# Patient Record
Sex: MALE | Race: WHITE | NOT HISPANIC OR LATINO | Employment: FULL TIME | URBAN - METROPOLITAN AREA
[De-identification: names, ages, dates, MRNs, and addresses within clinical notes are randomized per-mention and may not be internally consistent; named-entity substitution may affect disease eponyms.]

---

## 2017-02-27 ENCOUNTER — HOSPITAL ENCOUNTER (OUTPATIENT)
Dept: RADIOLOGY | Facility: CLINIC | Age: 52
Discharge: HOME | End: 2017-02-27
Payer: COMMERCIAL

## 2017-02-27 ENCOUNTER — TRANSCRIBE ORDERS (OUTPATIENT)
Dept: RADIOLOGY | Facility: CLINIC | Age: 52
End: 2017-02-27

## 2017-02-27 DIAGNOSIS — N20.0 CALCULUS OF KIDNEY: ICD-10-CM

## 2017-02-27 PROCEDURE — 74000 HB X-RAY EXAM OF ABDOMEN (SINGLE ANTEROPOSTERIOR VIEW): CPT

## 2017-03-01 ENCOUNTER — ALLSCRIPTS OFFICE VISIT (OUTPATIENT)
Dept: OTHER | Facility: OTHER | Age: 52
End: 2017-03-01

## 2017-04-02 ENCOUNTER — GENERIC CONVERSION - ENCOUNTER (OUTPATIENT)
Dept: OTHER | Facility: OTHER | Age: 52
End: 2017-04-02

## 2017-04-03 ENCOUNTER — APPOINTMENT (OUTPATIENT)
Dept: LAB | Facility: CLINIC | Age: 52
End: 2017-04-03
Payer: COMMERCIAL

## 2017-04-03 ENCOUNTER — ALLSCRIPTS OFFICE VISIT (OUTPATIENT)
Dept: OTHER | Facility: OTHER | Age: 52
End: 2017-04-03

## 2017-04-03 ENCOUNTER — TRANSCRIBE ORDERS (OUTPATIENT)
Dept: LAB | Facility: CLINIC | Age: 52
End: 2017-04-03

## 2017-04-03 DIAGNOSIS — I48.91 ATRIAL FIBRILLATION, UNSPECIFIED TYPE (HCC): ICD-10-CM

## 2017-04-03 DIAGNOSIS — I48.91 ATRIAL FIBRILLATION, UNSPECIFIED TYPE (HCC): Primary | ICD-10-CM

## 2017-04-04 ENCOUNTER — HOSPITAL ENCOUNTER (OUTPATIENT)
Dept: NON INVASIVE DIAGNOSTICS | Facility: HOSPITAL | Age: 52
Discharge: HOME | End: 2017-04-04
Attending: INTERNAL MEDICINE | Admitting: INTERNAL MEDICINE
Payer: COMMERCIAL

## 2017-04-04 ENCOUNTER — HOSPITAL ENCOUNTER (OUTPATIENT)
Dept: NON INVASIVE DIAGNOSTICS | Facility: HOSPITAL | Age: 52
Discharge: HOME | End: 2017-04-04
Attending: INTERNAL MEDICINE
Payer: COMMERCIAL

## 2017-04-04 ENCOUNTER — ANESTHESIA (OUTPATIENT)
Dept: SURGERY | Facility: HOSPITAL | Age: 52
End: 2017-04-04
Payer: COMMERCIAL

## 2017-04-04 ENCOUNTER — ANESTHESIA EVENT (OUTPATIENT)
Dept: SURGERY | Facility: HOSPITAL | Age: 52
End: 2017-04-04
Payer: COMMERCIAL

## 2017-04-04 ENCOUNTER — ANESTHESIA EVENT (OUTPATIENT)
Dept: NON INVASIVE DIAGNOSTICS | Facility: HOSPITAL | Age: 52
End: 2017-04-04
Payer: COMMERCIAL

## 2017-04-04 ENCOUNTER — GENERIC CONVERSION - ENCOUNTER (OUTPATIENT)
Dept: OTHER | Facility: OTHER | Age: 52
End: 2017-04-04

## 2017-04-04 ENCOUNTER — ANESTHESIA (OUTPATIENT)
Dept: NON INVASIVE DIAGNOSTICS | Facility: HOSPITAL | Age: 52
End: 2017-04-04
Payer: COMMERCIAL

## 2017-04-04 VITALS
SYSTOLIC BLOOD PRESSURE: 116 MMHG | BODY MASS INDEX: 40.52 KG/M2 | HEIGHT: 70 IN | TEMPERATURE: 98.2 F | RESPIRATION RATE: 20 BRPM | HEART RATE: 80 BPM | WEIGHT: 283 LBS | OXYGEN SATURATION: 95 % | DIASTOLIC BLOOD PRESSURE: 75 MMHG

## 2017-04-04 DIAGNOSIS — I48.91 A-FIB (HCC): ICD-10-CM

## 2017-04-04 LAB
ALBUMIN SERPL BCP-MCNC: 3.5 G/DL (ref 3.5–5)
ALP SERPL-CCNC: 49 U/L (ref 46–116)
ALT SERPL W P-5'-P-CCNC: 62 U/L (ref 12–78)
ANION GAP SERPL CALCULATED.3IONS-SCNC: 6 MMOL/L (ref 4–13)
AST SERPL W P-5'-P-CCNC: 20 U/L (ref 5–45)
ATRIAL RATE: 83 BPM
ATRIAL RATE: 86 BPM
BILIRUB SERPL-MCNC: 0.36 MG/DL (ref 0.2–1)
BUN SERPL-MCNC: 20 MG/DL (ref 5–25)
CALCIUM SERPL-MCNC: 8.7 MG/DL (ref 8.3–10.1)
CHLORIDE SERPL-SCNC: 108 MMOL/L (ref 100–108)
CO2 SERPL-SCNC: 27 MMOL/L (ref 21–32)
CREAT SERPL-MCNC: 1.22 MG/DL (ref 0.6–1.3)
ERYTHROCYTE [DISTWIDTH] IN BLOOD BY AUTOMATED COUNT: 13.1 % (ref 11.6–15.1)
GFR SERPL CREATININE-BSD FRML MDRD: >60 ML/MIN/1.73SQ M
GLUCOSE P FAST SERPL-MCNC: 105 MG/DL (ref 65–99)
GLUCOSE SERPL-MCNC: 105 MG/DL (ref 65–140)
HCT VFR BLD AUTO: 44.4 % (ref 36.5–49.3)
HGB BLD-MCNC: 15.4 G/DL (ref 12–17)
MAGNESIUM SERPL-MCNC: 2.2 MG/DL (ref 1.6–2.6)
MCH RBC QN AUTO: 30.1 PG (ref 26.8–34.3)
MCHC RBC AUTO-ENTMCNC: 34.7 G/DL (ref 31.4–37.4)
MCV RBC AUTO: 87 FL (ref 82–98)
P AXIS: 24 DEGREES
PLATELET # BLD AUTO: 239 THOUSANDS/UL (ref 149–390)
PMV BLD AUTO: 11.9 FL (ref 8.9–12.7)
POTASSIUM SERPL-SCNC: 3.7 MMOL/L (ref 3.5–5.3)
PR INTERVAL: 156 MS
PROT SERPL-MCNC: 7.5 G/DL (ref 6.4–8.2)
QRS AXIS: -20 DEGREES
QRS AXIS: -5 DEGREES
QRSD INTERVAL: 82 MS
QRSD INTERVAL: 96 MS
QT INTERVAL: 360 MS
QT INTERVAL: 406 MS
QTC INTERVAL: 452 MS
QTC INTERVAL: 477 MS
RBC # BLD AUTO: 5.12 MILLION/UL (ref 3.88–5.62)
SODIUM SERPL-SCNC: 141 MMOL/L (ref 136–145)
T WAVE AXIS: 27 DEGREES
T WAVE AXIS: 51 DEGREES
VENTRICULAR RATE: 83 BPM
VENTRICULAR RATE: 95 BPM
WBC # BLD AUTO: 8.67 THOUSAND/UL (ref 4.31–10.16)

## 2017-04-04 PROCEDURE — 85027 COMPLETE CBC AUTOMATED: CPT | Performed by: INTERNAL MEDICINE

## 2017-04-04 PROCEDURE — 92960 CARDIOVERSION ELECTRIC EXT: CPT | Performed by: INTERNAL MEDICINE

## 2017-04-04 PROCEDURE — 93312 ECHO TRANSESOPHAGEAL: CPT

## 2017-04-04 PROCEDURE — 80053 COMPREHEN METABOLIC PANEL: CPT | Performed by: INTERNAL MEDICINE

## 2017-04-04 PROCEDURE — 93005 ELECTROCARDIOGRAM TRACING: CPT | Performed by: INTERNAL MEDICINE

## 2017-04-04 PROCEDURE — 83735 ASSAY OF MAGNESIUM: CPT | Performed by: INTERNAL MEDICINE

## 2017-04-04 RX ORDER — SODIUM CHLORIDE 9 MG/ML
50 INJECTION, SOLUTION INTRAVENOUS CONTINUOUS
Status: DISCONTINUED | OUTPATIENT
Start: 2017-04-04 | End: 2017-04-04 | Stop reason: HOSPADM

## 2017-04-04 RX ORDER — PROPOFOL 10 MG/ML
INJECTION, EMULSION INTRAVENOUS AS NEEDED
Status: DISCONTINUED | OUTPATIENT
Start: 2017-04-04 | End: 2017-04-04 | Stop reason: SURG

## 2017-04-04 RX ADMIN — PROPOFOL 20 MG: 10 INJECTION, EMULSION INTRAVENOUS at 17:09

## 2017-04-04 RX ADMIN — SODIUM CHLORIDE: 0.9 INJECTION, SOLUTION INTRAVENOUS at 16:41

## 2017-04-04 RX ADMIN — PROPOFOL 50 MG: 10 INJECTION, EMULSION INTRAVENOUS at 17:02

## 2017-04-04 RX ADMIN — PROPOFOL 100 MG: 10 INJECTION, EMULSION INTRAVENOUS at 16:57

## 2017-04-04 RX ADMIN — PROPOFOL 200 MG: 10 INJECTION, EMULSION INTRAVENOUS at 16:52

## 2017-04-04 RX ADMIN — PROPOFOL 50 MG: 10 INJECTION, EMULSION INTRAVENOUS at 17:00

## 2017-04-04 RX ADMIN — TOPICAL ANESTHETIC 1 SPRAY: 200 SPRAY DENTAL; PERIODONTAL at 16:51

## 2017-04-04 RX ADMIN — PROPOFOL 30 MG: 10 INJECTION, EMULSION INTRAVENOUS at 17:05

## 2017-12-30 ENCOUNTER — OFFICE VISIT (OUTPATIENT)
Dept: URGENT CARE | Facility: MEDICAL CENTER | Age: 52
End: 2017-12-30
Payer: COMMERCIAL

## 2017-12-30 PROCEDURE — G0382 LEV 3 HOSP TYPE B ED VISIT: HCPCS

## 2018-01-01 NOTE — PROGRESS NOTES
Assessment   1  Acute upper respiratory infection (465 9) (J06 9)    Plan   Acute upper respiratory infection    · Benzonatate 200 MG Oral Capsule; TAKE 1 CAPSULE 3 TIMES DAILY AS NEEDED    Discussion/Summary   Discussion Summary:    1  Push fluids  Take Tessalon 1 every 8 hours as needed for cough  Follow-up with PCP if symptoms persist     Medication Side Effects Reviewed: Possible side effects of new medications were reviewed with the patient/guardian today  Understands and agrees with treatment plan: The treatment plan was reviewed with the patient/guardian  The patient/guardian understands and agrees with the treatment plan      Chief Complaint   1  Cold Symptoms  Chief Complaint Free Text Note Form: Pt presents with cold symptoms for 3-4 days  No fever      History of Present Illness   HPI: The patient is a 15-year-old male who presents with 3 day history of nasal discharge, coughing congestion  He denies any fever, chills or body aches since the onset of his symptoms  Patient denies any upset stomach, vomiting or diarrhea  Hospital Based Practices Required Assessment:      Pain Assessment      the patient states they do not have pain  Abuse And Domestic Violence Screen       Yes, the patient is safe at home  -- The patient states no one is hurting them  Depression And Suicide Screen  No, the patient has not had thoughts of hurting themself  No, the patient has not felt depressed in the past 7 days  Prefered Language is  english  Primary Language is  english  Readiness To Learn: Receptive  Barriers To Learning: none  Preferred Learning: verbal      Review of Systems   Focused-Male:      Constitutional: no fever or chills, feels well, no tiredness, no recent weight loss or weight gain  ENT: nasal discharge  Respiratory: cough-- and-- PND  Active Problems   1  Asthma (246 90) (J45 909)   2  Atrial fibrillation (840 31) (I42 91)   3   Benign essential hypertension (401 1) (I10)   4  Bilateral kidney stones (592 0) (N20 0)   5  Bilateral leg weakness (729 89) (R29 898)   6  Cough (786 2) (R05)   7  Degenerative joint disease of right knee (715 96) (M17 11)   8  Essential hypertension (401 9) (I10)   9  Hypertension (401 9) (I10)   10  Morbid obesity (278 01) (E66 01)   11  Muscle weakness (generalized) (728 87) (M62 81)   12  Nephrolithiasis (592 0) (N20 0)   13  Primary osteoarthritis of left knee (715 16) (M17 12)   14  Prostate cancer screening (V76 44) (Z12 5)   15  Right knee pain (719 46) (M25 561)   16  Spinal stenosis (724 00) (M48 00)   17  Strep pharyngitis (034 0) (J02 0)   18  Tear of medial meniscus of right knee (836 0) (S83 241A)   19  Tremor (781 0) (R25 1)    Past Medical History   1  History of Acute bronchitis (466 0) (J20 9)   2  History of Foot Pain (Soft Tissue) (729 5)   3  History of arthritis (V13 4) (Z87 39)   4  History of Irregular heart beat (427 9) (I49 9)  Active Problems And Past Medical History Reviewed: The active problems and past medical history were reviewed and updated today  Family History   Mother    1  Family history of Atrial fibrillation   2  Family history of blood dyscrasia (V18 3) (Z83 2)   3  Family history of hypertension (V17 49) (Z82 49)   4  Family history of Hypoglycemia  Father    5  Family history of atrial fibrillation (V17 49) (Z82 49)   6  Family history of Hypoglycemia  Family History    7  Family history of Diabetes Mellitus (V18 0)   8  Family history of Thrombocytosis  Family History Reviewed: The family history was reviewed and updated today  Social History    · Alcohol use (V49 89) (Z78 9)   · Completed 12th grade   · Former smoker (A43 88) (F58 508)   ·    · No caffeine use   · No drug use  Social History Reviewed: The social history was reviewed and updated today  Surgical History   1  History of Appendectomy   2   History of Cystoscopy With Insertion Of Ureteral Stent 3  History of Cystoscopy With Removal Of Object   4  History of Foot Surgery Left   5  History of Hand Surgery  Surgical History Reviewed: The surgical history was reviewed and updated today  Current Meds    1  AmLODIPine Besylate 2 5 MG Oral Tablet; TAKE 1 TABLET DAILY; Therapy: 29ZPI9862 to (Evaluate:26Rrt2311)  Requested for: 33GPK9430; Last     Rx:78Jhr1387 Ordered   2  AmLODIPine Besylate 5 MG Oral Tablet; Take 1 tablet daily; Therapy: 74OPZ0459 to (Evaluate:12Oct2018)  Requested for: 31KAU6256; Last     Rx:16Nov2017 Ordered   3  Bystolic 2 5 MG Oral Tablet; TAKE 1 TABLET DAILY; Therapy: 72ZVW9365 to (Evaluate:07Jun2018)  Requested for: 12Jun2017; Last     Rx:12Jun2017 Ordered   4  Bystolic 5 MG Oral Tablet; TAKE 1 TABLET IN THE MORNING AND 0 5 TABLET IN THE     EVENING; Therapy: (Recorded:80Lbc3732) to Recorded   5  Dofetilide 500 MCG Oral Capsule; take one capsule by mouth every 12 hours; Therapy: 74RZA1124 to (Evaluate:17Nov2018)  Requested for: 21MKE0447; Last     Rx:22Nov2017 Ordered   6  Edarbi 80 MG Oral Tablet; TAKE 1 TABLET DAILY; Last Rx:07Apr2017 Ordered   7  Tikosyn 500 MCG Oral Capsule; take one capsule by mouth every 12 hours; Therapy: 69Xou5544 to (Vertis Bodily)  Requested for: 15BOS7644; Last     Rx:33Mqz6318 Ordered   8  Xarelto 20 MG Oral Tablet; take 1 tablet every day; Therapy: 42PCP1022 to (Aniya Shepherd)  Requested for: 50MHW0260; Last     Rx:74Sss7610 Ordered  Medication List Reviewed: The medication list was reviewed and updated today  Allergies   1  No Known Drug Allergies    Vitals   Signs   Recorded: 30Dec2017 12:17PM   Temperature: 98 3 F  Heart Rate: 75  Respiration: 20  Systolic: 225  Diastolic: 88  Weight: 953 lb   BMI Calculated: 42 18  BSA Calculated: 2 46  O2 Saturation: 97  Pain Scale: 0    Physical Exam        Constitutional      General appearance: No acute distress, well appearing and well nourished         Ears, Nose, Mouth, and Throat      External inspection of ears and nose: Normal        Otoscopic examination: Tympanic membrance translucent with normal light reflex  Canals patent without erythema  Nasal mucosa, septum, and turbinates: Abnormal  -- Slight, clear nasal drainage bilateral       Oropharynx: Normal with no erythema, edema, exudate or lesions  Pulmonary      Respiratory effort: No increased work of breathing or signs of respiratory distress  Auscultation of lungs: Clear to auscultation  Cardiovascular      Auscultation of heart: Normal rate and rhythm, normal S1 and S2, without murmurs         Future Appointments      Date/Time Provider Specialty Site   03/07/2018 09:00 AM [facility]la, [facility]     Signatures    Electronically signed by : Phani Schmid, [facility]; Dec 30 2017  2:06PM EST                       (Author)     Electronically signed by : ANEUDY Eldridge ; Dec 31 2017  2:23PM EST                       (Co-author)

## 2018-01-09 NOTE — RESULT NOTES
Message  pt seen for emg today , mri of lumbar spine , no spinal stenosis , emg of lower extremties today was normal , due to persistent symptoms of leg heaviness , will order mri of tpsine   pt has f/u with me in march Signatures   Electronically signed by :  Kali Henderson DO; Jan 26 2016 10:40AM EST                       (Author)

## 2018-01-10 NOTE — RESULT NOTES
Verified Results  XR SPINE LUMBAR COMPLETE Pankaj Connell MINIMUM 6 VIEWS 83ECW0625 12:38PM [facility] Order Number: [id number]     Test Name Result Flag Reference   XR SPINE LUMBAR COMPLETE W BENDING MINIMUM 6 VIEWS (Report)     LUMBAR SPINE     INDICATION: Low back pain     COMPARISON: None     VIEWS: AP, bilateral oblique, coned down and lateral projections in neutral, flexion and extension; 6 images     FINDINGS:     Alignment is unremarkable  There is no subluxation and alignment is stable in flexion, extension, and neutral positioning  There is no radiographic evidence of acute fracture or destructive osseous lesion  No significant lumbar degenerative change noted  Several calcifications overlie the lower pole of the left kidney, the largest measures 4 mm in diameter  2 additional smaller calculi are seen  Normal lumbar spine  Left renal calculi  This can be further evaluated with an ultrasound         ##sigslh##sigslh

## 2018-01-11 NOTE — PROGRESS NOTES
Chief Complaint  Pt here for BP check per Evelin Manner  Took BP 2 times about 10 minutes apart  1st: 170/90 2nd:172/98      Active Problems    1  Asthma (493 90) (J45 909)   2  Atrial fibrillation (427 31) (I48 91)   3  Benign essential hypertension (401 1) (I10)   4  Bilateral kidney stones (592 0) (N20 0)   5  Bilateral leg weakness (729 89) (M62 81)   6  Degenerative joint disease of right knee (715 96) (M17 9)   7  Essential hypertension (401 9) (I10)   8  Hypertension (401 9) (I10)   9  Morbid obesity (278 01) (E66 01)   10  Muscle weakness (generalized) (728 87) (M62 81)   11  Nephrolithiasis (592 0) (N20 0)   12  Primary osteoarthritis of left knee (715 16) (M17 12)   13  Right knee pain (719 46) (M25 561)   14  Spinal stenosis (724 00) (M48 00)   15  Strep pharyngitis (034 0) (J02 0)   16  Tear of medial meniscus of right knee (836 0) (S83 241A)   17  Tremor (781 0) (R25 1)    Current Meds   1  AmLODIPine Besylate 5 MG Oral Tablet; Take 1 tablet daily; Therapy: 26THA0528 to (Evaluate:05Pcf0792)  Requested for: 35MYV9354; Last   Rx:02Nov2016 Ordered   2  Benicar 40 MG Oral Tablet; TAKE 1 TABLET DAILY; Therapy: 98VDM8404 to (Evaluate:12Oct2017)  Requested for: 29Oct2016; Last   Rx:36Hgs9418 Ordered   3  Bystolic 5 MG Oral Tablet; TAKE 1 TABLET DAILY  Requested for: 31Hvi8933; Last   [place]:68TVV3719 Ordered   4  Nitrofurantoin Macrocrystal 100 MG Oral Capsule; 100 mg twice daily for three days; Therapy: 84Tvu3337 to (Katie Santos)  Requested for: 29Dcg1247; Last   Rx:36Tff8078 Ordered   5  Tikosyn 500 MCG Oral Capsule; take one capsule by mouth every 12 hours; Therapy: 14Dub4807 to (Dale Kay)  Requested for: 54HHT1862; Last   Rx:23Sjr0420 Ordered   6  Xarelto 20 MG Oral Tablet; take 1 tablet every day; Therapy: 37LNT2195 to (Evaluate:28Xoa8736)  Requested for: 59RBE8032; Last   Rx:01Jfx8191 Ordered    Allergies    1   No Known Drug Allergies    Vitals  Signs    Heart Rate: 88  Systolic: 881  Diastolic: 90  BP Cuff Size: Large  Height: 5 ft 10 in  Weight: 294 lb 6 oz  BMI Calculated: 42 24  BSA Calculated: 2 46  O2 Saturation: 97    Future Appointments    Date/Time Provider Specialty Site   03/01/2017 10:30 AM Neelam Bravo Urology [facility] FOR UROLOGY [facility]   12/13/2016 10:40 AM Kira Godoy DO Cardiology [facility]     Signatures   Electronically signed by : Lauren Khan DO; Dec  4 2016  5:26PM EST                       (Author)

## 2018-01-12 NOTE — PROCEDURES
Procedures by Janeen Cardenas MD at  4/4/2017  5:22 PM      Author:  Janeen Cardenas MD Service:  Cardiology Author Type:  Fellow    Filed:  4/4/2017  5:24 PM Date of Service:  4/4/2017  5:22 PM Status:  Attested    :  Janeen Cardenas MD (Fellow)  Cosigner:  Kristopher Kim MD at 5/2/2017  7:07 PM      Procedure Orders:       1  ELECTRICAL CARDIOVERSION [[id number]] ordered by Janeen Cardenas MD at 04/04/17 1722                 Post-procedure Diagnoses:       1  A-fib [I48 91]              Attestation signed by Kristopher Kim MD at 5/2/2017  7:07 PM             I have reviewed the notes, assessments, and plan performed by Janeen Cardenas,   I personally reviewed the history with the patient and examined the patient  I concur with his documentation of [facility]                                               Electrical Cardioversion  Date/Time: 4/4/2017 5:22 PM  Performed by: Akil Rose  Authorized by: Akil Rose     Verbal consent obtained?: Yes    Written consent obtained?: Yes    Risks and benefits: Risks, benefits and alternatives were discussed     Consent given by:  Patient  Patient states understanding  of procedure being performed: Yes    Patient's understanding  of procedure matches consent: Yes    Procedure consent matches  procedure scheduled: Yes    Relevant documents present and  verified: Yes    Test results available and properly labeled:  Yes    Site marked: Yes    Imaging studies available: Yes    Patient identity confirmed:  Verbally with patient, arm band, provided demographic data and hospital-assigned identification number  Time out: Immediately prior to the procedure a time out was called    Patient sedated: Yes    Sedation:  Propofol  Vital signs: Vital signs  monitored during sedation    Cardioversion basis:  Elective  Elective indications:  failure of anti-arrhythmic medications    Pre-procedure rhythm:  Atrial fibrillation  Position:  Patient was placed in a supine position    Chest area exposed:  Chest area was exposed    Electrodes:  Pads  Electrodes placed:  Anterior-posterior  Number of attempts:  1  Attempt 1:     Attempt 1 mode:  Synchronous    Attempt 1 waveform:  Biphasic    Attempt 1 shock (Joules):  200    Attempt 1 outcome:  Conversion to normal sinus rhythm  Post-procedure rhythm:  Normal sinus rhythm  Complications:  no complications    Patient tolerance:  Patient tolerated the procedure well with no immediate complications   Preprocedure BLAIRE performed  Left atrial clot/thrombus was ruled out                        Received Antonio Acevedo MD  May  2 2017  7:07PM [facility] Standard Time

## 2018-01-13 VITALS
SYSTOLIC BLOOD PRESSURE: 138 MMHG | HEIGHT: 70 IN | HEART RATE: 60 BPM | WEIGHT: 300 LBS | BODY MASS INDEX: 42.95 KG/M2 | DIASTOLIC BLOOD PRESSURE: 90 MMHG

## 2018-01-13 NOTE — RESULT NOTES
Verified Results  * MRI LUMBAR SPINE [facility] [employer] [id number] 09:31AM [place] Navarro     Test Name Result Flag Reference   MRI LUMBAR SPINE [employer] (Report)     This is a summary report  The complete report is available in the patient's medical record  If you cannot access the medical record, please contact the sending organization for a detailed fax or copy  MRI LUMBAR SPINE WITHOUT CONTRAST     INDICATION: 49-year-old male, leg weakness     COMPARISON: 1/13/2016 x-rays     TECHNIQUE: Sagittal T1, sagittal T2, sagittal inversion recovery, axial T1 and axial T2, coronal T2       IMAGE QUALITY: Diagnostic     FINDINGS:     ALIGNMENT: Normal alignment of the lumbar spine  No compression fracture  No spondylolysis or spondylolisthesis  No scoliosis  MARROW SIGNAL:      Approximately 2 cm L5 vertebral body hemangioma   No significant marrow pathology     DISTAL CORD AND CONUS: Normal size and signal within the distal cord and conus  The conus ends at the L1 level  PARASPINAL SOFT TISSUES: Paraspinal soft tissues are unremarkable  SACRUM: Normal signal within the sacrum  No evidence of insufficiency or stress fracture  LOWER THORACIC DISC SPACES: Normal disc height and signal  No disc herniation, canal stenosis or foraminal narrowing       LUMBAR DISC SPACES:        L1-L2: Normal      L2-L3: Mild degenerative disc dehydration, mild degenerative facet hypertrophy, normal interspace height, no stenosis     L3-L4: Mild degenerative disc dehydration, mild to moderate degenerative facet hypertrophy, normal interspace height, no stenosis     L4-L5: Mild degenerative disc dehydration, mild to moderate degenerative facet hypertrophy, normal interspace height, bulging annulus, central annular tear, no stenosis     L5-S1: Mild degenerative disc dehydration, moderate degenerative facet hypertrophy, mild loss of interspace height, bulging annulus, no stenosis     Multilevel degenerative spondylosis, less conspicuous by x-rays     Bulging annulus, central annular tear L4-5     No evidence of disc herniation, central canal or foraminal stenosis     Incidentally noted L5 vertebral body hemangioma

## 2018-01-16 NOTE — MISCELLANEOUS
Provider Comments  Provider Comments:   Patient no-showed appointment with Dr Maria Antonia Shaver on 03/01/16; [facility] for patient to call back and re-schedule    -Erwin Bermudez   Electronically signed by :  Leopoldo Locket, DO; Mar  3 2016  4:04PM EST                       (Author)

## 2018-01-16 NOTE — PROGRESS NOTES
Chief Complaint  Patient here today for nursing EKG visit  Patient c/o Hans, fatigue and SOB  Active Problems    1  Asthma (493 90) (J45 909)   2  Atrial fibrillation (427 31) (I48 91)   3  Benign essential hypertension (401 1) (I10)   4  Bilateral kidney stones (592 0) (N20 0)   5  Bilateral leg weakness (729 89) (R29 898)   6  Cough (786 2) (R05)   7  Degenerative joint disease of right knee (715 96) (M17 11)   8  Essential hypertension (401 9) (I10)   9  Hypertension (401 9) (I10)   10  Morbid obesity (278 01) (E66 01)   11  Muscle weakness (generalized) (728 87) (M62 81)   12  Nephrolithiasis (592 0) (N20 0)   13  Primary osteoarthritis of left knee (715 16) (M17 12)   14  Prostate cancer screening (V76 44) (Z12 5)   15  Right knee pain (719 46) (M25 561)   16  Spinal stenosis (724 00) (M48 00)   17  Strep pharyngitis (034 0) (J02 0)   18  Tear of medial meniscus of right knee (836 0) (S83 241A)   19  Tremor (781 0) (R25 1)    Current Meds   1  AmLODIPine Besylate 2 5 MG Oral Tablet; TAKE 1 TABLET DAILY; Therapy: 49GNC2066 to (Evaluate:82Mfk8945)  Requested for: 03CVP8332; Last   Rx:82Uct9562 Ordered   2  AmLODIPine Besylate 5 MG Oral Tablet; take one tablet by mouth daily; Therapy: 91IRU2269 to (Evaluate:31Glh3445)  Requested for: 83Mcu8985; Last   Rx:30Gjx5276 Ordered   3  Bystolic 5 MG Oral Tablet; TAKE 1 TABLET IN THE MORNING AND 0 5 TABLET IN THE   EVENING; Therapy: (Recorded:84Qll1714) to Recorded   4  Edarbi 80 MG Oral Tablet; Therapy: (Recorded:01Mar2017) to Recorded   5  Tikosyn 500 MCG Oral Capsule; take one capsule by mouth every 12 hours; Therapy: 19Mkr1316 to (Maryuri Martinez)  Requested for: 40OKS4526; Last   Rx:38Cek5214 Ordered   6  Xarelto 20 MG Oral Tablet; take 1 tablet every day; Therapy: 48YRY8712 to (Evaluate:84Jyy0272)  Requested for: 35Gxc2574; Last   Rx:53Onc1051 Ordered    Allergies    1   No Known Drug Allergies    Vitals  Signs    Heart Rate: 113, Apical  Systolic: 901, LUE  Diastolic: 80, LUE  Height: 5 ft 10 in  Weight: 285 lb 6 oz  BMI Calculated: 40 95  BSA Calculated: 2 43    Plan  Atrial fibrillation    · EKG/ECG- POC; Status:Complete;   Done: 98OHJ0309    Future Appointments    Date/Time Provider Specialty Site   03/07/2018 09:00 AM Johanne Park, Neelam Sparks Urology [facility] FOR UROLOGY [facility]   04/05/2017 12:40 PM Missy Munoz DO Cardiology [facility]

## 2018-01-17 NOTE — PROGRESS NOTES
Preliminary Nursing Report                Patient Information    Initial Encounter Entry Date:   2016 9:02 AM EST (Automated Transmission Automated Transmission)       Last Modified:   {Laly Monroe}              Legal Name: 73Art Garduno Number:        YOB: 1965        Age (years): 48        Gender: M        Body Mass Index (BMI): 42 kg/m2        Height: 70 in  Weight: 290 lbs (132 kgs)           Address:   [address] [place] [id number] [place]              Phone: [phone]   (consent to leave messages)        Email:        Ethnicity: Decline to State        Yarsani:        Marital Status:        Preferred Language: English        Race: Other Race                    Patient Insurance Information        Primary Insurance Information Carrier Name: {Primary  CarrierName}           Carrier Address:   {Primary  CarrierAddress}              Carrier Phone: {Primary  CarrierPhone}          Group Number: {Primary  GroupNumber}          Policy Number: {Primary  PolicyNumber}          Insured Name: {Primary  InsuredName}          Insured : {Primary  InsuredDOB}          Relationship to Insured: {Primary  RelationshiptoInsured}           Secondary Insurance Information Carrier Name: {Secondary  CarrierName}           Carrier Address:   {Secondary  CarrierAddress}              Carrier Phone: {Secondary  CarrierPhone}          Group Number: {Secondary  GroupNumber}          Policy Number: {Secondary  PolicyNumber}          Insured Name: {Secondary  InsuredName}          Insured : {Secondary  InsuredDOB}          Relationship to Insured: {Secondary  RelationshiptoInsured}                       Health Profile   Booking #:   Nick Le #: [id number]               DOS: 2016    Surgery : CYSTOURETHROSCOPY, WITH URETEROSCOPY AND/OR PYELOSCOPY;    Add'l Procedures/Notes:     Surgery Risk: Minor          Precautions     Atrial fibrillation Allergies    No Known Drug Allergies             Medications    AmLODIPine Besylate 5 MG Oral Tablet       Benzonatate 100 MG Oral Capsule       Bystolic 5 MG Oral Tablet       Losartan Potassium 100 MG Oral Tablet       Tikosyn 500 MCG Oral Capsule       Xarelto 20 MG Oral Tablet               Conditions    Asthma       Atrial fibrillation       Bilateral kidney stones       Bilateral leg weakness       Degenerative joint disease of right knee       Essential hypertension       Muscle weakness (generalized)       Nephrolithiasis       Primary osteoarthritis of left knee       Right knee pain       Spinal stenosis       Strep pharyngitis       Tear of medial meniscus of right knee       Tremor               Family History    None             Surgical History    None             Social History    Alcohol use       Completed 12th grade       Former smoker              Clinical Comments: with 4 children, works for [employer], wife name Jewels Bustillo; No caffeine use       No drug use                               Patient Instructions       ? NPO Instructions   The day before surgery it is recommended to have a light dinner at your usual time and you are allowed a light snack early in the evening  Do not eat anything heavy or eat a big meal after 7pm  Do not eat or drink anything after midnight prior to your surgery  If you are supposed to take any of your medications, do so with a sip of water  Failure to follow these instructions can lead to an increased risk of lung complications and may result in a delay or cancellation of your procedure  If you have any questions, contact your institution for further instructions  No candy, no gum, no mints, no chewing tobacco   Triggered by: Medical Procedure Risk         ? Direct Xa inhibitor   With physician consultation, please stop the triggering medication at least 3 days before major surgery or neuraxial procedures  Triggered by: Xarelto 20 MG Oral Tablet         ? Antiarrhythmic Medication   Please continue the following cardiac medication up to and including the day of surgery (with a sip of water)  Triggered by: Tikosyn 500 MCG Oral Capsule         ? Beta Blocker 3, 2, c, 4, 6, 5  Please continue to take this medication on your normal schedule  If this is an oral medication and you take in the morning, you may do so with a sip of water  Triggered by: Bystolic 5 MG Oral Tablet         ? Calcium Blocker (Blood Pressure Medication) 3, 4, 6, 5, 2  Please continue to take this medication on your normal schedule  If this is an oral medication and you take in the morning, you may do so with a sip of water  Triggered by: AmLODIPine Besylate 5 MG Oral Tablet               Testing Considerations       No recommendations for this classification  Consultations       ? Cardiac Consult (Major Rate) c  If the patient has a heart rate of greater than 100 bpm, or if the heart rhythm disturbance is new, then a cardiac consult is indicated  Otherwise, optimization of medical therapy is recommended under the direction of the PCP or cardiologist   Triggered by: Atrial fibrillation               Miscellaneous Questions         Question: Are you able to walk up a flight of stairs, walk up a hill or do heavy housework WITHOUT having chest pain or shortness of breath? Answer: YES                   Allergies/Conditions/Medications Not Found        The following were not recognized by our system when generating the recommendations  Please consider if this would impact any preoperative protocols  ? Alcohol use       ? Completed 12th grade       ?        ? No caffeine use       ? No drug use       ? Losartan Potassium 100 MG Oral Tablet                  Appointment Information         Date:    08/09/2016        Location:    [place]        Address:           Directions:                      Footnotes revision 14      ?? Denotes a free-text entry        Legal Disclaimer: Any and all recommendations and services provided herein are designed to assist in the preoperative care of the patient  Nothing contained herein is designed to replace, eliminate or alleviate the responsibility of the attending physician to supervise and determine the patient?s preoperative care and course of treatment  Failure to provide complete, accurate information may negatively impact the system?s ability to recommend the proper preoperative protocol  THE ATTENDING PHYSICIAN IS RESPONSIBLE TO REVIEW THE SUGGESTED PREOPERATIVE PROTOCOLS/COURSE OF TREATMENT AND PRESCRIBE THE FINAL COURSE OF PREOPERATIVE TREATMENT IN CONSULTATION WITH THE PATIENT  THE ePREOP SYSTEM AND ITS MATERIALS ARE PROVIDED ? AS IS? WITHOUT WARRANTY OF ANY KIND, EXPRESS OR IMPLIED, INCLUDING, BUT NOT LIMITED TO, WARRANTIES OF PERFORMANCE OR MERCHANTABILITY OR FITNESS FOR A PARTICULAR PURPOSE  PATIENT AND PHYSICIANS HEREBY AGREE THAT THEIR USE OF THE MATERIALS AND RESOURCES ACT AS A CONSENT TO RELEASE AND WAIVE ePREOP FROM ANY AND ALL CLAIMS OF WARRANTY, TORT OR CONTRACT LAW OF ANY KIND             Electronically signed by:Sahil Sow MD  Jul 29 2016 12:16PM EST

## 2018-01-22 VITALS
SYSTOLIC BLOOD PRESSURE: 138 MMHG | BODY MASS INDEX: 40.86 KG/M2 | WEIGHT: 285.38 LBS | DIASTOLIC BLOOD PRESSURE: 80 MMHG | HEIGHT: 70 IN | HEART RATE: 113 BPM

## 2018-01-23 VITALS
SYSTOLIC BLOOD PRESSURE: 166 MMHG | HEART RATE: 75 BPM | RESPIRATION RATE: 20 BRPM | DIASTOLIC BLOOD PRESSURE: 88 MMHG | TEMPERATURE: 98.3 F | WEIGHT: 294 LBS | OXYGEN SATURATION: 97 % | BODY MASS INDEX: 42.18 KG/M2

## 2018-01-24 ENCOUNTER — TELEPHONE (OUTPATIENT)
Dept: FAMILY MEDICINE CLINIC | Facility: CLINIC | Age: 53
End: 2018-01-24

## 2018-01-24 DIAGNOSIS — Z20.828 EXPOSURE TO INFLUENZA: Primary | ICD-10-CM

## 2018-01-24 RX ORDER — OSELTAMIVIR PHOSPHATE 75 MG/1
75 CAPSULE ORAL EVERY 12 HOURS SCHEDULED
Qty: 10 CAPSULE | Refills: 0 | Status: SHIPPED | OUTPATIENT
Start: 2018-01-24 | End: 2018-01-29

## 2018-03-01 DIAGNOSIS — N20.0 CALCULUS OF KIDNEY: ICD-10-CM

## 2018-03-01 DIAGNOSIS — Z12.5 ENCOUNTER FOR SCREENING FOR MALIGNANT NEOPLASM OF PROSTATE: ICD-10-CM

## 2018-04-18 DIAGNOSIS — I10 ESSENTIAL HYPERTENSION: Primary | ICD-10-CM

## 2018-04-18 RX ORDER — AZILSARTAN KAMEDOXOMIL 80 MG/1
TABLET ORAL
Qty: 30 TABLET | Refills: 11 | Status: SHIPPED | OUTPATIENT
Start: 2018-04-18 | End: 2019-04-26 | Stop reason: SDUPTHER

## 2018-04-21 DIAGNOSIS — I10 ESSENTIAL HYPERTENSION: Primary | ICD-10-CM

## 2018-04-23 RX ORDER — AMLODIPINE BESYLATE 2.5 MG/1
TABLET ORAL
Qty: 90 TABLET | Refills: 2 | Status: SHIPPED | OUTPATIENT
Start: 2018-04-23 | End: 2018-09-03

## 2018-06-08 DIAGNOSIS — I10 ESSENTIAL HYPERTENSION: Primary | ICD-10-CM

## 2018-06-08 RX ORDER — NEBIVOLOL HYDROCHLORIDE 2.5 MG/1
TABLET ORAL
Qty: 90 TABLET | Refills: 3 | Status: SHIPPED | OUTPATIENT
Start: 2018-06-08 | End: 2018-10-29 | Stop reason: HOSPADM

## 2018-09-03 ENCOUNTER — HOSPITAL ENCOUNTER (EMERGENCY)
Facility: HOSPITAL | Age: 53
Discharge: HOME | End: 2018-09-03
Attending: EMERGENCY MEDICINE | Admitting: EMERGENCY MEDICINE
Payer: COMMERCIAL

## 2018-09-03 VITALS
TEMPERATURE: 98.3 F | HEART RATE: 58 BPM | SYSTOLIC BLOOD PRESSURE: 139 MMHG | DIASTOLIC BLOOD PRESSURE: 74 MMHG | RESPIRATION RATE: 18 BRPM | OXYGEN SATURATION: 94 %

## 2018-09-03 DIAGNOSIS — I48.91 ATRIAL FIBRILLATION WITH RVR (HCC): Primary | ICD-10-CM

## 2018-09-03 LAB
ANION GAP SERPL CALCULATED.3IONS-SCNC: 12 MMOL/L (ref 4–13)
ATRIAL RATE: 357 BPM
ATRIAL RATE: 60 BPM
BASOPHILS # BLD AUTO: 0.08 THOUSANDS/ΜL (ref 0–0.1)
BASOPHILS NFR BLD AUTO: 1 % (ref 0–1)
BUN SERPL-MCNC: 23 MG/DL (ref 5–25)
CALCIUM SERPL-MCNC: 9 MG/DL (ref 8.3–10.1)
CHLORIDE SERPL-SCNC: 105 MMOL/L (ref 100–108)
CO2 SERPL-SCNC: 27 MMOL/L (ref 21–32)
CREAT SERPL-MCNC: 1.38 MG/DL (ref 0.6–1.3)
EOSINOPHIL # BLD AUTO: 0.26 THOUSAND/ΜL (ref 0–0.61)
EOSINOPHIL NFR BLD AUTO: 3 % (ref 0–6)
ERYTHROCYTE [DISTWIDTH] IN BLOOD BY AUTOMATED COUNT: 12.5 % (ref 11.6–15.1)
GFR SERPL CREATININE-BSD FRML MDRD: 58 ML/MIN/1.73SQ M
GLUCOSE SERPL-MCNC: 99 MG/DL (ref 65–140)
HCT VFR BLD AUTO: 50.6 % (ref 36.5–49.3)
HGB BLD-MCNC: 16.8 G/DL (ref 12–17)
IMM GRANULOCYTES # BLD AUTO: 0.03 THOUSAND/UL (ref 0–0.2)
IMM GRANULOCYTES NFR BLD AUTO: 0 % (ref 0–2)
LYMPHOCYTES # BLD AUTO: 2.21 THOUSANDS/ΜL (ref 0.6–4.47)
LYMPHOCYTES NFR BLD AUTO: 24 % (ref 14–44)
MCH RBC QN AUTO: 29.7 PG (ref 26.8–34.3)
MCHC RBC AUTO-ENTMCNC: 33.2 G/DL (ref 31.4–37.4)
MCV RBC AUTO: 89 FL (ref 82–98)
MONOCYTES # BLD AUTO: 1.05 THOUSAND/ΜL (ref 0.17–1.22)
MONOCYTES NFR BLD AUTO: 11 % (ref 4–12)
NEUTROPHILS # BLD AUTO: 5.66 THOUSANDS/ΜL (ref 1.85–7.62)
NEUTS SEG NFR BLD AUTO: 61 % (ref 43–75)
NRBC BLD AUTO-RTO: 0 /100 WBCS
P AXIS: 71 DEGREES
PHOSPHATE SERPL-MCNC: 3.5 MG/DL (ref 2.7–4.5)
PLATELET # BLD AUTO: 223 THOUSANDS/UL (ref 149–390)
PMV BLD AUTO: 11.8 FL (ref 8.9–12.7)
POTASSIUM SERPL-SCNC: 3.9 MMOL/L (ref 3.5–5.3)
PR INTERVAL: 158 MS
QRS AXIS: -43 DEGREES
QRS AXIS: -47 DEGREES
QRSD INTERVAL: 78 MS
QRSD INTERVAL: 82 MS
QT INTERVAL: 340 MS
QT INTERVAL: 414 MS
QTC INTERVAL: 414 MS
QTC INTERVAL: 460 MS
RBC # BLD AUTO: 5.66 MILLION/UL (ref 3.88–5.62)
SODIUM SERPL-SCNC: 144 MMOL/L (ref 136–145)
T WAVE AXIS: 61 DEGREES
T WAVE AXIS: 90 DEGREES
TROPONIN I SERPL-MCNC: <0.02 NG/ML
TSH SERPL DL<=0.05 MIU/L-ACNC: 2.95 UIU/ML (ref 0.36–3.74)
VENTRICULAR RATE: 110 BPM
VENTRICULAR RATE: 60 BPM
WBC # BLD AUTO: 9.29 THOUSAND/UL (ref 4.31–10.16)

## 2018-09-03 PROCEDURE — 93010 ELECTROCARDIOGRAM REPORT: CPT | Performed by: INTERNAL MEDICINE

## 2018-09-03 PROCEDURE — 96374 THER/PROPH/DIAG INJ IV PUSH: CPT

## 2018-09-03 PROCEDURE — 93005 ELECTROCARDIOGRAM TRACING: CPT

## 2018-09-03 PROCEDURE — 96361 HYDRATE IV INFUSION ADD-ON: CPT

## 2018-09-03 PROCEDURE — 36415 COLL VENOUS BLD VENIPUNCTURE: CPT | Performed by: EMERGENCY MEDICINE

## 2018-09-03 PROCEDURE — 84484 ASSAY OF TROPONIN QUANT: CPT | Performed by: EMERGENCY MEDICINE

## 2018-09-03 PROCEDURE — 84443 ASSAY THYROID STIM HORMONE: CPT | Performed by: EMERGENCY MEDICINE

## 2018-09-03 PROCEDURE — 99285 EMERGENCY DEPT VISIT HI MDM: CPT

## 2018-09-03 PROCEDURE — 92960 CARDIOVERSION ELECTRIC EXT: CPT

## 2018-09-03 PROCEDURE — 80048 BASIC METABOLIC PNL TOTAL CA: CPT | Performed by: EMERGENCY MEDICINE

## 2018-09-03 PROCEDURE — 84100 ASSAY OF PHOSPHORUS: CPT | Performed by: EMERGENCY MEDICINE

## 2018-09-03 PROCEDURE — 85025 COMPLETE CBC W/AUTO DIFF WBC: CPT | Performed by: EMERGENCY MEDICINE

## 2018-09-03 RX ORDER — ETOMIDATE 2 MG/ML
0.15 INJECTION INTRAVENOUS ONCE
Status: COMPLETED | OUTPATIENT
Start: 2018-09-03 | End: 2018-09-03

## 2018-09-03 RX ORDER — FENTANYL CITRATE 50 UG/ML
50 INJECTION, SOLUTION INTRAMUSCULAR; INTRAVENOUS ONCE
Status: COMPLETED | OUTPATIENT
Start: 2018-09-03 | End: 2018-09-03

## 2018-09-03 RX ADMIN — SODIUM CHLORIDE 500 ML: 0.9 INJECTION, SOLUTION INTRAVENOUS at 16:54

## 2018-09-03 RX ADMIN — FENTANYL CITRATE 50 MCG: 50 INJECTION INTRAMUSCULAR; INTRAVENOUS at 16:54

## 2018-09-03 RX ADMIN — ETOMIDATE 20 MG: 2 INJECTION, SOLUTION INTRAVENOUS at 17:17

## 2018-09-03 NOTE — ED NOTES
Pt placed on defib  Pads anterior and posterior and cardiac monitor       Annamaria Alberto  09/03/18 0786

## 2018-09-03 NOTE — ED PROVIDER NOTES
History  Chief Complaint   Patient presents with    Rapid Heart Rate     Patient reports having rapid heart rate since last night  Has hx of afib  Also c/o shortness of breath and numbness in left arm       52 yom with paroxysmal afib  Had bad dream last night, woke up with heart racing  Assoc left arm numbness and chest pressure  Missed dose of tikosyn last night  Onset 12 hours priorTaking xarelto everyday however  Denies n/v/d neck pain, chest pain other assoc sx  Exam reveals irregularly irregular rhthym  A/P: afib with rvr, stable but symptomatic  History provided by:  Patient  Atrial Fibrillation   Quality:  Racing heart  Severity:  Moderate  Onset quality:  Sudden  Duration:  12 hours  Timing:  Constant  Progression:  Unchanged  Chronicity:  Recurrent  Relieved by:  None  Worsened by:  None  Ineffective treatments:  None  Associated symptoms: no chest pain, no cough, no diarrhea, no fatigue, no fever, no headaches, no nausea, no rash, no shortness of breath and no vomiting        Prior to Admission Medications   Prescriptions Last Dose Informant Patient Reported? Taking? AmLODIPine Besylate (NORVASC PO) 9/3/2018 at Unknown time  Yes Yes   Sig: Take 5 mg by mouth  BYSTOLIC 2 5 MG tablet 5/0/9862 at Unknown time  No Yes   Sig: TAKE 1 TABLET DAILY  EDARBI 80 MG tablet 9/3/2018 at Unknown time  No Yes   Sig: TAKE ONE TABLET DAILY   RIVAROXABAN PO 9/3/2018 at Unknown time  Yes Yes   Sig: Take 20 mg by mouth  dofetilide (TIKOSYN) 500 mcg capsule 9/3/2018 at Unknown time  Yes Yes   Sig: Take by mouth        Facility-Administered Medications: None       Past Medical History:   Diagnosis Date    Arthritis     Asthma     Atrial fibrillation (HCC)     Chronic kidney disease     Hypertension     Muscle weakness        Past Surgical History:   Procedure Laterality Date    APPENDECTOMY      CA CYSTO/URETERO W/LITHOTRIPSY &INDWELL STENT INSRT Left 8/9/2016    Procedure: CYSTOSCOPY URETEROSCOPY WITH LITHOTRIPSY HOLMIUM LASER STONE EXTRACTION, RETROGRADE PYELOGRAM AND INSERTION STENT URETERAL;  Surgeon: Rebel Ma MD;  Location: [facility];  Service: Urology       History reviewed  No pertinent family history  I have reviewed and agree with the history as documented  Social History   Substance Use Topics    Smoking status: Former Smoker     Quit date: 4/4/1994    Smokeless tobacco: Not on file    Alcohol use Yes      Comment: occ        Review of Systems   Constitutional: Negative for chills, fatigue and fever  Eyes: Negative for photophobia and visual disturbance  Respiratory: Negative for cough and shortness of breath  Cardiovascular: Positive for palpitations  Negative for chest pain and leg swelling  Gastrointestinal: Negative for diarrhea, nausea and vomiting  Endocrine: Negative for polydipsia and polyuria  Genitourinary: Negative for decreased urine volume, difficulty urinating, dysuria and frequency  Musculoskeletal: Negative for back pain, neck pain and neck stiffness  Skin: Negative for color change and rash  Allergic/Immunologic: Negative for environmental allergies and immunocompromised state  Neurological: Positive for light-headedness  Negative for dizziness and headaches  Hematological: Negative for adenopathy  Does not bruise/bleed easily  Psychiatric/Behavioral: Negative for dysphoric mood  The patient is not nervous/anxious  All other systems reviewed and are negative  Physical Exam  Physical Exam   Constitutional: He is oriented to person, place, and time  He appears well-developed  HENT:   Head: Normocephalic and atraumatic  Right Ear: External ear normal    Left Ear: External ear normal    Mouth/Throat: Oropharynx is clear and moist    Eyes: Conjunctivae and EOM are normal  Pupils are equal, round, and reactive to light  Neck: Normal range of motion  Neck supple  No JVD present  No thyromegaly present     Cardiovascular: Normal heart sounds  Exam reveals no gallop and no friction rub  No murmur heard  Irregularly irregular tachycardic   Pulmonary/Chest: Effort normal and breath sounds normal  No respiratory distress  He has no wheezes  He has no rales  Abdominal: Soft  Bowel sounds are normal  He exhibits no distension  There is no rebound and no guarding  Musculoskeletal: Normal range of motion  He exhibits no edema  Lymphadenopathy:     He has no cervical adenopathy  Neurological: He is alert and oriented to person, place, and time  No cranial nerve deficit  Skin: Skin is warm  Psychiatric: He has a normal mood and affect  His behavior is normal    Nursing note and vitals reviewed        Vital Signs  ED Triage Vitals   Temperature Pulse Respirations Blood Pressure SpO2   09/03/18 1518 09/03/18 1516 09/03/18 1516 09/03/18 1516 09/03/18 1516   98 3 °F (36 8 °C) 83 20 (!) 161/102 97 %      Temp Source Heart Rate Source Patient Position - Orthostatic VS BP Location FiO2 (%)   09/03/18 1518 09/03/18 1516 09/03/18 1516 09/03/18 1516 --   Oral Monitor Sitting Left arm       Pain Score       09/03/18 1516       2           Vitals:    09/03/18 1729 09/03/18 1733 09/03/18 1745 09/03/18 1800   BP: 163/75 130/70 133/79 139/74   Pulse: 62 (!) 52 58 58   Patient Position - Orthostatic VS: Lying Lying Lying Lying       Visual Acuity      ED Medications  Medications   etomidate (AMIDATE) 2 mg/mL injection 20 mg (20 mg Intravenous Given by Other 9/3/18 1717)   sodium chloride 0 9 % bolus 500 mL (0 mL Intravenous Stopped 9/3/18 1747)   fentanyl citrate (PF) 100 MCG/2ML 50 mcg (50 mcg Intravenous Given 9/3/18 1654)       Diagnostic Studies  Results Reviewed     Procedure Component Value Units Date/Time    Basic metabolic panel [[id number]]  (Abnormal) Collected:  09/03/18 1545    Lab Status:  Final result Specimen:  Blood from Arm, Right Updated:  09/03/18 1650     Sodium 144 mmol/L      Potassium 3 9 mmol/L      Chloride 105 mmol/L      CO2 27 mmol/L      ANION GAP 12 mmol/L      BUN 23 mg/dL      Creatinine 1 38 (H) mg/dL      Glucose 99 mg/dL      Calcium 9 0 mg/dL      eGFR 58 ml/min/1 73sq m     Narrative:         National Kidney Disease Education Program recommendations are as follows:  GFR calculation is accurate only with a steady state creatinine  Chronic Kidney disease less than 60 ml/min/1 73 sq  meters  Kidney failure less than 15 ml/min/1 73 sq  meters  TSH [[id number]]  (Normal) Collected:  09/03/18 1545    Lab Status:  Final result Specimen:  Blood from Arm, Right Updated:  09/03/18 1650     TSH 3RD GENERATON 2 951 uIU/mL     Narrative:         Patients undergoing fluorescein dye angiography may retain small amounts of fluorescein in the body for 48-72 hours post procedure  Samples containing fluorescein can produce falsely depressed TSH values  If the patient had this procedure,a specimen should be resubmitted post fluorescein clearance      Phosphorus [[id number]]  (Normal) Collected:  09/03/18 1545    Lab Status:  Final result Specimen:  Blood from Arm, Right Updated:  09/03/18 1650     Phosphorus 3 5 mg/dL     Troponin I [[id number]]  (Normal) Collected:  09/03/18 1545    Lab Status:  Final result Specimen:  Blood from Arm, Right Updated:  09/03/18 1644     Troponin I <0 02 ng/mL     CBC and differential [[id number]]  (Abnormal) Collected:  09/03/18 1545    Lab Status:  Final result Specimen:  Blood from Arm, Right Updated:  09/03/18 1619     WBC 9 29 Thousand/uL      RBC 5 66 (H) Million/uL      Hemoglobin 16 8 g/dL      Hematocrit 50 6 (H) %      MCV 89 fL      MCH 29 7 pg      MCHC 33 2 g/dL      RDW 12 5 %      MPV 11 8 fL      Platelets 081 Thousands/uL      nRBC 0 /100 WBCs      Neutrophils Relative 61 %      Immat GRANS % 0 %      Lymphocytes Relative 24 %      Monocytes Relative 11 %      Eosinophils Relative 3 %      Basophils Relative 1 %      Neutrophils Absolute 5 66 Thousands/µL      Immature Grans Absolute 0 03 Thousand/uL Lymphocytes Absolute 2 21 Thousands/µL      Monocytes Absolute 1 05 Thousand/µL      Eosinophils Absolute 0 26 Thousand/µL      Basophils Absolute 0 08 Thousands/µL                  No orders to display              Procedures  CriticalCare Time  Performed by: Harland Jeans by: Tyree Durant     Critical care provider statement:     Critical care time (minutes):  40    Critical care time was exclusive of:  Separately billable procedures and treating other patients and teaching time    Critical care was necessary to treat or prevent imminent or life-threatening deterioration of the following conditions:  Cardiac failure    Critical care was time spent personally by me on the following activities:  Blood draw for specimens, obtaining history from patient or surrogate, re-evaluation of patient's condition, review of old charts, evaluation of patient's response to treatment, examination of patient, ordering and review of laboratory studies, ordering and performing treatments and interventions and development of treatment plan with patient or surrogate  Comments:      60 minutes of afib with rvr, symptomatic, requiring observation for clinical deterioation     Cardioversion  Date/Time: 9/4/2018 11:44 PM  Performed by: Harland Jeans by: Tyree Durant     Verbal consent obtained?: Yes    Written consent obtained?: Yes    Risks and benefits: Risks, benefits and alternatives were discussed    Consent given by:  Patient  Cardioversion basis:  Elective  Elective indications: failure of anti-arrhythmic medications    Pre-procedure rhythm:  Atrial fibrillation  Position: Patient was placed in a supine position    Chest area exposed: Chest area was exposed    Electrodes:  Pads  Electrodes placed:  Anterior-posterior  Number of attempts:  1  Attempt 1:     Attempt 1 mode:  Synchronous    Attempt 1 waveform:  Biphasic    Attempt 1 shock (Joules):  200    Attempt 1 outcome:  Conversion to normal sinus rhythm  Post-procedure rhythm:  Normal sinus rhythm  Complications: no complications    Patient tolerance:  Patient tolerated the procedure well with no immediate complications  Procedural Sedation  Date/Time: 9/3/2018 5:00 PM  Performed by: Daryn Carrera by: Anirudh Hinds     Consent:     Consent obtained:  Verbal and written    Consent given by:  Patient    Risks discussed:   Allergic reaction, dysrhythmia, inadequate sedation, nausea, vomiting, prolonged sedation necessitating reversal, prolonged hypoxia resulting in organ damage and respiratory compromise necessitating ventilatory assistance and intubation  Universal protocol:     Patient identity confirmation method:  Verbally with patient  Indications:     Sedation purpose:  Cardioversion    Procedure necessitating sedation performed by:  Physician performing sedation    Intended level of sedation:  Moderate (conscious sedation)  Pre-sedation assessment:     ASA classification: class 2 - patient with mild systemic disease      Neck mobility: normal      Mallampati score:  II - soft palate, uvula, fauces visible    Pre-sedation assessments completed and reviewed: airway patency, cardiovascular function, mental status and nausea/vomiting    Immediate pre-procedure details:     Reassessment: Patient reassessed immediately prior to procedure      Reviewed: vital signs      Verified: bag valve mask available, emergency equipment available, IV patency confirmed and oxygen available    Procedure details (see MAR for exact dosages):     Sedation start time:  9/3/2018 4:38 PM    Preoxygenation:  Nasal cannula    Sedation:  Etomidate    Analgesia:  Fentanyl    Intra-procedure monitoring:  Blood pressure monitoring, cardiac monitor, continuous capnometry and continuous pulse oximetry    Intra-procedure events: none      Sedation end time:  9/3/2018 5:46 PM  Post-procedure details:     Attendance: Constant attendance by certified staff until patient recovered Recovery: Patient returned to pre-procedure baseline      Patient is stable for discharge or admission: yes      Patient tolerance: Tolerated well, no immediate complications           Phone Contacts  ED Phone Contact    ED Course  ED Course as of Sep 04 2348   Mon Sep 03, 2018   1725 Patient patient was successfully cardioverted with 200 joules back into NSR    1750 Patient tolerating p o  will discharge soon                                MDM  Number of Diagnoses or Management Options  Atrial fibrillation with RVR [facility]): new and requires workup  Diagnosis management comments: Patient cardioverted back to NSR, feels well, discharged, follow up with Dr Harp Code and/or Complexity of Data Reviewed  Clinical lab tests: ordered and reviewed  Tests in the medicine section of CPT®: reviewed and ordered  Decide to obtain previous medical records or to obtain history from someone other than the patient: yes  Independent visualization of images, tracings, or specimens: yes    Patient Progress  Patient progress: resolved    CritCare Time    Disposition  Final diagnoses:   Atrial fibrillation with RVR ([address] )     Time reflects when diagnosis was documented in both MDM as applicable and the Disposition within this note     Time User Action Codes Description Comment    9/3/2018  5:51 PM Anette Duque Add [I48 91] Atrial fibrillation with RVR [facility])       ED Disposition     ED Disposition Condition Comment    Discharge  Kelli Heredia 49 discharge to home/self care      Condition at discharge: Good        Follow-up Information     Follow up With Specialties Details Nicholas Larose 3968, DO Cardiology Schedule an appointment as soon as possible for a visit  [address]  [phone]            Discharge Medication List as of 9/3/2018  5:52 PM      CONTINUE these medications which have NOT CHANGED    Details   AmLODIPine Besylate (NORVASC PO) Take 5 mg by mouth , Until Discontinued, Historical Med      BYSTOLIC 2 5 MG tablet TAKE 1 TABLET DAILY , Normal      dofetilide (TIKOSYN) 500 mcg capsule Take by mouth , Until Discontinued, Historical Med      EDARBI 80 MG tablet TAKE ONE TABLET DAILY, Normal      RIVAROXABAN PO Take 20 mg by mouth , Until Discontinued, Historical Med           No discharge procedures on file      ED Provider  Electronically Signed by           Kimberly Rice DO  09/04/18 5641

## 2018-09-03 NOTE — DISCHARGE INSTRUCTIONS
A-fib (Atrial Fibrillation)   WHAT YOU NEED TO KNOW:   A-fib may come and go, or it may be a long-term condition  A-fib can cause blood clots, stroke, or heart failure  These conditions may become life-threatening  It is important to treat and manage a-fib to help prevent a blood clot, stroke, or heart failure  DISCHARGE INSTRUCTIONS:   Call 911 for any of the following:   · You have any of the following signs of a heart attack:      ¨ Squeezing, pressure, or pain in your chest that lasts longer than 5 minutes or returns    ¨ Discomfort or pain in your back, neck, jaw, stomach, or arm     ¨ Trouble breathing    ¨ Nausea or vomiting    ¨ Lightheadedness or a sudden cold sweat, especially with chest pain or trouble breathing    · You have any of the following signs of a stroke:      ¨ Numbness or drooping on one side of your face     ¨ Weakness in an arm or leg    ¨ Confusion or difficulty speaking    ¨ Dizziness, a severe headache, or vision loss  Return to the emergency department if:  You have any of the following signs of a blood clot:  · You feel lightheaded, are short of breath, and have chest pain  · You cough up blood  · You have swelling, redness, pain, or warmth in your arm or leg  Contact your cardiologist or healthcare provider if:   · Your target heart rate is not in the range it should be  · You have new or worsening swelling in your legs, feet, ankles, or abdomen  · You are short of breath, even at rest      · You have questions or concerns about your condition or care  Medicines: You may need any of the following:  · Heart medicines  help control your heart rate and rhythm  You may need more than one medicine to treat your symptoms  · Blood thinners    help prevent blood clots  Examples of blood thinners include heparin and warfarin  Clots can cause strokes, heart attacks, and death   The following are general safety guidelines to follow while you are taking a blood thinner:    ¨ Watch for bleeding and bruising while you take blood thinners  Watch for bleeding from your gums or nose  Watch for blood in your urine and bowel movements  Use a soft washcloth on your skin, and a soft toothbrush to brush your teeth  This can keep your skin and gums from bleeding  If you shave, use an electric shaver  Do not play contact sports  ¨ Tell your dentist and other healthcare providers that you take anticoagulants  Wear a bracelet or necklace that says you take this medicine  ¨ Do not start or stop any medicines unless your healthcare provider tells you to  Many medicines cannot be used with blood thinners  ¨ Tell your healthcare provider right away if you forget to take the medicine, or if you take too much  ¨ Warfarin  is a blood thinner that you may need to take  The following are things you should be aware of if you take warfarin  § Foods and medicines can affect the amount of warfarin in your blood  Do not make major changes to your diet while you take warfarin  Warfarin works best when you eat about the same amount of vitamin K every day  Vitamin K is found in green leafy vegetables and certain other foods  Ask for more information about what to eat when you are taking warfarin  § You will need to see your healthcare provider for follow-up visits when you are on warfarin  You will need regular blood tests  These tests are used to decide how much medicine you need  · Antiplatelets , such as aspirin, help prevent blood clots  Take your antiplatelet medicine exactly as directed  These medicines make it more likely for you to bleed or bruise  If you are told to take aspirin, do not take acetaminophen or ibuprofen instead  · Take your medicine as directed  Contact your healthcare provider if you think your medicine is not helping or if you have side effects  Tell him or her if you are allergic to any medicine   Keep a list of the medicines, vitamins, and herbs you take  Include the amounts, and when and why you take them  Bring the list or the pill bottles to follow-up visits  Carry your medicine list with you in case of an emergency  Follow up with your cardiologist as directed: You will need regular blood tests and monitoring  Write down your questions so you remember to ask them during your visits  Manage A-fib:   · Know your target heart rate  Learn how to take your pulse and monitor your heart rate  · Manage other health conditions  This includes high blood pressure, sleep apnea, thyroid disease, diabetes, and other heart conditions  Take medicine as directed and follow your treatment plan  · Limit or do not drink alcohol  Alcohol can make a-fib hard to manage  Ask your healthcare provider if it is safe for you to drink alcohol  A drink of alcohol is 12 ounces of beer, 5 ounces of wine, or 1½ ounces of liquor  · Do not smoke  Nicotine and other chemicals in cigarettes and cigars can cause heart and lung damage  Ask your healthcare provider for information if you currently smoke and need help to quit  E-cigarettes or smokeless tobacco still contain nicotine  Talk to your healthcare provider before you use these products  · Eat heart-healthy foods  Heart healthy foods will help keep your cholesterol low  These include fruits, vegetables, whole-grain breads, low-fat dairy products, beans, lean meats, and fish  Replace butter and margarine with heart-healthy oils such as olive oil and canola oil  · Maintain a healthy weight  Ask your healthcare provider how much you should weigh  Ask him to help you create a weight loss plan if you are overweight  · Exercise for 30 minutes  most days of the week  Ask your healthcare provider about the best exercise plan for you  © 2017 [employer] Information is for End User's use only and may not be sold, redistributed or otherwise used for commercial purposes   All illustrations and images included in CareNotes® are the copyrighted property of [employer]  or [employer]  The above information is an  only  It is not intended as medical advice for individual conditions or treatments  Talk to your doctor, nurse or pharmacist before following any medical regimen to see if it is safe and effective for you  Procedural Sedation   WHAT YOU NEED TO KNOW:   Procedural sedation is medicine used during procedures to help you feel relaxed and calm  You will remember little to none of the procedure  After sedation you may feel tired, weak, or unsteady on your feet  You may also have trouble concentrating or short-term memory loss  These symptoms should go away in 24 hours or less  DISCHARGE INSTRUCTIONS:   Call 911 or have someone else call for any of the following:   · You have sudden trouble breathing  · You cannot be woken  Return to the emergency department if:   · You have a severe headache or dizziness  · Your heart is beating faster than usual   Contact your healthcare provider if:   · You have a fever or chills  · Your skin is itchy, swollen, or you have a rash  · You have nausea or are vomiting for more than 8 hours after the procedure  · You have questions or concerns about your condition or care  Self-care:   · Have someone stay with you for 24 hours  This person can drive you to errands and help you do things around the house  This person can also watch for problems  · Rest and do quiet activities for 24 hours  Do not exercise, ride a bike, or play sports  Stand up slowly to prevent dizziness and falls  Take short walks around the house with another person  Slowly return to your usual activities the next day  · Do not drive or use dangerous machines or tools for 24 hours  You may injure yourself or others  Examples include a lawnmower, saw, or drill   Do not return to work for 24 hours if you use dangerous machines or tools for work      · Do not make important decisions for 24 hours  For example, do not sign important papers or invest money  · Drink liquids as directed  Liquids help flush the sedation medicine out of your body  Ask how much liquid to drink each day and which liquids are best for you  · Eat small, frequent meals to prevent nausea and vomiting  Start with clear liquids such as juice or broth  If you do not vomit after clear liquids, you can eat your usual foods  · Do not drink alcohol or take medicines that make you drowsy  This includes medicines that help you sleep and anxiety medicines  Ask your healthcare provider if it is safe for you to take pain medicine  Follow up with your healthcare provider as directed:  Write down your questions so you remember to ask them during your visits  © 2017 [employer] Information is for End User's use only and may not be sold, redistributed or otherwise used for commercial purposes  All illustrations and images included in CareNotes® are the copyrighted property of [employer]  or [employer]  The above information is an  only  It is not intended as medical advice for individual conditions or treatments  Talk to your doctor, nurse or pharmacist before following any medical regimen to see if it is safe and effective for you

## 2018-10-05 ENCOUNTER — OFFICE VISIT (OUTPATIENT)
Dept: FAMILY MEDICINE CLINIC | Facility: CLINIC | Age: 53
End: 2018-10-05
Payer: COMMERCIAL

## 2018-10-05 VITALS
DIASTOLIC BLOOD PRESSURE: 100 MMHG | HEART RATE: 74 BPM | TEMPERATURE: 97.7 F | WEIGHT: 298.6 LBS | HEIGHT: 70 IN | BODY MASS INDEX: 42.75 KG/M2 | SYSTOLIC BLOOD PRESSURE: 180 MMHG

## 2018-10-05 DIAGNOSIS — M25.512 ACUTE PAIN OF LEFT SHOULDER: ICD-10-CM

## 2018-10-05 DIAGNOSIS — E66.01 MORBID OBESITY (HCC): ICD-10-CM

## 2018-10-05 DIAGNOSIS — Z23 NEED FOR INFLUENZA VACCINATION: ICD-10-CM

## 2018-10-05 DIAGNOSIS — I10 BENIGN ESSENTIAL HYPERTENSION: Primary | ICD-10-CM

## 2018-10-05 PROBLEM — N18.30 CKD (CHRONIC KIDNEY DISEASE) STAGE 3, GFR 30-59 ML/MIN (HCC): Status: ACTIVE | Noted: 2018-10-05

## 2018-10-05 PROCEDURE — 90471 IMMUNIZATION ADMIN: CPT

## 2018-10-05 PROCEDURE — 90682 RIV4 VACC RECOMBINANT DNA IM: CPT

## 2018-10-05 PROCEDURE — 99214 OFFICE O/P EST MOD 30 MIN: CPT | Performed by: FAMILY MEDICINE

## 2018-10-05 NOTE — ASSESSMENT & PLAN NOTE
Relatively normal physical exam with a positive Apley scratch test, but no signs of impingement   Check Xray to r/o fracture  PT referral  Tylenol PRN, avoid NSAIDs because he is on a blood thinner  Consider MRI or prednisone burst if indicated

## 2018-10-05 NOTE — ASSESSMENT & PLAN NOTE
Wt Readings from Last 3 Encounters:   10/05/18 135 kg (298 lb 9 6 oz)   12/30/17 133 kg (294 lb)   04/04/17 128 kg (283 lb)     -Body mass index is 42 84 kg/m²    -Reviewed healthy diet- high in protein, low in carbohydrates, high calorie/low nutrition foods, try to stop drinking regular and diet sodas  -Drink at least 8 glasses of pure water a day  -Get at least 30 minutes of breathless exercise 4-5 days per week  -F/U 6 months

## 2018-10-05 NOTE — PROGRESS NOTES
FAMILY MEDICINE PROGRESS NOTE  Travis Nguyen 48 y o  male   DATE: October 5, 2018     ASSESSMENT and PLAN:  Travis Nguyen is a 48 y o  male with:     Acute pain of left shoulder    Relatively normal physical exam with a positive Apley scratch test      Benign essential hypertension  BP Readings from Last 3 Encounters:   10/05/18 (!) 180/100   09/03/18 139/74   12/30/17 166/88       Results from last 6 Months  Lab Units 09/03/18  1545   SODIUM mmol/L 144   POTASSIUM mmol/L 3 9   CHLORIDE mmol/L 105   CO2 mmol/L 27   BUN mg/dL 23   CREATININE mg/dL 1 38*   CALCIUM mg/dL 9 0   Uncontrolled on current regimen of Tikosyn 500mcg BID, Edarbi 80GJ, Bystolic 5 5BM, Amlodipine 5mg    Advised patient to follow up cardiologist likely will need higher doses of medications   Reviewed in detail signs or symptoms for which to go to the ER, patient is asymptomatic at this time  Discussed weight loss at length as below  Morbid obesity ([facility] [address] )  Wt Readings from Last 3 Encounters:   10/05/18 135 kg (298 lb 9 6 oz)   12/30/17 133 kg (294 lb)   04/04/17 128 kg (283 lb)     -Body mass index is 42 84 kg/m²  -Reviewed healthy diet- high in protein, low in carbohydrates, high calorie/low nutrition foods, try to stop drinking regular and diet sodas  -Drink at least 8 glasses of pure water a day  -Get at least 30 minutes of breathless exercise 4-5 days per week  -F/U 6 months    Need for influenza vaccination  Vaccine Counseling: Discussed with: Patient  The benefits, contraindication and side effects for the following vaccines were reviewed: Immunization component list: influenza  Total number of components reveiwed:1      SUBJECTIVE:  Travis Nguyen is a 48 y o  male who presents today with a chief complaint of Shoulder Pain (Left shoulder pain for several months)  For the past few months he has had pain in his left shoulder, and left back and radiates occ to his fingers and feels a numbness/tingling in his left arm    The pain in his scapula is more a tootheache that is always there and the numbness/tingling exacerbated with certain positions and any activity      No inciting injury, does  heavy canoes and   No spinal issues  Similar issues with with the right scapula years ago  Review of Systems   Constitutional: Negative for fatigue and fever  Cardiovascular: Negative for chest pain and palpitations  Musculoskeletal: Positive for arthralgias  Negative for back pain, gait problem and joint swelling  I have reviewed the patient's PMH, Social History, Medication List and Allergies as appropriate  OBJECTIVE:  BP (!) 180/100 (BP Location: Left arm, Patient Position: Sitting, Cuff Size: Large)   Pulse 74   Temp 97 7 °F (36 5 °C)   Ht 5' 10" (1 778 m)   Wt 135 kg (298 lb 9 6 oz)   BMI 42 84 kg/m²    Physical Exam   Constitutional: He appears well-developed and well-nourished  No distress  Cardiovascular: Normal rate, regular rhythm and normal heart sounds  Pulmonary/Chest: Effort normal and breath sounds normal  No respiratory distress  He has no wheezes  He has no rales  Musculoskeletal:        Left shoulder: Normal  He exhibits normal range of motion, no tenderness, no bony tenderness, no swelling, no effusion and no crepitus  Left Shoulder Exam:  Positive Apley Scratch test (rotator cuff)  Negative Empty can test (supraspinatus)  Negative [place] test (impignement)  Negative Neers test (subacromial impingement)  Negative Cross arm test [facility] SCREVEN joint arthritis)  Normal abduction, adduction, flexion, extension   Skin: He is not diaphoretic  Vitals reviewed  Brissa Jackman MD    Note: Portions of the record may have been created with voice recognition software  Occasional wrong word or "sound a like" substitutions may have occurred due to the inherent limitations of voice recognition software  Read the chart carefully and recognize, using context, where substitutions have occurred

## 2018-10-05 NOTE — ASSESSMENT & PLAN NOTE
Vaccine Counseling: Discussed with: Patient  The benefits, contraindication and side effects for the following vaccines were reviewed: Immunization component list: influenza      Total number of components reveiwed:1

## 2018-10-05 NOTE — ASSESSMENT & PLAN NOTE
BP Readings from Last 3 Encounters:   10/05/18 (!) 180/100   09/03/18 139/74   12/30/17 166/88       Results from last 6 Months  Lab Units 09/03/18  1545   SODIUM mmol/L 144   POTASSIUM mmol/L 3 9   CHLORIDE mmol/L 105   CO2 mmol/L 27   BUN mg/dL 23   CREATININE mg/dL 1 38*   CALCIUM mg/dL 9 0   Uncontrolled on current regimen of Tikosyn 500mcg BID, Edarbi 94OU, Bystolic 3 2GI, Amlodipine 5mg    Advised patient to follow up cardiologist likely will need higher doses of medications   Reviewed in detail signs or symptoms for which to go to the ER, patient is asymptomatic at this time  Discussed weight loss at length as below

## 2018-10-09 ENCOUNTER — EVALUATION (OUTPATIENT)
Dept: PHYSICAL THERAPY | Facility: CLINIC | Age: 53
End: 2018-10-09
Payer: COMMERCIAL

## 2018-10-09 DIAGNOSIS — M25.512 ACUTE PAIN OF LEFT SHOULDER: ICD-10-CM

## 2018-10-09 PROCEDURE — G8990 OTHER PT/OT CURRENT STATUS: HCPCS | Performed by: PHYSICAL THERAPIST

## 2018-10-09 PROCEDURE — 97110 THERAPEUTIC EXERCISES: CPT | Performed by: PHYSICAL THERAPIST

## 2018-10-09 PROCEDURE — G8991 OTHER PT/OT GOAL STATUS: HCPCS | Performed by: PHYSICAL THERAPIST

## 2018-10-09 PROCEDURE — 97162 PT EVAL MOD COMPLEX 30 MIN: CPT | Performed by: PHYSICAL THERAPIST

## 2018-10-09 NOTE — PROGRESS NOTES
PT Evaluation     Today's date: 10/9/2018  Patient name: Josephine Schmitz  : 1965  MRN: [id number]  Referring provider: Dougie Iraheta MD  Dx:   Encounter Diagnosis     ICD-10-CM    1  Acute pain of left shoulder M25 512 Ambulatory referral to Physical Therapy                  Assessment    Assessment details: Josephine Schmitz is a pleasant 48 y o  presenting to physical therapy with MD referral for Acute pain of left shoulder  Pt presents with poor posture and muscle spasm in left infraspinatus muscle belly  Problem list: Poor posture, decreased upper extremity strength, mildly limited shoulder ROM, and increased neural tension    Treatment to include: Manual therapy techniques, nerve glides, RTC/periscapular muscle strengthening, instruction in a comprehensive HEP, and modalities as needed  This pt would benefit from skilled PT services to address their impairments and functional limitations to maximize functional outcome  Barriers to therapy: 90 day duration of symptoms, a-fib, asthma, HTN  Understanding of Dx/Px/POC: good   Prognosis: good    Goals  ST  Pt will improve shoulder ER strength to at 4+/5 in 3 weeks  2  Pt will improve seated posture to no more than mild forward head posture and forward rounded shoulders in 3 weeks  LT  Pt will be able to lift a case of water with minimal to no pain in 6 weeks  2  Pt will be independent in a comprehensive HEP in 6 weeks  Plan  Patient would benefit from: skilled physical therapy  Frequency: 2x week  Duration in weeks: 6  Treatment plan discussed with: patient        Subjective Evaluation    History of Present Illness  Mechanism of injury: Pt reports he began to experience left shoulder pain 90 days ago with insidious onset  Pt states since onset, the pain is now constant and he is now experiencing tingling over palmar aspect of digits 5-3  Pt states tingling occurs with activity and pt denies any coldness in hands   Pt denies shoulder clicking, popping, locking, or feelings of instability  Pt denies any cervical pain  Premorbid status:  - ADLs: Independent with no difficulty  - Work: Full time, Full duty- desk work  - Recreation: walking 3-4 times per week    Current status:  - ADLs/Functional activities:     - Reaching into shoulder height cabinets with Pain Levels: no pain   - Reaching into overhead cabinets with Pain Levels: mild pain   - Washing lower back/tucking in shirt with Pain Levels: moderate pain   - Washing hair with Pain Levels: no pain   - Driving with Pain Levels: mild pain with arm at 12 o'clock position   - Lifting laundry basket with no pain- has not attempted heavier weight   - Sleeping with 3-4 nightly sleep disturbances due to pain  - Work: Full time, Full duty  - Recreation: none  Pain  Current pain ratin  At best pain rating: 3  At worst pain ratin  Location: posterior aspect of shoulder  Quality: knife-like  Relieving factors: medications  Progression: worsening          Objective     Observations     Additional Observation Details  Moderate forward rounded shoulders and forward head posture  Palpation   Left   Muscle spasm in the infraspinatus  Tenderness of the infraspinatus  Right   No palpable tenderness to the infraspinatus       Cervical/Thoracic Screen   Cervical range of motion within normal limits with the following exceptions: Cervical ROM WNL with no reproduction of numbness/tingling    Active Range of Motion   Left Shoulder   Flexion: 150 degrees   Abduction: 164 degrees   External rotation 90°: 75 degrees   External rotation BTH: T2   Internal rotation 90°: 50 degrees   Internal rotation BTB: T12     Right Shoulder   Flexion: 150 degrees   Abduction: 160 degrees   External rotation 90°: 90 degrees  External rotation BTH: T3   Internal rotation 90°: 35 degrees   Internal rotation BTB: L3     Additional Active Range of Motion Details  Poor scapulohumeral rhythm bilaterally on return from full flexed and full abducted positions with winging of medial border of scapula  Strength/Myotome Testing     Left Shoulder     Planes of Motion   Flexion: 4   Abduction: 4   External rotation at 0°: 4   Internal rotation at 0°: 4+     Right Shoulder     Planes of Motion   Flexion: 4+   Abduction: 4+   External rotation at 0°: 4+   Internal rotation at 0°: 4+     Left Elbow   Flexion: 5  Extension: 5    Right Elbow   Flexion: 5  Extension: 5    Tests     Left Shoulder   Positive empty can, full can, Hawkin's and painful arc  Negative drop arm, Neer's and Yergason's  Right Shoulder   Negative drop arm, empty can, full can, Hawkin's, painful arc and Yergason's       Additional Tests Details  Positive for increased neural tension with ulnar testing on L      Flowsheet Rows      Most Recent Value   PT/OT G-Codes   Current Score  51   Projected Score  72   FOTO information reviewed  No   Assessment Type  Evaluation   G code set  Other PT/OT Primary   Other PT Primary Current Status ()  CK   Other PT Primary Goal Status ()  CJ          Precautions: a-fib, asthma, HTN, on blood thinners (no graston)    Daily Treatment Diary     Manual  10-9 (IE)            STM to left infraspinatus             PROM into shoulder IR at 90 deg abd                                                        Exercise Diary  10-9            UBE post 4 mins NV                         Corner stretch 4 x 30" NV                         TB:             - rows 2 x 10 GTB NV            - pulldowns 2 x 10 GTB NV            - ER 2 x 10 GTB NV                         Incline bench:             - I's 10 x ea NV            - T's 10 x ea NV            - Y's 10 x ea NV                         * Please provide HEP- unable due to time constraint on IE                                                                                  Modalities  10-9 (IE)            Cryo as needed                                         * On initial evaluation, educated pt on anatomy, pathology, and exercise rationale

## 2018-10-11 ENCOUNTER — APPOINTMENT (OUTPATIENT)
Dept: PHYSICAL THERAPY | Facility: CLINIC | Age: 53
End: 2018-10-11
Payer: COMMERCIAL

## 2018-10-16 ENCOUNTER — OFFICE VISIT (OUTPATIENT)
Dept: PHYSICAL THERAPY | Facility: CLINIC | Age: 53
End: 2018-10-16
Payer: COMMERCIAL

## 2018-10-16 DIAGNOSIS — M25.512 ACUTE PAIN OF LEFT SHOULDER: Primary | ICD-10-CM

## 2018-10-16 PROCEDURE — 97140 MANUAL THERAPY 1/> REGIONS: CPT | Performed by: PHYSICAL THERAPIST

## 2018-10-16 PROCEDURE — 97110 THERAPEUTIC EXERCISES: CPT | Performed by: PHYSICAL THERAPIST

## 2018-10-16 NOTE — PROGRESS NOTES
Daily Note     Today's date: 10/16/2018  Patient name: Gene Saldaña  : 1965  MRN: [id number]  Referring provider: Aram Palomino MD  Dx:   Encounter Diagnosis     ICD-10-CM    1  Acute pain of left shoulder M25 512                   Subjective: Patient states that his shoulder is feeling much better than at time of initial evaluation  Pt states he re-arranged his workspace and has noticed significant improvement  Pt also reports he noticed his tingling is actually in his thumb and index finger rather than his 4th and 5th digits  Objective: See treatment diary below      Assessment: Provided pt with basic HEP and ensured proper form and understanding  Also, initiated exercises this date to address impairments noted during initial evaluation  Pt reported onset of tingling in thumb and index finger during shoulder ER with TB  Educated pt to reduce  and tingling was resolved  Pt has hx of fracture repair in left wrist which would most likely be cause of overactivation of wrist extensors causing compression of radial nerve  Plan: Progress treatment as tolerated        Precautions: a-fib, asthma, HTN, on blood thinners (no graston)     Daily Treatment Diary      Manual  10-9 (IE)  10-16                   STM to left infraspinatus    JG                   PROM into shoulder IR at 90 deg abd    JG                                                                                                 Exercise Diary  10-9  10-16                   UBE post 4 mins NV  4 mins                                           Corner stretch 4 x 30" NV  4 x 30"                                           TB:                       - rows 2 x 10 GTB NV  2 x 10 GTB                   - pulldowns 2 x 10 GTB NV  2 x 10 GTB                   - ER 2 x 10 GTB NV  2 x 10 GTB                                           Incline bench:                       - I's 10 x ea NV  10 x ea                   - T's 10 x ea NV  10 x ea                   - Y's 10 x ea NV  10 x ea                                                                                                                                                                                                Modalities  10-9 (IE)                     Cryo as needed                                                                         Access Code: [email or web address]   URL: [email or web address]   Date: 10/16/2018   Prepared by: Darren Rosales  · Corner Pec Major Stretch - 4 reps - 30 seconds hold - 3x daily - 7x weekly  · Seated Scapular Retraction - 10 reps - 10 seconds hold - 3x daily - 7x weekly  · Seated Cervical Retraction - 10 reps - 10 seconds hold - 3x daily - 7x weekly

## 2018-10-17 ENCOUNTER — OFFICE VISIT (OUTPATIENT)
Dept: CARDIOLOGY CLINIC | Facility: CLINIC | Age: 53
End: 2018-10-17
Payer: COMMERCIAL

## 2018-10-17 VITALS
SYSTOLIC BLOOD PRESSURE: 180 MMHG | HEIGHT: 70 IN | BODY MASS INDEX: 42.23 KG/M2 | WEIGHT: 295 LBS | HEART RATE: 64 BPM | DIASTOLIC BLOOD PRESSURE: 104 MMHG

## 2018-10-17 DIAGNOSIS — E66.01 MORBID OBESITY (HCC): ICD-10-CM

## 2018-10-17 DIAGNOSIS — Z79.01 ANTICOAGULATION ADEQUATE: ICD-10-CM

## 2018-10-17 DIAGNOSIS — I48.91 ATRIAL FIBRILLATION, UNSPECIFIED TYPE (HCC): Primary | ICD-10-CM

## 2018-10-17 DIAGNOSIS — R29.898 BILATERAL LEG WEAKNESS: ICD-10-CM

## 2018-10-17 DIAGNOSIS — N18.30 CKD (CHRONIC KIDNEY DISEASE) STAGE 3, GFR 30-59 ML/MIN (HCC): ICD-10-CM

## 2018-10-17 DIAGNOSIS — I10 BENIGN ESSENTIAL HYPERTENSION: ICD-10-CM

## 2018-10-17 DIAGNOSIS — Z79.899 LONG TERM CURRENT USE OF ANTIARRHYTHMIC DRUG: ICD-10-CM

## 2018-10-17 PROCEDURE — 93000 ELECTROCARDIOGRAM COMPLETE: CPT | Performed by: INTERNAL MEDICINE

## 2018-10-17 PROCEDURE — 99214 OFFICE O/P EST MOD 30 MIN: CPT | Performed by: INTERNAL MEDICINE

## 2018-10-17 NOTE — PROGRESS NOTES
EPS Progress Note - Magen Rodriguez 48 y o  male MRN: [id number]           ASSESSMENT:  1  Atrial fibrillation, unspecified type ([facility] [address] )  POCT ECG    TSH, 3rd generation with Free T4 reflex   2  Benign essential hypertension  cloNIDine (CATAPRES-TTS-2) 0 2 mg/24 hr    Basic metabolic panel    Magnesium    Hepatic function panel    Ambulatory referral to Nephrology    TSH, 3rd generation with Free T4 reflex   3  Bilateral leg weakness     4  CKD (chronic kidney disease) stage 3, GFR 30-59 ml/min ([facility])     5  Morbid obesity ([facility] [address] )     6  Long term current use of antiarrhythmic drug     7  Anticoagulation adequate             PLAN:  1) HTN poorly controlled  On higher dose amlodipine leg swelling  Can't increase beta blocker much further  Will start catapress patch TTS 2 and refer to nephrology  I personally contacted Dr René Fritz today  2) CKD he had one CR the was elevated in setting of AFIB  Previous GFR greater than 60  For now will leave dofetilide dose the same and recheck Chem 7  ECG  msec   3) b/l leg weakness resolved  4) AAD therapy continue current dose  One CV in 3 years on dofetilide  Qualifies for ablation but not necessary at this time  Patient not interested at this time  5) obesity - chronic  Very busy work life  He understands need to lose weight  6) anticoagulation - on appropriate dose eliquis    Will stop bystolic    Consult - Dr Kalpana Linares, F/U 3 months with me    HPI:   Interim history CV x one for symptomatic afib  Otherwise doing well  BP in office and at home consistently elevated  Otherwise 12 point ROS negative for complaints  ROS:  negative, palpitations, tachycardia, irregular heart beat  all other 12 point ROS negative    Objective:     Vitals: Blood pressure (!) 180/104, pulse 64, height 5' 10" (1 778 m), weight 134 kg (295 lb)  , Body mass index is 42 33 kg/m²  ,        Physical Exam:    GEN: Magen Rodriguez appears well, alert and oriented x 3, pleasant and cooperative   HEENT: pupils equal, round, and reactive to light; extraocular muscles intact  NECK: supple, no carotid bruits   HEART: regular rhythm, normal S1 and S2, no murmurs, clicks, gallops or rubs   LUNGS: clear to auscultation bilaterally; no wheezes, rales, or rhonchi   ABDOMEN: normal bowel sounds, soft, no tenderness, no distention  EXTREMITIES: peripheral pulses normal; no clubbing, cyanosis, or edema  NEURO: no focal findings   SKIN: normal without suspicious lesions on exposed skin    Medications:      Current Outpatient Prescriptions:     AmLODIPine Besylate (NORVASC PO), Take 5 mg by mouth 1 tablet daily in the AM , Disp: , Rfl:     BYSTOLIC 2 5 MG tablet, TAKE 1 TABLET DAILY  (Patient taking differently: TAKE 1 TABLET DAILY IN THE AM), Disp: 90 tablet, Rfl: 3    dofetilide (TIKOSYN) 500 mcg capsule, Take 500 mcg by mouth 2 (two) times a day 1 Capsule at 7:00 Am and 7 Pm , Disp: , Rfl:     EDARBI 80 MG tablet, TAKE ONE TABLET DAILY (Patient taking differently: TAKE ONE TABLET DAILY IN THE AM), Disp: 30 tablet, Rfl: 11    rivaroxaban (XARELTO) 20 mg tablet, Take 20 mg by mouth daily with breakfast, Disp: , Rfl:     cloNIDine (CATAPRES-TTS-2) 0 2 mg/24 hr, Place 1 patch on the skin once a week, Disp: 4 patch, Rfl: 0     Family History   Problem Relation Age of Onset    Atrial fibrillation Mother     Other Mother         blood dyscrasia    Hypertension Mother     Other Father         hypoglycemia     Atrial fibrillation Father     Diabetes Family     Other Family         Thrombocytosis     Social History     Social History    Marital status: /Civil Union     Spouse name: N/A    Number of children: 4    Years of education: 12th grade      Occupational History    Not on file       Social History Main Topics    Smoking status: Former Smoker     Quit date: 4/4/1994    Smokeless tobacco: Not on file    Alcohol use Yes      Comment: occ    Drug use: No    Sexual activity: Not on file     Other Topics Concern    Not on file     Social History Narrative    No caffeine use      History   Smoking Status    Former Smoker    Quit date: 1994   Smokeless Tobacco    Not on file     History   Alcohol Use    Yes     Comment: occ       Labs & Results:  Below is the patient's most recent value for Albumin, ALT, AST, BUN, Calcium, Chloride, Cholesterol, CO2, Creatinine, GFR, Glucose, HDL, Hematocrit, Hemoglobin, Hemoglobin A1C, LDL, Magnesium, Phosphorus, Platelets, Potassium, PSA, Sodium, Triglycerides, and WBC  Lab Results   Component Value Date    ALT 62 2017    AST 20 2017    BUN 23 2018    CALCIUM 9 0 2018     2018    CHOL 177 10/08/2012    CO2 27 2018    CREATININE 1 38 (H) 2018    HDL 37 (L) 10/08/2012    HCT 50 6 (H) 2018    HGB 16 8 2018    HGBA1C 5 8 (H) 2013    MG 2 2 2017    PHOS 3 5 2018     2018    K 3 9 2018     2018    TRIG 150 (H) 10/08/2012    WBC 9 29 2018     Note: for a comprehensive list of the patient's lab results, access the Results Review activity  No results found for this or any previous visit  Cardiac testing:   No results found for this or any previous visit  Results for orders placed during the hospital encounter of 17   BLAIRE    Narrative [address]  [address], [address] [facility]  [phone]    Transesophageal Echocardiogram  2D, M-mode, Doppler, and Color Doppler    Study date:  2017    Patient: Pepe Trevizo  MR number: [id number]  Account number: [de-identified]  : 1965  Age: 46 years  Gender: Male  Status: Outpatient  Location: Cath lab  Height: 67 in  Weight: 195 lb  BP: 128/ 64 mmHg    Indications: Atrial fibrillation  Study performed to rule out left atrial thrombus prior to elective electrical cardioversion      Diagnoses: R06 02 - Shortness of breath    Sonographer: JOAQUÍN Castillo  Interpreting Physician:  Sofy Navas MD  Primary Physician:  Elma Ferrer DO  Referring Physician:  Neftali Nielsen MD  Group:  [facility]  Cardiology Fellow:  Cindy Whittaker MD    SUMMARY    LEFT VENTRICLE:  Systolic function was normal  Ejection fraction was estimated to be 60 %  LEFT ATRIUM:  The atrium was mildly dilated  No thrombus was identified  LEFT ATRIAL APPENDAGE:  The appendage was moderately dilated  No thrombus was identified  ATRIAL SEPTUM:  No defect or patent foramen ovale was identified  No defect or patent foramen ovale was identified  Contrast injection was performed  There was no right-to-left shunt, with provocative maneuvers to increase right atrial pressure  MITRAL VALVE:  There was mild regurgitation  TRICUSPID VALVE:  There was mild regurgitation  HISTORY: PRIOR HISTORY: Atrial fibrillation, previous cardioversions, Hypertension    PROCEDURE: The procedure was performed in the catheterization laboratory  This was a routine study  The risks and alternatives of the procedure were explained to the patient and informed consent was obtained  The transesophageal approach  was used  The study included complete 2D imaging, M-mode, complete spectral Doppler, and color Doppler  The heart rate was 121 bpm, at the start of the study  Intubated with ease  One intubation attempt(s)  There was no blood detected on  the probe  Image quality was adequate  There were no complications during the procedure  MEDICATIONS: Benzocaine spray, topical to oropharynx, prior to procedure  Sedation administered by anesthesia team     LEFT VENTRICLE: Size was normal  Systolic function was normal  Ejection fraction was estimated to be 60 %  This study was inadequate for the evaluation of regional wall motion  Wall thickness was normal  DOPPLER: The study was not  technically sufficient to allow evaluation of LV diastolic function      RIGHT VENTRICLE: The size was normal  Systolic function was normal  Wall thickness was normal     LEFT ATRIUM: The atrium was mildly dilated  No thrombus was identified  APPENDAGE: The appendage was moderately dilated  No thrombus was identified  DOPPLER: The function was normal (normal emptying velocity)  ATRIAL SEPTUM: No defect or patent foramen ovale was identified  No defect or patent foramen ovale was identified  Contrast injection was performed  There was no right-to-left shunt, with provocative maneuvers to increase right atrial  pressure  RIGHT ATRIUM: Size was normal  No thrombus was identified  MITRAL VALVE: Valve structure was normal  There was normal leaflet separation  DOPPLER: There was mild regurgitation  AORTIC VALVE: The valve was trileaflet  Leaflets exhibited normal thickness and normal cuspal separation  DOPPLER: There was no regurgitation  TRICUSPID VALVE: The valve structure was normal  There was normal leaflet separation  DOPPLER: There was mild regurgitation  PULMONIC VALVE: Leaflets exhibited normal thickness, no calcification, and normal cuspal separation  PERICARDIUM: There was no pericardial effusion  AORTA: The root exhibited normal size  There was no atheroma  There was no evidence for dissection  There was no evidence for aneurysm  MEASUREMENT TABLES    DOPPLER MEASUREMENTS  Left atrium   (Reference normals)  VIVEK peak cheli   80 cm/s   (--)    [employer] Accredited Echocardiography Laboratory    Prepared and electronically signed by    Carlo Godoy MD  Signed 05-Apr-2017 16:16:09       No results found for this or any previous visit  No results found for this or any previous visit        EKG personally reviewed by DO RUTH BolanosR

## 2018-10-18 ENCOUNTER — APPOINTMENT (OUTPATIENT)
Dept: LAB | Facility: CLINIC | Age: 53
End: 2018-10-18
Payer: COMMERCIAL

## 2018-10-18 ENCOUNTER — OFFICE VISIT (OUTPATIENT)
Dept: PHYSICAL THERAPY | Facility: CLINIC | Age: 53
End: 2018-10-18
Payer: COMMERCIAL

## 2018-10-18 ENCOUNTER — APPOINTMENT (OUTPATIENT)
Dept: RADIOLOGY | Facility: CLINIC | Age: 53
End: 2018-10-18
Payer: COMMERCIAL

## 2018-10-18 ENCOUNTER — TRANSCRIBE ORDERS (OUTPATIENT)
Dept: LAB | Facility: CLINIC | Age: 53
End: 2018-10-18

## 2018-10-18 ENCOUNTER — TRANSCRIBE ORDERS (OUTPATIENT)
Dept: RADIOLOGY | Facility: CLINIC | Age: 53
End: 2018-10-18

## 2018-10-18 DIAGNOSIS — M25.512 ACUTE PAIN OF LEFT SHOULDER: ICD-10-CM

## 2018-10-18 DIAGNOSIS — M25.512 ACUTE PAIN OF LEFT SHOULDER: Primary | ICD-10-CM

## 2018-10-18 LAB
ALBUMIN SERPL BCP-MCNC: 3.6 G/DL (ref 3.5–5)
ALP SERPL-CCNC: 60 U/L (ref 46–116)
ALT SERPL W P-5'-P-CCNC: 57 U/L (ref 12–78)
ANION GAP SERPL CALCULATED.3IONS-SCNC: 7 MMOL/L (ref 4–13)
AST SERPL W P-5'-P-CCNC: 19 U/L (ref 5–45)
BILIRUB DIRECT SERPL-MCNC: 0.07 MG/DL (ref 0–0.2)
BILIRUB SERPL-MCNC: 0.3 MG/DL (ref 0.2–1)
BUN SERPL-MCNC: 16 MG/DL (ref 5–25)
CALCIUM SERPL-MCNC: 8.6 MG/DL (ref 8.3–10.1)
CHLORIDE SERPL-SCNC: 103 MMOL/L (ref 100–108)
CO2 SERPL-SCNC: 31 MMOL/L (ref 21–32)
CREAT SERPL-MCNC: 1 MG/DL (ref 0.6–1.3)
GFR SERPL CREATININE-BSD FRML MDRD: 86 ML/MIN/1.73SQ M
GLUCOSE SERPL-MCNC: 111 MG/DL (ref 65–140)
MAGNESIUM SERPL-MCNC: 2.1 MG/DL (ref 1.6–2.6)
POTASSIUM SERPL-SCNC: 3.5 MMOL/L (ref 3.5–5.3)
PROT SERPL-MCNC: 7.5 G/DL (ref 6.4–8.2)
SODIUM SERPL-SCNC: 141 MMOL/L (ref 136–145)
TSH SERPL DL<=0.05 MIU/L-ACNC: 2.32 UIU/ML (ref 0.36–3.74)

## 2018-10-18 PROCEDURE — 36415 COLL VENOUS BLD VENIPUNCTURE: CPT | Performed by: INTERNAL MEDICINE

## 2018-10-18 PROCEDURE — 80076 HEPATIC FUNCTION PANEL: CPT | Performed by: INTERNAL MEDICINE

## 2018-10-18 PROCEDURE — 80048 BASIC METABOLIC PNL TOTAL CA: CPT | Performed by: INTERNAL MEDICINE

## 2018-10-18 PROCEDURE — 73030 X-RAY EXAM OF SHOULDER: CPT

## 2018-10-18 PROCEDURE — 97110 THERAPEUTIC EXERCISES: CPT

## 2018-10-18 PROCEDURE — 83735 ASSAY OF MAGNESIUM: CPT | Performed by: INTERNAL MEDICINE

## 2018-10-18 PROCEDURE — 97140 MANUAL THERAPY 1/> REGIONS: CPT

## 2018-10-18 PROCEDURE — 84443 ASSAY THYROID STIM HORMONE: CPT | Performed by: INTERNAL MEDICINE

## 2018-10-18 NOTE — PROGRESS NOTES
Daily Note     Today's date: 10/18/2018  Patient name: Josephine Schmitz  : 1965  MRN: [id number]  Referring provider: Dougie Iraheta MD  Dx:   Encounter Diagnosis     ICD-10-CM    1  Acute pain of left shoulder M25 512                   Subjective: Pt states no new complaints  Objective: See treatment diary below    Precautions: a-fib, asthma, HTN, on blood thinners (no graston)     Daily Treatment Diary      Manual  10-9 (IE)  10-16  10/18                 STM to left infraspinatus    JG  5'                 PROM into shoulder IR at 90 deg abd    JG  5'                                                                                               Exercise Diary  10-9  10-16  10/18                 UBE post 4 mins NV  4 mins  4 min                                         Corner stretch 4 x 30" NV  4 x 30" 4x30"                                         TB:                       - rows 2 x 10 GTB NV  2 x 10 GTB GTB 2x10                 - pulldowns 2 x 10 GTB NV  2 x 10 GTB GTB 2x10                 - ER 2 x 10 GTB NV  2 x 10 GTB GTB 2x10                                         Incline bench:                       - I's 10 x ea NV  10 x ea  2x10 ea                 - T's 10 x ea NV  10 x ea 2x10 ea                 - Y's 10 x ea NV  10 x ea 2x10 ea                                                                                                                                                                                               Modalities  10-9 (IE)  10/16  10/18                 Cryo as needed      defers                                                                     Assessment: Tolerated treatment well  Patient exhibited good technique with therapeutic exercises  No adverse effects noted w/ TE; min tenderness noted w/ STM to L infraspinatus  Plan: Progress treatment as tolerated

## 2018-10-22 ENCOUNTER — TELEPHONE (OUTPATIENT)
Dept: FAMILY MEDICINE CLINIC | Facility: CLINIC | Age: 53
End: 2018-10-22

## 2018-10-22 NOTE — TELEPHONE ENCOUNTER
Please call Brissa Vora and let him know that I reviewed his recent test which showed that his shoulder Xray was normal, no fracture or dislocation  Thank you! Procedure: Xr Shoulder 2+ Vw Left    Result Date: 10/21/2018  Narrative: LEFT SHOULDER INDICATION:   M25 512: Pain in left shoulder  COMPARISON:  None VIEWS:  XR SHOULDER 2+ VW LEFT FINDINGS: There is no acute fracture or dislocation  No significant degenerative changes  No lytic or blastic lesions are seen  Soft tissues are unremarkable  Impression: No acute osseous abnormality   Workstation performed: [id number]

## 2018-10-23 ENCOUNTER — OFFICE VISIT (OUTPATIENT)
Dept: PHYSICAL THERAPY | Facility: CLINIC | Age: 53
End: 2018-10-23
Payer: COMMERCIAL

## 2018-10-23 DIAGNOSIS — M25.512 ACUTE PAIN OF LEFT SHOULDER: Primary | ICD-10-CM

## 2018-10-23 PROCEDURE — G8991 OTHER PT/OT GOAL STATUS: HCPCS | Performed by: PHYSICAL THERAPIST

## 2018-10-23 PROCEDURE — G8992 OTHER PT/OT  D/C STATUS: HCPCS | Performed by: PHYSICAL THERAPIST

## 2018-10-23 PROCEDURE — 97140 MANUAL THERAPY 1/> REGIONS: CPT | Performed by: PHYSICAL THERAPIST

## 2018-10-23 PROCEDURE — 97110 THERAPEUTIC EXERCISES: CPT | Performed by: PHYSICAL THERAPIST

## 2018-10-25 ENCOUNTER — APPOINTMENT (OUTPATIENT)
Dept: PHYSICAL THERAPY | Facility: CLINIC | Age: 53
End: 2018-10-25
Payer: COMMERCIAL

## 2018-10-26 ENCOUNTER — HOSPITAL ENCOUNTER (INPATIENT)
Facility: HOSPITAL | Age: 53
LOS: 3 days | Discharge: HOME | DRG: 309 | End: 2018-10-29
Attending: EMERGENCY MEDICINE | Admitting: INTERNAL MEDICINE
Payer: COMMERCIAL

## 2018-10-26 DIAGNOSIS — I48.91 ATRIAL FIBRILLATION WITH RAPID VENTRICULAR RESPONSE (HCC): Primary | ICD-10-CM

## 2018-10-26 DIAGNOSIS — I48.91 ATRIAL FIBRILLATION WITH RVR (HCC): ICD-10-CM

## 2018-10-26 DIAGNOSIS — N18.30 CKD (CHRONIC KIDNEY DISEASE) STAGE 3, GFR 30-59 ML/MIN (HCC): ICD-10-CM

## 2018-10-26 DIAGNOSIS — E66.01 MORBID OBESITY (HCC): ICD-10-CM

## 2018-10-26 DIAGNOSIS — I10 BENIGN ESSENTIAL HYPERTENSION: ICD-10-CM

## 2018-10-26 LAB
ANION GAP SERPL CALCULATED.3IONS-SCNC: 9 MMOL/L (ref 4–13)
BASOPHILS # BLD AUTO: 0.06 THOUSANDS/ΜL (ref 0–0.1)
BASOPHILS NFR BLD AUTO: 1 % (ref 0–1)
BUN SERPL-MCNC: 17 MG/DL (ref 5–25)
CALCIUM SERPL-MCNC: 9.1 MG/DL (ref 8.3–10.1)
CHLORIDE SERPL-SCNC: 102 MMOL/L (ref 100–108)
CO2 SERPL-SCNC: 30 MMOL/L (ref 21–32)
CREAT SERPL-MCNC: 1.18 MG/DL (ref 0.6–1.3)
EOSINOPHIL # BLD AUTO: 0.22 THOUSAND/ΜL (ref 0–0.61)
EOSINOPHIL NFR BLD AUTO: 3 % (ref 0–6)
ERYTHROCYTE [DISTWIDTH] IN BLOOD BY AUTOMATED COUNT: 12.3 % (ref 11.6–15.1)
GFR SERPL CREATININE-BSD FRML MDRD: 70 ML/MIN/1.73SQ M
GLUCOSE SERPL-MCNC: 138 MG/DL (ref 65–140)
HCT VFR BLD AUTO: 49.7 % (ref 36.5–49.3)
HGB BLD-MCNC: 17.1 G/DL (ref 12–17)
IMM GRANULOCYTES # BLD AUTO: 0.02 THOUSAND/UL (ref 0–0.2)
IMM GRANULOCYTES NFR BLD AUTO: 0 % (ref 0–2)
LYMPHOCYTES # BLD AUTO: 2.44 THOUSANDS/ΜL (ref 0.6–4.47)
LYMPHOCYTES NFR BLD AUTO: 28 % (ref 14–44)
MCH RBC QN AUTO: 30.4 PG (ref 26.8–34.3)
MCHC RBC AUTO-ENTMCNC: 34.4 G/DL (ref 31.4–37.4)
MCV RBC AUTO: 88 FL (ref 82–98)
MONOCYTES # BLD AUTO: 0.92 THOUSAND/ΜL (ref 0.17–1.22)
MONOCYTES NFR BLD AUTO: 11 % (ref 4–12)
NEUTROPHILS # BLD AUTO: 4.98 THOUSANDS/ΜL (ref 1.85–7.62)
NEUTS SEG NFR BLD AUTO: 57 % (ref 43–75)
NRBC BLD AUTO-RTO: 0 /100 WBCS
PLATELET # BLD AUTO: 239 THOUSANDS/UL (ref 149–390)
PMV BLD AUTO: 11.8 FL (ref 8.9–12.7)
POTASSIUM SERPL-SCNC: 3.8 MMOL/L (ref 3.5–5.3)
RBC # BLD AUTO: 5.63 MILLION/UL (ref 3.88–5.62)
SODIUM SERPL-SCNC: 141 MMOL/L (ref 136–145)
TSH SERPL DL<=0.05 MIU/L-ACNC: 2.64 UIU/ML (ref 0.36–3.74)
WBC # BLD AUTO: 8.64 THOUSAND/UL (ref 4.31–10.16)

## 2018-10-26 PROCEDURE — 80048 BASIC METABOLIC PNL TOTAL CA: CPT | Performed by: EMERGENCY MEDICINE

## 2018-10-26 PROCEDURE — 96374 THER/PROPH/DIAG INJ IV PUSH: CPT

## 2018-10-26 PROCEDURE — 99285 EMERGENCY DEPT VISIT HI MDM: CPT

## 2018-10-26 PROCEDURE — 36415 COLL VENOUS BLD VENIPUNCTURE: CPT | Performed by: EMERGENCY MEDICINE

## 2018-10-26 PROCEDURE — 99254 IP/OBS CNSLTJ NEW/EST MOD 60: CPT | Performed by: INTERNAL MEDICINE

## 2018-10-26 PROCEDURE — 85025 COMPLETE CBC W/AUTO DIFF WBC: CPT | Performed by: EMERGENCY MEDICINE

## 2018-10-26 PROCEDURE — 99220 PR INITIAL OBSERVATION CARE/DAY 70 MINUTES: CPT | Performed by: INTERNAL MEDICINE

## 2018-10-26 PROCEDURE — 5A2204Z RESTORATION OF CARDIAC RHYTHM, SINGLE: ICD-10-PCS | Performed by: EMERGENCY MEDICINE

## 2018-10-26 PROCEDURE — 92960 CARDIOVERSION ELECTRIC EXT: CPT

## 2018-10-26 PROCEDURE — 84443 ASSAY THYROID STIM HORMONE: CPT | Performed by: EMERGENCY MEDICINE

## 2018-10-26 PROCEDURE — 93005 ELECTROCARDIOGRAM TRACING: CPT

## 2018-10-26 PROCEDURE — 96361 HYDRATE IV INFUSION ADD-ON: CPT

## 2018-10-26 RX ORDER — LOSARTAN POTASSIUM 50 MG/1
100 TABLET ORAL DAILY
Status: DISCONTINUED | OUTPATIENT
Start: 2018-10-26 | End: 2018-10-27

## 2018-10-26 RX ORDER — NEBIVOLOL 5 MG/1
5 TABLET ORAL DAILY
Status: DISCONTINUED | OUTPATIENT
Start: 2018-10-26 | End: 2018-10-29 | Stop reason: HOSPADM

## 2018-10-26 RX ORDER — DILTIAZEM HYDROCHLORIDE 5 MG/ML
15 INJECTION INTRAVENOUS ONCE
Status: COMPLETED | OUTPATIENT
Start: 2018-10-26 | End: 2018-10-26

## 2018-10-26 RX ORDER — ETOMIDATE 2 MG/ML
0.3 INJECTION INTRAVENOUS ONCE
Status: COMPLETED | OUTPATIENT
Start: 2018-10-26 | End: 2018-10-26

## 2018-10-26 RX ORDER — DOFETILIDE 0.5 MG/1
500 CAPSULE ORAL 2 TIMES DAILY
Status: DISCONTINUED | OUTPATIENT
Start: 2018-10-26 | End: 2018-10-29 | Stop reason: HOSPADM

## 2018-10-26 RX ORDER — AMLODIPINE BESYLATE 5 MG/1
5 TABLET ORAL DAILY
Status: DISCONTINUED | OUTPATIENT
Start: 2018-10-26 | End: 2018-10-26

## 2018-10-26 RX ADMIN — DILTIAZEM HYDROCHLORIDE 15 MG: 5 INJECTION INTRAVENOUS at 11:58

## 2018-10-26 RX ADMIN — SODIUM CHLORIDE 1000 ML: 0.9 INJECTION, SOLUTION INTRAVENOUS at 10:06

## 2018-10-26 RX ADMIN — ETOMIDATE 40.2 MG: 2 INJECTION INTRAVENOUS at 11:42

## 2018-10-26 RX ADMIN — DOFETILIDE 500 MCG: 0.5 CAPSULE ORAL at 19:22

## 2018-10-26 RX ADMIN — NEBIVOLOL HYDROCHLORIDE 5 MG: 5 TABLET ORAL at 16:16

## 2018-10-26 RX ADMIN — DILTIAZEM HYDROCHLORIDE 30 MG: 30 TABLET, FILM COATED ORAL at 12:17

## 2018-10-26 NOTE — ASSESSMENT & PLAN NOTE
· Onset at about 7:00 p m  yesterday evening    Has a history of atrial fibrillation over the past 3 years currently on Tikosyn and follows with Dr Lazaro Graf outpatient  · Recently taking off Bystolic on 29/32/63 and started on clonidine patch for poorly controlled hypertension  · He is status postDCCV in the ED x2 with only transient return to sinus rhythm  · Improved rate control status post IV Cardizem in the ED  · Continue Cardizem p o , hold Norvasc, obtain Cardiology evaluation  · Most recent BLAIRE was on 4/4/17 which showed EF 60%  · TSH within normal limits

## 2018-10-26 NOTE — CONSULTS
Consultation - Cardiology Team One  Matt Gutierrez 48 y o  male MRN: [id number]  Unit/Bed#: -01 Encounter: [id number]    Inpatient consult to Cardiology  Consult performed by: [address] [place] [address] [place] [address] ordered by: Theodore Palacios          Physician Requesting Consult: Zeke Rawls MD     Reason for Consult / Principal Problem: atrial fibrillation      Assessment/ Plan:     Paroxsymal atrial fibrillation: currently in afib with RVR; rates 100-120s; cardioversion attempted in ED with 1x 200J and 2x 250J without success  Rates improved with diltiazem but remains in Afib; he feels better with better rate control  On AC with Xarelto; no missed doses  Ablation discussed prior Dr Filiberto Reveles but pt would like to lose weight and get better BP control before agreeing to ablation  Will discuss further with attending; will likely need medication adjustments +/- repeat cardioversion  HTN: poorly controlled; referred to nephrology as OP  OP regimen: clonidine past 0 2mg weekly, amlodipine 5mg daily, azilsartan 80mg daily  He has felt much better on clonidine patch; has appt with nephrology on 12/5    CKD: Cr 1 18    History of Present Illness   HPI: Matt Gutierrez is a 48y o  year old male who has a history of atrial fibrillation, HTN, CKD, Obesity  He follows with cardiologist Dr Lazaro Graf  Pt presents with palpitations  He has known PAF; maintains SR on Tikosyn and is anticoagulated with Xarelto  Onset occurred yesterday at 7pm while being pulled over by police driving  Continue throughout night and this AM he came to ED  No chest pain, SOB, dizziness or lightheadedness  No recent illness or dehydration  Presenting EKG showed atrial fibrillation with ventricular rate 140  CBC and BMP unremarkable  Cardioversion attempted on 2 occasions; 1x 200J and again with 250J x2 without success  He was given IV Cardizem with rate improvement and he is feeling much better  Denies any further palpitations       Saw Dr Jacinto Age 10 days ago; bystolic was stopped and clonidine patch was started for uncontrolled htn  EKG reviewed personally:   10/26/2018  Atrial fibrillation  Ventricular rate 140    Telemetry reviewed personally:  Atrial fibrillation with RVR  Rate 120-130s    Review of Systems   Constitution: Negative for decreased appetite, fever and weakness  Cardiovascular: Negative for chest pain, dyspnea on exertion, leg swelling and palpitations  Respiratory: Negative for cough and shortness of breath  Gastrointestinal: Negative for abdominal pain, nausea and vomiting  Genitourinary: Negative for dysuria  Neurological: Negative for dizziness and light-headedness  Psychiatric/Behavioral: Negative for altered mental status  All other systems reviewed and are negative      Historical Information   Past Medical History:   Diagnosis Date    Arthritis     Asthma     Atrial fibrillation ([facility] [address] )     Chronic kidney disease     kidney stones    Hypertension     Muscle weakness      Past Surgical History:   Procedure Laterality Date    APPENDECTOMY      CYSTOSCOPY  08/30/2016    w/removal of object, last assessed 8/30/16    FOOT SURGERY Left     HAND SURGERY      KNEE SURGERY Right 2016    AL CYSTO/URETERO W/LITHOTRIPSY &INDWELL STENT INSRT Left 8/9/2016    Procedure: CYSTOSCOPY URETEROSCOPY WITH LITHOTRIPSY HOLMIUM LASER STONE EXTRACTION, RETROGRADE PYELOGRAM AND INSERTION STENT URETERAL;  Surgeon: Soila Sanchez MD;  [facility];  Service: Urology last assessed 8/30/16    WRIST SURGERY Left 2014    Fracture surgery     History   Alcohol Use    Yes     Comment: occ     History   Drug Use No     History   Smoking Status    Former Smoker    Quit date: 4/4/1994   Smokeless Tobacco    Never Used     Family History:   Family History   Problem Relation Age of Onset    Atrial fibrillation Mother     Other Mother         blood dyscrasia    Hypertension Mother     Other Father         hypoglycemia  Atrial fibrillation Father     Diabetes Family     Other Family         Thrombocytosis       Meds/Allergies   current meds:   Current Facility-Administered Medications   Medication Dose Route Frequency    [START ON 11/8/2018] cloNIDine (CATAPRES-TTS-2) 0 2 mg/24 hr TD weekly patch  1 patch Transdermal Weekly    diltiazem (CARDIZEM) tablet 30 mg  30 mg Oral Q6H [address]    dofetilide (TIKOSYN) capsule 500 mcg  500 mcg Oral BID    losartan (COZAAR) tablet 100 mg  100 mg Oral Daily    [START ON 10/27/2018] rivaroxaban (XARELTO) tablet 20 mg  20 mg Oral Daily With Breakfast          No Known Allergies    Objective   Vitals: Blood pressure 147/93, pulse 89, temperature 98 5 °F (36 9 °C), temperature source Oral, resp  rate 20, height 5' 10" (1 778 m), weight 132 kg (290 lb), SpO2 95 %  ,     Body mass index is 41 61 kg/m²  ,     Systolic (57WXK), DAD:543 , Min:140 , QTN:436     Diastolic (70ERW), TME:925, Min:90, Max:148            Intake/Output Summary (Last 24 hours) at 10/26/18 1457  Last data filed at 10/26/18 1215   Gross per 24 hour   Intake             1000 ml   Output                0 ml   Net             1000 ml     Weight (last 2 days)     Date/Time   Weight    10/26/18 1257  132 (290)    10/26/18 0945  134 (295 86)            Invasive Devices     Peripheral Intravenous Line            Peripheral IV 10/26/18 Right Antecubital less than 1 day                Physical Exam   Constitutional: He is oriented to person, place, and time  No distress  Pt sitting up in bed in NAD, alert and cooperative   HENT:   Head: Normocephalic and atraumatic  Cardiovascular: S1 normal and S2 normal   An irregular rhythm present  Tachycardia present  No murmur heard  Pulmonary/Chest: Effort normal and breath sounds normal  No respiratory distress  He has no wheezes  He has no rales  Abdominal:   obese   Neurological: He is alert and oriented to person, place, and time  Skin: He is not diaphoretic     Psychiatric: He has a normal mood and affect  His behavior is normal    Nursing note and vitals reviewed  LABORATORY RESULTS:      CBC with diff:   Results from last 7 days  Lab Units 10/26/18  1007   WBC Thousand/uL 8 64   HEMOGLOBIN g/dL 17 1*   HEMATOCRIT % 49 7*   MCV fL 88   PLATELETS Thousands/uL 239   MCH pg 30 4   MCHC g/dL 34 4   RDW % 12 3   MPV fL 11 8   NRBC AUTO /100 WBCs 0     CMP:  Results from last 7 days  Lab Units 10/26/18  1008   SODIUM mmol/L 141   POTASSIUM mmol/L 3 8   CHLORIDE mmol/L 102   CO2 mmol/L 30   BUN mg/dL 17   CREATININE mg/dL 1 18   CALCIUM mg/dL 9 1   EGFR ml/min/1 73sq m 70     BMP:  Results from last 7 days  Lab Units 10/26/18  1008   SODIUM mmol/L 141   POTASSIUM mmol/L 3 8   CHLORIDE mmol/L 102   CO2 mmol/L 30   BUN mg/dL 17   CREATININE mg/dL 1 18   CALCIUM mg/dL 9 1        Results from last 7 days  Lab Units 10/26/18  1008   TSH 3RD GENERATON uIU/mL 2 642         Lipid Profile:   Lab Results   Component Value Date    CHOL 177 10/08/2012     Lab Results   Component Value Date    HDL 37 (L) 10/08/2012     No results found for: [address]  Lab Results   Component Value Date    TRIG 150 (H) 10/08/2012     Cardiac testing:   No results found for this or any previous visit  Results for orders placed during the hospital encounter of 17   BLAIRE    N[facility]ative [facility] [address] [place] [address]  [phone]    Transesophageal Echocardiogram  2D, M-mode, Doppler, and Color Doppler    Study date:  2017    Patient: Collin Mir  MR number: [id number]  Account number: [de-identified]  : 1965  Age: 46 years  Gender: Male  Status: Outpatient  Location: Cath lab  Height: 67 in  Weight: 195 lb  BP: 128/ 64 mmHg    Indications: Atrial fibrillation  Study performed to rule out left atrial thrombus prior to elective electrical cardioversion      Diagnoses: R06 02 - Shortness of breath    Sonographer:  JOAQUÍN Treviño  Interpreting Physician: Cindy Leon MD  Primary Physician:  Hanh Marlow DO  Referring Physician:  Kerry Kevin MD  Group:  [facility]  Cardiology Fellow:  Luis Enrique Alvarenga MD    SUMMARY    LEFT VENTRICLE:  Systolic function was normal  Ejection fraction was estimated to be 60 %  LEFT ATRIUM:  The atrium was mildly dilated  No thrombus was identified  LEFT ATRIAL APPENDAGE:  The appendage was moderately dilated  No thrombus was identified  ATRIAL SEPTUM:  No defect or patent foramen ovale was identified  No defect or patent foramen ovale was identified  Contrast injection was performed  There was no right-to-left shunt, with provocative maneuvers to increase right atrial pressure  MITRAL VALVE:  There was mild regurgitation  TRICUSPID VALVE:  There was mild regurgitation  HISTORY: PRIOR HISTORY: Atrial fibrillation, previous cardioversions, Hypertension    PROCEDURE: The procedure was performed in the catheterization laboratory  This was a routine study  The risks and alternatives of the procedure were explained to the patient and informed consent was obtained  The transesophageal approach  was used  The study included complete 2D imaging, M-mode, complete spectral Doppler, and color Doppler  The heart rate was 121 bpm, at the start of the study  Intubated with ease  One intubation attempt(s)  There was no blood detected on  the probe  Image quality was adequate  There were no complications during the procedure  MEDICATIONS: Benzocaine spray, topical to oropharynx, prior to procedure  Sedation administered by anesthesia team     LEFT VENTRICLE: Size was normal  Systolic function was normal  Ejection fraction was estimated to be 60 %  This study was inadequate for the evaluation of regional wall motion  Wall thickness was normal  DOPPLER: The study was not  technically sufficient to allow evaluation of LV diastolic function      RIGHT VENTRICLE: The size was normal  Systolic function was normal  Wall thickness was normal     LEFT ATRIUM: The atrium was mildly dilated  No thrombus was identified  APPENDAGE: The appendage was moderately dilated  No thrombus was identified  DOPPLER: The function was normal (normal emptying velocity)  ATRIAL SEPTUM: No defect or patent foramen ovale was identified  No defect or patent foramen ovale was identified  Contrast injection was performed  There was no right-to-left shunt, with provocative maneuvers to increase right atrial  pressure  RIGHT ATRIUM: Size was normal  No thrombus was identified  MITRAL VALVE: Valve structure was normal  There was normal leaflet separation  DOPPLER: There was mild regurgitation  AORTIC VALVE: The valve was trileaflet  Leaflets exhibited normal thickness and normal cuspal separation  DOPPLER: There was no regurgitation  TRICUSPID VALVE: The valve structure was normal  There was normal leaflet separation  DOPPLER: There was mild regurgitation  PULMONIC VALVE: Leaflets exhibited normal thickness, no calcification, and normal cuspal separation  PERICARDIUM: There was no pericardial effusion  AORTA: The root exhibited normal size  There was no atheroma  There was no evidence for dissection  There was no evidence for aneurysm  MEASUREMENT TABLES    DOPPLER MEASUREMENTS  Left atrium   (Reference normals)  VIVEK peak cheli   80 cm/s   (--)    [employer] Accredited Echocardiography Laboratory    Prepared and electronically signed by    Cindy Leon MD  Signed 05-Apr-2017 16:16:09       Imaging: I have personally reviewed pertinent reports  Xr Shoulder 2+ Vw Left    Result Date: 10/21/2018  Narrative: LEFT SHOULDER INDICATION:   M25 512: Pain in left shoulder  COMPARISON:  None VIEWS:  XR SHOULDER 2+ VW LEFT FINDINGS: There is no acute fracture or dislocation  No significant degenerative changes  No lytic or blastic lesions are seen  Soft tissues are unremarkable       Impression: No acute osseous abnormality  Workstation performed: [id number]     Assessment  Principal Problem:    Atrial fibrillation with RVR ([facility])  Active Problems:    Benign essential hypertension    Morbid obesity ([facility])    CKD (chronic kidney disease) stage 3, GFR 30-59 ml/min ([facility])      Thank you for allowing us to participate in this patient's care  This pt will follow up with Dr Marko Brenner once discharged  Counseling / Coordination of Care  Total floor / unit time spent today 45 minutes  Greater than 50% of total time was spent with the patient and / or family counseling and / or coordination of care  A description of the counseling / coordination of care: Review of history, current assessment, development of a plan  Code Status: Level 1 - Full Code    ** Please Note: Dragon 360 Dictation voice to text software may have been used in the creation of this document   **

## 2018-10-26 NOTE — PROGRESS NOTES
Pt up in the chair and denies discomfort  Spoke with LUZ MARINA, Dr Rose Mary Garcia and confirmed that cardiology will come and see the pt  Will continue to monitor

## 2018-10-26 NOTE — ASSESSMENT & PLAN NOTE
· Bystolic recently discontinued on 10/17/18 and patient started on Catapres for uncontrolled hypertension  · Continue clonidine, Norvasc will be held currently while on Cardizem, continue ARB

## 2018-10-26 NOTE — H&P
H&P- Magen Rodriguez 1965, 48 y o  male MRN: [id number]    Unit/Bed#: -01 Encounter: [id number]    Primary Care Provider: Andrea Stern MD   Date and time admitted to hospital: 10/26/2018  9:38 AM    * Atrial fibrillation with RVR (H[facility])   Assessment & Plan    · Onset at about 7:00 p m  yesterday evening  Has a history of atrial fibrillation over the past 3 years currently on Tikosyn and follows with Dr Sotero Roblero outpatient  · Recently taking off Bystolic on 69/25/54 and started on clonidine patch for poorly controlled hypertension  · He is status postDCCV in the ED x2 with only transient return to sinus rhythm  · Improved rate control status post IV Cardizem in the ED  · Continue Cardizem p o , hold Norvasc, obtain Cardiology evaluation  · Most recent BLAIRE was on 4/4/17 which showed EF 60%  · TSH within normal limits     Benign essential hypertension   Assessment & Plan    · Bystolic recently discontinued on 10/17/18 and patient started on Catapres for uncontrolled hypertension  · Continue clonidine, Norvasc will be held currently while on Cardizem, continue ARB     CKD (chronic kidney disease) stage 3, GFR 30-59 ml/min ([facility])   Assessment & Plan    · Creatinine at baseline  · Follow-up with nephrologist outpatient (recently referred)     Morbid obesity ([address] )   Assessment & Plan    · BMI 41 6  · Encourage therapeutic lifestyle changes to promote weight loss       VTE Prophylaxis: Rivaroxaban (Xarelto)       Code Status:  Full code    POLST: POLST form is not discussed and not completed at this time  Discussed with patient and spouse at the bedside  All questions answered    Anticipated Length of Stay:  Patient will be admitted on an Inpatient basis with an anticipated length of stay of  < 2 midnights  Justification for Hospital Stay:  Atrial fibrillation with RVR    Chief Complaint:     Palpitations    History of Present Illness:  Magen Rodriguez is a 48 y o  male who presents with palpitations  Patient reports he was pulled over yesterday by  following which she developed sudden onset of palpitations  Has an underlying history of atrial fibrillation and currently on Tikosyn and Xarelto  He follows with Dr Afia De La Vega outpatient and is status post cardioversions in the past   On presentation to the ED he was hypertensive in the 300 systolic with heart rate in the 130s to 150s  He had 2 unsuccessful attempts at [address] with 200 and 250joules  He briefly converted to sinus rhythm and then went back to AFib  After 50 mg IV Cardizem bolus and 30 mg p  O , heart rate improved to the 80s although he remained in AFib  He is now admitted for further cardiac evaluation  He denies any chest pain, no diaphoresis, no lightheadedness, no shortness of breath, no dizziness or syncope  Review of Systems   Constitutional: Negative  HENT: Negative  Eyes: Negative  Respiratory: Negative  Cardiovascular: Positive for palpitations  Negative for chest pain and leg swelling  Gastrointestinal: Negative  Genitourinary: Negative  Musculoskeletal: Negative  Neurological: Negative  Psychiatric/Behavioral: Negative  All other systems reviewed and are negative        Past Medical History:   Diagnosis Date    Arthritis     Asthma     Atrial fibrillation ([address] )     Chronic kidney disease     kidney stones    Hypertension     Muscle weakness        Past Surgical History:   Procedure Laterality Date    APPENDECTOMY      CYSTOSCOPY  08/30/2016    w/removal of object, last assessed 8/30/16    FOOT SURGERY Left     HAND SURGERY      KNEE SURGERY Right 2016    NM CYSTO/URETERO W/LITHOTRIPSY &INDWELL STENT INSRT Left 8/9/2016    Procedure: CYSTOSCOPY URETEROSCOPY WITH LITHOTRIPSY HOLMIUM LASER STONE EXTRACTION, RETROGRADE PYELOGRAM AND INSERTION STENT URETERAL;  Surgeon: Jhoan Price MD;  [facility];  Service: Urology last assessed 8/30/16    WRIST SURGERY Left 2014    Fracture surgery       Family History:  Family History   Problem Relation Age of Onset    Atrial fibrillation Mother     Other Mother         blood dyscrasia    Hypertension Mother     Other Father         hypoglycemia     Atrial fibrillation Father     Diabetes Family     Other Family         Thrombocytosis       Home Meds:  all medications and allergies reviewed    Allergies: No Known Allergies      Marital Status: /Civil Union     History   Alcohol Use    Yes     Comment: occ     History   Smoking Status    Former Smoker    Quit date: 4/4/1994   Smokeless Tobacco    Never Used     History   Drug Use No     Physical Exam:   Vitals:   Blood Pressure: 147/93 (10/26/18 1257)  Pulse: 89 (10/26/18 1257)  Temperature: 98 5 °F (36 9 °C) (10/26/18 1323)  Temp Source: Oral (10/26/18 1323)  Respirations: 20 (10/26/18 1257)  Height: 5' 10" (177 8 cm) (10/26/18 1257)  Weight - Scale: 132 kg (290 lb) (10/26/18 1257)  SpO2: 95 % (10/26/18 1257)    Physical Exam   Constitutional: He is oriented to person, place, and time  He appears well-developed and well-nourished  No distress  HENT:   Head: Normocephalic and atraumatic  Eyes: Conjunctivae and EOM are normal  Right eye exhibits no discharge  Left eye exhibits no discharge  No scleral icterus  Neck: Normal range of motion  Neck supple  Cardiovascular: An irregularly irregular rhythm present  No murmur heard  Pulmonary/Chest: Effort normal and breath sounds normal  No respiratory distress  He has no wheezes  He has no rales  Abdominal: Soft  Bowel sounds are normal  He exhibits no distension and no mass  There is no tenderness  Musculoskeletal: Normal range of motion  He exhibits no edema or tenderness  Neurological: He is alert and oriented to person, place, and time  Skin: Skin is warm and dry  He is not diaphoretic  Psychiatric: He has a normal mood and affect  His behavior is normal    Vitals reviewed          Lab Results: I have personally reviewed pertinent reports  Results from last 7 days  Lab Units 10/26/18  1007   WBC Thousand/uL 8 64   HEMOGLOBIN g/dL 17 1*   HEMATOCRIT % 49 7*   PLATELETS Thousands/uL 239   NEUTROS PCT % 57   LYMPHS PCT % 28   MONOS PCT % 11   EOS PCT % 3       Results from last 7 days  Lab Units 10/26/18  1008   SODIUM mmol/L 141   POTASSIUM mmol/L 3 8   CHLORIDE mmol/L 102   CO2 mmol/L 30   BUN mg/dL 17   CREATININE mg/dL 1 18   CALCIUM mg/dL 9 1             EKG, telemetry, Pathology, and Other Studies Reviewed on Admission:   · Atrial fibrillation with RVR    ** Please Note: Dragon 360 Dictation voice to text software may have been used in the creation of this document   **

## 2018-10-26 NOTE — ED PROVIDER NOTES
History  Chief Complaint   Patient presents with    Atrial Fibrillation     pt reports getting pulled over by police last night and began feeling fluttering in his chest, reports fluttering continued today, pt takes Tikosyn and sees cardiology      HPI     71-year-old male with history of paroxysmal AFib on Xarelto, hypertension, who presents for evaluation of palpitations  Patient states that he was pulled over by a  yesterday at noon, and had sudden onset of palpitations  He denies any chest pain, shortness of breath, or lightheadedness  Has been taking his Tikosyn as prescribed and has not missed any doses of his Xarelto  He follows with Dr Martir Johnston with Cardiology  Last time he was here, he was cardioverted and sent home, this is what he is requesting today  No history of stroke or TI A  States that he is completely asymptomatic with the exception of palpitations  Prior to Admission Medications   Prescriptions Last Dose Informant Patient Reported? Taking? AmLODIPine Besylate (NORVASC PO)  Self Yes Yes   Sig: Take 5 mg by mouth 1 tablet daily in the AM    BYSTOLIC 2 5 MG tablet Not Taking at Unknown time Self No No   Sig: TAKE 1 TABLET DAILY     Patient not taking: Reported on 10/26/2018   EDARBI 80 MG tablet  Self No Yes   Sig: TAKE ONE TABLET DAILY   Patient taking differently: TAKE ONE TABLET DAILY IN THE AM   cloNIDine (CATAPRES-TTS-2) 0 2 mg/24 hr   No Yes   Sig: Place 1 patch on the skin once a week   dofetilide (TIKOSYN) 500 mcg capsule  Self Yes Yes   Sig: Take 500 mcg by mouth 2 (two) times a day 1 Capsule at 7:00 Am and 7 Pm    rivaroxaban (XARELTO) 20 mg tablet  Self Yes Yes   Sig: Take 20 mg by mouth daily with breakfast      Facility-Administered Medications: None       Past Medical History:   Diagnosis Date    Arthritis     Asthma     Atrial fibrillation (HCC)     Chronic kidney disease     kidney stones    Hypertension     Muscle weakness        Past Surgical History: Procedure Laterality Date    APPENDECTOMY      CYSTOSCOPY  08/30/2016    w/removal of object, last assessed 8/30/16    FOOT SURGERY Left     HAND SURGERY      KNEE SURGERY Right 2016    [place] CYSTO/URETERO W/LITHOTRIPSY &INDWELL STENT INSRT Left 8/9/2016    Procedure: CYSTOSCOPY URETEROSCOPY WITH LITHOTRIPSY HOLMIUM LASER STONE EXTRACTION, RETROGRADE PYELOGRAM AND INSERTION STENT URETERAL;  Surgeon: Jhoan Price MD;  Location: [facility];  Service: Urology last assessed 8/30/16    WRIST SURGERY Left 2014    Fracture surgery       Family History   Problem Relation Age of Onset    Atrial fibrillation Mother     Other Mother         blood dyscrasia    Hypertension Mother     Other Father         hypoglycemia     Atrial fibrillation Father     Diabetes Family     Other Family         Thrombocytosis     I have reviewed and agree with the history as documented  Social History   Substance Use Topics    Smoking status: Former Smoker     Quit date: 4/4/1994    Smokeless tobacco: Never Used    Alcohol use Yes      Comment: occ        Review of Systems   Constitutional: Negative for chills and fever  HENT: Negative for congestion  Eyes: Negative for visual disturbance  Respiratory: Negative for cough and shortness of breath  Cardiovascular: Positive for palpitations  Negative for chest pain and leg swelling  Gastrointestinal: Negative for abdominal pain, diarrhea, nausea and vomiting  Genitourinary: Negative for dysuria and frequency  Musculoskeletal: Negative for arthralgias, back pain, neck pain and neck stiffness  Skin: Negative for rash  Neurological: Negative for dizziness, weakness, light-headedness, numbness and headaches  Psychiatric/Behavioral: Negative for agitation, behavioral problems and confusion  Physical Exam  Physical Exam   Constitutional: He is oriented to person, place, and time  He appears well-developed and well-nourished  No distress     HENT:   Head: Normocephalic and atraumatic  Right Ear: External ear normal    Left Ear: External ear normal    Nose: Nose normal    Mouth/Throat: Oropharynx is clear and moist    Eyes: Conjunctivae are normal    Neck: Normal range of motion  Neck supple  Cardiovascular: Normal heart sounds  Exam reveals no gallop and no friction rub  No murmur heard  afib with RVR with HR of 130-150 bpm   Pulmonary/Chest: Effort normal and breath sounds normal  No respiratory distress  He has no wheezes  He has no rales  Abdominal: Soft  Bowel sounds are normal  He exhibits no distension  There is no tenderness  There is no guarding  Musculoskeletal: Normal range of motion  He exhibits no edema or deformity  Neurological: He is alert and oriented to person, place, and time  He exhibits normal muscle tone  Skin: Skin is warm and dry  He is not diaphoretic         Vital Signs  ED Triage Vitals   Temperature Pulse Respirations Blood Pressure SpO2   10/26/18 0945 10/26/18 0945 10/26/18 0945 10/26/18 0946 10/26/18 0945   97 5 °F (36 4 °C) (!) 149 20 (!) 209/95 96 %      Temp Source Heart Rate Source Patient Position - Orthostatic VS BP Location FiO2 (%)   10/26/18 0945 10/26/18 0945 10/26/18 0945 10/26/18 0945 --   Oral Monitor Lying Right arm       Pain Score       10/26/18 0945       No Pain           Vitals:    10/28/18 0700 10/28/18 1500 10/28/18 2200 10/29/18 0721   BP: 160/98 152/94 151/98 (!) 143/104   Pulse: 102 96 94 81   Patient Position - Orthostatic VS: Sitting Sitting Lying Sitting       Visual Acuity      ED Medications  Medications   dofetilide (TIKOSYN) capsule 500 mcg (500 mcg Oral Given 10/29/18 0647)   rivaroxaban (XARELTO) tablet 20 mg (20 mg Oral Given 10/29/18 0808)   cloNIDine (CATAPRES-TTS-2) 0 2 mg/24 hr TD weekly patch (not administered)   nebivolol (BYSTOLIC) tablet 5 mg (5 mg Oral Given 10/29/18 0808)   azilsartan medoxomil (EDARBI) tablet 80 mg (80 mg Oral Given 10/29/18 0808)   sodium chloride 0 9 % infusion (50 mL/hr Intravenous New Bag 10/29/18 0808)   amLODIPine (NORVASC) tablet 5 mg (5 mg Oral Given 10/29/18 0808)   etomidate (AMIDATE) 2 mg/mL injection 40 2 mg (40 2 mg Intravenous Given 10/26/18 1142)   sodium chloride 0 9 % bolus 1,000 mL (0 mL Intravenous Stopped 10/26/18 1215)   diltiazem (CARDIZEM) tablet 30 mg (30 mg Oral Given 10/26/18 1217)   diltiazem (CARDIZEM) injection 15 mg (15 mg Intravenous Given 10/26/18 1158)       Diagnostic Studies  Results Reviewed     Procedure Component Value Units Date/Time    Basic metabolic panel [[id number]] Collected:  10/26/18 1008    Lab Status:  Final result Specimen:  Blood from Arm, Right Updated:  10/26/18 1044     Sodium 141 mmol/L      Potassium 3 8 mmol/L      Chloride 102 mmol/L      CO2 30 mmol/L      ANION GAP 9 mmol/L      BUN 17 mg/dL      Creatinine 1 18 mg/dL      Glucose 138 mg/dL      Calcium 9 1 mg/dL      eGFR 70 ml/min/1 73sq m     Narrative:         National Kidney Disease Education Program recommendations are as follows:  GFR calculation is accurate only with a steady state creatinine  Chronic Kidney disease less than 60 ml/min/1 73 sq  meters  Kidney failure less than 15 ml/min/1 73 sq  meters  TSH [[id number]]  (Normal) Collected:  10/26/18 1008    Lab Status:  Final result Specimen:  Blood from Arm, Right Updated:  10/26/18 1044     TSH 3RD GENERATON 2 642 uIU/mL     Narrative:         Patients undergoing fluorescein dye angiography may retain small amounts of fluorescein in the body for 48-72 hours post procedure  Samples containing fluorescein can produce falsely depressed TSH values  If the patient had this procedure,a specimen should be resubmitted post fluorescein clearance      CBC and differential [[id number]]  (Abnormal) Collected:  10/26/18 1007    Lab Status:  Final result Specimen:  Blood from Arm, Right Updated:  10/26/18 1013     WBC 8 64 Thousand/uL      RBC 5 63 (H) Million/uL      Hemoglobin 17 1 (H) g/dL      Hematocrit 49 7 (H) %      MCV 88 fL      MCH 30 4 pg      MCHC 34 4 g/dL      RDW 12 3 %      MPV 11 8 fL      Platelets 225 Thousands/uL      nRBC 0 /100 WBCs      Neutrophils Relative 57 %      Immat GRANS % 0 %      Lymphocytes Relative 28 %      Monocytes Relative 11 %      Eosinophils Relative 3 %      Basophils Relative 1 %      Neutrophils Absolute 4 98 Thousands/µL      Immature Grans Absolute 0 02 Thousand/uL      Lymphocytes Absolute 2 44 Thousands/µL      Monocytes Absolute 0 92 Thousand/µL      Eosinophils Absolute 0 22 Thousand/µL      Basophils Absolute 0 06 Thousands/µL                  No orders to display              Procedures  Procedural Sedation  Date/Time: 10/26/2018 4:58 PM  Performed by: Chloe Stone  Authorized by: Chloe Stone     Consent:     Consent obtained:  Written    Consent given by:  Patient    Risks discussed:  Respiratory compromise necessitating ventilatory assistance and intubation and vomiting  Universal protocol:     Procedure explained and questions answered to patient or proxy's satisfaction: yes      Required blood products, implants, devices, and special equipment available: yes      Patient identity confirmation method:  Verbally with patient  Indications:     Sedation purpose:  Cardioversion    Procedure necessitating sedation performed by:  Physician performing sedation  Pre-sedation assessment:     ASA classification: class 1 - normal, healthy patient      Neck mobility: normal      Mouth opening:  3 or more finger widths    Mallampati score:  III - soft palate, base of uvula visible    Pre-sedation assessments completed and reviewed: airway patency, cardiovascular function, mental status, nausea/vomiting and respiratory function      History of difficult intubation: no    Immediate pre-procedure details:     Reassessment: Patient reassessed immediately prior to procedure      Reviewed: vital signs and relevant labs/tests      Verified: bag valve mask available, emergency equipment available, intubation equipment available, IV patency confirmed, oxygen available and suction available    Procedure details (see MAR for exact dosages):     Preoxygenation:  Nasal cannula    Sedation:  Etomidate    Intra-procedure monitoring:  Blood pressure monitoring, cardiac monitor, continuous pulse oximetry and frequent vital sign checks    Intra-procedure events: none    Post-procedure details:     Attendance: Constant attendance by certified staff until patient recovered      Recovery: Patient returned to pre-procedure baseline      Post-sedation assessments completed and reviewed: airway patency, cardiovascular function, mental status, nausea/vomiting and respiratory function      Patient tolerance: Tolerated well, no immediate complications  Cardioversion  Date/Time: 10/26/2018 5:03 PM  Performed by: Juan Carrera  Authorized by: Juan Carrera     Verbal consent obtained?: Yes    Written consent obtained?: Yes    Risks and benefits: Risks, benefits and alternatives were discussed    Consent given by:  Patient  Patient identity confirmed:  Verbally with patient  Time out: Immediately prior to the procedure a time out was called    Patient sedated: Yes    Pre-procedure rhythm:  Atrial fibrillation  Position: Patient was placed in a supine position    Chest area exposed: Chest area was exposed    Electrodes:  Pads  Electrodes placed:  Anterior-posterior  Number of attempts:  2  Attempt 1:     Attempt 1 mode:  Synchronous    Attempt 1 waveform:  Biphasic    Attempt 1 shock (Joules):  200    Attempt 1 outcome:  No change in rhythm  Attempt 2:     Attempt 2 mode:  Synchronous    Attempt 2 waveform:  Biphasic    Shock (joules) attempt two: 250      Attempt 2 outcome:  No change in rhythm  Post-procedure rhythm:  Atrial fibrillation  Complications: no complications    Patient tolerance:  Patient tolerated the procedure well with no immediate complications           Phone Contacts  ED Phone Contact    ED Course                               MDM  Number of Diagnoses or Management Options  Atrial fibrillation with rapid ventricular response ([address] ): established and worsening  Diagnosis management comments: Generally well appearing  Patient is hypertensive to 940 systolic on arrival   He is in AFib with RVR, with heart rate between 130 and 150  I personally interpreted his EKG, which shows heart rate of 140 beats per minute, AFib with rapid ventricular response, left axis deviation, no ST segment deviation or T-wave inversions  I spoke with Dr Gabrielle Ramon with Cardiology regarding the patient's case  He is in agreement with cardioversion in the ED  Procedural sedation and cardioversion consent signed, and attempts were made at cardioversion twice, once at 200 joules, and again at 250 joules  After the 2nd shock, patient briefly went back in to sinus rhythm for about 10 sec with visible P waves, but then converted back into AFib  He woke up without difficulty, and continues to be asymptomatic with the exception of palpitations  He was given 30 mg of p o  Diltiazem followed by 15 mg IV bolus, with resolution of RVR and heart rate of 80, though he remains in AFib  Labs show improvement in renal function, mild hemoconcentration, otherwise unremarkable  Will admit to Medicine for further management  Amount and/or Complexity of Data Reviewed  Clinical lab tests: ordered and reviewed  Tests in the radiology section of CPT®: ordered and reviewed  Review and summarize past medical records: yes  Discuss the patient with other providers: yes  Independent visualization of images, tracings, or specimens: yes    Patient Progress  Patient progress: stable    The patient presented with a condition in which there was a high probability of imminent or life-threatening deterioration, and critical care services (excluding separately billable procedures) totalled 30-74 minutes               Disposition  Final diagnoses:   Atrial fibrillation with rapid ventricular response ([facility] )     Time reflects when diagnosis was documented in both MDM as applicable and the Disposition within this note     Time User Action Codes Description Comment    10/26/2018 12:07 PM Baron Kocher Add [I48 [id number]] Atrial fibrillation with rapid ventricular response ([facility] )     10/27/2018 12:10 PM Rebeccacandelaria Yon, [address] [facility] [address] [[id number]] Atrial fibrillation with RVR [facility])       ED Disposition     ED Disposition Condition Comment    Admit  Case was discussed with LUZ MARINA and the patient's admission status was agreed to be Admission Status: inpatient status to the service of Dr Celena Dietrich   Follow-up Information    None         Current Discharge Medication List      CONTINUE these medications which have NOT CHANGED    Details   AmLODIPine Besylate (NORVASC PO) Take 5 mg by mouth 1 tablet daily in the AM       cloNIDine (CATAPRES-TTS-2) 0 2 mg/24 hr Place 1 patch on the skin once a week  Qty: 4 patch, Refills: 0    Associated Diagnoses: Benign essential hypertension      dofetilide (TIKOSYN) 500 mcg capsule Take 500 mcg by mouth 2 (two) times a day 1 Capsule at 7:00 Am and 7 Pm       EDARBI 80 MG tablet TAKE ONE TABLET DAILY  Qty: 30 tablet, Refills: 11    Associated Diagnoses: Essential hypertension      rivaroxaban (XARELTO) 20 mg tablet Take 20 mg by mouth daily with breakfast      BYSTOLIC 2 5 MG tablet TAKE 1 TABLET DAILY  Qty: 90 tablet, Refills: 3    Associated Diagnoses: Essential hypertension           No discharge procedures on file      ED Provider  Electronically Signed by           Xochitl Traylor MD  10/29/18 6114

## 2018-10-27 PROCEDURE — 99232 SBSQ HOSP IP/OBS MODERATE 35: CPT | Performed by: NURSE PRACTITIONER

## 2018-10-27 RX ADMIN — RIVAROXABAN 20 MG: 20 TABLET, FILM COATED ORAL at 09:12

## 2018-10-27 RX ADMIN — DOFETILIDE 500 MCG: 0.5 CAPSULE ORAL at 06:10

## 2018-10-27 RX ADMIN — DOFETILIDE 500 MCG: 0.5 CAPSULE ORAL at 19:37

## 2018-10-27 RX ADMIN — NEBIVOLOL HYDROCHLORIDE 5 MG: 5 TABLET ORAL at 09:12

## 2018-10-27 NOTE — ASSESSMENT & PLAN NOTE
·  Has a history of atrial fibrillation over the past 3 years currently on Tikosyn and follows with Dr Toni Laguerre as  outpatient  · Recently taken off Bystolic on 53/89/33 and started on clonidine patch for poorly controlled hypertension as well as Edabri  · He is status postDCCV in the ED x2 with only transient return to sinus rhythm  · Improved rate control status post IV Cardizem in the ED  · Continue Cardizem p o , hold Norvasc, Cardiology input appreciated   · Most recent BLAIRE was on 4/4/17 which showed EF 60%  · TSH within normal limits  · Plan os for BLAIRE and CVV Monday with higher joules   · Needs to remain in the hospital on telemetry until this can happen

## 2018-10-27 NOTE — PROGRESS NOTES
Progress Note - Amrita Holt 1965, 48 y o  male MRN: [id number]    Unit/Bed#: -01 Encounter: [id number]    Primary Care Provider: Marysol Raza MD   Date and time admitted to hospital: 10/26/2018  9:38 AM        * Atrial fibrillation with RVR ([facility] )   Assessment & Plan    ·  Has a history of atrial fibrillation over the past 3 years currently on Tikosyn and follows with Dr Beth Crandall as  outpatient  · Recently taken off Bystolic on 61/72/83 and started on clonidine patch for poorly controlled hypertension as well as Edabri  · He is status postDCCV in the ED x2 with only transient return to sinus rhythm  · Improved rate control status post IV Cardizem in the ED  · Continue Cardizem p o , hold Norvasc, Cardiology input appreciated   · Most recent BLAIRE was on 4/4/17 which showed EF 60%  · TSH within normal limits  · Plan os for BLAIRE and CVV Monday with higher joules   · Needs to remain in the hospital on telemetry until this can happen      Benign essential hypertension   Assessment & Plan    · Bystolic recently discontinued on 10/17/18 and patient started on Catapres for uncontrolled hypertension  · Continue clonidine, Norvasc will be held currently while on Cardizem, continue ARB  · Patient brought his home medications in  Morbid obesity ([facility] )   Assessment & Plan    · BMI 41 6  · Encourage therapeutic lifestyle changes to promote weight loss  · Consulting nutrition as he is ready to make a change      CKD (chronic kidney disease) stage 3, GFR 30-59 ml/min ([facility])   Assessment & Plan    · Creatinine at baseline  · Follow-up with nephrologist outpatient (recently referred)       VTE Pharmacologic Prophylaxis:   Pharmacologic: Rivaroxaban (Xarelto)  Mechanical VTE Prophylaxis in Place: Yes    Patient Centered Rounds: I have performed bedside rounds with nursing staff today      Discussions with Specialists or Other Care Team Provider: Primary RN     Education and Discussions with Family / Patient: Patient and his wife    Time Spent for Care: 45 minutes  More than 50% of total time spent on counseling and coordination of care as described above  Current Length of Stay: 1 day(s)    Current Patient Status: Inpatient   Certification Statement: The patient, admitted on an observation basis, will now require > 2 midnight hospital stay due to persistent afib and requiring IP CVV  Discharge Plan / Estimated Discharge Date: not medically stable for discharge today       Code Status: Level 1 - Full Code      Subjective:   Offers no complaints of CP or SOB today  Has had palpitations on and off  No fever or chills  Really wants to go home  Needs a daily shower if we "are making him stay through Monday "     Objective:     Vitals:   Temp (24hrs), Av 3 °F (36 8 °C), Min:97 7 °F (36 5 °C), Max:98 6 °F (37 °C)    Temp:  [97 7 °F (36 5 °C)-98 6 °F (37 °C)] 98 5 °F (36 9 °C)  HR:  [] 104  Resp:  [18] 18  BP: (130-160)/() 132/101  SpO2:  [92 %-96 %] 95 %  Body mass index is 41 61 kg/m²  Input and Output Summary (last 24 hours): Intake/Output Summary (Last 24 hours) at 10/27/18 1326  Last data filed at 10/27/18 0700   Gross per 24 hour   Intake              420 ml   Output              600 ml   Net             -180 ml       Physical Exam:     Physical Exam   Constitutional: He is oriented to person, place, and time  He appears well-nourished  No distress  HENT:   Head: Normocephalic and atraumatic  Eyes: Pupils are equal, round, and reactive to light  Neck: Normal range of motion  Neck supple  Cardiovascular: Normal rate  No murmur heard  Irregularly irregular    Pulmonary/Chest: Effort normal and breath sounds normal    Abdominal: Soft  Bowel sounds are normal    Musculoskeletal: Normal range of motion  He exhibits no edema  Neurological: He is alert and oriented to person, place, and time  Skin: Skin is warm and dry  Psychiatric: He has a normal mood and affect   His behavior is normal  Thought content normal    Nursing note and vitals reviewed  Additional Data:     Labs:      Results from last 7 days  Lab Units 10/26/18  1007   WBC Thousand/uL 8 64   HEMOGLOBIN g/dL 17 1*   HEMATOCRIT % 49 7*   PLATELETS Thousands/uL 239   NEUTROS PCT % 57   LYMPHS PCT % 28   MONOS PCT % 11   EOS PCT % 3       Results from last 7 days  Lab Units 10/26/18  1008   SODIUM mmol/L 141   POTASSIUM mmol/L 3 8   CHLORIDE mmol/L 102   CO2 mmol/L 30   BUN mg/dL 17   CREATININE mg/dL 1 18   CALCIUM mg/dL 9 1           * I Have Reviewed All Lab Data Listed Above  Recent Cultures (last 7 days):           Last 24 Hours Medication List:     Current Facility-Administered Medications:  azilsartan medoxomil 80 mg Oral Daily GARRY Hermosillo   [START ON 11/8/2018] cloNIDine 1 patch Transdermal Weekly Ryan Alegria MD   dofetilide 500 mcg Oral BID Ryan Alegria MD   nebivolol 5 mg Oral Daily Colin Damon MD   rivaroxaban 20 mg Oral Daily With Breakfast Ryan Alegria MD        Today, Patient Was Seen By: GARRY Hermosillo    ** Please Note: Dragon 360 Dictation voice to text software may have been used in the creation of this document   **

## 2018-10-27 NOTE — UTILIZATION REVIEW
Initial Clinical Review    Thank you,  [address] [facility] Review Department  Phone: [phone]; Fax [phone]  ATTENTION: Please call with any questions or concerns to [phone]  and carefully follow the prompts so that you are directed to the right person  Send all requests for admission clinical reviews, approved or denied determinations and any other requests to fax [phone]  All voicemails are confidential      Admission: Date/Time/Statement:  Patient initially placed as an Inpatient  On 10/26 @ 12:08 - changed to Observation status on 10/26 @ 15:14 - then changed  Back to Inpatient on 10/27 @ 08:25 continued treatment of his Afib  Orders Placed This Encounter   Procedures    Inpatient Admission (expected length of stay for this patient is greater than two midnights)     Standing Status:   Standing     Number of Occurrences:   1     Order Specific Question:   Admitting Physician     Answer:   Keturah Rm [[id number]]     Order Specific Question:   Level of Care     Answer:   Med Surg [[id number]]     Order Specific Question:   Estimated length of stay     Answer:   More than 2 Midnights     Order Specific Question:   Certification     Answer:   I certify that inpatient services are medically necessary for this patient for a duration of greater than two midnights  See H&P and MD Progress Notes for additional information about the patient's course of treatment       ED: Date/Time/Mode of Arrival:   ED Arrival Information     Expected Arrival Acuity Means of Arrival Escorted By Service Admission Type    - 10/26/2018 09:31 Emergent Walk-In Self General Medicine Emergency    Arrival Complaint    A fib          Chief Complaint:   Chief Complaint   Patient presents with    Atrial Fibrillation     pt reports getting pulled over by police last night and began feeling fluttering in his chest, reports fluttering continued today, pt takes Tikosyn and sees cardiology        History of Illness: Magen Rodriguez is a 48 y o  male who presents with palpitations  Patient reports he was pulled over yesterday by  following which he developed sudden onset of palpitations  Has an underlying history of atrial fibrillation and currently on Tikosyn and Xarelto  He follows with Dr Sotero Roblero outpatient and is status post cardioversions in the past   On presentation to the ED he was hypertensive in the 633 systolic with heart rate in the 130s to 150s  He had 2 unsuccessful attempts at [address] with 200 and 250joules  He briefly converted to sinus rhythm and then went back to AFib  After 50 mg IV Cardizem bolus and 30 mg p  O , heart rate improved to the 80s although he remained in AFib  He is now admitted for further cardiac evaluation    He denies any chest pain, no diaphoresis, no lightheadedness, no shortness of breath, no dizziness or syncope        ED Vital Signs:   ED Triage Vitals   Temperature Pulse Respirations Blood Pressure SpO2   10/26/18 0945 10/26/18 0945 10/26/18 0945 10/26/18 0946 10/26/18 0945   97 5 °F (36 4 °C) (!) 149 20 (!) 209/95 96 %      Temp Source Heart Rate Source Patient Position - Orthostatic VS BP Location FiO2 (%)   10/26/18 0945 10/26/18 0945 10/26/18 0945 10/26/18 0945 --   Oral Monitor Lying Right arm       Pain Score       10/26/18 0945       No Pain        Wt Readings from Last 1 Encounters:   10/26/18 132 kg (290 lb)       Vital Signs (abnormal):   Date/Time  Temp  Pulse  Resp  BP  MAP (mmHg)  SpO2  O2 Device  Patient Position - Orthostatic VS   10/27/18 0811  98 5 °F (36 9 °C)  104  18   132/101  114  95 %  None (Room air)  Sitting   10/26/18 2200  98 6 °F (37 °C)   117  18   145/107  --  92 %  None (Room air)  Sitting   10/26/18 1619  --  --  --  --  --  --  None (Room air)  --   10/26/18 1612  --   110  --  160/88  --  --  --  Sitting   10/26/18 1500  97 7 °F (36 5 °C)  94  18  130/76  --  96 %  None (Room air)  Sitting   10/26/18 1323  98 5 °F (36 9 °C)  -- --  --  --  --  --  --   10/26/18 1322  --  --  --  --  --  --  None (Room air)  --   10/26/18 1257  --  89  20  147/93  --  95 %  None (Room air)  Lying   10/26/18 12:12:10  --  80  --  --  --  --  --  --   10/26/18 12:09:10  --  84  20  --  --  96 %  None (Room air)  --   10/26/18 12:06:10  --  92  --  --  --  --  --  --   10/26/18 12:03:20  --   134  --  --  --  --  --  --   10/26/18 12:00:50  --   124  --  --  --  --  --  --   10/26/18 12:00:30  --   124  20   191/107  --  97 %  Nasal cannula  Lying   10/26/18 11:57:10  --   136  --  --  --  --  --  --   10/26/18 11:54:10  --   136  --  --  --  --  --  --   10/26/18 11:53:44  --   119   24   198/148  --  96 %  --  --   10/26/18 11:51:10  --   134  --  --  --  --  --  --   10/26/18 11:48:10  --   128  --  --  --  --  --  --   10/26/18 11:45:10  --   134  --  --  --  --  --  --   10/26/18 1143  --   138  20   171/90  --  95 %  --  --   10/26/18 11:42:10  --   126  --  --  --  --  --  --   10/26/18 1030  --   130  20  148/98  --  96 %  None (Room air)  Sitting   10/26/18 1006  --   136  --  140/100  --  96 %  None (Room air)  Lying   10/26/18 1000  --  --  --  --  --  --  None (Room air)  --   10/26/18 0946  --  --  --   209/95  --  --  --  --   10/26/18 0945  97 5 °F (36 4 °C)   149  20  --  --  96 %  None (Room air)  Lying         Abnormal Labs/Diagnostic Test Results:  BMP - normal - H/H 17 1/49 7 -   ED Treatment:   Medication Administration from 10/26/2018 0931 to 10/26/2018 1248       Date/Time Order Dose Route Action     10/26/2018 1142 etomidate (AMIDATE) 2 mg/mL injection 40 2 mg 40 2 mg Intravenous Given     10/26/2018 1006 sodium chloride 0 9 % bolus 1,000 mL 1,000 mL Intravenous New Bag     10/26/2018 1217 diltiazem (CARDIZEM) tablet 30 mg 30 mg Oral Given     10/26/2018 1158 diltiazem (CARDIZEM) injection 15 mg 15 mg Intravenous Given       Past Medical/Surgical History:   Diagnosis    Arthritis    Asthma    Atrial fibrillation (HCC)    Chronic kidney disease    Hypertension    Muscle weakness       Admitting Diagnosis: A-fib ([facility]) [I48 91]  Atrial fibrillation with rapid ventricular response ([facility]) [I48 91]    Age/Sex: 48 y o  male    Assessment/Plan:   Atrial fibrillation with RVR ([facility])     · Onset at about 7:00 p m  yesterday evening  Has a history of atrial fibrillation over the past 3 years currently on Tikosyn and follows with Dr Nisha Jarrett outpatient  · Recently taking off Bystolic on 02/78/34 and started on clonidine patch for poorly controlled hypertension  · He is status postDCCV in the ED x2 with only transient return to sinus rhythm  · Improved rate control status post IV Cardizem in the ED  · Continue Cardizem p o , hold Norvasc, obtain Cardiology evaluation  · Most recent BLAIRE was on 4/4/17 which showed EF 60%  · TSH within normal limits      Benign essential hypertension     · Bystolic recently discontinued on 10/17/18 and patient started on Catapres for uncontrolled hypertension  · Continue clonidine, Norvasc will be held currently while on Cardizem, continue ARB      CKD (chronic kidney disease) stage 3, GFR 30-59 ml/min ([facility])     · Creatinine at baseline  · Follow-up with nephrologist outpatient (recently referred)      Morbid obesity ([facility] [address] )     · BMI 41 6  · Encourage therapeutic lifestyle changes to promote weight loss            Admission Orders:  Scheduled Meds:   Current Facility-Administered Medications:  azilsartan medoxomil 80 mg Oral Daily   [START ON 11/8/2018 cloNIDine 1 patch Transdermal Weekly   dofetilide 500 mcg Oral BID   nebivolol 5 mg Oral Daily   rivaroxaban 20 mg Oral Daily With Breakfast     Nursing Orders -  Telem  -  VS q 4 - diet Cardiac  4 gm NA     Cardiology - 48year old male presents with palpitations that began last night  He has known PAF and resistant HTN  Cardioversion was attempted in the ER but was unsuccessful  He is currently in atrial fibrillation in the low 100s   He also recently started a clonidine patch for HTN and bystolic was stopped  Recommend to restart Bystolic and continue Tikosyn  Will schedule cardioversion for Monday  He would need higher joules due to his body habitus

## 2018-10-27 NOTE — ASSESSMENT & PLAN NOTE
· Bystolic recently discontinued on 10/17/18 and patient started on Catapres for uncontrolled hypertension  · Continue clonidine, Norvasc will be held currently while on Cardizem, continue ARB  · Patient brought his home medications in

## 2018-10-27 NOTE — ASSESSMENT & PLAN NOTE
· BMI 41 6  · Encourage therapeutic lifestyle changes to promote weight loss  · Consulting nutrition as he is ready to make a change

## 2018-10-27 NOTE — PROGRESS NOTES
Progress Note - Cardiology   Travis Nguyen 48 y o  male MRN: [id number]  Unit/Bed#: -01 Encounter: [id number]    Assessment:  1  PAF  2  HTN    Plan:  Plan is for repeat CV on Monday  He has done well with Tikosyn and hopefully can get back in SR  No change in his medications  Subjective/Objective     He remains in AF  Denies CP, SOB, palpitations            Vitals: BP (!) 132/101 (BP Location: Right arm)   Pulse 104   Temp 98 5 °F (36 9 °C) (Oral)   Resp 18   Ht 5' 10" (1 778 m)   Wt 132 kg (290 lb)   SpO2 95%   BMI 41 61 kg/m²   Vitals:    10/26/18 0945 10/26/18 1257   Weight: 134 kg (295 lb 13 7 oz) 132 kg (290 lb)     Orthostatic Blood Pressures      Most Recent Value   Blood Pressure   132/101 filed at 10/27/2018 7093   Patient Position - Orthostatic VS  Sitting filed at 10/27/2018 2727            Intake/Output Summary (Last 24 hours) at 10/27/18 1400  Last data filed at 10/27/18 0700   Gross per 24 hour   Intake             1420 ml   Output              600 ml   Net              820 ml       Invasive Devices     Peripheral Intravenous Line            Peripheral IV 10/26/18 Right Antecubital less than 1 day                Review of Systems: Negative     Physical Exam: BP (!) 132/101 (BP Location: Right arm)   Pulse 104   Temp 98 5 °F (36 9 °C) (Oral)   Resp 18   Ht 5' 10" (1 778 m)   Wt 132 kg (290 lb)   SpO2 95%   BMI 41 61 kg/m²     General Appearance:    Alert, cooperative, no distress, appears stated age   Head:    Normocephalic, without obvious abnormality, atraumatic   Eyes:    PERRL, conjunctiva/corneas clear, EOM's intact, fundi     benign, both eyes        Ears:    Normal TM's and external ear canals, both ears   Nose:   Nares normal, septum midline, mucosa normal, no drainage    or sinus tenderness   Throat:   Lips, mucosa, and tongue normal; teeth and gums normal   Neck:   Supple, symmetrical, trachea midline, no adenopathy;        thyroid:  No enlargement/tenderness/nodules; no carotid    bruit or JVD   Back:     Symmetric, no curvature, ROM normal, no CVA tenderness   Lungs:     Clear to auscultation bilaterally, respirations unlabored   Chest wall:    No tenderness or deformity   Heart:    irregular, S1 and S2 normal, no murmur, rub   or gallop   Abdomen:     Soft, non-tender, bowel sounds active all four quadrants,     no masses, no organomegaly           Extremities:   Extremities normal, atraumatic, no cyanosis or edema   Pulses:   2+ and symmetric all extremities   Skin:   Skin color, texture, turgor normal, no rashes or lesions   Lymph nodes:   Cervical, supraclavicular, and axillary nodes normal   Neurologic:   CNII-XII intact  Normal strength, sensation and reflexes       throughout       Lab Results: I have personally reviewed pertinent lab results  Imaging: I have personally reviewed pertinent reports

## 2018-10-28 PROCEDURE — 99232 SBSQ HOSP IP/OBS MODERATE 35: CPT | Performed by: INTERNAL MEDICINE

## 2018-10-28 RX ORDER — AMLODIPINE BESYLATE 5 MG/1
5 TABLET ORAL DAILY
Status: DISCONTINUED | OUTPATIENT
Start: 2018-10-28 | End: 2018-10-29 | Stop reason: HOSPADM

## 2018-10-28 RX ADMIN — AMLODIPINE BESYLATE 5 MG: 5 TABLET ORAL at 12:00

## 2018-10-28 RX ADMIN — RIVAROXABAN 20 MG: 20 TABLET, FILM COATED ORAL at 09:06

## 2018-10-28 RX ADMIN — NEBIVOLOL HYDROCHLORIDE 5 MG: 5 TABLET ORAL at 09:06

## 2018-10-28 RX ADMIN — DOFETILIDE 500 MCG: 0.5 CAPSULE ORAL at 07:09

## 2018-10-28 RX ADMIN — DOFETILIDE 500 MCG: 0.5 CAPSULE ORAL at 20:03

## 2018-10-28 NOTE — PLAN OF CARE
CARDIOVASCULAR - ADULT     Maintains optimal cardiac output and hemodynamic stability Progressing     Absence of cardiac dysrhythmias or at baseline rhythm Progressing        Nutrition/Hydration-ADULT     Nutrient/Hydration intake appropriate for improving, restoring or maintaining nutritional needs Progressing        Potential for Falls     Patient will remain free of falls Progressing

## 2018-10-28 NOTE — ASSESSMENT & PLAN NOTE
· Bystolic recently discontinued on 10/17/18 and patient started on Catapres for uncontrolled hypertension  · Will resume Bystolic as per Cardiology and continue normal home medication regimen

## 2018-10-28 NOTE — PROGRESS NOTES
[address] Internal Medicine  Progress Note - Kassi Arrington 1965, 48 y o  male MRN: [id number]    Unit/Bed#: -01 Encounter: [id number]    Primary Care Provider: Justyn Cruz MD   Date and time admitted to hospital: 10/26/2018  9:38 AM        * Atrial fibrillation with RVR ([facility] [address] )   Assessment & Plan    · Has a history of atrial fibrillation over the past 3 years currently on Tikosyn and follows with Dr Evelin Yu as outpatient  · Recently taken off Bystolic on 65/98/47 and started on clonidine patch for poorly controlled hypertension as well as [place]  · He is status cardioversion in the ED x2 with only transient return to sinus rhythm  · Improved rate control with IV Cardizem in the ED  · Cardiology input appreciated  Will continue home regimen of Tikosyn, Clonidine patch, Edarbi, Amlodipine, Xarelto and restart his Bystolic  Rate improved  · Patient does not want ablation as this time    · Most recent BLAIRE was on 4/4/17 which showed EF 60%  · Plan on for BLAIRE and cardioversion Monday with higher joules   · Needs to remain in the hospital on telemetry until this can happen      CKD (chronic kidney disease) stage 3, GFR 30-59 ml/min ([facility])   Assessment & Plan    · Creatinine at baseline  · Follow-up with nephrologist outpatient on 12/5 as scheduled  Lab Results   Component Value Date    CREATININE 1 18 10/26/2018    CREATININE 1 00 10/18/2018    CREATININE 1 38 (H) 09/03/2018          Benign essential hypertension   Assessment & Plan    · Bystolic recently discontinued on 10/17/18 and patient started on Catapres for uncontrolled hypertension  · Will resume Bystolic as per Cardiology and continue normal home medication regimen     [facility]   Assessment & Plan    · BMI 41 6  · Encourage therapeutic lifestyle changes to promote weight loss  · Consulting nutrition as he is ready to make a change          VTE Pharmacologic Prophylaxis:   Pharmacologic: Rivaroxaban (Xarelto)  Mechanical VTE Prophylaxis in Place: Yes    Patient Centered Rounds: I have performed bedside rounds with nursing staff today  Discussions with Specialists or Other Care Team Provider: Bedside RN    Education and Discussions with Family / Patient: plan of care with patient    Time Spent for Care: 30 minutes  More than 50% of total time spent on counseling and coordination of care as described above  Current Length of Stay: 2 day(s)    Current Patient Status: Inpatient   Certification Statement: The patient will continue to require additional inpatient hospital stay due to telemetry monitoring while awaiting cardioversion    Discharge Plan: Possible discharge tomorrow after cardioversion    Code Status: Level 1 - Full Code      Subjective:   Patient denies chest pain, shortness of breath, dizziness  He does report being aware of the "flutter" in his chest   Tolerating oral intake  Anticipating intervention tomorrow  Objective:     Vitals:   Temp (24hrs), Av °F (36 7 °C), Min:97 9 °F (36 6 °C), Max:98 1 °F (36 7 °C)    Temp:  [97 9 °F (36 6 °C)-98 1 °F (36 7 °C)] 97 9 °F (36 6 °C)  HR:  [] 102  Resp:  [18] 18  BP: (140-160)/(90-98) 160/98  SpO2:  [94 %-95 %] 95 %  Body mass index is 41 61 kg/m²  Input and Output Summary (last 24 hours): Intake/Output Summary (Last 24 hours) at 10/28/18 1225  Last data filed at 10/28/18 0700   Gross per 24 hour   Intake              480 ml   Output             1600 ml   Net            -1120 ml       Physical Exam:     Physical Exam   Constitutional: He is oriented to person, place, and time  He appears well-developed and well-nourished  No distress  Eyes: Conjunctivae are normal  No scleral icterus  Cardiovascular: Normal rate, normal heart sounds and intact distal pulses  An irregular rhythm present  No murmur heard  Pulmonary/Chest: Effort normal and breath sounds normal  No respiratory distress  He has no wheezes  He has no rales  Abdominal: Soft   Bowel sounds are normal  He exhibits no distension  There is no tenderness  Musculoskeletal: Normal range of motion  He exhibits no edema or tenderness  Neurological: He is alert and oriented to person, place, and time  Skin: Skin is warm and dry  He is not diaphoretic  Psychiatric: He has a normal mood and affect  His behavior is normal        Additional Data:     Labs:      Results from last 7 days  Lab Units 10/26/18  1007   WBC Thousand/uL 8 64   HEMOGLOBIN g/dL 17 1*   HEMATOCRIT % 49 7*   PLATELETS Thousands/uL 239   NEUTROS PCT % 57   LYMPHS PCT % 28   MONOS PCT % 11   EOS PCT % 3       Results from last 7 days  Lab Units 10/26/18  1008   SODIUM mmol/L 141   POTASSIUM mmol/L 3 8   CHLORIDE mmol/L 102   CO2 mmol/L 30   BUN mg/dL 17   CREATININE mg/dL 1 18   ANION GAP mmol/L 9   CALCIUM mg/dL 9 1                           * I Have Reviewed All Lab Data Listed Above  * Additional Pertinent Lab Tests Reviewed: [facility]    Imaging:    Imaging Reports Reviewed Today Include: None  Imaging Personally Reviewed by Myself Includes:  None    Recent Cultures (last 7 days):           Last 24 Hours Medication List:     Current Facility-Administered Medications:  amLODIPine 5 mg Oral Daily Colin Damon MD   azilsartan medoxomil 80 mg Oral Daily GARRY Hermosillo   [START ON 11/8/2018] cloNIDine 1 patch Transdermal Weekly Ryan Alegria MD   dofetilide 500 mcg Oral BID Ryan Alegria MD   nebivolol 5 mg Oral Daily Colin Damon MD   rivaroxaban 20 mg Oral Daily With Sami Gracia MD        Today, Patient Was Seen By: GARRY Swenson    ** Please Note: Dictation voice to text software may have been used in the creation of this document   **

## 2018-10-28 NOTE — ASSESSMENT & PLAN NOTE
· Has a history of atrial fibrillation over the past 3 years currently on Tikosyn and follows with Dr Lazaro Graf as outpatient  · Recently taken off Bystolic on 01/73/00 and started on clonidine patch for poorly controlled hypertension as well as [place]  · He is status cardioversion in the ED x2 with only transient return to sinus rhythm  · Improved rate control with IV Cardizem in the ED  · Cardiology input appreciated  Will continue home regimen of Tikosyn, Clonidine patch, Edarbi, Amlodipine, Xarelto and restart his Bystolic  Rate improved  · Patient does not want ablation as this time    · Most recent BLAIRE was on 4/4/17 which showed EF 60%  · Plan on for BLAIRE and cardioversion Monday with higher joules   · Needs to remain in the hospital on telemetry until this can happen

## 2018-10-28 NOTE — ASSESSMENT & PLAN NOTE
· Creatinine at baseline  · Follow-up with nephrologist outpatient on 12/5 as scheduled  Lab Results   Component Value Date    CREATININE 1 18 10/26/2018    CREATININE 1 00 10/18/2018    CREATININE 1 38 (H) 09/03/2018

## 2018-10-29 ENCOUNTER — ANESTHESIA (INPATIENT)
Dept: NON INVASIVE DIAGNOSTICS | Facility: HOSPITAL | Age: 53
DRG: 309 | End: 2018-10-29
Payer: COMMERCIAL

## 2018-10-29 VITALS
RESPIRATION RATE: 16 BRPM | HEIGHT: 70 IN | DIASTOLIC BLOOD PRESSURE: 83 MMHG | WEIGHT: 290 LBS | HEART RATE: 56 BPM | OXYGEN SATURATION: 94 % | SYSTOLIC BLOOD PRESSURE: 141 MMHG | BODY MASS INDEX: 41.52 KG/M2 | TEMPERATURE: 97.7 F

## 2018-10-29 PROBLEM — I48.91 ATRIAL FIBRILLATION WITH RVR (HCC): Status: RESOLVED | Noted: 2017-04-04 | Resolved: 2018-10-29

## 2018-10-29 LAB
ANION GAP SERPL CALCULATED.3IONS-SCNC: 11 MMOL/L (ref 4–13)
ATRIAL RATE: 197 BPM
ATRIAL RATE: 59 BPM
BUN SERPL-MCNC: 17 MG/DL (ref 5–25)
CALCIUM SERPL-MCNC: 8.6 MG/DL (ref 8.3–10.1)
CHLORIDE SERPL-SCNC: 105 MMOL/L (ref 100–108)
CO2 SERPL-SCNC: 24 MMOL/L (ref 21–32)
CREAT SERPL-MCNC: 1.01 MG/DL (ref 0.6–1.3)
ERYTHROCYTE [DISTWIDTH] IN BLOOD BY AUTOMATED COUNT: 12.5 % (ref 11.6–15.1)
GFR SERPL CREATININE-BSD FRML MDRD: 85 ML/MIN/1.73SQ M
GLUCOSE SERPL-MCNC: 109 MG/DL (ref 65–140)
HCT VFR BLD AUTO: 46.9 % (ref 36.5–49.3)
HGB BLD-MCNC: 15.6 G/DL (ref 12–17)
MAGNESIUM SERPL-MCNC: 2.3 MG/DL (ref 1.6–2.6)
MCH RBC QN AUTO: 30.4 PG (ref 26.8–34.3)
MCHC RBC AUTO-ENTMCNC: 33.3 G/DL (ref 31.4–37.4)
MCV RBC AUTO: 91 FL (ref 82–98)
P AXIS: 47 DEGREES
PLATELET # BLD AUTO: 220 THOUSANDS/UL (ref 149–390)
PMV BLD AUTO: 12 FL (ref 8.9–12.7)
POTASSIUM SERPL-SCNC: 3.7 MMOL/L (ref 3.5–5.3)
PR INTERVAL: 160 MS
QRS AXIS: -56 DEGREES
QRS AXIS: 22 DEGREES
QRSD INTERVAL: 80 MS
QRSD INTERVAL: 86 MS
QT INTERVAL: 300 MS
QT INTERVAL: 418 MS
QTC INTERVAL: 413 MS
QTC INTERVAL: 458 MS
RBC # BLD AUTO: 5.14 MILLION/UL (ref 3.88–5.62)
SODIUM SERPL-SCNC: 140 MMOL/L (ref 136–145)
T WAVE AXIS: 56 DEGREES
T WAVE AXIS: 88 DEGREES
VENTRICULAR RATE: 140 BPM
VENTRICULAR RATE: 59 BPM
WBC # BLD AUTO: 9.06 THOUSAND/UL (ref 4.31–10.16)

## 2018-10-29 PROCEDURE — 93005 ELECTROCARDIOGRAM TRACING: CPT

## 2018-10-29 PROCEDURE — 83735 ASSAY OF MAGNESIUM: CPT | Performed by: NURSE PRACTITIONER

## 2018-10-29 PROCEDURE — 92960 CARDIOVERSION ELECTRIC EXT: CPT | Performed by: INTERNAL MEDICINE

## 2018-10-29 PROCEDURE — 80048 BASIC METABOLIC PNL TOTAL CA: CPT | Performed by: NURSE PRACTITIONER

## 2018-10-29 PROCEDURE — 93010 ELECTROCARDIOGRAM REPORT: CPT | Performed by: INTERNAL MEDICINE

## 2018-10-29 PROCEDURE — 99239 HOSP IP/OBS DSCHRG MGMT >30: CPT | Performed by: NURSE PRACTITIONER

## 2018-10-29 PROCEDURE — 5A2204Z RESTORATION OF CARDIAC RHYTHM, SINGLE: ICD-10-PCS | Performed by: INTERNAL MEDICINE

## 2018-10-29 PROCEDURE — 85027 COMPLETE CBC AUTOMATED: CPT | Performed by: NURSE PRACTITIONER

## 2018-10-29 PROCEDURE — 99232 SBSQ HOSP IP/OBS MODERATE 35: CPT | Performed by: INTERNAL MEDICINE

## 2018-10-29 RX ORDER — POTASSIUM CHLORIDE 750 MG/1
10 TABLET, EXTENDED RELEASE ORAL DAILY
Qty: 30 TABLET | Refills: 0 | Status: SHIPPED | OUTPATIENT
Start: 2018-10-30 | End: 2020-07-20 | Stop reason: ALTCHOICE

## 2018-10-29 RX ORDER — SODIUM CHLORIDE 9 MG/ML
50 INJECTION, SOLUTION INTRAVENOUS CONTINUOUS
Status: DISCONTINUED | OUTPATIENT
Start: 2018-10-29 | End: 2018-10-29 | Stop reason: HOSPADM

## 2018-10-29 RX ORDER — NEBIVOLOL 5 MG/1
5 TABLET ORAL DAILY
Qty: 30 TABLET | Refills: 0 | Status: SHIPPED | OUTPATIENT
Start: 2018-10-30 | End: 2018-11-19 | Stop reason: SDUPTHER

## 2018-10-29 RX ORDER — POTASSIUM CHLORIDE 20 MEQ/1
20 TABLET, EXTENDED RELEASE ORAL ONCE
Status: COMPLETED | OUTPATIENT
Start: 2018-10-29 | End: 2018-10-29

## 2018-10-29 RX ORDER — PROPOFOL 10 MG/ML
INJECTION, EMULSION INTRAVENOUS AS NEEDED
Status: DISCONTINUED | OUTPATIENT
Start: 2018-10-29 | End: 2018-10-29 | Stop reason: SURG

## 2018-10-29 RX ORDER — POTASSIUM CHLORIDE 20 MEQ/1
40 TABLET, EXTENDED RELEASE ORAL ONCE
Status: DISCONTINUED | OUTPATIENT
Start: 2018-10-29 | End: 2018-10-29

## 2018-10-29 RX ORDER — POTASSIUM CHLORIDE 750 MG/1
10 TABLET, EXTENDED RELEASE ORAL DAILY
Status: DISCONTINUED | OUTPATIENT
Start: 2018-10-30 | End: 2018-10-29 | Stop reason: HOSPADM

## 2018-10-29 RX ORDER — CHLORTHALIDONE 25 MG/1
25 TABLET ORAL DAILY
Qty: 30 TABLET | Refills: 0 | Status: SHIPPED | OUTPATIENT
Start: 2018-10-29 | End: 2018-12-05

## 2018-10-29 RX ORDER — CHLORTHALIDONE 25 MG/1
25 TABLET ORAL DAILY
Status: DISCONTINUED | OUTPATIENT
Start: 2018-10-29 | End: 2018-10-29 | Stop reason: HOSPADM

## 2018-10-29 RX ADMIN — PROPOFOL 100 MG: 10 INJECTION, EMULSION INTRAVENOUS at 09:58

## 2018-10-29 RX ADMIN — DOFETILIDE 500 MCG: 0.5 CAPSULE ORAL at 06:47

## 2018-10-29 RX ADMIN — CHLORTHALIDONE 25 MG: 25 TABLET ORAL at 12:01

## 2018-10-29 RX ADMIN — RIVAROXABAN 20 MG: 20 TABLET, FILM COATED ORAL at 08:08

## 2018-10-29 RX ADMIN — POTASSIUM CHLORIDE 20 MEQ: 1500 TABLET, EXTENDED RELEASE ORAL at 12:01

## 2018-10-29 RX ADMIN — PROPOFOL 30 MG: 10 INJECTION, EMULSION INTRAVENOUS at 10:00

## 2018-10-29 RX ADMIN — SODIUM CHLORIDE 50 ML/HR: 0.9 INJECTION, SOLUTION INTRAVENOUS at 08:08

## 2018-10-29 RX ADMIN — NEBIVOLOL HYDROCHLORIDE 5 MG: 5 TABLET ORAL at 08:08

## 2018-10-29 RX ADMIN — AMLODIPINE BESYLATE 5 MG: 5 TABLET ORAL at 08:08

## 2018-10-29 NOTE — PROCEDURES
CARDIOVERSION PROCEDURE NOTE    Indication: Persistent atrial fibrillation  Anticoagulation: Xarelto    Outpatient cardiologist: Dr Carly Baeza    Procedure: Time out was performed, patient verified  Sedated by anesthesia with IV Propofol  A single 200 J shock was given via pads placed in AP position, which was successful in converting to SR  Patient tolerated procedure without any noted immediate complications

## 2018-10-29 NOTE — DISCHARGE INSTR - AVS FIRST PAGE
Monitor your blood pressure daily and keep a log  Take the log with you to your follow up appointment  Please have your lab work done in one week to monitor your potassium level

## 2018-10-29 NOTE — PROGRESS NOTES
Progress Note - Cardiology   Amrita Holt 48 y o  male MRN: [id number]  Encounter: [id number]  10/29/18  10:06 AM        Assessment/Plan:    1  Paroxysmal/persistent atrial fibrillation  Converted with a single 200 J shock via pads in AP position 10/29  Currently maintaining SR  Continue Tikosyn, Xarelto  Bystolic resumed at 5 mg dose to help with rate control in future  2  HTN  On amlodipine 5, Edarbi 80, clonidine patch 0 2, Bystolic 5  BP still elevated, will add chlorthalidone 25 (reports he has not been on a diuretic that he is aware)  Replete potassium here and recheck in a week after starting chlorthalidone, will start low dose standing supplemental potassium due to possibility of hypokalemia with chlorthalidone  3  CKD III  Creatinine 1 01  Follows with Dr Beth Crandall as outpt  Subjective/Objective   Chief Complaint:   Chief Complaint   Patient presents with    Atrial Fibrillation     pt reports getting pulled over by police last night and began feeling fluttering in his chest, reports fluttering continued today, pt takes Tikosyn and sees cardiology          Subjective: 48year old man with a history of paroxysmal atrial fibrillation, HTN, CKD III, admitted with persistent afib, which was not responsive to shocks in ED  He underwent CV today, with conversion to SR with one shock  No CP  No SOB  No significant LE edema  No fevers         Patient Active Problem List   Diagnosis    Atrial fibrillation with RVR (HCC)    Asthma    Benign essential hypertension    Bilateral leg weakness    Degenerative joint disease of right knee    Morbid obesity ([address] )    Primary osteoarthritis of left knee    Tremor    Acute pain of left shoulder    Need for influenza vaccination    CKD (chronic kidney disease) stage 3, GFR 30-59 ml/min ([address] )    Long term current use of antiarrhythmic drug    Anticoagulation adequate     Past Medical History:   Diagnosis Date    Arthritis     Asthma     Atrial fibrillation ([facility]     Chronic kidney disease     kidney stones    Hypertension     Muscle weakness        No Known Allergies    Current Facility-Administered Medications   Medication Dose Route Frequency Provider Last Rate Last Dose    amLODIPine (NORVASC) tablet 5 mg  5 mg Oral Daily Dominic Hackett MD   5 mg at 10/29/18 3737    azilsartan medoxomil (EDARBI) tablet 80 mg  80 mg Oral Daily GARRY Byrd   80 mg at 10/29/18 0808    [START ON 11/8/2018] cloNIDine (CATAPRES-TTS-2) 0 2 mg/24 hr TD weekly patch  1 patch Transdermal Weekly Matteo Ernst MD        dofetilide (TIKOSYN) capsule 500 mcg  500 mcg Oral BID Matteo Ernst MD   500 mcg at 10/29/18 0647    nebivolol (BYSTOLIC) tablet 5 mg  5 mg Oral Daily Dominic Hackett MD   5 mg at 10/29/18 8399    rivaroxaban (XARELTO) tablet 20 mg  20 mg Oral Daily With Breakfast Matteo Ernst MD   20 mg at 10/29/18 8658    sodium chloride 0 9 % infusion  50 mL/hr Intravenous Continuous GARRY August 50 mL/hr at 10/29/18 0808 50 mL/hr at 10/29/18 0808       Vitals: /90   Pulse 96   Temp 98 °F (36 7 °C) (Temporal)   Resp 18   Ht 5' 10" (1 778 m)   Wt 132 kg (290 lb)   SpO2 97%   BMI 41 61 kg/m²     Intake/Output Summary (Last 24 hours) at 10/29/18 1006  Last data filed at 10/29/18 1001   Gross per 24 hour   Intake              740 ml   Output             2050 ml   Net            -1310 ml     Wt Readings from Last 3 Encounters:   10/26/18 132 kg (290 lb)   10/17/18 134 kg (295 lb)   10/05/18 135 kg (298 lb 9 6 oz)       Body mass index is 41 61 kg/m²  ,     Vitals:    10/28/18 1500 10/28/18 2200 10/29/18 0721 10/29/18 0939   BP: 152/94 151/98 (!) 143/104 142/90   Pulse: 96 94 81 96   Patient Position - Orthostatic VS: Sitting Lying Sitting        Physical Exam:     GEN: Alert and oriented x 3, in no acute distress  HEENT: Sclera anicteric, conjunctivae pink, mucous membranes moist   NECK: Supple, no carotid bruits, no significant JVD  HEART: Irregularly irregular rhythm, normal S1 and S2, no murmurs, clicks, gallops or rubs  LUNGS: Clear to auscultation bilaterally; no wheezes, rales, or rhonchi   ABDOMEN: Obese, soft, nontender, nondistended, normoactive bowel sounds  EXTREMITIES: Skin warm and well perfused, no clubbing, cyanosis, or edema  NEURO: No focal findings  SKIN: Normal without suspicious lesions on exposed skin  Lab Results:     BMP:  Results from last 7 days  Lab Units 10/29/18  0428 10/26/18  1008   SODIUM mmol/L 140 141   POTASSIUM mmol/L 3 7 3 8   CHLORIDE mmol/L 105 102   CO2 mmol/L 24 30   BUN mg/dL 17 17   CREATININE mg/dL 1 01 1 18   CALCIUM mg/dL 8 6 9 1       CBC:   Results from last 7 days  Lab Units 10/29/18  0428 10/26/18  1007   WBC Thousand/uL 9 06 8 64   HEMOGLOBIN g/dL 15 6 17 1*   HEMATOCRIT % 46 9 49 7*   MCV fL 91 88   PLATELETS Thousands/uL 220 239   MCH pg 30 4 30 4   MCHC g/dL 33 3 34 4   RDW % 12 5 12 3   MPV fL 12 0 11 8   NRBC AUTO /100 WBCs  --  0          Results from last 7 days  Lab Units 10/29/18  0428   MAGNESIUM mg/dL 2 3       INR:         Lipid Profile:   Lab Results   Component Value Date    CHOL 177 10/08/2012     Lab Results   Component Value Date    HDL 37 (L) 10/08/2012     No results found for: [facility]  Lab Results   Component Value Date    TRIG 150 (H) 10/08/2012         Hgb A1c:         Telemetry: personally reviewed, afib

## 2018-10-29 NOTE — DISCHARGE INSTRUCTIONS
Get bloodwork done one week after starting chlorthalidone (water pill) to ensure potassium levels are normal          A-fib (Atrial Fibrillation)   WHAT YOU NEED TO KNOW:   A-fib may come and go, or it may be a long-term condition  A-fib can cause blood clots, stroke, or heart failure  These conditions may become life-threatening  It is important to treat and manage a-fib to help prevent a blood clot, stroke, or heart failure  DISCHARGE INSTRUCTIONS:   Call 911 for any of the following:   · You have any of the following signs of a heart attack:      ¨ Squeezing, pressure, or pain in your chest that lasts longer than 5 minutes or returns    ¨ Discomfort or pain in your back, neck, jaw, stomach, or arm     ¨ Trouble breathing    ¨ Nausea or vomiting    ¨ Lightheadedness or a sudden cold sweat, especially with chest pain or trouble breathing    · You have any of the following signs of a stroke:      ¨ Numbness or drooping on one side of your face     ¨ Weakness in an arm or leg    ¨ Confusion or difficulty speaking    ¨ Dizziness, a severe headache, or vision loss  Seek care immediately if:  You have any of the following signs of a blood clot:  · You feel lightheaded, are short of breath, and have chest pain  · You cough up blood  · You have swelling, redness, pain, or warmth in your arm or leg  Contact your cardiologist or healthcare provider if:   · Your heart rate is higher than your healthcare provider said it should be  · You have new or worsening swelling in your legs, feet, ankles, or abdomen  · You are short of breath, even at rest      · You have questions or concerns about your condition or care  Medicines: You may need any of the following:  · Heart medicines  help control your heart rate and rhythm  You may need more than one medicine to treat your symptoms  · Blood thinners    help prevent blood clots  Examples of blood thinners include heparin and warfarin   Clots can cause strokes, heart attacks, and death  The following are general safety guidelines to follow while you are taking a blood thinner:    ¨ Watch for bleeding and bruising while you take blood thinners  Watch for bleeding from your gums or nose  Watch for blood in your urine and bowel movements  Use a soft washcloth on your skin, and a soft toothbrush to brush your teeth  This can keep your skin and gums from bleeding  If you shave, use an electric shaver  Do not play contact sports  ¨ Tell your dentist and other healthcare providers that you take anticoagulants  Wear a bracelet or necklace that says you take this medicine  ¨ Do not start or stop any medicines unless your healthcare provider tells you to  Many medicines cannot be used with blood thinners  ¨ Tell your healthcare provider right away if you forget to take the medicine, or if you take too much  ¨ Warfarin  is a blood thinner that you may need to take  The following are things you should be aware of if you take warfarin  § Foods and medicines can affect the amount of warfarin in your blood  Do not make major changes to your diet while you take warfarin  Warfarin works best when you eat about the same amount of vitamin K every day  Vitamin K is found in green leafy vegetables and certain other foods  Ask for more information about what to eat when you are taking warfarin  § You will need to see your healthcare provider for follow-up visits when you are on warfarin  You will need regular blood tests  These tests are used to decide how much medicine you need  · Antiplatelets , such as aspirin, help prevent blood clots  Take your antiplatelet medicine exactly as directed  These medicines make it more likely for you to bleed or bruise  If you are told to take aspirin, do not take acetaminophen or ibuprofen instead  · Take your medicine as directed    Contact your healthcare provider if you think your medicine is not helping or if you have side effects  Tell him or her if you are allergic to any medicine  Keep a list of the medicines, vitamins, and herbs you take  Include the amounts, and when and why you take them  Bring the list or the pill bottles to follow-up visits  Carry your medicine list with you in case of an emergency  Follow up with your cardiologist as directed: You will need regular blood tests and monitoring  Write down your questions so you remember to ask them during your visits  Manage A-fib:   · Know your target heart rate  Learn how to take your pulse and monitor your heart rate  · Manage other health conditions  This includes high blood pressure, sleep apnea, thyroid disease, diabetes, and other heart conditions  Take medicine as directed and follow your treatment plan  · Limit or do not drink alcohol  Alcohol can make a-fib hard to manage  Ask your healthcare provider if it is safe for you to drink alcohol  A drink of alcohol is 12 ounces of beer, 5 ounces of wine, or 1½ ounces of liquor  · Do not smoke  Nicotine and other chemicals in cigarettes and cigars can cause heart and lung damage  Ask your healthcare provider for information if you currently smoke and need help to quit  E-cigarettes or smokeless tobacco still contain nicotine  Talk to your healthcare provider before you use these products  · Eat heart-healthy foods  Heart healthy foods will help keep your cholesterol low  These include fruits, vegetables, whole-grain breads, low-fat dairy products, beans, lean meats, and fish  Replace butter and margarine with heart-healthy oils such as olive oil and canola oil  · Maintain a healthy weight  Ask your healthcare provider how much you should weigh  Ask him to help you create a weight loss plan if you are overweight  · Exercise for 30 minutes  most days of the week  Ask your healthcare provider about the best exercise plan for you    © 2017 [employer] Information is for End User's use only and may not be sold, redistributed or otherwise used for commercial purposes  All illustrations and images included in CareNotes® are the copyrighted property of [employer]  or [employer]  The above information is an  only  It is not intended as medical advice for individual conditions or treatments  Talk to your doctor, nurse or pharmacist before following any medical regimen to see if it is safe and effective for you  Cardioversion   WHAT YOU NEED TO KNOW:   Cardioversion is a procedure that uses medicine or electrical shocks to correct arrhythmias  An arrhythmias is a heartbeat that is too slow, too fast, or irregular  It may prevent your body from getting the blood and oxygen it needs  Your heart has 4 chambers, called the atria and ventricles  The atria are at the top of your heart, and the ventricles are at the bottom of your heart  Most arrhythmias that need cardioversion start in the atria  DISCHARGE INSTRUCTIONS:   Call 911 for any of the following:   · You have any of the following signs of a heart attack:      ¨ Squeezing, pressure, or pain in your chest that lasts longer than 5 minutes or returns    ¨ Discomfort or pain in your back, neck, jaw, stomach, or arm     ¨ Trouble breathing    ¨ Nausea or vomiting    ¨ Lightheadedness or a sudden cold sweat, especially with chest pain or trouble breathing    · You have any of the following signs of a stroke:      ¨ Numbness or drooping on one side of your face     ¨ Weakness in an arm or leg    ¨ Confusion or difficulty speaking    ¨ Dizziness, a severe headache, or vision loss    · You feel lightheaded, short of breath, and have chest pain  · You cough up blood  · You have trouble breathing  Seek care immediately if:   · You feel your heart beating fast or fluttering  · You feel weak or faint  · Your leg or arm is larger than usual, painful, and warm    Contact your healthcare provider if: · Your skin is itchy, swollen, or you have a rash  · You have questions or concerns about your condition or care  Medicines: You may need any of the following:  · Heart medicines  help control your heart rate and rhythm  · Blood thinners    help prevent blood clots  Examples of blood thinners include heparin and warfarin  Clots can cause strokes, heart attacks, and death  The following are general safety guidelines to follow while you are taking a blood thinner:    ¨ Watch for bleeding and bruising while you take blood thinners  Watch for bleeding from your gums or nose  Watch for blood in your urine and bowel movements  Use a soft washcloth on your skin, and a soft toothbrush to brush your teeth  This can keep your skin and gums from bleeding  If you shave, use an electric shaver  Do not play contact sports  ¨ Tell your dentist and other healthcare providers that you take anticoagulants  Wear a bracelet or necklace that says you take this medicine  ¨ Do not start or stop any medicines unless your healthcare provider tells you to  Many medicines cannot be used with blood thinners  ¨ Tell your healthcare provider right away if you forget to take the medicine, or if you take too much  ¨ Warfarin  is a blood thinner that you may need to take  The following are things you should be aware of if you take warfarin  § Foods and medicines can affect the amount of warfarin in your blood  Do not make major changes to your diet while you take warfarin  Warfarin works best when you eat about the same amount of vitamin K every day  Vitamin K is found in green leafy vegetables and certain other foods  Ask for more information about what to eat when you are taking warfarin  § You will need to see your healthcare provider for follow-up visits when you are on warfarin  You will need regular blood tests  These tests are used to decide how much medicine you need  · Take your medicine as directed  Contact your healthcare provider if you think your medicine is not helping or if you have side effects  Tell him or her if you are allergic to any medicine  Keep a list of the medicines, vitamins, and herbs you take  Include the amounts, and when and why you take them  Bring the list or the pill bottles to follow-up visits  Carry your medicine list with you in case of an emergency  Self-care:   · Rest as directed  Do not drive for at least 24 hours  Ask your healthcare provider when you can return to your normal activities  · Check your heart rate and blood pressure as directed  Ask your healthcare provider what your heart rate and blood pressure should be  · Do not smoke  Nicotine and other chemicals in cigarettes and cigars can cause heart and lung damage  They can also increase your risk for another arrhythmia  Ask your healthcare provider for information if you currently smoke and need help to quit  E-cigarettes or smokeless tobacco still contain nicotine  Talk to your healthcare provider before you use these products  · Eat heart healthy foods  These include fruits, vegetables, whole-grain breads, low-fat dairy products, beans, lean meats, and fish  Replace butter and margarine with heart-healthy oils such as olive oil and canola oil  · Maintain a healthy weight  Ask your healthcare provider how much you should weigh  Ask him to help you create a weight loss plan if you are overweight  Follow up with your healthcare provider as directed:  Write down your questions so you remember to ask them during your visits  © 2017 [address] [employer] Information is for End User's use only and may not be sold, redistributed or otherwise used for commercial purposes  All illustrations and images included in CareNotes® are the copyrighted property of [employer]  or [employer]  The above information is an  only   It is not intended as medical advice for individual conditions or treatments  Talk to your doctor, nurse or pharmacist before following any medical regimen to see if it is safe and effective for you

## 2018-10-29 NOTE — PLAN OF CARE
Problem: Potential for Falls  Goal: Patient will remain free of falls  INTERVENTIONS:  - Assess patient frequently for physical needs  -  Identify cognitive and physical deficits and behaviors that affect risk of falls  -  [place] fall precautions as indicated by assessment   - Educate patient/family on patient safety including physical limitations  - Instruct patient to call for assistance with activity based on assessment  - Modify environment to reduce risk of injury  - Consider OT/PT consult to assist with strengthening/mobility   Outcome: Progressing      Problem: CARDIOVASCULAR - ADULT  Goal: Maintains optimal cardiac output and hemodynamic stability  INTERVENTIONS:  - Monitor I/O, vital signs and rhythm  - Monitor for S/S and trends of decreased cardiac output i e  bleeding, hypotension  - Administer and titrate ordered vasoactive medications to optimize hemodynamic stability  - Assess quality of pulses, skin color and temperature  - Assess for signs of decreased coronary artery perfusion - ex  Angina  - Instruct patient to report change in severity of symptoms   Outcome: Progressing    Goal: Absence of cardiac dysrhythmias or at baseline rhythm  INTERVENTIONS:  - Continuous cardiac monitoring, monitor vital signs, obtain 12 lead EKG if indicated  - Administer antiarrhythmic and heart rate control medications as ordered  - Monitor electrolytes and administer replacement therapy as ordered   Outcome: Progressing      Problem: Nutrition/Hydration-ADULT  Goal: Nutrient/Hydration intake appropriate for improving, restoring or maintaining nutritional needs  Monitor and assess patient's nutrition/hydration status for malnutrition (ex- brittle hair, bruises, dry skin, pale skin and conjunctiva, muscle wasting, smooth red tongue, and disorientation)  Collaborate with interdisciplinary team and initiate plan and interventions as ordered  Monitor patient's weight and dietary intake as ordered or per policy   Utilize nutrition screening tool and intervene per policy  Determine patient's food preferences and provide high-protein, high-caloric foods as appropriate       INTERVENTIONS:  - Monitor oral intake, urinary output, labs, and treatment plans  - Assess nutrition and hydration status and recommend course of action  - Evaluate amount of meals eaten  - Assist patient with eating if necessary   - Allow adequate time for meals  - Recommend/ encourage appropriate diets, oral nutritional supplements, and vitamin/mineral supplements  - Order, calculate, and assess calorie counts as needed  - Recommend, monitor, and adjust tube feedings and TPN/PPN based on assessed needs  - Assess need for intravenous fluids  - Provide specific nutrition/hydration education as appropriate  - Include patient/family/caregiver in decisions related to nutrition   Outcome: Progressing

## 2018-10-29 NOTE — DISCHARGE SUMMARY
[address] Internal Medicine  Discharge- Aquilino Radford 1965, 48 y o  male MRN: [id number]    Unit/Bed#: -01 Encounter: [id number]    Primary Care Provider: Brissa Jackman MD   Date and time admitted to hospital: 10/26/2018  9:38 AM        * Atrial fibrillation with RVR (HCC)resolved as of 10/29/2018   Assessment & Plan    · Has a history of atrial fibrillation over the past 3 years currently on Tikosyn and follows with Dr Marko Brenner as outpatient  · Recently taken off Bystolic on 98/06/16 and started on clonidine patch for poorly controlled hypertension as well as Edarbi  · Unsuccesssful cardioversion in ED, improved rate control with IV Cardizem in the ED  · Patient does not want ablation as this time  · Most recent BLAIRE was on 4/4/17 which showed EF 60%  · Underwent successful cardioversion today  · Will be discharged on Amlodipine 5 mg, Edarbi 80 mg, Clonidine patch 0 2 mg, and Bystolic 5 mg  · Follow up with Dr Marko Brenner       CKD (chronic kidney disease) stage 3, GFR 30-59 ml/min ([facility])   Assessment & Plan    · Creatinine at baseline  · Follow-up with nephrologist outpatient on 12/5 as scheduled  Lab Results   Component Value Date    CREATININE 1 01 10/29/2018    CREATININE 1 18 10/26/2018    CREATININE 1 00 10/18/2018          Benign essential hypertension   Assessment & Plan    · Bystolic recently discontinued on 10/17/18 and patient started on Catapres for uncontrolled hypertension  · Will resume Bystolic as per Cardiology and continue normal home medication regimen  · Chlorthalidone / KCl to be added to regimen  This was discussed with Dr Marko Brenner who wishes to not move forward with this recommendation  (this was clarified with the patient post-discharge)    · Patient instructed to monitor his blood pressure daily and keep a log for his follow up appointment     Morbid obesity ([address] )   Assessment & Plan    · BMI 41 6  · Encourage therapeutic lifestyle changes to promote weight loss  · Dietician consultation appreciated  · Outpatient referral to continue nutrition education           Discharging Physician / Practitioner: GARRY Gómez  PCP: Fabienne Fuentes MD  Admission Date:   Admission Orders     Ordered        10/27/18 2852  Inpatient Admission  Once         10/26/18 1514  Place in Observation  Once         10/26/18 1208  Inpatient Admission (expected length of stay for this patient is greater than two midnights)  Once             Discharge Date: 10/29/18    Resolved Problems  Date Reviewed: 10/29/2018          Resolved    * (Principal)Atrial fibrillation with RVR ([address] ) 10/29/2018     Resolved by  Jose Huertas, [facility] Stay:  · Cardiology    Procedures Performed:     · Cardioversion, 200 J x 1, 250 J x 2 in ER, 10/26, unsuccessful  · Cardioversion, 200 J x 1, 10/29, successful    Significant Findings / Test Results:     · Atrial fibrillation with RVR  · Elevated blood pressure    Incidental Findings:   · None     Test Results Pending at Discharge (will require follow up): · None     Outpatient Tests Requested:  · None    Complications:  None    Reason for Admission: Rapid atrial fibrillation    Hospital Course:     Nidia Saba is a 48 y o  male patient who originally presented to the hospital on 10/26/2018 due to rapid atrial fibrillation  Patient states that he has a history of paroxysmal atrial fibrillation and it is usually brought on with the year  Patient states that he had been pulled over by the police and began to feel a fluttering in his chest which he recognized as his atrial fibrillation  The denies other symptoms such as chest pain, shortness of breath, or dizziness  He reported just the fluttering in his chest   Patient is followed by Dr Nisha Jarrett, cardiologist, as an outpatient  He is maintained on Tikosyn  He is also on therapeutic anticoagulation with Xarelto    Patient states that recently due to his refractory hypertension, his home medication regimen was changed  His Bystolic had been stopped and he was placed on a clonidine patch for improved blood pressure control  Cardioversion x3 was attempted in the emergency room which resulted in a brief period of normal sinus rhythm but then returned to rapid AFib  He was given a dose of IV Cardizem which helped to control his rate  Patient was admitted to the hospital for further observation and monitoring and for planned cardioversion while here  Patient was given his normal home medications of Tikosyn, amlodipine, clonidine patch, and his Bystolic was re-initiated  His blood pressure was reasonably controlled  He remained in atrial fibrillation with a controlled rate although somewhat elevated into the 90s occasionally over 100  Patient was scheduled for cardioversion this morning which she underwent  Successful cardioversion and was obtained with the single shock of 200 joules  He was cleared by Cardiology for discharge as he was now in normal sinus rhythm  The patient felt well and was entirely asymptomatic  Initially, the patient was to be discharged home with a new prescription of chlorthalidone and potassium supplementation to augment his blood pressure control regimen  Because possible drug interaction, this regimen was discussed with his outpatient cardiologist Dr Evelin Yu, who decided against starting this regimen  The patient had the new plan clarified via telephone post discharge  Patient is to follow up with Dr Evelin Yu in a timely manner  Remained stable throughout his entire stay  He was also seen in consultation by the dietitian as patient had expressed an interest in education with nutrition  He is interested in weight loss  He is wishing to put off an ablation until he has experienced some weight loss  Please see above list of diagnoses and related plan for additional information       Condition at Discharge: stable     Discharge Day Visit / Exam:     Subjective: Asymptomatic at time  He denies any fluttering in his chest   He denies chest pain, shortness of breath, or dizziness  Vitals: Blood Pressure: 141/83 (10/29/18 1201)  Pulse: 56 (10/29/18 1201)  Temperature: 97 7 °F (36 5 °C) (10/29/18 1013)  Temp Source: Temporal (10/29/18 1013)  Respirations: 16 (10/29/18 1028)  Height: 5' 10" (177 8 cm) (10/26/18 1257)  Weight - Scale: 132 kg (290 lb) (10/26/18 1257)  SpO2: 94 % (10/29/18 1028)  Exam:   Physical Exam   Constitutional: He is oriented to person, place, and time  He appears well-developed and well-nourished  No distress  Eyes: Conjunctivae are normal  No scleral icterus  Cardiovascular: Normal rate, regular rhythm and normal heart sounds  No murmur heard  Pulmonary/Chest: Effort normal and breath sounds normal  No respiratory distress  He has no wheezes  He has no rales  Abdominal: Soft  Bowel sounds are normal  He exhibits no distension  There is no tenderness  Musculoskeletal: Normal range of motion  He exhibits no edema or tenderness  Neurological: He is alert and oriented to person, place, and time  Skin: Skin is warm and dry  He is not diaphoretic  Psychiatric: He has a normal mood and affect  His behavior is normal        Discussion with Family:  Plan of care with patient and spouse    Discharge instructions/Information to patient and family:   See after visit summary for information provided to patient and family  Provisions for Follow-Up Care:  See after visit summary for information related to follow-up care and any pertinent home health orders  Disposition:     Home    For Discharges to [facility]:   · Not Applicable to this Patient - Not Applicable to this Patient    Planned Readmission:  None     Discharge Statement:  I spent 60 minutes discharging the patient  This time was spent on the day of discharge  I had direct contact with the patient on the day of discharge   Greater than 50% of the total time was spent examining patient, answering all patient questions, arranging and discussing plan of care with patient as well as directly providing post-discharge instructions  Additional time then spent on discharge activities  Discharge Medications:  See after visit summary for reconciled discharge medications provided to patient and family        ** Please Note: This note has been constructed using a voice recognition system **

## 2018-10-29 NOTE — PLAN OF CARE
Problem: Potential for Falls  Goal: Patient will remain free of falls  INTERVENTIONS:  - Assess patient frequently for physical needs  -  Identify cognitive and physical deficits and behaviors that affect risk of falls  -  [place] fall precautions as indicated by assessment   - Educate patient/family on patient safety including physical limitations  - Instruct patient to call for assistance with activity based on assessment  - Modify environment to reduce risk of injury  - Consider OT/PT consult to assist with strengthening/mobility   Outcome: Completed Date Met: 10/29/18      Problem: CARDIOVASCULAR - ADULT  Goal: Maintains optimal cardiac output and hemodynamic stability  INTERVENTIONS:  - Monitor I/O, vital signs and rhythm  - Monitor for S/S and trends of decreased cardiac output i e  bleeding, hypotension  - Administer and titrate ordered vasoactive medications to optimize hemodynamic stability  - Assess quality of pulses, skin color and temperature  - Assess for signs of decreased coronary artery perfusion - ex  Angina  - Instruct patient to report change in severity of symptoms   Outcome: Completed Date Met: 10/29/18    Goal: Absence of cardiac dysrhythmias or at baseline rhythm  INTERVENTIONS:  - Continuous cardiac monitoring, monitor vital signs, obtain 12 lead EKG if indicated  - Administer antiarrhythmic and heart rate control medications as ordered  - Monitor electrolytes and administer replacement therapy as ordered   Outcome: Completed Date Met: 10/29/18      Problem: Nutrition/Hydration-ADULT  Goal: Nutrient/Hydration intake appropriate for improving, restoring or maintaining nutritional needs  Monitor and assess patient's nutrition/hydration status for malnutrition (ex- brittle hair, bruises, dry skin, pale skin and conjunctiva, muscle wasting, smooth red tongue, and disorientation)  Collaborate with interdisciplinary team and initiate plan and interventions as ordered    Monitor patient's weight and dietary intake as ordered or per policy  Utilize nutrition screening tool and intervene per policy  Determine patient's food preferences and provide high-protein, high-caloric foods as appropriate       INTERVENTIONS:  - Monitor oral intake, urinary output, labs, and treatment plans  - Assess nutrition and hydration status and recommend course of action  - Evaluate amount of meals eaten  - Assist patient with eating if necessary   - Allow adequate time for meals  - Recommend/ encourage appropriate diets, oral nutritional supplements, and vitamin/mineral supplements  - Order, calculate, and assess calorie counts as needed  - Recommend, monitor, and adjust tube feedings and TPN/PPN based on assessed needs  - Assess need for intravenous fluids  - Provide specific nutrition/hydration education as appropriate  - Include patient/family/caregiver in decisions related to nutrition   Outcome: Completed Date Met: 10/29/18

## 2018-10-29 NOTE — ASSESSMENT & PLAN NOTE
· Has a history of atrial fibrillation over the past 3 years currently on Tikosyn and follows with Dr Lai Reaves as outpatient  · Recently taken off Bystolic on 51/56/54 and started on clonidine patch for poorly controlled hypertension as well as Edarbi  · Unsuccesssful cardioversion in ED, improved rate control with IV Cardizem in the ED  · Patient does not want ablation as this time    · Most recent BLAIRE was on 4/4/17 which showed EF 60%  · Underwent successful cardioversion today  · Will be discharged on Amlodipine 5 mg, Edarbi 80 mg, Clonidine patch 0 2 mg, and Bystolic 5 mg  · Follow up with Dr Lai Reaves

## 2018-10-29 NOTE — ASSESSMENT & PLAN NOTE
· Bystolic recently discontinued on 10/17/18 and patient started on Catapres for uncontrolled hypertension  · Will resume Bystolic as per Cardiology and continue normal home medication regimen  · Chlorthalidone / KCl to be added to regimen  This was discussed with Dr Ania Alex who wishes to not move forward with this recommendation  (this was clarified with the patient post-discharge)    · Patient instructed to monitor his blood pressure daily and keep a log for his follow up appointment

## 2018-10-29 NOTE — PROGRESS NOTES
Tolerated cardioversion well  EKG done post procedure  Pt return to spu report given to receiving caterina Caraballo Rn

## 2018-10-29 NOTE — ASSESSMENT & PLAN NOTE
· Creatinine at baseline  · Follow-up with nephrologist outpatient on 12/5 as scheduled  Lab Results   Component Value Date    CREATININE 1 01 10/29/2018    CREATININE 1 18 10/26/2018    CREATININE 1 00 10/18/2018

## 2018-10-29 NOTE — ANESTHESIA PREPROCEDURE EVALUATION
Attempted Cardioversion last Friday 3 days ago w/o success  Review of Systems/Medical History          Cardiovascular  Hypertension , Dysrhythmias , atrial fibrillation,    Pulmonary  Not a smoker , Asthma , well controlled/ stable ,        GI/Hepatic    No GERD ,        Chronic kidney disease stage 3,        Endo/Other     GYN       Hematology   Musculoskeletal    Arthritis     Neurology   Psychology           Physical Exam    Airway  Comment: Large Neck  Mallampati score: II  TM Distance: >3 FB  Neck ROM: full     Dental   No notable dental hx     Cardiovascular      Pulmonary      Other Findings        Anesthesia Plan  ASA Score- 3     Anesthesia Type- IV sedation with anesthesia with ASA Monitors  Additional Monitors:   Airway Plan:         Plan Factors-Patient not instructed to abstain from smoking on day of procedure  Patient did not smoke on day of surgery  Induction- intravenous  Postoperative Plan-     Informed Consent- Anesthetic plan and risks discussed with patient  I personally reviewed this patient with the CRNA  Discussed and agreed on the Anesthesia Plan with the CRNA               Lab Results   Component Value Date    GLUC 109 10/29/2018    ALT 57 10/18/2018    AST 19 10/18/2018    BUN 17 10/29/2018    CALCIUM 8 6 10/29/2018     10/29/2018    CHOL 177 10/08/2012    CO2 24 10/29/2018    CREATININE 1 01 10/29/2018    HDL 37 (L) 10/08/2012    HCT 46 9 10/29/2018    HGB 15 6 10/29/2018    PROT 7 3 12/02/2014    HGBA1C 5 8 (H) 09/03/2013    MG 2 3 10/29/2018    PHOS 3 5 09/03/2018     10/29/2018    K 3 7 10/29/2018     10/29/2018    TRIG 150 (H) 10/08/2012    WBC 9 06 10/29/2018

## 2018-10-30 ENCOUNTER — APPOINTMENT (OUTPATIENT)
Dept: PHYSICAL THERAPY | Facility: CLINIC | Age: 53
End: 2018-10-30
Payer: COMMERCIAL

## 2018-10-30 ENCOUNTER — TRANSITIONAL CARE MANAGEMENT (OUTPATIENT)
Dept: FAMILY MEDICINE CLINIC | Facility: CLINIC | Age: 53
End: 2018-10-30

## 2018-10-30 ENCOUNTER — TELEPHONE (OUTPATIENT)
Dept: FAMILY MEDICINE CLINIC | Facility: CLINIC | Age: 53
End: 2018-10-30

## 2018-10-30 NOTE — TELEPHONE ENCOUNTER
Left message for patient to return call, TCM note statrted, episode created  Please route call back to nurses

## 2018-10-31 NOTE — UTILIZATION REVIEW
Notification of Discharge  This is a Notification of Discharge from our facility [address]  Please be advised that this patient has been discharge from our facility  Below you will find the admission and discharge date and time including the patients disposition  PRESENTATION DATE: 10/26/2018  9:38 AM  IP ADMISSION DATE: 10/26/18 1208  DISCHARGE DATE: 10/29/2018  2:11 PM  DISPOSITION: Home/Self Care    [address] [facility] in the [facility] [place] Utilization Review Department  Phone: [phone]; Fax [phone]  ATTENTION: The Network Utilization Review Department is now centralized for our 9 Facilities  Make a note that we have a new phone and fax numbers for our Department  Please call with any questions or concerns to [phone] and carefully follow the prompts so that you are directed to the right person  All voicemails are confidential  Fax any determinations, approvals, denials, and requests for initial or continue stay review clinical to [phone]  Due to HIGH CALL volume, it would be easier if you could please send faxed requests to expedite your requests and in part, help us provide discharge notifications faster    Reference #[id number]

## 2018-11-06 ENCOUNTER — TRANSITIONAL CARE MANAGEMENT (OUTPATIENT)
Dept: FAMILY MEDICINE CLINIC | Facility: CLINIC | Age: 53
End: 2018-11-06

## 2018-11-11 DIAGNOSIS — I10 ESSENTIAL HYPERTENSION: Primary | ICD-10-CM

## 2018-11-11 RX ORDER — AMLODIPINE BESYLATE 5 MG/1
TABLET ORAL
Qty: 30 TABLET | Refills: 10 | Status: SHIPPED | OUTPATIENT
Start: 2018-11-11 | End: 2019-08-08 | Stop reason: SDUPTHER

## 2018-11-12 DIAGNOSIS — I10 BENIGN ESSENTIAL HYPERTENSION: ICD-10-CM

## 2018-11-19 DIAGNOSIS — I48.91 ATRIAL FIBRILLATION WITH RVR (HCC): ICD-10-CM

## 2018-11-19 RX ORDER — NEBIVOLOL HYDROCHLORIDE 5 MG/1
TABLET ORAL
Qty: 30 TABLET | Refills: 0 | Status: SHIPPED | OUTPATIENT
Start: 2018-11-19 | End: 2018-12-20 | Stop reason: SDUPTHER

## 2018-11-27 NOTE — PROGRESS NOTES
Addendum: Added discharge G-codes and resolved episode of care  Pt has not been to PT in > 30 days and has been hospitalized

## 2018-12-05 ENCOUNTER — OFFICE VISIT (OUTPATIENT)
Dept: NEPHROLOGY | Facility: CLINIC | Age: 53
End: 2018-12-05
Payer: COMMERCIAL

## 2018-12-05 VITALS
SYSTOLIC BLOOD PRESSURE: 150 MMHG | WEIGHT: 296.8 LBS | BODY MASS INDEX: 42.49 KG/M2 | HEART RATE: 56 BPM | HEIGHT: 70 IN | DIASTOLIC BLOOD PRESSURE: 84 MMHG

## 2018-12-05 DIAGNOSIS — I10 BENIGN ESSENTIAL HYPERTENSION: Primary | ICD-10-CM

## 2018-12-05 DIAGNOSIS — N20.0 NEPHROLITHIASIS: ICD-10-CM

## 2018-12-05 DIAGNOSIS — N20.0 NEPHROLITHIASIS: Primary | ICD-10-CM

## 2018-12-05 DIAGNOSIS — I10 ESSENTIAL HYPERTENSION, BENIGN: ICD-10-CM

## 2018-12-05 DIAGNOSIS — N18.30 CKD (CHRONIC KIDNEY DISEASE) STAGE 3, GFR 30-59 ML/MIN (HCC): ICD-10-CM

## 2018-12-05 LAB
SL AMB  POCT GLUCOSE, UA: NORMAL
SL AMB LEUKOCYTE ESTERASE,UA: NORMAL
SL AMB POCT BILIRUBIN,UA: NORMAL
SL AMB POCT BLOOD,UA: NORMAL
SL AMB POCT CLARITY,UA: CLEAR
SL AMB POCT COLOR,UA: YELLOW
SL AMB POCT KETONES,UA: NORMAL
SL AMB POCT NITRITE,UA: NORMAL
SL AMB POCT PH,UA: 5
SL AMB POCT SPECIFIC GRAVITY,UA: 1.02
SL AMB POCT URINE PROTEIN: NORMAL
SL AMB POCT UROBILINOGEN: NORMAL

## 2018-12-05 PROCEDURE — 81002 URINALYSIS NONAUTO W/O SCOPE: CPT | Performed by: INTERNAL MEDICINE

## 2018-12-05 PROCEDURE — 99245 OFF/OP CONSLTJ NEW/EST HI 55: CPT | Performed by: INTERNAL MEDICINE

## 2018-12-05 RX ORDER — SPIRONOLACTONE 25 MG/1
25 TABLET ORAL DAILY
Qty: 30 TABLET | Refills: 5 | Status: SHIPPED | OUTPATIENT
Start: 2018-12-05 | End: 2019-03-29 | Stop reason: SDUPTHER

## 2018-12-05 RX ORDER — AMILORIDE HYDROCHLORIDE 5 MG/1
5 TABLET ORAL DAILY
Qty: 30 TABLET | Refills: 5 | Status: SHIPPED | OUTPATIENT
Start: 2018-12-05 | End: 2018-12-05

## 2018-12-05 RX ORDER — CHLORTHALIDONE 25 MG/1
12.5 TABLET ORAL DAILY
Qty: 30 TABLET | Refills: 5 | Status: SHIPPED | OUTPATIENT
Start: 2018-12-05 | End: 2020-07-20 | Stop reason: ALTCHOICE

## 2018-12-05 NOTE — LETTER
December 5, 2018     [address], [place] [address]    Patient: Eddy Mooney   YOB: 1965   Date of Visit: 12/5/2018       Dear Dr Diallo Aldana: Thank you for referring Omid Saleh to me for evaluation  Below are my notes for this consultation  If you have questions, please do not hesitate to call me  I look forward to following your patient along with you  Sincerely,        Jen Howe MD        CC: MD Pradip Wen MD Jaunita Almas, MD  12/5/2018 10:39 AM  Sign at close encounter  Consultation - Nephrology 12/5/2018        History of Present Illness   Reason for Consult / Principal Problem:   Hypertension    HPI: Eddy Mooney is a 48y o  year old male with a history of paroxysmally atrial fibrillation followed by Dr Diallo Aldana  He presented to [facility] and October with an episode of rapid atrial fibrillation on 10/26/2018  He typically has maintained on Xarelto and Tikosyn  Apparently he had accelerated hypertension at that time and had a recent switch of Bystolic to a clonidine patch  During that admission he had an attempt x3 at cardioversion without resolution of the atrial fibrillation  He was given IV Cardizem to control his heart rate  Again he eventually had a no other cardioversion which worked towards the end of his stay  There has been some discussion of ablation  We are asked to see him for hypertension:  Patient with a history of hypertension for approximately 10 years not well controlled  He developed atrial fibrillation about 6 years ago and has now been starting to really push to get under good control  Patient has noted 100 lb weight gain over the last 10 years and his blood pressures been difficult to control during that time  No episodes of diaphoresis/palpitations/headaches  No significant headaches, chest pain or leg swelling at this time  He does get dyspnea on exertion    He has no coronary artery disease by evaluation  He  No dizziness or lightheadedness  Patient denies any history of kidney disease except for nephrolithiasis  He denies any dysuria, hematuria, voiding symptoms or foamy urine  He has had several stones, a few the past on their own, and a few that needed extraction by Dr Summer Ortiz  He does drink a lot of fluids on a regular basis  He has never had a formal evaluation  There is a history of a creatinine elevation of 1 38 but that was temporary related to medications and that resolved and typically his creatinine is approximately 1 0  Of note, patient had a sleep study which was negative  Blood pressure medications:  -amlodipine 5 mg daily in the morning  -Bystolic 5 mg daily in the morning  -clonidine patch 2  Weekly  -Edarbi 80 mg in the morning   Social history:  -limited salt intake  -once a week exercise at most    General:  Overall feels well except for recent URI which is resolving with a mildly productive cough of green sputum  No fevers chills  Good appetite and good energy  Weight is been stable the last year so    Cardiovascular:  See HPI  Respiratory:  See HPI  Gastrointestinal:  No nausea vomiting diarrhea constipation bright red blood per rectum or melena or dark black tarry stools  Genitourinary:  See HPI  Neurology:  See HPI, wrist injury on the left arm has led to occasional numbness tingling of his left hand  Rest of review of systems as completely reviewed with the patient are negative    Historical Information   Past Medical History:   Diagnosis Date    Arthritis     Asthma     Atrial fibrillation ([address] )     Chronic kidney disease     kidney stones    Hypertension     Muscle weakness    ·  No other history of significance including CVA/seizures/cancer/coronary artery disease/CHF/emphysema/thyroid disease/anemia or low blood count    Past Surgical History:   Procedure Laterality Date    APPENDECTOMY      CYSTOSCOPY  08/30/2016    w/removal of object, last assessed 8/30/16    FOOT SURGERY Left     HAND SURGERY      KNEE SURGERY Right 2016    MO CYSTO/URETERO W/LITHOTRIPSY &INDWELL STENT INSRT Left 8/9/2016    Procedure: CYSTOSCOPY URETEROSCOPY WITH LITHOTRIPSY HOLMIUM LASER STONE EXTRACTION, RETROGRADE PYELOGRAM AND INSERTION STENT URETERAL;  Surgeon: Merry Mckeon MD;  Location: [facility];  Service: Urology last assessed 8/30/16    WRIST SURGERY Left 2014    Fracture surgery     Social History   History   Alcohol Use    Yes     Comment: occ    He drinks about 6 beers a week  History   Drug Use No     History   Smoking Status    Former Smoker    Quit date: 4/4/1994   Smokeless Tobacco    Never Used    Smoked about 11 years less than 1 pack per day    Family History   Problem Relation Age of Onset    Atrial fibrillation Mother     Other Mother         blood dyscrasia    Hypertension Mother     Other Father         hypoglycemia     Atrial fibrillation Father     Diabetes Family     Other Family         Thrombocytosis    Both parents with hypertension  No kidney disease in the family    Meds/Allergies   all current active meds have been reviewed, current meds:   Current Outpatient Prescriptions:     amLODIPine (NORVASC) 5 mg tablet, TAKE 1 TABLET DAILY, Disp: 30 tablet, Rfl: 10    BYSTOLIC 5 MG tablet, TAKE 1 TABLET BY MOUTH EVERY DAY, Disp: 30 tablet, Rfl: 0    cloNIDine (CATAPRES-TTS-2) 0 2 mg/24 hr, APPLY 1 PATCH ONE TIME PER WEEK, Disp: 4 patch, Rfl: 0    dofetilide (TIKOSYN) 500 mcg capsule, Take 500 mcg by mouth 2 (two) times a day 1 Capsule at 7:00 Am and 7 Pm , Disp: , Rfl:     EDARBI 80 MG tablet, TAKE ONE TABLET DAILY (Patient taking differently: TAKE ONE TABLET DAILY IN THE AM), Disp: 30 tablet, Rfl: 11    rivaroxaban (XARELTO) 20 mg tablet, Take 20 mg by mouth daily with breakfast, Disp: , Rfl:     AmLODIPine Besylate (NORVASC PO), Take 5 mg by mouth 1 tablet daily in the AM , Disp: , Rfl:     chlorthalidone 25 mg tablet, Take 0 5 tablets (12 5 mg total) by mouth daily, Disp: 30 tablet, Rfl: 5    potassium chloride (K-DUR,KLOR-CON) 10 mEq tablet, Take 1 tablet (10 mEq total) by mouth daily (Patient not taking: Reported on 12/5/2018 ), Disp: 30 tablet, Rfl: 0    spironolactone (ALDACTONE) 25 mg tablet, Take 1 tablet (25 mg total) by mouth daily, Disp: 30 tablet, Rfl: 5    No Known Allergies    Objective   Vitals:    12/05/18 0849   BP: 150/84   Pulse: 56    BP sitting on left:  184/100 with a heart rate of 52 and regular using larger cuff  BP standing on left:  186/100 with a heart rate of 56 and regular using larger cuff; the same on the right  Body mass index is 42 59 kg/m²  General: ob well-developed well-nourished,obese, no acute distress  Skin:  No acute rash  Eyes:  No scleral icterus and noninjected  ENT:  Normocephalic/atraumatic, mucous membranes moist  Neck:  Supple, no jugular venous distention, 2+ carotid upstroke, no carotid bruits, no thyromegaly  Back:  No CVA tenderness and no spinal abnormalities  Chest:  Clear to auscultation percussion, good respiratory effort  CVS:  Regular rate and rhythm without a murmur rub or gallops appreciable  Abdomen:  Obese, soft and nontender with normal bowel sounds, no hepatosplenomegaly or bruits appreciable  Extremities:  No clubbing, cyanosis or edema, 2+ dorsalis pedis pulses, no femoral bruits  Neuro:  No gross focality  Psych:  Alert, oriented, and appropriate    Current Weight:   Weight (last 2 days)     Date/Time   Weight    12/05/18 0849  135 (296 8)            Wt Readings from Last 3 Encounters:   12/05/18 135 kg (296 lb 12 8 oz)   10/26/18 132 kg (290 lb)   10/17/18 134 kg (295 lb)         Lab Results:  I have personally reviewed pertinent labs    Results for orders placed or performed in visit on 12/05/18   POCT urine dip   Result Value Ref Range    LEUKOCYTE ESTERASE,UA neg     NITRITE,UA neg     SL AMB POCT UROBILINOGEN neg     POCT URINE PROTEIN neg      PH,UA 5 0     BLOOD,UA neg     SPECIFIC GRAVITY,UA 1 020     KETONES,UA neg     BILIRUBIN,UA neg     GLUCOSE, UA neg      COLOR,UA yellow     CLARITY,UA clear                ASSESSMENT AND PLAN:  1  Hypertension:  The patient most likely has essential hypertension but given the difficulty in controlling his blood pressure on more than 3 medications suggests resistant hypertension and therefore we must rule out secondary causes including primary aldosterone state, pheochromocytoma, renal artery stenosis  Patient has had normal thyroid function and no evidence of obstructive sleep apnea  I am certain that is significant obesity is playing a role as well  Recommendations: Workup:  -aldosterone/renin ratio  -plasma free metanephrines  -obtain 24 hour urine for free cortisol given significant obesity  -urine protein creatinine ratio for completeness  -renal artery duplex to begin with, given obesity we may have to resort to other testing to rule out renal artery disease  -thyroid function study has been normal  -obstructive sleep apnea has been ruled out  Treatment:  -push nonmedical regimen:  I have strongly counseled him in regards to weight loss and approach is uneven offering dietary evaluation, avoidance of salt which he appears to be doing, and exercise on a regular basis at least 3 days a week progressing to 5 days a week for at least 30 minutes  -medical regimen:  · Maintain amlodipine 5 mg in the morning  · Maintain Bystolic 5 mg in the morning  · Maintain clonidine patch for now 2  Weekly we can always increase as needed  · Maintain a Edarbi 80 mg in the morning  · Ideally I would like to start a diuretic on the patient such as chlorthalidone 12 5 mg; however thiazide type diuretics can cause an increase in QT interval and possible level of Tikosyn  I WOULD ONLY START THIS WITH THE PERMISSION OF DR Winifred Munoz  · I would also add spironolactone 25 mg once a day    Amiloride will cause an increase potentially in the concentration of Tikosyn  · Ideal goal will be to get his blood pressure below 135/85  This will be a slow progression in that direction  We will follow very closely  I will have him obtain blood pressure readings at home bring the men along with blood pressure monitor correlation with the office machine  2  Nephrolithiasis:  Patient has had numerous stones  At this juncture I would recommend a 24 hour urine collection to determine what the potential abnormalities are in his urinary studies so that we can better prevent stones  In addition, I would recommend continuing with 2 5-3 L of water a day for now and avoidance of salt  I will also obtain a KUB next visit  Patient Instructions   1  Medication changes today:  -begin chlorthalidone 12 5 mg which is a half a tablet once a day in the morning  I WILL CHECK WITH DR Zay Colunga IF THIS IS ACCEPTABLE SO PLEASE DO NOT STARTED UNTIL YOU GET OFFICIAL WORD FROM ME   -begin spironolactone 25 mg a day after going for lab work:  Side effects may include breast enlargement and tenderness and soreness  Please let me know if you developed that  2   Please go for lab work now in the morning but not fast  3  Please arrange to go for a kidney artery ultrasound which we will help to make the appointment  4  Please go for a 24 hour urine collection in the next several weeks for stone prevention evaluation  5  Please take 1 week a blood pressure readings beginning in 2 weeks morning and evening, sitting and standing:  · Take the morning readings before any medications  · Take the evening readings closer to bedtime  · When taking standing readings, keep your arm supported at heart level and not dangling  PLEASE THEN BRING IN THESE BLOOD PRESSURE READINGS ALONG WITH YOUR BLOOD PRESSURE MACHINE FOR CORRELATION WITH THE OFFICE MACHINE TO SEE 1 OF MY ADVANCED PRACTITIONER'S IN ABOUT 2-3 WEEKS    6   YOUR ULTIMATE GOAL IS TO MAINTAIN A BLOOD PRESSURE OF LESS THAN 135/85 ON AVERAGE AT HOME  7  Follow-up in 3 months:  -please bring in 1 week a blood pressure readings morning and evening, sitting and standing is outlined above  -please go for fasting lab work prior to your appointment  -please go for 24 hour urine collection prior to your appointment  -please go for an x-ray of your abdomen prior to your appointment  8  General instructions:  -avoid salt  -avoid medications such as Motrin, Naprosyn, ibuprofen, Aleve or Advil or Celebrex as they can affect your kidney function; you can use Tylenol as needed for pain or fevers if you have no liver problems  -avoid medications such as Sudafed or other medications with decongestants as they can raise your blood pressure  -try to exercise at least 30 minutes at least 3 days a week with an ultimate goal of 5 days a week  -try to lose 10 lb by your next visit in 3 months  -please drink 2 5-3 L of water a day for stone prevention, this is equivalent to 80-96 oz of water a day        Portions of the record may have been created with voice recognition software   Occasional wrong word or "sound a like" substitutions may have occurred due to the inherent limitations of voice recognition software   Read the chart carefully and recognize, using context, where substitutions have occurred      Piotr Mccollum MD

## 2018-12-05 NOTE — PATIENT INSTRUCTIONS
1   Medication changes today:  -begin chlorthalidone 12 5 mg which is a half a tablet once a day in the morning  I WILL CHECK WITH DR Dante Mckeon IF THIS IS ACCEPTABLE SO PLEASE DO NOT STARTED UNTIL YOU GET OFFICIAL WORD FROM ME   -begin spironolactone 25 mg a day after going for lab work:  Side effects may include breast enlargement and tenderness and soreness  Please let me know if you developed that  PLEASE CHECK WITH YOUR PHARMACY IF THERE IS ANY OVERT INTERACTION WITH TIKOSYN AND SPIRONOLACTONE  I COULD NOT FIND ANY ON MY INFORMATION  LET ME KNOW IF YOU HEAR DIFFERENT  2   Please go for lab work now in the morning but not fast  3  Please arrange to go for a kidney artery ultrasound which we will help to make the appointment  4  Please go for a 24 hour urine collection in the next several weeks for stone prevention evaluation  5  Please take 1 week a blood pressure readings beginning in 2 weeks morning and evening, sitting and standing:  · Take the morning readings before any medications  · Take the evening readings closer to bedtime  · When taking standing readings, keep your arm supported at heart level and not dangling  PLEASE THEN BRING IN THESE BLOOD PRESSURE READINGS ALONG WITH YOUR BLOOD PRESSURE MACHINE FOR CORRELATION WITH THE OFFICE MACHINE TO SEE 1 OF MY ADVANCED PRACTITIONER'S IN ABOUT 2-3 WEEKS  6   YOUR ULTIMATE GOAL IS TO MAINTAIN A BLOOD PRESSURE OF LESS THAN 135/85 ON AVERAGE AT HOME  7  Follow-up in 3 months:  -please bring in 1 week a blood pressure readings morning and evening, sitting and standing is outlined above  -please go for fasting lab work prior to your appointment  -please go for 24 hour urine collection prior to your appointment  -please go for an x-ray of your abdomen prior to your appointment  8  General instructions:  -avoid salt  -avoid medications such as Motrin, Naprosyn, ibuprofen, Aleve or Advil or Celebrex as they can affect your kidney function; you can use Tylenol as needed for pain or fevers if you have no liver problems  -avoid medications such as Sudafed or other medications with decongestants as they can raise your blood pressure  -try to exercise at least 30 minutes at least 3 days a week with an ultimate goal of 5 days a week  -try to lose 10 lb by your next visit in 3 months  -please drink 2 5-3 L of water a day for stone prevention, this is equivalent to 80-96 oz of water a day

## 2018-12-05 NOTE — PROGRESS NOTES
Consultation - Nephrology 12/5/2018        History of Present Illness   Reason for Consult / Principal Problem:   Hypertension    HPI: Harsha William is a 48y o  year old male with a history of paroxysmally atrial fibrillation followed by Dr Agustin Johns  He presented to [facility] with an episode of rapid atrial fibrillation on 10/26/2018  He typically has maintained on Xarelto and Tikosyn  Apparently he had accelerated hypertension at that time and had a recent switch of Bystolic to a clonidine patch  During that admission he had an attempt x3 at cardioversion without resolution of the atrial fibrillation  He was given IV Cardizem to control his heart rate  Again he eventually had a no other cardioversion which worked towards the end of his stay  There has been some discussion of ablation  We are asked to see him for hypertension:  Patient with a history of hypertension for approximately 10 years not well controlled  He developed atrial fibrillation about 6 years ago and has now been starting to really push to get under good control  Patient has noted 100 lb weight gain over the last 10 years and his blood pressures been difficult to control during that time  No episodes of diaphoresis/palpitations/headaches  No significant headaches, chest pain or leg swelling at this time  He does get dyspnea on exertion  He has no coronary artery disease by evaluation  He  No dizziness or lightheadedness  Patient denies any history of kidney disease except for nephrolithiasis  He denies any dysuria, hematuria, voiding symptoms or foamy urine  He has had several stones, a few the past on their own, and a few that needed extraction by Dr Whitney Santa  He does drink a lot of fluids on a regular basis  He has never had a formal evaluation    There is a history of a creatinine elevation of 1 38 but that was temporary related to medications and that resolved and typically his creatinine is approximately 1 0  Of note, patient had a sleep study which was negative  Blood pressure medications:  -amlodipine 5 mg daily in the morning  -Bystolic 5 mg daily in the morning  -clonidine patch 2  Weekly  -Edarbi 80 mg in the morning   Social history:  -limited salt intake  -once a week exercise at most    General:  Overall feels well except for recent URI which is resolving with a mildly productive cough of green sputum  No fevers chills  Good appetite and good energy  Weight is been stable the last year so    Cardiovascular:  See HPI  Respiratory:  See HPI  Gastrointestinal:  No nausea vomiting diarrhea constipation bright red blood per rectum or melena or dark black tarry stools  Genitourinary:  See HPI  Neurology:  See HPI, wrist injury on the left arm has led to occasional numbness tingling of his left hand  Rest of review of systems as completely reviewed with the patient are negative    Historical Information   Past Medical History:   Diagnosis Date    Arthritis     Asthma     Atrial fibrillation ([facility], [place] [address] )     Chronic kidney disease     kidney stones    Hypertension     Muscle weakness    ·  No other history of significance including CVA/seizures/cancer/coronary artery disease/CHF/emphysema/thyroid disease/anemia or low blood count    Past Surgical History:   Procedure Laterality Date    APPENDECTOMY      CYSTOSCOPY  08/30/2016    w/removal of object, last assessed 8/30/16    FOOT SURGERY Left     HAND SURGERY      KNEE SURGERY Right 2016    TX CYSTO/URETERO W/LITHOTRIPSY &INDWELL STENT INSRT Left 8/9/2016    Procedure: CYSTOSCOPY URETEROSCOPY WITH LITHOTRIPSY HOLMIUM LASER STONE EXTRACTION, RETROGRADE PYELOGRAM AND INSERTION STENT URETERAL;  Surgeon: Daniel Purvis MD;  Location: [facility];  Service: Urology last assessed 8/30/16    WRIST SURGERY Left 2014    Fracture surgery     Social History   History   Alcohol Use    Yes     Comment: occ    He drinks about 6 beers a week  History   Drug Use No     History   Smoking Status    Former Smoker    Quit date: 4/4/1994   Smokeless Tobacco    Never Used    Smoked about 11 years less than 1 pack per day    Family History   Problem Relation Age of Onset    Atrial fibrillation Mother     Other Mother         blood dyscrasia    Hypertension Mother     Other Father         hypoglycemia     Atrial fibrillation Father     Diabetes Family     Other Family         Thrombocytosis    Both parents with hypertension  No kidney disease in the family    Meds/Allergies   all current active meds have been reviewed, current meds:   Current Outpatient Prescriptions:     amLODIPine (NORVASC) 5 mg tablet, TAKE 1 TABLET DAILY, Disp: 30 tablet, Rfl: 10    BYSTOLIC 5 MG tablet, TAKE 1 TABLET BY MOUTH EVERY DAY, Disp: 30 tablet, Rfl: 0    cloNIDine (CATAPRES-TTS-2) 0 2 mg/24 hr, APPLY 1 PATCH ONE TIME PER WEEK, Disp: 4 patch, Rfl: 0    dofetilide (TIKOSYN) 500 mcg capsule, Take 500 mcg by mouth 2 (two) times a day 1 Capsule at 7:00 Am and 7 Pm , Disp: , Rfl:     EDARBI 80 MG tablet, TAKE ONE TABLET DAILY (Patient taking differently: TAKE ONE TABLET DAILY IN THE AM), Disp: 30 tablet, Rfl: 11    rivaroxaban (XARELTO) 20 mg tablet, Take 20 mg by mouth daily with breakfast, Disp: , Rfl:     AmLODIPine Besylate (NORVASC PO), Take 5 mg by mouth 1 tablet daily in the AM , Disp: , Rfl:     chlorthalidone 25 mg tablet, Take 0 5 tablets (12 5 mg total) by mouth daily, Disp: 30 tablet, Rfl: 5    potassium chloride (K-DUR,KLOR-CON) 10 mEq tablet, Take 1 tablet (10 mEq total) by mouth daily (Patient not taking: Reported on 12/5/2018 ), Disp: 30 tablet, Rfl: 0    spironolactone (ALDACTONE) 25 mg tablet, Take 1 tablet (25 mg total) by mouth daily, Disp: 30 tablet, Rfl: 5    No Known Allergies    Objective   Vitals:    12/05/18 0849   BP: 150/84   Pulse: 56    BP sitting on left:  184/100 with a heart rate of 52 and regular using larger cuff  BP standing on left:  186/100 with a heart rate of 56 and regular using larger cuff; the same on the right  Body mass index is 42 59 kg/m²  General: ob well-developed well-nourished,obese, no acute distress  Skin:  No acute rash  Eyes:  No scleral icterus and noninjected  ENT:  Normocephalic/atraumatic, mucous membranes moist  Neck:  Supple, no jugular venous distention, 2+ carotid upstroke, no carotid bruits, no thyromegaly  Back:  No CVA tenderness and no spinal abnormalities  Chest:  Clear to auscultation percussion, good respiratory effort  CVS:  Regular rate and rhythm without a murmur rub or gallops appreciable  Abdomen:  Obese, soft and nontender with normal bowel sounds, no hepatosplenomegaly or bruits appreciable  Extremities:  No clubbing, cyanosis or edema, 2+ dorsalis pedis pulses, no femoral bruits  Neuro:  No gross focality  Psych:  Alert, oriented, and appropriate    Current Weight:   Weight (last 2 days)     Date/Time   Weight    12/05/18 0849  135 (296 8)            Wt Readings from Last 3 Encounters:   12/05/18 135 kg (296 lb 12 8 oz)   10/26/18 132 kg (290 lb)   10/17/18 134 kg (295 lb)         Lab Results:  I have personally reviewed pertinent labs  Results for orders placed or performed in visit on 12/05/18   POCT urine dip   Result Value Ref Range    LEUKOCYTE ESTERASE,UA neg     NITRITE,UA neg     SL AMB POCT UROBILINOGEN neg     POCT URINE PROTEIN neg      PH,UA 5 0     BLOOD,UA neg     SPECIFIC GRAVITY,UA 1 020     KETONES,UA neg     BILIRUBIN,UA neg     GLUCOSE, UA neg      COLOR,UA yellow     CLARITY,UA clear                ASSESSMENT AND PLAN:  1  Hypertension:  The patient most likely has essential hypertension but given the difficulty in controlling his blood pressure on more than 3 medications suggests resistant hypertension and therefore we must rule out secondary causes including primary aldosterone state, pheochromocytoma, renal artery stenosis    Patient has had normal thyroid function and no evidence of obstructive sleep apnea  I am certain that is significant obesity is playing a role as well  Recommendations: Workup:  -aldosterone/renin ratio  -plasma free metanephrines  -obtain 24 hour urine for free cortisol given significant obesity  -urine protein creatinine ratio for completeness  -renal artery duplex to begin with, given obesity we may have to resort to other testing to rule out renal artery disease  -thyroid function study has been normal  -obstructive sleep apnea has been ruled out  Treatment:  -push nonmedical regimen:  I have strongly counseled him in regards to weight loss and approach is uneven offering dietary evaluation, avoidance of salt which he appears to be doing, and exercise on a regular basis at least 3 days a week progressing to 5 days a week for at least 30 minutes  -medical regimen:  · Maintain amlodipine 5 mg in the morning  · Maintain Bystolic 5 mg in the morning  · Maintain clonidine patch for now 2  Weekly we can always increase as needed  · Maintain a Edarbi 80 mg in the morning  · Ideally I would like to start a diuretic on the patient such as chlorthalidone 12 5 mg; however thiazide type diuretics can cause an increase in QT interval and possible level of Tikosyn  I WOULD ONLY START THIS WITH THE PERMISSION OF DR Barbra Medina  · I would also add spironolactone 25 mg once a day  Amiloride will cause an increase potentially in the concentration of Tikosyn  · Ideal goal will be to get his blood pressure below 135/85  This will be a slow progression in that direction  We will follow very closely  I will have him obtain blood pressure readings at home bring the men along with blood pressure monitor correlation with the office machine  2  Nephrolithiasis:  Patient has had numerous stones  At this juncture I would recommend a 24 hour urine collection to determine what the potential abnormalities are in his urinary studies so that we can better prevent stones    In addition, I would recommend continuing with 2 5-3 L of water a day for now and avoidance of salt  I will also obtain a KUB next visit  Patient Instructions   1  Medication changes today:  -begin chlorthalidone 12 5 mg which is a half a tablet once a day in the morning  I WILL CHECK WITH DR Bryan Kay IF THIS IS ACCEPTABLE SO PLEASE DO NOT STARTED UNTIL YOU GET OFFICIAL WORD FROM ME   -begin spironolactone 25 mg a day after going for lab work:  Side effects may include breast enlargement and tenderness and soreness  Please let me know if you developed that  PLEASE CHECK WITH YOUR PHARMACY IF THERE IS ANY OVERT INTERACTION WITH TIKOSYN AND SPIRONOLACTONE  I COULD NOT FIND ANY ON MY INFORMATION  LET ME KNOW IF YOU HEAR DIFFERENT  2   Please go for lab work now in the morning but not fast  3  Please arrange to go for a kidney artery ultrasound which we will help to make the appointment  4  Please go for a 24 hour urine collection in the next several weeks for stone prevention evaluation  5  Please take 1 week a blood pressure readings beginning in 2 weeks morning and evening, sitting and standing:  · Take the morning readings before any medications  · Take the evening readings closer to bedtime  · When taking standing readings, keep your arm supported at heart level and not dangling  PLEASE THEN BRING IN THESE BLOOD PRESSURE READINGS ALONG WITH YOUR BLOOD PRESSURE MACHINE FOR CORRELATION WITH THE OFFICE MACHINE TO SEE 1 OF MY ADVANCED PRACTITIONER'S IN ABOUT 2-3 WEEKS  6   YOUR ULTIMATE GOAL IS TO MAINTAIN A BLOOD PRESSURE OF LESS THAN 135/85 ON AVERAGE AT HOME  7  Follow-up in 3 months:  -please bring in 1 week a blood pressure readings morning and evening, sitting and standing is outlined above  -please go for fasting lab work prior to your appointment  -please go for 24 hour urine collection prior to your appointment  -please go for an x-ray of your abdomen prior to your appointment  8  General instructions:  -avoid salt  -avoid medications such as Motrin, Naprosyn, ibuprofen, Aleve or Advil or Celebrex as they can affect your kidney function; you can use Tylenol as needed for pain or fevers if you have no liver problems  -avoid medications such as Sudafed or other medications with decongestants as they can raise your blood pressure  -try to exercise at least 30 minutes at least 3 days a week with an ultimate goal of 5 days a week  -try to lose 10 lb by your next visit in 3 months  -please drink 2 5-3 L of water a day for stone prevention, this is equivalent to 80-96 oz of water a day        Portions of the record may have been created with voice recognition software   Occasional wrong word or "sound a like" substitutions may have occurred due to the inherent limitations of voice recognition software   Read the chart carefully and recognize, using context, where substitutions have occurred      Britta Jones MD

## 2018-12-08 DIAGNOSIS — I48.0 PAROXYSMAL ATRIAL FIBRILLATION (HCC): Primary | ICD-10-CM

## 2018-12-09 DIAGNOSIS — I10 BENIGN ESSENTIAL HYPERTENSION: ICD-10-CM

## 2018-12-09 RX ORDER — CLONIDINE 0.2 MG/24H
PATCH, EXTENDED RELEASE TRANSDERMAL
Qty: 4 PATCH | Refills: 0 | Status: SHIPPED | OUTPATIENT
Start: 2018-12-09 | End: 2019-01-03 | Stop reason: SDUPTHER

## 2018-12-09 RX ORDER — DOFETILIDE 0.5 MG/1
CAPSULE ORAL
Qty: 180 CAPSULE | Refills: 2 | Status: SHIPPED | OUTPATIENT
Start: 2018-12-09 | End: 2019-09-13 | Stop reason: SDUPTHER

## 2018-12-18 ENCOUNTER — HOSPITAL ENCOUNTER (OUTPATIENT)
Dept: NON INVASIVE DIAGNOSTICS | Facility: CLINIC | Age: 53
Discharge: HOME | End: 2018-12-18
Payer: COMMERCIAL

## 2018-12-18 DIAGNOSIS — N18.30 CKD (CHRONIC KIDNEY DISEASE) STAGE 3, GFR 30-59 ML/MIN (HCC): ICD-10-CM

## 2018-12-18 DIAGNOSIS — I10 BENIGN ESSENTIAL HYPERTENSION: ICD-10-CM

## 2018-12-18 PROCEDURE — 93975 VASCULAR STUDY: CPT

## 2018-12-18 PROCEDURE — 93975 VASCULAR STUDY: CPT | Performed by: SURGERY

## 2018-12-20 DIAGNOSIS — I48.91 ATRIAL FIBRILLATION WITH RVR (HCC): ICD-10-CM

## 2018-12-20 RX ORDER — NEBIVOLOL HYDROCHLORIDE 5 MG/1
TABLET ORAL
Qty: 30 TABLET | Refills: 0 | Status: SHIPPED | OUTPATIENT
Start: 2018-12-20 | End: 2019-01-19 | Stop reason: SDUPTHER

## 2018-12-20 NOTE — PROGRESS NOTES
Just following up with you  ?loop diuretic for Tim Hinojosa  Can't do thiazide   Thanks for your help   Ninoska Perez

## 2019-01-03 DIAGNOSIS — I10 BENIGN ESSENTIAL HYPERTENSION: ICD-10-CM

## 2019-01-03 RX ORDER — CLONIDINE 0.2 MG/24H
PATCH, EXTENDED RELEASE TRANSDERMAL
Qty: 4 PATCH | Refills: 0 | Status: SHIPPED | OUTPATIENT
Start: 2019-01-03 | End: 2019-02-07 | Stop reason: SDUPTHER

## 2019-01-04 ENCOUNTER — CLINICAL SUPPORT (OUTPATIENT)
Dept: NUTRITION | Facility: HOSPITAL | Age: 54
End: 2019-01-04
Payer: COMMERCIAL

## 2019-01-04 VITALS — BODY MASS INDEX: 42.64 KG/M2 | WEIGHT: 297.2 LBS

## 2019-01-04 DIAGNOSIS — E66.01 MORBID OBESITY (HCC): ICD-10-CM

## 2019-01-04 PROCEDURE — 97802 MEDICAL NUTRITION INDIV IN: CPT

## 2019-01-04 NOTE — PROGRESS NOTES
Initial Nutrition Assessment Form    Patient Name: Ranulfo Duran    YOB: 1965    Sex: Male     Assessment Date: 1/4/2019  Start Time: 1115 Stop Time:1215 Total Minutes:60     Data:  Present at session: self   Parent Concerns: n/a   Medical Dx/Reason for Referral: wt   Past Medical History:   Diagnosis Date    Arthritis     Asthma     Atrial fibrillation ([address] )     Chronic kidney disease     kidney stones    Hypertension     Muscle weakness        Current Outpatient Prescriptions   Medication Sig Dispense Refill    amLODIPine (NORVASC) 5 mg tablet TAKE 1 TABLET DAILY 30 tablet 10    AmLODIPine Besylate (NORVASC PO) Take 5 mg by mouth 1 tablet daily in the AM       BYSTOLIC 5 MG tablet TAKE 1 TABLET BY MOUTH EVERY DAY 30 tablet 0    chlorthalidone 25 mg tablet Take 0 5 tablets (12 5 mg total) by mouth daily 30 tablet 5    cloNIDine (CATAPRES-TTS-2) 0 2 mg/24 hr APPLY 1 PATCH ONE TIME PER WEEK 4 patch 0    dofetilide (TIKOSYN) 500 mcg capsule TAKE ONE CAPSULE BY MOUTH EVERY 12 HOURS 180 capsule 2    EDARBI 80 MG tablet TAKE ONE TABLET DAILY (Patient taking differently: TAKE ONE TABLET DAILY IN THE AM) 30 tablet 11    potassium chloride (K-DUR,KLOR-CON) 10 mEq tablet Take 1 tablet (10 mEq total) by mouth daily (Patient not taking: Reported on 12/5/2018 ) 30 tablet 0    rivaroxaban (XARELTO) 20 mg tablet Take 20 mg by mouth daily with breakfast      spironolactone (ALDACTONE) 25 mg tablet Take 1 tablet (25 mg total) by mouth daily 30 tablet 5     No current facility-administered medications for this visit           Additional Meds/Supplements: n/a   Special Learning Needs: n/a   Height: 70"  HC Readings from Last 3 Encounters:   No data found for [facility]       Weight: Wt Readings from Last 12 Encounters:   01/04/19 135 kg (297 lb 3 2 oz)   12/05/18 135 kg (296 lb 12 8 oz)   10/26/18 132 kg (290 lb)   10/17/18 134 kg (295 lb)   10/05/18 135 kg (298 lb 9 6 oz)   12/30/17 133 kg (294 lb)   04/04/17 128 kg (283 lb)   04/03/17 129 kg (285 lb 6 oz)   03/01/17 136 kg (300 lb)   12/13/16 134 kg (294 lb 8 oz)   11/10/16 134 kg (294 lb 6 oz)   08/31/16 131 kg (288 lb 2 oz)     Estimated body mass index is 42 64 kg/m² as calculated from the following:    Height as of 12/5/18: 5' 10" (1 778 m)  Weight as of this encounter: 135 kg (297 lb 3 2 oz)  Usual Weight: #  Ideal Body Weight: #   Recent Weight Change: []Yes     [x]No  Amount:       Energy Needs: No calculations performed for this visit   No Known Allergies NKFA   History   Alcohol Use    Yes     Comment: occ    1x/wk 3-4 beers reg   History   Smoking Status    Former Smoker    Quit date: 4/4/1994   Smokeless Tobacco    Never Used    n/a   Who shops? spouse   Who cooks? spouse   Exercise: Gym 5x/wk [id number] 3 2; legs shoulders/arms, 1 mile elliptical x 20 mins   Prior Counseling? []Yes     [x]No  When:    Why:         Diet Hx:  Minimizes salt  Breakfast:decaf coffee egg white omelets x3 TBSP cheese poasted peppers   7am  a m  Lunch:   Sushi 2 rolls (out); Dmitry rest salad- greek ; chix parm s/w; [place] food (out 2-3x/wk); Water when out  1130-1   p m  Dinner: 1-2x/wk out to dinner-chix green bean salad w/mozz;     7 p m           Snacks: AM -   PM -   HS - 9        Nutrition Diagnosis:   Overweight/obesity  related to Excess energy intake as evidenced by  Waist circumference more than normative standard for age and sex       Medical Nutrition Therapy Intervention:  [x]Individualized Meal Plan []Understanding Lab Values   []Basic Pathophysiology of Disease []Food/Medication Interactions   [x]Food Diary [x]Exercise   [x]Lifestyle/Behavior Modification Techniques []Medication, Mechanism of Action   []Label Reading []Self Blood Glucose Monitoring   [x]Weight/BMI Goals []Other - 4 kids 3 in college still   Other Notes: getting up at 5 gym 520 leaves at 630 Bed 10-11/sleep by 1130       Comprehension: []Excellent  [x]Very Good  []Good  []Fair   []Poor Receptivity: []Excellent  [x]Very Good  []Good  []Fair   []Poor    Expected Compliance: []Excellent  [x]Very Good  []Good  []Fair   []Poor        Goals:  1 adequate sleep 7-8 hours   2  Decrease alcohol   3  Eat small snack before gym  4  eat B within 1 hr after gym  5  Track food       No Follow-up on file    Labs:  CMP  Lab Results   Component Value Date     08/18/2015    K 3 7 10/29/2018     10/29/2018    CO2 24 10/29/2018    ANIONGAP 7 08/18/2015    BUN 17 10/29/2018    CREATININE 1 01 10/29/2018    GLUCOSE 94 08/18/2015    GLUF 105 (H) 04/04/2017    CALCIUM 8 6 10/29/2018    AST 19 10/18/2018    ALT 57 10/18/2018    ALKPHOS 60 10/18/2018    PROT 7 3 12/02/2014    BILITOT 0 4 12/02/2014    EGFR 85 10/29/2018       BMP  Lab Results   Component Value Date    GLUCOSE 94 08/18/2015    CALCIUM 8 6 10/29/2018     08/18/2015    K 3 7 10/29/2018    CO2 24 10/29/2018     10/29/2018    BUN 17 10/29/2018    CREATININE 1 01 10/29/2018       Lipids  Lab Results   Component Value Date    CHOL 177 10/08/2012     Lab Results   Component Value Date    HDL 37 (L) 10/08/2012     No results found for: [facility]  Lab Results   Component Value Date    TRIG 150 (H) 10/08/2012     No results found for: CHOLHDL    Hemoglobin A1C  Lab Results   Component Value Date    HGBA1C 5 8 (H) 09/03/2013       Fasting Glucose  Lab Results   Component Value Date    GLUF 105 (H) 04/04/2017       Insulin     Thyroid  No results found for: TSH, [id number], E5ELSXH, THYROIDAB    Hepatic Function Panel  Lab Results   Component Value Date    ALT 57 10/18/2018    AST 19 10/18/2018    ALKPHOS 60 10/18/2018    BILITOT 0 4 12/02/2014       Celiac Disease Antibody Panel  No results found for: ENDOMYSIAL IGA, GLIADIN IGA, GLIADIN IGG, IGA, TISSUE TRANSGLUT AB, TTG IGA   Iron  No results found for: IRON, TIBC, FERRITIN    Vitamins  No results found for: VITAMIN B2   No results found for: NICOTINAMIDE, NICOTINIC ACID   No results found for: VITAMINB6  No results found for: [id number]  No results found for: VITB5  No results found for: B0TBACUW  No results found for: THYROGLB  No results found for: VITAMIN K   No results found for: 25-HYDROXY VIT D   No components found for: Rosalia Thacker MS, RD N  [facility] [address]  [id number] [facility] [place]

## 2019-01-19 DIAGNOSIS — I48.91 ATRIAL FIBRILLATION WITH RVR (HCC): ICD-10-CM

## 2019-01-19 RX ORDER — RIVAROXABAN 20 MG/1
TABLET, FILM COATED ORAL
Qty: 90 TABLET | Refills: 3 | Status: SHIPPED | OUTPATIENT
Start: 2019-01-19 | End: 2019-12-18 | Stop reason: SDUPTHER

## 2019-01-19 RX ORDER — NEBIVOLOL HYDROCHLORIDE 5 MG/1
TABLET ORAL
Qty: 30 TABLET | Refills: 0 | Status: SHIPPED | OUTPATIENT
Start: 2019-01-19 | End: 2019-02-19 | Stop reason: SDUPTHER

## 2019-01-21 ENCOUNTER — TELEPHONE (OUTPATIENT)
Dept: NEPHROLOGY | Facility: CLINIC | Age: 54
End: 2019-01-21

## 2019-01-21 NOTE — TELEPHONE ENCOUNTER
I called and left message for patient to call back to schedule March follow up appt with Dr Ron Garcia

## 2019-01-25 ENCOUNTER — CLINICAL SUPPORT (OUTPATIENT)
Dept: NUTRITION | Facility: HOSPITAL | Age: 54
End: 2019-01-25
Payer: COMMERCIAL

## 2019-01-25 VITALS — WEIGHT: 287 LBS | BODY MASS INDEX: 41.18 KG/M2

## 2019-01-25 PROCEDURE — 97803 MED NUTRITION INDIV SUBSEQ: CPT

## 2019-01-25 NOTE — PROGRESS NOTES
Follow-Up Nutrition Assessment Form    Patient Name: Alicia Mujica    YOB: 1965    Sex: Male      Follow Up Date: 1/25/2019  Start Time: 1145 Stop Time: 1215 Total Minutes: 30     Data:  Present at session: self   Parent Concerns: n/a   Medical Dx/Reason for Referral: wt   Past Medical History:   Diagnosis Date    Arthritis     Asthma     Atrial fibrillation ([address] )     Chronic kidney disease     kidney stones    Hypertension     Muscle weakness        Current Outpatient Prescriptions   Medication Sig Dispense Refill    amLODIPine (NORVASC) 5 mg tablet TAKE 1 TABLET DAILY 30 tablet 10    AmLODIPine Besylate (NORVASC PO) Take 5 mg by mouth 1 tablet daily in the AM       BYSTOLIC 5 MG tablet TAKE 1 TABLET BY MOUTH EVERY DAY 30 tablet 0    chlorthalidone 25 mg tablet Take 0 5 tablets (12 5 mg total) by mouth daily 30 tablet 5    cloNIDine (CATAPRES-TTS-2) 0 2 mg/24 hr APPLY 1 PATCH ONE TIME PER WEEK 4 patch 0    dofetilide (TIKOSYN) 500 mcg capsule TAKE ONE CAPSULE BY MOUTH EVERY 12 HOURS 180 capsule 2    EDARBI 80 MG tablet TAKE ONE TABLET DAILY (Patient taking differently: TAKE ONE TABLET DAILY IN THE AM) 30 tablet 11    potassium chloride (K-DUR,KLOR-CON) 10 mEq tablet Take 1 tablet (10 mEq total) by mouth daily (Patient not taking: Reported on 12/5/2018 ) 30 tablet 0    spironolactone (ALDACTONE) 25 mg tablet Take 1 tablet (25 mg total) by mouth daily 30 tablet 5    XARELTO 20 MG tablet TAKE 1 TABLET BY MOUTH EVERY DAY 90 tablet 3     No current facility-administered medications for this visit           Additional Meds/Supplements: n/a   Barriers to Learning: None   Labs: n/a   Height:   Ht Readings from Last 3 Encounters:   12/05/18 5' 10" (1 778 m)   10/26/18 5' 10" (1 778 m)   10/17/18 5' 10" (1 778 m)      Weight: Wt Readings from Last 12 Encounters:   01/25/19 130 kg (287 lb)   01/04/19 135 kg (297 lb 3 2 oz)   12/05/18 135 kg (296 lb 12 8 oz)   10/26/18 132 kg (290 lb)   10/17/18 134 kg (295 lb)   10/05/18 135 kg (298 lb 9 6 oz)   12/30/17 133 kg (294 lb)   04/04/17 128 kg (283 lb)   04/03/17 129 kg (285 lb 6 oz)   03/01/17 136 kg (300 lb)   12/13/16 134 kg (294 lb 8 oz)   11/10/16 134 kg (294 lb 6 oz)     Estimated body mass index is 41 18 kg/m² as calculated from the following:    Height as of 12/5/18: 5' 10" (1 778 m)  Weight as of this encounter: 130 kg (287 lb)  Wt  Change Since Last Visit: [x]Yes     []No  Amount: Dec 10#      Energy Needs: No calculation needed   Pain Screen: Are you having pain now? No      Goals Achieved:  Watching portions and cut down way back on ETOH goes to gym at [employer] 3-4x/wk cardio  x60 mins 30 mins x treadmill stepper x30 mins  Trying to make gym a priority, working to decrease HS snacking; dinner still largest meal of day; cutting way back on carns increase portions of veggies; feels better with wt loss knees hurting less, sleeping like a log-adequate sleep now bed at 10 and listens to book on earpod x 15 mins  Watching salt as well  New Goals:   1    Continue current meal and activity plan   2    3        Initial PES:       New PES: No Change      New Problem List:  1 none new   2    3        Assessment:  Pleased with wt loss, HS snacking is biggest challenge, does not eat sweets, enjoys going to the gym-gets there early goes home, getting enough food, eating banana before gym drink half glass oj; after gym eats mushroom eggwhite omelet cooked in canola oil some days other days 3/5days a week are out with customers at lunch     Medical Nutrition Therapy Intervention:  [x]Individualized Meal Plan []Understanding Lab Values   []Basic Pathophysiology of Disease []Food/Medication Interactions   [x]Food Diary [x]Exercise   [x]Lifestyle/Behavior Modification Techniques []Medication, Mechanism of Action   []Label Reading []Self Blood Glucose Monitoring   [x]Weight/BMI Goals []Other -    Other Notes:        Comprehension: []Excellent  [x]Very Good  []Good  []Fair   []Poor    Receptivity: []Excellent  [x]Very Good  []Good  []Fair   []Poor    Expected Compliance: []Excellent  [x]Very Good  []Good  []Fair   []Poor      Labs:  CMP  Lab Results   Component Value Date     08/18/2015    K 3 7 10/29/2018     10/29/2018    CO2 24 10/29/2018    ANIONGAP 7 08/18/2015    BUN 17 10/29/2018    CREATININE 1 01 10/29/2018    GLUCOSE 94 08/18/2015    GLUF 105 (H) 04/04/2017    CALCIUM 8 6 10/29/2018    AST 19 10/18/2018    ALT 57 10/18/2018    ALKPHOS 60 10/18/2018    PROT 7 3 12/02/2014    BILITOT 0 4 12/02/2014    EGFR 85 10/29/2018       BMP  Lab Results   Component Value Date    GLUCOSE 94 08/18/2015    CALCIUM 8 6 10/29/2018     08/18/2015    K 3 7 10/29/2018    CO2 24 10/29/2018     10/29/2018    BUN 17 10/29/2018    CREATININE 1 01 10/29/2018       Lipids  Lab Results   Component Value Date    CHOL 177 10/08/2012     Lab Results   Component Value Date    HDL 37 (L) 10/08/2012     No results found for: [address]  Lab Results   Component Value Date    TRIG 150 (H) 10/08/2012     No results found for: CHOLHDL    Hemoglobin A1C  Lab Results   Component Value Date    HGBA1C 5 8 (H) 09/03/2013       Fasting Glucose  Lab Results   Component Value Date    GLUF 105 (H) 04/04/2017       Insulin     Thyroid  No results found for: TSH, J1GUQET, Y8SGVTV, THYROIDAB    Hepatic Function Panel  Lab Results   Component Value Date    ALT 57 10/18/2018    AST 19 10/18/2018    ALKPHOS 60 10/18/2018    BILITOT 0 4 12/02/2014       Celiac Disease Antibody Panel  No results found for: ENDOMYSIAL IGA, GLIADIN IGA, GLIADIN IGG, IGA, TISSUE TRANSGLUT AB, TTG IGA   Iron  No results found for: IRON, TIBC, FERRITIN    Vitamins  No results found for: VITAMIN B2   No results found for: NICOTINAMIDE, NICOTINIC ACID   No results found for: VITAMINB6  No results found for: [id number]  No results found for: VITB5  No results found for: C7VZQNHL  No results found for: THYROGLB  No results found for: VITAMIN K   No results found for: 25-HYDROXY VIT D   No components found for: VITAMINE     No Follow-up on file      [address] [address]

## 2019-02-07 DIAGNOSIS — I10 BENIGN ESSENTIAL HYPERTENSION: ICD-10-CM

## 2019-02-07 RX ORDER — CLONIDINE 0.2 MG/24H
PATCH, EXTENDED RELEASE TRANSDERMAL
Qty: 4 PATCH | Refills: 0 | Status: SHIPPED | OUTPATIENT
Start: 2019-02-07 | End: 2019-03-06 | Stop reason: SDUPTHER

## 2019-02-18 ENCOUNTER — APPOINTMENT (OUTPATIENT)
Dept: RADIOLOGY | Facility: MEDICAL CENTER | Age: 54
End: 2019-02-18
Payer: COMMERCIAL

## 2019-02-18 ENCOUNTER — TELEPHONE (OUTPATIENT)
Dept: FAMILY MEDICINE CLINIC | Facility: CLINIC | Age: 54
End: 2019-02-18

## 2019-02-18 ENCOUNTER — OFFICE VISIT (OUTPATIENT)
Dept: URGENT CARE | Facility: MEDICAL CENTER | Age: 54
End: 2019-02-18
Payer: COMMERCIAL

## 2019-02-18 VITALS
WEIGHT: 293 LBS | DIASTOLIC BLOOD PRESSURE: 90 MMHG | RESPIRATION RATE: 15 BRPM | HEIGHT: 70 IN | OXYGEN SATURATION: 97 % | HEART RATE: 60 BPM | SYSTOLIC BLOOD PRESSURE: 158 MMHG | BODY MASS INDEX: 41.95 KG/M2 | TEMPERATURE: 98.3 F

## 2019-02-18 DIAGNOSIS — M79.674 GREAT TOE PAIN, RIGHT: Primary | ICD-10-CM

## 2019-02-18 DIAGNOSIS — M79.671 RIGHT FOOT PAIN: ICD-10-CM

## 2019-02-18 PROCEDURE — G0382 LEV 3 HOSP TYPE B ED VISIT: HCPCS | Performed by: PHYSICIAN ASSISTANT

## 2019-02-18 PROCEDURE — 73630 X-RAY EXAM OF FOOT: CPT

## 2019-02-18 RX ORDER — COLCHICINE 0.6 MG/1
TABLET ORAL
Qty: 3 TABLET | Refills: 0 | Status: SHIPPED | OUTPATIENT
Start: 2019-02-18 | End: 2020-07-20 | Stop reason: ALTCHOICE

## 2019-02-18 RX ORDER — PREDNISONE 10 MG/1
TABLET ORAL
Qty: 30 TABLET | Refills: 0 | Status: SHIPPED | OUTPATIENT
Start: 2019-02-18 | End: 2020-07-20 | Stop reason: ALTCHOICE

## 2019-02-18 NOTE — PROGRESS NOTES
[facility]      NAME: Aniya Leon is a 48 y o  male  : 1965    MRN: [id number]  DATE: 2019  TIME: 2:33 PM    Assessment and Plan   Great toe pain, right [[id number]]  1  Great toe pain, right  colchicine (COLCRYS) 0 6 mg tablet    predniSONE 10 mg tablet   2  Right foot pain  XR foot 3+ vw right       Patient Instructions      X-ray was negative  Patient clinically has symptoms suggestive for acute gout at 1st MTP of right foot  Will treat patient with colchicine prednisone taper  Advised patient, a foods taking cause gout flares  If symptoms persist, patient advised to follow up PCP/podiatrist     Follow up with PCP in 24-48 hours  Follow up with PCP for health maintenance  Monitor for severe worsening of current symptoms   - Proceed to ER if symptoms worsen or if in distress -  Increase fluids and rest  Tylenol and Advil as needed for fever and chills  Chief Complaint     Chief Complaint   Patient presents with    Foot Pain     Pt has been working out and now reports over the last weekhe has had pain at the ball of his right foot  History of Present Illness   Anyia Leon presents to the clinic c/o      20-year-old male, presents for evaluation of right great toe pain, the ball of his foot for the last week  Patient is not sure of anything that he did, to bring this on  He states he has been working out more than normal recently  States that is been painful, and worse with putting any weight on it  Also feels warm to touch  Denies any paresthesias, paralysis of the toe  Denies any previous history of gout      Review of Systems   Review of Systems   Respiratory: Negative for cough  Cardiovascular: Negative for chest pain  Musculoskeletal: Positive for arthralgias           Current Medications     Long-Term Medications   Medication Sig Dispense Refill    amLODIPine (NORVASC) 5 mg tablet TAKE 1 TABLET DAILY 30 tablet 10    AmLODIPine Besylate (NORVASC PO) Take 5 mg by mouth 1 tablet daily in the AM       BYSTOLIC 5 MG tablet TAKE 1 TABLET BY MOUTH EVERY DAY 30 tablet 0    chlorthalidone 25 mg tablet Take 0 5 tablets (12 5 mg total) by mouth daily 30 tablet 5    cloNIDine (CATAPRES-TTS-2) 0 2 mg/24 hr APPLY 1 PATCH ONE TIME PER WEEK 4 patch 0    dofetilide (TIKOSYN) 500 mcg capsule TAKE ONE CAPSULE BY MOUTH EVERY 12 HOURS 180 capsule 2    EDARBI 80 MG tablet TAKE ONE TABLET DAILY (Patient taking differently: TAKE ONE TABLET DAILY IN THE AM) 30 tablet 11    potassium chloride (K-DUR,KLOR-CON) 10 mEq tablet Take 1 tablet (10 mEq total) by mouth daily 30 tablet 0    spironolactone (ALDACTONE) 25 mg tablet Take 1 tablet (25 mg total) by mouth daily 30 tablet 5    XARELTO 20 MG tablet TAKE 1 TABLET BY MOUTH EVERY DAY 90 tablet 3    colchicine (COLCRYS) 0 6 mg tablet Take two tablets now   Take 1 in one hour after 3 tablet 0       Current Allergies     Allergies as of 02/18/2019    (No Known Allergies)            The following portions of the patient's history were reviewed and updated as appropriate: allergies, current medications, past family history, past medical history, past social history, past surgical history and problem list     HISTORICAL INFO:  Past Medical History:   Diagnosis Date    Arthritis     Asthma     Atrial fibrillation ([address] )     Chronic kidney disease     kidney stones    Hypertension     Muscle weakness      Past Surgical History:   Procedure Laterality Date    APPENDECTOMY      CYSTOSCOPY  08/30/2016    w/removal of object, last assessed 8/30/16    FOOT SURGERY Left     HAND SURGERY      KNEE SURGERY Right 2016    ID CYSTO/URETERO W/LITHOTRIPSY &INDWELL STENT INSRT Left 8/9/2016    Procedure: CYSTOSCOPY URETEROSCOPY WITH LITHOTRIPSY HOLMIUM LASER STONE EXTRACTION, RETROGRADE PYELOGRAM AND INSERTION STENT URETERAL;  Surgeon: Ana Meyer MD;  Location: [facility];  Service: Urology last assessed 8/30/16    WRIST SURGERY Left 2014    Fracture surgery       Objective   /90   Pulse 60   Temp 98 3 °F (36 8 °C) (Temporal)   Resp 15   Ht 5' 10" (1 778 m)   Wt 133 kg (293 lb)   SpO2 97%   BMI 42 04 kg/m²        Physical Exam     Physical Exam   Constitutional: He appears well-developed and well-nourished  No distress  HENT:   Head: Normocephalic and atraumatic  Cardiovascular: Normal rate, regular rhythm and normal heart sounds  Pulmonary/Chest: Effort normal and breath sounds normal  No respiratory distress  He has no wheezes  He has no rales  Musculoskeletal:        Feet:    Skin: Skin is warm and dry  He is not diaphoretic  Nursing note and vitals reviewed  M*Modal software was used to dictate this note  It may contain errors with dictating incorrect words/spelling  Please contact provider directly for any questions       Gianluca May PA-C

## 2019-02-18 NOTE — PATIENT INSTRUCTIONS
Gout   WHAT YOU NEED TO KNOW:   Gout is a form of arthritis that causes severe joint pain, redness, swelling, and stiffness  Acute gout pain starts suddenly, gets worse quickly, and stops on its own  Acute gout can become chronic and cause permanent damage to the joints  DISCHARGE INSTRUCTIONS:   Return to the emergency department if:   · You have severe pain in one or more of your joints that you cannot tolerate  · You have a fever or redness that spreads beyond the joint area  Contact your healthcare provider if:   · You have new symptoms, such as a rash, after you start gout treatment  · Your joint pain and swelling do not go away, even after treatment  · You are not urinating as much or as often as you usually do  · You have trouble taking your gout medicines  · You have questions or concerns about your condition or care  Medicines: You may need any of the following:  · Prescription pain medicine  may be given  Ask your healthcare provider how to take this medicine safely  Some prescription pain medicines contain acetaminophen  Do not take other medicines that contain acetaminophen without talking to your healthcare provider  Too much acetaminophen may cause liver damage  Prescription pain medicine may cause constipation  Ask your healthcare provider how to prevent or treat constipation  · NSAIDs , such as ibuprofen, help decrease swelling, pain, and fever  This medicine is available with or without a doctor's order  NSAIDs can cause stomach bleeding or kidney problems in certain people  If you take blood thinner medicine, always ask your healthcare provider if NSAIDs are safe for you  Always read the medicine label and follow directions  · Gout medicine  decreases joint pain and swelling  It may also be given to prevent new gout attacks  · Steroids  reduce inflammation and can help your joint stiffness and pain during gout attacks      · Uric acid medicine  may be given to reduce the amount of uric acid your body makes  Some medicines may help you pass more uric acid when you urinate  · Take your medicine as directed  Contact your healthcare provider if you think your medicine is not helping or if you have side effects  Tell him or her if you are allergic to any medicine  Keep a list of the medicines, vitamins, and herbs you take  Include the amounts, and when and why you take them  Bring the list or the pill bottles to follow-up visits  Carry your medicine list with you in case of an emergency  Follow up with your healthcare provider as directed:  Write down your questions so you remember to ask them during your visits  Manage gout:   · Rest your painful joint so it can heal   Your healthcare provider may recommend crutches or a walker if the affected joint is in a leg  · Apply ice to your joint  Ice decreases pain and swelling  Use an ice pack, or put crushed ice in a plastic bag  Cover the ice pack or bag with a towel before you apply it to your painful joint  Apply ice for 15 to 20 minutes every hour, or as directed  · Elevate your joint  Elevation helps reduce swelling and pain  Raise your joint above the level of your heart as often as you can  Prop your painful joint on pillows to keep it above your heart comfortably  · Go to physical therapy if directed  A physical therapist can teach you exercises to improve flexibility and range of motion  Prevent gout attacks:   · Do not eat high-purine foods  These foods include meats, seafood, asparagus, spinach, cauliflower, and some types of beans  Healthcare providers may tell you to eat more low-fat milk products, such as yogurt  Milk products may decrease your risk for gout attacks  Vitamin C and coffee may also help  Your healthcare provider or dietitian can help you create a meal plan  · Drink more liquids as directed  Liquids such as water help remove uric acid from your body   Ask how much liquid to drink each day and which liquids are best for you  · Manage your weight  Weight loss may decrease the amount of uric acid in your body  Exercise can help you lose weight  Talk to your healthcare provider about the best exercises for you  · Control your blood sugar level if you have diabetes  Keep your blood sugar level in a normal range  This can help prevent gout attacks  · Limit or do not drink alcohol as directed  Alcohol can trigger a gout attack  Alcohol also increases your risk for dehydration  Ask your healthcare provider if alcohol is safe for you  © 2017 [facility] Information is for End User's use only and may not be sold, redistributed or otherwise used for commercial purposes  All illustrations and images included in CareNotes® are the copyrighted property of A D A M , Inc  or Elieser Tim  The above information is an  only  It is not intended as medical advice for individual conditions or treatments  Talk to your doctor, nurse or pharmacist before following any medical regimen to see if it is safe and effective for you

## 2019-02-18 NOTE — TELEPHONE ENCOUNTER
If he has to go to Urgent Care, they should likely be able to get an Xray then when they see him  But, he can also be seen here and we can determine if he needs an Xray or not

## 2019-02-18 NOTE — TELEPHONE ENCOUNTER
Patient called stating he hurt his right foot working out  He says he is limping and is having pain  Patient would like to know if an order for an Xray can be put through for him so he can go straight to Urgent care or does he need to be seen first  Please advise  Patient would like a return call   [phone]

## 2019-02-19 DIAGNOSIS — I48.91 ATRIAL FIBRILLATION WITH RVR (HCC): ICD-10-CM

## 2019-02-20 RX ORDER — NEBIVOLOL HYDROCHLORIDE 5 MG/1
TABLET ORAL
Qty: 30 TABLET | Refills: 0 | Status: SHIPPED | OUTPATIENT
Start: 2019-02-20 | End: 2019-03-18 | Stop reason: SDUPTHER

## 2019-02-22 ENCOUNTER — CLINICAL SUPPORT (OUTPATIENT)
Dept: NUTRITION | Facility: HOSPITAL | Age: 54
End: 2019-02-22
Payer: COMMERCIAL

## 2019-02-22 VITALS — BODY MASS INDEX: 40.98 KG/M2 | WEIGHT: 285.6 LBS

## 2019-02-22 PROCEDURE — 97803 MED NUTRITION INDIV SUBSEQ: CPT

## 2019-02-22 NOTE — PROGRESS NOTES
Follow-Up Nutrition Assessment Form    Patient Name: Travis Nguyen    YOB: 1965    Sex: Male      Follow Up Date: 2/22/2019  Start Time: [facility] Stop Time:310 Total Minutes: 30     Data:  Present at session: self   Parent Concerns: n/a   Medical Dx/Reason for Referral: wt   Past Medical History:   Diagnosis Date    Arthritis     Asthma     Atrial fibrillation (HCC)     Chronic kidney disease     kidney stones    Hypertension     Muscle weakness        Current Outpatient Medications   Medication Sig Dispense Refill    amLODIPine (NORVASC) 5 mg tablet TAKE 1 TABLET DAILY 30 tablet 10    AmLODIPine Besylate (NORVASC PO) Take 5 mg by mouth 1 tablet daily in the AM       BYSTOLIC 5 MG tablet TAKE 1 TABLET BY MOUTH EVERY DAY 30 tablet 0    chlorthalidone 25 mg tablet Take 0 5 tablets (12 5 mg total) by mouth daily 30 tablet 5    cloNIDine (CATAPRES-TTS-2) 0 2 mg/24 hr APPLY 1 PATCH ONE TIME PER WEEK 4 patch 0    colchicine (COLCRYS) 0 6 mg tablet Take two tablets now  Take 1 in one hour after 3 tablet 0    dofetilide (TIKOSYN) 500 mcg capsule TAKE ONE CAPSULE BY MOUTH EVERY 12 HOURS 180 capsule 2    EDARBI 80 MG tablet TAKE ONE TABLET DAILY (Patient taking differently: TAKE ONE TABLET DAILY IN THE AM) 30 tablet 11    potassium chloride (K-DUR,KLOR-CON) 10 mEq tablet Take 1 tablet (10 mEq total) by mouth daily 30 tablet 0    predniSONE 10 mg tablet 4 PO x 3 days  3 PO x 3 days  2 PO x 3 days  1 PO x 3 days 30 tablet 0    spironolactone (ALDACTONE) 25 mg tablet Take 1 tablet (25 mg total) by mouth daily 30 tablet 5    XARELTO 20 MG tablet TAKE 1 TABLET BY MOUTH EVERY DAY 90 tablet 3     No current facility-administered medications for this visit           Additional Meds/Supplements: steroids and intiinflammatory   Barriers to Learning: None   Labs: gout   Height:   Ht Readings from Last 3 Encounters:   02/18/19 5' 10" (1 778 m)   12/05/18 5' 10" (1 778 m)   10/26/18 5' 10" (1 778 m)      Weight: Wt Readings from Last 12 Encounters:   02/22/19 130 kg (285 lb 9 6 oz)   02/18/19 133 kg (293 lb)   01/25/19 130 kg (287 lb)   01/04/19 135 kg (297 lb 3 2 oz)   12/05/18 135 kg (296 lb 12 8 oz)   10/26/18 132 kg (290 lb)   10/17/18 134 kg (295 lb)   10/05/18 135 kg (298 lb 9 6 oz)   12/30/17 133 kg (294 lb)   04/04/17 128 kg (283 lb)   04/03/17 129 kg (285 lb 6 oz)   03/01/17 136 kg (300 lb)     Estimated body mass index is 40 98 kg/m² as calculated from the following:    Height as of 2/18/19: 5' 10" (1 778 m)  Weight as of this encounter: 130 kg (285 lb 9 6 oz)  Wt  Change Since Last Visit: [x]Yes     []No  Amount: Dec 2#      Energy Needs: No calculation needed   Pain Screen: Are you having pain now? No      Goals Achieved:  Just back to gym, eating out often, just back from visiting dgt in [facility] ate out with beer etc   Feels better when goes to gym;  Elliptical at 40 mins; snacks if does not go to gym  HS snacking at 8 is the problem, has decreased portions sizes  Needs to not sit around, ok wittout sweets  Eats bananas every day, drinks water and flavored seltzers  Sleeping 5-6 hours a day  Trying to keep junk out of house  Weekend exercise hikes, long walks in the woods  New Goals:   1  Sleep-6 plus hours as able   2  Back to gym more regularly   3  Initial PES:       New PES: No Change      New Problem List:  1  None new   2    3        Assessment:  Wt loss continues more gradually now  Eating better working out more  Asleep at 11 awake at 5  Eating oatmeal and banana OJ and meds and gym  Lunch is leftovers or out  TRying not to make dinner largest meal, but if eats out then it might be       Medical Nutrition Therapy Intervention:  [x]Individualized Meal Plan []Understanding Lab Values   []Basic Pathophysiology of Disease []Food/Medication Interactions   [x]Food Diary [x]Exercise   [x]Lifestyle/Behavior Modification Techniques []Medication, Mechanism of Action   []Label Reading []Self Blood Glucose Monitoring   [x]Weight/BMI Goals []Other -    Other Notes: Just visited dgt in [facility]       Comprehension: []Excellent  [x]Very Good  []Good  []Fair   []Poor    Receptivity: []Excellent  [x]Very Good  []Good  []Fair   []Poor    Expected Compliance: []Excellent  [x]Very Good  []Good  []Fair   []Poor      Labs:  CMP  Lab Results   Component Value Date     08/18/2015    K 3 7 10/29/2018     10/29/2018    CO2 24 10/29/2018    ANIONGAP 7 08/18/2015    BUN 17 10/29/2018    CREATININE 1 01 10/29/2018    GLUCOSE 94 08/18/2015    GLUF 105 (H) 04/04/2017    CALCIUM 8 6 10/29/2018    AST 19 10/18/2018    ALT 57 10/18/2018    ALKPHOS 60 10/18/2018    PROT 7 3 12/02/2014    BILITOT 0 4 12/02/2014    EGFR 85 10/29/2018       BMP  Lab Results   Component Value Date    GLUCOSE 94 08/18/2015    CALCIUM 8 6 10/29/2018     08/18/2015    K 3 7 10/29/2018    CO2 24 10/29/2018     10/29/2018    BUN 17 10/29/2018    CREATININE 1 01 10/29/2018       Lipids  Lab Results   Component Value Date    CHOL 177 10/08/2012     Lab Results   Component Value Date    HDL 37 (L) 10/08/2012     No results found for: [facility]  Lab Results   Component Value Date    TRIG 150 (H) 10/08/2012     No results found for: CHOLHDL    Hemoglobin A1C  Lab Results   Component Value Date    HGBA1C 5 8 (H) 09/03/2013       Fasting Glucose  Lab Results   Component Value Date    GLUF 105 (H) 04/04/2017       Insulin     Thyroid  No results found for: TSH, [id number], E2CBKCT, THYROIDAB    Hepatic Function Panel  Lab Results   Component Value Date    ALT 57 10/18/2018    AST 19 10/18/2018    ALKPHOS 60 10/18/2018    BILITOT 0 4 12/02/2014       Celiac Disease Antibody Panel  No results found for: ENDOMYSIAL IGA, GLIADIN IGA, GLIADIN IGG, IGA, TISSUE TRANSGLUT AB, TTG IGA   Iron  No results found for: IRON, TIBC, FERRITIN    Vitamins  No results found for: VITAMIN B2   No results found for: NICOTINAMIDE, NICOTINIC ACID   No results found for: VITAMINB6  No results found for: [id number]  No results found for: VITB5  No results found for: [id number]  No results found for: THYROGLB  No results found for: VITAMIN K   No results found for: 25-HYDROXY VIT D   No components found for: VITAMINE     No follow-ups on file      [id number] [address] [place] [address] [address]  [address]  [place] [address]

## 2019-03-06 ENCOUNTER — TELEPHONE (OUTPATIENT)
Dept: NEPHROLOGY | Facility: CLINIC | Age: 54
End: 2019-03-06

## 2019-03-06 DIAGNOSIS — I10 BENIGN ESSENTIAL HYPERTENSION: ICD-10-CM

## 2019-03-06 RX ORDER — CLONIDINE 0.2 MG/24H
PATCH, EXTENDED RELEASE TRANSDERMAL
Qty: 4 PATCH | Refills: 0 | Status: SHIPPED | OUTPATIENT
Start: 2019-03-06 | End: 2019-03-26 | Stop reason: SDUPTHER

## 2019-03-09 ENCOUNTER — APPOINTMENT (OUTPATIENT)
Dept: LAB | Facility: CLINIC | Age: 54
End: 2019-03-09
Payer: COMMERCIAL

## 2019-03-09 DIAGNOSIS — N18.30 CKD (CHRONIC KIDNEY DISEASE) STAGE 3, GFR 30-59 ML/MIN (HCC): ICD-10-CM

## 2019-03-09 DIAGNOSIS — I10 BENIGN ESSENTIAL HYPERTENSION: ICD-10-CM

## 2019-03-09 DIAGNOSIS — N20.0 NEPHROLITHIASIS: ICD-10-CM

## 2019-03-09 LAB
ALBUMIN SERPL BCP-MCNC: 3.7 G/DL (ref 3.5–5)
ALP SERPL-CCNC: 45 U/L (ref 46–116)
ALT SERPL W P-5'-P-CCNC: 51 U/L (ref 12–78)
ANION GAP SERPL CALCULATED.3IONS-SCNC: 8 MMOL/L (ref 4–13)
AST SERPL W P-5'-P-CCNC: 16 U/L (ref 5–45)
BILIRUB SERPL-MCNC: 0.3 MG/DL (ref 0.2–1)
BUN SERPL-MCNC: 21 MG/DL (ref 5–25)
CALCIUM SERPL-MCNC: 9.1 MG/DL (ref 8.3–10.1)
CHLORIDE SERPL-SCNC: 105 MMOL/L (ref 100–108)
CO2 SERPL-SCNC: 29 MMOL/L (ref 21–32)
CREAT SERPL-MCNC: 1.26 MG/DL (ref 0.6–1.3)
CREAT UR-MCNC: 216 MG/DL
GFR SERPL CREATININE-BSD FRML MDRD: 65 ML/MIN/1.73SQ M
GLUCOSE P FAST SERPL-MCNC: 103 MG/DL (ref 65–99)
MAGNESIUM SERPL-MCNC: 2 MG/DL (ref 1.6–2.6)
POTASSIUM SERPL-SCNC: 4.7 MMOL/L (ref 3.5–5.3)
PROT SERPL-MCNC: 7.6 G/DL (ref 6.4–8.2)
PROT UR-MCNC: 9 MG/DL
PROT/CREAT UR: 0.04 MG/G{CREAT} (ref 0–0.1)
SODIUM SERPL-SCNC: 142 MMOL/L (ref 136–145)

## 2019-03-09 PROCEDURE — 82570 ASSAY OF URINE CREATININE: CPT

## 2019-03-09 PROCEDURE — 84156 ASSAY OF PROTEIN URINE: CPT

## 2019-03-09 PROCEDURE — 80053 COMPREHEN METABOLIC PANEL: CPT

## 2019-03-09 PROCEDURE — 36415 COLL VENOUS BLD VENIPUNCTURE: CPT

## 2019-03-09 PROCEDURE — 83735 ASSAY OF MAGNESIUM: CPT

## 2019-03-12 PROBLEM — I12.9 HYPERTENSIVE CHRONIC KIDNEY DISEASE WITH STAGE 1 THROUGH STAGE 4 CHRONIC KIDNEY DISEASE, OR UNSPECIFIED CHRONIC KIDNEY DISEASE: Status: ACTIVE | Noted: 2019-03-12

## 2019-03-12 NOTE — PROGRESS NOTES
RENAL FOLLOW UP NOTE: td    ASSESSMENT AND PLAN:  1  Hypertension:  Most compatible with essential hypertension/resistant hypertension:  Secondary workup:  -normal thyroid function study  -normal sleep study  -aldosterone/renin ratio:  Pending  -plasma free metanephrines:  Pending  -24 hour urine for free cortisol:  Pending  -renal artery duplex:  No evidence of significant renal artery disease; but somewhat limited by body habitus and overlying bowel gas  Again obesity may be contributing to his hypertension  · Current home blood pressure readings:  · -a m :  Approximately:  Low 130s/high 70s  · -p m :  Mid 130s/mid 70s  · -heart rate:  50s    · GOAL BLOOD PRESSURE:  LESS THAN 135/85    · Recommendations:        -take 1 week a blood pressure readings morning evening after purchasing another blood pressure machine just sitting        -Re correlate blood pressure machine        -pending readings I would make no changes it appears he is at goal        -continue to push nonmedical regimen which the patient is doing an outstanding job of    2  CKD stage 3:  Baseline creatinine is ranged anywhere from 1 0-1 3:  -creatinine relatively stable 1 26    -electrolytes are normal  -magnesium and calcium are acceptable  -urine protein creatinine ratio is 0 04 g which is normal    3  Nephrolithiasis:  As mentioned previously he has had numerous stones  -check 24 hour urinary studies which has not been done yet  -low-salt diet  -check KUB  -2 5-3 L of water a day  Further recommendations will be forthcoming depending upon the 24 hour urinary studies    PATIENT INSTRUCTIONS:    Patient Instructions   1  No medication changes today  2  Please go for the lab work as ordered  If there is any questions about the lab orders call the office  There are 2 separate urinary studies 1 for stone evaluation in the urine, as well as a 24 hour urine for a hormone call cortisol      3  Please purchase a new Omron blood pressure machine but with an extra large cuff at a medical supply or pharmacy   -then please take 1 week a blood pressure readings on your left arm seated only morning and evening: The morning readings are before taking any medications  The evening readings are closer to bedtime    -THEN PLEASE BRING IN THE NEW BLOOD PRESSURE MACHINE ALONG WITH YOUR READINGS TO SEE 1 OF MY ADVANCED PRACTITIONER'S IN THE NEXT FEW WEEKS  4  Follow-up in 4 months:  -please bring in 1 week a blood pressure readings morning and evening, sitting and standing is outlined above:  · Take the morning readings before any medications  · Take the evening readings closer to bedtime  · When taking standing readings, keep your arm supported at heart level and not dangling  -please go for fasting lab work prior to your appointment    5  General instructions:  -avoid salt  -avoid medications such as Motrin, Naprosyn, ibuprofen, Aleve or Advil or Celebrex as they can affect your kidney function; you can use Tylenol as needed for pain or fevers if you have no liver problems  -avoid medications such as Sudafed or other medications with decongestants as they can raise your blood pressure  -try to exercise at least 30 minutes at least 3 days a week with an ultimate goal of 5 days a week  -try to lose 5-10 lb by your next visit            Subjective: The patient overall is feeling well  No fevers chills cough or colds  Good appetite and good energy  No urinary symptoms including foamy urine or blood in the urine  No gastrointestinal symptoms  No cardiovascular symptoms including swelling of the legs  No headaches, dizziness or lightheadedness  Blood pressure medications:  -Klor-Con 10 mEq daily  -Edarbi 80 mg daily in the morning  -clonidine patch 2   Weekly  -Bystolic 5 mg daily  -amlodipine 5 mg daily in the morning    ROS:  See HPI, otherwise review of systems as completely reviewed with the patient are negative    Past Medical History:   Diagnosis Date    Arthritis  Asthma     Atrial fibrillation (HCC)     Chronic kidney disease     kidney stones    Gout     Hypertension     Muscle weakness      Past Surgical History:   Procedure Laterality Date    APPENDECTOMY      CYSTOSCOPY  08/30/2016    w/removal of object, last assessed 8/30/16    FOOT SURGERY Left     HAND SURGERY      KNEE SURGERY Right 2016    SD CYSTO/URETERO W/LITHOTRIPSY &INDWELL STENT INSRT Left 8/9/2016    Procedure: CYSTOSCOPY URETEROSCOPY WITH LITHOTRIPSY HOLMIUM LASER STONE EXTRACTION, RETROGRADE PYELOGRAM AND INSERTION STENT URETERAL;  Surgeon: Eduard Barahona MD;  [facility];  Service: Urology last assessed 8/30/16    WRIST SURGERY Left 2014    Fracture surgery     Family History   Problem Relation Age of Onset    Atrial fibrillation Mother     Other Mother         blood dyscrasia    Hypertension Mother     Other Father         hypoglycemia     Atrial fibrillation Father     Diabetes Family     Other Family         Thrombocytosis      reports that he quit smoking about 24 years ago  He has never used smokeless tobacco  He reports that he drinks alcohol  He reports that he does not use drugs  I COMPLETELY REVIEWED THE PAST MEDICAL HISTORY/PAST SURGICAL HISTORY/SOCIAL HISTORY/FAMILY HISTORY/AND MEDICATIONS  AND UPDATED ALL    Objective:     Vitals:   BP sitting on left with large cuff:  152/82 with a heart rate of 64 and regular  BP standing on left with large cuff:  152/84 with a heart rate of 64 regular    Weight (last 2 days)     Date/Time   Weight    03/13/19 1504   129 (285 2)            Wt Readings from Last 3 Encounters:   03/13/19 129 kg (285 lb 3 2 oz)   02/22/19 130 kg (285 lb 9 6 oz)   02/18/19 133 kg (293 lb)     Body mass index is 40 92 kg/m²      Physical Exam: General:  Obese, but in No acute distress  Skin:  No acute rash  Eyes:  No scleral icterus, noninjected  ENT:  Moist mucous membranes  Neck:  Supple, no jugular venous distention  Back   No CVAT  Chest: Clear to auscultation and percussion, good respiratory effort  CVS:  Regular rate and rhythm without a rub, murmurs or gallops  Abdomen:  Obese, Soft and nontender with normal bowel sounds  Extremities:  No cyanosis and no edema  Neuro:  Grossly intact  Psych:  Alert, oriented x3 and appropriate      Medications:    Current Outpatient Medications:     amLODIPine (NORVASC) 5 mg tablet, TAKE 1 TABLET DAILY, Disp: 30 tablet, Rfl: 10    BYSTOLIC 5 MG tablet, TAKE 1 TABLET BY MOUTH EVERY DAY, Disp: 30 tablet, Rfl: 0    cloNIDine (CATAPRES-TTS-2) 0 2 mg/24 hr, APPLY 1 PATCH ONE TIME PER WEEK, Disp: 4 patch, Rfl: 0    dofetilide (TIKOSYN) 500 mcg capsule, TAKE ONE CAPSULE BY MOUTH EVERY 12 HOURS, Disp: 180 capsule, Rfl: 2    EDARBI 80 MG tablet, TAKE ONE TABLET DAILY (Patient taking differently: TAKE ONE TABLET DAILY IN THE AM), Disp: 30 tablet, Rfl: 11    potassium chloride (K-DUR,KLOR-CON) 10 mEq tablet, Take 1 tablet (10 mEq total) by mouth daily, Disp: 30 tablet, Rfl: 0    XARELTO 20 MG tablet, TAKE 1 TABLET BY MOUTH EVERY DAY, Disp: 90 tablet, Rfl: 3    AmLODIPine Besylate (NORVASC PO), Take 5 mg by mouth 1 tablet daily in the AM , Disp: , Rfl:     chlorthalidone 25 mg tablet, Take 0 5 tablets (12 5 mg total) by mouth daily (Patient not taking: Reported on 3/13/2019), Disp: 30 tablet, Rfl: 5    colchicine (COLCRYS) 0 6 mg tablet, Take two tablets now  Take 1 in one hour after (Patient not taking: Reported on 3/13/2019), Disp: 3 tablet, Rfl: 0    predniSONE 10 mg tablet, 4 PO x 3 days  3 PO x 3 days  2 PO x 3 days  1 PO x 3 days (Patient not taking: Reported on 3/13/2019), Disp: 30 tablet, Rfl: 0    spironolactone (ALDACTONE) 25 mg tablet, Take 1 tablet (25 mg total) by mouth daily (Patient not taking: Reported on 3/13/2019), Disp: 30 tablet, Rfl: 5    Lab, Imaging and other studies: I have personally reviewed pertinent labs    Laboratory Results:  Results for orders placed or performed in visit on 03/09/19 Comprehensive metabolic panel   Result Value Ref Range    Sodium 142 136 - 145 mmol/L    Potassium 4 7 3 5 - 5 3 mmol/L    Chloride 105 100 - 108 mmol/L    CO2 29 21 - 32 mmol/L    ANION GAP 8 4 - 13 mmol/L    BUN 21 5 - 25 mg/dL    Creatinine 1 26 0 60 - 1 30 mg/dL    Glucose, Fasting 103 (H) 65 - 99 mg/dL    Calcium 9 1 8 3 - 10 1 mg/dL    AST 16 5 - 45 U/L    ALT 51 12 - 78 U/L    Alkaline Phosphatase 45 (L) 46 - 116 U/L    Total Protein 7 6 6 4 - 8 2 g/dL    Albumin 3 7 3 5 - 5 0 g/dL    Total Bilirubin 0 30 0 20 - 1 00 mg/dL    eGFR 65 ml/min/1 73sq m   Magnesium   Result Value Ref Range    Magnesium 2 0 1 6 - 2 6 mg/dL   Protein / creatinine ratio, urine   Result Value Ref Range    Creatinine, Ur 216 0 mg/dL    Protein Urine Random 9 mg/dL    Prot/Creat Ratio, Ur 0 04 0 00 - 0 10       Results from last 7 days   Lab Units 03/09/19  1100   POTASSIUM mmol/L 4 7   CHLORIDE mmol/L 105   CO2 mmol/L 29   BUN mg/dL 21   CREATININE mg/dL 1 26   CALCIUM mg/dL 9 1   MAGNESIUM mg/dL 2 0         Radiology review:   chest X-ray    Ultrasound      Portions of the record may have been created with voice recognition software   Occasional wrong word or "sound a like" substitutions may have occurred due to the inherent limitations of voice recognition software   Read the chart carefully and recognize, using context, where substitutions have occurred

## 2019-03-13 ENCOUNTER — OFFICE VISIT (OUTPATIENT)
Dept: NEPHROLOGY | Facility: CLINIC | Age: 54
End: 2019-03-13
Payer: COMMERCIAL

## 2019-03-13 VITALS — BODY MASS INDEX: 40.83 KG/M2 | WEIGHT: 285.2 LBS | HEIGHT: 70 IN

## 2019-03-13 DIAGNOSIS — N18.30 CHRONIC KIDNEY DISEASE, STAGE III (MODERATE) (HCC): ICD-10-CM

## 2019-03-13 DIAGNOSIS — I12.9 HYPERTENSIVE CHRONIC KIDNEY DISEASE WITH STAGE 1 THROUGH STAGE 4 CHRONIC KIDNEY DISEASE, OR UNSPECIFIED CHRONIC KIDNEY DISEASE: Primary | ICD-10-CM

## 2019-03-13 DIAGNOSIS — N20.0 NEPHROLITHIASIS: ICD-10-CM

## 2019-03-13 PROCEDURE — 99214 OFFICE O/P EST MOD 30 MIN: CPT | Performed by: INTERNAL MEDICINE

## 2019-03-13 NOTE — LETTER
March 13, 2019     Yordan Cruz MD  [address] [place] [address]    Patient: Fili Watts   YOB: 1965   Date of Visit: 3/13/2019       Dear Dr Rivas Cruz: Thank you for referring Madeline Sacks to me for evaluation  Below are my notes for this consultation  If you have questions, please do not hesitate to call me  I look forward to following your patient along with you  Sincerely,        Dallas Puckett MD        CC: DO Daniel Chang MD Russ Mays, MD  3/13/2019  3:42 PM  Sign at close encounter  RENAL FOLLOW UP NOTE: td    ASSESSMENT AND PLAN:  1  Hypertension:  Most compatible with essential hypertension/resistant hypertension:  Secondary workup:  -normal thyroid function study  -normal sleep study  -aldosterone/renin ratio:  Pending  -plasma free metanephrines:  Pending  -24 hour urine for free cortisol:  Pending  -renal artery duplex:  No evidence of significant renal artery disease; but somewhat limited by body habitus and overlying bowel gas  Again obesity may be contributing to his hypertension  · Current home blood pressure readings:  · -a m :  Approximately:  Low 130s/high 70s  · -p m :  Mid 130s/mid 70s  · -heart rate:  50s    · GOAL BLOOD PRESSURE:  LESS THAN 135/85    · Recommendations:        -take 1 week a blood pressure readings morning evening after purchasing another blood pressure machine just sitting        -Re correlate blood pressure machine        -pending readings I would make no changes it appears he is at goal        -continue to push nonmedical regimen which the patient is doing an outstanding job of    2  CKD stage 3:  Baseline creatinine is ranged anywhere from 1 0-1 3:  -creatinine relatively stable 1 26    -electrolytes are normal  -magnesium and calcium are acceptable  -urine protein creatinine ratio is 0 04 g which is normal    3  Nephrolithiasis:  As mentioned previously he has had numerous stones    -check 24 hour urinary studies which has not been done yet  -low-salt diet  -check KUB  -2 5-3 L of water a day  Further recommendations will be forthcoming depending upon the 24 hour urinary studies    PATIENT INSTRUCTIONS:    Patient Instructions   1  No medication changes today  2  Please go for the lab work as ordered  If there is any questions about the lab orders call the office  There are 2 separate urinary studies 1 for stone evaluation in the urine, as well as a 24 hour urine for a hormone call cortisol  3  Please purchase a new Omron blood pressure machine but with an extra large cuff at a medical supply or pharmacy   -then please take 1 week a blood pressure readings on your left arm seated only morning and evening: The morning readings are before taking any medications  The evening readings are closer to bedtime    -THEN PLEASE BRING IN THE NEW BLOOD PRESSURE MACHINE ALONG WITH YOUR READINGS TO SEE 1 OF MY ADVANCED PRACTITIONER'S IN THE NEXT FEW WEEKS  4  Follow-up in 4 months:  -please bring in 1 week a blood pressure readings morning and evening, sitting and standing is outlined above:  · Take the morning readings before any medications  · Take the evening readings closer to bedtime  · When taking standing readings, keep your arm supported at heart level and not dangling  -please go for fasting lab work prior to your appointment    5  General instructions:  -avoid salt  -avoid medications such as Motrin, Naprosyn, ibuprofen, Aleve or Advil or Celebrex as they can affect your kidney function; you can use Tylenol as needed for pain or fevers if you have no liver problems  -avoid medications such as Sudafed or other medications with decongestants as they can raise your blood pressure  -try to exercise at least 30 minutes at least 3 days a week with an ultimate goal of 5 days a week  -try to lose 5-10 lb by your next visit            Subjective: The patient overall is feeling well  No fevers chills cough or colds    Good appetite and good energy  No urinary symptoms including foamy urine or blood in the urine  No gastrointestinal symptoms  No cardiovascular symptoms including swelling of the legs  No headaches, dizziness or lightheadedness  Blood pressure medications:  -Klor-Con 10 mEq daily  -Edarbi 80 mg daily in the morning  -clonidine patch 2  Weekly  -Bystolic 5 mg daily  -amlodipine 5 mg daily in the morning    ROS:  See HPI, otherwise review of systems as completely reviewed with the patient are negative    Past Medical History:   Diagnosis Date    Arthritis     Asthma     Atrial fibrillation ([address] )     Chronic kidney disease     kidney stones    Gout     Hypertension     Muscle weakness      Past Surgical History:   Procedure Laterality Date    APPENDECTOMY      CYSTOSCOPY  08/30/2016    w/removal of object, last assessed 8/30/16    FOOT SURGERY Left     HAND SURGERY      KNEE SURGERY Right 2016    DE CYSTO/URETERO W/LITHOTRIPSY &INDWELL STENT INSRT Left 8/9/2016    Procedure: CYSTOSCOPY URETEROSCOPY WITH LITHOTRIPSY HOLMIUM LASER STONE EXTRACTION, RETROGRADE PYELOGRAM AND INSERTION STENT URETERAL;  Surgeon: Ani Denson MD;  Location[facility];  Service: Urology last assessed 8/30/16    WRIST SURGERY Left 2014    Fracture surgery     Family History   Problem Relation Age of Onset    Atrial fibrillation Mother     Other Mother         blood dyscrasia    Hypertension Mother     Other Father         hypoglycemia     Atrial fibrillation Father     Diabetes Family     Other Family         Thrombocytosis      reports that he quit smoking about 24 years ago  He has never used smokeless tobacco  He reports that he drinks alcohol  He reports that he does not use drugs      I COMPLETELY REVIEWED THE PAST MEDICAL HISTORY/PAST SURGICAL HISTORY/SOCIAL HISTORY/FAMILY HISTORY/AND MEDICATIONS  AND UPDATED ALL    Objective:     Vitals:   BP sitting on left with large cuff:  152/82 with a heart rate of 64 and regular  BP standing on left with large cuff:  152/84 with a heart rate of 64 regular    Weight (last 2 days)     Date/Time   Weight    03/13/19 1504   129 (285 2)            Wt Readings from Last 3 Encounters:   03/13/19 129 kg (285 lb 3 2 oz)   02/22/19 130 kg (285 lb 9 6 oz)   02/18/19 133 kg (293 lb)     Body mass index is 40 92 kg/m²  Physical Exam: General:  Obese, but in No acute distress  Skin:  No acute rash  Eyes:  No scleral icterus, noninjected  ENT:  Moist mucous membranes  Neck:  Supple, no jugular venous distention  Back   No CVAT  Chest:  Clear to auscultation and percussion, good respiratory effort  CVS:  Regular rate and rhythm without a rub, murmurs or gallops  Abdomen:  Obese, Soft and nontender with normal bowel sounds  Extremities:  No cyanosis and no edema  Neuro:  Grossly intact  Psych:  Alert, oriented x3 and appropriate      Medications:    Current Outpatient Medications:     amLODIPine (NORVASC) 5 mg tablet, TAKE 1 TABLET DAILY, Disp: 30 tablet, Rfl: 10    BYSTOLIC 5 MG tablet, TAKE 1 TABLET BY MOUTH EVERY DAY, Disp: 30 tablet, Rfl: 0    cloNIDine (CATAPRES-TTS-2) 0 2 mg/24 hr, APPLY 1 PATCH ONE TIME PER WEEK, Disp: 4 patch, Rfl: 0    dofetilide (TIKOSYN) 500 mcg capsule, TAKE ONE CAPSULE BY MOUTH EVERY 12 HOURS, Disp: 180 capsule, Rfl: 2    EDARBI 80 MG tablet, TAKE ONE TABLET DAILY (Patient taking differently: TAKE ONE TABLET DAILY IN THE AM), Disp: 30 tablet, Rfl: 11    potassium chloride (K-DUR,KLOR-CON) 10 mEq tablet, Take 1 tablet (10 mEq total) by mouth daily, Disp: 30 tablet, Rfl: 0    XARELTO 20 MG tablet, TAKE 1 TABLET BY MOUTH EVERY DAY, Disp: 90 tablet, Rfl: 3    AmLODIPine Besylate (NORVASC PO), Take 5 mg by mouth 1 tablet daily in the AM , Disp: , Rfl:     chlorthalidone 25 mg tablet, Take 0 5 tablets (12 5 mg total) by mouth daily (Patient not taking: Reported on 3/13/2019), Disp: 30 tablet, Rfl: 5    colchicine (COLCRYS) 0 6 mg tablet, Take two tablets now  Take 1 in one hour after (Patient not taking: Reported on 3/13/2019), Disp: 3 tablet, Rfl: 0    predniSONE 10 mg tablet, 4 PO x 3 days  3 PO x 3 days  2 PO x 3 days  1 PO x 3 days (Patient not taking: Reported on 3/13/2019), Disp: 30 tablet, Rfl: 0    spironolactone (ALDACTONE) 25 mg tablet, Take 1 tablet (25 mg total) by mouth daily (Patient not taking: Reported on 3/13/2019), Disp: 30 tablet, Rfl: 5    Lab, Imaging and other studies: I have personally reviewed pertinent labs  Laboratory Results:  Results for orders placed or performed in visit on 03/09/19   Comprehensive metabolic panel   Result Value Ref Range    Sodium 142 136 - 145 mmol/L    Potassium 4 7 3 5 - 5 3 mmol/L    Chloride 105 100 - 108 mmol/L    CO2 29 21 - 32 mmol/L    ANION GAP 8 4 - 13 mmol/L    BUN 21 5 - 25 mg/dL    Creatinine 1 26 0 60 - 1 30 mg/dL    Glucose, Fasting 103 (H) 65 - 99 mg/dL    Calcium 9 1 8 3 - 10 1 mg/dL    AST 16 5 - 45 U/L    ALT 51 12 - 78 U/L    Alkaline Phosphatase 45 (L) 46 - 116 U/L    Total Protein 7 6 6 4 - 8 2 g/dL    Albumin 3 7 3 5 - 5 0 g/dL    Total Bilirubin 0 30 0 20 - 1 00 mg/dL    eGFR 65 ml/min/1 73sq m   Magnesium   Result Value Ref Range    Magnesium 2 0 1 6 - 2 6 mg/dL   Protein / creatinine ratio, urine   Result Value Ref Range    Creatinine, Ur 216 0 mg/dL    Protein Urine Random 9 mg/dL    Prot/Creat Ratio, Ur 0 04 0 00 - 0 10       Results from last 7 days   Lab Units 03/09/19  1100   POTASSIUM mmol/L 4 7   CHLORIDE mmol/L 105   CO2 mmol/L 29   BUN mg/dL 21   CREATININE mg/dL 1 26   CALCIUM mg/dL 9 1   MAGNESIUM mg/dL 2 0         Radiology review:   chest X-ray    Ultrasound      Portions of the record may have been created with voice recognition software   Occasional wrong word or "sound a like" substitutions may have occurred due to the inherent limitations of voice recognition software   Read the chart carefully and recognize, using context, where substitutions have occurred

## 2019-03-13 NOTE — PATIENT INSTRUCTIONS
1  No medication changes today  2  Please go for the lab work as ordered  If there is any questions about the lab orders call the office  There are 2 separate urinary studies 1 for stone evaluation in the urine, as well as a 24 hour urine for a hormone call cortisol  3  Please purchase a new Omron blood pressure machine but with an extra large cuff at a medical supply or pharmacy   -then please take 1 week a blood pressure readings on your left arm seated only morning and evening: The morning readings are before taking any medications  The evening readings are closer to bedtime    -THEN PLEASE BRING IN THE NEW BLOOD PRESSURE MACHINE ALONG WITH YOUR READINGS TO SEE 1 OF MY ADVANCED PRACTITIONER'S IN THE NEXT FEW WEEKS  4  Follow-up in 4 months:  -please bring in 1 week a blood pressure readings morning and evening, sitting and standing is outlined above:  · Take the morning readings before any medications  · Take the evening readings closer to bedtime  · When taking standing readings, keep your arm supported at heart level and not dangling  -please go for fasting lab work prior to your appointment    5  General instructions:  -avoid salt  -avoid medications such as Motrin, Naprosyn, ibuprofen, Aleve or Advil or Celebrex as they can affect your kidney function; you can use Tylenol as needed for pain or fevers if you have no liver problems  -avoid medications such as Sudafed or other medications with decongestants as they can raise your blood pressure  -try to exercise at least 30 minutes at least 3 days a week with an ultimate goal of 5 days a week  -try to lose 5-10 lb by your next visit

## 2019-03-18 DIAGNOSIS — I48.91 ATRIAL FIBRILLATION WITH RVR (HCC): ICD-10-CM

## 2019-03-18 RX ORDER — NEBIVOLOL HYDROCHLORIDE 5 MG/1
TABLET ORAL
Qty: 30 TABLET | Refills: 0 | Status: SHIPPED | OUTPATIENT
Start: 2019-03-18 | End: 2019-04-14 | Stop reason: SDUPTHER

## 2019-03-25 ENCOUNTER — CLINICAL SUPPORT (OUTPATIENT)
Dept: NUTRITION | Facility: HOSPITAL | Age: 54
End: 2019-03-25
Payer: COMMERCIAL

## 2019-03-25 VITALS — WEIGHT: 285.6 LBS | BODY MASS INDEX: 40.98 KG/M2

## 2019-03-25 PROCEDURE — 97803 MED NUTRITION INDIV SUBSEQ: CPT

## 2019-03-25 NOTE — PROGRESS NOTES
Follow-Up Nutrition Assessment Form    Patient Name: Fili Watts    YOB: 1965    Sex: Male      Follow Up Date: 3/25/2019  Start Time: 905 Stop Time: 935 Total Minutes:30     Data:  Present at session: self   Parent Concerns: n/a   Medical Dx/Reason for Referral: wt   Past Medical History:   Diagnosis Date    Arthritis     Asthma     Atrial fibrillation (HCC)     Chronic kidney disease     kidney stones    Gout     Hypertension     Muscle weakness        Current Outpatient Medications   Medication Sig Dispense Refill    amLODIPine (NORVASC) 5 mg tablet TAKE 1 TABLET DAILY 30 tablet 10    AmLODIPine Besylate (NORVASC PO) Take 5 mg by mouth 1 tablet daily in the AM       BYSTOLIC 5 MG tablet TAKE 1 TABLET BY MOUTH EVERY DAY 30 tablet 0    chlorthalidone 25 mg tablet Take 0 5 tablets (12 5 mg total) by mouth daily (Patient not taking: Reported on 3/13/2019) 30 tablet 5    cloNIDine (CATAPRES-TTS-2) 0 2 mg/24 hr APPLY 1 PATCH ONE TIME PER WEEK 4 patch 0    colchicine (COLCRYS) 0 6 mg tablet Take two tablets now  Take 1 in one hour after (Patient not taking: Reported on 3/13/2019) 3 tablet 0    dofetilide (TIKOSYN) 500 mcg capsule TAKE ONE CAPSULE BY MOUTH EVERY 12 HOURS 180 capsule 2    EDARBI 80 MG tablet TAKE ONE TABLET DAILY (Patient taking differently: TAKE ONE TABLET DAILY IN THE AM) 30 tablet 11    potassium chloride (K-DUR,KLOR-CON) 10 mEq tablet Take 1 tablet (10 mEq total) by mouth daily 30 tablet 0    predniSONE 10 mg tablet 4 PO x 3 days  3 PO x 3 days  2 PO x 3 days  1 PO x 3 days (Patient not taking: Reported on 3/13/2019) 30 tablet 0    spironolactone (ALDACTONE) 25 mg tablet Take 1 tablet (25 mg total) by mouth daily (Patient not taking: Reported on 3/13/2019) 30 tablet 5    XARELTO 20 MG tablet TAKE 1 TABLET BY MOUTH EVERY DAY 90 tablet 3     No current facility-administered medications for this visit           Additional Meds/Supplements: n/a   Barriers to Learning: None   Labs: n/a   Height:   Ht Readings from Last 3 Encounters:   03/13/19 5' 10" (1 778 m)   02/18/19 5' 10" (1 778 m)   12/05/18 5' 10" (1 778 m)      Weight: Wt Readings from Last 12 Encounters:   03/25/19 130 kg (285 lb 9 6 oz)   03/13/19 129 kg (285 lb 3 2 oz)   02/22/19 130 kg (285 lb 9 6 oz)   02/18/19 133 kg (293 lb)   01/25/19 130 kg (287 lb)   01/04/19 135 kg (297 lb 3 2 oz)   12/05/18 135 kg (296 lb 12 8 oz)   10/26/18 132 kg (290 lb)   10/17/18 134 kg (295 lb)   10/05/18 135 kg (298 lb 9 6 oz)   12/30/17 133 kg (294 lb)   04/04/17 128 kg (283 lb)     Estimated body mass index is 40 98 kg/m² as calculated from the following:    Height as of 3/13/19: 5' 10" (1 778 m)  Weight as of this encounter: 130 kg (285 lb 9 6 oz)  Wt  Change Since Last Visit: []Yes     [x]No  Amount:       Energy Needs: No calculation needed   Pain Screen: Are you having pain now? No      Goals Achieved:  Sleeping more, staying in bed until 6, going to gym around 7pmish, bed at 9, energy is good/tons of it, drinking water during day and flavored seltzers-no cals and no sodium-prefers water  Not skipping meals, eating out still a lot with work, trying to make better food choices, lot of drinking holidays, beer fest drank 6 heavy-dark- and 8 beers on [employer] Pat's day mainly social events  Going to gym at least 5 times a week, has his routines  20 mins ellip 20 mins bike and 20 mins on treadmill     New Goals:   1  Continue same meal and activity plan   2    3        Initial PES:       New PES: No Change      New Problem List:  1 none new   2    3        Assessment:  Wt stable had some drinking holidays, eating out often, taking 6 BP meds to keep it under control       Medical Nutrition Therapy Intervention:  [x]Individualized Meal Plan []Understanding Lab Values   []Basic Pathophysiology of Disease []Food/Medication Interactions   [x]Food Diary [x]Exercise   [x]Lifestyle/Behavior Modification Techniques []Medication, Mechanism of Action   []Label Reading []Self Blood Glucose Monitoring   [x]Weight/BMI Goals []Other -    Other Notes:        Comprehension: []Excellent  [x]Very Good  []Good  []Fair   []Poor    Receptivity: []Excellent  [x]Very Good  []Good  []Fair   []Poor    Expected Compliance: []Excellent  [x]Very Good  []Good  []Fair   []Poor      Labs:  CMP  Lab Results   Component Value Date     08/18/2015    K 4 7 03/09/2019     03/09/2019    CO2 29 03/09/2019    ANIONGAP 7 08/18/2015    BUN 21 03/09/2019    CREATININE 1 26 03/09/2019    GLUCOSE 94 08/18/2015    GLUF 103 (H) 03/09/2019    CALCIUM 9 1 03/09/2019    AST 16 03/09/2019    ALT 51 03/09/2019    ALKPHOS 45 (L) 03/09/2019    PROT 7 3 12/02/2014    BILITOT 0 4 12/02/2014    EGFR 65 03/09/2019       BMP  Lab Results   Component Value Date    GLUCOSE 94 08/18/2015    CALCIUM 9 1 03/09/2019     08/18/2015    K 4 7 03/09/2019    CO2 29 03/09/2019     03/09/2019    BUN 21 03/09/2019    CREATININE 1 26 03/09/2019       Lipids  Lab Results   Component Value Date    CHOL 177 10/08/2012     Lab Results   Component Value Date    HDL 37 (L) 10/08/2012     No results found for: [facility]  Lab Results   Component Value Date    TRIG 150 (H) 10/08/2012     No results found for: CHOLHDL    Hemoglobin A1C  Lab Results   Component Value Date    HGBA1C 5 8 (H) 09/03/2013       Fasting Glucose  Lab Results   Component Value Date    GLUF 103 (H) 03/09/2019       Insulin     Thyroid  No results found for: TSH, E7GUXCO, F8AFUUB, THYROIDAB    Hepatic Function Panel  Lab Results   Component Value Date    ALT 51 03/09/2019    AST 16 03/09/2019    ALKPHOS 45 (L) 03/09/2019    BILITOT 0 4 12/02/2014       Celiac Disease Antibody Panel  No results found for: ENDOMYSIAL IGA, GLIADIN IGA, GLIADIN IGG, IGA, TISSUE TRANSGLUT AB, TTG IGA   Iron  No results found for: IRON, TIBC, FERRITIN    Vitamins  No results found for: VITAMIN B2   No results found for: NICOTINAMIDE, NICOTINIC ACID   No results found for: Shan Ahmadi  No results found for: [id number]  No results found for: VITB5  No results found for: [id number]  No results found for: THYROGLB  No results found for: VITAMIN K   No results found for: 25-HYDROXY VIT D   No components found for: VITAMINE     No follow-ups on file      [address] [address]

## 2019-03-26 DIAGNOSIS — I10 BENIGN ESSENTIAL HYPERTENSION: ICD-10-CM

## 2019-03-26 RX ORDER — CLONIDINE 0.2 MG/24H
PATCH, EXTENDED RELEASE TRANSDERMAL
Qty: 4 PATCH | Refills: 0 | Status: SHIPPED | OUTPATIENT
Start: 2019-03-26 | End: 2019-04-26 | Stop reason: SDUPTHER

## 2019-03-29 DIAGNOSIS — N20.0 NEPHROLITHIASIS: ICD-10-CM

## 2019-03-29 DIAGNOSIS — N18.30 CKD (CHRONIC KIDNEY DISEASE) STAGE 3, GFR 30-59 ML/MIN (HCC): ICD-10-CM

## 2019-03-29 DIAGNOSIS — I10 BENIGN ESSENTIAL HYPERTENSION: ICD-10-CM

## 2019-04-02 RX ORDER — SPIRONOLACTONE 25 MG/1
TABLET ORAL
Qty: 30 TABLET | Refills: 4 | Status: SHIPPED | OUTPATIENT
Start: 2019-04-02 | End: 2019-06-28 | Stop reason: SDUPTHER

## 2019-04-03 ENCOUNTER — OFFICE VISIT (OUTPATIENT)
Dept: NEPHROLOGY | Facility: CLINIC | Age: 54
End: 2019-04-03
Payer: COMMERCIAL

## 2019-04-03 VITALS
HEIGHT: 70 IN | DIASTOLIC BLOOD PRESSURE: 82 MMHG | SYSTOLIC BLOOD PRESSURE: 148 MMHG | BODY MASS INDEX: 41.23 KG/M2 | HEART RATE: 52 BPM | WEIGHT: 288 LBS

## 2019-04-03 DIAGNOSIS — N18.30 CHRONIC KIDNEY DISEASE, STAGE III (MODERATE) (HCC): ICD-10-CM

## 2019-04-03 DIAGNOSIS — I10 BENIGN ESSENTIAL HYPERTENSION: Primary | ICD-10-CM

## 2019-04-03 DIAGNOSIS — N20.0 NEPHROLITHIASIS: ICD-10-CM

## 2019-04-03 PROCEDURE — 99213 OFFICE O/P EST LOW 20 MIN: CPT | Performed by: NURSE PRACTITIONER

## 2019-04-14 DIAGNOSIS — I48.91 ATRIAL FIBRILLATION WITH RVR (HCC): ICD-10-CM

## 2019-04-15 RX ORDER — NEBIVOLOL HYDROCHLORIDE 5 MG/1
TABLET ORAL
Qty: 30 TABLET | Refills: 0 | Status: SHIPPED | OUTPATIENT
Start: 2019-04-15 | End: 2019-05-16 | Stop reason: SDUPTHER

## 2019-04-26 DIAGNOSIS — I10 BENIGN ESSENTIAL HYPERTENSION: ICD-10-CM

## 2019-04-26 DIAGNOSIS — I10 ESSENTIAL HYPERTENSION: ICD-10-CM

## 2019-04-27 RX ORDER — CLONIDINE 0.2 MG/24H
PATCH, EXTENDED RELEASE TRANSDERMAL
Qty: 4 PATCH | Refills: 0 | Status: SHIPPED | OUTPATIENT
Start: 2019-04-27 | End: 2019-05-20 | Stop reason: SDUPTHER

## 2019-04-27 RX ORDER — AZILSARTAN KAMEDOXOMIL 80 MG/1
TABLET ORAL
Qty: 30 TABLET | Refills: 11 | Status: SHIPPED | OUTPATIENT
Start: 2019-04-27 | End: 2020-04-11

## 2019-05-16 DIAGNOSIS — I48.91 ATRIAL FIBRILLATION WITH RVR (HCC): ICD-10-CM

## 2019-05-16 RX ORDER — NEBIVOLOL HYDROCHLORIDE 5 MG/1
TABLET ORAL
Qty: 30 TABLET | Refills: 0 | Status: SHIPPED | OUTPATIENT
Start: 2019-05-16 | End: 2019-06-16 | Stop reason: SDUPTHER

## 2019-05-20 DIAGNOSIS — I10 BENIGN ESSENTIAL HYPERTENSION: ICD-10-CM

## 2019-05-20 RX ORDER — CLONIDINE 0.2 MG/24H
PATCH, EXTENDED RELEASE TRANSDERMAL
Qty: 4 PATCH | Refills: 0 | Status: SHIPPED | OUTPATIENT
Start: 2019-05-20 | End: 2019-06-14 | Stop reason: SDUPTHER

## 2019-06-14 DIAGNOSIS — I10 BENIGN ESSENTIAL HYPERTENSION: ICD-10-CM

## 2019-06-16 DIAGNOSIS — I48.91 ATRIAL FIBRILLATION WITH RVR (HCC): ICD-10-CM

## 2019-06-17 RX ORDER — CLONIDINE 0.2 MG/24H
PATCH, EXTENDED RELEASE TRANSDERMAL
Qty: 4 PATCH | Refills: 0 | Status: SHIPPED | OUTPATIENT
Start: 2019-06-17 | End: 2019-07-16 | Stop reason: SDUPTHER

## 2019-06-17 RX ORDER — NEBIVOLOL HYDROCHLORIDE 5 MG/1
TABLET ORAL
Qty: 30 TABLET | Refills: 0 | Status: SHIPPED | OUTPATIENT
Start: 2019-06-17 | End: 2019-07-20 | Stop reason: SDUPTHER

## 2019-06-28 DIAGNOSIS — N20.0 NEPHROLITHIASIS: ICD-10-CM

## 2019-06-28 DIAGNOSIS — I10 BENIGN ESSENTIAL HYPERTENSION: ICD-10-CM

## 2019-06-28 DIAGNOSIS — N18.30 CKD (CHRONIC KIDNEY DISEASE) STAGE 3, GFR 30-59 ML/MIN (HCC): ICD-10-CM

## 2019-06-28 RX ORDER — SPIRONOLACTONE 25 MG/1
TABLET ORAL
Qty: 90 TABLET | Refills: 1 | Status: SHIPPED | OUTPATIENT
Start: 2019-06-28 | End: 2019-12-11 | Stop reason: SDUPTHER

## 2019-07-15 ENCOUNTER — TELEPHONE (OUTPATIENT)
Dept: NEPHROLOGY | Facility: CLINIC | Age: 54
End: 2019-07-15

## 2019-07-15 NOTE — TELEPHONE ENCOUNTER
I called and left message for patient to call back and schedule follow up appointment in August with Dr Perlita Campos

## 2019-07-16 DIAGNOSIS — I10 BENIGN ESSENTIAL HYPERTENSION: ICD-10-CM

## 2019-07-16 RX ORDER — CLONIDINE 0.2 MG/24H
PATCH, EXTENDED RELEASE TRANSDERMAL
Qty: 4 PATCH | Refills: 0 | Status: SHIPPED | OUTPATIENT
Start: 2019-07-16 | End: 2019-07-23 | Stop reason: SDUPTHER

## 2019-07-20 DIAGNOSIS — I48.91 ATRIAL FIBRILLATION WITH RVR (HCC): ICD-10-CM

## 2019-07-20 RX ORDER — NEBIVOLOL HYDROCHLORIDE 5 MG/1
TABLET ORAL
Qty: 30 TABLET | Refills: 0 | Status: SHIPPED | OUTPATIENT
Start: 2019-07-20 | End: 2019-08-13 | Stop reason: SDUPTHER

## 2019-07-23 DIAGNOSIS — I10 BENIGN ESSENTIAL HYPERTENSION: ICD-10-CM

## 2019-07-23 RX ORDER — CLONIDINE 0.2 MG/24H
PATCH, EXTENDED RELEASE TRANSDERMAL
Qty: 4 PATCH | Refills: 0 | Status: SHIPPED | OUTPATIENT
Start: 2019-07-23 | End: 2019-09-12 | Stop reason: SDUPTHER

## 2019-08-08 DIAGNOSIS — I10 ESSENTIAL HYPERTENSION: ICD-10-CM

## 2019-08-12 RX ORDER — AMLODIPINE BESYLATE 5 MG/1
TABLET ORAL
Qty: 90 TABLET | Refills: 3 | Status: SHIPPED | OUTPATIENT
Start: 2019-08-12 | End: 2020-08-07

## 2019-08-13 DIAGNOSIS — I48.91 ATRIAL FIBRILLATION WITH RVR (HCC): ICD-10-CM

## 2019-08-13 RX ORDER — NEBIVOLOL HYDROCHLORIDE 5 MG/1
TABLET ORAL
Qty: 30 TABLET | Refills: 0 | Status: SHIPPED | OUTPATIENT
Start: 2019-08-13 | End: 2019-08-20

## 2019-08-20 DIAGNOSIS — I48.91 ATRIAL FIBRILLATION WITH RVR (HCC): ICD-10-CM

## 2019-08-20 RX ORDER — NEBIVOLOL 5 MG/1
5 TABLET ORAL DAILY
Qty: 30 TABLET | Refills: 8 | Status: SHIPPED | OUTPATIENT
Start: 2019-08-20 | End: 2020-05-07

## 2019-09-12 DIAGNOSIS — I10 BENIGN ESSENTIAL HYPERTENSION: ICD-10-CM

## 2019-09-12 RX ORDER — CLONIDINE 0.2 MG/24H
PATCH, EXTENDED RELEASE TRANSDERMAL
Qty: 4 PATCH | Refills: 0 | Status: SHIPPED | OUTPATIENT
Start: 2019-09-12 | End: 2019-10-05 | Stop reason: SDUPTHER

## 2019-09-13 DIAGNOSIS — I48.0 PAROXYSMAL ATRIAL FIBRILLATION (HCC): ICD-10-CM

## 2019-09-13 RX ORDER — DOFETILIDE 0.5 MG/1
CAPSULE ORAL
Qty: 60 CAPSULE | Refills: 8 | Status: SHIPPED | OUTPATIENT
Start: 2019-09-13 | End: 2020-06-12

## 2019-10-05 DIAGNOSIS — I10 BENIGN ESSENTIAL HYPERTENSION: ICD-10-CM

## 2019-10-07 RX ORDER — CLONIDINE 0.2 MG/24H
PATCH, EXTENDED RELEASE TRANSDERMAL
Qty: 4 PATCH | Refills: 0 | Status: SHIPPED | OUTPATIENT
Start: 2019-10-07 | End: 2019-11-01 | Stop reason: SDUPTHER

## 2019-10-29 ENCOUNTER — TELEPHONE (OUTPATIENT)
Dept: CARDIOLOGY CLINIC | Facility: CLINIC | Age: 54
End: 2019-10-29

## 2019-11-01 DIAGNOSIS — I10 BENIGN ESSENTIAL HYPERTENSION: ICD-10-CM

## 2019-11-01 RX ORDER — CLONIDINE 0.2 MG/24H
PATCH, EXTENDED RELEASE TRANSDERMAL
Qty: 4 PATCH | Refills: 0 | Status: SHIPPED | OUTPATIENT
Start: 2019-11-01 | End: 2019-12-02 | Stop reason: SDUPTHER

## 2019-12-02 DIAGNOSIS — I10 BENIGN ESSENTIAL HYPERTENSION: ICD-10-CM

## 2019-12-02 RX ORDER — CLONIDINE 0.2 MG/24H
PATCH, EXTENDED RELEASE TRANSDERMAL
Qty: 4 PATCH | Refills: 0 | Status: SHIPPED | OUTPATIENT
Start: 2019-12-02 | End: 2019-12-31

## 2019-12-11 ENCOUNTER — OFFICE VISIT (OUTPATIENT)
Dept: CARDIOLOGY CLINIC | Facility: CLINIC | Age: 54
End: 2019-12-11
Payer: COMMERCIAL

## 2019-12-11 VITALS
WEIGHT: 299 LBS | SYSTOLIC BLOOD PRESSURE: 122 MMHG | HEIGHT: 70 IN | DIASTOLIC BLOOD PRESSURE: 78 MMHG | BODY MASS INDEX: 42.8 KG/M2 | HEART RATE: 59 BPM

## 2019-12-11 DIAGNOSIS — I48.91 ATRIAL FIBRILLATION WITH RVR (HCC): Primary | ICD-10-CM

## 2019-12-11 DIAGNOSIS — E66.01 MORBID OBESITY (HCC): ICD-10-CM

## 2019-12-11 DIAGNOSIS — Z79.01 ANTICOAGULATION ADEQUATE: ICD-10-CM

## 2019-12-11 DIAGNOSIS — N18.30 CKD (CHRONIC KIDNEY DISEASE) STAGE 3, GFR 30-59 ML/MIN (HCC): ICD-10-CM

## 2019-12-11 DIAGNOSIS — I10 BENIGN ESSENTIAL HYPERTENSION: ICD-10-CM

## 2019-12-11 DIAGNOSIS — N20.0 NEPHROLITHIASIS: ICD-10-CM

## 2019-12-11 DIAGNOSIS — Z79.899 LONG TERM CURRENT USE OF ANTIARRHYTHMIC DRUG: ICD-10-CM

## 2019-12-11 PROCEDURE — 93000 ELECTROCARDIOGRAM COMPLETE: CPT | Performed by: INTERNAL MEDICINE

## 2019-12-11 PROCEDURE — 3078F DIAST BP <80 MM HG: CPT | Performed by: INTERNAL MEDICINE

## 2019-12-11 PROCEDURE — 99214 OFFICE O/P EST MOD 30 MIN: CPT | Performed by: INTERNAL MEDICINE

## 2019-12-11 PROCEDURE — 3074F SYST BP LT 130 MM HG: CPT | Performed by: INTERNAL MEDICINE

## 2019-12-11 RX ORDER — SPIRONOLACTONE 25 MG/1
TABLET ORAL
Qty: 90 TABLET | Refills: 1 | Status: SHIPPED | OUTPATIENT
Start: 2019-12-11 | End: 2020-06-04

## 2019-12-11 NOTE — PROGRESS NOTES
EPS Progress Note - Sherita Posey 47 y o  male MRN: [id number]           ASSESSMENT:  1  Atrial fibrillation with RVR (HCC)  POCT ECG   2  Anticoagulation adequate     3  Long term current use of antiarrhythmic drug     4  Benign essential hypertension     5  Morbid obesity ([address] )             PLAN:  1  Symptomatic atrial fibrillation  Status post cardioversion x2, 2017 and 2018  On antiarrhythmic therapy, Dofetilide     2  AC  Maintained on Xarelto 20 mg daily    3  Hypertension  Well controlled     4  Weight management  He knows that he needs to lose weight he is going to work on it unfortunately his job where he travels 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       5  dental           HPI:   Sherita Posey is a 47 y o  male who presents to the office today for 1 year follow-up  Patient has a history atrial fibrillation, hypertension, stage III CKD, and morbid obese       he has persistent atrial fibrillation  He is on dofetilide and has done quite well on the stress he is only had one cardioversion since being on dofetilide  His blood pressure is well controlled now that Enzo Tapia has seen him from Nephrology    ROS:   Negative for palpitations, shortness of breath, chest discomfort, presyncope or syncope, lower extremity swelling  All other 12 point ROS negative       Objective:     Vitals: Blood pressure 122/78, pulse 59, height 5' 10" (1 778 m), weight 136 kg (299 lb)  , Body mass index is 42 9 kg/m²  ,        Physical Exam:    GEN: Sherita Posey appears well, alert and oriented x 3, pleasant and cooperative   HEENT: pupils equal, round, and reactive to light; extraocular muscles intact  NECK: supple, no carotid bruits   HEART: regular rhythm, normal S1 and S2, no murmurs, clicks, gallops or rubs   LUNGS: clear to auscultation bilaterally; no wheezes, rales, or rhonchi   ABDOMEN: normal bowel sounds, soft, no tenderness, no distention  EXTREMITIES: peripheral pulses normal; no clubbing, cyanosis, or edema  NEURO: no focal findings   SKIN: normal without suspicious lesions on exposed skin    Medications:      Current Outpatient Medications:     amLODIPine (NORVASC) 5 mg tablet, TAKE 1 TABLET DAILY, Disp: 90 tablet, Rfl: 3    cloNIDine (CATAPRES-TTS-2) 0 2 mg/24 hr, APPLY 1 PATCH ONE TIME PER WEEK, Disp: 4 patch, Rfl: 0    dofetilide (TIKOSYN) 500 mcg capsule, TAKE ONE CAPSULE BY MOUTH EVERY 12 HOURS, Disp: 60 capsule, Rfl: 8    EDARBI 80 MG tablet, TAKE ONE TABLET DAILY, Disp: 30 tablet, Rfl: 11    nebivolol (BYSTOLIC) 5 mg tablet, Take 1 tablet (5 mg total) by mouth daily, Disp: 30 tablet, Rfl: 8    spironolactone (ALDACTONE) 25 mg tablet, TAKE 1 TABLET BY MOUTH EVERY DAY, Disp: 90 tablet, Rfl: 1    XARELTO 20 MG tablet, TAKE 1 TABLET BY MOUTH EVERY DAY, Disp: 90 tablet, Rfl: 3    chlorthalidone 25 mg tablet, Take 0 5 tablets (12 5 mg total) by mouth daily (Patient not taking: Reported on 3/13/2019), Disp: 30 tablet, Rfl: 5    colchicine (COLCRYS) 0 6 mg tablet, Take two tablets now  Take 1 in one hour after (Patient not taking: Reported on 3/13/2019), Disp: 3 tablet, Rfl: 0    potassium chloride (K-DUR,KLOR-CON) 10 mEq tablet, Take 1 tablet (10 mEq total) by mouth daily (Patient not taking: Reported on 4/3/2019), Disp: 30 tablet, Rfl: 0    predniSONE 10 mg tablet, 4 PO x 3 days  3 PO x 3 days  2 PO x 3 days   1 PO x 3 days (Patient not taking: Reported on 3/13/2019), Disp: 30 tablet, Rfl: 0     Family History   Problem Relation Age of Onset    Atrial fibrillation Mother     Other Mother         blood dyscrasia    Hypertension Mother     Other Father         hypoglycemia     Atrial fibrillation Father     Diabetes Family     Other Family         Thrombocytosis     Social History     Socioeconomic History    Marital status: /Civil Union     Spouse name: Not on file    Number of children: 4    Years of education: 12th grade     Highest education level: Not on file   Occupational History    Not on file   Social Needs    Financial resource strain: Not on file    Food insecurity:     Worry: Not on file     Inability: Not on file    Transportation needs:     Medical: Not on file     Non-medical: Not on file   Tobacco Use    Smoking status: Former Smoker     Last attempt to quit: 1994     Years since quittin 7    Smokeless tobacco: Never Used   Substance and Sexual Activity    Alcohol use: Yes     Comment: occ    Drug use: No    Sexual activity: Not on file   Lifestyle    Physical activity:     Days per week: Not on file     Minutes per session: Not on file    Stress: Not on file   Relationships    Social connections:     Talks on phone: Not on file     Gets together: Not on file     Attends [employer] service: Not on file     Active member of club or organization: Not on file     Attends meetings of clubs or organizations: Not on file     Relationship status: Not on file    Intimate partner violence:     Fear of current or ex partner: Not on file     Emotionally abused: Not on file     Physically abused: Not on file     Forced sexual activity: Not on file   Other Topics Concern    Not on file   Social History Narrative    No caffeine use      Social History     Tobacco Use   Smoking Status Former Smoker    Last attempt to quit: 1994    Years since quittin 7   Smokeless Tobacco Never Used     Social History     Substance and Sexual Activity   Alcohol Use Yes    Comment: occ       Labs & Results:  Below is the patient's most recent value for Albumin, ALT, AST, BUN, Calcium, Chloride, Cholesterol, CO2, Creatinine, GFR, Glucose, HDL, Hematocrit, Hemoglobin, Hemoglobin A1C, LDL, Magnesium, Phosphorus, Platelets, Potassium, PSA, Sodium, Triglycerides, and WBC     Lab Results   Component Value Date    ALT 51 2019    AST 16 2019    BUN 21 2019    CALCIUM 9 1 2019     2019    CHOL 177 10/08/2012    CO2 29 2019    CREATININE 1 26 2019    HDL 37 (L) 10/08/2012    HCT 46 9 10/29/2018    HGB 15 6 10/29/2018    HGBA1C 5 8 (H) 2013    MG 2 0 2019    PHOS 3 5 2018     10/29/2018    K 4 7 2019     2015    TRIG 150 (H) 10/08/2012    WBC 9 06 10/29/2018     Note: for a comprehensive list of the patient's lab results, access the Results Review activity  Cardiac testing:   No results found for this or any previous visit  Results for orders placed during the hospital encounter of 17   BLAIRE    Narrative [address]  [address], [address] [facility]  [phone]    Transesophageal Echocardiogram  2D, M-mode, Doppler, and Color Doppler    Study date:  2017    Patient: Leobardo Ortega  MR number: [id number]  Account number: [de-identified]  : 1965  Age: 46 years  Gender: Male  Status: Outpatient  Location: Cath lab  Height: 67 in  Weight: 195 lb  BP: 128/ 64 mmHg    Indications: Atrial fibrillation  Study performed to rule out left atrial thrombus prior to elective electrical cardioversion  Diagnoses: R06 02 - Shortness of breath    Sonographer:  JOAQUÍN Villalobos  Interpreting Physician:  Beatris Egan MD  Primary Physician:  Zeeshan Hernandez DO  Referring Physician:  Mariluz Steele MD  Group:  [facility]  Cardiology Fellow:  Suyapa Alarcon MD    SUMMARY    LEFT VENTRICLE:  Systolic function was normal  Ejection fraction was estimated to be 60 %  LEFT ATRIUM:  The atrium was mildly dilated  No thrombus was identified  LEFT ATRIAL APPENDAGE:  The appendage was moderately dilated  No thrombus was identified  ATRIAL SEPTUM:  No defect or patent foramen ovale was identified  No defect or patent foramen ovale was identified  Contrast injection was performed  There was no right-to-left shunt, with provocative maneuvers to increase right atrial pressure  MITRAL VALVE:  There was mild regurgitation      TRICUSPID VALVE:  There was mild regurgitation  HISTORY: PRIOR HISTORY: Atrial fibrillation, previous cardioversions, Hypertension    PROCEDURE: The procedure was performed in the catheterization laboratory  This was a routine study  The risks and alternatives of the procedure were explained to the patient and informed consent was obtained  The transesophageal approach  was used  The study included complete 2D imaging, M-mode, complete spectral Doppler, and color Doppler  The heart rate was 121 bpm, at the start of the study  Intubated with ease  One intubation attempt(s)  There was no blood detected on  the probe  Image quality was adequate  There were no complications during the procedure  MEDICATIONS: Benzocaine spray, topical to oropharynx, prior to procedure  Sedation administered by anesthesia team     LEFT VENTRICLE: Size was normal  Systolic function was normal  Ejection fraction was estimated to be 60 %  This study was inadequate for the evaluation of regional wall motion  Wall thickness was normal  DOPPLER: The study was not  technically sufficient to allow evaluation of LV diastolic function  RIGHT VENTRICLE: The size was normal  Systolic function was normal  Wall thickness was normal     LEFT ATRIUM: The atrium was mildly dilated  No thrombus was identified  APPENDAGE: The appendage was moderately dilated  No thrombus was identified  DOPPLER: The function was normal (normal emptying velocity)  ATRIAL SEPTUM: No defect or patent foramen ovale was identified  No defect or patent foramen ovale was identified  Contrast injection was performed  There was no right-to-left shunt, with provocative maneuvers to increase right atrial  pressure  RIGHT ATRIUM: Size was normal  No thrombus was identified  MITRAL VALVE: Valve structure was normal  There was normal leaflet separation  DOPPLER: There was mild regurgitation  AORTIC VALVE: The valve was trileaflet   Leaflets exhibited normal thickness and normal cuspal separation  DOPPLER: There was no regurgitation  TRICUSPID VALVE: The valve structure was normal  There was normal leaflet separation  DOPPLER: There was mild regurgitation  PULMONIC VALVE: Leaflets exhibited normal thickness, no calcification, and normal cuspal separation  PERICARDIUM: There was no pericardial effusion  AORTA: The root exhibited normal size  There was no atheroma  There was no evidence for dissection  There was no evidence for aneurysm  MEASUREMENT TABLES    DOPPLER MEASUREMENTS  Left atrium   (Reference normals)  VIVEK peak cheli   80 cm/s   (--)    [facility] Accredited Echocardiography Laboratory    Prepared and electronically signed by    Rodger Ahn MD  Signed 05-Apr-2017 16:16:09       No results found for this or any previous visit  No results found for this or any previous visit

## 2019-12-18 DIAGNOSIS — I48.91 ATRIAL FIBRILLATION WITH RVR (HCC): ICD-10-CM

## 2019-12-18 RX ORDER — RIVAROXABAN 20 MG/1
TABLET, FILM COATED ORAL
Qty: 90 TABLET | Refills: 3 | Status: SHIPPED | OUTPATIENT
Start: 2019-12-18 | End: 2021-01-07

## 2019-12-31 DIAGNOSIS — I10 BENIGN ESSENTIAL HYPERTENSION: ICD-10-CM

## 2019-12-31 RX ORDER — CLONIDINE 0.2 MG/24H
PATCH, EXTENDED RELEASE TRANSDERMAL
Qty: 4 PATCH | Refills: 0 | Status: SHIPPED | OUTPATIENT
Start: 2019-12-31 | End: 2020-01-22

## 2020-01-22 DIAGNOSIS — I10 BENIGN ESSENTIAL HYPERTENSION: ICD-10-CM

## 2020-01-22 RX ORDER — CLONIDINE 0.2 MG/24H
PATCH, EXTENDED RELEASE TRANSDERMAL
Qty: 4 PATCH | Refills: 0 | Status: SHIPPED | OUTPATIENT
Start: 2020-01-22 | End: 2020-03-03

## 2020-03-03 DIAGNOSIS — I10 BENIGN ESSENTIAL HYPERTENSION: ICD-10-CM

## 2020-03-03 RX ORDER — CLONIDINE 0.2 MG/24H
PATCH, EXTENDED RELEASE TRANSDERMAL
Qty: 4 PATCH | Refills: 0 | Status: SHIPPED | OUTPATIENT
Start: 2020-03-03 | End: 2020-03-24

## 2020-03-24 DIAGNOSIS — I10 BENIGN ESSENTIAL HYPERTENSION: ICD-10-CM

## 2020-03-24 RX ORDER — CLONIDINE 0.2 MG/24H
PATCH, EXTENDED RELEASE TRANSDERMAL
Qty: 4 PATCH | Refills: 0 | Status: SHIPPED | OUTPATIENT
Start: 2020-03-24 | End: 2020-04-13

## 2020-04-11 DIAGNOSIS — I10 ESSENTIAL HYPERTENSION: ICD-10-CM

## 2020-04-11 RX ORDER — AZILSARTAN KAMEDOXOMIL 80 MG/1
TABLET ORAL
Qty: 30 TABLET | Refills: 11 | Status: SHIPPED | OUTPATIENT
Start: 2020-04-11 | End: 2020-06-11 | Stop reason: SDUPTHER

## 2020-04-13 DIAGNOSIS — I10 BENIGN ESSENTIAL HYPERTENSION: ICD-10-CM

## 2020-04-13 RX ORDER — CLONIDINE 0.2 MG/24H
PATCH, EXTENDED RELEASE TRANSDERMAL
Qty: 4 PATCH | Refills: 0 | Status: SHIPPED | OUTPATIENT
Start: 2020-04-13 | End: 2020-05-12

## 2020-04-28 ENCOUNTER — TELEPHONE (OUTPATIENT)
Dept: FAMILY MEDICINE CLINIC | Facility: CLINIC | Age: 55
End: 2020-04-28

## 2020-05-05 DIAGNOSIS — I48.91 ATRIAL FIBRILLATION WITH RVR (HCC): ICD-10-CM

## 2020-05-07 RX ORDER — NEBIVOLOL HYDROCHLORIDE 5 MG/1
TABLET ORAL
Qty: 90 TABLET | Refills: 2 | Status: SHIPPED | OUTPATIENT
Start: 2020-05-07 | End: 2021-02-04

## 2020-05-11 DIAGNOSIS — I10 BENIGN ESSENTIAL HYPERTENSION: ICD-10-CM

## 2020-05-12 RX ORDER — CLONIDINE 0.2 MG/24H
PATCH, EXTENDED RELEASE TRANSDERMAL
Qty: 4 PATCH | Refills: 0 | Status: SHIPPED | OUTPATIENT
Start: 2020-05-12 | End: 2020-06-05

## 2020-06-04 DIAGNOSIS — I10 BENIGN ESSENTIAL HYPERTENSION: ICD-10-CM

## 2020-06-04 DIAGNOSIS — N20.0 NEPHROLITHIASIS: ICD-10-CM

## 2020-06-04 DIAGNOSIS — N18.30 CKD (CHRONIC KIDNEY DISEASE) STAGE 3, GFR 30-59 ML/MIN (HCC): ICD-10-CM

## 2020-06-04 RX ORDER — SPIRONOLACTONE 25 MG/1
TABLET ORAL
Qty: 90 TABLET | Refills: 1 | Status: SHIPPED | OUTPATIENT
Start: 2020-06-04 | End: 2020-08-05

## 2020-06-05 DIAGNOSIS — I10 BENIGN ESSENTIAL HYPERTENSION: ICD-10-CM

## 2020-06-05 RX ORDER — CLONIDINE 0.2 MG/24H
PATCH, EXTENDED RELEASE TRANSDERMAL
Qty: 4 PATCH | Refills: 0 | Status: SHIPPED | OUTPATIENT
Start: 2020-06-05 | End: 2020-06-28

## 2020-06-11 ENCOUNTER — TELEPHONE (OUTPATIENT)
Dept: CARDIOLOGY CLINIC | Facility: CLINIC | Age: 55
End: 2020-06-11

## 2020-06-11 DIAGNOSIS — I10 ESSENTIAL HYPERTENSION: ICD-10-CM

## 2020-06-12 DIAGNOSIS — I48.0 PAROXYSMAL ATRIAL FIBRILLATION (HCC): ICD-10-CM

## 2020-06-12 RX ORDER — DOFETILIDE 0.5 MG/1
CAPSULE ORAL
Qty: 60 CAPSULE | Refills: 8 | Status: SHIPPED | OUTPATIENT
Start: 2020-06-12 | End: 2021-03-03

## 2020-06-16 DIAGNOSIS — I48.91 ATRIAL FIBRILLATION WITH RVR (HCC): Primary | ICD-10-CM

## 2020-06-16 RX ORDER — OLMESARTAN MEDOXOMIL 40 MG/1
40 TABLET ORAL DAILY
Qty: 90 TABLET | Refills: 2 | Status: SHIPPED | OUTPATIENT
Start: 2020-06-16 | End: 2020-08-05

## 2020-06-28 DIAGNOSIS — I10 BENIGN ESSENTIAL HYPERTENSION: ICD-10-CM

## 2020-06-28 RX ORDER — CLONIDINE 0.2 MG/24H
PATCH, EXTENDED RELEASE TRANSDERMAL
Qty: 4 PATCH | Refills: 0 | Status: SHIPPED | OUTPATIENT
Start: 2020-06-28 | End: 2020-07-28

## 2020-07-07 ENCOUNTER — TELEPHONE (OUTPATIENT)
Dept: NEPHROLOGY | Facility: CLINIC | Age: 55
End: 2020-07-07

## 2020-07-07 DIAGNOSIS — I12.9 HYPERTENSIVE CHRONIC KIDNEY DISEASE WITH STAGE 1 THROUGH STAGE 4 CHRONIC KIDNEY DISEASE, OR UNSPECIFIED CHRONIC KIDNEY DISEASE: ICD-10-CM

## 2020-07-07 DIAGNOSIS — N18.30 CHRONIC KIDNEY DISEASE, STAGE III (MODERATE) (HCC): Primary | ICD-10-CM

## 2020-07-07 DIAGNOSIS — N20.0 NEPHROLITHIASIS: ICD-10-CM

## 2020-07-07 NOTE — TELEPHONE ENCOUNTER
Patient called in requesting a follow up with provider  Patient stated they have not been seen in about a year but knows provider likes lab work done prior to appointments  Patient is scheduled for 8/5/20 @ 8:30 am in [facility] office  Please advise

## 2020-07-07 NOTE — TELEPHONE ENCOUNTER
I would perform the following lab work  CMP/magnesium/phosphorus/PTH intact level/vitamin-D level/urine protein creatinine ratio/if he has not had a lipid profile with another physician I would perform med as well  If there is any question about kidney stones he can do a 24 hour urine stone evaluation as well but if he would prefer to wait until after the visit that would be fine    He also needs to take 1 week a blood pressure readings morning evening, sitting and standing and bring those in or send those in

## 2020-07-20 ENCOUNTER — OFFICE VISIT (OUTPATIENT)
Dept: CARDIOLOGY CLINIC | Facility: CLINIC | Age: 55
End: 2020-07-20
Payer: COMMERCIAL

## 2020-07-20 VITALS
HEART RATE: 50 BPM | DIASTOLIC BLOOD PRESSURE: 90 MMHG | TEMPERATURE: 98.7 F | WEIGHT: 303 LBS | SYSTOLIC BLOOD PRESSURE: 138 MMHG | BODY MASS INDEX: 43.38 KG/M2 | HEIGHT: 70 IN

## 2020-07-20 DIAGNOSIS — I10 BENIGN ESSENTIAL HYPERTENSION: ICD-10-CM

## 2020-07-20 DIAGNOSIS — Z79.899 LONG TERM CURRENT USE OF ANTIARRHYTHMIC DRUG: ICD-10-CM

## 2020-07-20 DIAGNOSIS — Z79.01 ANTICOAGULATION ADEQUATE: ICD-10-CM

## 2020-07-20 DIAGNOSIS — I48.91 ATRIAL FIBRILLATION WITH RVR (HCC): Primary | ICD-10-CM

## 2020-07-20 PROBLEM — R53.83 FATIGUE: Status: ACTIVE | Noted: 2020-07-20

## 2020-07-20 PROCEDURE — 99214 OFFICE O/P EST MOD 30 MIN: CPT | Performed by: INTERNAL MEDICINE

## 2020-07-20 PROCEDURE — 3080F DIAST BP >= 90 MM HG: CPT | Performed by: INTERNAL MEDICINE

## 2020-07-20 PROCEDURE — 93000 ELECTROCARDIOGRAM COMPLETE: CPT | Performed by: INTERNAL MEDICINE

## 2020-07-20 PROCEDURE — 3008F BODY MASS INDEX DOCD: CPT | Performed by: INTERNAL MEDICINE

## 2020-07-20 PROCEDURE — 1036F TOBACCO NON-USER: CPT | Performed by: INTERNAL MEDICINE

## 2020-07-20 PROCEDURE — 3008F BODY MASS INDEX DOCD: CPT | Performed by: UROLOGY

## 2020-07-20 PROCEDURE — 3075F SYST BP GE 130 - 139MM HG: CPT | Performed by: INTERNAL MEDICINE

## 2020-07-20 NOTE — PROGRESS NOTES
EPS Progress Note - Mathieu Nolasco 47 y o  male MRN: [id number]           ASSESSMENT:  1  Atrial fibrillation with RVR (HCC)  POCT ECG   2  Benign essential hypertension     3  Anticoagulation adequate     4  Long term current use of antiarrhythmic drug             PLAN:  1) HTN BP under control but ? Fatigue secondary to antihypertensive agents  2) Tikosyn use stable  Would go back to afib off of AAD  3) anticoagulant appropriate  4) fatigue - possibly side effect of bystolic or clonidine  5) obesity - patient knows he need to lose weight    HPI:   Interim history he is doing well overall  Just fatigued  Falls asleep easily  ROS: fatigue  If he sits in comfortable chair he will fall asleep  No sob or CP all other 12 point ros negative       Objective:     Vitals: Blood pressure 138/90, pulse (!) 50, temperature 98 7 °F (37 1 °C), temperature source Temporal, height 5' 10" (1 778 m), weight (!) 137 kg (303 lb)  , Body mass index is 43 48 kg/m²  ,        Physical Exam:    GEN: Mathieu Nolasco appears well, alert and oriented x 3, pleasant and cooperative he is obese  HEENT: pupils equal, round, and reactive to light; extraocular muscles intact  NECK: supple, no carotid bruits   HEART: regular rhythm, normal S1 and S2, no murmurs, clicks, gallops or rubs   LUNGS: clear to auscultation bilaterally; no wheezes, rales, or rhonchi   ABDOMEN: normal bowel sounds, soft, no tenderness, no distention  EXTREMITIES: peripheral pulses normal; no clubbing, cyanosis, or edema  NEURO: no focal findings   SKIN: normal without suspicious lesions on exposed skin    Medications:      Current Outpatient Medications:     amLODIPine (NORVASC) 5 mg tablet, TAKE 1 TABLET DAILY, Disp: 90 tablet, Rfl: 3    BYSTOLIC 5 MG tablet, TAKE 1 TABLET BY MOUTH EVERY DAY, Disp: 90 tablet, Rfl: 2    cloNIDine (CATAPRES-TTS-2) 0 2 mg/24 hr, APPLY 1 PATCH ONE TIME PER WEEK, Disp: 4 patch, Rfl: 0    dofetilide (TIKOSYN) 500 mcg capsule, TAKE ONE CAPSULE BY MOUTH EVERY 12 HOURS, Disp: 60 capsule, Rfl: 8    olmesartan (BENICAR) 40 mg tablet, Take 1 tablet (40 mg total) by mouth daily, Disp: 90 tablet, Rfl: 2    spironolactone (ALDACTONE) 25 mg tablet, TAKE 1 TABLET BY MOUTH EVERY DAY, Disp: 90 tablet, Rfl: 1    XARELTO 20 MG tablet, TAKE 1 TABLET BY MOUTH EVERY DAY, Disp: 90 tablet, Rfl: 3     Family History   Problem Relation Age of Onset    Atrial fibrillation Mother     Other Mother         blood dyscrasia    Hypertension Mother     Other Father         hypoglycemia     Atrial fibrillation Father     Diabetes Family     Other Family         Thrombocytosis     Social History     Socioeconomic History    Marital status: /Civil Union     Spouse name: Not on file    Number of children: 4    Years of education: 12th grade     Highest education level: Not on file   Occupational History    Not on file   Social Needs    Financial resource strain: Not on file    Food insecurity:     Worry: Not on file     Inability: Not on file    Transportation needs:     Medical: Not on file     Non-medical: Not on file   Tobacco Use    Smoking status: Former Smoker     Last attempt to quit: 1994     Years since quittin 3    Smokeless tobacco: Never Used   Substance and Sexual Activity    Alcohol use: Yes     Comment: occ    Drug use: No    Sexual activity: Not on file   Lifestyle    Physical activity:     Days per week: Not on file     Minutes per session: Not on file    Stress: Not on file   Relationships    Social connections:     Talks on phone: Not on file     Gets together: Not on file     Attends [employer] service: Not on file     Active member of club or organization: Not on file     Attends meetings of clubs or organizations: Not on file     Relationship status: Not on file    Intimate partner violence:     Fear of current or ex partner: Not on file     Emotionally abused: Not on file     Physically abused: Not on file Forced sexual activity: Not on file   Other Topics Concern    Not on file   Social History Narrative    No caffeine use      Social History     Tobacco Use   Smoking Status Former Smoker    Last attempt to quit: 1994    Years since quittin 3   Smokeless Tobacco Never Used     Social History     Substance and Sexual Activity   Alcohol Use Yes    Comment: occ       Labs & Results:  Below is the patient's most recent value for Albumin, ALT, AST, BUN, Calcium, Chloride, Cholesterol, CO2, Creatinine, GFR, Glucose, HDL, Hematocrit, Hemoglobin, Hemoglobin A1C, LDL, Magnesium, Phosphorus, Platelets, Potassium, PSA, Sodium, Triglycerides, and WBC  Lab Results   Component Value Date    ALT 51 2019    AST 16 2019    BUN 21 2019    CALCIUM 9 1 2019     2019    CHOL 177 10/08/2012    CO2 29 2019    CREATININE 1 26 2019    HDL 37 (L) 10/08/2012    HCT 46 9 10/29/2018    HGB 15 6 10/29/2018    HGBA1C 5 8 (H) 2013    MG 2 0 2019    PHOS 3 5 2018     10/29/2018    K 4 7 2019     2015    TRIG 150 (H) 10/08/2012    WBC 9 06 10/29/2018     Note: for a comprehensive list of the patient's lab results, access the Results Review activity  Cardiac testing:   No results found for this or any previous visit  Results for orders placed during the hospital encounter of 17   [facility]    Narrative [address]  [phone]    Transesophageal Echocardiogram  2D, M-mode, Doppler, and Color Doppler    Study date:  2017    Patient: Tamela Mello  MR number: [id number]  Account number: [de-identified]  : 1965  Age: 46 years  Gender: Male  Status: Outpatient  Location: Cath lab  Height: 67 in  Weight: 195 lb  BP: 128/ 64 mmHg    Indications: Atrial fibrillation   Study performed to rule out left atrial thrombus prior to elective electrical cardioversion  Diagnoses: R06 02 - Shortness of breath    Sonographer:  JOAQUÍN Colbert  Interpreting Physician:  Karol Calvin MD  Primary Physician:  Pablo Vasques DO  Referring Physician:  Gerard Lugo MD  Group:  [facility]  Cardiology Fellow:  Steven Gupta MD    SUMMARY    LEFT VENTRICLE:  Systolic function was normal  Ejection fraction was estimated to be 60 %  LEFT ATRIUM:  The atrium was mildly dilated  No thrombus was identified  LEFT ATRIAL APPENDAGE:  The appendage was moderately dilated  No thrombus was identified  ATRIAL SEPTUM:  No defect or patent foramen ovale was identified  No defect or patent foramen ovale was identified  Contrast injection was performed  There was no right-to-left shunt, with provocative maneuvers to increase right atrial pressure  MITRAL VALVE:  There was mild regurgitation  TRICUSPID VALVE:  There was mild regurgitation  HISTORY: PRIOR HISTORY: Atrial fibrillation, previous cardioversions, Hypertension    PROCEDURE: The procedure was performed in the catheterization laboratory  This was a routine study  The risks and alternatives of the procedure were explained to the patient and informed consent was obtained  The transesophageal approach  was used  The study included complete 2D imaging, M-mode, complete spectral Doppler, and color Doppler  The heart rate was 121 bpm, at the start of the study  Intubated with ease  One intubation attempt(s)  There was no blood detected on  the probe  Image quality was adequate  There were no complications during the procedure  MEDICATIONS: Benzocaine spray, topical to oropharynx, prior to procedure  Sedation administered by anesthesia team     LEFT VENTRICLE: Size was normal  Systolic function was normal  Ejection fraction was estimated to be 60 %  This study was inadequate for the evaluation of regional wall motion   Wall thickness was normal  DOPPLER: The study was not  technically sufficient to allow evaluation of LV diastolic function  RIGHT VENTRICLE: The size was normal  Systolic function was normal  Wall thickness was normal     LEFT ATRIUM: The atrium was mildly dilated  No thrombus was identified  APPENDAGE: The appendage was moderately dilated  No thrombus was identified  DOPPLER: The function was normal (normal emptying velocity)  ATRIAL SEPTUM: No defect or patent foramen ovale was identified  No defect or patent foramen ovale was identified  Contrast injection was performed  There was no right-to-left shunt, with provocative maneuvers to increase right atrial  pressure  RIGHT ATRIUM: Size was normal  No thrombus was identified  MITRAL VALVE: Valve structure was normal  There was normal leaflet separation  DOPPLER: There was mild regurgitation  AORTIC VALVE: The valve was trileaflet  Leaflets exhibited normal thickness and normal cuspal separation  DOPPLER: There was no regurgitation  TRICUSPID VALVE: The valve structure was normal  There was normal leaflet separation  DOPPLER: There was mild regurgitation  PULMONIC VALVE: Leaflets exhibited normal thickness, no calcification, and normal cuspal separation  PERICARDIUM: There was no pericardial effusion  AORTA: The root exhibited normal size  There was no atheroma  There was no evidence for dissection  There was no evidence for aneurysm  MEASUREMENT TABLES    DOPPLER MEASUREMENTS  Left atrium   (Reference normals)  VIVEK peak cheli   80 cm/s   (--)    [employer] Accredited Echocardiography Laboratory    Prepared and electronically signed by    Carlos Morgan MD  Signed 05-Apr-2017 16:16:09       No results found for this or any previous visit  No results found for this or any previous visit

## 2020-07-24 ENCOUNTER — DOCUMENTATION (OUTPATIENT)
Dept: NEPHROLOGY | Facility: CLINIC | Age: 55
End: 2020-07-24

## 2020-07-24 ENCOUNTER — APPOINTMENT (OUTPATIENT)
Dept: LAB | Facility: CLINIC | Age: 55
End: 2020-07-24
Payer: COMMERCIAL

## 2020-07-24 ENCOUNTER — TRANSCRIBE ORDERS (OUTPATIENT)
Dept: LAB | Facility: CLINIC | Age: 55
End: 2020-07-24

## 2020-07-24 ENCOUNTER — TELEPHONE (OUTPATIENT)
Dept: NEPHROLOGY | Facility: CLINIC | Age: 55
End: 2020-07-24

## 2020-07-24 DIAGNOSIS — I12.9 HYPERTENSIVE CHRONIC KIDNEY DISEASE WITH STAGE 1 THROUGH STAGE 4 CHRONIC KIDNEY DISEASE, OR UNSPECIFIED CHRONIC KIDNEY DISEASE: ICD-10-CM

## 2020-07-24 DIAGNOSIS — I10 BENIGN ESSENTIAL HYPERTENSION: Primary | ICD-10-CM

## 2020-07-24 DIAGNOSIS — N20.0 NEPHROLITHIASIS: ICD-10-CM

## 2020-07-24 DIAGNOSIS — N18.30 CHRONIC KIDNEY DISEASE, STAGE III (MODERATE) (HCC): ICD-10-CM

## 2020-07-24 LAB
25(OH)D3 SERPL-MCNC: 21.7 NG/ML (ref 30–100)
ALBUMIN SERPL BCP-MCNC: 3.7 G/DL (ref 3.5–5)
ALP SERPL-CCNC: 49 U/L (ref 46–116)
ALT SERPL W P-5'-P-CCNC: 49 U/L (ref 12–78)
ANION GAP SERPL CALCULATED.3IONS-SCNC: 7 MMOL/L (ref 4–13)
AST SERPL W P-5'-P-CCNC: 17 U/L (ref 5–45)
BILIRUB SERPL-MCNC: 0.26 MG/DL (ref 0.2–1)
BUN SERPL-MCNC: 33 MG/DL (ref 5–25)
CALCIUM SERPL-MCNC: 8.6 MG/DL (ref 8.3–10.1)
CHLORIDE SERPL-SCNC: 105 MMOL/L (ref 100–108)
CHOLEST SERPL-MCNC: 171 MG/DL (ref 50–200)
CO2 SERPL-SCNC: 25 MMOL/L (ref 21–32)
CREAT SERPL-MCNC: 1.9 MG/DL (ref 0.6–1.3)
CREAT UR-MCNC: 132 MG/DL
GFR SERPL CREATININE-BSD FRML MDRD: 39 ML/MIN/1.73SQ M
GLUCOSE P FAST SERPL-MCNC: 107 MG/DL (ref 65–99)
HDLC SERPL-MCNC: 34 MG/DL
LDLC SERPL CALC-MCNC: 107 MG/DL (ref 0–100)
MAGNESIUM SERPL-MCNC: 2 MG/DL (ref 1.6–2.6)
NONHDLC SERPL-MCNC: 137 MG/DL
PHOSPHATE SERPL-MCNC: 3 MG/DL (ref 2.7–4.5)
POTASSIUM SERPL-SCNC: 4.5 MMOL/L (ref 3.5–5.3)
PROT SERPL-MCNC: 7.9 G/DL (ref 6.4–8.2)
PROT UR-MCNC: 23 MG/DL
PROT/CREAT UR: 0.17 MG/G{CREAT} (ref 0–0.1)
PTH-INTACT SERPL-MCNC: 31.2 PG/ML (ref 18.4–80.1)
SODIUM SERPL-SCNC: 137 MMOL/L (ref 136–145)
TRIGL SERPL-MCNC: 150 MG/DL

## 2020-07-24 PROCEDURE — 84100 ASSAY OF PHOSPHORUS: CPT

## 2020-07-24 PROCEDURE — 83970 ASSAY OF PARATHORMONE: CPT

## 2020-07-24 PROCEDURE — 36415 COLL VENOUS BLD VENIPUNCTURE: CPT

## 2020-07-24 PROCEDURE — 82570 ASSAY OF URINE CREATININE: CPT

## 2020-07-24 PROCEDURE — 84156 ASSAY OF PROTEIN URINE: CPT

## 2020-07-24 PROCEDURE — 82306 VITAMIN D 25 HYDROXY: CPT

## 2020-07-24 PROCEDURE — 80053 COMPREHEN METABOLIC PANEL: CPT

## 2020-07-24 PROCEDURE — 83735 ASSAY OF MAGNESIUM: CPT

## 2020-07-24 PROCEDURE — 80061 LIPID PANEL: CPT

## 2020-07-24 NOTE — TELEPHONE ENCOUNTER
----- Message from Emani Aguilera MD sent at 7/24/2020 12:07 PM EDT -----  Also, please make sure he is feeling well and not on any new medications such as NSAIDs  Also I would do a urinalysis the microscopic exam along with his basic metabolic profile

## 2020-07-24 NOTE — TELEPHONE ENCOUNTER
----- Message from Luther Nuñez MD sent at 7/24/2020 12:07 PM EDT -----  The patient's creatinine is higher than has been:  1  Please check to see if the patient is feeling well  2  Stop olmesartan  3  Hold spironolactone    4  Repeat a basic metabolic profile in 1 week  5   Please have him wait several days and then take a week a blood pressure readings prior to his appointment morning evening, sitting and standing   TAKE THE MORNING READINGS BEFORE ANY MEDICATIONS AND WHEN YOU ARE RELAX FOR SEVERAL MINUTES  TAKE THE EVENING READINGS BEFORE SUPPER/DINNER AND BEFORE ANY MEDICATIONS AND AGAIN WHEN YOU ARE RELAX FOR SEVERAL MINUTES  PLEASE INCLUDE HEART RATE WITH YOUR BLOOD PRESSURE READINGS  When taking standing readings, keep your arm supported at heart level and not dangling  Make sure you are sitting with your back supported and feet on the ground and do not cross your legs or feet  Make sure you have not taken any coffee or caffeine products or exercised or smoke cigarettes at least 30 minutes before taking your blood pressure

## 2020-07-24 NOTE — TELEPHONE ENCOUNTER
I spoke to the patient, I discussed medication changes and reviewed his labs  Patient is understandable and will have repeat labs in 1 week, I will mail them out  Pt will bring a bp log on his appt date 8/5

## 2020-07-27 DIAGNOSIS — I10 BENIGN ESSENTIAL HYPERTENSION: ICD-10-CM

## 2020-07-28 RX ORDER — CLONIDINE 0.2 MG/24H
PATCH, EXTENDED RELEASE TRANSDERMAL
Qty: 4 PATCH | Refills: 0 | Status: SHIPPED | OUTPATIENT
Start: 2020-07-28 | End: 2020-08-18

## 2020-07-31 ENCOUNTER — TELEPHONE (OUTPATIENT)
Dept: NEPHROLOGY | Facility: CLINIC | Age: 55
End: 2020-07-31

## 2020-07-31 PROBLEM — E55.9 VITAMIN D DEFICIENCY: Status: ACTIVE | Noted: 2020-07-31

## 2020-07-31 PROBLEM — N17.9 ACUTE RENAL FAILURE (HCC): Status: ACTIVE | Noted: 2020-07-31

## 2020-07-31 PROBLEM — E78.5 DYSLIPIDEMIA: Status: ACTIVE | Noted: 2020-07-31

## 2020-07-31 NOTE — TELEPHONE ENCOUNTER
Lm for the patient to have labs drawn again prior to his appt, and possibly have US done prior or when possible

## 2020-07-31 NOTE — PROGRESS NOTES
Virtual Regular Visit      Assessment/Plan:  1  Hypertension:  Most compatible with essential hypertension/resistant hypertension:  Secondary workup:  -normal thyroid function study  -normal sleep study  -aldosterone/renin ratio:  Pending  -plasma free metanephrines:  Pending  -24 hour urine for free cortisol:  Pending  -renal artery duplex:  No evidence of significant renal artery disease; but somewhat limited by body habitus and overlying bowel gas  Again obesity may be contributing to his hypertension  · Current home blood pressure readings:  · -a  m : 164/100 approximate range  · -p  m : 160's/100  · -heart rate: 60's     · GOAL BLOOD PRESSURE:  LESS THAN 130/80 GIVEN CKD     · Recommendations:  · continue to push nonmedical regimen including isotonic exercise, avoidance of salt and weight loss; patient counseled as such  · Medication changes today:  I would increase the amlodipine to 5 mg in the morning and 2 5 mg in the evening  I would strongly push diet exercise and avoidance of salt  I would not add an ACE-inhibitor/ARB at the moment given the elevated creatinine  This will certainly be something in the future  Then of course a diuretic as well but again, I am hesitant given the elevated creatinine and Tikosyn         2  CKD stage 3:    · Baseline creatinine is ranged anywhere from 1 0-1 3:  · 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  · Current creatinine: 1 90 which is higher than usual   No evidence of intrinsic disease with a negative urinalysis and no overt hydronephrosis although potential?  pelvicaliectasis of the right kidney please see below    · Repeat creatinine was 1 80 demonstrating stability possibly new baseline level  · UA:  No proteinuria and no hematuria, 2-4 WBCs  · Renal ultrasound:  · 1 2 cm right renal calculus, several right renal central cystic structures may represent parapelvic simple cysts and/or mild pelvicaliectasis right ureteral jet is visualized and no anna hydronephrosis  Potential CT urogram for follow-up if clinically warranted:  · Bladder calculi largest of 2 6 cm, no significant PVR  · Hepatic steatosis  -electrolytes are normal  -magnesium and calcium and phosphorus are acceptable  -urine protein creatinine ratio is 0 17 g which is normal  -PTH intact level acceptable 31 2  -VITAMIN-D 21 7:  REPLETE mildly given stone disease  · RECOMMEND CT UROGRAM, NO CONTRAST AT THIS TIME TO MAKE SURE NO HYDRONEPHROSIS OF THE RIGHT KIDNEY  · Urine with urine culture given symptoms  · Follow-up with Urology  · Follow-up basic metabolic profile     3  Nephrolithiasis:  As mentioned previously he has had numerous stones  May have passed a stone since last time   -check 24 hour urinary studies which has not been done yet  -low-salt diet  -check CT urogram  -2 5-3 L of water a day  Further recommendations will be forthcoming depending upon the 24 hour urinary studies which is pending    4  Dyslipidemia:  · Current lipid profile:  /HDL 34/triglycerides 150  · Goal:  LDL less than 100 given CKD  Recommend:  · Diet, exercise and weight loss, patient counseled as such  · Push diet and exercise    Patient is not too far from goal   Consider statin therapy if not at goal     Problem List Items Addressed This Visit        Cardiovascular and Mediastinum    Benign essential hypertension - Primary    Relevant Orders    Aldosterone    Renin Direct Assay    Cortisol, Free, Urine, 24 Hour    Metanephrine, Fractionated Plasma Free    Urinalysis with microscopic    US kidney and bladder with pvr (Completed)    Basic metabolic panel    CT renal protocol    Urinalysis with microscopic    Urine culture    Basic metabolic panel    Comprehensive metabolic panel    CBC    Lipid Panel with Direct LDL reflex    Magnesium    Phosphorus    Protein / creatinine ratio, urine    PTH, intact    Urinalysis with microscopic       Genitourinary    Pelvicaliectasis    Relevant Orders    CT renal protocol    Urinalysis with microscopic    Urine culture    Basic metabolic panel    Comprehensive metabolic panel    CBC    Lipid Panel with Direct LDL reflex    Magnesium    Phosphorus    Protein / creatinine ratio, urine    PTH, intact    Urinalysis with microscopic    Chronic kidney disease, stage III (moderate) (HCC)    Relevant Orders    Urinalysis with microscopic    US kidney and bladder with pvr (Completed)    Basic metabolic panel    CT renal protocol    Urinalysis with microscopic    Urine culture    Basic metabolic panel    Comprehensive metabolic panel    CBC    Lipid Panel with Direct LDL reflex    Magnesium    Phosphorus    Protein / creatinine ratio, urine    PTH, intact    Urinalysis with microscopic    Hypertensive chronic kidney disease with stage 1 through stage 4 chronic kidney disease, or unspecified chronic kidney disease    Relevant Orders    Aldosterone    Renin Direct Assay    Cortisol, Free, Urine, 24 Hour    Metanephrine, Fractionated Plasma Free    Urinalysis with microscopic    US kidney and bladder with pvr (Completed)    Basic metabolic panel    CT renal protocol    Urinalysis with microscopic    Urine culture    Basic metabolic panel    Comprehensive metabolic panel    CBC    Lipid Panel with Direct LDL reflex    Magnesium    Phosphorus    Protein / creatinine ratio, urine    PTH, intact    Urinalysis with microscopic    Acute renal failure (HCC)    Relevant Orders    Urinalysis with microscopic    US kidney and bladder with pvr (Completed)    Basic metabolic panel    CT renal protocol    Urinalysis with microscopic    Urine culture    Basic metabolic panel    Comprehensive metabolic panel    CBC    Lipid Panel with Direct LDL reflex    Magnesium    Phosphorus    Protein / creatinine ratio, urine    PTH, intact    Urinalysis with microscopic    STANISLAW (acute kidney injury) ([facility] [address] )    Relevant Orders    CT renal protocol    Urinalysis with microscopic    Urine culture Basic metabolic panel    Comprehensive metabolic panel    CBC    Lipid Panel with Direct LDL reflex    Magnesium    Phosphorus    Protein / creatinine ratio, urine    PTH, intact    Urinalysis with microscopic       Other    Vitamin D deficiency    Relevant Orders    Urinalysis with microscopic    US kidney and bladder with pvr (Completed)    Basic metabolic panel    CT renal protocol    Urinalysis with microscopic    Urine culture    Basic metabolic panel    Comprehensive metabolic panel    CBC    Lipid Panel with Direct LDL reflex    Magnesium    Phosphorus    Protein / creatinine ratio, urine    PTH, intact    Urinalysis with microscopic    Dyslipidemia    Relevant Orders    Urinalysis with microscopic    US kidney and bladder with pvr (Completed)    Basic metabolic panel    CT renal protocol    Urinalysis with microscopic    Urine culture    Basic metabolic panel    Comprehensive metabolic panel    CBC    Lipid Panel with Direct LDL reflex    Magnesium    Phosphorus    Protein / creatinine ratio, urine    PTH, intact    Urinalysis with microscopic      Other Visit Diagnoses     Urgency of urination        Relevant Orders    Urinalysis with microscopic    Urine culture    Basic metabolic panel    Comprehensive metabolic panel    CBC    Lipid Panel with Direct LDL reflex    Magnesium    Phosphorus    Protein / creatinine ratio, urine    PTH, intact    Urinalysis with microscopic               Reason for visit is   Chief Complaint   Patient presents with    Virtual Regular Visit        Encounter provider Dylan Mujica MD    Provider located at [address] [place] [id number]      Recent Visits  Date Type Provider Dept   07/31/20 Telephone [address]   Showing recent visits within past 7 days and meeting all other requirements     Today's Visits  Date Type Provider Dept   08/05/20 Telemedicine Dylan Mujica MD [facility] [place]   Showing today's visits and meeting all other requirements     Future Appointments  No visits were found meeting these conditions  Showing future appointments within next 150 days and meeting all other requirements        The patient was identified by name and date of birth  Marlin Goldsmith was informed that this is a telemedicine visit and that the visit is being conducted through [facility] and patient was informed that this is a secure, HIPAA-compliant platform  He agrees to proceed     My office door was closed  No one else was in the room  He acknowledged consent and understanding of privacy and security of the video platform  The patient has agreed to participate and understands they can discontinue the visit at any time  Patient is aware this is a billable service  Subjective  Marlin Goldsmith is a 47 y o  male with hypertension and CKD stage 3   HPI   There has been no hospitalizations or acute illnesses since last visit  The patient overall is feeling well  No fevers, chills, or cough or colds  Good appetite and poor energy  No hematuria, dysuria,  or foamy urine, but urgency and frequency over the last year (follows with Dr Pan Breath)  No gastrointestinal symptoms  No chest pain, some mild dyspnea on exertion which is chronic, and no leg edema  No headaches, dizziness or lightheadedness  Blood pressure medications:  · Amlodipine 5 mg daily in the morning  · Bystolic 5 mg daily  · Catapres patch 2   Weekly    Past Medical History:   Diagnosis Date    Arthritis     Asthma     Atrial fibrillation ([address] )     Chronic kidney disease     kidney stones    Gout     Hypertension     Muscle weakness        Past Surgical History:   Procedure Laterality Date    APPENDECTOMY      CYSTOSCOPY  08/30/2016    w/removal of object, last assessed 8/30/16    FOOT SURGERY Left     HAND SURGERY      KNEE SURGERY Right 2016    VA CYSTO/URETERO W/LITHOTRIPSY &INDWELL STENT INSRT Left 8/9/2016 Procedure: CYSTOSCOPY URETEROSCOPY WITH LITHOTRIPSY HOLMIUM LASER STONE EXTRACTION, RETROGRADE PYELOGRAM AND INSERTION STENT URETERAL;  Surgeon: Paul Vance MD;  [facility]: [facility];  Service: Urology last assessed 16    WRIST SURGERY Left     Fracture surgery       Current Outpatient Medications   Medication Sig Dispense Refill    amLODIPine (NORVASC) 5 mg tablet TAKE 1 TABLET DAILY 90 tablet 3    BYSTOLIC 5 MG tablet TAKE 1 TABLET BY MOUTH EVERY DAY 90 tablet 2    cloNIDine (CATAPRES-TTS-2) 0 2 mg/24 hr APPLY 1 PATCH ONE TIME PER WEEK 4 patch 0    dofetilide (TIKOSYN) 500 mcg capsule TAKE ONE CAPSULE BY MOUTH EVERY 12 HOURS 60 capsule 8    XARELTO 20 MG tablet TAKE 1 TABLET BY MOUTH EVERY DAY 90 tablet 3     No current facility-administered medications for this visit           No Known Allergies    Review of Systems:  Please see HPI, otherwise review of systems is completely reviewed with the patient are negative    Video Exam    Blood pressure today:  Sittin/101 with a heart rate of 60    Physical Exam       General: obese,  The patient feels comfortable, there is no acute distress/no overt diaphoresis  Skin:  No overt rash/there is no facial flushing or cyanosis and facial color appears normal  Head:  No overt trauma and normal overall appearance  HEENT:  Eyes appear normal without injection or scleral icterus and extraocular movements appear intact; mucous membranes appear moist; oropharynx is clear  Neck:  Trachea appears midline, no overt jugular venous distention,, and Neck is supple  Pulmonary:  Respiratory status appears normal :  The patient is able to speak without any overt shortness of breath and can speak in full sentences without any audible wheezing or stridor    Cardiovascular:  Heart rate is regular per patient/family/caregiver  Abdomen: obese,  No tenderness as patient/family/caregiver press on the abdominal area with no overt distension  Musculoskeletal:  No overt edema as the patient/family/caregiver presses on the legs or by visualization; no significant tenderness elicited/no significant arthritic changes of the hands (right 5th finger slightly bent from an old injury)  Neuro:  Patient appears to be able to move all extremities with no gross focality  Psych:  Patient is alert and oriented and appropriate and fully cooperative    I reviewed all the patient's medications with the patient during this visit  I have reviewed all the above findings and plans with the patient  I have answered the patient's questions to their complete satisfaction  I informed the patient that we will be mailing out instructions compatible with an after visit summary along with lab slips as needed  The patient was satisfied with all of the information as well as the visit  I spent 40 minutes with patient today in which greater than 50% of the time was spent in counseling/coordination of care regarding The AK I/possible right hydronephrosis/elevated blood pressure      VIRTUAL VISIT [address] [facility] [place] acknowledges that he has consented to an online visit or consultation  He understands that the online visit is based solely on information provided by him, and that, in the absence of a face-to-face physical evaluation by the physician, the diagnosis he receives is both limited and provisional in terms of accuracy and completeness  This is not intended to replace a full medical face-to-face evaluation by the physician  [facility] understands and accepts these terms

## 2020-07-31 NOTE — TELEPHONE ENCOUNTER
----- Message from Ya Moses MD sent at 7/31/2020  8:25 AM EDT -----  IN REVIEW THE PATIENT FOR PRE CHARTING HIS CREATININE IS ABOVE HIS BASELINE 1 90   -I HAVE ORDERED REPEAT BASIC METABOLIC PROFILE INCLUDING AN ALDOSTERONE/RENIN RATIO AND PLASMA FREE METANEPHRINES    THIS NEEDS TO BE DONE ABOUT 0800 HOURS IN THE MORNING HAVING BEEN WALKING AROUND FOR ABOUT AN HOUR BUT IT IS NONFASTING WITH GOOD HYDRATION    -I ALSO ORDERED A URINALYSIS WITH MICROSCOPIC WHICH HE CAN DO AT THE SAME TIME    -I ALSO ORDERED A RENAL ULTRASOUND WITH PVR BECAUSE THE SLIGHT ELEVATION IN CREATININE    THANKS

## 2020-08-03 ENCOUNTER — APPOINTMENT (OUTPATIENT)
Dept: LAB | Facility: AMBULARY SURGERY CENTER | Age: 55
End: 2020-08-03
Payer: COMMERCIAL

## 2020-08-03 ENCOUNTER — HOSPITAL ENCOUNTER (OUTPATIENT)
Dept: RADIOLOGY | Age: 55
Discharge: HOME | End: 2020-08-03
Payer: COMMERCIAL

## 2020-08-03 DIAGNOSIS — N17.9 ACUTE RENAL FAILURE, UNSPECIFIED ACUTE RENAL FAILURE TYPE (HCC): ICD-10-CM

## 2020-08-03 DIAGNOSIS — E78.5 DYSLIPIDEMIA: ICD-10-CM

## 2020-08-03 DIAGNOSIS — N18.30 CHRONIC KIDNEY DISEASE, STAGE III (MODERATE) (HCC): ICD-10-CM

## 2020-08-03 DIAGNOSIS — E55.9 VITAMIN D DEFICIENCY: ICD-10-CM

## 2020-08-03 DIAGNOSIS — I10 BENIGN ESSENTIAL HYPERTENSION: ICD-10-CM

## 2020-08-03 DIAGNOSIS — N20.0 NEPHROLITHIASIS: ICD-10-CM

## 2020-08-03 DIAGNOSIS — I12.9 HYPERTENSIVE CHRONIC KIDNEY DISEASE WITH STAGE 1 THROUGH STAGE 4 CHRONIC KIDNEY DISEASE, OR UNSPECIFIED CHRONIC KIDNEY DISEASE: ICD-10-CM

## 2020-08-03 LAB
ANION GAP SERPL CALCULATED.3IONS-SCNC: 4 MMOL/L (ref 4–13)
BACTERIA UR QL AUTO: ABNORMAL /HPF
BILIRUB UR QL STRIP: NEGATIVE
BUN SERPL-MCNC: 30 MG/DL (ref 5–25)
CALCIUM SERPL-MCNC: 9.5 MG/DL (ref 8.3–10.1)
CHLORIDE SERPL-SCNC: 108 MMOL/L (ref 100–108)
CLARITY UR: CLEAR
CO2 SERPL-SCNC: 26 MMOL/L (ref 21–32)
COLOR UR: YELLOW
CREAT SERPL-MCNC: 1.8 MG/DL (ref 0.6–1.3)
GFR SERPL CREATININE-BSD FRML MDRD: 42 ML/MIN/1.73SQ M
GLUCOSE P FAST SERPL-MCNC: 101 MG/DL (ref 65–99)
GLUCOSE UR STRIP-MCNC: NEGATIVE MG/DL
HGB UR QL STRIP.AUTO: NEGATIVE
HYALINE CASTS #/AREA URNS LPF: ABNORMAL /LPF
KETONES UR STRIP-MCNC: NEGATIVE MG/DL
LEUKOCYTE ESTERASE UR QL STRIP: NEGATIVE
NITRITE UR QL STRIP: NEGATIVE
NON-SQ EPI CELLS URNS QL MICRO: ABNORMAL /HPF
PH UR STRIP.AUTO: 6 [PH]
POTASSIUM SERPL-SCNC: 4.3 MMOL/L (ref 3.5–5.3)
PROT UR STRIP-MCNC: NEGATIVE MG/DL
RBC #/AREA URNS AUTO: ABNORMAL /HPF
SODIUM SERPL-SCNC: 138 MMOL/L (ref 136–145)
SP GR UR STRIP.AUTO: 1.01 (ref 1–1.03)
UROBILINOGEN UR QL STRIP.AUTO: 0.2 E.U./DL
WBC #/AREA URNS AUTO: ABNORMAL /HPF

## 2020-08-03 PROCEDURE — 80048 BASIC METABOLIC PNL TOTAL CA: CPT

## 2020-08-03 PROCEDURE — 82088 ASSAY OF ALDOSTERONE: CPT

## 2020-08-03 PROCEDURE — 36415 COLL VENOUS BLD VENIPUNCTURE: CPT

## 2020-08-03 PROCEDURE — 51798 US URINE CAPACITY MEASURE: CPT

## 2020-08-03 PROCEDURE — 81001 URINALYSIS AUTO W/SCOPE: CPT

## 2020-08-04 PROBLEM — N17.9 AKI (ACUTE KIDNEY INJURY) (HCC): Status: ACTIVE | Noted: 2020-08-04

## 2020-08-05 ENCOUNTER — TELEPHONE (OUTPATIENT)
Dept: NEPHROLOGY | Facility: CLINIC | Age: 55
End: 2020-08-05

## 2020-08-05 ENCOUNTER — TELEPHONE (OUTPATIENT)
Dept: FAMILY MEDICINE CLINIC | Facility: CLINIC | Age: 55
End: 2020-08-05

## 2020-08-05 ENCOUNTER — APPOINTMENT (OUTPATIENT)
Dept: LAB | Facility: CLINIC | Age: 55
End: 2020-08-05
Payer: COMMERCIAL

## 2020-08-05 ENCOUNTER — TELEMEDICINE (OUTPATIENT)
Dept: NEPHROLOGY | Facility: CLINIC | Age: 55
End: 2020-08-05
Payer: COMMERCIAL

## 2020-08-05 DIAGNOSIS — N17.9 AKI (ACUTE KIDNEY INJURY) (HCC): ICD-10-CM

## 2020-08-05 DIAGNOSIS — F41.8 SITUATIONAL ANXIETY: Primary | ICD-10-CM

## 2020-08-05 DIAGNOSIS — N17.9 ACUTE RENAL FAILURE, UNSPECIFIED ACUTE RENAL FAILURE TYPE (HCC): ICD-10-CM

## 2020-08-05 DIAGNOSIS — R39.15 URGENCY OF URINATION: ICD-10-CM

## 2020-08-05 DIAGNOSIS — N18.30 CHRONIC KIDNEY DISEASE, STAGE III (MODERATE) (HCC): ICD-10-CM

## 2020-08-05 DIAGNOSIS — I10 BENIGN ESSENTIAL HYPERTENSION: ICD-10-CM

## 2020-08-05 DIAGNOSIS — I12.9 HYPERTENSIVE CHRONIC KIDNEY DISEASE WITH STAGE 1 THROUGH STAGE 4 CHRONIC KIDNEY DISEASE, OR UNSPECIFIED CHRONIC KIDNEY DISEASE: ICD-10-CM

## 2020-08-05 DIAGNOSIS — E78.5 DYSLIPIDEMIA: ICD-10-CM

## 2020-08-05 DIAGNOSIS — I10 BENIGN ESSENTIAL HYPERTENSION: Primary | ICD-10-CM

## 2020-08-05 DIAGNOSIS — E55.9 VITAMIN D DEFICIENCY: ICD-10-CM

## 2020-08-05 DIAGNOSIS — N20.0 NEPHROLITHIASIS: ICD-10-CM

## 2020-08-05 DIAGNOSIS — N28.89 PELVICALIECTASIS: ICD-10-CM

## 2020-08-05 PROCEDURE — 3080F DIAST BP >= 90 MM HG: CPT | Performed by: INTERNAL MEDICINE

## 2020-08-05 PROCEDURE — 82340 ASSAY OF CALCIUM IN URINE: CPT

## 2020-08-05 PROCEDURE — 1036F TOBACCO NON-USER: CPT | Performed by: INTERNAL MEDICINE

## 2020-08-05 PROCEDURE — 84133 ASSAY OF URINE POTASSIUM: CPT

## 2020-08-05 PROCEDURE — 99215 OFFICE O/P EST HI 40 MIN: CPT | Performed by: INTERNAL MEDICINE

## 2020-08-05 PROCEDURE — 82570 ASSAY OF URINE CREATININE: CPT

## 2020-08-05 PROCEDURE — 83945 ASSAY OF OXALATE: CPT

## 2020-08-05 PROCEDURE — 83935 ASSAY OF URINE OSMOLALITY: CPT

## 2020-08-05 PROCEDURE — 82507 ASSAY OF CITRATE: CPT

## 2020-08-05 PROCEDURE — 81003 URINALYSIS AUTO W/O SCOPE: CPT

## 2020-08-05 PROCEDURE — 82131 AMINO ACIDS SINGLE QUANT: CPT

## 2020-08-05 PROCEDURE — 84105 ASSAY OF URINE PHOSPHORUS: CPT

## 2020-08-05 PROCEDURE — 83735 ASSAY OF MAGNESIUM: CPT

## 2020-08-05 PROCEDURE — 84392 ASSAY OF URINE SULFATE: CPT

## 2020-08-05 PROCEDURE — 3075F SYST BP GE 130 - 139MM HG: CPT | Performed by: INTERNAL MEDICINE

## 2020-08-05 PROCEDURE — 84560 ASSAY OF URINE/URIC ACID: CPT

## 2020-08-05 PROCEDURE — 84300 ASSAY OF URINE SODIUM: CPT

## 2020-08-05 PROCEDURE — 87086 URINE CULTURE/COLONY COUNT: CPT

## 2020-08-05 PROCEDURE — 82530 CORTISOL FREE: CPT

## 2020-08-05 PROCEDURE — 82436 ASSAY OF URINE CHLORIDE: CPT

## 2020-08-05 PROCEDURE — 82140 ASSAY OF AMMONIA: CPT

## 2020-08-05 RX ORDER — LORAZEPAM 0.5 MG/1
TABLET ORAL
Qty: 2 TABLET | Refills: 0 | Status: SHIPPED | OUTPATIENT
Start: 2020-08-05 | End: 2020-09-03

## 2020-08-05 NOTE — TELEPHONE ENCOUNTER
LM for patient to call the office back to go over check out paper work  Patient needs a CT I need to know which location he would like to go to  Please schedule for this week or next week

## 2020-08-05 NOTE — LETTER
August 5, 2020     Hal Mcgowan MD  [address] [place] [address]    Patient: Marlin Goldsmith   YOB: 1965   Date of Visit: 8/5/2020       Dear Dr Preethi Mcgowan: Thank you for referring Kenyetta Oreilly to me for evaluation  Below are my notes for this consultation  If you have questions, please do not hesitate to call me  I look forward to following your patient along with you  Sincerely,        Michelle Gunderson MD        CC: Geraldine Khan, MD Michelle Reis MD  8/5/2020  9:10 AM  Sign when Signing Visit              Virtual Regular Visit      Assessment/Plan:  1  Hypertension:  Most compatible with essential hypertension/resistant hypertension:  Secondary workup:  -normal thyroid function study  -normal sleep study  -aldosterone/renin ratio:  Pending  -plasma free metanephrines:  Pending  -24 hour urine for free cortisol:  Pending  -renal artery duplex:  No evidence of significant renal artery disease; but somewhat limited by body habitus and overlying bowel gas  Again obesity may be contributing to his hypertension  · Current home blood pressure readings:  · -a  m : 164/100 approximate range  · -p  m : 160's/100  · -heart rate: 60's     · GOAL BLOOD PRESSURE:  LESS THAN 130/80 GIVEN CKD     · Recommendations:  · continue to push nonmedical regimen including isotonic exercise, avoidance of salt and weight loss; patient counseled as such  · Medication changes today:  I would increase the amlodipine to 5 mg in the morning and 2 5 mg in the evening  I would strongly push diet exercise and avoidance of salt  I would not add an ACE-inhibitor/ARB at the moment given the elevated creatinine  This will certainly be something in the future  Then of course a diuretic as well but again, I am hesitant given the elevated creatinine and Tikosyn         2   CKD stage 3:    · Baseline creatinine is ranged anywhere from 1 0-1 3:  · 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  · Current creatinine: 1 90 which is higher than usual   No evidence of intrinsic disease with a negative urinalysis and no overt hydronephrosis although potential?  pelvicaliectasis of the right kidney please see below  · Repeat creatinine was 1 80 demonstrating stability possibly new baseline level  · UA:  No proteinuria and no hematuria, 2-4 WBCs  · Renal ultrasound:  · 1 2 cm right renal calculus, several right renal central cystic structures may represent parapelvic simple cysts and/or mild pelvicaliectasis right ureteral jet is visualized and no anna hydronephrosis  Potential CT urogram for follow-up if clinically warranted:  · Bladder calculi largest of 2 6 cm, no significant PVR  · Hepatic steatosis  -electrolytes are normal  -magnesium and calcium and phosphorus are acceptable  -urine protein creatinine ratio is 0 17 g which is normal  -PTH intact level acceptable 31 2  -VITAMIN-D 21 7:  REPLETE mildly given stone disease  · RECOMMEND CT UROGRAM, NO CONTRAST AT THIS TIME TO MAKE SURE NO HYDRONEPHROSIS OF THE RIGHT KIDNEY  · Urine with urine culture given symptoms  · Follow-up with Urology  · Follow-up basic metabolic profile     3  Nephrolithiasis:  As mentioned previously he has had numerous stones  May have passed a stone since last time   -check 24 hour urinary studies which has not been done yet  -low-salt diet  -check CT urogram  -2 5-3 L of water a day  Further recommendations will be forthcoming depending upon the 24 hour urinary studies which is pending    4  Dyslipidemia:  · Current lipid profile:  /HDL 34/triglycerides 150  · Goal:  LDL less than 100 given CKD  Recommend:  · Diet, exercise and weight loss, patient counseled as such  · Push diet and exercise    Patient is not too far from goal   Consider statin therapy if not at goal     Problem List Items Addressed This Visit        Cardiovascular and Mediastinum    Benign essential hypertension - Primary    Relevant Orders    Aldosterone    Renin Direct Assay    Cortisol, Free, Urine, 24 Hour    Metanephrine, Fractionated Plasma Free    Urinalysis with microscopic    US kidney and bladder with pvr (Completed)    Basic metabolic panel    CT renal protocol    Urinalysis with microscopic    Urine culture    Basic metabolic panel    Comprehensive metabolic panel    CBC    Lipid Panel with Direct LDL reflex    Magnesium    Phosphorus    Protein / creatinine ratio, urine    PTH, intact    Urinalysis with microscopic       Genitourinary    Pelvicaliectasis    Relevant Orders    CT renal protocol    Urinalysis with microscopic    Urine culture    Basic metabolic panel    Comprehensive metabolic panel    CBC    Lipid Panel with Direct LDL reflex    Magnesium    Phosphorus    Protein / creatinine ratio, urine    PTH, intact    Urinalysis with microscopic    Chronic kidney disease, stage III (moderate) ([facility])    Relevant Orders    Urinalysis with microscopic    US kidney and bladder with pvr (Completed)    Basic metabolic panel    CT renal protocol    Urinalysis with microscopic    Urine culture    Basic metabolic panel    Comprehensive metabolic panel    CBC    Lipid Panel with Direct LDL reflex    Magnesium    Phosphorus    Protein / creatinine ratio, urine    PTH, intact    Urinalysis with microscopic    Hypertensive chronic kidney disease with stage 1 through stage 4 chronic kidney disease, or unspecified chronic kidney disease    Relevant Orders    Aldosterone    Renin Direct Assay    Cortisol, Free, Urine, 24 Hour    Metanephrine, Fractionated Plasma Free    Urinalysis with microscopic    US kidney and bladder with pvr (Completed)    Basic metabolic panel    CT renal protocol    Urinalysis with microscopic    Urine culture    Basic metabolic panel    Comprehensive metabolic panel    CBC    Lipid Panel with Direct LDL reflex    Magnesium    Phosphorus    Protein / creatinine ratio, urine    PTH, intact    Urinalysis with microscopic Acute renal failure (HCC)    Relevant Orders    Urinalysis with microscopic    US kidney and bladder with pvr (Completed)    Basic metabolic panel    CT renal protocol    Urinalysis with microscopic    Urine culture    Basic metabolic panel    Comprehensive metabolic panel    CBC    Lipid Panel with Direct LDL reflex    Magnesium    Phosphorus    Protein / creatinine ratio, urine    PTH, intact    Urinalysis with microscopic    STANISLAW (acute kidney injury) ([facility] [address] )    Relevant Orders    CT renal protocol    Urinalysis with microscopic    Urine culture    Basic metabolic panel    Comprehensive metabolic panel    CBC    Lipid Panel with Direct LDL reflex    Magnesium    Phosphorus    Protein / creatinine ratio, urine    PTH, intact    Urinalysis with microscopic       Other    Vitamin D deficiency    Relevant Orders    Urinalysis with microscopic    US kidney and bladder with pvr (Completed)    Basic metabolic panel    CT renal protocol    Urinalysis with microscopic    Urine culture    Basic metabolic panel    Comprehensive metabolic panel    CBC    Lipid Panel with Direct LDL reflex    Magnesium    Phosphorus    Protein / creatinine ratio, urine    PTH, intact    Urinalysis with microscopic    Dyslipidemia    Relevant Orders    Urinalysis with microscopic    US kidney and bladder with pvr (Completed)    Basic metabolic panel    CT renal protocol    Urinalysis with microscopic    Urine culture    Basic metabolic panel    Comprehensive metabolic panel    CBC    Lipid Panel with Direct LDL reflex    Magnesium    Phosphorus    Protein / creatinine ratio, urine    PTH, intact    Urinalysis with microscopic      Other Visit Diagnoses     Urgency of urination        Relevant Orders    Urinalysis with microscopic    Urine culture    Basic metabolic panel    Comprehensive metabolic panel    CBC    Lipid Panel with Direct LDL reflex    Magnesium    Phosphorus    Protein / creatinine ratio, urine    PTH, intact    Urinalysis with microscopic               Reason for visit is   Chief Complaint   Patient presents with    Virtual Regular Visit        Encounter provider Gabriela Escobedo MD    Provider located at [address] [place] [address]  Naomi TREVINO [id number]      Recent Visits  Date Type Provider Dept   07/31/20 Telephone Shun Lopez recent visits within past 7 days and meeting all other requirements     Today's Visits  Date Type Provider Dept   08/05/20 Telemedicine Gabriela Escobedo MD [facility] today's visits and meeting all other requirements     Future Appointments  No visits were found meeting these conditions  Showing future appointments within next 150 days and meeting all other requirements        The patient was identified by name and date of birth  Aniya Leon was informed that this is a telemedicine visit and that the visit is being conducted through [facility] and patient was informed that this is a secure, HIPAA-compliant platform  He agrees to proceed     My office door was closed  No one else was in the room  He acknowledged consent and understanding of privacy and security of the video platform  The patient has agreed to participate and understands they can discontinue the visit at any time  Patient is aware this is a billable service  Subjective  Aniya Leon is a 47 y o  male with hypertension and CKD stage 3   HPI   There has been no hospitalizations or acute illnesses since last visit  The patient overall is feeling well  No fevers, chills, or cough or colds    Good appetite and poor energy  No hematuria, dysuria,  or foamy urine, but urgency and frequency over the last year (follows with Dr Rashard Trujillo)  No gastrointestinal symptoms  No chest pain, some mild dyspnea on exertion which is chronic, and no leg edema  No headaches, dizziness or lightheadedness  Blood pressure medications:  · Amlodipine 5 mg daily in the morning  · Bystolic 5 mg daily  · Catapres patch 2  Weekly    Past Medical History:   Diagnosis Date    Arthritis     Asthma     Atrial fibrillation ([address] )     Chronic kidney disease     kidney stones    Gout     Hypertension     Muscle weakness        Past Surgical History:   Procedure Laterality Date    APPENDECTOMY      CYSTOSCOPY  2016    w/removal of object, last assessed 16    FOOT SURGERY Left     HAND SURGERY      KNEE SURGERY Right 2016    GA CYSTO/URETERO W/LITHOTRIPSY &INDWELL STENT INSRT Left 2016    Procedure: CYSTOSCOPY URETEROSCOPY WITH LITHOTRIPSY HOLMIUM LASER STONE EXTRACTION, RETROGRADE PYELOGRAM AND INSERTION STENT URETERAL;  Surgeon: Malou Sawyer MD;  [facility];  Service: Urology last assessed 16    WRIST SURGERY Left     Fracture surgery       Current Outpatient Medications   Medication Sig Dispense Refill    amLODIPine (NORVASC) 5 mg tablet TAKE 1 TABLET DAILY 90 tablet 3    BYSTOLIC 5 MG tablet TAKE 1 TABLET BY MOUTH EVERY DAY 90 tablet 2    cloNIDine (CATAPRES-TTS-2) 0 2 mg/24 hr APPLY 1 PATCH ONE TIME PER WEEK 4 patch 0    dofetilide (TIKOSYN) 500 mcg capsule TAKE ONE CAPSULE BY MOUTH EVERY 12 HOURS 60 capsule 8    XARELTO 20 MG tablet TAKE 1 TABLET BY MOUTH EVERY DAY 90 tablet 3     No current facility-administered medications for this visit           No Known Allergies    Review of Systems:  Please see HPI, otherwise review of systems is completely reviewed with the patient are negative    Video Exam    Blood pressure today:  Sittin/101 with a heart rate of 60    Physical Exam       General: obese,  The patient feels comfortable, there is no acute distress/no overt diaphoresis  Skin:  No overt rash/there is no facial flushing or cyanosis and facial color appears normal  Head:  No overt trauma and normal overall appearance  HEENT:  Eyes appear normal without injection or scleral icterus and extraocular movements appear intact; mucous membranes appear moist; oropharynx is clear  Neck:  Trachea appears midline, no overt jugular venous distention,, and Neck is supple  Pulmonary:  Respiratory status appears normal :  The patient is able to speak without any overt shortness of breath and can speak in full sentences without any audible wheezing or stridor    Cardiovascular:  Heart rate is regular per patient/family/caregiver  Abdomen: obese,  No tenderness as patient/family/caregiver press on the abdominal area with no overt distension  Musculoskeletal:  No overt edema as the patient/family/caregiver presses on the legs or by visualization; no significant tenderness elicited/no significant arthritic changes of the hands (right 5th finger slightly bent from an old injury)  Neuro:  Patient appears to be able to move all extremities with no gross focality  Psych:  Patient is alert and oriented and appropriate and fully cooperative    I reviewed all the patient's medications with the patient during this visit  I have reviewed all the above findings and plans with the patient  I have answered the patient's questions to their complete satisfaction  I informed the patient that we will be mailing out instructions compatible with an after visit summary along with lab slips as needed  The patient was satisfied with all of the information as well as the visit  I spent 40 minutes with patient today in which greater than 50% of the time was spent in counseling/coordination of care regarding The AK I/possible right hydronephrosis/elevated blood pressure      VIRTUAL VISIT [address] [facility] [place] acknowledges that he has consented to an online visit or consultation  He understands that the online visit is based solely on information provided by him, and that, in the absence of a face-to-face physical evaluation by the physician, the diagnosis he receives is both limited and provisional in terms of accuracy and completeness   This is not intended to replace a full medical face-to-face evaluation by the physician  Eddy Mooney understands and accepts these terms

## 2020-08-05 NOTE — TELEPHONE ENCOUNTER
----- Message from Jayesh Alfaro MD sent at 8/5/2020  9:03 AM EDT -----  Good morning,  Our mutual patient has an elevated creatinine? Right hydronephrosis, I am sending him for CT without contrast at this time  According to the patient he gets very anxious during the study could you possibly prescribed an antianxiety medication pre procedure        Many thanks,  Celeste Frost

## 2020-08-05 NOTE — PATIENT INSTRUCTIONS
1  Medication changes today:   Increase the amlodipine to 5 mg and 2 5 mg in the evening but watch for leg swelling   Please take vitamin-D 2000 units daily over-the-counter    2  Please go for a CT of your kidneys now, we will help to arrange for this!!  3  Please go for a urine/urine culture and repeat labs early next week nonfasting  4  Please make an appointment to follow up with Dr Alexandra Gonsales  I did make him aware of all the information    5  Please take 1 week a blood pressure readings in 3 weeks ,  MORNING AND EVENING, SITTING AND STANDING as follows:  · TAKE THE MORNING READINGS BEFORE ANY MEDICATIONS AND WHEN YOU ARE RELAX FOR SEVERAL MINUTES  · TAKE THE EVENING READINGS BEFORE SUPPER/DINNER AND BEFORE ANY MEDICATIONS AND AGAIN WHEN YOU ARE RELAX FOR SEVERAL MINUTES  · PLEASE INCLUDE HEART RATE WITH YOUR BLOOD PRESSURE READINGS  · When taking standing readings, keep your arm supported at heart level and not dangling  · Make sure you are sitting with your back supported and feet on the ground and do not cross your legs or feet  · Make sure you have not taken any coffee or caffeine products or exercised or smoke cigarettes at least 30 minutes before taking your blood pressure  Then please mail these readings into the office    6  Follow-up in 3 months   Please bring in 1 week a blood pressure readings morning evening, sitting and standing is outlined above   PLEASE BRING IN YOUR BLOOD PRESSURE MACHINE FOR CORRELATION WITH THE OFFICE MACHINE AT THE NEXT VISIT   Please go for fasting lab work 1-2 weeks prior to your appointment      7  General instructions:   AVOID SALT BUT NOT ADDING AN READING LABELS TO MAKE SURE THERE IS LOW-SALT IN THE FOOD THAT YOU ARE EATING     Avoid nonsteroidal anti-inflammatory drugs such as Naprosyn, ibuprofen, Aleve, Advil, Celebrex, Meloxicam (Mobic) etc   You can use Tylenol as needed if you do not have any liver condition to be concerned about     Avoid medications such as Sudafed or decongestants and antihistamines that contained the D component which is the decongestant  You can take antihistamines without the decongestant or D component   Try to avoid medications such as pantoprazole or  Protonix/Nexium or Esomeprazole)/Prilosec or omeprazole/Prevacid or lansoprazole/AcipHex or Rabeprazole  If you are able to, use Pepcid as this is safer for your kidneys   Try to exercise at least 30 minutes 3 days a week to begin with with an ultimate goal of 5 days a week for at least 30 minutes     Try to lose 5-10 lb by your next visit     Please do not drink more than 2 glasses of alcohol/wine on a daily basis as this may contribute to your high blood pressure

## 2020-08-06 LAB — BACTERIA UR CULT: NORMAL

## 2020-08-07 ENCOUNTER — TELEPHONE (OUTPATIENT)
Dept: NEPHROLOGY | Facility: CLINIC | Age: 55
End: 2020-08-07

## 2020-08-07 DIAGNOSIS — I10 ESSENTIAL HYPERTENSION: ICD-10-CM

## 2020-08-07 LAB — ALDOST SERPL-MCNC: 12.8 NG/DL (ref 0–30)

## 2020-08-07 RX ORDER — AMLODIPINE BESYLATE 5 MG/1
TABLET ORAL
Qty: 90 TABLET | Refills: 3 | Status: SHIPPED | OUTPATIENT
Start: 2020-08-07 | End: 2020-12-01

## 2020-08-07 NOTE — TELEPHONE ENCOUNTER
----- Message from Raymond Ernst MD sent at 8/5/2020  8:47 AM EDT -----  The patient was supposed to go for an aldosterone/renin ratio and plasma free metanephrines; a look at the labs it appears he went for now Aldosterone only not a renin or plasma free metanephrines  Could you please inquire, if we need to we may have to resend the aldosterone/renin and plasma free metanephrines and he will go for those  This is extremely important

## 2020-08-07 NOTE — TELEPHONE ENCOUNTER
Per Breann at lab link someone at [facility] canceled the orders and placed a redraw request for the aldosterone/renin and the metanephrine labs

## 2020-08-08 LAB
CORTIS F 24H UR-MRATE: 38 UG/24 HR (ref 5–64)
CORTIS F UR-MCNC: 21 UG/L

## 2020-08-10 ENCOUNTER — TELEPHONE (OUTPATIENT)
Dept: NEPHROLOGY | Facility: CLINIC | Age: 55
End: 2020-08-10

## 2020-08-10 ENCOUNTER — APPOINTMENT (OUTPATIENT)
Dept: LAB | Facility: CLINIC | Age: 55
End: 2020-08-10
Payer: COMMERCIAL

## 2020-08-10 ENCOUNTER — TELEPHONE (OUTPATIENT)
Dept: UROLOGY | Facility: AMBULATORY SURGERY CENTER | Age: 55
End: 2020-08-10

## 2020-08-10 DIAGNOSIS — I12.9 HYPERTENSIVE CHRONIC KIDNEY DISEASE WITH STAGE 1 THROUGH STAGE 4 CHRONIC KIDNEY DISEASE, OR UNSPECIFIED CHRONIC KIDNEY DISEASE: ICD-10-CM

## 2020-08-10 DIAGNOSIS — N17.9 AKI (ACUTE KIDNEY INJURY) (HCC): ICD-10-CM

## 2020-08-10 DIAGNOSIS — N17.9 ACUTE RENAL FAILURE, UNSPECIFIED ACUTE RENAL FAILURE TYPE (HCC): ICD-10-CM

## 2020-08-10 DIAGNOSIS — E55.9 VITAMIN D DEFICIENCY: ICD-10-CM

## 2020-08-10 DIAGNOSIS — N18.30 CHRONIC KIDNEY DISEASE, STAGE III (MODERATE) (HCC): ICD-10-CM

## 2020-08-10 DIAGNOSIS — I10 BENIGN ESSENTIAL HYPERTENSION: ICD-10-CM

## 2020-08-10 DIAGNOSIS — N28.89 PELVICALIECTASIS: ICD-10-CM

## 2020-08-10 DIAGNOSIS — R39.15 URGENCY OF URINATION: ICD-10-CM

## 2020-08-10 DIAGNOSIS — E78.5 DYSLIPIDEMIA: ICD-10-CM

## 2020-08-10 DIAGNOSIS — N20.0 NEPHROLITHIASIS: ICD-10-CM

## 2020-08-10 LAB
ANION GAP SERPL CALCULATED.3IONS-SCNC: 6 MMOL/L (ref 4–13)
BUN SERPL-MCNC: 21 MG/DL (ref 5–25)
CALCIUM SERPL-MCNC: 8.5 MG/DL (ref 8.3–10.1)
CHLORIDE SERPL-SCNC: 102 MMOL/L (ref 100–108)
CO2 SERPL-SCNC: 28 MMOL/L (ref 21–32)
CREAT SERPL-MCNC: 1.87 MG/DL (ref 0.6–1.3)
GFR SERPL CREATININE-BSD FRML MDRD: 40 ML/MIN/1.73SQ M
GLUCOSE P FAST SERPL-MCNC: 111 MG/DL (ref 65–99)
POTASSIUM SERPL-SCNC: 4.2 MMOL/L (ref 3.5–5.3)
SODIUM SERPL-SCNC: 136 MMOL/L (ref 136–145)

## 2020-08-10 PROCEDURE — 83835 ASSAY OF METANEPHRINES: CPT

## 2020-08-10 PROCEDURE — 36415 COLL VENOUS BLD VENIPUNCTURE: CPT

## 2020-08-10 PROCEDURE — 80048 BASIC METABOLIC PNL TOTAL CA: CPT

## 2020-08-10 PROCEDURE — 84244 ASSAY OF RENIN: CPT

## 2020-08-10 NOTE — TELEPHONE ENCOUNTER
Message   Received: Today   Message Contents   MD Raymond Heard MD; MEENAKSHI Thomas,     Can can you make an appt for Michael Dickerson with me for PVR and to review his CT scan that renal has ordered   We have not seen him in the office since 3/17 so technically he is a "new patient" visit  Gregg Riggs    Previous Messages     ----- Message -----   From: Raymond Ernst MD   Sent: 8/5/2020   8:51 AM EDT   To: Cristi Eldridge MD     Good morning anna! Our mutual patient had an ultrasound demonstrating right pelvicaliectasis with recommendation for CT urogram which I will order       Of note:  He also is complaining of frequency and urgency so I will order a urine culture at this time along with the urinalysis     Finally, his creatinine is somewhat elevated want make sure obstructive uropathy is not contributing     I appreciate your help on this matter as always,     Julieth Rodríguez

## 2020-08-10 NOTE — TELEPHONE ENCOUNTER
----- Message from Fariha Elizalde MD sent at 8/10/2020 12:52 PM EDT -----  Please let the patient know that his kidney numbers are stable although not much improved  Also let him know that Urology will reach out to him to investigate all of his symptoms and make sure there is no evidence of obstruction/blockage  Any questions I am happy to speak with him  Dr Teresa Lopez

## 2020-08-10 NOTE — TELEPHONE ENCOUNTER
Patient scheduled for CT 8/17/2020  To be scheduled for appointment with Dr Kari Almeida for PVR and to review results  Please call and offer Patient appointment on 8/27/2020 at 9:15 am in [place] with Dr Kari Almeida  Thank you

## 2020-08-11 ENCOUNTER — TELEPHONE (OUTPATIENT)
Dept: NEPHROLOGY | Facility: CLINIC | Age: 55
End: 2020-08-11

## 2020-08-11 DIAGNOSIS — I10 BENIGN ESSENTIAL HYPERTENSION: Primary | ICD-10-CM

## 2020-08-11 DIAGNOSIS — I12.9 HYPERTENSIVE CHRONIC KIDNEY DISEASE WITH STAGE 1 THROUGH STAGE 4 CHRONIC KIDNEY DISEASE, OR UNSPECIFIED CHRONIC KIDNEY DISEASE: ICD-10-CM

## 2020-08-11 DIAGNOSIS — N18.30 CHRONIC KIDNEY DISEASE, STAGE III (MODERATE) (HCC): ICD-10-CM

## 2020-08-11 RX ORDER — HYDRALAZINE HYDROCHLORIDE 25 MG/1
25 TABLET, FILM COATED ORAL 3 TIMES DAILY
Qty: 60 TABLET | Refills: 5 | Status: SHIPPED | OUTPATIENT
Start: 2020-08-11 | End: 2020-09-15

## 2020-08-11 NOTE — TELEPHONE ENCOUNTER
Since adding Amlodipine 2 5 mg  HS, pt is experiencing ankle edema, more on the left  His pressures are still uncontrolled; today's reading was 192/101  Pt states that his pressures continue to be this high

## 2020-08-11 NOTE — PROGRESS NOTES
Message left on patient's voicemail to change Amlodipine back to 5 mg  Daily in the AM (omit the 2 5 mg  HS dose) and to start Hydralazine 25 mg  BID per Dr Aniceto Morel

## 2020-08-11 NOTE — TELEPHONE ENCOUNTER
I spoke to the patient he os aware that urology should be contacting him regarding urinary symptoms

## 2020-08-11 NOTE — TELEPHONE ENCOUNTER
Given high blood pressure readings in the edema from extra amlodipine  Recommendations  1  Decrease amlodipine back down to just 5 mg in the morning  2  Start hydralazine 25 mg twice a day beginning now/this morning I will place the order  3  Wait 2-3 weeks and then take 1 week a blood pressure readings morning evening, sitting and standing as follows      And mail as into the office:  · TAKE THE MORNING READINGS BEFORE ANY MEDICATIONS AND WHEN YOU ARE RELAX FOR SEVERAL MINUTES  · TAKE THE EVENING READINGS BEFORE SUPPER/DINNER AND BEFORE ANY MEDICATIONS AND AGAIN WHEN YOU ARE RELAX FOR SEVERAL MINUTES  · PLEASE INCLUDE HEART RATE WITH YOUR BLOOD PRESSURE READINGS  · When taking standing readings, keep your arm supported at heart level and not dangling  · Make sure you are sitting with your back supported and feet on the ground and do not cross your legs or feet  · Make sure you have not taken any coffee or caffeine products or exercised or smoke cigarettes at least 30 minutes before taking your blood pressure

## 2020-08-14 LAB
METANEPH FREE SERPL-MCNC: 33.7 PG/ML (ref 0–88)
NORMETANEPHRINE SERPL-MCNC: 108.5 PG/ML (ref 0–136.8)

## 2020-08-17 ENCOUNTER — DOCUMENTATION (OUTPATIENT)
Dept: NEPHROLOGY | Facility: CLINIC | Age: 55
End: 2020-08-17

## 2020-08-17 ENCOUNTER — HOSPITAL ENCOUNTER (OUTPATIENT)
Dept: RADIOLOGY | Age: 55
Discharge: HOME | End: 2020-08-17
Payer: COMMERCIAL

## 2020-08-17 DIAGNOSIS — N17.9 ACUTE RENAL FAILURE, UNSPECIFIED ACUTE RENAL FAILURE TYPE (HCC): ICD-10-CM

## 2020-08-17 DIAGNOSIS — I10 BENIGN ESSENTIAL HYPERTENSION: ICD-10-CM

## 2020-08-17 DIAGNOSIS — N18.30 CHRONIC KIDNEY DISEASE, STAGE III (MODERATE) (HCC): ICD-10-CM

## 2020-08-17 DIAGNOSIS — E55.9 VITAMIN D DEFICIENCY: ICD-10-CM

## 2020-08-17 DIAGNOSIS — N20.0 NEPHROLITHIASIS: ICD-10-CM

## 2020-08-17 DIAGNOSIS — I12.9 HYPERTENSIVE CHRONIC KIDNEY DISEASE WITH STAGE 1 THROUGH STAGE 4 CHRONIC KIDNEY DISEASE, OR UNSPECIFIED CHRONIC KIDNEY DISEASE: ICD-10-CM

## 2020-08-17 DIAGNOSIS — N28.89 PELVICALIECTASIS: ICD-10-CM

## 2020-08-17 DIAGNOSIS — E78.5 DYSLIPIDEMIA: ICD-10-CM

## 2020-08-17 DIAGNOSIS — N17.9 AKI (ACUTE KIDNEY INJURY) (HCC): ICD-10-CM

## 2020-08-17 DIAGNOSIS — N20.0 NEPHROLITHIASIS: Primary | ICD-10-CM

## 2020-08-17 LAB
AMMONIA 24H UR-MRATE: 32 MEQ/24 HR
AMMONIA UR-SCNC: ABNORMAL UG/DL
CA H2 PHOS DIHYD CRY URNS QL MICRO: 0.22 RATIO (ref 0–3)
CALCIUM 24H UR-MCNC: 4.5 MG/DL
CALCIUM 24H UR-MRATE: 81 MG/24 HR (ref 100–300)
CHLORIDE 24H UR-SCNC: 88 MMOL/L
CHLORIDE 24H UR-SRATE: 158 MMOL/24 HR (ref 110–250)
CITRATE 24H UR-MCNC: 28 MG/L
CITRATE 24H UR-MRATE: 50 MG/24 HR (ref 320–1240)
COM CRY STONE QL IR: 1.83 RATIO (ref 0–6)
CREAT 24H UR-MCNC: 90 MG/DL
CREAT 24H UR-MRATE: 1620 MG/24 HR (ref 1000–2000)
CYSTINE 24H UR-MCNC: 5.27 MG/L
CYSTINE 24H UR-MRATE: 9.49 MG/24 HR (ref 10–100)
MAGNESIUM 24H UR-MRATE: 104 MG/24 HR (ref 12–293)
MAGNESIUM UR-MCNC: 5.8 MG/DL
NA URATE CRY STONE QL IR: 0.88 RATIO (ref 0–4)
OSMOLALITY UR: 536 MOSMOL/KG (ref 300–900)
OXALATE 24H UR-MRATE: 20 MG/24 HR (ref 7–44)
OXALATE UR-MCNC: 11 MG/L
PH 24H UR: 5.4 [PH]
PHOSPHATE 24H UR-MCNC: 55.6 MG/DL
PHOSPHATE 24H UR-MRATE: 1000.8 MG/24 HR (ref 400–1300)
PLEASE NOTE (STONE RISK): ABNORMAL
POTASSIUM 24H UR-SCNC: 44.1 MMOL/24 HR (ref 25–125)
POTASSIUM UR-SCNC: 24.5 MMOL/L
PRESERVED URINE: 1800 ML/24 HR (ref 800–1800)
SODIUM 24H UR-SCNC: 101 MMOL/L
SODIUM 24H UR-SRATE: 182 MMOL/24 HR (ref 58–337)
SPECIMEN VOL 24H UR: 1800 ML/24 HR (ref 800–1800)
SULFATE 24H UR-MCNC: 27 MEQ/24 HR (ref 0–30)
SULFATE UR-MCNC: 15 MEQ/L
TRI-PHOS CRY STONE MICRO: 0.01 RATIO (ref 0–1)
URATE 24H UR-MCNC: 12.8 MG/DL
URATE 24H UR-MRATE: 230 MG/24 HR (ref 250–750)
URATE DIHYD CRY STONE QL IR: 1.36 RATIO (ref 0–1.2)

## 2020-08-17 PROCEDURE — G1004 CDSM NDSC: HCPCS

## 2020-08-17 PROCEDURE — 74178 CT ABD&PLV WO CNTR FLWD CNTR: CPT

## 2020-08-17 RX ORDER — POTASSIUM CITRATE 10 MEQ/1
20 TABLET, EXTENDED RELEASE ORAL 2 TIMES DAILY
Qty: 120 TABLET | Refills: 5 | Status: SHIPPED | OUTPATIENT
Start: 2020-08-17 | End: 2021-01-31

## 2020-08-17 RX ADMIN — IOHEXOL 100 ML: 350 INJECTION, SOLUTION INTRAVENOUS at 13:39

## 2020-08-17 NOTE — PROGRESS NOTES
24 hour urine for stone:  1  Volume 1800  2  Calcium 81  3  Oxalate 20  4  Citrate 50  5  Sodium 182  6  Uric acid 230  Recommendations:      1  Increase fluid intake to 80-96 oz of water per day  2  Sodium restriction  3  Eat a lot of fruits and vegetables to increase the citrate  4  Potassium citrate 20 mEq twice a day this is also Urocit-K 20 mEq twice a day  5   Repeat a basic metabolic profile 1-2 weeks after making these changes

## 2020-08-18 DIAGNOSIS — I10 BENIGN ESSENTIAL HYPERTENSION: ICD-10-CM

## 2020-08-18 RX ORDER — CLONIDINE 0.2 MG/24H
PATCH, EXTENDED RELEASE TRANSDERMAL
Qty: 4 PATCH | Refills: 0 | Status: SHIPPED | OUTPATIENT
Start: 2020-08-18 | End: 2020-10-23 | Stop reason: SDUPTHER

## 2020-08-19 ENCOUNTER — TELEPHONE (OUTPATIENT)
Dept: OTHER | Facility: OTHER | Age: 55
End: 2020-08-19

## 2020-08-19 NOTE — TELEPHONE ENCOUNTER
Covering for Dr Abhishek David:  Please call Dr Precious Gonzalez office and have the patient seen ASAP to go over the findings  I will forward the CT results to Dr Rashard Trujillo as well but I would like to make sure that the patient gets an appointment ASAP  I talked to the patient and informed him of the findings  I instructed him to go to the ER if he develops R flank pain, hematuria, nausea, vomiting

## 2020-08-19 NOTE — TELEPHONE ENCOUNTER
Dr Bobo called the office advised patient had an ultrasound done which showed stone blockage on patients right kidney  Please advise  Dr hull is O  O O until Monday morning       Thank you

## 2020-08-20 NOTE — TELEPHONE ENCOUNTER
Patient called stated he had a recent cat scan and was advised to contact office and get a sooner appointment than 08/27/20 with Dr Kelly Kidney  Please advise

## 2020-08-20 NOTE — TELEPHONE ENCOUNTER
Spoke to Patient and scheduled for tomorrow 8/21/2020 in [address] with Dr Pierce [place]  Address given  Patient repeats back understanding  Knows to call office with questions or concerns in the interim

## 2020-08-20 NOTE — TELEPHONE ENCOUNTER
Patient of Dr Nando Edmond last office visit 03/2017 with history of nephrolithiasis  Scheduled for new patient office visit on 8/27/2020 with Dr Nando Edmond in [place]  Patient calling to report he was advised to contact office to get a sooner appointment due to CT results  Routing to provider to review and advise  IMPRESSION:     There is moderate right hydroureteronephrosis, due several adjacent large stones at the right ureterovesical junction largest measuring 23 x 17 mm (series 601 images 108 through 120 )  These stones may be located within a ureterocele (series 5 image   167 )     There are few additional tiny nonobstructing renal calyceal stones bilaterally      Moderate fatty liver change

## 2020-08-21 ENCOUNTER — ANESTHESIA EVENT (OUTPATIENT)
Dept: PERIOP | Facility: HOSPITAL | Age: 55
End: 2020-08-21
Payer: COMMERCIAL

## 2020-08-21 ENCOUNTER — OFFICE VISIT (OUTPATIENT)
Dept: UROLOGY | Facility: CLINIC | Age: 55
End: 2020-08-21
Payer: COMMERCIAL

## 2020-08-21 ENCOUNTER — TELEPHONE (OUTPATIENT)
Dept: UROLOGY | Facility: AMBULATORY SURGERY CENTER | Age: 55
End: 2020-08-21

## 2020-08-21 VITALS
DIASTOLIC BLOOD PRESSURE: 86 MMHG | HEART RATE: 80 BPM | TEMPERATURE: 97.7 F | SYSTOLIC BLOOD PRESSURE: 146 MMHG | BODY MASS INDEX: 43.76 KG/M2 | WEIGHT: 305 LBS

## 2020-08-21 DIAGNOSIS — N20.1 RIGHT URETERAL STONE: Primary | ICD-10-CM

## 2020-08-21 PROCEDURE — 99204 OFFICE O/P NEW MOD 45 MIN: CPT | Performed by: UROLOGY

## 2020-08-21 PROCEDURE — 1036F TOBACCO NON-USER: CPT | Performed by: UROLOGY

## 2020-08-21 RX ORDER — SODIUM CHLORIDE 9 MG/ML
125 INJECTION, SOLUTION INTRAVENOUS CONTINUOUS
Status: CANCELLED | OUTPATIENT
Start: 2020-08-24

## 2020-08-21 NOTE — ANESTHESIA PREPROCEDURE EVALUATION
Procedure:  CYSTOSCOPY URETEROSCOPY WITH LITHOTRIPSY HOLMIUM LASER, RETROGRADE PYELOGRAM AND INSERTION STENT URETERAL, cystolitholapaxy of bladder stone (Right Bladder)    Relevant Problems   CARDIO  Xarelto took today      (+) Atrial fibrillation with RVR ([facility]) (a fib had cardioversion 2018 )   (+) Benign essential hypertension      /RENAL   (+) STANISLAW (acute kidney injury) ([facility])   (+) Acute renal failure (HCC) (cr 1 87)   (+) Chronic kidney disease, stage III (moderate) ([facility])   (+) Hypertensive chronic kidney disease with stage 1 through stage 4 chronic kidney disease, or unspecified chronic kidney disease   (+) Pelvicaliectasis      MUSCULOSKELETAL   (+) Degenerative joint disease of right knee   (+) Primary osteoarthritis of left knee      PULMONARY   (+) Asthma      Other   (+) Morbid obesity ([facility])        Physical Exam    Airway    Mallampati score: II  TM Distance: >3 FB  Neck ROM: full     Dental       Cardiovascular  Cardiovascular exam normal    Pulmonary  Pulmonary exam normal     Other Findings        Anesthesia Plan  ASA Score- 3     Anesthesia Type- general with ASA Monitors  Additional Monitors:   Airway Plan: ETT  Plan Factors-Exercise tolerance (METS): >4 METS  Chart reviewed  EKG reviewed  Existing labs reviewed  Patient is not a current smoker  Patient not instructed to abstain from smoking on day of procedure  Patient did not smoke on day of surgery  Induction- intravenous  Postoperative Plan- Plan for postoperative opioid use  Informed Consent- Anesthetic plan and risks discussed with patient and spouse  I personally reviewed this patient with the CRNA  Discussed and agreed on the Anesthesia Plan with the CRNA             Lab Results   Component Value Date    HGBA1C 5 8 (H) 09/03/2013       Lab Results   Component Value Date     08/18/2015    K 4 2 08/10/2020     08/10/2020    CO2 28 08/10/2020    ANIONGAP 7 08/18/2015    BUN 21 08/10/2020 CREATININE 1 87 (H) 08/10/2020    GLUCOSE 94 08/18/2015    GLUF 111 (H) 08/10/2020    CALCIUM 8 5 08/10/2020    AST 17 07/24/2020    ALT 49 07/24/2020    ALKPHOS 49 07/24/2020    PROT 7 3 12/02/2014    BILITOT 0 4 12/02/2014    EGFR 40 08/10/2020       Lab Results   Component Value Date    WBC 9 06 10/29/2018    HGB 15 6 10/29/2018    HCT 46 9 10/29/2018    MCV 91 10/29/2018     10/29/2018   ekg NSR      Echo 2017   LEFT VENTRICLE:  Systolic function was normal  Ejection fraction was estimated to be 60 %      LEFT ATRIUM:  The atrium was mildly dilated  No thrombus was identified      LEFT ATRIAL APPENDAGE:  The appendage was moderately dilated  No thrombus was identified      ATRIAL SEPTUM:  No defect or patent foramen ovale was identified  No defect or patent foramen ovale was identified  Contrast injection was performed  There was no right-to-left shunt, with provocative maneuvers to increase right atrial pressure      MITRAL VALVE:  There was mild regurgitation      TRICUSPID VALVE:  There was mild regurgitation

## 2020-08-21 NOTE — PROGRESS NOTES
8/21/2020    Francis Kwan  1965  [id number]        Assessment  Large right distal ureteral calculus versus ureterocele versus bladder stone, CKD, right hydronephrosis    Discussion  I recommend cystoscopy with right ureteroscopy with holmium laser lithotripsy and possible cystolitholapaxy  We discussed that if the stone is within a ureterocele within the distal ureter that I would incise the ureterocele with the holmium laser  Risk and benefits of the procedure discussed and reviewed  Informed consent obtained  Routine prostate cancer screening at 54years of age  History of Present Illness  47 y o  male with a history of a recent retroperitoneal ultrasound which revealed bilateral renal calculi measuring up to 1 2 cm  In addition an ultrasound revealed a bladder stone measuring 2 6 cm  He then had a follow-up CT scan of the abdomen pelvis with and without IV contrast   In the right kidney there is moderate hydronephrosis secondary to a stone measuring up to 23 mm at the level the right UVJ  This may possibly be associated with a right-sided ureterocele  There is a 3 mm stone identified in the kidney on the left side  AUA Symptom Score      Review of Systems  Review of Systems   Constitutional: Negative  HENT: Negative  Eyes: Negative  Respiratory: Negative  Cardiovascular: Negative  Gastrointestinal: Negative  Endocrine: Negative  Genitourinary:        Per HPI   Musculoskeletal: Negative  Skin: Negative  Allergic/Immunologic: Negative  Neurological: Negative  Hematological: Negative  Psychiatric/Behavioral: Negative            Past Medical History  Past Medical History:   Diagnosis Date    Arthritis     Asthma     Atrial fibrillation ([address] )     Chronic kidney disease     kidney stones    Gout     Hypertension     Muscle weakness        Past Social History  Past Surgical History:   Procedure Laterality Date    APPENDECTOMY      CYSTOSCOPY 2016    w/removal of object, last assessed 16    FOOT SURGERY Left     HAND SURGERY      KNEE SURGERY Right 2016    [place] CYSTO/URETERO W/LITHOTRIPSY &INDWELL STENT INSRT Left 2016    Procedure: CYSTOSCOPY URETEROSCOPY WITH LITHOTRIPSY HOLMIUM LASER STONE EXTRACTION, RETROGRADE PYELOGRAM AND INSERTION STENT URETERAL;  Surgeon: Sulaiman Kilgore MD;  [facility];  Service: Urology last assessed 16    WRIST SURGERY Left 2014    Fracture surgery       Past Family History  Family History   Problem Relation Age of Onset    Atrial fibrillation Mother     Other Mother         blood dyscrasia    Hypertension Mother     Other Father         hypoglycemia     Atrial fibrillation Father     Diabetes Family     Other Family         Thrombocytosis       Past Social history  Social History     Socioeconomic History    Marital status: /Civil Union     Spouse name: Not on file    Number of children: 4    Years of education: 12th grade     Highest education level: Not on file   Occupational History    Not on file   Social Needs    Financial resource strain: Not on file    Food insecurity     Worry: Not on file     Inability: Not on file   [employer] needs     Medical: Not on file     Non-medical: Not on file   Tobacco Use    Smoking status: Former Smoker     Last attempt to quit: 1994     Years since quittin 4    Smokeless tobacco: Never Used   Substance and Sexual Activity    Alcohol use: Yes     Comment: occ    Drug use: No    Sexual activity: Not on file   Lifestyle    Physical activity     Days per week: Not on file     Minutes per session: Not on file    Stress: Not on file   Relationships    Social connections     Talks on phone: Not on file     Gets together: Not on file     Attends Tenriism service: Not on file     Active member of club or organization: Not on file     Attends meetings of clubs or organizations: Not on file     Relationship status: Not on file    Intimate partner violence     Fear of current or ex partner: Not on file     Emotionally abused: Not on file     Physically abused: Not on file     Forced sexual activity: Not on file   Other Topics Concern    Not on file   Social History Narrative    No caffeine use        Current Medications  Current Outpatient Medications   Medication Sig Dispense Refill    amLODIPine (NORVASC) 5 mg tablet TAKE 1 TABLET DAILY 90 tablet 3    BYSTOLIC 5 MG tablet TAKE 1 TABLET BY MOUTH EVERY DAY 90 tablet 2    cloNIDine (CATAPRES-TTS-2) 0 2 mg/24 hr APPLY 1 PATCH ONE TIME PER WEEK 4 patch 0    dofetilide (TIKOSYN) 500 mcg capsule TAKE ONE CAPSULE BY MOUTH EVERY 12 HOURS 60 capsule 8    hydrALAZINE (APRESOLINE) 25 mg tablet Take 1 tablet (25 mg total) by mouth 3 (three) times a day 60 tablet 5    potassium citrate (Urocit-K 10) 10 mEq Take 2 tablets (20 mEq total) by mouth 2 (two) times a day 120 tablet 5    XARELTO 20 MG tablet TAKE 1 TABLET BY MOUTH EVERY DAY 90 tablet 3    LORazepam (ATIVAN) 0 5 mg tablet 1 tab PO BID PRN pre-procedural anxiety (Patient not taking: Reported on 8/21/2020) 2 tablet 0     No current facility-administered medications for this visit  Allergies  No Known Allergies    Past Medical History, Social History, Family History, medications and allergies were reviewed  Vitals  Vitals:    08/21/20 1007   BP: 146/86   Pulse: 80   Temp: 97 7 °F (36 5 °C)   Weight: (!) 138 kg (305 lb)       Physical Exam  Physical Exam  On examination he is in no acute distress  Lungs are clear  Cardiac is regular  His abdomen is soft obese nontender nondistended   examination reveals no CVA tenderness  Skin is warm  Extremities without edema    Neurologic is grossly intact and nonfocal   Gait normal   Affect normal      Results  No results found for: PSA  Lab Results   Component Value Date    GLUCOSE 94 08/18/2015    CALCIUM 8 5 08/10/2020     08/18/2015    K 4 2 08/10/2020    CO2 28 08/10/2020     08/10/2020    BUN 21 08/10/2020    CREATININE 1 87 (H) 08/10/2020     Lab Results   Component Value Date    WBC 9 06 10/29/2018    HGB 15 6 10/29/2018    HCT 46 9 10/29/2018    MCV 91 10/29/2018     10/29/2018         Office Urine Dip  No results found for this or any previous visit (from the past 1 hour(s)) ]

## 2020-08-21 NOTE — PRE-PROCEDURE INSTRUCTIONS
Pre-Surgery Instructions:   Medication Instructions    amLODIPine (NORVASC) 5 mg tablet Instructed patient per Anesthesia Guidelines   BYSTOLIC 5 MG tablet Instructed patient per Anesthesia Guidelines   cloNIDine (CATAPRES-TTS-2) 0 2 mg/24 hr Instructed patient per Anesthesia Guidelines   dofetilide (TIKOSYN) 500 mcg capsule Instructed patient per Anesthesia Guidelines   hydrALAZINE (APRESOLINE) 25 mg tablet Instructed patient per Anesthesia Guidelines   potassium citrate (Urocit-K 10) 10 mEq Instructed patient per Anesthesia Guidelines   XARELTO 20 MG tablet Instructed patient per Anesthesia Guidelines  Alpha-2 adrenergic agonist Med Class   Continue to take this medication on your normal schedule  If this is an oral medication and you take it in the morning, then you may take this medicine with a sip of water  Benzodiazepine antagonist Med Class   If this medication is needed please continue to take on your normal schedule  If you take it in the morning, then you may take this medicine with a sip of water  Beta blocker Med Class   Continue to take this heart medication on your normal schedule  If this is an oral medication and you take it in the morning, then you may take this medicine with a sip of water  Calcium Channel Blocker Med Class   Continue to take this heart medication on your normal schedule  If this is an oral medication and you take it in the morning, then you may take this medicine with a sip of water  Direct Xa Inhibitor Med Class   Stop taking this medication at least 3 days prior to surgery/procedure with prescribing Physician and Surgeon consultation    Phone assessment completed with verbal understanding of instructions, NPO MN unless otherwise instructed, am meds including Xarelto , pt was advised not to stop, insurance card and photo ID,  for disch home and [facility] call this pm

## 2020-08-21 NOTE — H&P (VIEW-ONLY)
8/21/2020    Shan Kwan  1965  [id number]        Assessment  Large right distal ureteral calculus versus ureterocele versus bladder stone, CKD, right hydronephrosis    Discussion  I recommend cystoscopy with right ureteroscopy with holmium laser lithotripsy and possible cystolitholapaxy  We discussed that if the stone is within a ureterocele within the distal ureter that I would incise the ureterocele with the holmium laser  Risk and benefits of the procedure discussed and reviewed  Informed consent obtained  Routine prostate cancer screening at 54years of age  History of Present Illness  47 y o  male with a history of a recent retroperitoneal ultrasound which revealed bilateral renal calculi measuring up to 1 2 cm  In addition an ultrasound revealed a bladder stone measuring 2 6 cm  He then had a follow-up CT scan of the abdomen pelvis with and without IV contrast   In the right kidney there is moderate hydronephrosis secondary to a stone measuring up to 23 mm at the level the right UVJ  This may possibly be associated with a right-sided ureterocele  There is a 3 mm stone identified in the kidney on the left side  AUA Symptom Score      Review of Systems  Review of Systems   Constitutional: Negative  HENT: Negative  Eyes: Negative  Respiratory: Negative  Cardiovascular: Negative  Gastrointestinal: Negative  Endocrine: Negative  Genitourinary:        Per HPI   Musculoskeletal: Negative  Skin: Negative  Allergic/Immunologic: Negative  Neurological: Negative  Hematological: Negative  Psychiatric/Behavioral: Negative            Past Medical History  Past Medical History:   Diagnosis Date    Arthritis     Asthma     Atrial fibrillation ([address] )     Chronic kidney disease     kidney stones    Gout     Hypertension     Muscle weakness        Past Social History  Past Surgical History:   Procedure Laterality Date    APPENDECTOMY      CYSTOSCOPY 2016    w/removal of object, last assessed 16    FOOT SURGERY Left     HAND SURGERY      KNEE SURGERY Right 2016    [place] CYSTO/URETERO W/LITHOTRIPSY &INDWELL STENT INSRT Left 2016    Procedure: CYSTOSCOPY URETEROSCOPY WITH LITHOTRIPSY HOLMIUM LASER STONE EXTRACTION, RETROGRADE PYELOGRAM AND INSERTION STENT URETERAL;  Surgeon: Reginaldo Wu MD;  [facility];  Service: Urology last assessed 16    WRIST SURGERY Left 2014    Fracture surgery       Past Family History  Family History   Problem Relation Age of Onset    Atrial fibrillation Mother     Other Mother         blood dyscrasia    Hypertension Mother     Other Father         hypoglycemia     Atrial fibrillation Father     Diabetes Family     Other Family         Thrombocytosis       Past Social history  Social History     Socioeconomic History    Marital status: /Civil Union     Spouse name: Not on file    Number of children: 4    Years of education: 12th grade     Highest education level: Not on file   Occupational History    Not on file   Social Needs    Financial resource strain: Not on file    Food insecurity     Worry: Not on file     Inability: Not on file   [employer] needs     Medical: Not on file     Non-medical: Not on file   Tobacco Use    Smoking status: Former Smoker     Last attempt to quit: 1994     Years since quittin 4    Smokeless tobacco: Never Used   Substance and Sexual Activity    Alcohol use: Yes     Comment: occ    Drug use: No    Sexual activity: Not on file   Lifestyle    Physical activity     Days per week: Not on file     Minutes per session: Not on file    Stress: Not on file   Relationships    Social connections     Talks on phone: Not on file     Gets together: Not on file     Attends Latter day service: Not on file     Active member of club or organization: Not on file     Attends meetings of clubs or organizations: Not on file     Relationship status: Not on file    Intimate partner violence     Fear of current or ex partner: Not on file     Emotionally abused: Not on file     Physically abused: Not on file     Forced sexual activity: Not on file   Other Topics Concern    Not on file   Social History Narrative    No caffeine use        Current Medications  Current Outpatient Medications   Medication Sig Dispense Refill    amLODIPine (NORVASC) 5 mg tablet TAKE 1 TABLET DAILY 90 tablet 3    BYSTOLIC 5 MG tablet TAKE 1 TABLET BY MOUTH EVERY DAY 90 tablet 2    cloNIDine (CATAPRES-TTS-2) 0 2 mg/24 hr APPLY 1 PATCH ONE TIME PER WEEK 4 patch 0    dofetilide (TIKOSYN) 500 mcg capsule TAKE ONE CAPSULE BY MOUTH EVERY 12 HOURS 60 capsule 8    hydrALAZINE (APRESOLINE) 25 mg tablet Take 1 tablet (25 mg total) by mouth 3 (three) times a day 60 tablet 5    potassium citrate (Urocit-K 10) 10 mEq Take 2 tablets (20 mEq total) by mouth 2 (two) times a day 120 tablet 5    XARELTO 20 MG tablet TAKE 1 TABLET BY MOUTH EVERY DAY 90 tablet 3    LORazepam (ATIVAN) 0 5 mg tablet 1 tab PO BID PRN pre-procedural anxiety (Patient not taking: Reported on 8/21/2020) 2 tablet 0     No current facility-administered medications for this visit  Allergies  No Known Allergies    Past Medical History, Social History, Family History, medications and allergies were reviewed  Vitals  Vitals:    08/21/20 1007   BP: 146/86   Pulse: 80   Temp: 97 7 °F (36 5 °C)   Weight: (!) 138 kg (305 lb)       Physical Exam  Physical Exam  On examination he is in no acute distress  Lungs are clear  Cardiac is regular  His abdomen is soft obese nontender nondistended   examination reveals no CVA tenderness  Skin is warm  Extremities without edema    Neurologic is grossly intact and nonfocal   Gait normal   Affect normal      Results  No results found for: PSA  Lab Results   Component Value Date    GLUCOSE 94 08/18/2015    CALCIUM 8 5 08/10/2020     08/18/2015    K 4 2 08/10/2020    CO2 28 08/10/2020     08/10/2020    BUN 21 08/10/2020    CREATININE 1 87 (H) 08/10/2020     Lab Results   Component Value Date    WBC 9 06 10/29/2018    HGB 15 6 10/29/2018    HCT 46 9 10/29/2018    MCV 91 10/29/2018     10/29/2018         Office Urine Dip  No results found for this or any previous visit (from the past 1 hour(s)) ]

## 2020-08-21 NOTE — TELEPHONE ENCOUNTER
I spoke with pt this morning and scheduled him for a cysto/ R  RGP/ R  URS w/ litho/ R  Stent insertion/ cystolitholapaxy at the [facility] on 8/24 with Dr Jacque Lancaster  I verbally went over all of pt 's pre op instructions and prep information with him  Per pt 's request I emailed him a copy of his surgical packet and instructed him to call me with any questions or concerns regarding this procedure

## 2020-08-23 ENCOUNTER — HOSPITAL ENCOUNTER (EMERGENCY)
Facility: HOSPITAL | Age: 55
Discharge: HOME | End: 2020-08-23
Attending: EMERGENCY MEDICINE | Admitting: EMERGENCY MEDICINE
Payer: COMMERCIAL

## 2020-08-23 ENCOUNTER — NURSE TRIAGE (OUTPATIENT)
Dept: OTHER | Facility: OTHER | Age: 55
End: 2020-08-23

## 2020-08-23 ENCOUNTER — APPOINTMENT (EMERGENCY)
Dept: CT IMAGING | Facility: HOSPITAL | Age: 55
End: 2020-08-23
Payer: COMMERCIAL

## 2020-08-23 VITALS
BODY MASS INDEX: 44.51 KG/M2 | HEART RATE: 56 BPM | TEMPERATURE: 97.5 F | OXYGEN SATURATION: 97 % | WEIGHT: 310.19 LBS | RESPIRATION RATE: 18 BRPM | SYSTOLIC BLOOD PRESSURE: 140 MMHG | DIASTOLIC BLOOD PRESSURE: 77 MMHG

## 2020-08-23 DIAGNOSIS — N21.0 BLADDER STONES: ICD-10-CM

## 2020-08-23 DIAGNOSIS — R33.9 URINARY RETENTION: Primary | ICD-10-CM

## 2020-08-23 PROCEDURE — 99284 EMERGENCY DEPT VISIT MOD MDM: CPT | Performed by: EMERGENCY MEDICINE

## 2020-08-23 PROCEDURE — 99284 EMERGENCY DEPT VISIT MOD MDM: CPT

## 2020-08-23 PROCEDURE — 74176 CT ABD & PELVIS W/O CONTRAST: CPT

## 2020-08-23 PROCEDURE — G1004 CDSM NDSC: HCPCS

## 2020-08-23 PROCEDURE — 51798 US URINE CAPACITY MEASURE: CPT

## 2020-08-23 PROCEDURE — 87086 URINE CULTURE/COLONY COUNT: CPT | Performed by: EMERGENCY MEDICINE

## 2020-08-23 RX ORDER — TRAMADOL HYDROCHLORIDE 50 MG/1
50 TABLET ORAL EVERY 6 HOURS PRN
Qty: 10 TABLET | Refills: 0 | Status: SHIPPED | OUTPATIENT
Start: 2020-08-23 | End: 2020-08-23 | Stop reason: SDUPTHER

## 2020-08-23 RX ORDER — TRAMADOL HYDROCHLORIDE 50 MG/1
50 TABLET ORAL EVERY 6 HOURS PRN
Qty: 10 TABLET | Refills: 0 | Status: SHIPPED | OUTPATIENT
Start: 2020-08-23 | End: 2020-09-02

## 2020-08-23 NOTE — TELEPHONE ENCOUNTER
Regarding: difficulty urinating  ----- Message from María Worthington sent at 8/23/2020 11:28 AM EDT -----  Pt has been having problems urination   He has been passing clots, today he cannot urinate at all and they are concerned it may be stuck in his penis

## 2020-08-23 NOTE — TELEPHONE ENCOUNTER
Reason for Disposition   [1] Unable to urinate (or only a few drops) > 4 hours AND     [2] bladder feels very full (e g , feels blocked with strong urge to urinate; palpable bladder)    Answer Assessment - Initial Assessment Questions  1  SEVERITY: "How bad is the pain?"  (e g , Scale 1-10; mild, moderate, or severe)          Pt is trying to urinate and is unable to do so  States bladder feels very full and it has been now about 2 hrs  Thinks it could be kidney stone that moved  2  FREQUENCY: "How many times have you had painful urination today?"       Not at this time  3  PATTERN: "Is pain present every time you urinate or just sometimes?"       Slight burning right now  4  ONSET: "When did the painful urination start?"       Today since about 0930      5  FEVER: "Do you have a fever?" If so, ask: "What is your temperature, how was it measured, and when did it start?"      Denies fever  6  PAST UTI: "Have you had a urine infection before?" If so, ask: "When was the last time?" and "What happened that time?"         7  CAUSE: "What do you think is causing the painful urination?"       Diagnosed with Right uretal stone and hydronephrosis  8  OTHER SYMPTOMS: "Do you have any other symptoms?" (e g , flank pain, penile discharge, scrotal pain, blood in urine)      Denies back or flank pain  Was passing some blood clots early this morning but nothing at this time other then inability to urinate  Protocols used: URINATION PAIN - MALE-ADULT-    Pts symptoms discussed with Dr Carmine Fang who recommended pt head over to ED for evaluation  Pt will go to [facility]

## 2020-08-23 NOTE — ED PROVIDER NOTES
History  Chief Complaint   Patient presents with    Difficulty Urinating     patient with kidney stone in urethra  scheduled to see urology tomorrow  states unable to void     51-year-old male with a history of hypertension, atrial fibrillation on Xarelto, kidney stones presents to the emergency department for inability to urinate and suprapubic pain since this morning  Patient has known large possible distal ureteral stone verses bladder stone and is due for urologic procedure tomorrow  He states he had been unable to urinate, only small dribbles since this morning  When he arrived to the ED he passed a large amount of urine and blood along with some blood clots and believes he may have passed a stone  He is now completely pain free  History provided by:  Patient   used: No    Difficulty Urinating   Presenting symptoms: no dysuria, no penile pain, no scrotal pain and no swelling    Relieved by:  None tried  Worsened by:  Nothing  Ineffective treatments:  None tried  Associated symptoms: abdominal pain, hematuria and urinary retention    Associated symptoms: no diarrhea, no fever, no flank pain, no urinary frequency and no vomiting    Abdominal pain:     Location:  Suprapubic    Quality: pressure      Duration:  8 hours    Chronicity:  New  Risk factors: kidney stones        Prior to Admission Medications   Prescriptions Last Dose Informant Patient Reported? Taking?    BYSTOLIC 5 MG tablet   No No   Sig: TAKE 1 TABLET BY MOUTH EVERY DAY   LORazepam (ATIVAN) 0 5 mg tablet   No No   Si tab PO BID PRN pre-procedural anxiety   Patient not taking: Reported on 2020   XARELTO 20 MG tablet   No No   Sig: TAKE 1 TABLET BY MOUTH EVERY DAY   amLODIPine (NORVASC) 5 mg tablet   No No   Sig: TAKE 1 TABLET DAILY   cloNIDine (CATAPRES-TTS-2) 0 2 mg/24 hr   No No   Sig: APPLY 1 PATCH ONE TIME PER WEEK   dofetilide (TIKOSYN) 500 mcg capsule   No No   Sig: TAKE ONE CAPSULE BY MOUTH EVERY 12 HOURS hydrALAZINE (APRESOLINE) 25 mg tablet   No No   Sig: Take 1 tablet (25 mg total) by mouth 3 (three) times a day   Patient taking differently: Take 25 mg by mouth 2 (two) times a day    potassium citrate (Urocit-K 10) 10 mEq   No No   Sig: Take 2 tablets (20 mEq total) by mouth 2 (two) times a day      Facility-Administered Medications: None       Past Medical History:   Diagnosis Date    Arthritis     Asthma     Atrial fibrillation ([address] )     Chronic kidney disease     kidney stones    Gout     Hypertension     Kidney stone     Seasonal allergies        Past Surgical History:   Procedure Laterality Date    APPENDECTOMY      CARDIOVERSION      X4 last was 2018    COLONOSCOPY      CYSTOSCOPY  2016    w/removal of object, last assessed 16    FOOT SURGERY Left     HAND SURGERY      KNEE SURGERY Right 2016    IN CYSTO/URETERO W/LITHOTRIPSY &INDWELL STENT INSRT Left 2016    Procedure: CYSTOSCOPY URETEROSCOPY WITH LITHOTRIPSY HOLMIUM LASER STONE EXTRACTION, RETROGRADE PYELOGRAM AND INSERTION STENT URETERAL;  Surgeon: Nando Meyer MD;  Location: [facility];  Service: Urology last assessed 16    WRIST SURGERY Left     Fracture surgery       Family History   Problem Relation Age of Onset    Atrial fibrillation Mother     Other Mother         blood dyscrasia    Hypertension Mother     Other Father         hypoglycemia     Atrial fibrillation Father     Diabetes Family     Other Family         Thrombocytosis     I have reviewed and agree with the history as documented  E-Cigarette/Vaping    E-Cigarette Use Never User      E-Cigarette/Vaping Substances     Social History     Tobacco Use    Smoking status: Former Smoker     Last attempt to quit: 1994     Years since quittin 4    Smokeless tobacco: Never Used   Substance Use Topics    Alcohol use: Yes     Comment: occ    Drug use: No       Review of Systems   Constitutional: Negative  Negative for fever     HENT: Negative  Eyes: Negative  Respiratory: Negative  Cardiovascular: Negative  Gastrointestinal: Positive for abdominal pain  Negative for diarrhea and vomiting  Genitourinary: Positive for difficulty urinating and hematuria  Negative for dysuria, flank pain, frequency and penile pain  Musculoskeletal: Negative for neck pain  Skin: Negative  Allergic/Immunologic: Negative  Neurological: Negative  Negative for weakness, numbness and headaches  Hematological: Negative  Psychiatric/Behavioral: Negative  All other systems reviewed and are negative  Physical Exam  Physical Exam  Vitals signs and nursing note reviewed  Constitutional:       General: He is not in acute distress  Appearance: He is well-developed  He is obese  He is not ill-appearing, toxic-appearing or diaphoretic  HENT:      Head: Normocephalic and atraumatic  Right Ear: External ear normal       Left Ear: External ear normal    Eyes:      General: No scleral icterus  Conjunctiva/sclera: Conjunctivae normal       Pupils: Pupils are equal, round, and reactive to light  Neck:      Thyroid: No thyromegaly  Vascular: No JVD  Cardiovascular:      Rate and Rhythm: Normal rate and regular rhythm  Heart sounds: Normal heart sounds  Pulmonary:      Effort: Pulmonary effort is normal       Breath sounds: Normal breath sounds  Abdominal:      General: Bowel sounds are normal  There is no distension  Palpations: Abdomen is soft  There is no mass  Tenderness: There is no abdominal tenderness  There is no right CVA tenderness or left CVA tenderness  Hernia: No hernia is present  Skin:     General: Skin is warm and dry  Coloration: Skin is not jaundiced or pale  Findings: No bruising, erythema, lesion or rash  Neurological:      General: No focal deficit present  Mental Status: He is alert and oriented to person, place, and time  Motor: No weakness        Deep Tendon Reflexes: Reflexes are normal and symmetric  Psychiatric:         Mood and Affect: Mood normal          Behavior: Behavior is cooperative  Vital Signs  ED Triage Vitals   Temperature Pulse Respirations Blood Pressure SpO2   08/23/20 1346 08/23/20 1256 08/23/20 1256 08/23/20 1256 08/23/20 1256   97 5 °F (36 4 °C) 61 18 (!) 211/108 96 %      Temp src Heart Rate Source Patient Position - Orthostatic VS BP Location FiO2 (%)   -- 08/23/20 1346 08/23/20 1256 08/23/20 1256 --    Monitor Standing Right arm       Pain Score       08/23/20 1256       5           Vitals:    08/23/20 1256 08/23/20 1346 08/23/20 1506   BP: (!) 211/108 153/91 140/77   Pulse: 61 56 56   Patient Position - Orthostatic VS: Standing Sitting Lying         Visual Acuity      ED Medications  Medications - No data to display    Diagnostic Studies  Results Reviewed     Procedure Component Value Units Date/Time    Urine culture [[id number]] Collected:  08/23/20 1506    Lab Status: In process Specimen:  Urine, Clean Catch Updated:  08/23/20 1509                 CT renal stone study abdomen pelvis without contrast   Final Result by Lesli 6, DO (08/23 1446)      There are large stones identified in the bladder  Right hydroureter and moderate right hydronephrosis  No obstructing stone is identified within the ureter  There are nonobstructing intrarenal stones seen bilaterally  Workstation performed: [id number]                    Procedures  Procedures         ED Course  ED Course as of Aug 23 1733   Renata Mark Aug 23, 2020   1454 Bladder scan reveals 19 milliliters of urine in the bladder                                                MDM  Number of Diagnoses or Management Options  Diagnosis management comments: 51-year-old male presents with inability to urinate since this morning  He states he only passes small dribbles  He has a known very large stone in the distal right ureter verses bladder    He is due for urologic procedure tomorrow  On arrival to the ED he states he passed a large volume of urine mixed with blood and clots and now feels 100% better and believes he may have passed the stone  Unfortunately the toilet flushed  His exam here is normal   No abdominal tenderness on exam   Will obtain bladder scan to assess amount a urine in bladder  Will do CT stone study to see if he did pass the stone  Amount and/or Complexity of Data Reviewed  Tests in the radiology section of CPT®: ordered and reviewed          Disposition  Final diagnoses:   Urinary retention   Bladder stones     Time reflects when diagnosis was documented in both MDM as applicable and the Disposition within this note     Time User Action Codes Description Comment    8/23/2020  2:53 PM Vivi FLYNN Add [R33 9] Urinary retention     8/23/2020  2:53 PM Vivi FLYNN Add [N21 0] Bladder stones       ED Disposition     ED Disposition Condition Date/Time Comment    Discharge Good Sun Aug 23, 2020  2:53 PM Janeen Range discharge to home/self care              Follow-up Information     Follow up With Specialties Details Why Contact Info        Keep your appointment with urology tomorrow          Discharge Medication List as of 8/23/2020  2:54 PM      CONTINUE these medications which have NOT CHANGED    Details   amLODIPine (NORVASC) 5 mg tablet TAKE 1 TABLET DAILY, Normal      BYSTOLIC 5 MG tablet TAKE 1 TABLET BY MOUTH EVERY DAY, Normal      cloNIDine (CATAPRES-TTS-2) 0 2 mg/24 hr APPLY 1 PATCH ONE TIME PER WEEK, Normal      dofetilide (TIKOSYN) 500 mcg capsule TAKE ONE CAPSULE BY MOUTH EVERY 12 HOURS, Normal      hydrALAZINE (APRESOLINE) 25 mg tablet Take 1 tablet (25 mg total) by mouth 3 (three) times a day, Starting Tue 8/11/2020, Normal      LORazepam (ATIVAN) 0 5 mg tablet 1 tab PO BID PRN pre-procedural anxiety, Normal      potassium citrate (Urocit-K 10) 10 mEq Take 2 tablets (20 mEq total) by mouth 2 (two) times a day, Starting Mon 8/17/2020, Normal      XARELTO 20 MG tablet TAKE 1 TABLET BY MOUTH EVERY DAY, Normal           No discharge procedures on file  PDMP Review       Value Time User    PDMP Reviewed  Yes 8/5/2020  9:41 AM Citlaly Raza MD          ED Provider  Electronically Signed by           Elias Nassar, DO  08/23/20 9537 [address]/[facility], DO  08/23/20 0156

## 2020-08-24 ENCOUNTER — HOSPITAL ENCOUNTER (OUTPATIENT)
Facility: HOSPITAL | Age: 55
Setting detail: OUTPATIENT SURGERY
Discharge: HOME | End: 2020-08-24
Attending: UROLOGY | Admitting: UROLOGY
Payer: COMMERCIAL

## 2020-08-24 ENCOUNTER — TELEPHONE (OUTPATIENT)
Dept: UROLOGY | Facility: CLINIC | Age: 55
End: 2020-08-24

## 2020-08-24 ENCOUNTER — ANESTHESIA (OUTPATIENT)
Dept: PERIOP | Facility: HOSPITAL | Age: 55
End: 2020-08-24
Payer: COMMERCIAL

## 2020-08-24 ENCOUNTER — APPOINTMENT (OUTPATIENT)
Dept: RADIOLOGY | Facility: HOSPITAL | Age: 55
End: 2020-08-24
Payer: COMMERCIAL

## 2020-08-24 VITALS
OXYGEN SATURATION: 94 % | RESPIRATION RATE: 20 BRPM | SYSTOLIC BLOOD PRESSURE: 123 MMHG | WEIGHT: 305 LBS | TEMPERATURE: 97.5 F | HEIGHT: 70 IN | HEART RATE: 57 BPM | BODY MASS INDEX: 43.67 KG/M2 | DIASTOLIC BLOOD PRESSURE: 75 MMHG

## 2020-08-24 DIAGNOSIS — N20.1 RIGHT URETERAL STONE: ICD-10-CM

## 2020-08-24 DIAGNOSIS — N21.0 BLADDER STONES: Primary | ICD-10-CM

## 2020-08-24 PROCEDURE — 52356 CYSTO/URETERO W/LITHOTRIPSY: CPT | Performed by: UROLOGY

## 2020-08-24 PROCEDURE — 87086 URINE CULTURE/COLONY COUNT: CPT | Performed by: UROLOGY

## 2020-08-24 PROCEDURE — C2617 STENT, NON-COR, TEM W/O DEL: HCPCS | Performed by: UROLOGY

## 2020-08-24 PROCEDURE — 82360 CALCULUS ASSAY QUANT: CPT | Performed by: UROLOGY

## 2020-08-24 PROCEDURE — 74450 X-RAY URETHRA/BLADDER: CPT

## 2020-08-24 PROCEDURE — C1769 GUIDE WIRE: HCPCS | Performed by: UROLOGY

## 2020-08-24 DEVICE — STENT URETERAL 6 FR 26CM INLAY OPTIMA: Type: IMPLANTABLE DEVICE | Status: FUNCTIONAL

## 2020-08-24 RX ORDER — HYDROMORPHONE HCL/PF 1 MG/ML
0.5 SYRINGE (ML) INJECTION
Status: DISCONTINUED | OUTPATIENT
Start: 2020-08-24 | End: 2020-08-24 | Stop reason: HOSPADM

## 2020-08-24 RX ORDER — CEFAZOLIN SODIUM 2 G/50ML
2000 SOLUTION INTRAVENOUS ONCE
Status: DISCONTINUED | OUTPATIENT
Start: 2020-08-24 | End: 2020-08-24 | Stop reason: HOSPADM

## 2020-08-24 RX ORDER — LIDOCAINE HYDROCHLORIDE 10 MG/ML
INJECTION, SOLUTION EPIDURAL; INFILTRATION; INTRACAUDAL; PERINEURAL AS NEEDED
Status: DISCONTINUED | OUTPATIENT
Start: 2020-08-24 | End: 2020-08-24

## 2020-08-24 RX ORDER — MAGNESIUM HYDROXIDE 1200 MG/15ML
LIQUID ORAL AS NEEDED
Status: DISCONTINUED | OUTPATIENT
Start: 2020-08-24 | End: 2020-08-24 | Stop reason: HOSPADM

## 2020-08-24 RX ORDER — FENTANYL CITRATE 50 UG/ML
INJECTION, SOLUTION INTRAMUSCULAR; INTRAVENOUS AS NEEDED
Status: DISCONTINUED | OUTPATIENT
Start: 2020-08-24 | End: 2020-08-24

## 2020-08-24 RX ORDER — PROPOFOL 10 MG/ML
INJECTION, EMULSION INTRAVENOUS AS NEEDED
Status: DISCONTINUED | OUTPATIENT
Start: 2020-08-24 | End: 2020-08-24

## 2020-08-24 RX ORDER — HYDROCODONE BITARTRATE AND ACETAMINOPHEN 5; 325 MG/1; MG/1
1 TABLET ORAL EVERY 6 HOURS PRN
Status: DISCONTINUED | OUTPATIENT
Start: 2020-08-24 | End: 2020-08-24 | Stop reason: HOSPADM

## 2020-08-24 RX ORDER — SODIUM CHLORIDE, SODIUM LACTATE, POTASSIUM CHLORIDE, CALCIUM CHLORIDE 600; 310; 30; 20 MG/100ML; MG/100ML; MG/100ML; MG/100ML
INJECTION, SOLUTION INTRAVENOUS CONTINUOUS PRN
Status: DISCONTINUED | OUTPATIENT
Start: 2020-08-24 | End: 2020-08-24

## 2020-08-24 RX ORDER — ONDANSETRON 2 MG/ML
4 INJECTION INTRAMUSCULAR; INTRAVENOUS ONCE AS NEEDED
Status: DISCONTINUED | OUTPATIENT
Start: 2020-08-24 | End: 2020-08-24 | Stop reason: HOSPADM

## 2020-08-24 RX ORDER — HYDROCODONE BITARTRATE AND ACETAMINOPHEN 5; 325 MG/1; MG/1
2 TABLET ORAL EVERY 6 HOURS PRN
Qty: 6 TABLET | Refills: 0 | Status: SHIPPED | OUTPATIENT
Start: 2020-08-24 | End: 2020-09-03

## 2020-08-24 RX ORDER — DEXAMETHASONE SODIUM PHOSPHATE 10 MG/ML
INJECTION, SOLUTION INTRAMUSCULAR; INTRAVENOUS AS NEEDED
Status: DISCONTINUED | OUTPATIENT
Start: 2020-08-24 | End: 2020-08-24

## 2020-08-24 RX ORDER — CEPHALEXIN 500 MG/1
500 CAPSULE ORAL EVERY 12 HOURS SCHEDULED
Qty: 6 CAPSULE | Refills: 0 | Status: SHIPPED | OUTPATIENT
Start: 2020-08-24 | End: 2020-08-27

## 2020-08-24 RX ORDER — SODIUM CHLORIDE 9 MG/ML
125 INJECTION, SOLUTION INTRAVENOUS CONTINUOUS
Status: DISCONTINUED | OUTPATIENT
Start: 2020-08-24 | End: 2020-08-24 | Stop reason: HOSPADM

## 2020-08-24 RX ORDER — ONDANSETRON 2 MG/ML
INJECTION INTRAMUSCULAR; INTRAVENOUS AS NEEDED
Status: DISCONTINUED | OUTPATIENT
Start: 2020-08-24 | End: 2020-08-24

## 2020-08-24 RX ORDER — CEFAZOLIN SODIUM 1 G/50ML
1000 SOLUTION INTRAVENOUS ONCE
Status: DISCONTINUED | OUTPATIENT
Start: 2020-08-24 | End: 2020-08-24 | Stop reason: HOSPADM

## 2020-08-24 RX ORDER — MIDAZOLAM HYDROCHLORIDE 2 MG/2ML
INJECTION, SOLUTION INTRAMUSCULAR; INTRAVENOUS AS NEEDED
Status: DISCONTINUED | OUTPATIENT
Start: 2020-08-24 | End: 2020-08-24

## 2020-08-24 RX ORDER — PROMETHAZINE HYDROCHLORIDE 25 MG/ML
12.5 INJECTION, SOLUTION INTRAMUSCULAR; INTRAVENOUS ONCE AS NEEDED
Status: DISCONTINUED | OUTPATIENT
Start: 2020-08-24 | End: 2020-08-24 | Stop reason: HOSPADM

## 2020-08-24 RX ADMIN — FENTANYL CITRATE 100 MCG: 50 INJECTION INTRAMUSCULAR; INTRAVENOUS at 11:43

## 2020-08-24 RX ADMIN — Medication 3000 MG: at 11:50

## 2020-08-24 RX ADMIN — PROPOFOL 200 MG: 10 INJECTION, EMULSION INTRAVENOUS at 11:44

## 2020-08-24 RX ADMIN — DEXAMETHASONE SODIUM PHOSPHATE 4 MG: 10 INJECTION, SOLUTION INTRAMUSCULAR; INTRAVENOUS at 12:04

## 2020-08-24 RX ADMIN — LIDOCAINE HYDROCHLORIDE 50 MG: 10 INJECTION, SOLUTION EPIDURAL; INFILTRATION; INTRACAUDAL; PERINEURAL at 11:44

## 2020-08-24 RX ADMIN — HYDROMORPHONE HYDROCHLORIDE 0.5 MG: 1 INJECTION, SOLUTION INTRAMUSCULAR; INTRAVENOUS; SUBCUTANEOUS at 13:52

## 2020-08-24 RX ADMIN — HYDROMORPHONE HYDROCHLORIDE 0.5 MG: 1 INJECTION, SOLUTION INTRAMUSCULAR; INTRAVENOUS; SUBCUTANEOUS at 13:32

## 2020-08-24 RX ADMIN — MIDAZOLAM HYDROCHLORIDE 2 MG: 1 INJECTION, SOLUTION INTRAMUSCULAR; INTRAVENOUS at 11:42

## 2020-08-24 RX ADMIN — SODIUM CHLORIDE, SODIUM LACTATE, POTASSIUM CHLORIDE, AND CALCIUM CHLORIDE: .6; .31; .03; .02 INJECTION, SOLUTION INTRAVENOUS at 11:25

## 2020-08-24 RX ADMIN — HYDROCODONE BITARTRATE AND ACETAMINOPHEN 1 TABLET: 5; 325 TABLET ORAL at 14:40

## 2020-08-24 RX ADMIN — ONDANSETRON HYDROCHLORIDE 4 MG: 2 INJECTION, SOLUTION INTRAMUSCULAR; INTRAVENOUS at 12:03

## 2020-08-24 RX ADMIN — HYDROMORPHONE HYDROCHLORIDE 0.5 MG: 1 INJECTION, SOLUTION INTRAMUSCULAR; INTRAVENOUS; SUBCUTANEOUS at 13:42

## 2020-08-24 NOTE — NURSING NOTE
Welch care performed and instructions given to wife/  Leg bag placed; blood tinged urine noted  Pain 7/10 penis; pain med given  Continue to monitor

## 2020-08-24 NOTE — OP NOTE
OPERATIVE REPORT  PATIENT NAME: Matt Gutierrez    :  1965  MRN: [id number]  Pt Location:  OR ROOM 10    SURGERY DATE: 2020    Surgeon(s) and Role:     * Malou Sawyer MD - Primary    Preop Diagnosis:  Right ureteral stone [N20 1]    Post-Op Diagnosis Codes:     * Right ureteral stone [N20 1]    Procedure(s) (LRB):  CYSTOSCOPY , cystolitholapaxy of bladder stone WITH HOMIUM LASER RIGHT RETROGRADE AND RIGHT URETERAL STENT, FULGERATION OF BLADDER (Right)    Specimen(s):  ID Type Source Tests Collected by Time Destination   A :  Urine Urine, Cystoscopic URINE CULTURE Malou Sawyer MD 2020 1203    B : BLADDER STONES Calculus Urinary Bladder STONE ANALYSIS Malou Sawyer MD 2020 1220        Estimated Blood Loss:   Minimal    Drains:  Urethral Catheter Latex (Active)   Number of days: 0     Right 26-6 Nepalese double-J ureteral stent without string, 18 Nepalese Welch catheter    Anesthesia Type:   General    Operative Indications:  Right ureteral stone [N20 1]      Operative Findings:  Multiple large stones within the bladder removed with holmium laser  Patulous left ureteral orifice  Patulous right ureteral orifice more than left possibly secondary to a ruptured ureterocele  Right retrograde pyelogram with moderate right-sided hydronephrosis  Right ureteral stent placed without a string  Complications:   None    Procedure and Technique:  Eris Diggs is a 28-year-old male with a rising creatinine  His baseline is 1 26  More recently his creatinine has been between 1 8 and 1 9  He had recent imaging which showed what appeared to be a very large bladder stone versus a stone within a right ureterocele  There was right-sided hydronephrosis which favored the possibility of ureterocele  Risk and benefits of cystoscopy with cystolitholapaxy and possible right ureteroscopy with retrograde pyelography and right ureteral stent were discussed and reviewed  Informed consent was obtained    The patient was brought to the operating room on August 24, 2020  After the smooth induction of general LMA anesthesia, patient was placed in dorsal lithotomy position  His genitalia was prepped and draped in sterile fashion  Intravenous antibiotics were administered  A time-out was performed with all members of the operative team confirming the patient's identity procedure to be performed  A 22 Greek rigid cystoscope with 30 degree lens was inserted in the bladder was thoroughly inspected  There was a high bladder neck without BPH  The bladder was entered  Multiple large stones were identified within the lumen of the bladder  The left ureteral orifice was visualized and patulous  The right ureteral orifice was widely open with inflammatory changes circumferentially noted  This almost had the appearance as if there had been a ureterocele in place which had ruptured from a stone within the distal ureter  A 1000 micron holmium laser fiber was utilized to decimated all the stones within the bladder  The Ellik was utilized to remove all chips  I then focused my attention on the clearly visualized right ureteral orifice  A 5 Greek open-ended catheter with the assistance of a wire was passed  A right retrograde pyelogram revealed hydronephrosis of the right kidney and ureter all the way to the level of the bladder  This would be most consistent with a ureterocele  Based on his elevated creatinine of almost 1 9, I made the decision to place a right 26-6 [place] double-J ureteral stent  The proximal coil was appreciated within the right renal pelvis and the distal coil was visualized within the bladder  There was no string left in place  I then thoroughly reinspected the bladder  All stone fragments were removed  There was bleeding from the bladder mucosa circumferentially around the ureteral orifice from the possible ureterocele tissue    Bugbee electrocautery was used for hemostasis and fulguration of the bladder in this area  The bladder was left full  The cystoscope was removed  An 25 Kyrgyz Welch catheter was placed and connected to gravity drainage the patient is on Xarelto for his atrial fibrillation  Overall the patient tolerated the procedure well number no complications  The patient was extubated in the operating room transferred the PACU in stable condition at the conclusion of the case      Patient Disposition:  PACU stable and extubated    SIGNATURE: Soila Sanchez MD  DATE: August 24, 2020  TIME: 1:09 PM

## 2020-08-24 NOTE — TELEPHONE ENCOUNTER
Belkis Lee underwent cystoscopy with cystolitholapaxy  He has a Welch catheter in place which should be removed on Wednesday August 26, 2020 by the nursing staff and either the [facility] or [facility] office  The patient also has a right ureteral stent in place  Please arrange for cystoscopy with stent removal with Dr Rashard Trujillo in the next 6 weeks

## 2020-08-24 NOTE — INTERVAL H&P NOTE
H&P reviewed  After examining the patient I find no changes in the patients condition since the H&P had been written  Vitals:    08/24/20 1107   BP: 151/79   Pulse: (!) 54   Resp: 18   Temp: 98 5 °F (36 9 °C)   SpO2: 97%       Plan for cystoscopy, cystolitholapaxy, R retrograde, possible R ureteroscopy with laser, possible laser or ureterocele

## 2020-08-24 NOTE — DISCHARGE INSTRUCTIONS
Leonila Sands,    Today I removed multiple large bladder stones  I also placed a right ureteral stent  He had a catheter in place that will need to be removed my office on Wednesday  He of a right ureteral stent in place which I will remove in the office in the next 6 weeks  Antibiotics and pain medication as prescribed  Call for follow-up with Dr Francisco Gonzalez  [phone]    Dr Tal Gerber TO KNOW:   A bladder stone is a hard substance in your bladder  Bladder stones may form in your bladder, or they may first form in your kidney and then travel to your bladder  Bladder stones are made up of minerals such as calcium, uric acid, oxalate, and phosphate  You may have one or more bladder stone  DISCHARGE INSTRUCTIONS:   Seek care immediately if:   · You have severe pain that does not get better with medicine  · You are vomiting  · You have a fever  Contact your healthcare provider if:   · Your symptoms do not get better, or they get worse  · You have trouble urinating  · You have questions or concerns about your condition or care  Drink plenty of liquids: Your healthcare provider may tell you to drink up to 8 (eight-ounce) cups of liquids each day  This helps flush out the stones when you urinate  It may also help prevent bladder stones from forming again  Water is the best liquid to drink  Follow up with your healthcare provider as directed: You may need to return for more tests or treatment  Write down your questions so you remember to ask them during your visits  © 2017 [facility] Information is for End User's use only and may not be sold, redistributed or otherwise used for commercial purposes  All illustrations and images included in CareNotes® are the copyrighted property of [email or web address] , [employer]  or Elieser [employer]  The above information is an  only  It is not intended as medical advice for individual conditions or treatments  Talk to your doctor, nurse or pharmacist before following any medical regimen to see if it is safe and effective for you

## 2020-08-25 LAB — BACTERIA UR CULT: NORMAL

## 2020-08-26 ENCOUNTER — PROCEDURE VISIT (OUTPATIENT)
Dept: UROLOGY | Facility: CLINIC | Age: 55
End: 2020-08-26

## 2020-08-26 VITALS — SYSTOLIC BLOOD PRESSURE: 142 MMHG | HEART RATE: 58 BPM | TEMPERATURE: 98 F | DIASTOLIC BLOOD PRESSURE: 90 MMHG

## 2020-08-26 DIAGNOSIS — N20.1 RIGHT URETERAL STONE: Primary | ICD-10-CM

## 2020-08-26 LAB — BACTERIA UR CULT: NORMAL

## 2020-08-26 PROCEDURE — 99024 POSTOP FOLLOW-UP VISIT: CPT

## 2020-08-26 PROCEDURE — 3077F SYST BP >= 140 MM HG: CPT

## 2020-08-26 PROCEDURE — 3080F DIAST BP >= 90 MM HG: CPT

## 2020-08-26 NOTE — PROGRESS NOTES
8/26/2020    Christen Kwan  1965  [id number]    Diagnosis  Chief Complaint     Nephrolithiasis; Post-op          Patient presents for Welch removal S/PCYSTOSCOPY , cystolitholapaxy of bladder stone WITH HOMIUM LASER RIGHT RETROGRADE AND RIGHT URETERAL STENT, FULGERATION OF BLADDER (Right Bladder) on 8/24/2020  by Dr Pamella Alas    Patient will Fu with Dr Nando Edmond in 1 week    Procedure Welch removal    Welch catheter removed after deflation of an intact balloon  Patient tolerated well   Encouraged patient to hydrate well       Vitals:    08/26/20 0911   BP: 142/90   BP Location: Right arm   Patient Position: Sitting   Cuff Size: Large   Pulse: 58   Temp: 98 °F (36 7 °C)           Merlin Rashid LPN

## 2020-08-26 NOTE — PATIENT INSTRUCTIONS
What to expect after Welch catheter removal:   Blood in the urine may continue for a few days   Burning with urination may continue for a few days   May have urinary frequency for a few days   Call office if symptoms continue to worsen or patient develops a fever   Increase water intake

## 2020-09-03 ENCOUNTER — TELEPHONE (OUTPATIENT)
Dept: NEPHROLOGY | Facility: CLINIC | Age: 55
End: 2020-09-03

## 2020-09-03 ENCOUNTER — TELEPHONE (OUTPATIENT)
Dept: FAMILY MEDICINE CLINIC | Facility: CLINIC | Age: 55
End: 2020-09-03

## 2020-09-03 LAB
COLOR STONE: NORMAL
COMMENT-STONE3: NORMAL
COMPOSITION: NORMAL
LABORATORY COMMENT REPORT: NORMAL
PHOTO: NORMAL
SIZE STONE: NORMAL MM
SPEC SOURCE SUBJ: NORMAL
STONE ANALYSIS-IMP: NORMAL
URATE MFR STONE: 100 %
WT STONE: 3511 MG

## 2020-09-03 NOTE — TELEPHONE ENCOUNTER
Reviewing his medications I do not believe any of them are causing his acute problems      1  Check with his primary physician regarding both the gout and the anxiety

## 2020-09-03 NOTE — TELEPHONE ENCOUNTER
Patient has not been seen since 2018, needs OV to evaluate for gout given he has multiple other medical conditions that can affect what he can take for gout

## 2020-09-03 NOTE — TELEPHONE ENCOUNTER
Patient called stating that he is having a really bad gout flare up in his left foot  He is asking if you would please send a round of steroids to [facility] on [address]  Please advise  He just had urologic surgery and his nephrologist referred him back to his PCP

## 2020-09-03 NOTE — TELEPHONE ENCOUNTER
Pt had urological procedure done  He is now experiencing an episode of gout in his foot; using crutches to walk  Started yesterday  Not taking anything acute other than potassium citrate for maintenance  Additionally, he has been experiencing anxiety attacks; waking up in the night with anxiety  Something very new to him  Wondering if anything to do with recent med changes that were made

## 2020-09-04 ENCOUNTER — TELEMEDICINE (OUTPATIENT)
Dept: FAMILY MEDICINE CLINIC | Facility: CLINIC | Age: 55
End: 2020-09-04
Payer: COMMERCIAL

## 2020-09-04 DIAGNOSIS — M10.372 ACUTE GOUT DUE TO RENAL IMPAIRMENT INVOLVING LEFT FOOT: Primary | ICD-10-CM

## 2020-09-04 DIAGNOSIS — N18.30 CHRONIC KIDNEY DISEASE, STAGE III (MODERATE) (HCC): ICD-10-CM

## 2020-09-04 DIAGNOSIS — I12.9 HYPERTENSIVE CHRONIC KIDNEY DISEASE WITH STAGE 1 THROUGH STAGE 4 CHRONIC KIDNEY DISEASE, OR UNSPECIFIED CHRONIC KIDNEY DISEASE: ICD-10-CM

## 2020-09-04 PROBLEM — N17.9 ACUTE RENAL FAILURE (HCC): Status: RESOLVED | Noted: 2020-07-31 | Resolved: 2020-09-04

## 2020-09-04 PROBLEM — M25.512 ACUTE PAIN OF LEFT SHOULDER: Status: RESOLVED | Noted: 2018-10-05 | Resolved: 2020-09-04

## 2020-09-04 PROBLEM — Z23 NEED FOR INFLUENZA VACCINATION: Status: RESOLVED | Noted: 2018-10-05 | Resolved: 2020-09-04

## 2020-09-04 PROBLEM — N17.9 AKI (ACUTE KIDNEY INJURY) (HCC): Status: RESOLVED | Noted: 2020-08-04 | Resolved: 2020-09-04

## 2020-09-04 PROBLEM — R53.83 FATIGUE: Status: RESOLVED | Noted: 2020-07-20 | Resolved: 2020-09-04

## 2020-09-04 PROCEDURE — 99214 OFFICE O/P EST MOD 30 MIN: CPT | Performed by: FAMILY MEDICINE

## 2020-09-04 RX ORDER — PREDNISONE 10 MG/1
30 TABLET ORAL DAILY
Qty: 15 TABLET | Refills: 0 | Status: SHIPPED | OUTPATIENT
Start: 2020-09-04 | End: 2020-09-09

## 2020-09-04 NOTE — PROGRESS NOTES
Virtual Regular Visit      Assessment/Plan:    Problem List Items Addressed This Visit        Genitourinary    Chronic kidney disease, stage III (moderate) ([facility])    Hypertensive chronic kidney disease with stage 1 through stage 4 chronic kidney disease, or unspecified chronic kidney disease      Other Visit Diagnoses     Acute gout due to renal impairment involving left foot    -  Primary    Relevant Medications    predniSONE 10 mg tablet    Other Relevant Orders    Uric acid        Exam and history consistent with acute gout, possibly secondary to recent procedure  Given CKD 3, will treat with prednisone, patient will call back if needs higher dose or prolonged course  Given this is the 3rd episode of gout in the last 3 years and has had episodes prior to that as well, check uric acid at least 4 weeks after acute episode  Given recurrence of ureteral stones, may need uric acid lowering agent after discussion with urologist and nephrologist     Patient has established care appointment with new PCP in 6 weeks, advised to get lab work done prior to that visit  Reason for visit is   Chief Complaint   Patient presents with   [facility] Virtual Regular Visit        Encounter provider Celio Linares MD    Provider located at [place] [address]  [phone]      Recent Visits  Date Type Provider Dept   09/03/20 Telephone Jesus Gates 12 recent visits within past 7 days and meeting all other requirements     Today's Visits  Date Type Provider Dept   09/04/20 Jorge Linares MD 2 [facility] today's visits and meeting all other requirements     Future Appointments  No visits were found meeting these conditions  Showing future appointments within next 150 days and meeting all other requirements        The patient was identified by name and date of birth   Mathieu Ing was informed that this is a telemedicine visit and that the visit is being conducted through [employer] and patient was informed that this is not a secure, HIPAA-complaint platform  He agrees to proceed     My office door was closed  No one else was in the room  He acknowledged consent and understanding of privacy and security of the video platform  The patient has agreed to participate and understands they can discontinue the visit at any time  Patient is aware this is a billable service  Edwin Watts is a 47 y o  male with left foot pain  Patient has a history of hypertension, paroxysmal AFib, CKD3  Patient thinks he has a flare up of gout, started 3 days ago with a feeling of a bruised heel and has gotten progressively worse  He states that his foot and toe or sensitive to even a sheet touching it  He is walking with crutches due to the pain  Patient has had similar episodes in the past, last in February 2019, has done extremely well with prednisone previously  He recently had a stent placed for a large ureteral stone  His last creatinine approximately 1 month ago was elevated 1 87, slightly above his baseline but stable        Past Medical History:   Diagnosis Date    Arthritis     Asthma     Atrial fibrillation ([address] )     Chronic kidney disease     kidney stones    Gout     Hypertension     Kidney stone     Seasonal allergies        Past Surgical History:   Procedure Laterality Date    APPENDECTOMY      CARDIOVERSION      X4 last was 2018    COLONOSCOPY      CYSTOSCOPY  08/30/2016    w/removal of object, last assessed 8/30/16    FOOT SURGERY Left     HAND SURGERY      KNEE SURGERY Right 2016    OR CYSTO/URETERO W/LITHOTRIPSY &INDWELL STENT INSRT Left 8/9/2016    Procedure: CYSTOSCOPY URETEROSCOPY WITH LITHOTRIPSY HOLMIUM LASER STONE EXTRACTION, RETROGRADE PYELOGRAM AND INSERTION STENT URETERAL;  Surgeon: Daniel Purvis MD;  Location[facility];  Service: Urology last assessed 8/30/16    OR CYSTO/URETERO W/LITHOTRIPSY &INDWELL STENT INSRT Right 8/24/2020    Procedure: CYSTOSCOPY , cystolitholapaxy of bladder stone WITH HOMIUM LASER RIGHT RETROGRADE AND RIGHT URETERAL STENT, FULGERATION OF BLADDER;  Surgeon: Daniel Purvis MD;  Location: [address] [place] [address];  Service: Urology    WRIST SURGERY Left 2014    Fracture surgery       Current Outpatient Medications   Medication Sig Dispense Refill    amLODIPine (NORVASC) 5 mg tablet TAKE 1 TABLET DAILY 90 tablet 3    BYSTOLIC 5 MG tablet TAKE 1 TABLET BY MOUTH EVERY DAY 90 tablet 2    cloNIDine (CATAPRES-TTS-2) 0 2 mg/24 hr APPLY 1 PATCH ONE TIME PER WEEK 4 patch 0    dofetilide (TIKOSYN) 500 mcg capsule TAKE ONE CAPSULE BY MOUTH EVERY 12 HOURS 60 capsule 8    hydrALAZINE (APRESOLINE) 25 mg tablet Take 1 tablet (25 mg total) by mouth 3 (three) times a day (Patient taking differently: Take 25 mg by mouth 2 (two) times a day ) 60 tablet 5    potassium citrate (Urocit-K 10) 10 mEq Take 2 tablets (20 mEq total) by mouth 2 (two) times a day 120 tablet 5    predniSONE 10 mg tablet Take 3 tablets (30 mg total) by mouth daily for 5 days 15 tablet 0    XARELTO 20 MG tablet TAKE 1 TABLET BY MOUTH EVERY DAY 90 tablet 3     No current facility-administered medications for this visit  No Known Allergies    Review of Systems   Constitutional: Negative for chills and fever  Musculoskeletal: Positive for arthralgias and joint swelling  Skin: Negative for rash  Video Exam    There were no vitals filed for this visit  Physical Exam  Constitutional:       Appearance: Normal appearance  He is obese  Musculoskeletal:      Left ankle: He exhibits decreased range of motion and swelling  He exhibits no ecchymosis, no deformity and no laceration  Feet:    Neurological:      Mental Status: He is alert            I have spent 15 minutes with Patient and wife today in which greater than 50% of this time was spent in counseling/coordination of care regarding Risks and benefits of tx options, Instructions for management, Patient and family education, Importance of treatment compliance and Impressions  VIRTUAL VISIT DISCLAIMER    Eddy Jesica acknowledges that he has consented to an online visit or consultation  He understands that the online visit is based solely on information provided by him, and that, in the absence of a face-to-face physical evaluation by the physician, the diagnosis he receives is both limited and provisional in terms of accuracy and completeness  This is not intended to replace a full medical face-to-face evaluation by the physician  Eddy Mooney understands and accepts these terms

## 2020-09-08 ENCOUNTER — APPOINTMENT (OUTPATIENT)
Dept: LAB | Facility: CLINIC | Age: 55
End: 2020-09-08
Payer: COMMERCIAL

## 2020-09-08 ENCOUNTER — TRANSCRIBE ORDERS (OUTPATIENT)
Dept: LAB | Facility: CLINIC | Age: 55
End: 2020-09-08

## 2020-09-08 DIAGNOSIS — I12.9 HYPERTENSIVE CHRONIC KIDNEY DISEASE WITH STAGE 1 THROUGH STAGE 4 CHRONIC KIDNEY DISEASE, OR UNSPECIFIED CHRONIC KIDNEY DISEASE: ICD-10-CM

## 2020-09-08 DIAGNOSIS — N20.0 NEPHROLITHIASIS: ICD-10-CM

## 2020-09-08 DIAGNOSIS — E78.5 DYSLIPIDEMIA: ICD-10-CM

## 2020-09-08 DIAGNOSIS — N17.9 ACUTE RENAL FAILURE, UNSPECIFIED ACUTE RENAL FAILURE TYPE (HCC): ICD-10-CM

## 2020-09-08 DIAGNOSIS — N18.30 CHRONIC KIDNEY DISEASE, STAGE III (MODERATE) (HCC): ICD-10-CM

## 2020-09-08 DIAGNOSIS — N28.89 PELVICALIECTASIS: ICD-10-CM

## 2020-09-08 DIAGNOSIS — N17.9 AKI (ACUTE KIDNEY INJURY) (HCC): ICD-10-CM

## 2020-09-08 DIAGNOSIS — E55.9 VITAMIN D DEFICIENCY: ICD-10-CM

## 2020-09-08 DIAGNOSIS — R39.15 URGENCY OF URINATION: ICD-10-CM

## 2020-09-08 DIAGNOSIS — I10 BENIGN ESSENTIAL HYPERTENSION: ICD-10-CM

## 2020-09-08 LAB
ANION GAP SERPL CALCULATED.3IONS-SCNC: 6 MMOL/L (ref 4–13)
BUN SERPL-MCNC: 24 MG/DL (ref 5–25)
CALCIUM SERPL-MCNC: 8.4 MG/DL (ref 8.3–10.1)
CHLORIDE SERPL-SCNC: 105 MMOL/L (ref 100–108)
CO2 SERPL-SCNC: 28 MMOL/L (ref 21–32)
CREAT SERPL-MCNC: 1.54 MG/DL (ref 0.6–1.3)
GFR SERPL CREATININE-BSD FRML MDRD: 50 ML/MIN/1.73SQ M
GLUCOSE P FAST SERPL-MCNC: 156 MG/DL (ref 65–99)
POTASSIUM SERPL-SCNC: 4.2 MMOL/L (ref 3.5–5.3)
SODIUM SERPL-SCNC: 139 MMOL/L (ref 136–145)

## 2020-09-08 PROCEDURE — 36415 COLL VENOUS BLD VENIPUNCTURE: CPT

## 2020-09-08 PROCEDURE — 80048 BASIC METABOLIC PNL TOTAL CA: CPT

## 2020-09-09 ENCOUNTER — TELEPHONE (OUTPATIENT)
Dept: NEPHROLOGY | Facility: CLINIC | Age: 55
End: 2020-09-09

## 2020-09-09 NOTE — TELEPHONE ENCOUNTER
----- Message from Raymond Ernst MD sent at 9/9/2020  8:27 AM EDT -----  Let the patient know that his creatinine is actually improved down to 1 54

## 2020-09-15 DIAGNOSIS — N18.30 CHRONIC KIDNEY DISEASE, STAGE III (MODERATE) (HCC): ICD-10-CM

## 2020-09-15 DIAGNOSIS — I12.9 HYPERTENSIVE CHRONIC KIDNEY DISEASE WITH STAGE 1 THROUGH STAGE 4 CHRONIC KIDNEY DISEASE, OR UNSPECIFIED CHRONIC KIDNEY DISEASE: ICD-10-CM

## 2020-09-15 DIAGNOSIS — I10 BENIGN ESSENTIAL HYPERTENSION: ICD-10-CM

## 2020-09-15 RX ORDER — HYDRALAZINE HYDROCHLORIDE 25 MG/1
25 TABLET, FILM COATED ORAL 2 TIMES DAILY
Qty: 180 TABLET | Refills: 3 | Status: SHIPPED | OUTPATIENT
Start: 2020-09-15 | End: 2021-09-07

## 2020-09-30 ENCOUNTER — PROCEDURE VISIT (OUTPATIENT)
Dept: UROLOGY | Facility: AMBULATORY SURGERY CENTER | Age: 55
End: 2020-09-30
Payer: COMMERCIAL

## 2020-09-30 VITALS
DIASTOLIC BLOOD PRESSURE: 90 MMHG | SYSTOLIC BLOOD PRESSURE: 152 MMHG | TEMPERATURE: 98 F | HEART RATE: 75 BPM | HEIGHT: 70 IN | BODY MASS INDEX: 43.81 KG/M2 | RESPIRATION RATE: 20 BRPM | WEIGHT: 306 LBS

## 2020-09-30 DIAGNOSIS — N20.1 RIGHT URETERAL STONE: Primary | ICD-10-CM

## 2020-09-30 DIAGNOSIS — Z12.5 SCREENING FOR PROSTATE CANCER: ICD-10-CM

## 2020-09-30 PROCEDURE — 52310 CYSTOSCOPY AND TREATMENT: CPT | Performed by: UROLOGY

## 2020-09-30 RX ORDER — CEPHALEXIN 500 MG/1
500 CAPSULE ORAL 3 TIMES DAILY
Qty: 9 CAPSULE | Refills: 0 | Status: SHIPPED | OUTPATIENT
Start: 2020-09-30 | End: 2020-10-03

## 2020-09-30 NOTE — PROGRESS NOTES
Cystoscopy    Date/Time: 9/30/2020 11:24 AM  Performed by: Eduard Barahona MD  Authorized by: Eduard Barahona MD     Procedure details: cystoscopy        Jennyfer Nelson is a 80-year-old male who underwent cystolitholapaxy for multiple large bladder stones  He had right-sided hydronephrosis appreciated possibly secondary to a ruptured ureterocele  I placed a right ureteral stent without a string  He returns in follow-up today to undergo cystoscopy with stent removal   His most recent creatinine level from September 2020 is 1 54      Cystoscopy with stent removal procedure note: The patient returns to the office today to undergo cystoscopy with stent removal  Risk and benefits of the procedure were discussed and informed consent was obtained  The patient was placed in the modified supine position  Genitalia was prepped and draped in a sterile fashion  Viscous lidocaine was used for local anesthesia  The flexible cystoscope was passed  The bladder was inspected  The stent was identified coming from the right ureteral orifice  There was inflamed mucosa circumferentially around the right ureteral orifice  The stent was grasped and easily removed without difficulty  Overall the patient tolerated the procedure  Impression:  Status post cystolitholapaxy, status post stent removal    Plan:  Keflex 500 mg t i d  Was prescribed for 3 day course  I recommend follow-up in 4 weeks with advanced practitioner with retroperitoneal ultrasound to ensure stability/resolution of the right-sided hydronephrosis  Recommend PSA screening secondary to age

## 2020-10-13 ENCOUNTER — OFFICE VISIT (OUTPATIENT)
Dept: FAMILY MEDICINE CLINIC | Facility: CLINIC | Age: 55
End: 2020-10-13
Payer: COMMERCIAL

## 2020-10-13 ENCOUNTER — APPOINTMENT (OUTPATIENT)
Dept: LAB | Facility: CLINIC | Age: 55
End: 2020-10-13
Payer: COMMERCIAL

## 2020-10-13 VITALS
DIASTOLIC BLOOD PRESSURE: 82 MMHG | TEMPERATURE: 97.9 F | SYSTOLIC BLOOD PRESSURE: 136 MMHG | HEIGHT: 71 IN | WEIGHT: 308 LBS | RESPIRATION RATE: 16 BRPM | HEART RATE: 62 BPM | BODY MASS INDEX: 43.12 KG/M2

## 2020-10-13 DIAGNOSIS — I12.9 HYPERTENSIVE CHRONIC KIDNEY DISEASE WITH STAGE 1 THROUGH STAGE 4 CHRONIC KIDNEY DISEASE, OR UNSPECIFIED CHRONIC KIDNEY DISEASE: ICD-10-CM

## 2020-10-13 DIAGNOSIS — I10 BENIGN ESSENTIAL HYPERTENSION: ICD-10-CM

## 2020-10-13 DIAGNOSIS — Z00.00 ANNUAL PHYSICAL EXAM: ICD-10-CM

## 2020-10-13 DIAGNOSIS — I48.91 ATRIAL FIBRILLATION WITH RVR (HCC): Primary | ICD-10-CM

## 2020-10-13 DIAGNOSIS — F41.8 SITUATIONAL ANXIETY: ICD-10-CM

## 2020-10-13 DIAGNOSIS — E66.01 MORBID OBESITY (HCC): ICD-10-CM

## 2020-10-13 DIAGNOSIS — E78.5 DYSLIPIDEMIA: ICD-10-CM

## 2020-10-13 LAB
ALBUMIN SERPL BCP-MCNC: 3.6 G/DL (ref 3.5–5)
ALP SERPL-CCNC: 52 U/L (ref 46–116)
ALT SERPL W P-5'-P-CCNC: 56 U/L (ref 12–78)
ANION GAP SERPL CALCULATED.3IONS-SCNC: 7 MMOL/L (ref 4–13)
AST SERPL W P-5'-P-CCNC: 23 U/L (ref 5–45)
BACTERIA UR QL AUTO: NORMAL /HPF
BILIRUB SERPL-MCNC: 0.28 MG/DL (ref 0.2–1)
BILIRUB UR QL STRIP: NEGATIVE
BUN SERPL-MCNC: 21 MG/DL (ref 5–25)
CALCIUM SERPL-MCNC: 9 MG/DL (ref 8.3–10.1)
CHLORIDE SERPL-SCNC: 105 MMOL/L (ref 100–108)
CHOLEST SERPL-MCNC: 189 MG/DL (ref 50–200)
CLARITY UR: CLEAR
CO2 SERPL-SCNC: 30 MMOL/L (ref 21–32)
COLOR UR: YELLOW
CREAT SERPL-MCNC: 1.33 MG/DL (ref 0.6–1.3)
CREAT UR-MCNC: 170 MG/DL
ERYTHROCYTE [DISTWIDTH] IN BLOOD BY AUTOMATED COUNT: 12.3 % (ref 11.6–15.1)
GFR SERPL CREATININE-BSD FRML MDRD: 60 ML/MIN/1.73SQ M
GLUCOSE P FAST SERPL-MCNC: 120 MG/DL (ref 65–99)
GLUCOSE UR STRIP-MCNC: NEGATIVE MG/DL
HCT VFR BLD AUTO: 45.9 % (ref 36.5–49.3)
HDLC SERPL-MCNC: 38 MG/DL
HGB BLD-MCNC: 14.8 G/DL (ref 12–17)
HGB UR QL STRIP.AUTO: NEGATIVE
KETONES UR STRIP-MCNC: NEGATIVE MG/DL
LDLC SERPL CALC-MCNC: 116 MG/DL (ref 0–100)
LEUKOCYTE ESTERASE UR QL STRIP: NEGATIVE
MAGNESIUM SERPL-MCNC: 2 MG/DL (ref 1.6–2.6)
MCH RBC QN AUTO: 29.3 PG (ref 26.8–34.3)
MCHC RBC AUTO-ENTMCNC: 32.2 G/DL (ref 31.4–37.4)
MCV RBC AUTO: 91 FL (ref 82–98)
NITRITE UR QL STRIP: NEGATIVE
NON-SQ EPI CELLS URNS QL MICRO: NORMAL /HPF
PH UR STRIP.AUTO: 6 [PH]
PHOSPHATE SERPL-MCNC: 3.5 MG/DL (ref 2.7–4.5)
PLATELET # BLD AUTO: 212 THOUSANDS/UL (ref 149–390)
PMV BLD AUTO: 13 FL (ref 8.9–12.7)
POTASSIUM SERPL-SCNC: 4.1 MMOL/L (ref 3.5–5.3)
PROT SERPL-MCNC: 8.1 G/DL (ref 6.4–8.2)
PROT UR STRIP-MCNC: NEGATIVE MG/DL
PROT UR-MCNC: 19 MG/DL
PROT/CREAT UR: 0.11 MG/G{CREAT} (ref 0–0.1)
PTH-INTACT SERPL-MCNC: 38 PG/ML (ref 18.4–80.1)
RBC # BLD AUTO: 5.05 MILLION/UL (ref 3.88–5.62)
RBC #/AREA URNS AUTO: NORMAL /HPF
SODIUM SERPL-SCNC: 142 MMOL/L (ref 136–145)
SP GR UR STRIP.AUTO: 1.02 (ref 1–1.03)
TRIGL SERPL-MCNC: 175 MG/DL
UROBILINOGEN UR QL STRIP.AUTO: 0.2 E.U./DL
WBC # BLD AUTO: 8.54 THOUSAND/UL (ref 4.31–10.16)
WBC #/AREA URNS AUTO: NORMAL /HPF

## 2020-10-13 PROCEDURE — 80061 LIPID PANEL: CPT

## 2020-10-13 PROCEDURE — 36415 COLL VENOUS BLD VENIPUNCTURE: CPT

## 2020-10-13 PROCEDURE — 99396 PREV VISIT EST AGE 40-64: CPT | Performed by: FAMILY MEDICINE

## 2020-10-13 PROCEDURE — 81001 URINALYSIS AUTO W/SCOPE: CPT

## 2020-10-13 PROCEDURE — 84100 ASSAY OF PHOSPHORUS: CPT

## 2020-10-13 PROCEDURE — 80053 COMPREHEN METABOLIC PANEL: CPT

## 2020-10-13 PROCEDURE — 83735 ASSAY OF MAGNESIUM: CPT

## 2020-10-13 PROCEDURE — 3725F SCREEN DEPRESSION PERFORMED: CPT | Performed by: FAMILY MEDICINE

## 2020-10-13 PROCEDURE — 1036F TOBACCO NON-USER: CPT | Performed by: FAMILY MEDICINE

## 2020-10-13 PROCEDURE — 83970 ASSAY OF PARATHORMONE: CPT

## 2020-10-13 PROCEDURE — 85027 COMPLETE CBC AUTOMATED: CPT

## 2020-10-13 PROCEDURE — 82570 ASSAY OF URINE CREATININE: CPT

## 2020-10-13 PROCEDURE — 84156 ASSAY OF PROTEIN URINE: CPT

## 2020-10-23 DIAGNOSIS — I10 BENIGN ESSENTIAL HYPERTENSION: ICD-10-CM

## 2020-10-23 RX ORDER — CLONIDINE 0.2 MG/24H
1 PATCH, EXTENDED RELEASE TRANSDERMAL WEEKLY
Qty: 4 PATCH | Refills: 3 | Status: SHIPPED | OUTPATIENT
Start: 2020-10-23 | End: 2021-01-13

## 2020-11-05 ENCOUNTER — TELEPHONE (OUTPATIENT)
Dept: NEPHROLOGY | Facility: CLINIC | Age: 55
End: 2020-11-05

## 2020-11-05 ENCOUNTER — DOCUMENTATION (OUTPATIENT)
Dept: NEPHROLOGY | Facility: CLINIC | Age: 55
End: 2020-11-05

## 2020-11-05 DIAGNOSIS — I10 BENIGN ESSENTIAL HYPERTENSION: Primary | ICD-10-CM

## 2020-11-05 DIAGNOSIS — I12.9 HYPERTENSIVE CHRONIC KIDNEY DISEASE WITH STAGE 1 THROUGH STAGE 4 CHRONIC KIDNEY DISEASE, OR UNSPECIFIED CHRONIC KIDNEY DISEASE: ICD-10-CM

## 2020-11-05 DIAGNOSIS — N18.31 STAGE 3A CHRONIC KIDNEY DISEASE (HCC): ICD-10-CM

## 2020-11-05 RX ORDER — LOSARTAN POTASSIUM 50 MG/1
50 TABLET ORAL DAILY
Qty: 30 TABLET | Refills: 5 | Status: SHIPPED | OUTPATIENT
Start: 2020-11-05 | End: 2021-04-23

## 2020-11-13 ENCOUNTER — TELEPHONE (OUTPATIENT)
Dept: UROLOGY | Facility: AMBULATORY SURGERY CENTER | Age: 55
End: 2020-11-13

## 2020-11-16 ENCOUNTER — LAB (OUTPATIENT)
Dept: LAB | Facility: AMBULARY SURGERY CENTER | Age: 55
End: 2020-11-16
Payer: COMMERCIAL

## 2020-11-16 ENCOUNTER — TELEPHONE (OUTPATIENT)
Dept: NEPHROLOGY | Facility: CLINIC | Age: 55
End: 2020-11-16

## 2020-11-16 ENCOUNTER — HOSPITAL ENCOUNTER (OUTPATIENT)
Dept: ULTRASOUND IMAGING | Facility: HOSPITAL | Age: 55
Discharge: HOME | End: 2020-11-16
Payer: COMMERCIAL

## 2020-11-16 DIAGNOSIS — N20.1 RIGHT URETERAL STONE: ICD-10-CM

## 2020-11-16 DIAGNOSIS — M10.372 ACUTE GOUT DUE TO RENAL IMPAIRMENT INVOLVING LEFT FOOT: ICD-10-CM

## 2020-11-16 DIAGNOSIS — I12.9 HYPERTENSIVE CHRONIC KIDNEY DISEASE WITH STAGE 1 THROUGH STAGE 4 CHRONIC KIDNEY DISEASE, OR UNSPECIFIED CHRONIC KIDNEY DISEASE: ICD-10-CM

## 2020-11-16 DIAGNOSIS — Z12.5 SCREENING FOR PROSTATE CANCER: ICD-10-CM

## 2020-11-16 DIAGNOSIS — N18.31 STAGE 3A CHRONIC KIDNEY DISEASE (HCC): ICD-10-CM

## 2020-11-16 DIAGNOSIS — I10 BENIGN ESSENTIAL HYPERTENSION: ICD-10-CM

## 2020-11-16 LAB
ANION GAP SERPL CALCULATED.3IONS-SCNC: 6 MMOL/L (ref 4–13)
BUN SERPL-MCNC: 20 MG/DL (ref 5–25)
CALCIUM SERPL-MCNC: 9.1 MG/DL (ref 8.3–10.1)
CHLORIDE SERPL-SCNC: 106 MMOL/L (ref 100–108)
CO2 SERPL-SCNC: 29 MMOL/L (ref 21–32)
CREAT SERPL-MCNC: 1.26 MG/DL (ref 0.6–1.3)
GFR SERPL CREATININE-BSD FRML MDRD: 64 ML/MIN/1.73SQ M
GLUCOSE P FAST SERPL-MCNC: 117 MG/DL (ref 65–99)
POTASSIUM SERPL-SCNC: 4.1 MMOL/L (ref 3.5–5.3)
PSA SERPL-MCNC: 0.8 NG/ML (ref 0–4)
SODIUM SERPL-SCNC: 141 MMOL/L (ref 136–145)
URATE SERPL-MCNC: 6.4 MG/DL (ref 4.2–8)

## 2020-11-16 PROCEDURE — 76770 US EXAM ABDO BACK WALL COMP: CPT

## 2020-11-16 PROCEDURE — G0103 PSA SCREENING: HCPCS

## 2020-11-16 PROCEDURE — 80048 BASIC METABOLIC PNL TOTAL CA: CPT

## 2020-11-16 PROCEDURE — 84550 ASSAY OF BLOOD/URIC ACID: CPT

## 2020-11-16 PROCEDURE — 36415 COLL VENOUS BLD VENIPUNCTURE: CPT

## 2020-11-17 ENCOUNTER — OFFICE VISIT (OUTPATIENT)
Dept: UROLOGY | Facility: AMBULATORY SURGERY CENTER | Age: 55
End: 2020-11-17
Payer: COMMERCIAL

## 2020-11-17 VITALS
SYSTOLIC BLOOD PRESSURE: 132 MMHG | TEMPERATURE: 97.3 F | BODY MASS INDEX: 43.76 KG/M2 | HEART RATE: 60 BPM | WEIGHT: 312.6 LBS | DIASTOLIC BLOOD PRESSURE: 88 MMHG | HEIGHT: 71 IN

## 2020-11-17 DIAGNOSIS — N20.0 NEPHROLITHIASIS: ICD-10-CM

## 2020-11-17 DIAGNOSIS — Z12.5 SCREENING FOR PROSTATE CANCER: ICD-10-CM

## 2020-11-17 DIAGNOSIS — N20.1 RIGHT URETERAL STONE: Primary | ICD-10-CM

## 2020-11-17 LAB
BACTERIA UR QL AUTO: NORMAL /HPF
BILIRUB UR QL STRIP: NEGATIVE
CLARITY UR: CLEAR
COLOR UR: YELLOW
GLUCOSE UR STRIP-MCNC: NEGATIVE MG/DL
HGB UR QL STRIP.AUTO: NEGATIVE
HYALINE CASTS #/AREA URNS LPF: NORMAL /LPF
KETONES UR STRIP-MCNC: NEGATIVE MG/DL
LEUKOCYTE ESTERASE UR QL STRIP: NEGATIVE
NITRITE UR QL STRIP: NEGATIVE
NON-SQ EPI CELLS URNS QL MICRO: NORMAL /HPF
PH UR STRIP.AUTO: 6 [PH]
PROT UR STRIP-MCNC: NEGATIVE MG/DL
RBC #/AREA URNS AUTO: NORMAL /HPF
SL AMB  POCT GLUCOSE, UA: ABNORMAL
SL AMB LEUKOCYTE ESTERASE,UA: ABNORMAL
SL AMB POCT BILIRUBIN,UA: ABNORMAL
SL AMB POCT BLOOD,UA: ABNORMAL
SL AMB POCT CLARITY,UA: CLEAR
SL AMB POCT COLOR,UA: ABNORMAL
SL AMB POCT KETONES,UA: ABNORMAL
SL AMB POCT NITRITE,UA: ABNORMAL
SL AMB POCT PH,UA: 5
SL AMB POCT SPECIFIC GRAVITY,UA: 1.02
SL AMB POCT URINE PROTEIN: ABNORMAL
SL AMB POCT UROBILINOGEN: ABNORMAL
SP GR UR STRIP.AUTO: 1.02 (ref 1–1.03)
UROBILINOGEN UR QL STRIP.AUTO: 0.2 E.U./DL
WBC #/AREA URNS AUTO: NORMAL /HPF

## 2020-11-17 PROCEDURE — 3008F BODY MASS INDEX DOCD: CPT | Performed by: NURSE PRACTITIONER

## 2020-11-17 PROCEDURE — 81001 URINALYSIS AUTO W/SCOPE: CPT | Performed by: NURSE PRACTITIONER

## 2020-11-17 PROCEDURE — 3075F SYST BP GE 130 - 139MM HG: CPT | Performed by: NURSE PRACTITIONER

## 2020-11-17 PROCEDURE — 87086 URINE CULTURE/COLONY COUNT: CPT | Performed by: NURSE PRACTITIONER

## 2020-11-17 PROCEDURE — 1036F TOBACCO NON-USER: CPT | Performed by: NURSE PRACTITIONER

## 2020-11-17 PROCEDURE — 81002 URINALYSIS NONAUTO W/O SCOPE: CPT | Performed by: NURSE PRACTITIONER

## 2020-11-17 PROCEDURE — 99214 OFFICE O/P EST MOD 30 MIN: CPT | Performed by: NURSE PRACTITIONER

## 2020-11-17 PROCEDURE — 3079F DIAST BP 80-89 MM HG: CPT | Performed by: NURSE PRACTITIONER

## 2020-11-18 LAB — BACTERIA UR CULT: NORMAL

## 2020-11-19 ENCOUNTER — TELEPHONE (OUTPATIENT)
Dept: UROLOGY | Facility: HOSPITAL | Age: 55
End: 2020-11-19

## 2020-11-19 DIAGNOSIS — N20.0 NEPHROLITHIASIS: Primary | ICD-10-CM

## 2020-12-01 ENCOUNTER — DOCUMENTATION (OUTPATIENT)
Dept: NEPHROLOGY | Facility: CLINIC | Age: 55
End: 2020-12-01

## 2020-12-01 DIAGNOSIS — I10 ESSENTIAL HYPERTENSION: ICD-10-CM

## 2020-12-01 RX ORDER — AMLODIPINE BESYLATE 5 MG/1
5 TABLET ORAL 2 TIMES DAILY
Qty: 180 TABLET | Refills: 3 | Status: SHIPPED | OUTPATIENT
Start: 2020-12-01 | End: 2020-12-07

## 2020-12-03 ENCOUNTER — HOSPITAL ENCOUNTER (OUTPATIENT)
Dept: RADIOLOGY | Age: 55
Discharge: HOME | End: 2020-12-03
Payer: COMMERCIAL

## 2020-12-03 ENCOUNTER — TELEPHONE (OUTPATIENT)
Dept: UROLOGY | Facility: MEDICAL CENTER | Age: 55
End: 2020-12-03

## 2020-12-03 DIAGNOSIS — N20.0 NEPHROLITHIASIS: ICD-10-CM

## 2020-12-03 PROCEDURE — 51798 US URINE CAPACITY MEASURE: CPT

## 2020-12-07 ENCOUNTER — OFFICE VISIT (OUTPATIENT)
Dept: FAMILY MEDICINE CLINIC | Facility: CLINIC | Age: 55
End: 2020-12-07
Payer: COMMERCIAL

## 2020-12-07 VITALS
BODY MASS INDEX: 44.88 KG/M2 | DIASTOLIC BLOOD PRESSURE: 80 MMHG | WEIGHT: 313.5 LBS | RESPIRATION RATE: 18 BRPM | SYSTOLIC BLOOD PRESSURE: 140 MMHG | TEMPERATURE: 97.6 F | OXYGEN SATURATION: 95 % | HEART RATE: 62 BPM | HEIGHT: 70 IN

## 2020-12-07 DIAGNOSIS — R06.83 SNORING: ICD-10-CM

## 2020-12-07 DIAGNOSIS — I12.9 HYPERTENSIVE CHRONIC KIDNEY DISEASE WITH STAGE 1 THROUGH STAGE 4 CHRONIC KIDNEY DISEASE, OR UNSPECIFIED CHRONIC KIDNEY DISEASE: Primary | ICD-10-CM

## 2020-12-07 DIAGNOSIS — E66.01 CLASS 3 SEVERE OBESITY DUE TO EXCESS CALORIES WITH SERIOUS COMORBIDITY AND BODY MASS INDEX (BMI) OF 40.0 TO 44.9 IN ADULT (HCC): ICD-10-CM

## 2020-12-07 DIAGNOSIS — F41.8 SITUATIONAL ANXIETY: ICD-10-CM

## 2020-12-07 PROBLEM — E66.813 CLASS 3 SEVERE OBESITY DUE TO EXCESS CALORIES WITH SERIOUS COMORBIDITY AND BODY MASS INDEX (BMI) OF 40.0 TO 44.9 IN ADULT (HCC): Status: ACTIVE | Noted: 2020-12-07

## 2020-12-07 PROCEDURE — 99214 OFFICE O/P EST MOD 30 MIN: CPT | Performed by: FAMILY MEDICINE

## 2020-12-07 PROCEDURE — 3077F SYST BP >= 140 MM HG: CPT | Performed by: FAMILY MEDICINE

## 2020-12-07 PROCEDURE — 1036F TOBACCO NON-USER: CPT | Performed by: FAMILY MEDICINE

## 2020-12-07 PROCEDURE — 3079F DIAST BP 80-89 MM HG: CPT | Performed by: FAMILY MEDICINE

## 2020-12-07 PROCEDURE — 3008F BODY MASS INDEX DOCD: CPT | Performed by: FAMILY MEDICINE

## 2020-12-07 RX ORDER — AMLODIPINE BESYLATE 5 MG/1
5 TABLET ORAL DAILY
Qty: 180 TABLET | Refills: 3 | Status: SHIPPED | OUTPATIENT
Start: 2020-12-07 | End: 2021-12-07

## 2020-12-28 ENCOUNTER — TELEPHONE (OUTPATIENT)
Dept: FAMILY MEDICINE CLINIC | Facility: CLINIC | Age: 55
End: 2020-12-28

## 2020-12-31 ENCOUNTER — TELEPHONE (OUTPATIENT)
Dept: FAMILY MEDICINE CLINIC | Facility: CLINIC | Age: 55
End: 2020-12-31

## 2021-01-05 ENCOUNTER — HOSPITAL ENCOUNTER (OUTPATIENT)
Dept: SLEEP CENTER | Facility: CLINIC | Age: 56
Discharge: HOME | End: 2021-01-05
Payer: COMMERCIAL

## 2021-01-05 ENCOUNTER — TELEPHONE (OUTPATIENT)
Dept: SLEEP CENTER | Facility: CLINIC | Age: 56
End: 2021-01-05

## 2021-01-05 DIAGNOSIS — I12.9 HYPERTENSIVE CHRONIC KIDNEY DISEASE WITH STAGE 1 THROUGH STAGE 4 CHRONIC KIDNEY DISEASE, OR UNSPECIFIED CHRONIC KIDNEY DISEASE: ICD-10-CM

## 2021-01-05 DIAGNOSIS — R06.83 SNORING: ICD-10-CM

## 2021-01-05 DIAGNOSIS — E66.01 CLASS 3 SEVERE OBESITY DUE TO EXCESS CALORIES WITH SERIOUS COMORBIDITY AND BODY MASS INDEX (BMI) OF 40.0 TO 44.9 IN ADULT (HCC): ICD-10-CM

## 2021-01-05 PROCEDURE — G0399 HOME SLEEP TEST/TYPE 3 PORTA: HCPCS

## 2021-01-05 PROCEDURE — G0399 HOME SLEEP TEST/TYPE 3 PORTA: HCPCS | Performed by: INTERNAL MEDICINE

## 2021-01-05 NOTE — TELEPHONE ENCOUNTER
----- Message from Judson Orozco DO sent at 1/3/2021  3:54 PM EST -----  Chart reviewed  Study approved  Schedule HST    ----- Message -----  From: Kevin Layne MA  Sent: 12/30/2020   9:31 AM EST  To: Sleep Medicine [facility] Provider    This sleep study needs approval      If approved please sign and return to clerical pool  If denied please include reasons why  Also provide alternative testing if warranted  Please sign and return to clerical pool

## 2021-01-06 NOTE — PROGRESS NOTES
Home Sleep Study Documentation    Pre-Sleep Home Study:    Set-up and instructions performed by: Mariano Damon    Technician performed demonstration for Patient: yes    Return demonstration performed by Patient: no    Written instructions provided to Patient: yes    Patient signed consent form: yes        Post-Sleep Home Study:    Additional comments by Patient: none    Home Sleep Study Failed:no:    Failure reason: N/A    Reported or Detected: N/A    Scored by: SAMIR Slater, [employer]

## 2021-01-07 DIAGNOSIS — I48.91 ATRIAL FIBRILLATION WITH RVR (HCC): ICD-10-CM

## 2021-01-07 RX ORDER — RIVAROXABAN 20 MG/1
TABLET, FILM COATED ORAL
Qty: 90 TABLET | Refills: 3 | Status: SHIPPED | OUTPATIENT
Start: 2021-01-07 | End: 2022-01-04

## 2021-01-11 DIAGNOSIS — G47.33 OSA (OBSTRUCTIVE SLEEP APNEA): ICD-10-CM

## 2021-01-11 DIAGNOSIS — G47.34 NOCTURNAL HYPOXEMIA: ICD-10-CM

## 2021-01-11 DIAGNOSIS — R06.83 SNORING: Primary | ICD-10-CM

## 2021-01-11 DIAGNOSIS — E66.01 CLASS 3 SEVERE OBESITY DUE TO EXCESS CALORIES WITH SERIOUS COMORBIDITY AND BODY MASS INDEX (BMI) OF 40.0 TO 44.9 IN ADULT (HCC): ICD-10-CM

## 2021-01-12 ENCOUNTER — TELEPHONE (OUTPATIENT)
Dept: SLEEP CENTER | Facility: CLINIC | Age: 56
End: 2021-01-12

## 2021-01-12 DIAGNOSIS — Z01.812 ENCOUNTER FOR PREPROCEDURE SCREENING LABORATORY TESTING FOR COVID-19: Primary | ICD-10-CM

## 2021-01-12 DIAGNOSIS — Z20.822 ENCOUNTER FOR PREPROCEDURE SCREENING LABORATORY TESTING FOR COVID-19: Primary | ICD-10-CM

## 2021-01-13 DIAGNOSIS — I10 BENIGN ESSENTIAL HYPERTENSION: ICD-10-CM

## 2021-01-13 RX ORDER — CLONIDINE 0.2 MG/24H
PATCH, EXTENDED RELEASE TRANSDERMAL
Qty: 12 PATCH | Refills: 1 | Status: SHIPPED | OUTPATIENT
Start: 2021-01-13 | End: 2021-06-29

## 2021-01-20 ENCOUNTER — TELEPHONE (OUTPATIENT)
Dept: SLEEP CENTER | Facility: CLINIC | Age: 56
End: 2021-01-20

## 2021-01-20 DIAGNOSIS — Z20.822 ENCOUNTER FOR PREPROCEDURE SCREENING LABORATORY TESTING FOR COVID-19: ICD-10-CM

## 2021-01-20 DIAGNOSIS — Z01.812 ENCOUNTER FOR PREPROCEDURE SCREENING LABORATORY TESTING FOR COVID-19: ICD-10-CM

## 2021-01-20 PROCEDURE — U0005 INFEC AGEN DETEC AMPLI PROBE: HCPCS

## 2021-01-20 PROCEDURE — U0003 INFECTIOUS AGENT DETECTION BY NUCLEIC ACID (DNA OR RNA); SEVERE ACUTE RESPIRATORY SYNDROME CORONAVIRUS 2 (SARS-COV-2) (CORONAVIRUS DISEASE [COVID-19]), AMPLIFIED PROBE TECHNIQUE, MAKING USE OF HIGH THROUGHPUT TECHNOLOGIES AS DESCRIBED BY CMS-2020-01-R: HCPCS

## 2021-01-20 NOTE — TELEPHONE ENCOUNTER
Patient called, states he was never informed he needed to go for COVID test   He will go tomorrow to [facility]  I advised someone will call with results of COVID test   Patient agreeable to limit interaction and will mask

## 2021-01-20 NOTE — TELEPHONE ENCOUNTER
----- Message from Anupama Lang MD sent at 1/20/2021 10:58 AM EST -----  Approved  ----- Message -----  From: Jordy Edward MA  Sent: 1/12/2021   8:19 AM EST  To: Sleep Medicine [place] Provider    This sleep study needs approval      If approved please sign and return to clerical pool  If denied please include reasons why  Also provide alternative testing if warranted  Please sign and return to clerical pool

## 2021-01-21 LAB — SARS-COV-2 RNA RESP QL NAA+PROBE: NEGATIVE

## 2021-01-26 ENCOUNTER — HOSPITAL ENCOUNTER (OUTPATIENT)
Dept: SLEEP CENTER | Facility: CLINIC | Age: 56
Discharge: HOME | End: 2021-01-26
Payer: COMMERCIAL

## 2021-01-26 DIAGNOSIS — G47.34 NOCTURNAL HYPOXEMIA: ICD-10-CM

## 2021-01-26 DIAGNOSIS — G47.33 OSA (OBSTRUCTIVE SLEEP APNEA): ICD-10-CM

## 2021-01-26 DIAGNOSIS — R06.83 SNORING: ICD-10-CM

## 2021-01-26 DIAGNOSIS — E66.01 CLASS 3 SEVERE OBESITY DUE TO EXCESS CALORIES WITH SERIOUS COMORBIDITY AND BODY MASS INDEX (BMI) OF 40.0 TO 44.9 IN ADULT (HCC): ICD-10-CM

## 2021-01-26 PROCEDURE — 95811 POLYSOM 6/>YRS CPAP 4/> PARM: CPT

## 2021-01-26 PROCEDURE — 95811 POLYSOM 6/>YRS CPAP 4/> PARM: CPT | Performed by: INTERNAL MEDICINE

## 2021-01-27 ENCOUNTER — TELEPHONE (OUTPATIENT)
Dept: FAMILY MEDICINE CLINIC | Facility: CLINIC | Age: 56
End: 2021-01-27

## 2021-01-27 DIAGNOSIS — G47.33 OSA (OBSTRUCTIVE SLEEP APNEA): Primary | ICD-10-CM

## 2021-01-27 NOTE — PROGRESS NOTES
Sleep Study Documentation    Pre-Sleep Study       Sleep testing procedure explained to patient:YES    Patient napped prior to study:NO    [address] [place] worker after 12PM   Caffeine use:NO    Alcohol:Dayshift workers after 5PM: Alcohol use:NO    Typical day for patient:YES       Study Documentation    Sleep Study Indications: Severe obstructive sleep apnea and hypoxia demonstrated on home study 1/5/2021  Sleep Study: Treatment   Optimal PAP pressure: 21/17cm  Leak: Small  Snore:Eliminated  REM Obtained:yes  Supplemental O2: no    Minimum SaO2 88%  Baseline SaO2 96%  PAP mask tried (list all)  [employer] full face, size large   PAP mask choice (final) [employer] Simplus full face, size large   PAP mask type:full face  PAP pressure at which snoring was eliminated 11cm    Mode of Therapy:CPAP  ETCO2:No  CPAP changed to BiPAP:Yes  If yes why continuous respiratory events at CPAP of 15cm         EKG abnormalities: yes:  Occasional PACs  EEG abnormalities: no    Sleep Study Recorded < 2 hours: N/A    Sleep Study Recorded > 2 hours but incomplete study: N/A    Sleep Study Recorded 6 hours but no sleep obtained: NO    Patient classification: employed       Post-Sleep Study    Medication used at bedtime or during sleep study:NO    Patient reports time it took to fall asleep:20 to 30 minutes    Patient reports waking up during study:3 or more times  Patient reports returning to sleep without difficulty  Patient reports sleeping 6 to 8 hours without dreaming  Patient reports sleep during study:better than usual    Patient rated sleepiness: Not sleepy or tired    PAP treatment:yes: Post PAP treatment patient reports feeling better and  would wear PAP mask at home

## 2021-01-27 NOTE — TELEPHONE ENCOUNTER
I called patient and asked him to contact his insurance company to find out which DME supplier is covered by his plan  He states he will call back in AM with information  Will then proceed with ordering Bi pap machine and supplies

## 2021-01-28 NOTE — TELEPHONE ENCOUNTER
I spoke with Leila Brenner at [employer]  They do the Authorizations  I faxed all pertinent info to her  Awaiting a response

## 2021-01-30 DIAGNOSIS — I48.91 ATRIAL FIBRILLATION WITH RVR (HCC): ICD-10-CM

## 2021-01-30 DIAGNOSIS — N20.0 NEPHROLITHIASIS: ICD-10-CM

## 2021-01-31 RX ORDER — POTASSIUM CITRATE 10 MEQ/1
20 TABLET, EXTENDED RELEASE ORAL 2 TIMES DAILY
Qty: 360 TABLET | Refills: 1 | Status: SHIPPED | OUTPATIENT
Start: 2021-01-31 | End: 2021-07-29

## 2021-02-04 RX ORDER — NEBIVOLOL HYDROCHLORIDE 5 MG/1
TABLET ORAL
Qty: 90 TABLET | Refills: 2 | Status: SHIPPED | OUTPATIENT
Start: 2021-02-04 | End: 2022-02-10 | Stop reason: SDUPTHER

## 2021-02-23 ENCOUNTER — OFFICE VISIT (OUTPATIENT)
Dept: UROLOGY | Facility: AMBULATORY SURGERY CENTER | Age: 56
End: 2021-02-23
Payer: COMMERCIAL

## 2021-02-23 VITALS
HEART RATE: 68 BPM | HEIGHT: 70 IN | WEIGHT: 313 LBS | DIASTOLIC BLOOD PRESSURE: 90 MMHG | SYSTOLIC BLOOD PRESSURE: 160 MMHG | BODY MASS INDEX: 44.81 KG/M2

## 2021-02-23 DIAGNOSIS — Z12.11 ENCOUNTER FOR SCREENING COLONOSCOPY: Primary | ICD-10-CM

## 2021-02-23 DIAGNOSIS — N20.0 NEPHROLITHIASIS: ICD-10-CM

## 2021-02-23 PROCEDURE — 3080F DIAST BP >= 90 MM HG: CPT | Performed by: UROLOGY

## 2021-02-23 PROCEDURE — 3077F SYST BP >= 140 MM HG: CPT | Performed by: UROLOGY

## 2021-02-23 PROCEDURE — 1036F TOBACCO NON-USER: CPT | Performed by: UROLOGY

## 2021-02-23 PROCEDURE — 99213 OFFICE O/P EST LOW 20 MIN: CPT | Performed by: UROLOGY

## 2021-02-23 PROCEDURE — 3008F BODY MASS INDEX DOCD: CPT | Performed by: UROLOGY

## 2021-02-23 NOTE — PROGRESS NOTES
2/23/2021    Dmitriy Kwan  1965  [id number]        Assessment    Possible right-sided ureterocele, history of cystoscopy with cystolitholapaxy, status post right stent removal, routine prostate cancer screening, bladder wall thickening, resolution of hydronephrosis      Discussion   we discussed his recent retroperitoneal ultrasound which does show   Resolution of his prior hydronephrosis  We again discussed the possibility of a congenital ureterocele  I recommend follow-up in 1 year with a retroperitoneal ultrasound  He will be due for his next PSA and digital rectal examination in November of 2022  The patient was instructed to call sooner if he has increasing his lower urinary tract symptoms or hematuria  History of Present Illness  54 y o  male with a history of   Symptomatic bladder stones  These were identified in August 2020  He ultimately underwent cystolitholapaxy  I also identified right-sided hydronephrosis secondary to a possible ureterocele versus ruptured ureterocele  I performed a retrograde pyelogram which was otherwise normal   I placed a right ureteral stent  I subsequently removed the stent in the office  Follow-up ultrasound showed minimal hydronephrosis but bladder wall thickening was noted  Radiology suggested a 2nd ultrasound which was performed in December  He returns in follow-up today  We discussed and reviewed the results of the ultrasound which does show improvement from prior  AUA Symptom Score      Review of Systems  Review of Systems   Constitutional: Negative  HENT: Negative  Eyes: Negative  Respiratory: Negative  Cardiovascular: Negative  Gastrointestinal: Negative  Endocrine: Negative  Genitourinary:        Per HPI   Musculoskeletal: Negative  Skin: Negative  Allergic/Immunologic: Negative  Neurological: Negative  Hematological: Negative  Psychiatric/Behavioral: Negative      All other systems reviewed and are negative          Past Medical History  Past Medical History:   Diagnosis Date    Arthritis     Asthma     Atrial fibrillation ([address] )     Chronic kidney disease     kidney stones    Gout     Hypertension     Kidney stone     Seasonal allergies        Past Social History  Past Surgical History:   Procedure Laterality Date    APPENDECTOMY      CARDIOVERSION      X4 last was 2018    COLONOSCOPY      CYSTOSCOPY  08/30/2016    w/removal of object, last assessed 8/30/16    FOOT SURGERY Left     HAND SURGERY      KNEE SURGERY Right 2016    MS CYSTO/URETERO W/LITHOTRIPSY &INDWELL STENT INSRT Left 8/9/2016    Procedure: CYSTOSCOPY URETEROSCOPY WITH LITHOTRIPSY HOLMIUM LASER STONE EXTRACTION, RETROGRADE PYELOGRAM AND INSERTION STENT URETERAL;  Surgeon: Shai Horner MD;  Location:  MAIN OR;  Service: Urology last assessed 8/30/16    MS CYSTO/URETERO W/LITHOTRIPSY &INDWELL STENT INSRT Right 8/24/2020    Procedure: CYSTOSCOPY , cystolitholapaxy of bladder stone WITH HOMIUM LASER RIGHT RETROGRADE AND RIGHT URETERAL STENT, [address];  Surgeon: Shai Horner MD;  Location: [facility] MAIN OR;  Service: Urology    WRIST SURGERY Left 2014    Fracture surgery       Past Family History  Family History   Problem Relation Age of Onset    Atrial fibrillation Mother     Other Mother         blood dyscrasia    Hypertension Mother     Other Father         hypoglycemia     Atrial fibrillation Father     Diabetes Family     Other Family         Thrombocytosis       Past Social history  Social History     Socioeconomic History    Marital status: /Civil Union     Spouse name: Not on file    Number of children: 4    Years of education: 12th grade     Highest education level: Not on file   Occupational History    Not on file   Social Needs    Financial resource strain: Not on file    Food insecurity     Worry: Not on file     Inability: Not on file    Transportation needs     Medical: Not on file Non-medical: Not on file   Tobacco Use    Smoking status: Former Smoker     Packs/day: 0 50     Years: 10 00     Pack years: 5 00     Types: Cigarettes     Quit date: 1994     Years since quittin 9    Smokeless tobacco: Never Used   Substance and Sexual Activity    Alcohol use: Yes     Frequency: 2-4 times a month     Drinks per session: 1 or 2     Binge frequency: Never     Comment: occ    Drug use: No    Sexual activity: Yes     Partners: Female     Birth control/protection: None   Lifestyle    Physical activity     Days per week: Not on file     Minutes per session: Not on file    Stress: Not on file   Relationships    Social connections     Talks on phone: Not on file     Gets together: Not on file     Attends Latter day service: Not on file     Active member of club or organization: Not on file     Attends meetings of clubs or organizations: Not on file     Relationship status: Not on file    Intimate partner violence     Fear of current or ex partner: Not on file     Emotionally abused: Not on file     Physically abused: Not on file     Forced sexual activity: Not on file   Other Topics Concern    Not on file   Social History Narrative    No caffeine use        Current Medications  Current Outpatient Medications   Medication Sig Dispense Refill    amLODIPine (NORVASC) 5 mg tablet Take 1 tablet (5 mg total) by mouth daily 180 tablet 3    Bystolic 5 MG tablet TAKE 1 TABLET BY MOUTH EVERY DAY 90 tablet 2    cloNIDine (CATAPRES-TTS-2) 0 2 mg/24 hr PLACE 1 PATCH ON THE SKIN ONCE A WEEK 12 patch 1    dofetilide (TIKOSYN) 500 mcg capsule TAKE ONE CAPSULE BY MOUTH EVERY 12 HOURS 60 capsule 8    hydrALAZINE (APRESOLINE) 25 mg tablet Take 1 tablet (25 mg total) by mouth 2 (two) times a day 180 tablet 3    losartan (COZAAR) 50 mg tablet Take 1 tablet (50 mg total) by mouth daily 30 tablet 5    potassium citrate (UROCIT-K) 10 mEq TAKE 2 TABLETS (20 MEQ TOTAL) BY MOUTH 2 (TWO) TIMES A  tablet 1    Xarelto 20 MG tablet TAKE 1 TABLET BY MOUTH EVERY DAY 90 tablet 3     No current facility-administered medications for this visit  Allergies  No Known Allergies    Past Medical History, Social History, Family History, medications and allergies were reviewed  Vitals  Vitals:    02/23/21 1535   BP: 160/90   BP Location: Left arm   Patient Position: Sitting   Cuff Size: Adult   Pulse: 68   Weight: (!) 142 kg (313 lb)   Height: 5' 10" (1 778 m)       Physical Exam  Physical Exam      On examination he is in no acute distress  Gait normal   Affect normal    Results  Lab Results   Component Value Date    PSA 0 8 11/16/2020     Lab Results   Component Value Date    GLUCOSE 94 08/18/2015    CALCIUM 9 1 11/16/2020     08/18/2015    K 4 1 11/16/2020    CO2 29 11/16/2020     11/16/2020    BUN 20 11/16/2020    CREATININE 1 26 11/16/2020     Lab Results   Component Value Date    WBC 8 54 10/13/2020    HGB 14 8 10/13/2020    HCT 45 9 10/13/2020    MCV 91 10/13/2020     10/13/2020         Office Urine Dip  No results found for this or any previous visit (from the past 1 hour(s))  ]      Total visit time was 15 minutes of which over 50% was spent on counseling

## 2021-03-03 DIAGNOSIS — I48.0 PAROXYSMAL ATRIAL FIBRILLATION (HCC): ICD-10-CM

## 2021-03-03 RX ORDER — DOFETILIDE 0.5 MG/1
CAPSULE ORAL
Qty: 60 CAPSULE | Refills: 8 | Status: SHIPPED | OUTPATIENT
Start: 2021-03-03 | End: 2022-01-10

## 2021-03-10 DIAGNOSIS — Z23 ENCOUNTER FOR IMMUNIZATION: ICD-10-CM

## 2021-03-17 LAB — RENIN PLAS-CCNC: 0.35 NG/ML/HR (ref 0.17–5.38)

## 2021-04-13 NOTE — ASSESSMENT & PLAN NOTE
Wt Readings from Last 3 Encounters:   02/23/21 (!) 142 kg (313 lb)   12/07/20 (!) 142 kg (313 lb 8 oz)   11/17/20 (!) 142 kg (312 lb 9 6 oz)

## 2021-04-13 NOTE — PROGRESS NOTES
FAMILY MEDICINE PROGRESS NOTE    Date of Service: 21  Primary Care Provider:   Abi Cervantes MD     Name: Lorena Franks       : 1965       Age:55 y o  Sex: male      MRN: [id number]      Chief Complaint:Follow-up     ASSESSMENT and PLAN:  Lorena Franks is a 54 y o  male with:     Problem List Items Addressed This Visit        Respiratory    HARPER (obstructive sleep apnea) - Primary     Recent home sleep study with AHI of 89 2, in lab titration study was done to follow-up home study  Hourly events reduced with BiPAP on settings of 21/17 mmH2O  He has been very happy with his BiPAP, he is sleeping better  Nocturnal hypoxemia       Cardiovascular and Mediastinum    Benign essential hypertension     BP Readings from Last 3 Encounters:   21 132/76   21 160/90   20 140/80     Improved blood pressure control since starting BiPAP, continue current management for now, though discussed that with continued use of PAP therapy, weight loss he may be able to come off of or lower dose of some antihypertensive medications  Relevant Orders    Ambulatory referral to Nutrition Services    Chronic atrial fibrillation (HCC)     Regular rate and rhythm on exam, no symptoms reported  He follows with cardiology, reviewed CHADS-Vasc score of 1, recommended that patient discuss with cardiologist whether it would be appropriate to be on aspirin alone for anticoagulation               Genitourinary    Hypertensive chronic kidney disease with stage 1 through stage 4 chronic kidney disease, or unspecified chronic kidney disease     Lab Results   Component Value Date    EGFR 64 2020    EGFR 60 10/13/2020    EGFR 50 2020    CREATININE 1 26 2020    CREATININE 1 33 (H) 10/13/2020    CREATININE 1 54 (H) 2020     BP Readings from Last 3 Encounters:   21 132/76   21 160/90   20 140/80     Blood pressure controlled today on current regimen with losartan 50 mg daily, amlodipine 5 mg in the morning, nebivolol 5 mg, hydralazine 25 mg twice daily and weekly clonidine patch  He is managed primarily by nephrology, continue current management  Other    Morbid obesity ([address] )     Wt Readings from Last 3 Encounters:   02/23/21 (!) 142 kg (313 lb)   12/07/20 (!) 142 kg (313 lb 8 oz)   11/17/20 (!) 142 kg (312 lb 9 6 oz)            Relevant Orders    Ambulatory referral to Nutrition Services          SUBJECTIVE:  Bernardo Arechiga is a 54 y o  male with hypertension, obesity, CKD, afib, HARPER who presents today with a chief complaint of Follow-up  HPI     Patient was last seen in December for follow-up to discuss his issues with weight  He stated he was over eating, and he was having a hard time climbing stairs due to his weight  He also acknowledged that his weight was contributing to his poorly controlled hypertension  Recommended time restricted eating, as well as exploring recipes from [employer]  He has been working on his eating since his last visit, decreasing intake of added fats, saturated fats, eating less oil and cheese  Since his last visit he did have home sleep study revealing severe obstructive sleep apnea  An in lab sleep study was done, AHI reduced from 89 2 to 1 4 events per hour with BiPAP settings of 21/17 mmH2O  He reports that he feels much better on BiPAP  He is sleeping better, his anxiety has improved since being on BiPAP  He is feeling good about making changes in his diet and plans to start getting more regular physical activity  He plans to follow-up with cardiology in several months for follow-up of atrial fibrillation, he is controlled on dofetilide for rhythm control, Bystolic, and is on Xarelto 20 mg for anticoagulation   Review of Systems   Constitutional: Negative for activity change and appetite change  Respiratory: Negative for shortness of breath  Cardiovascular: Positive for leg swelling   Negative for chest pain and palpitations  Psychiatric/Behavioral: Negative for sleep disturbance  I have reviewed the patient's Past Medical History  Current Outpatient Medications:     amLODIPine (NORVASC) 5 mg tablet, Take 1 tablet (5 mg total) by mouth daily, Disp: 180 tablet, Rfl: 3    Bystolic 5 MG tablet, TAKE 1 TABLET BY MOUTH EVERY DAY, Disp: 90 tablet, Rfl: 2    cloNIDine (CATAPRES-TTS-2) 0 2 mg/24 hr, PLACE 1 PATCH ON THE SKIN ONCE A WEEK, Disp: 12 patch, Rfl: 1    dofetilide (TIKOSYN) 500 mcg capsule, TAKE 1 CAPSULE BY MOUTH EVERY 12 HOURS, Disp: 60 capsule, Rfl: 8    hydrALAZINE (APRESOLINE) 25 mg tablet, Take 1 tablet (25 mg total) by mouth 2 (two) times a day, Disp: 180 tablet, Rfl: 3    losartan (COZAAR) 50 mg tablet, Take 1 tablet (50 mg total) by mouth daily, Disp: 30 tablet, Rfl: 5    potassium citrate (UROCIT-K) 10 mEq, TAKE 2 TABLETS (20 MEQ TOTAL) BY MOUTH 2 (TWO) TIMES A DAY, Disp: 360 tablet, Rfl: 1    Xarelto 20 MG tablet, TAKE 1 TABLET BY MOUTH EVERY DAY, Disp: 90 tablet, Rfl: 3    OBJECTIVE:  /76   Pulse 72   Temp 97 8 °F (36 6 °C)   Resp 18   Ht 5' 10" (1 778 m)   Wt (!) 146 kg (321 lb)   BMI 46 06 kg/m²    BP Readings from Last 3 Encounters:   04/14/21 132/76   02/23/21 160/90   12/07/20 140/80      Wt Readings from Last 3 Encounters:   04/14/21 (!) 146 kg (321 lb)   02/23/21 (!) 142 kg (313 lb)   12/07/20 (!) 142 kg (313 lb 8 oz)      Physical Exam  Constitutional:       General: He is not in acute distress  Appearance: Normal appearance  He is obese  He is not ill-appearing or toxic-appearing  HENT:      Head: Normocephalic and atraumatic  Right Ear: External ear normal       Left Ear: External ear normal    Eyes:      Extraocular Movements: Extraocular movements intact  Conjunctiva/sclera: Conjunctivae normal    Neck:      Musculoskeletal: Normal range of motion and neck supple  No neck rigidity     Cardiovascular:      Rate and Rhythm: Normal rate and regular rhythm  Pulses: Normal pulses  Heart sounds: Normal heart sounds  Pulmonary:      Effort: Pulmonary effort is normal  No respiratory distress  Breath sounds: Normal breath sounds  No wheezing, rhonchi or rales  Musculoskeletal:      Right lower leg: Edema present  Left lower leg: Edema present  Skin:     Findings: No erythema or rash  Neurological:      General: No focal deficit present  Mental Status: He is alert and oriented to person, place, and time  Psychiatric:         Mood and Affect: Mood normal          Behavior: Behavior normal                 Return in about 6 months (around 10/14/2021) for Annual physical     Deaj Tiwari MD    Note: Portions of the record have been created with voice recognition software  Occasional wrong word or "sound a like" substitutions may have occurred due to the inherent limitations of voice recognition software  Read the chart carefully and recognize, using context, where substitutions have occurred

## 2021-04-13 NOTE — ASSESSMENT & PLAN NOTE
Recent home sleep study with AHI of 89 2, in lab titration study was done to follow-up home study  Hourly events reduced with BiPAP on settings of 21/17 mmH2O  He has been very happy with his BiPAP, he is sleeping better

## 2021-04-14 ENCOUNTER — OFFICE VISIT (OUTPATIENT)
Dept: FAMILY MEDICINE CLINIC | Facility: CLINIC | Age: 56
End: 2021-04-14
Payer: COMMERCIAL

## 2021-04-14 VITALS
RESPIRATION RATE: 18 BRPM | TEMPERATURE: 97.8 F | DIASTOLIC BLOOD PRESSURE: 76 MMHG | SYSTOLIC BLOOD PRESSURE: 132 MMHG | WEIGHT: 315 LBS | HEIGHT: 70 IN | HEART RATE: 72 BPM | BODY MASS INDEX: 45.1 KG/M2

## 2021-04-14 DIAGNOSIS — I48.20 CHRONIC ATRIAL FIBRILLATION (HCC): ICD-10-CM

## 2021-04-14 DIAGNOSIS — R73.02 IMPAIRED GLUCOSE TOLERANCE: ICD-10-CM

## 2021-04-14 DIAGNOSIS — G47.33 OSA (OBSTRUCTIVE SLEEP APNEA): Primary | ICD-10-CM

## 2021-04-14 DIAGNOSIS — E66.01 MORBID OBESITY (HCC): ICD-10-CM

## 2021-04-14 DIAGNOSIS — I12.9 HYPERTENSIVE CHRONIC KIDNEY DISEASE WITH STAGE 1 THROUGH STAGE 4 CHRONIC KIDNEY DISEASE, OR UNSPECIFIED CHRONIC KIDNEY DISEASE: ICD-10-CM

## 2021-04-14 DIAGNOSIS — I10 BENIGN ESSENTIAL HYPERTENSION: ICD-10-CM

## 2021-04-14 DIAGNOSIS — G47.34 NOCTURNAL HYPOXEMIA: ICD-10-CM

## 2021-04-14 PROBLEM — I48.91 ATRIAL FIBRILLATION WITH RVR (HCC): Status: RESOLVED | Noted: 2017-04-04 | Resolved: 2021-04-14

## 2021-04-14 PROCEDURE — 1036F TOBACCO NON-USER: CPT | Performed by: FAMILY MEDICINE

## 2021-04-14 PROCEDURE — 3078F DIAST BP <80 MM HG: CPT | Performed by: FAMILY MEDICINE

## 2021-04-14 PROCEDURE — 3075F SYST BP GE 130 - 139MM HG: CPT | Performed by: FAMILY MEDICINE

## 2021-04-14 PROCEDURE — 3008F BODY MASS INDEX DOCD: CPT | Performed by: FAMILY MEDICINE

## 2021-04-14 PROCEDURE — 99214 OFFICE O/P EST MOD 30 MIN: CPT | Performed by: FAMILY MEDICINE

## 2021-04-14 NOTE — PATIENT INSTRUCTIONS
Mediterranean Diet   AMBULATORY CARE:   A Mediterranean diet  is a meal plan that includes foods that are commonly eaten in countries that border the [place]  This meal plan may provide several health benefits  These include losing or maintaining weight, and decreasing blood pressure, blood sugar, and cholesterol levels  It may also help protect against certain health conditions such as heart disease, cancer, type 2 diabetes, and Alzheimer disease  Work with a dietitian to develop a meal plan that is right for you  Foods to include in the [address] [facility] diet:   · Include fruits and vegetables in each meal   Eat a variety of fresh fruits and vegetables  · Choose whole grains every day  These foods include whole-grain breads, pastas, and cereals  It also includes brown rice, quinoa, and millet  · Use unsaturated fats instead of saturated fats  Cook with olive or canola oil  Limit saturated fats, such as butter, margarine, and shortening  Saturated fat is an unhealthy fat that can increase your cholesterol levels  · Choose plant foods, poultry, and fish as your main sources of protein  ? Eat plant-based foods that provide protein,  such as lentils, beans, chickpeas, nuts, and seeds  Choose mostly plant-based foods in place of meat on most days of the week  ? Eat protein foods high in omega-3 fats  Fish high in omega-3 fats include salmon, trout, and tuna  Include these types of fish 1 or 2 times each week  Limit fish high in mercury, such as shark, swordfish, tilefish, and jodi mackerel  Omega-3 fats are also found in walnuts and flaxseed  ? Choose poultry (chicken or turkey)  without skin instead of red meat  Red meat is high in saturated fat  Limit eggs and high-fat meats, such as wilkins, sausage, and hot dogs  · Choose low-fat dairy foods  such as nonfat or 1% milk, or low-fat almond, cashew, or soy milk  Other examples include low-fat cheese, yogurt, and cottage cheese  · Limit sweets  Limit your intake of high-sugar foods, such as soda, desserts, and candy  · Talk to your healthcare provider about alcohol  Studies have shown that moderate intake of wine may reduce the risk of heart disease  A moderate amount of wine is 1 serving for women and men 65 years and older each day  Two servings is recommended for men 24to 59years of age each day  A serving of wine is 5 ounces  Other things you need to know if you follow the Mediterranean diet:   · Include foods high in iron and vitamin C   Plant-based foods that are high in iron include spinach, beans, tofu, and artichoke  Eat a serving of vitamin C with any iron-rich food to help your body absorb more iron  Examples include oranges, strawberries, cantaloupe, broccoli, and yellow peppers  · Get regular physical activity  The Mediterranean diet will have the most benefit if you get regular physical activity  Get 30 minutes of physical activity at least 5 days a week  Choose physical activities that increase your heart rate  Examples include walking, hiking, swimming, and riding a bike  Ask your healthcare provider about the best exercise plan for you  © Copyright [address] Information is for End User's use only and may not be sold, redistributed or otherwise used for commercial purposes  All illustrations and images included in CareNotes® are the copyrighted property of A D A M , Inc  or Marie Sparks  The above information is an  only  It is not intended as medical advice for individual conditions or treatments  Talk to your doctor, nurse or pharmacist before following any medical regimen to see if it is safe and effective for you

## 2021-04-14 NOTE — ASSESSMENT & PLAN NOTE
Lab Results   Component Value Date    EGFR 64 11/16/2020    EGFR 60 10/13/2020    EGFR 50 09/08/2020    CREATININE 1 26 11/16/2020    CREATININE 1 33 (H) 10/13/2020    CREATININE 1 54 (H) 09/08/2020     BP Readings from Last 3 Encounters:   04/14/21 132/76   02/23/21 160/90   12/07/20 140/80     Blood pressure controlled today on current regimen with losartan 50 mg daily, amlodipine 5 mg in the morning, nebivolol 5 mg, hydralazine 25 mg twice daily and weekly clonidine patch  He is managed primarily by nephrology, continue current management

## 2021-04-14 NOTE — ASSESSMENT & PLAN NOTE
BP Readings from Last 3 Encounters:   04/14/21 132/76   02/23/21 160/90   12/07/20 140/80     Improved blood pressure control since starting BiPAP, continue current management for now, though discussed that with continued use of PAP therapy, weight loss he may be able to come off of or lower dose of some antihypertensive medications

## 2021-04-14 NOTE — ASSESSMENT & PLAN NOTE
Lab Results   Component Value Date    HGBA1C 5 8 (H) 09/03/2013     Lab Results   Component Value Date    GLUF 117 (H) 11/16/2020    LDLCALC 116 (H) 10/13/2020    CREATININE 1 26 11/16/2020     Counseled on importance of healthy eating, regular physical activity  Patient is interested in meeting with dietitian  Recommended other plant-based eating resources

## 2021-04-14 NOTE — ASSESSMENT & PLAN NOTE
Regular rate and rhythm on exam, no symptoms reported  He follows with cardiology, reviewed CHADS-Vasc score of 1, recommended that patient discuss with cardiologist whether it would be appropriate to be on aspirin alone for anticoagulation

## 2021-04-16 DIAGNOSIS — E66.01 MORBID OBESITY (HCC): Primary | ICD-10-CM

## 2021-04-23 DIAGNOSIS — I10 BENIGN ESSENTIAL HYPERTENSION: ICD-10-CM

## 2021-04-23 DIAGNOSIS — N18.31 STAGE 3A CHRONIC KIDNEY DISEASE (HCC): ICD-10-CM

## 2021-04-23 DIAGNOSIS — I12.9 HYPERTENSIVE CHRONIC KIDNEY DISEASE WITH STAGE 1 THROUGH STAGE 4 CHRONIC KIDNEY DISEASE, OR UNSPECIFIED CHRONIC KIDNEY DISEASE: ICD-10-CM

## 2021-04-23 RX ORDER — LOSARTAN POTASSIUM 50 MG/1
TABLET ORAL
Qty: 90 TABLET | Refills: 1 | Status: SHIPPED | OUTPATIENT
Start: 2021-04-23 | End: 2021-10-25

## 2021-05-13 ENCOUNTER — OFFICE VISIT (OUTPATIENT)
Dept: CARDIOLOGY CLINIC | Facility: CLINIC | Age: 56
End: 2021-05-13
Payer: COMMERCIAL

## 2021-05-13 VITALS
BODY MASS INDEX: 45.1 KG/M2 | HEART RATE: 60 BPM | WEIGHT: 315 LBS | SYSTOLIC BLOOD PRESSURE: 140 MMHG | HEIGHT: 70 IN | DIASTOLIC BLOOD PRESSURE: 82 MMHG

## 2021-05-13 DIAGNOSIS — R06.02 SOB (SHORTNESS OF BREATH): Primary | ICD-10-CM

## 2021-05-13 PROCEDURE — 99214 OFFICE O/P EST MOD 30 MIN: CPT | Performed by: PHYSICIAN ASSISTANT

## 2021-05-13 NOTE — PROGRESS NOTES
Electrophysiology Office Note    Eddy Mooney  1965  [id number]  HEART & VASCULAR [facility]  [address]        Assessment/Plan     1  SOB (shortness of breath)  Echo complete with contrast if indicated     1  Persistent atrial fibrillation, asymptomatic    * patient presents to [facility] EP office under direction of Dr Diallo Aldana for routine 6 month follow up  * today he is in NSR with acceptable QTc, remains on tikosyn 500mcg BID since 2016 with other AF history as below    * he informs me he is not very symptomatic when in AF and would not have known unless other physicians have told him he was in the rhythm    * he is on [facility] w/ xarelto without any major or minor bleeding issues or cost issues    * last EF in 2017 was 60% with mod LA dilation    * we had a great discussion today regarding risk factor modification to limit AF substrate formation today     1  HTN - followed by Dr Roxy Segura with very much controlled BP's     2  T2DM - does not have     3  Tobacco abuse - does not smoke cigarettes     4  Alcohol intake - very rare     5  HARPER - was diagnosed with severe HARPER and has been compliant with his BiPAP for the past three months     6  Obesity - patient is very interested in losing weight and is seeking consultation in the weight management center in early June  I encouraged him he will be able to lose the weight he would like to over time    * will have patient f/u with Dr Juan Ellington in 6 months for routine monitoring or sooner should symptoms arise     2  Obesity   3  Severe HARPER on BiPAP   4  HTN   5   Fatigue   * he feels his lower HR is the cause of his fatigue, will discuss reducing dose of BB with Dr Ron Garcia and Dr Diallo Aldana, informed patient if we reduce the BB he will likely need to be increased on another agent to offset the BP changes    * he has not had an ECHO since 2017, want to ensure EF is WNL and evaluate LA size            Rhythm History: Atrial fibrillation:   1  Diagnosed with persistent atrial fibrillation, initiated on tikosyn + cardioverted - 2016   2  AF recurrence s/p BLAIRE + DCCV - 2017 - continued on tikosyn   3  AF recurrence s/p DCCV - 2018 - continued on tikosyn    Atrial flutter:     SVT:     VT/VF:     Device history:   Pacemaker:    Defibrillator:    BIV PPM:    BIV ICD:    ILR:      Cardiac Testing:     ECHO: No results found for this or any previous visit  CATH/STRESS TEST:   SUMMARY:   -  Rest ECG: Atrial fibrillation  The ECG showed left anterior hemiblock  Poor   R wave progression was present in V1- V4  -  Stress results: Duration of   exercise was 7 min and 30 sec  Target heart rate was achieved  There was no   chest pain during stress  The stress test was terminated due to achievement of   maximal (symptom limited) exercise and achievement of target heart rate  -     ECG   conclusions: The stress ECG was negative for ischemia  The stress ECG was   abnormal due to arrhythmia  Arrhythmia during stress: atrial fibrillation  IMPRESSIONS: Negative for ischemia after maximal exercise without reproduction   of symptoms  Atrial fibrillation noted throughout  EKG:   NSR        History of Present Illness     HPI/INTERVAL HISTORY: Janeen Velasquez is a 54 y o  male with history as above who presents to [facility] EP office today under direction of Dr Leticia Herron for routine 6 month follow up      5-13-21:  Since last office appointment patient has concerned with inability to "get my motor up to full speed " He feels when he walks his HR prevents him from reaching "full power, like a governor on a motorcycle " He would like to change his bystolic to allow a higher HR but knows he will also need to adjust his other ANTI-HTN medications which are managed by Dr Bogdan Dawson from Nephrology  He is not having any LH, dizziness or exertional chest discomfort   He was recently diagnosed with severe HARPER requiring BiPAP which has significantly improved his sleep quality and overall QOL  He is very interested in losing weight and is planning on seeing weight management in early   Review of Systems  ROS as noted above, otherwise 12 point review of systems was performed and is negative         Historical Information   Social History     Socioeconomic History    Marital status: /Civil Union     Spouse name: Not on file    Number of children: 4    Years of education: 12th grade     Highest education level: Not on file   Occupational History    Not on file   Social Needs    Financial resource strain: Not on file    Food insecurity     Worry: Not on file     Inability: Not on file   [place] [employer] needs     Medical: Not on file     Non-medical: Not on file   Tobacco Use    Smoking status: Former Smoker     Packs/day: 0 50     Years: 10 00     Pack years: 5 00     Types: Cigarettes     Quit date: 1994     Years since quittin 1    Smokeless tobacco: Never Used   Substance and Sexual Activity    Alcohol use: Yes     Frequency: 2-4 times a month     Drinks per session: 1 or 2     Binge frequency: Never     Comment: occ    Drug use: No    Sexual activity: Yes     Partners: Female     Birth control/protection: None   Lifestyle    Physical activity     Days per week: Not on file     Minutes per session: Not on file    Stress: Not on file   Relationships    Social connections     Talks on phone: Not on file     Gets together: Not on file     Attends [employer] service: Not on file     Active member of club or organization: Not on file     Attends meetings of clubs or organizations: Not on file     Relationship status: Not on file    Intimate partner violence     Fear of current or ex partner: Not on file     Emotionally abused: Not on file     Physically abused: Not on file     Forced sexual activity: Not on file   Other Topics Concern    Not on file   Social History Narrative    No caffeine use      Past Medical History:   Diagnosis Date   Velma Velasquez Arthritis     Asthma     At[address] )     Atrial fibrillation with RVR ([facility] ) 2017    Chronic kidney disease     kidney stones    Gout     Hypertension     Kidney stone     Seasonal allergies      Past Surgical History:   Procedure Laterality Date    APPENDECTOMY      CARDIOVERSION      X4 last was 2018    COLONOSCOPY      CYSTOSCOPY  2016    w/removal of object, last assessed 16    FOOT SURGERY Left     HAND SURGERY      KNEE SURGERY Right 2016    PA CYSTO/URETERO W/LITHOTRIPSY &INDWELL STENT INSRT Left 2016    Procedure: CYSTOSCOPY URETEROSCOPY WITH LITHOTRIPSY HOLMIUM LASER STONE EXTRACTION, RETROGRADE PYELOGRAM AND INSERTION STENT URETERAL;  Surgeon: Pradip Baron MD;  Location:  MAIN OR;  Service: Urology last assessed 16    PA CYSTO/URETERO W/LITHOTRIPSY &INDWELL STENT INSRT Right 2020    Procedure: CYSTOSCOPY , cystolitholapaxy of bladder stone WITH HOMIUM LASER RIGHT RETROGRADE AND RIGHT URETERAL STENT, [address] OF BLADDER;  Surgeon: Pradip Baron MD;  Location: [address] [place] [address] MAIN OR;  Service: Urology    WRIST SURGERY Left 2014    Fracture surgery     Social History     Substance and Sexual Activity   Alcohol Use Yes    Frequency: 2-4 times a month    Drinks per session: 1 or 2    Binge frequency: Never    Comment: occ     Social History     Substance and Sexual Activity   Drug Use No     Social History     Tobacco Use   Smoking Status Former Smoker    Packs/day: 0 50    Years: 10 00    Pack years: 5 00    Types: Cigarettes    Quit date: 1994    Years since quittin 1   Smokeless Tobacco Never Used     Family History   Problem Relation Age of Onset    Atrial fibrillation Mother     Other Mother         blood dyscrasia    Hypertension Mother     Other Father         hypoglycemia     Atrial fibrillation Father     Diabetes Family     Other Family         Thrombocytosis       Meds/Allergies       Current Outpatient Medications:   amLODIPine (NORVASC) 5 mg tablet, Take 1 tablet (5 mg total) by mouth daily, Disp: 180 tablet, Rfl: 3    Bystolic 5 MG tablet, TAKE 1 TABLET BY MOUTH EVERY DAY, Disp: 90 tablet, Rfl: 2    cloNIDine (CATAPRES-TTS-2) 0 2 mg/24 hr, PLACE 1 PATCH ON THE SKIN ONCE A WEEK, Disp: 12 patch, Rfl: 1    dofetilide (TIKOSYN) 500 mcg capsule, TAKE 1 CAPSULE BY MOUTH EVERY 12 HOURS, Disp: 60 capsule, Rfl: 8    hydrALAZINE (APRESOLINE) 25 mg tablet, Take 1 tablet (25 mg total) by mouth 2 (two) times a day, Disp: 180 tablet, Rfl: 3    losartan (COZAAR) 50 mg tablet, TAKE 1 TABLET BY MOUTH EVERY DAY, Disp: 90 tablet, Rfl: 1    potassium citrate (UROCIT-K) 10 mEq, TAKE 2 TABLETS (20 MEQ TOTAL) BY MOUTH 2 (TWO) TIMES A DAY, Disp: 360 tablet, Rfl: 1    Xarelto 20 MG tablet, TAKE 1 TABLET BY MOUTH EVERY DAY, Disp: 90 tablet, Rfl: 3    No Known Allergies    Objective   Vitals: Blood pressure 140/82, pulse 60, height 5' 10" (1 778 m), weight (!) 143 kg (315 lb)  Physical Exam  Constitutional:       Appearance: He is well-developed  HENT:      Head: Normocephalic and atraumatic  Eyes:      Pupils: Pupils are equal, round, and reactive to light  Neck:      Musculoskeletal: Normal range of motion and neck supple  Cardiovascular:      Rate and Rhythm: Normal rate and regular rhythm  Pulmonary:      Effort: Pulmonary effort is normal       Breath sounds: Normal breath sounds  Abdominal:      General: Bowel sounds are normal       Palpations: Abdomen is soft  Musculoskeletal: Normal range of motion  Skin:     General: Skin is warm and dry  Neurological:      Mental Status: He is alert and oriented to person, place, and time  Labs:not applicable    Imaging: I have personally reviewed pertinent reports

## 2021-05-14 ENCOUNTER — TELEPHONE (OUTPATIENT)
Dept: CARDIOLOGY CLINIC | Facility: CLINIC | Age: 56
End: 2021-05-14

## 2021-05-14 DIAGNOSIS — I48.91 ATRIAL FIBRILLATION WITH RVR (HCC): ICD-10-CM

## 2021-05-14 NOTE — TELEPHONE ENCOUNTER
----- Message from Governor Zheng sent at 5/14/2021  2:10 PM EDT -----    ----- Message -----  From: Benton Kwon PA-C  Sent: 5/13/2021   4:25 PM EDT  To: Cardiology Ep Clincal    I saw Mr Rosemarie Apodaca today in the office for afib management and he was concerned with his HR being too low, can you inform him of what Dr Kayy Edouard said below regarding his medication changes please? Dr Clayton Javed agrees  Thanks! Felicia Joe   ----- Message -----  From: Ya Moses MD  Sent: 5/13/2021  10:10 AM EDT  To: Benton Kwon PA-C    He can decrease his Bystolic to 2 5 mg daily  He is supposed of increased his amlodipine to 5 mg the 2 5 in the evening  He needs to get me a week a blood pressure readings after decreasing Bystolic, he can wait 1 week in the new morning evening readings with heart rate, and send those into the office    Thank you  But I will make sure that Dr Clayton Javed agrees as well  ----- Message -----  From: Benton Kwon PA-C  Sent: 5/13/2021   9:54 AM EDT  To: Ya Moses MD, Loki Garcia, DO    Dr Lavell Parnell and Dr Clayton Javed,     I saw Sujata Isaac today for routine visit  He would like to coem down on his bystolic as he feels he cannot get his HR high enough for activity  I informed him his BP is labile and we would likely need to increase another agent  I am also sending for an echo, he now uses BiPAP for severe HARPER and is being seen by weight loss management in the beginning of June  Glad he is trying   What do you guys think    Felicia Joe

## 2021-05-14 NOTE — TELEPHONE ENCOUNTER
I called the pt and left a detailed message asking him to decrease the Bystolic to 2 5 mg daily  Med list adjusted to reflect the change  Also, after 1 week of the decreased dose, he should check BP and HR in the AM and PM, and call the office

## 2021-05-19 NOTE — TELEPHONE ENCOUNTER
I called the pt today to be sure he rec'd the message about the decrease in the Bystolic dose  He will call back on Friday with the list of his BP's and HR's

## 2021-06-01 ENCOUNTER — CONSULT (OUTPATIENT)
Dept: BARIATRICS | Facility: CLINIC | Age: 56
End: 2021-06-01
Payer: COMMERCIAL

## 2021-06-01 VITALS
WEIGHT: 315 LBS | SYSTOLIC BLOOD PRESSURE: 150 MMHG | BODY MASS INDEX: 45.1 KG/M2 | TEMPERATURE: 97.5 F | HEART RATE: 63 BPM | DIASTOLIC BLOOD PRESSURE: 92 MMHG | HEIGHT: 70 IN

## 2021-06-01 DIAGNOSIS — I48.20 CHRONIC ATRIAL FIBRILLATION (HCC): ICD-10-CM

## 2021-06-01 DIAGNOSIS — G47.33 OSA (OBSTRUCTIVE SLEEP APNEA): ICD-10-CM

## 2021-06-01 DIAGNOSIS — I10 BENIGN ESSENTIAL HYPERTENSION: ICD-10-CM

## 2021-06-01 DIAGNOSIS — E66.01 MORBID OBESITY WITH BMI OF 45.0-49.9, ADULT (HCC): Primary | ICD-10-CM

## 2021-06-01 PROCEDURE — 3080F DIAST BP >= 90 MM HG: CPT | Performed by: PHYSICIAN ASSISTANT

## 2021-06-01 PROCEDURE — 1036F TOBACCO NON-USER: CPT | Performed by: PHYSICIAN ASSISTANT

## 2021-06-01 PROCEDURE — 3077F SYST BP >= 140 MM HG: CPT | Performed by: PHYSICIAN ASSISTANT

## 2021-06-01 PROCEDURE — 99204 OFFICE O/P NEW MOD 45 MIN: CPT | Performed by: PHYSICIAN ASSISTANT

## 2021-06-01 NOTE — PATIENT INSTRUCTIONS
Goals: Food log (ie ) www myfitnesspal com,sparkpeople  com,loseit com,calorieking  com,etc  baritastic  No sugary beverages  At least 64oz of water daily  Increase physical activity by 10 minutes daily   Gradually increase physical activity to a goal of 5 days per week for 30 minutes of MODERATE intensity PLUS 2 days per week of FULL BODY resistance training  5-10 servings of fruits and vegetables per day and 25-35 grams of dietary fiber per day, gradually increasing  Monitor blood pressure and notify the provider/cardiologist/nephrologist if consistently around 100/60 or you have symptoms of low blood pressure (lightheadedness/dizziness)   If choosing starch pick whole grain/complex carbs and keep 1/2 cup: brown rice, quinoa, whole wheat pasta, sweet potato, chickpea noodles, red lentil noodles

## 2021-06-01 NOTE — ASSESSMENT & PLAN NOTE
HTN/afib:  -may improve with weight loss and dietary changes  -avoid sympathomimetics  -continue management with cards/nephrology

## 2021-06-01 NOTE — PROGRESS NOTES
Assessment/Plan: Morbid Obesity  -Discussed options of HealthyCORE-Intensive Lifestyle Intervention Program, Very Low Calorie Diet-VLCD, Conservative Program, Riley-En-Y Gastric Bypass and Vertical Sleeve Gastrectomy and the role of weight loss medications   -Not interested in bariatric surgery  -Initial weight loss goal of 5-10% weight loss for improved health  -Avoid sympathomimetics: afib  -Avoid Topamax: Kidney stones  -Screening labs: Vitamin D reviewed from 7/24/21, CMP and LP reviewed from 10/13/21  -Patient is interested in pursuing HealthyCORE-Intensive Lifestyle Intervention Program    HARPER (obstructive sleep apnea)  -encouraged continued use of biPAP machine    Benign essential hypertension  HTN/afib:  -may improve with weight loss and dietary changes  -avoid sympathomimetics  -continue management with cards/nephrology    Follow up for [facility]  and in approximately 3 months with Non-Surgical Physician/Advanced Practitioner  Goals:  Food log (ie ) www myfitnesspal com,sparkpeople  com,loseit com,calorieking  com,etc  baritastic  No sugary beverages  At least 64oz of water daily  Increase physical activity by 10 minutes daily   Gradually increase physical activity to a goal of 5 days per week for 30 minutes of MODERATE intensity PLUS 2 days per week of FULL BODY resistance training  5-10 servings of fruits and vegetables per day and 25-35 grams of dietary fiber per day, gradually increasing  Monitor blood pressure and notify the provider/cardiologist/nephrologist if consistently around 100/60 or you have symptoms of low blood pressure (lightheadedness/dizziness)   If choosing starch pick whole grain/complex carbs and keep 1/2 cup: brown rice, quinoa, whole wheat pasta, sweet potato, chickpea noodles, red lentil noodles    Diagnoses and all orders for this visit:    Morbid obesity with BMI of 45 0-49 9, adult ([facility] [address] )  -     Ambulatory referral to Weight Management    HARPER (obstructive sleep apnea)    Benign essential hypertension    Chronic atrial fibrillation (HCC)        Subjective:   Chief Complaint   Patient presents with    Consult     mwm consult     Patient ID: Hero Schwartz  is a 54 y o  male with excess weight/obesity here to pursue weight management  Past Medical History:   Diagnosis Date    Arthritis     Asthma     Atrial fibrillation ([facility] [address] )     Atrial fibrillation with RVR ([facility] [address] ) 4/4/2017    Chronic kidney disease     kidney stones    Gout     Hypertension     Kidney stone     Seasonal allergies      HPI:  Obesity/Excess Weight:  Severity: Severe  Onset:  Gradual since getting  20+ years ago  Worsened with heart medication and since COVID pandemic  Modifiers: Diet and Exercise, Commercial Weight Loss Programs-ie  Weight Watchers, Brandt Ear, Nutrisystem, etc  and Atkins  Contributing factors: See above, dines out  Associated symptoms: comorbid conditions, fatigue, decreased exercise capacity, decreased self esteem, inability to do certain activities and clothes do not fit    Goals: under 250 lbs initially  Hydration: 60 oz of water per day, cherry juice daily- helps maintain gout  Alcohol: 2x/week- Guinness (2 per setting)  Exercise: no  Occupation: Office setting    The following portions of the patient's history were reviewed and updated as appropriate: allergies, current medications, past family history, past medical history, past social history, past surgical history and problem list     Review of Systems   Constitutional: Negative for chills and fever  +night sweats, plans to have evaluated   HENT: Negative for sore throat  Respiratory: Positive for shortness of breath (attributed to excess weight, recommend eval if sx persist or worsen)  Negative for cough  Cardiovascular: Negative for chest pain and palpitations  Gastrointestinal: Negative for constipation and diarrhea  Genitourinary: Negative for dysuria  Musculoskeletal: Positive for arthralgias  Skin: Negative for rash  Neurological: Negative for headaches  Psychiatric/Behavioral: Negative  Objective:    /92 (BP Location: Right arm, Patient Position: Sitting, Cuff Size: Large)   Pulse 63   Temp 97 5 °F (36 4 °C)   Ht 5' 10" (1 778 m)   Wt (!) 143 kg (315 lb 9 6 oz)   BMI 45 28 kg/m²     Physical Exam  Vitals signs and nursing note reviewed  Constitutional   General appearance: Abnormal   well developed and morbidly obese  Pulmonary   Respiratory effort: No increased work of breathing or signs of respiratory distress  Auscultation of lungs: Clear to auscultation, equal breath sounds bilaterally, no wheezes, no rales, no rhonci  Cardiovascular   Auscultation of heart: Normal rate and rhythm, normal S1 and S2, without murmurs  Examination of extremities for edema and/or varicosities: + edema     Abdomen   Abdomen: Abnormal   The abdomen was obese  Musculoskeletal   Gait and station: Normal     Psychiatric   Orientation to person, place and time: Normal     Affect: appropriate

## 2021-06-10 ENCOUNTER — OFFICE VISIT (OUTPATIENT)
Dept: BARIATRICS | Facility: CLINIC | Age: 56
End: 2021-06-10

## 2021-06-10 ENCOUNTER — CLINICAL SUPPORT (OUTPATIENT)
Dept: BARIATRICS | Facility: CLINIC | Age: 56
End: 2021-06-10

## 2021-06-10 VITALS — HEIGHT: 70 IN | BODY MASS INDEX: 44.78 KG/M2

## 2021-06-10 VITALS — WEIGHT: 312.1 LBS | HEIGHT: 70 IN | BODY MASS INDEX: 44.68 KG/M2

## 2021-06-10 DIAGNOSIS — R63.5 ABNORMAL WEIGHT GAIN: Primary | ICD-10-CM

## 2021-06-10 DIAGNOSIS — R63.5 ABNORMAL WEIGHT GAIN: ICD-10-CM

## 2021-06-10 PROCEDURE — 3008F BODY MASS INDEX DOCD: CPT | Performed by: PHYSICIAN ASSISTANT

## 2021-06-10 PROCEDURE — RECHECK

## 2021-06-10 PROCEDURE — WMPRO12: Performed by: DIETITIAN, REGISTERED

## 2021-06-10 PROCEDURE — RECHECK: Performed by: DIETITIAN, REGISTERED

## 2021-06-10 NOTE — PROGRESS NOTES
Weight Management Medical Nutrition Assessment  Nuris Scott presented for the New Start session with the Healthy CORE Program   Today's weight is 312 1#  Per dietary recall patient consumes excess calories from snacking between meals and larger portions at lunch and dinner meal  He states he craves sweets  He stated he knows he is a stress eater and struggles with this in the  afternoon during a work day  He has started to walk his dog daily  We developed and reviewed a low calorie meal        Patient seen by Medical Provider in past 6 months:  yes  Requested to schedule appointment with Medical Provider: No      Anthropometric Measurements  Start Weight (#): 312 1#  Current Weight (#): 312 1#  TBW % Change from start weight:n/a  Ideal Body Weight (#):n/a  Goal Weight (#):ST#      LT#   Pre COVID 280#     Weight Loss History  Previous weight loss attempts: Self Created Diets (Portion Control, Healthy Food Choices, etc )    Food and Nutrition Related History      Food Recall  Breakfast:oatmeal /almond milk   2 eggs with English Muffin    Cherry juice 1/2 cup with meds    Snack:banana or Breakstone package   Lunch: Leftovers - larger portion  Dine Out: [employer] 2 cheese burger and vanilla milkshake / Ruth Klinefelter - 3 pieces  Snack:berries - large portions   Dinner:Dine out- [employer] / apps / beer   Cooks:  3 days - larger portions  Snack:ice cream or Ritas in person  8:00pm       Beverages: water  Volume of beverage intake: 90oz    Weekends: Worse, [place] weekends - drink more  Cravings: sweets  Trouble area of day:    Frequency of Eating out: every 2 days  Food restrictions:none reported   Cooking: self   Food Shopping: self    Physical Activity Intake  Activity:Walking 3/4 mile   Frequency: daily   Physical limitations/barriers to exercise: none reported    Estimated Needs  Energy  Bear Gabrielle Energy Needs:  BMR : 2270 calories   1-2# loss weekly sedentary:  4594-2683 calories             1-2# loss weekly lightly active:7139-3654 calories  Maintenance calories for sedentary activity level: 2724 calories  Protein:75-90gm      (1 2-1 5g/kg IBW)  Fluid: 88oz     (35mL/kg IBW)    Nutrition Diagnosis  Yes;     Overweight/obesity  related to Excess energy intake as evidenced by  BMI more than normative standard for age and sex (obesity-grade III 36+)       Nutrition Intervention    Nutrition Prescription  Calories:1800 calories on sedentary day and flex to 2194-8012 calories on cardio days  Protein:75gm  Fluid:90oz    Meal Plan (Geo/Pro/Carb)  Breakfast:300/10-20/30-45  Snack:200  Lunch:500/30/30-45  Snack:200  Dinner:500/30/30-45  Snack:100-200    Nutrition Education:    Healthy Core Manual  Calorie controlled menu  Lean protein food choices  Healthy snack options  Food journaling tips      Nutrition Counseling:  Strategies: meal planning, portion sizes, healthy snack choices, hydration, fiber intake, protein intake, exercise, food journal      Monitoring and Evaluation:  Evaluation criteria:  Energy Intake  Meet protein needs  Maintain adequate hydration  Monitor weekly weight  Meal planning/preparation  Food journal   Decreased portions at mealtimes and snacks  Physical activity     Barriers to learning:none  Readiness to change: Action:  (Changing behavior)  Comprehension: good  Expected Compliance: good

## 2021-06-21 ENCOUNTER — HOSPITAL ENCOUNTER (OUTPATIENT)
Dept: NON INVASIVE DIAGNOSTICS | Facility: HOSPITAL | Age: 56
Discharge: HOME | End: 2021-06-21
Payer: COMMERCIAL

## 2021-06-21 DIAGNOSIS — R06.02 SOB (SHORTNESS OF BREATH): ICD-10-CM

## 2021-06-21 PROCEDURE — 93306 TTE W/DOPPLER COMPLETE: CPT

## 2021-06-21 PROCEDURE — 93306 TTE W/DOPPLER COMPLETE: CPT | Performed by: INTERNAL MEDICINE

## 2021-06-21 RX ADMIN — PERFLUTREN 0.6 ML/MIN: 6.52 INJECTION, SUSPENSION INTRAVENOUS at 15:09

## 2021-06-29 ENCOUNTER — OFFICE VISIT (OUTPATIENT)
Dept: BARIATRICS | Facility: CLINIC | Age: 56
End: 2021-06-29

## 2021-06-29 VITALS — HEIGHT: 70 IN | WEIGHT: 310 LBS | BODY MASS INDEX: 44.38 KG/M2

## 2021-06-29 DIAGNOSIS — I10 BENIGN ESSENTIAL HYPERTENSION: ICD-10-CM

## 2021-06-29 DIAGNOSIS — R63.5 ABNORMAL WEIGHT GAIN: Primary | ICD-10-CM

## 2021-06-29 PROCEDURE — RECHECK: Performed by: DIETITIAN, REGISTERED

## 2021-06-29 PROCEDURE — 3008F BODY MASS INDEX DOCD: CPT | Performed by: PHYSICIAN ASSISTANT

## 2021-06-29 RX ORDER — CLONIDINE 0.2 MG/24H
PATCH, EXTENDED RELEASE TRANSDERMAL
Qty: 12 PATCH | Refills: 1 | Status: SHIPPED | OUTPATIENT
Start: 2021-06-29 | End: 2021-12-20

## 2021-06-29 NOTE — PROGRESS NOTES
Weight Management Medical Nutrition Assessment  Abel Ojeda presented for the New Start session with the Healthy CORE Program   Today's weight is 310  #  He has lost 2# in the past 2 weeks  Patient stated he has traveled a lot he past 2 weeks and dined out often  Discussed calorie saving strategies when dining out  He is not formally food logging- recommend to food log wkd and dining out  He stated he knows he is a stress eater and struggles with this in the  afternoon during a work day  He has started to walk his dog daily  We reviewed a low calorie meal          Patient seen by Medical Provider in past 6 months:  yes  Requested to schedule appointment with Medical Provider: No        Anthropometric Measurements  Start Weight (#): 312 1#  Current Weight (#): 310#  TBW % Change from start weight:0 6%  Ideal Body Weight (#):n/a  Goal Weight (#):ST#      LT#   Pre COVID 280#      Weight Loss History  Previous weight loss attempts: Self Created Diets (Portion Control, Healthy Food Choices, etc )     Food and Nutrition Related History        Food Recall  Breakfast:oatmeal /almond milk   2 eggs with English Muffin        Cherry juice 1/2 cup with meds            Snack:banana or Breakstone package   Lunch: Leftovers -   Snack:snack packet   Dinner:Dine out- [employer] / apps / beer   Cooks:  3 days - larger portions  Snack:ice cream or Ritas in person  8:00pm         Beverages: water  Volume of beverage intake: 90oz     Weekends: Worse, Beach weekends - drink more  Cravings: sweets  Trouble area of day:     Frequency of Eating out: every 2 days  Food restrictions:none reported   Cooking: self   Food Shopping: self     Physical Activity Intake  Activity:Walking 3/4 mile   Frequency: 4-5x daily   Physical limitations/barriers to exercise: none reported     Estimated Needs  Energy  Bear Gabrielle Energy Needs:  BMR : 2270 calories   1-2# loss weekly sedentary:  1474-8212 calories             1-2# loss weekly lightly active:9249-3373 calories  Maintenance calories for sedentary activity level: 2724 calories  Protein:75-90gm      (1 2-1 5g/kg IBW)  Fluid: 88oz     (35mL/kg IBW)     Nutrition Diagnosis  Yes;     Overweight/obesity  related to Excess energy intake as evidenced by  BMI more than normative standard for age and sex (obesity-grade III 36+)     Nutrition Intervention     Nutrition Prescription  Calories:1800 calories on sedentary day and flex to 8398-0781 calories on cardio days  Protein:75gm  Fluid:90oz     Meal Plan (Geo/Pro/Carb)  Breakfast:300/10-20/30-45  Snack:200  Lunch:500/30/30-45  Snack:200  Dinner:500/30/30-45  Snack:100-200     Nutrition Education:    Healthy Core Manual  Calorie controlled menu  Lean protein food choices  Healthy snack options  Food journaling tips        Nutrition Counseling:  Strategies: meal planning, portion sizes, healthy snack choices, hydration, fiber intake, protein intake, exercise, food journal        Monitoring and Evaluation:  Evaluation criteria:  Energy Intake  Meet protein needs  Maintain adequate hydration  Monitor weekly weight  Meal planning/preparation  Food journal   Decreased portions at mealtimes and snacks  Physical activity      Barriers to learning:none  Readiness to change: Action:  (Changing behavior)  Comprehension: good  Expected Compliance: good

## 2021-07-07 NOTE — ASSESSMENT & PLAN NOTE
Diabetes education    Michelle Daniel has a new One Touch Verio meter and no strips. Rx attached. She has not been testing BS's. 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 She does not want to take an injection, but Rybelsus appears to be covered. This could be used in lieu of Januvia. Please authorize the Diabetes Clinic at [address] [place] [address] to teach/ assist Michelle Daniel in starting Rybelsus 3mg daily   If you agree, please note and return. Thank you. · BMI 41 6  · Encourage therapeutic lifestyle changes to promote weight loss  · Dietician consultation appreciated  · Outpatient referral to continue nutrition education

## 2021-07-21 ENCOUNTER — OFFICE VISIT (OUTPATIENT)
Dept: BARIATRICS | Facility: CLINIC | Age: 56
End: 2021-07-21

## 2021-07-21 DIAGNOSIS — E66.01 OBESITY, CLASS III, BMI 40-49.9 (MORBID OBESITY) (HCC): Primary | ICD-10-CM

## 2021-07-21 PROCEDURE — RECHECK

## 2021-07-21 NOTE — PROGRESS NOTES
Bariatric Behavioral Health Evaluation    Presenting Problem patient here for [address] [place] [address]      Is the patient seeking Bariatric Surgery Eval? No If yes how long have you researched this surgery option  Pre-morbid level of function and history of present illness: patient has concerns about medical issues, mobility and ability to perform functional activities  Patient feels he fatigues easily, has less energy and is "aging rapidly "     Psychiatric/Psychological Treatment Diagnosis: patient denies Mental Health diagnosis and treatment  Outpatient Counselor No     Psychiatrist No     Have you had Inpatient Treatment? No    Family Constellation (include relationship with each and Psych/Med HX)    Siblings  obesity    Additional comments/stressors related to family/relationships/peer support  Family has long history of eating out most nights of the week  Stopped with pandemic and now have resumed family meals at restaurants  Physical/Psychological Assessment:     Appearance: appropriate  Sociability: friendly  Affect: appropriate  Mood: calm  Thought Process: coherent  Speech: normal  Content: no impairment  Orientation: person  Yes , place  Yes , time  Yes , normal attention span  Yes , normal memory  Yes   and normal judgement  Yes   Insight: emotional  good    Risk Assessment:     none    Recommendations: Goals discussed: 1  Activity at gym 5 days a week  2  Food logging  3  Portion meals in half when eating out and take home  4  Develop ways to relax and de-stress not using food  Observation:     Interviews this interview only  Based on the previous information, the client presents the following risk of harm to self or others: low     Note Patient denies Mental Health diagnosis and treatment  Reviewed recommendations of no tobacco and alcohol in moderation  Goals discussed: 1  Activity at gym 5 days a week  2  Food logging   3  Portion meals in half when eating out and take home  4  Develop ways to relax and de-stress not using food

## 2021-07-29 DIAGNOSIS — N20.0 NEPHROLITHIASIS: ICD-10-CM

## 2021-07-29 RX ORDER — POTASSIUM CITRATE 10 MEQ/1
20 TABLET, EXTENDED RELEASE ORAL 2 TIMES DAILY
Qty: 360 TABLET | Refills: 1 | Status: SHIPPED | OUTPATIENT
Start: 2021-07-29 | End: 2022-01-24

## 2021-08-13 ENCOUNTER — TELEMEDICINE (OUTPATIENT)
Dept: FAMILY MEDICINE CLINIC | Facility: CLINIC | Age: 56
End: 2021-08-13
Payer: COMMERCIAL

## 2021-08-13 DIAGNOSIS — A08.4 GASTROENTERITIS AND COLITIS, VIRAL: ICD-10-CM

## 2021-08-13 DIAGNOSIS — B34.9 VIRAL ILLNESS: Primary | ICD-10-CM

## 2021-08-13 DIAGNOSIS — R05.9 COUGH: ICD-10-CM

## 2021-08-13 DIAGNOSIS — R06.00 EXERTIONAL DYSPNEA: ICD-10-CM

## 2021-08-13 PROCEDURE — 99213 OFFICE O/P EST LOW 20 MIN: CPT | Performed by: FAMILY MEDICINE

## 2021-08-13 NOTE — PROGRESS NOTES
Virtual Regular Visit    Verification of patient location: [place], [place]    Patient is located in the following state in which I hold an active license [place]      Assessment/Plan:    Problem List Items Addressed This Visit     None      Visit Diagnoses     Viral illness    -  Primary    Cough        Gastroenteritis and colitis, viral        Exertional dyspnea            Patient with signs and symptoms of likely viral illness  Discussed that symptoms could be due to possible COVID infection however given that onset of symptoms was over 3 weeks ago and overall his symptoms are improving do not see an indication to test for COVID at this point in time since he is outside the window for monoclonal antibody infusion  Overall he is well-appearing on exam, does not appear to be in any distress and is able to speak in complete sentences  Recommended supportive care for symptoms including tea with honey for cough, discussed importance hydration and eating a basic diet  Reviewed ER and return precautions should symptoms worsen or not improve  Reason for visit is   Chief Complaint   Patient presents with    Virtual Regular Visit        Encounter provider Johnathon Hilliard MD    Provider located at [place] [address]  [phone]      Recent Visits  No visits were found meeting these conditions  Showing recent visits within past 7 days and meeting all other requirements  Today's Visits  Date Type Provider Dept   08/13/21 Telemedicine Johnathon Hilliard MD [facility]   Showing today's visits and meeting all other requirements  Future Appointments  No visits were found meeting these conditions  Showing future appointments within next 150 days and meeting all other requirements       The patient was identified by name and date of birth   Blease Sample was informed that this is a telemedicine visit and that the visit is being conducted through [address] [place] [address] Now and patient was informed that this is a secure, HIPAA-compliant platform  He agrees to proceed     My office door was closed  No one else was in the room  He acknowledged consent and understanding of privacy and security of the video platform  The patient has agreed to participate and understands they can discontinue the visit at any time  Patient is aware this is a billable service  Subjective  Jose Angel Gibbons is a 54 y o  male who presents for virtual sick visit   HPI     He reports he had a bad cold about 3 weeks ago with cough, sneezing, rhinorrhea, congestion, diarrhea  He reports he had muscle aches, unsure if he had fevers  The symptoms lasted a week an a half he also had been working a lot at the end of the marely year  These symptoms improved, though he still has a lingering cough, diarrhea (intermittent days with 4 to 5 loose stools last episode was the day before yesterday), fatigue, shortness of breath  No treatments tried  He reports normal appetite  His wife subsequently had symptoms       Past Medical History:   Diagnosis Date    Ankle swelling     Arthritis     Asthma     [facility])     Atrial fibrillation with RVR (HCC) 4/4/2017    Chronic kidney disease     kidney stones    Gout     Hypertension     Kidney stone     Night sweats     Seasonal allergies     Sleep apnea        Past Surgical History:   Procedure Laterality Date    APPENDECTOMY      CARDIOVERSION      X4 last was 2018    COLONOSCOPY      CYSTOSCOPY  08/30/2016    w/removal of object, last assessed 8/30/16    FOOT SURGERY Left     HAND SURGERY      KNEE SURGERY Right 2016    CT CYSTO/URETERO W/LITHOTRIPSY &INDWELL STENT INSRT Left 8/9/2016    Procedure: CYSTOSCOPY URETEROSCOPY WITH LITHOTRIPSY HOLMIUM LASER STONE EXTRACTION, RETROGRADE PYELOGRAM AND INSERTION STENT URETERAL;  Surgeon: Elsa Torrez MD;  [facility];  Service: Urology last assessed 8/30/16    CT CYSTO/URETERO W/LITHOTRIPSY &INDWELL STENT INSRT Right 8/24/2020    Procedure: CYSTOSCOPY , cystolitholapaxy of bladder stone WITH HOMIUM LASER RIGHT RETROGRADE AND RIGHT URETERAL Calvin Makos OF BLADDER;  Surgeon: Minerva Trinh MD;  Location: [facility] MAIN OR;  Service: Urology    WRIST SURGERY Left 2014    Fracture surgery       Current Outpatient Medications   Medication Sig Dispense Refill    amLODIPine (NORVASC) 5 mg tablet Take 1 tablet (5 mg total) by mouth daily 180 tablet 3    Bystolic 5 MG tablet TAKE 1 TABLET BY MOUTH EVERY DAY (Patient taking differently: 2 5 mg Hold for heart rate less than 50 beats per minute  Dose change per Mike Marcial PA-C ) 90 tablet 2    cloNIDine (CATAPRES-TTS-2) 0 2 mg/24 hr PLACE 1 PATCH ON THE SKIN ONCE A WEEK 12 patch 1    dofetilide (TIKOSYN) 500 mcg capsule TAKE 1 CAPSULE BY MOUTH EVERY 12 HOURS 60 capsule 8    hydrALAZINE (APRESOLINE) 25 mg tablet Take 1 tablet (25 mg total) by mouth 2 (two) times a day 180 tablet 3    losartan (COZAAR) 50 mg tablet TAKE 1 TABLET BY MOUTH EVERY DAY 90 tablet 1    potassium citrate (UROCIT-K) 10 mEq TAKE 2 TABLETS (20 MEQ TOTAL) BY MOUTH 2 (TWO) TIMES A  tablet 1    Xarelto 20 MG tablet TAKE 1 TABLET BY MOUTH EVERY DAY 90 tablet 3     No current facility-administered medications for this visit  No Known Allergies    Review of Systems   Constitutional: Positive for fatigue  Negative for activity change, appetite change, chills and fever  HENT: Positive for congestion, rhinorrhea and sneezing  Respiratory: Positive for cough and shortness of breath  Negative for chest tightness  Cardiovascular: Negative for chest pain and leg swelling  Gastrointestinal: Positive for diarrhea  Negative for abdominal pain, blood in stool, constipation, nausea and vomiting  Musculoskeletal: Negative for myalgias (resolved)  Video Exam    There were no vitals filed for this visit      Physical Exam  Constitutional:       General: He is not in acute distress  Appearance: He is obese  He is not ill-appearing or toxic-appearing  HENT:      Head: Normocephalic and atraumatic  Mouth/Throat:      Mouth: Mucous membranes are moist    Eyes:      Extraocular Movements: Extraocular movements intact  Pulmonary:      Effort: Pulmonary effort is normal  No respiratory distress  Comments: Able to speak in complete sentences without difficulty  Musculoskeletal:      Cervical back: Normal range of motion and neck supple  Skin:     Coloration: Skin is not pale  Findings: No erythema  Neurological:      Mental Status: He is alert  Mental status is at baseline  Psychiatric:         Mood and Affect: Mood normal          Behavior: Behavior normal           I spent 15 minutes with patient today in which greater than 50% of the time was spent in counseling/coordination of care regarding Impressions and instructions for management    VIRTUAL VISIT Bubba Leiliu Kwan verbally agrees to participate in IDbyME  Pt is aware that [employer] could be limited without vital signs or the ability to perform a full hands-on physical [address] understands he or the provider may request at any time to terminate the video visit and request the patient to seek care or treatment in person

## 2021-09-04 DIAGNOSIS — I12.9 HYPERTENSIVE CHRONIC KIDNEY DISEASE WITH STAGE 1 THROUGH STAGE 4 CHRONIC KIDNEY DISEASE, OR UNSPECIFIED CHRONIC KIDNEY DISEASE: ICD-10-CM

## 2021-09-04 DIAGNOSIS — I10 BENIGN ESSENTIAL HYPERTENSION: ICD-10-CM

## 2021-09-04 DIAGNOSIS — N18.30 CHRONIC KIDNEY DISEASE, STAGE III (MODERATE) (HCC): ICD-10-CM

## 2021-09-07 RX ORDER — HYDRALAZINE HYDROCHLORIDE 25 MG/1
TABLET, FILM COATED ORAL
Qty: 180 TABLET | Refills: 3 | Status: SHIPPED | OUTPATIENT
Start: 2021-09-07 | End: 2022-02-10 | Stop reason: SDUPTHER

## 2021-10-22 DIAGNOSIS — I10 BENIGN ESSENTIAL HYPERTENSION: ICD-10-CM

## 2021-10-22 DIAGNOSIS — I12.9 HYPERTENSIVE CHRONIC KIDNEY DISEASE WITH STAGE 1 THROUGH STAGE 4 CHRONIC KIDNEY DISEASE, OR UNSPECIFIED CHRONIC KIDNEY DISEASE: ICD-10-CM

## 2021-10-22 DIAGNOSIS — N18.31 STAGE 3A CHRONIC KIDNEY DISEASE (HCC): ICD-10-CM

## 2021-10-25 RX ORDER — LOSARTAN POTASSIUM 50 MG/1
TABLET ORAL
Qty: 90 TABLET | Refills: 1 | Status: SHIPPED | OUTPATIENT
Start: 2021-10-25 | End: 2022-05-10 | Stop reason: ALTCHOICE

## 2021-12-07 DIAGNOSIS — I12.9 HYPERTENSIVE CHRONIC KIDNEY DISEASE WITH STAGE 1 THROUGH STAGE 4 CHRONIC KIDNEY DISEASE, OR UNSPECIFIED CHRONIC KIDNEY DISEASE: ICD-10-CM

## 2021-12-07 RX ORDER — AMLODIPINE BESYLATE 5 MG/1
TABLET ORAL
Qty: 180 TABLET | Refills: 3 | Status: SHIPPED | OUTPATIENT
Start: 2021-12-07 | End: 2022-03-11 | Stop reason: HOSPADM

## 2021-12-19 DIAGNOSIS — I10 BENIGN ESSENTIAL HYPERTENSION: ICD-10-CM

## 2021-12-20 RX ORDER — CLONIDINE 0.2 MG/24H
PATCH, EXTENDED RELEASE TRANSDERMAL
Qty: 12 PATCH | Refills: 1 | Status: SHIPPED | OUTPATIENT
Start: 2021-12-20 | End: 2022-02-10

## 2021-12-27 ENCOUNTER — TELEPHONE (OUTPATIENT)
Dept: FAMILY MEDICINE CLINIC | Facility: CLINIC | Age: 56
End: 2021-12-27

## 2021-12-27 ENCOUNTER — TELEMEDICINE (OUTPATIENT)
Dept: FAMILY MEDICINE CLINIC | Facility: CLINIC | Age: 56
End: 2021-12-27
Payer: COMMERCIAL

## 2021-12-27 DIAGNOSIS — U07.1 INFECTION DUE TO SEVERE ACUTE RESPIRATORY SYNDROME CORONAVIRUS 2 (SARS-COV-2): Primary | ICD-10-CM

## 2021-12-27 PROCEDURE — 99213 OFFICE O/P EST LOW 20 MIN: CPT | Performed by: FAMILY MEDICINE

## 2021-12-27 RX ORDER — BUDESONIDE 180 UG/1
4 AEROSOL, POWDER RESPIRATORY (INHALATION) 2 TIMES DAILY
Qty: 1 EACH | Refills: 0 | Status: SHIPPED | OUTPATIENT
Start: 2021-12-27 | End: 2022-04-05 | Stop reason: ALTCHOICE

## 2022-01-04 DIAGNOSIS — I48.91 ATRIAL FIBRILLATION WITH RVR (HCC): ICD-10-CM

## 2022-01-04 RX ORDER — RIVAROXABAN 20 MG/1
TABLET, FILM COATED ORAL
Qty: 90 TABLET | Refills: 3 | Status: SHIPPED | OUTPATIENT
Start: 2022-01-04 | End: 2022-03-11 | Stop reason: HOSPADM

## 2022-01-10 DIAGNOSIS — I48.0 PAROXYSMAL ATRIAL FIBRILLATION (HCC): ICD-10-CM

## 2022-01-10 RX ORDER — DOFETILIDE 0.5 MG/1
CAPSULE ORAL
Qty: 60 CAPSULE | Refills: 8 | Status: SHIPPED | OUTPATIENT
Start: 2022-01-10

## 2022-01-24 DIAGNOSIS — N20.0 NEPHROLITHIASIS: ICD-10-CM

## 2022-01-24 RX ORDER — POTASSIUM CITRATE 10 MEQ/1
20 TABLET, EXTENDED RELEASE ORAL 2 TIMES DAILY
Qty: 360 TABLET | Refills: 1 | Status: SHIPPED | OUTPATIENT
Start: 2022-01-24 | End: 2022-03-11 | Stop reason: HOSPADM

## 2022-02-10 ENCOUNTER — OFFICE VISIT (OUTPATIENT)
Dept: CARDIOLOGY CLINIC | Facility: CLINIC | Age: 57
End: 2022-02-10
Payer: COMMERCIAL

## 2022-02-10 VITALS
DIASTOLIC BLOOD PRESSURE: 94 MMHG | BODY MASS INDEX: 45.1 KG/M2 | HEART RATE: 57 BPM | HEIGHT: 70 IN | SYSTOLIC BLOOD PRESSURE: 148 MMHG | WEIGHT: 315 LBS

## 2022-02-10 DIAGNOSIS — E66.01 MORBID OBESITY WITH BMI OF 45.0-49.9, ADULT (HCC): ICD-10-CM

## 2022-02-10 DIAGNOSIS — I48.20 CHRONIC ATRIAL FIBRILLATION (HCC): Primary | ICD-10-CM

## 2022-02-10 DIAGNOSIS — E66.01 CLASS 3 SEVERE OBESITY DUE TO EXCESS CALORIES WITH SERIOUS COMORBIDITY AND BODY MASS INDEX (BMI) OF 40.0 TO 44.9 IN ADULT (HCC): ICD-10-CM

## 2022-02-10 DIAGNOSIS — Z79.899 LONG TERM CURRENT USE OF ANTIARRHYTHMIC DRUG: ICD-10-CM

## 2022-02-10 DIAGNOSIS — N18.30 CHRONIC KIDNEY DISEASE, STAGE III (MODERATE) (HCC): ICD-10-CM

## 2022-02-10 DIAGNOSIS — I48.91 ATRIAL FIBRILLATION WITH RVR (HCC): ICD-10-CM

## 2022-02-10 DIAGNOSIS — I10 BENIGN ESSENTIAL HYPERTENSION: ICD-10-CM

## 2022-02-10 DIAGNOSIS — I12.9 HYPERTENSIVE CHRONIC KIDNEY DISEASE WITH STAGE 1 THROUGH STAGE 4 CHRONIC KIDNEY DISEASE, OR UNSPECIFIED CHRONIC KIDNEY DISEASE: ICD-10-CM

## 2022-02-10 PROCEDURE — 93000 ELECTROCARDIOGRAM COMPLETE: CPT | Performed by: INTERNAL MEDICINE

## 2022-02-10 PROCEDURE — 3008F BODY MASS INDEX DOCD: CPT | Performed by: INTERNAL MEDICINE

## 2022-02-10 PROCEDURE — 1036F TOBACCO NON-USER: CPT | Performed by: INTERNAL MEDICINE

## 2022-02-10 PROCEDURE — 3077F SYST BP >= 140 MM HG: CPT | Performed by: INTERNAL MEDICINE

## 2022-02-10 PROCEDURE — 99214 OFFICE O/P EST MOD 30 MIN: CPT | Performed by: INTERNAL MEDICINE

## 2022-02-10 PROCEDURE — 3080F DIAST BP >= 90 MM HG: CPT | Performed by: INTERNAL MEDICINE

## 2022-02-10 RX ORDER — CLONIDINE 0.1 MG/24H
1 PATCH, EXTENDED RELEASE TRANSDERMAL WEEKLY
Qty: 4 PATCH | Refills: 11 | Status: SHIPPED | OUTPATIENT
Start: 2022-02-10

## 2022-02-10 RX ORDER — OLMESARTAN MEDOXOMIL 40 MG/1
40 TABLET ORAL DAILY
Qty: 90 TABLET | Refills: 3 | Status: SHIPPED | OUTPATIENT
Start: 2022-02-10

## 2022-02-10 RX ORDER — HYDRALAZINE HYDROCHLORIDE 50 MG/1
50 TABLET, FILM COATED ORAL 2 TIMES DAILY
Qty: 180 TABLET | Refills: 3 | Status: SHIPPED | OUTPATIENT
Start: 2022-02-10

## 2022-02-10 RX ORDER — NEBIVOLOL 2.5 MG/1
2.5 TABLET ORAL DAILY
Qty: 90 TABLET | Refills: 3 | Status: SHIPPED | OUTPATIENT
Start: 2022-02-10

## 2022-02-10 NOTE — PROGRESS NOTES
Electrophysiology Office Note    Kevin Fajardo  1965  [id number]  HEART [facility] VASCULAR [facility]  [address]        Assessment/Plan     1  Chronic atrial fibrillation (HCC)  POCT ECG    Comprehensive metabolic panel   2  Benign essential hypertension  olmesartan (BENICAR) 40 mg tablet    cloNIDine (CATAPRES-TTS-1) 0 1 mg/24 hr    hydrALAZINE (APRESOLINE) 50 mg tablet    Comprehensive metabolic panel   3  Hypertensive chronic kidney disease with stage 1 through stage 4 chronic kidney disease, or unspecified chronic kidney disease  olmesartan (BENICAR) 40 mg tablet    cloNIDine (CATAPRES-TTS-1) 0 1 mg/24 hr    hydrALAZINE (APRESOLINE) 50 mg tablet    nebivolol (Bystolic) 2 5 mg tablet    Comprehensive metabolic panel   4  Chronic kidney disease, stage III (moderate) (HCC)  hydrALAZINE (APRESOLINE) 50 mg tablet   5  Atrial fibrillation with RVR (HCC)  nebivolol (Bystolic) 2 5 mg tablet   6  Long term current use of antiarrhythmic drug     7  Morbid obesity with BMI of 45 0-49 9, adult ([facility])     8  Class 3 severe obesity due to excess calories with serious comorbidity and body mass index (BMI) of 40 0 to 44 9 in adult ([facility] [address] )          1  Persistent atrial fibrillation, asymptomatic -tikosyn 500mcg BID    2  Obesity     3  Severe HARPER on BiPAP     4  HTN very limited from side effects of his BP meds will change quite a bit      HPI/INTERVAL HISTORY:     Patient presents to the office today stating he has been having some sob with walking up steps  He is also having more episodes of fatigue  Patient denies chest pain,headache,double vision, palpitations, dizziness, lightheadedness and syncope      Review of Systems  ROS as noted above, otherwise 12 point review of systems was performed and is negative         Historical Information   Social History     Socioeconomic History    Marital status: /Civil Union     Spouse name: Not on file    Number of children: 4    Years of education: 12th grade     Highest education level: Not on file   Occupational History    Not on file   Tobacco Use    Smoking status: Former Smoker     Packs/day: 0 50     Years: 10 00     Pack years: 5 00     Types: Cigarettes     Quit date: 1994     Years since quittin 8    Smokeless tobacco: Never Used   Vaping Use    Vaping Use: Never used   Substance and Sexual Activity    Alcohol use: Yes     Comment: occ    Drug use: No    Sexual activity: Yes     Partners: Female     Birth control/protection: None   Other Topics Concern    Not on file   Social History Narrative    No caffeine use      Social Determinants of Health     Financial Resource Strain: Not on file   Food Insecurity: Not on file   Transportation Needs: Not on file   Physical Activity: Not on file   Stress: Not on file   Social Connections: Not on file   Intimate Partner Violence: Not on file   Housing Stability: Not on file     Past Medical History:   Diagnosis Date    Ankle swelling     Arthritis     Asthma     Atrial fibrillation ([facility] [address] )     Atrial fibrillation with RVR ([facility] [address] ) 2017    Chronic kidney disease     kidney stones    Gout     Hypertension     Kidney stone     Night sweats     Seasonal allergies     Sleep apnea      Past Surgical History:   Procedure Laterality Date    APPENDECTOMY      CARDIOVERSION      X4 last was 2018    COLONOSCOPY      CYSTOSCOPY  2016    w/removal of object, last assessed 16    FOOT SURGERY Left     HAND SURGERY      KNEE SURGERY Right 2016    NC CYSTO/URETERO W/LITHOTRIPSY &INDWELL STENT INSRT Left 2016    Procedure: CYSTOSCOPY URETEROSCOPY WITH LITHOTRIPSY HOLMIUM LASER STONE EXTRACTION, RETROGRADE PYELOGRAM AND INSERTION STENT URETERAL;  Surgeon: Rebekah Painting MD;  Location: [facility];  Service: Urology last assessed 16    NC CYSTO/URETERO W/LITHOTRIPSY &INDWELL STENT INSRT Right 2020    Procedure: CYSTOSCOPY , cystolitholapaxy of bladder stone WITH HOMIUM LASER RIGHT RETROGRADE AND RIGHT URETERAL STENT, FULGERATION OF BLADDER;  Surgeon: Shade Prabhakar MD;  Location: [address] [place] [address] OR;  Service: Urology    WRIST SURGERY Left 2014    Fracture surgery     Social History     Substance and Sexual Activity   Alcohol Use Yes    Comment: occ     Social History     Substance and Sexual Activity   Drug Use No     Social History     Tobacco Use   Smoking Status Former Smoker    Packs/day: 0 50    Years: 10 00    Pack years: 5 00    Types: Cigarettes    Quit date: 1994    Years since quittin 8   Smokeless Tobacco Never Used     Family History   Problem Relation Age of Onset    Atrial fibrillation Mother     Other Mother         blood dyscrasia    Hypertension Mother     Other Father         hypoglycemia     Atrial fibrillation Father     Diabetes Family     Other Family         Thrombocytosis    No Known Problems Sister     Cancer Neg Hx     Thyroid disease Neg Hx        Meds/Allergies       Current Outpatient Medications:     amLODIPine (NORVASC) 5 mg tablet, TAKE 1 TABLET BY MOUTH TWICE A DAY (Patient taking differently: 5 mg daily  ), Disp: 180 tablet, Rfl: 3    dofetilide (TIKOSYN) 500 mcg capsule, TAKE 1 CAPSULE BY MOUTH EVERY 12 HOURS, Disp: 60 capsule, Rfl: 8    hydrALAZINE (APRESOLINE) 50 mg tablet, Take 1 tablet (50 mg total) by mouth 2 (two) times a day, Disp: 180 tablet, Rfl: 3    nebivolol (Bystolic) 2 5 mg tablet, Take 1 tablet (2 5 mg total) by mouth daily Hold for heart rate less than 50 beats per minute , Disp: 90 tablet, Rfl: 3    potassium citrate (UROCIT-K) 10 mEq, TAKE 2 TABLETS (20 MEQ TOTAL) BY MOUTH 2 (TWO) TIMES A DAY, Disp: 360 tablet, Rfl: 1    Xarelto 20 MG tablet, TAKE 1 TABLET BY MOUTH EVERY DAY, Disp: 90 tablet, Rfl: 3    budesonide (Pulmicort Flexhaler) 180 MCG/ACT inhaler, Inhale 4 puffs 2 (two) times a day for 14 days Rinse mouth after use , Disp: 1 each, Rfl: 0   cloNIDine (CATAPRES-TTS-1) 0 1 mg/24 hr, Place 1 patch (0 1 mg total) on the skin once a week, Disp: 4 patch, Rfl: 11    olmesartan (BENICAR) 40 mg tablet, Take 1 tablet (40 mg total) by mouth daily, Disp: 90 tablet, Rfl: 3    No Known Allergies    Objective   Vitals: Blood pressure 148/94, pulse 57, height 5' 10" (1 778 m), weight (!) 144 kg (318 lb 1 6 oz)  Physical Exam  Constitutional:       Appearance: He is well-developed  HENT:      Head: Normocephalic and atraumatic  Eyes:      Pupils: Pupils are equal, round, and reactive to light  Cardiovascular:      Rate and Rhythm: Normal rate and regular rhythm  Pulmonary:      Effort: Pulmonary effort is normal       Breath sounds: Normal breath sounds  Abdominal:      General: Bowel sounds are normal       Palpations: Abdomen is soft  Musculoskeletal:         General: Normal range of motion  Cervical back: Normal range of motion and neck supple  Skin:     General: Skin is warm and dry  Neurological:      Mental Status: He is alert and oriented to person, place, and time  Labs:not applicable    Cardiac Testing:     ECHO: No results found for this or any previous visit  CATH/STRESS TEST:   SUMMARY:   -  Rest ECG: Atrial fibrillation  The ECG showed left anterior hemiblock  Poor   R wave progression was present in V1- V4  -  Stress results: Duration of   exercise was 7 min and 30 sec  Target heart rate was achieved  There was no   chest pain during stress  The stress test was terminated due to achievement of   maximal (symptom limited) exercise and achievement of target heart rate  -     ECG   conclusions: The stress ECG was negative for ischemia  The stress ECG was   abnormal due to arrhythmia  Arrhythmia during stress: atrial fibrillation  IMPRESSIONS: Negative for ischemia after maximal exercise without reproduction   of symptoms  Atrial fibrillation noted throughout          Imaging: I have personally reviewed pertinent reports

## 2022-02-25 ENCOUNTER — HOSPITAL ENCOUNTER (OUTPATIENT)
Dept: RADIOLOGY | Facility: HOSPITAL | Age: 57
Discharge: HOME | End: 2022-02-25
Attending: UROLOGY
Payer: COMMERCIAL

## 2022-02-25 DIAGNOSIS — N20.0 NEPHROLITHIASIS: ICD-10-CM

## 2022-02-25 PROCEDURE — 76770 US EXAM ABDO BACK WALL COMP: CPT

## 2022-03-07 ENCOUNTER — APPOINTMENT (EMERGENCY)
Dept: RADIOLOGY | Facility: HOSPITAL | Age: 57
End: 2022-03-07
Payer: COMMERCIAL

## 2022-03-07 ENCOUNTER — HOSPITAL ENCOUNTER (EMERGENCY)
Facility: HOSPITAL | Age: 57
End: 2022-03-07
Attending: EMERGENCY MEDICINE | Admitting: EMERGENCY MEDICINE
Payer: COMMERCIAL

## 2022-03-07 ENCOUNTER — APPOINTMENT (EMERGENCY)
Dept: CT IMAGING | Facility: HOSPITAL | Age: 57
End: 2022-03-07
Payer: COMMERCIAL

## 2022-03-07 ENCOUNTER — HOSPITAL ENCOUNTER (INPATIENT)
Facility: HOSPITAL | Age: 57
LOS: 4 days | Discharge: HOME | DRG: 064 | End: 2022-03-11
Attending: EMERGENCY MEDICINE | Admitting: EMERGENCY MEDICINE
Payer: COMMERCIAL

## 2022-03-07 VITALS
BODY MASS INDEX: 45.1 KG/M2 | HEIGHT: 70 IN | WEIGHT: 315 LBS | SYSTOLIC BLOOD PRESSURE: 136 MMHG | TEMPERATURE: 98.6 F | HEART RATE: 62 BPM | DIASTOLIC BLOOD PRESSURE: 66 MMHG | OXYGEN SATURATION: 95 % | RESPIRATION RATE: 18 BRPM

## 2022-03-07 DIAGNOSIS — R53.1 ACUTE RIGHT-SIDED WEAKNESS: ICD-10-CM

## 2022-03-07 DIAGNOSIS — I61.9 HEMORRHAGIC STROKE (HCC): ICD-10-CM

## 2022-03-07 DIAGNOSIS — I12.9 HYPERTENSIVE CHRONIC KIDNEY DISEASE WITH STAGE 1 THROUGH STAGE 4 CHRONIC KIDNEY DISEASE, OR UNSPECIFIED CHRONIC KIDNEY DISEASE: ICD-10-CM

## 2022-03-07 DIAGNOSIS — I61.9 ICH (INTRACEREBRAL HEMORRHAGE) (HCC): ICD-10-CM

## 2022-03-07 DIAGNOSIS — E78.5 DYSLIPIDEMIA: ICD-10-CM

## 2022-03-07 DIAGNOSIS — I62.9 INTRACRANIAL BLEED (HCC): Primary | ICD-10-CM

## 2022-03-07 DIAGNOSIS — I61.9 HEMORRHAGIC STROKE (HCC): Primary | ICD-10-CM

## 2022-03-07 LAB
ANION GAP SERPL CALCULATED.3IONS-SCNC: 9 MMOL/L (ref 4–13)
APTT PPP: 34 SECONDS (ref 23–37)
BUN SERPL-MCNC: 15 MG/DL (ref 5–25)
CALCIUM SERPL-MCNC: 8.8 MG/DL (ref 8.3–10.1)
CARDIAC TROPONIN I PNL SERPL HS: 6 NG/L
CHLORIDE SERPL-SCNC: 103 MMOL/L (ref 100–108)
CO2 SERPL-SCNC: 28 MMOL/L (ref 21–32)
CREAT SERPL-MCNC: 1.15 MG/DL (ref 0.6–1.3)
ERYTHROCYTE [DISTWIDTH] IN BLOOD BY AUTOMATED COUNT: 13 % (ref 11.6–15.1)
GFR SERPL CREATININE-BSD FRML MDRD: 70 ML/MIN/1.73SQ M
GLUCOSE SERPL-MCNC: 186 MG/DL (ref 65–140)
GLUCOSE SERPL-MCNC: 189 MG/DL (ref 65–140)
HCT VFR BLD AUTO: 44.3 % (ref 36.5–49.3)
HGB BLD-MCNC: 15.5 G/DL (ref 12–17)
INR PPP: 1.27 (ref 0.84–1.19)
MCH RBC QN AUTO: 30.3 PG (ref 26.8–34.3)
MCHC RBC AUTO-ENTMCNC: 35 G/DL (ref 31.4–37.4)
MCV RBC AUTO: 87 FL (ref 82–98)
PLATELET # BLD AUTO: 222 THOUSANDS/UL (ref 149–390)
PMV BLD AUTO: 12.3 FL (ref 8.9–12.7)
POTASSIUM SERPL-SCNC: 3.7 MMOL/L (ref 3.5–5.3)
PROTHROMBIN TIME: 15.8 SECONDS (ref 11.6–14.5)
RBC # BLD AUTO: 5.12 MILLION/UL (ref 3.88–5.62)
SODIUM SERPL-SCNC: 140 MMOL/L (ref 136–145)
WBC # BLD AUTO: 12.44 THOUSAND/UL (ref 4.31–10.16)

## 2022-03-07 PROCEDURE — 82948 REAGENT STRIP/BLOOD GLUCOSE: CPT

## 2022-03-07 PROCEDURE — 80048 BASIC METABOLIC PNL TOTAL CA: CPT | Performed by: EMERGENCY MEDICINE

## 2022-03-07 PROCEDURE — 70498 CT ANGIOGRAPHY NECK: CPT

## 2022-03-07 PROCEDURE — 99285 EMERGENCY DEPT VISIT HI MDM: CPT

## 2022-03-07 PROCEDURE — 36415 COLL VENOUS BLD VENIPUNCTURE: CPT | Performed by: EMERGENCY MEDICINE

## 2022-03-07 PROCEDURE — 85610 PROTHROMBIN TIME: CPT | Performed by: EMERGENCY MEDICINE

## 2022-03-07 PROCEDURE — 93005 ELECTROCARDIOGRAM TRACING: CPT

## 2022-03-07 PROCEDURE — 3066F NEPHROPATHY DOC TX: CPT | Performed by: INTERNAL MEDICINE

## 2022-03-07 PROCEDURE — 71045 X-RAY EXAM CHEST 1 VIEW: CPT

## 2022-03-07 PROCEDURE — 70496 CT ANGIOGRAPHY HEAD: CPT

## 2022-03-07 PROCEDURE — 85027 COMPLETE CBC AUTOMATED: CPT | Performed by: EMERGENCY MEDICINE

## 2022-03-07 PROCEDURE — 85730 THROMBOPLASTIN TIME PARTIAL: CPT | Performed by: EMERGENCY MEDICINE

## 2022-03-07 PROCEDURE — 99292 CRITICAL CARE ADDL 30 MIN: CPT | Performed by: EMERGENCY MEDICINE

## 2022-03-07 PROCEDURE — 84484 ASSAY OF TROPONIN QUANT: CPT | Performed by: EMERGENCY MEDICINE

## 2022-03-07 PROCEDURE — 99291 CRITICAL CARE FIRST HOUR: CPT | Performed by: EMERGENCY MEDICINE

## 2022-03-07 RX ORDER — LABETALOL 20 MG/4 ML (5 MG/ML) INTRAVENOUS SYRINGE
20 ONCE
Status: COMPLETED | OUTPATIENT
Start: 2022-03-07 | End: 2022-03-07

## 2022-03-07 RX ADMIN — LABETALOL HYDROCHLORIDE 20 MG: 5 INJECTION, SOLUTION INTRAVENOUS at 21:53

## 2022-03-07 RX ADMIN — PROTHROMBIN, COAGULATION FACTOR VII HUMAN, COAGULATION FACTOR IX HUMAN, COAGULATION FACTOR X HUMAN, PROTEIN C, PROTEIN S HUMAN, AND WATER 5000 UNITS: KIT at 22:40

## 2022-03-07 RX ADMIN — IOHEXOL 85 ML: 350 INJECTION, SOLUTION INTRAVENOUS at 21:43

## 2022-03-07 RX ADMIN — NICARDIPINE HYDROCHLORIDE 5 MG/HR: 2.5 INJECTION, SOLUTION INTRAVENOUS at 22:16

## 2022-03-08 ENCOUNTER — APPOINTMENT (INPATIENT)
Dept: RADIOLOGY | Facility: HOSPITAL | Age: 57
DRG: 064 | End: 2022-03-08
Payer: COMMERCIAL

## 2022-03-08 ENCOUNTER — APPOINTMENT (INPATIENT)
Dept: NON INVASIVE DIAGNOSTICS | Facility: HOSPITAL | Age: 57
DRG: 064 | End: 2022-03-08
Payer: COMMERCIAL

## 2022-03-08 PROBLEM — I61.9 ICH (INTRACEREBRAL HEMORRHAGE) (HCC): Status: ACTIVE | Noted: 2022-03-08

## 2022-03-08 LAB
ANION GAP SERPL CALCULATED.3IONS-SCNC: 3 MMOL/L (ref 4–13)
ANION GAP SERPL CALCULATED.3IONS-SCNC: 5 MMOL/L (ref 4–13)
AORTIC ROOT: 3.5 CM
APICAL FOUR CHAMBER EJECTION FRACTION: 63 %
APTT PPP: 34 SECONDS (ref 23–37)
ARTIFACT: PRESENT
ASCENDING AORTA: 4.1 CM (ref 2.38–3.56)
ATRIAL RATE: 60 BPM
AV PEAK GRADIENT: 15 MMHG
BASOPHILS # BLD AUTO: 0.07 THOUSANDS/ΜL (ref 0–0.1)
BASOPHILS # BLD MANUAL: 0.23 THOUSAND/UL (ref 0–0.1)
BASOPHILS NFR BLD AUTO: 1 % (ref 0–1)
BASOPHILS NFR MAR MANUAL: 2 % (ref 0–1)
BUN SERPL-MCNC: 12 MG/DL (ref 5–25)
BUN SERPL-MCNC: 13 MG/DL (ref 5–25)
CA-I BLD-SCNC: 1.11 MMOL/L (ref 1.12–1.32)
CALCIUM SERPL-MCNC: 8.5 MG/DL (ref 8.3–10.1)
CALCIUM SERPL-MCNC: 9.2 MG/DL (ref 8.3–10.1)
CHLORIDE SERPL-SCNC: 105 MMOL/L (ref 100–108)
CHLORIDE SERPL-SCNC: 107 MMOL/L (ref 100–108)
CHOLEST SERPL-MCNC: 165 MG/DL
CO2 SERPL-SCNC: 27 MMOL/L (ref 21–32)
CO2 SERPL-SCNC: 28 MMOL/L (ref 21–32)
CREAT SERPL-MCNC: 1.11 MG/DL (ref 0.6–1.3)
CREAT SERPL-MCNC: 1.17 MG/DL (ref 0.6–1.3)
E WAVE DECELERATION TIME: 352 MS
EOSINOPHIL # BLD AUTO: 0.13 THOUSAND/ΜL (ref 0–0.61)
EOSINOPHIL # BLD MANUAL: 0.23 THOUSAND/UL (ref 0–0.4)
EOSINOPHIL NFR BLD AUTO: 1 % (ref 0–6)
EOSINOPHIL NFR BLD MANUAL: 2 % (ref 0–6)
ERYTHROCYTE [DISTWIDTH] IN BLOOD BY AUTOMATED COUNT: 13 % (ref 11.6–15.1)
ERYTHROCYTE [DISTWIDTH] IN BLOOD BY AUTOMATED COUNT: 13 % (ref 11.6–15.1)
EST. AVERAGE GLUCOSE BLD GHB EST-MCNC: 169 MG/DL
FRACTIONAL SHORTENING: 41 % (ref 28–44)
GFR SERPL CREATININE-BSD FRML MDRD: 69 ML/MIN/1.73SQ M
GFR SERPL CREATININE-BSD FRML MDRD: 73 ML/MIN/1.73SQ M
GLUCOSE SERPL-MCNC: 143 MG/DL (ref 65–140)
GLUCOSE SERPL-MCNC: 179 MG/DL (ref 65–140)
GLUCOSE SERPL-MCNC: 191 MG/DL (ref 65–140)
GLUCOSE SERPL-MCNC: 212 MG/DL (ref 65–140)
HBA1C MFR BLD: 7.5 %
HCT VFR BLD AUTO: 41.1 % (ref 36.5–49.3)
HCT VFR BLD AUTO: 42.1 % (ref 36.5–49.3)
HDLC SERPL-MCNC: 36 MG/DL
HGB BLD-MCNC: 14.3 G/DL (ref 12–17)
HGB BLD-MCNC: 14.7 G/DL (ref 12–17)
IMM GRANULOCYTES # BLD AUTO: 0.04 THOUSAND/UL (ref 0–0.2)
IMM GRANULOCYTES NFR BLD AUTO: 0 % (ref 0–2)
INR PPP: 1.13 (ref 0.84–1.19)
INTERVENTRICULAR SEPTUM IN DIASTOLE (PARASTERNAL SHORT AXIS VIEW): 1.4 CM
INTERVENTRICULAR SEPTUM: 1.4 CM (ref 0.6–1.13)
LAAS-AP2: 28.6 CM2
LAAS-AP4: 30 CM2
LDLC SERPL CALC-MCNC: 100 MG/DL (ref 0–100)
LEFT ATRIUM SIZE: 4.5 CM
LEFT INTERNAL DIMENSION IN SYSTOLE: 3.2 CM (ref 14.33–21.75)
LEFT VENTRICULAR INTERNAL DIMENSION IN DIASTOLE: 5.4 CM (ref 25.09–37.41)
LEFT VENTRICULAR POSTERIOR WALL IN END DIASTOLE: 1.4 CM (ref 0.59–1.12)
LEFT VENTRICULAR STROKE VOLUME: 101 ML
LVSV (TEICH): 101 ML
LYMPHOCYTES # BLD AUTO: 1.62 THOUSANDS/ΜL (ref 0.6–4.47)
LYMPHOCYTES # BLD AUTO: 1.81 THOUSAND/UL (ref 0.6–4.47)
LYMPHOCYTES # BLD AUTO: 16 % (ref 14–44)
LYMPHOCYTES NFR BLD AUTO: 15 % (ref 14–44)
MAGNESIUM SERPL-MCNC: 1.9 MG/DL (ref 1.6–2.6)
MAGNESIUM SERPL-MCNC: 1.9 MG/DL (ref 1.6–2.6)
MCH RBC QN AUTO: 29.9 PG (ref 26.8–34.3)
MCH RBC QN AUTO: 30.2 PG (ref 26.8–34.3)
MCHC RBC AUTO-ENTMCNC: 34.8 G/DL (ref 31.4–37.4)
MCHC RBC AUTO-ENTMCNC: 34.9 G/DL (ref 31.4–37.4)
MCV RBC AUTO: 86 FL (ref 82–98)
MCV RBC AUTO: 86 FL (ref 82–98)
MONOCYTES # BLD AUTO: 0.23 THOUSAND/UL (ref 0–1.22)
MONOCYTES # BLD AUTO: 0.68 THOUSAND/ΜL (ref 0.17–1.22)
MONOCYTES NFR BLD AUTO: 6 % (ref 4–12)
MONOCYTES NFR BLD: 2 % (ref 4–12)
MV E'TISSUE VEL-SEP: 8 CM/S
MV PEAK A VEL: 1.01 M/S
MV PEAK E VEL: 71 CM/S
MV STENOSIS PRESSURE HALF TIME: 102 MS
MV VALVE AREA P 1/2 METHOD: 2.16 CM2
NEUTROPHILS # BLD AUTO: 8.61 THOUSANDS/ΜL (ref 1.85–7.62)
NEUTROPHILS # BLD MANUAL: 8.82 THOUSAND/UL (ref 1.85–7.62)
NEUTS SEG NFR BLD AUTO: 77 % (ref 43–75)
NEUTS SEG NFR BLD AUTO: 78 % (ref 43–75)
NRBC BLD AUTO-RTO: 0 /100 WBCS
P AXIS: 46 DEGREES
PHOSPHATE SERPL-MCNC: 2.4 MG/DL (ref 2.7–4.5)
PHOSPHATE SERPL-MCNC: 3 MG/DL (ref 2.7–4.5)
PLATELET # BLD AUTO: 196 THOUSANDS/UL (ref 149–390)
PLATELET # BLD AUTO: 199 THOUSANDS/UL (ref 149–390)
PLATELET BLD QL SMEAR: ABNORMAL
PMV BLD AUTO: 11.7 FL (ref 8.9–12.7)
PMV BLD AUTO: 12.1 FL (ref 8.9–12.7)
POTASSIUM SERPL-SCNC: 3.6 MMOL/L (ref 3.5–5.3)
POTASSIUM SERPL-SCNC: 3.7 MMOL/L (ref 3.5–5.3)
PR INTERVAL: 158 MS
PROTHROMBIN TIME: 14 SECONDS (ref 11.6–14.5)
QRS AXIS: -75 DEGREES
QRSD INTERVAL: 83 MS
QT INTERVAL: 421 MS
QTC INTERVAL: 421 MS
RBC # BLD AUTO: 4.78 MILLION/UL (ref 3.88–5.62)
RBC # BLD AUTO: 4.87 MILLION/UL (ref 3.88–5.62)
RBC MORPH BLD: NORMAL
RIGHT ATRIUM AREA SYSTOLE A4C: 21.9 CM2
RIGHT VENTRICLE ID DIMENSION: 4.5 CM
SL CV LEFT ATRIUM LENGTH A2C: 7 CM
SL CV LV EF: 65
SL CV PED ECHO LEFT VENTRICLE DIASTOLIC VOLUME (MOD BIPLANE) 2D: 142 ML
SL CV PED ECHO LEFT VENTRICLE SYSTOLIC VOLUME (MOD BIPLANE) 2D: 41 ML
SODIUM SERPL-SCNC: 137 MMOL/L (ref 136–145)
SODIUM SERPL-SCNC: 138 MMOL/L (ref 136–145)
T WAVE AXIS: 70 DEGREES
TRIGL SERPL-MCNC: 143 MG/DL
VENTRICULAR RATE: 60 BPM
WBC # BLD AUTO: 11.15 THOUSAND/UL (ref 4.31–10.16)
WBC # BLD AUTO: 11.31 THOUSAND/UL (ref 4.31–10.16)
Z-SCORE OF ASCENDING AORTA: 3.79
Z-SCORE OF INTERVENTRICULAR SEPTUM IN END DIASTOLE: 3.93
Z-SCORE OF LEFT VENTRICULAR DIMENSION IN END DIASTOLE: -17.36
Z-SCORE OF LEFT VENTRICULAR DIMENSION IN END SYSTOLE: -13.5
Z-SCORE OF LEFT VENTRICULAR POSTERIOR WALL IN END DIASTOLE: 4.05

## 2022-03-08 PROCEDURE — 97163 PT EVAL HIGH COMPLEX 45 MIN: CPT

## 2022-03-08 PROCEDURE — 84100 ASSAY OF PHOSPHORUS: CPT

## 2022-03-08 PROCEDURE — 85007 BL SMEAR W/DIFF WBC COUNT: CPT

## 2022-03-08 PROCEDURE — 97166 OT EVAL MOD COMPLEX 45 MIN: CPT

## 2022-03-08 PROCEDURE — 92523 SPEECH SOUND LANG COMPREHEN: CPT

## 2022-03-08 PROCEDURE — 85730 THROMBOPLASTIN TIME PARTIAL: CPT

## 2022-03-08 PROCEDURE — 85025 COMPLETE CBC W/AUTO DIFF WBC: CPT

## 2022-03-08 PROCEDURE — 85610 PROTHROMBIN TIME: CPT

## 2022-03-08 PROCEDURE — 99222 1ST HOSP IP/OBS MODERATE 55: CPT | Performed by: PHYSICIAN ASSISTANT

## 2022-03-08 PROCEDURE — 93306 TTE W/DOPPLER COMPLETE: CPT | Performed by: INTERNAL MEDICINE

## 2022-03-08 PROCEDURE — 93005 ELECTROCARDIOGRAM TRACING: CPT

## 2022-03-08 PROCEDURE — 99222 1ST HOSP IP/OBS MODERATE 55: CPT | Performed by: PHYSICAL MEDICINE & REHABILITATION

## 2022-03-08 PROCEDURE — 93306 TTE W/DOPPLER COMPLETE: CPT

## 2022-03-08 PROCEDURE — 99223 1ST HOSP IP/OBS HIGH 75: CPT | Performed by: PSYCHIATRY & NEUROLOGY

## 2022-03-08 PROCEDURE — 94760 N-INVAS EAR/PLS OXIMETRY 1: CPT

## 2022-03-08 PROCEDURE — 93010 ELECTROCARDIOGRAM REPORT: CPT | Performed by: INTERNAL MEDICINE

## 2022-03-08 PROCEDURE — G1004 CDSM NDSC: HCPCS

## 2022-03-08 PROCEDURE — 82948 REAGENT STRIP/BLOOD GLUCOSE: CPT

## 2022-03-08 PROCEDURE — 80048 BASIC METABOLIC PNL TOTAL CA: CPT

## 2022-03-08 PROCEDURE — 70551 MRI BRAIN STEM W/O DYE: CPT

## 2022-03-08 PROCEDURE — 99291 CRITICAL CARE FIRST HOUR: CPT | Performed by: PHYSICIAN ASSISTANT

## 2022-03-08 PROCEDURE — 99292 CRITICAL CARE ADDL 30 MIN: CPT | Performed by: EMERGENCY MEDICINE

## 2022-03-08 PROCEDURE — 92610 EVALUATE SWALLOWING FUNCTION: CPT

## 2022-03-08 PROCEDURE — 82330 ASSAY OF CALCIUM: CPT

## 2022-03-08 PROCEDURE — 80061 LIPID PANEL: CPT

## 2022-03-08 PROCEDURE — NC001 PR NO CHARGE: Performed by: ANESTHESIOLOGY

## 2022-03-08 PROCEDURE — 83036 HEMOGLOBIN GLYCOSYLATED A1C: CPT

## 2022-03-08 PROCEDURE — 83735 ASSAY OF MAGNESIUM: CPT

## 2022-03-08 PROCEDURE — 3051F HG A1C>EQUAL 7.0%<8.0%: CPT | Performed by: INTERNAL MEDICINE

## 2022-03-08 PROCEDURE — 85027 COMPLETE CBC AUTOMATED: CPT

## 2022-03-08 PROCEDURE — 70450 CT HEAD/BRAIN W/O DYE: CPT

## 2022-03-08 RX ORDER — HYDRALAZINE HYDROCHLORIDE 10 MG/1
10 TABLET, FILM COATED ORAL EVERY 6 HOURS PRN
Status: DISCONTINUED | OUTPATIENT
Start: 2022-03-08 | End: 2022-03-09

## 2022-03-08 RX ORDER — ALBUTEROL SULFATE 90 UG/1
2 AEROSOL, METERED RESPIRATORY (INHALATION) EVERY 4 HOURS PRN
Status: DISCONTINUED | OUTPATIENT
Start: 2022-03-08 | End: 2022-03-11 | Stop reason: HOSPADM

## 2022-03-08 RX ORDER — ONDANSETRON 2 MG/ML
4 INJECTION INTRAMUSCULAR; INTRAVENOUS EVERY 6 HOURS PRN
Status: DISCONTINUED | OUTPATIENT
Start: 2022-03-08 | End: 2022-03-11 | Stop reason: HOSPADM

## 2022-03-08 RX ORDER — ACETAMINOPHEN 325 MG/1
650 TABLET ORAL EVERY 4 HOURS PRN
Status: DISCONTINUED | OUTPATIENT
Start: 2022-03-08 | End: 2022-03-11 | Stop reason: HOSPADM

## 2022-03-08 RX ORDER — DOCUSATE SODIUM 100 MG/1
100 CAPSULE, LIQUID FILLED ORAL 2 TIMES DAILY
Status: DISCONTINUED | OUTPATIENT
Start: 2022-03-08 | End: 2022-03-11 | Stop reason: HOSPADM

## 2022-03-08 RX ORDER — LORAZEPAM 0.5 MG/1
0.5 TABLET ORAL ONCE AS NEEDED
Status: COMPLETED | OUTPATIENT
Start: 2022-03-08 | End: 2022-03-09

## 2022-03-08 RX ORDER — POTASSIUM CHLORIDE 20 MEQ/1
40 TABLET, EXTENDED RELEASE ORAL ONCE
Status: COMPLETED | OUTPATIENT
Start: 2022-03-08 | End: 2022-03-08

## 2022-03-08 RX ORDER — MAGNESIUM SULFATE HEPTAHYDRATE 40 MG/ML
2 INJECTION, SOLUTION INTRAVENOUS ONCE
Status: COMPLETED | OUTPATIENT
Start: 2022-03-08 | End: 2022-03-08

## 2022-03-08 RX ORDER — DOFETILIDE 0.5 MG/1
500 CAPSULE ORAL EVERY 12 HOURS SCHEDULED
Status: DISCONTINUED | OUTPATIENT
Start: 2022-03-08 | End: 2022-03-11 | Stop reason: HOSPADM

## 2022-03-08 RX ORDER — LORAZEPAM 0.5 MG/1
0.5 TABLET ORAL ONCE
Status: DISCONTINUED | OUTPATIENT
Start: 2022-03-08 | End: 2022-03-08

## 2022-03-08 RX ORDER — HYDRALAZINE HYDROCHLORIDE 20 MG/ML
10 INJECTION INTRAMUSCULAR; INTRAVENOUS EVERY 6 HOURS PRN
Status: DISCONTINUED | OUTPATIENT
Start: 2022-03-08 | End: 2022-03-11 | Stop reason: HOSPADM

## 2022-03-08 RX ORDER — NEBIVOLOL 2.5 MG/1
2.5 TABLET ORAL DAILY
Status: DISCONTINUED | OUTPATIENT
Start: 2022-03-08 | End: 2022-03-11 | Stop reason: HOSPADM

## 2022-03-08 RX ORDER — LABETALOL 20 MG/4 ML (5 MG/ML) INTRAVENOUS SYRINGE
10 EVERY 6 HOURS PRN
Status: DISCONTINUED | OUTPATIENT
Start: 2022-03-08 | End: 2022-03-11 | Stop reason: HOSPADM

## 2022-03-08 RX ORDER — AMLODIPINE BESYLATE 10 MG/1
10 TABLET ORAL DAILY
Status: DISCONTINUED | OUTPATIENT
Start: 2022-03-08 | End: 2022-03-11

## 2022-03-08 RX ORDER — LOSARTAN POTASSIUM 50 MG/1
100 TABLET ORAL DAILY
Status: DISCONTINUED | OUTPATIENT
Start: 2022-03-09 | End: 2022-03-11 | Stop reason: HOSPADM

## 2022-03-08 RX ADMIN — DOFETILIDE 500 MCG: 500 CAPSULE ORAL at 20:14

## 2022-03-08 RX ADMIN — NICARDIPINE HYDROCHLORIDE 5 MG/HR: 2.5 INJECTION, SOLUTION INTRAVENOUS at 01:37

## 2022-03-08 RX ADMIN — NICARDIPINE HYDROCHLORIDE 6 MG/HR: 2.5 INJECTION, SOLUTION INTRAVENOUS at 20:43

## 2022-03-08 RX ADMIN — MAGNESIUM SULFATE HEPTAHYDRATE 2 G: 2 INJECTION, SOLUTION INTRAVENOUS at 11:10

## 2022-03-08 RX ADMIN — NEBIVOLOL HYDROCHLORIDE 2.5 MG: 2.5 TABLET ORAL at 10:00

## 2022-03-08 RX ADMIN — NICARDIPINE HYDROCHLORIDE 6 MG/HR: 2.5 INJECTION, SOLUTION INTRAVENOUS at 16:10

## 2022-03-08 RX ADMIN — HYDRALAZINE HYDROCHLORIDE 10 MG: 20 INJECTION, SOLUTION INTRAMUSCULAR; INTRAVENOUS at 23:07

## 2022-03-08 RX ADMIN — POTASSIUM CHLORIDE 40 MEQ: 1500 TABLET, EXTENDED RELEASE ORAL at 05:45

## 2022-03-08 RX ADMIN — AMLODIPINE BESYLATE 10 MG: 10 TABLET ORAL at 16:37

## 2022-03-08 RX ADMIN — INSULIN LISPRO 2 UNITS: 100 INJECTION, SOLUTION INTRAVENOUS; SUBCUTANEOUS at 12:29

## 2022-03-08 RX ADMIN — INSULIN LISPRO 4 UNITS: 100 INJECTION, SOLUTION INTRAVENOUS; SUBCUTANEOUS at 05:45

## 2022-03-08 RX ADMIN — DOFETILIDE 500 MCG: 500 CAPSULE ORAL at 13:37

## 2022-03-08 NOTE — ED PROVIDER NOTES
History  Chief Complaint   Patient presents with    Numbness      numbness in right arm and right sided facial droop per wife  Wife noticed something was wrong at 5pm   patient states that he continues to keep dropping things and " I dont feel right"     63-year-old male presenting to the ED for evaluation of right-sided numbness, weakness and aphasia  This started 4-1/2 hours ago at 5:00 p m  While he was on a conference call  His wife states that she noticed a right facial droop and he would intermittently state the wrong words such as drift in place of the word right, and the word skid in place of another word  He states he tried to sleep it off but did not get any better  He does have a history of AFib on Xarelto, history of hypertension taking multiple antihypertensives  No history of strokes  History provided by:  Patient   used: No        Prior to Admission Medications   Prescriptions Last Dose Informant Patient Reported? Taking? Xarelto 20 MG tablet  Self No No   Sig: TAKE 1 TABLET BY MOUTH EVERY DAY   amLODIPine (NORVASC) 5 mg tablet  Self No No   Sig: TAKE 1 TABLET BY MOUTH TWICE A DAY   Patient taking differently: 5 mg daily     budesonide (Pulmicort Flexhaler) 180 MCG/ACT inhaler   No No   Sig: Inhale 4 puffs 2 (two) times a day for 14 days Rinse mouth after use  cloNIDine (CATAPRES-TTS-1) 0 1 mg/24 hr   No No   Sig: Place 1 patch (0 1 mg total) on the skin once a week   dofetilide (TIKOSYN) 500 mcg capsule  Self No No   Sig: TAKE 1 CAPSULE BY MOUTH EVERY 12 HOURS   hydrALAZINE (APRESOLINE) 50 mg tablet   No No   Sig: Take 1 tablet (50 mg total) by mouth 2 (two) times a day   nebivolol (Bystolic) 2 5 mg tablet   No No   Sig: Take 1 tablet (2 5 mg total) by mouth daily Hold for heart rate less than 50 beats per minute     olmesartan (BENICAR) 40 mg tablet   No No   Sig: Take 1 tablet (40 mg total) by mouth daily   potassium citrate (UROCIT-K) 10 mEq  Self No No   Sig: TAKE 2 TABLETS (20 MEQ TOTAL) BY MOUTH 2 (TWO) TIMES A DAY      Facility-Administered Medications: None       Past Medical History:   Diagnosis Date    Ankle swelling     Arthritis     Asthma     Atrial fibrillation (HCC)     Atrial fibrillation with RVR ([address] ) 4/4/2017    Chronic kidney disease     kidney stones    Gout     Hypertension     Kidney stone     Night sweats     Seasonal allergies     Sleep apnea        Past Surgical History:   Procedure Laterality Date    APPENDECTOMY      CARDIOVERSION      X4 last was 2018    COLONOSCOPY      CYSTOSCOPY  08/30/2016    w/removal of object, last assessed 8/30/16    FOOT SURGERY Left     HAND SURGERY      KNEE SURGERY Right 2016    NH CYSTO/URETERO W/LITHOTRIPSY &INDWELL STENT INSRT Left 8/9/2016    Procedure: CYSTOSCOPY URETEROSCOPY WITH LITHOTRIPSY HOLMIUM LASER STONE EXTRACTION, RETROGRADE PYELOGRAM AND INSERTION STENT URETERAL;  Surgeon: Rosette Pires MD;  Location:  MAIN OR;  Service: Urology last assessed 8/30/16    NH CYSTO/URETERO W/LITHOTRIPSY &INDWELL STENT INSRT Right 8/24/2020    Procedure: CYSTOSCOPY , cystolitholapaxy of bladder stone WITH HOMIUM LASER RIGHT RETROGRADE AND RIGHT URETERAL STENT, [facility];  Surgeon: Rosette Pires MD;  Location: [address] [place] [address] MAIN OR;  Service: Urology    WRIST SURGERY Left 2014    Fracture surgery       Family History   Problem Relation Age of Onset    Atrial fibrillation Mother     Other Mother         blood dyscrasia    Hypertension Mother     Other Father         hypoglycemia     Atrial fibrillation Father     Diabetes Family     Other Family         Thrombocytosis    No Known Problems Sister     Cancer Neg Hx     Thyroid disease Neg Hx      I have reviewed and agree with the history as documented      E-Cigarette/Vaping    E-Cigarette Use Never User      E-Cigarette/Vaping Substances    Nicotine No     THC No     CBD No     Flavoring No     Other No     Unknown No      Social History     Tobacco Use    Smoking status: Former Smoker     Packs/day: 0 50     Years: 10 00     Pack years: 5 00     Types: Cigarettes     Quit date: 1994     Years since quittin 9    Smokeless tobacco: Never Used   Vaping Use    Vaping Use: Never used   Substance Use Topics    Alcohol use: Yes     Comment: occ    Drug use: No       Review of Systems   Neurological: Positive for facial asymmetry, speech difficulty, weakness and numbness  All other systems reviewed and are negative  Physical Exam  Physical Exam  Vitals and nursing note reviewed  Constitutional:       General: He is not in acute distress  Appearance: Normal appearance  He is normal weight  HENT:      Head: Normocephalic and atraumatic  Right Ear: External ear normal       Left Ear: External ear normal       Nose: Nose normal  No congestion or rhinorrhea  Mouth/Throat:      Mouth: Mucous membranes are moist       Pharynx: Oropharynx is clear  Eyes:      General: No scleral icterus  Right eye: No discharge  Left eye: No discharge  Conjunctiva/sclera: Conjunctivae normal    Cardiovascular:      Rate and Rhythm: Normal rate and regular rhythm  Pulses: Normal pulses  Heart sounds: Normal heart sounds  Pulmonary:      Effort: Pulmonary effort is normal  No respiratory distress  Breath sounds: Normal breath sounds  Abdominal:      General: Abdomen is flat  Palpations: Abdomen is soft  Tenderness: There is no abdominal tenderness  Musculoskeletal:         General: No swelling  Normal range of motion  Cervical back: Normal range of motion and neck supple  Skin:     General: Skin is warm and dry  Capillary Refill: Capillary refill takes less than 2 seconds  Neurological:      General: No focal deficit present  Mental Status: He is alert and oriented to person, place, and time  Mental status is at baseline        Cranial Nerves: Cranial nerve deficit present  Sensory: Sensory deficit present  Motor: Weakness present  Comments: Right facial droop, drift to right arm and right leg  Mild expressive aphasia  No receptive aphasia  Mild right-sided sensory disturbance     Psychiatric:         Mood and Affect: Mood normal          Behavior: Behavior normal          Vital Signs  ED Triage Vitals   Temperature Pulse Respirations Blood Pressure SpO2   03/07/22 2127 03/07/22 2125 03/07/22 2127 03/07/22 2127 03/07/22 2125   98 6 °F (37 °C) 66 18 165/87 96 %      Temp Source Heart Rate Source Patient Position - Orthostatic VS BP Location FiO2 (%)   03/07/22 2127 03/07/22 2127 -- -- --   Oral Monitor         Pain Score       --                  Vitals:    03/07/22 2255 03/07/22 2300 03/07/22 2305 03/07/22 2310   BP:   136/66    Pulse: 60 64 62 62         Visual Acuity  Visual Acuity      Most Recent Value   L Pupil Size (mm) 3   R Pupil Size (mm) 3          ED Medications  Medications   iohexol (OMNIPAQUE) 350 MG/ML injection (SINGLE-DOSE) 85 mL (85 mL Intravenous Given 3/7/22 2143)   Labetalol HCl (NORMODYNE) injection 20 mg (20 mg Intravenous Given 3/7/22 2153)   prothrombin complex conc human (KCENTRA) 5,000 Units (5,000 Units Intravenous Given 3/7/22 2240)       Diagnostic Studies  Results Reviewed     Procedure Component Value Units Date/Time    HS Troponin 0hr (reflex protocol) [[id number]]  (Normal) Collected: 03/07/22 2132    Lab Status: Final result Specimen: Blood from Arm, Left Updated: 03/07/22 2221     hs TnI 0hr 6 ng/L     APTT [[id number]]  (Normal) Collected: 03/07/22 2132    Lab Status: Final result Specimen: Blood from Arm, Left Updated: 03/07/22 2216     PTT 34 seconds     Protime-INR [[id number]]  (Abnormal) Collected: 03/07/22 2132    Lab Status: Final result Specimen: Blood from Arm, Left Updated: 03/07/22 2216     Protime 15 8 seconds      INR 4 98    Basic metabolic panel [[id number]]  (Abnormal) Collected: 03/07/22 2132    Lab Status: Final result Specimen: Blood from Arm, Left Updated: 03/07/22 2210     Sodium 140 mmol/L      Potassium 3 7 mmol/L      Chloride 103 mmol/L      CO2 28 mmol/L      ANION GAP 9 mmol/L      BUN 15 mg/dL      Creatinine 1 15 mg/dL      Glucose 186 mg/dL      Calcium 8 8 mg/dL      eGFR 70 ml/min/1 73sq m     Narrative:      [place] guidelines for Chronic Kidney Disease (CKD):     Stage 1 with normal or high GFR (GFR > 90 mL/min/1 73 square meters)    Stage 2 Mild CKD (GFR = 60-89 mL/min/1 73 square meters)    Stage 3A Moderate CKD (GFR = 45-59 mL/min/1 73 square meters)    Stage 3B Moderate CKD (GFR = 30-44 mL/min/1 73 square meters)    Stage 4 Severe CKD (GFR = 15-29 mL/min/1 73 square meters)    Stage 5 End Stage CKD (GFR <15 mL/min/1 73 square meters)  Note: GFR calculation is accurate only with a steady state creatinine    CBC and Platelet [[id number]]  (Abnormal) Collected: 03/07/22 2132    Lab Status: Final result Specimen: Blood from Arm, Left Updated: 03/07/22 2153     WBC 12 44 Thousand/uL      RBC 5 12 Million/uL      Hemoglobin 15 5 g/dL      Hematocrit 44 3 %      MCV 87 fL      MCH 30 3 pg      MCHC 35 0 g/dL      RDW 13 0 %      Platelets 007 Thousands/uL      MPV 12 3 fL     Fingerstick Glucose (POCT) [[id number]]  (Abnormal) Collected: 03/07/22 2131    Lab Status: Final result Updated: 03/07/22 2131     POC Glucose 189 mg/dl                  X-ray chest 1 view portable   Final Result by Hubert Sprague MD (03/08 1318)      Clear lungs  Workstation performed: [id number]         CTA stroke alert (head/neck)   Final Result by Brittany Linares MD (03/07 2311)         1  No evidence of active bleeding within the left lentiform nuclei hemorrhage  No findings to indicate intracranial aneurysm or vascular malformation     2   No stenosis, dissection or occlusion of the carotid or vertebral arteries or major vessels of the             Findings were directly discussed with Yamil Guerra at 10:14 PM                         Workstation performed: [id number]         CT stroke alert brain   Final Result by Day Cartagena MD (03/07 2311)      Acute hemorrhage in the left lentiform nuclei measuring 3 7 x 2 1 x 2 3 cm (9 cc)  Minimal surrounding vasogenic edema  No midline shift        Findings were directly discussed with Yamil Guerra at 10:14 PM       Workstation performed: [id number]                    Procedures  ECG 12 Lead Documentation Only    Date/Time: 3/7/2022 10:31 PM  Performed by: Ciara Ruiz DO  Authorized by: Ciara Ruiz DO     Indications / Diagnosis:  Stroke  ECG reviewed by me, the ED Provider: yes    Patient location:  ED  Previous ECG:     Previous ECG:  Compared to current    Similarity:  No change    Comparison to cardiac monitor: Yes    Interpretation:     Interpretation: normal    Rate:     ECG rate:  63    ECG rate assessment: normal    Rhythm:     Rhythm: sinus rhythm    Ectopy:     Ectopy: none    QRS:     QRS axis:  Normal    QRS intervals:  Normal  Conduction:     Conduction: normal    ST segments:     ST segments:  Normal  T waves:     T waves: normal    CriticalCare Time  Performed by: Ciara Ruiz DO  Authorized by: Ciara Ruiz DO     Critical care provider statement:     Critical care time (minutes):  75    Critical care time was exclusive of:  Separately billable procedures and treating other patients and teaching time    Critical care was necessary to treat or prevent imminent or life-threatening deterioration of the following conditions:  CNS failure or compromise    Critical care was time spent personally by me on the following activities:  Obtaining history from patient or surrogate, development of treatment plan with patient or surrogate, discussions with consultants, discussions with primary provider, examination of patient, evaluation of patient's response to treatment, ordering and performing treatments and interventions, ordering and review of laboratory studies, ordering and review of radiographic studies, re-evaluation of patient's condition and review of old charts    I assumed direction of critical care for this patient from another provider in my specialty: no    Comments:      Acute hemorrhagic stroke, hypertensive emergency, on cardene drip, reversed xarelto w/ Jenita Flow, transfer to neuro ICU  ED Course  ED Course as of 03/08/22 2228   Mon Mar 07, 2022   2139 D/w Neurologist, Dr Beverly Araujo re: stroke alert  Taking xarelto, contraindication for tPA, also symptoms started at 5pm, 4 5 hours ago  CT/CTA ordered, will follow results to see if he has a large vascular occlusion amenable to thrombectomy  200 D/w Dr Honey Gilman, accepts to service, Neurocritical care at [facility]                    Stroke Assessment     Row Name 03/07/22 2132             NIH Stroke Scale    Interval Baseline      Level of Consciousness (1a ) 0      LOC Questions (1b ) 0      LOC Commands (1c ) 0      Best Gaze (2 ) 0      Visual (3 ) 0      Facial Palsy (4 ) 1      Motor Arm, Left (5a ) 1      Motor Arm, Right (5b ) 0      Motor Leg, Left (6a ) 1      Motor Leg, Right (6b ) 0      Limb Ataxia (7 ) 0      Sensory (8 ) 0      Best Language (9 ) 1      Dysarthria (10 ) 0      Extinction and Inattention (11 ) (Formerly Neglect) 0      Total 4                                          MDM  Number of Diagnoses or Management Options  Acute right-sided weakness: new and requires workup  Hemorrhagic stroke [facility]): new and requires workup     Amount and/or Complexity of Data Reviewed  Clinical lab tests: ordered and reviewed  Tests in the radiology section of CPT®: ordered and reviewed    Risk of Complications, Morbidity, and/or Mortality  Presenting problems: high  Diagnostic procedures: high  Management options: high    Patient Progress  Patient progress: (guarded)      Disposition  Final diagnoses:   Acute right-sided weakness Hemorrhagic stroke [facility])     Time reflects when diagnosis was documented in both MDM as applicable and the Disposition within this note     Time User Action Codes Description Comment    3/7/2022  9:31 PM Unk Patience [R53 1] Acute right-sided weakness     3/7/2022 10:10 PM Chelo Herrera Refugiojordi Add [I61 9] Hemorrhagic stroke ([facility] [address] )     3/7/2022 10:10 PM Donavon Mooree [R53 1] Acute right-sided weakness     3/7/2022 10:10 PM Mariah Pazu Modify [I61 9] Hemorrhagic stroke [facility])       ED Disposition     ED Disposition Condition Date/Time Comment    Transfer to Another Facility-In Network  Mon Mar 7, 2022 10:10 PM Anand Murphy should be transferred out to [address]          MD Documentation      Most Recent Value   Patient Condition The patient has been stabilized such that within reasonable medical probability, no material deterioration of the patient condition or the condition of the unborn child(zeyad) is likely to result from the transfer   Reason for Transfer Level of Care needed not available at this facility   Benefits of Transfer Specialized equipment and/or services available at the receiving facility (Include comment)________________________   Risks of Transfer Potential for delay in receiving treatment, Potential deterioration of medical condition, Loss of IV, Increased discomfort during transfer, Possible worsening of condition or death during transfer   Accepting Physician Dr Ray Zarate Name, Desiree Winkler [place]   Felipe Guerra   Provider Certification General risk, such as traffic hazards, adverse weather conditions, rough terrain or turbulence, possible failure of equipment (including vehicle or aircraft), or consequences of actions of persons outside the control of the transport personnel, Unanticipated needs of medical equipment and personnel during transport, Risk of worsening condition, The possibility of a transport vehicle being unavailable      RN Documentation      Most Recent Value   Accepting Facility Name, [address], [facility]   Level of Care Advanced life support      Follow-up Information    None         Discharge Medication List as of 3/7/2022 11:24 PM      CONTINUE these medications which have NOT CHANGED    Details   amLODIPine (NORVASC) 5 mg tablet TAKE 1 TABLET BY MOUTH TWICE A DAY, Normal      budesonide (Pulmicort Flexhaler) 180 MCG/ACT inhaler Inhale 4 puffs 2 (two) times a day for 14 days Rinse mouth after use , Starting Mon 12/27/2021, Until Mon 1/10/2022, Normal      cloNIDine (CATAPRES-TTS-1) 0 1 mg/24 hr Place 1 patch (0 1 mg total) on the skin once a week, Starting Thu 2/10/2022, Normal      dofetilide (TIKOSYN) 500 mcg capsule TAKE 1 CAPSULE BY MOUTH EVERY 12 HOURS, Normal      hydrALAZINE (APRESOLINE) 50 mg tablet Take 1 tablet (50 mg total) by mouth 2 (two) times a day, Starting Thu 2/10/2022, Normal      nebivolol (Bystolic) 2 5 mg tablet Take 1 tablet (2 5 mg total) by mouth daily Hold for heart rate less than 50 beats per minute , Starting Thu 2/10/2022, Normal      olmesartan (BENICAR) 40 mg tablet Take 1 tablet (40 mg total) by mouth daily, Starting Thu 2/10/2022, Normal      potassium citrate (UROCIT-K) 10 mEq TAKE 2 TABLETS (20 MEQ TOTAL) BY MOUTH 2 (TWO) TIMES A DAY, Starting Mon 1/24/2022, Normal      Xarelto 20 MG tablet TAKE 1 TABLET BY MOUTH EVERY DAY, Normal             No discharge procedures on file  PDMP Review       Value Time User    PDMP Reviewed  Yes 8/5/2020  9:41 AM Citlaly Jackman MD          ED Provider  Electronically Signed by           Erendira Cuevas DO  03/08/22 3543

## 2022-03-08 NOTE — QUICK NOTE
Stroke Alert Note   Rae Hairston 64 y o  male  MRN: [id number]   Unit/Bed#: ED 6 Encounter: [id number]     Late entry due to author's power loss overnight  Stroke alert text received at: 9:26pm  Neurology response by phone was immediate    56M with HTN, HLD, CKD, obesity, afib on xarelto presented after developing right sided face, arm, and leg weakness and difficulty speaking  He was on a conference call at [address] [place] [address],[address] when he suddenly noticed these symptoms  He tried to sleep off the symptoms, but they persisted so he presented to the ED  Confirmed by the ED that he had taken his xarelto today, and that he does not take any antiplatelet therapy  /87 on arrival     NIHSSS 4 (1 point each for mild facial weakness, right arm drift, right leg drift, and mild aphasia)    CT head wo: acute left basal ganglia hemorrhage  CTA head/neck: unremarkable per radiology  Appeared to be somewhat less vessel opacification in the anterior left MCA territory, but no vessel occlusion seen    IV tPA was not given due to acute basal ganglia hemorrhage (ICH score 0), on AC, and outside window  - Reverse xarelto with kcentra  - SBP <140  - Discuss patient with neurocritical care at [facility] for likely transfer  - stroke pathway  - frequent neurochecks, with STAT CT head for change in neuro exam  - Plan for repeat CT head in AM  - normothermia, euglycemia  - avoid hypotonic fluids        Renata Corral MD

## 2022-03-08 NOTE — RESPIRATORY THERAPY NOTE
RT Protocol Note  Muriel Vieyra 64 y o  male MRN: [id number]  Unit/Bed#: ICU 08 Encounter: [id number]    Assessment    Principal Problem:    ICH (intracerebral hemorrhage) ([place] [address] )  Active Problems:    Asthma    Benign essential hypertension    Morbid obesity with BMI of 45 0-49 9, adult (HCC)    Chronic atrial fibrillation (HCC)      Home Pulmonary Medications:  na  Home Devices/Therapy: BiPAP/CPAP    Past Medical History:   Diagnosis Date    Ankle swelling     Arthritis     Asthma     Atrial fibrillation (HCC)     Atrial fibrillation with RVR ([place] [address] ) 2017    Chronic kidney disease     kidney stones    Gout     Hypertension     Kidney stone     Night sweats     Seasonal allergies     Sleep apnea      Social History     Socioeconomic History    Marital status: /Civil Union     Spouse name: Not on file    Number of children: 4    Years of education: 12th grade     Highest education level: Not on file   Occupational History    Not on file   Tobacco Use    Smoking status: Former Smoker     Packs/day: 0 50     Years: 10 00     Pack years: 5 00     Types: Cigarettes     Quit date: 1994     Years since quittin 9    Smokeless tobacco: Never Used   Vaping Use    Vaping Use: Never used   Substance and Sexual Activity    Alcohol use: Yes     Comment: occ    Drug use: No    Sexual activity: Yes     Partners: Female     Birth control/protection: None   Other Topics Concern    Not on file   Social History Narrative    No caffeine use      Social Determinants of Health     Financial Resource Strain: Not on file   Food Insecurity: Not on file   Transportation Needs: Not on file   Physical Activity: Not on file   Stress: Not on file   Social Connections: Not on file   Intimate Partner Violence: Not on file   Housing Stability: Not on file       Subjective         Objective    Physical Exam:   Assessment Type: Assess only  General Appearance: Sleeping  Respiratory Pattern: Normal  Chest Assessment: Chest expansion symmetrical  Bilateral Breath Sounds: Clear,Diminished  Cough: None  O2 Device: RA    Vitals:  Blood pressure 156/74, pulse 66, weight (!) 145 kg (319 lb 10 7 oz), SpO2 95 %  Imaging and other studies: I have personally reviewed pertinent reports  and I have personally reviewed pertinent films in PACS    O2 Device: RA     Plan    Respiratory Plan: (P) Mild Distress pathway        Resp Comments: t  evaluated at bedside per Respiratory protocol  Pt  admitted with [address] at this time  Pt has Hx  of asthma but denies taking any Pulmonary medications at home  Pt  also has Hx  of HARPER and wears Bipap @ HS at home  Pt  declined offer for Bipap this evening  Pt's breath sounds are diminished but clear Bilaterally at this time  Will order Albuterol MDI prn for SOB/Wheezing due to asthma Hx  and Respiratory protocol

## 2022-03-08 NOTE — NURSING NOTE
Dr Lucio Porras, [facility] Attending at bedside to evaluate patient at bedside   Patient arrived in ICU @ 23:50

## 2022-03-08 NOTE — ASSESSMENT & PLAN NOTE
- BP on presentation 165/87  - Weaned off nicardipine gtt 3/9  - Discussed with patient and wife regarding regular BP checks at home with BP diary  - Medical management per critical care team

## 2022-03-08 NOTE — PROGRESS NOTES
24 hour events: 65 yo M admitted to ICU with L basal ganglia ICH score 0  Of note he had HTN had 40 lb weight gain and 3 weeks ago had blood pressure medication regimen changed  At home felt something was not right noticed word finding difficulty, right hand clumsiness and gait disturbance numbness in R foot  Is on eliquis at baseline for afib and was given [place]  He was placed on Cardene overnight for -150 and remains on low dose 1-2 mg  Repeat HCT completed appears stable pending official read  A1c and lipid panel complete  PE: strength 5/5 throughout  Decreased sensation in RLE compared to LLE also states he feels numb  Dysmetria of RUE  Word finding difficulties  NSR  Good respiratory effort  Ab soft/NT/ND  Extremities warm and well perfused     Impression:  Left basal ganglia ICH, ICH score 0  HTN emergency  Afib on xarlleto reversed with Kcentra  Asthma  HARPER - home CPAP  Obese class III  CKD III    Plan:  Frequent neuro checks  Neurosurgery and Neurology consult  PT/OT consult  Repeat imaging for new focal deficit or GCS change >2  -150, cardene infusion  Has clonidine patch on placed new one on Monday typically wears for one week  Restart bystoloic, if well tolerated from blood pressure standpoint will add on amlodipine this afternoon  Hold PO hydralzine and olmesartan   Restart Tikosyn, will check baseline ECG for QT interval   MRI ordered, if close to 24 hour jonathan from initial HCT (3/7/2022 about 11 pm) can avoid another HCT  TTE f/u  Hold all AP/AC a  Wife will bring in home CPAP  Passed Speech and swallow cleared for regular diet   Add SSI    If progressing well can consider sending to SD-1 this evening or overnight     Afternoon update:  Family updated bedside  Cardene was up to 5 this pm  Added back on home amlodipine and ARB  Added PRN medication   Wean off Cardene if possible  Can be sent to SD-1 overnight  Word finding difficulties improving and decreased dysmetria in R hand  MRI tonight  TTE completed

## 2022-03-08 NOTE — PHYSICAL THERAPY NOTE
PHYSICAL THERAPY EVALUATION     03/08/22 0916   Note Type   Note type Evaluation   Pain Assessment   Pain Assessment Tool 0-10   Pain Score No Pain   Restrictions/Precautions   Weight Bearing Precautions Per Order No   Other Precautions Chair Alarm; Bed Alarm;Multiple lines;Telemetry; Fall Risk;Impulsive   Home Living   Type of [address] Multi-level;Bed/bath upstairs  (3 story)   Bathroom Shower/Tub Walk-in shower   Bathroom Toilet Standard   Bathroom Equipment   (Pt denies any DME)   Home Equipment   (Pt denies any DME)   Prior Function   Level of [place] Independent with ADLs and functional mobility   Lives With Michel Help From Family  (Has 4 older children in college or working)   ADL Assistance Independent   IADLs Independent   Falls in the last 6 months 0   Vocational Full time employment   Cognition   Overall Cognitive Status Impaired   Attention Attends with cues to redirect   Orientation Level Oriented to person;Oriented to time;Oriented to situation   Memory Decreased recall of precautions   Following Commands Follows one step commands with increased time or repetition   Comments With orientation to place, pt stating [place]  Upon correction providing [place] location, pt replying "what did I say" possibly implying he thought he had stated that and could relate to word finding difficulties noted in previous notes  Subjective   Subjective Pt willing to participate in therapy but stating "I'm fine I won't need help" throughout session    RLE Assessment   RLE Assessment WNL   LLE Assessment   LLE Assessment WNL   Coordination   Movements are Fluid and Coordinated 0   Coordination and Movement Description Pt bumping into objects with ambulation    Bed Mobility   Supine to Sit 5  Supervision   Additional items Increased time required; Bedrails   Additional Comments Pt sat EOB x 5 minutes   Transfers   Sit to Stand 5  Supervision   Additional items Increased time required;Verbal cues  (VC for safety)   Stand to Sit 5  Supervision   Additional items Increased time required;Verbal cues  (VC for safety)   Ambulation/Elevation   Gait pattern Decreased foot clearance; Short stride  (Pt vearing R and bumping in objects/walls)   Gait Assistance 5  Supervision   Additional items Verbal cues  (VC for pathway negotiation)   Assistive Device None   Distance 100'x2   Balance   Static Sitting Fair   Dynamic Sitting Fair   Static Standing Fair   Dynamic Standing Fair -   Ambulatory Fair -   Activity Tolerance   Activity Tolerance Patient tolerated treatment well   Medical Staff Made Aware Yasmeen Mukherjee  OT present due to pt medical complexity and unknown assistance level prior to evaluation  Nurse Made Aware RN cleared pt for therapy  Assessment   Prognosis Excellent   Problem List Decreased strength;Decreased endurance; Impaired balance;Decreased mobility; Decreased coordination; Impaired judgement;Decreased safety awareness   Assessment Pt is a 64y o  year old male admitted to [facility] with ICH (intracerebral hemorrhage) ([facility] [address] ) on 3/7/2022  PT consulted to assess pt's functional mobility and D/C needs  Order placed for PT eval and tx, w/ up w/ A order  Prior to hospitalization, pt was independent in ADLs, iADLs, and ambulation without an AD  He works, drives, and lives in a 3 story home with his wife  Currently pt is limited by weakness, impaired balance, decreased endurance, impaired coordination, gait deviations, decreased activity tolerance, decreased functional mobility tolerance, decreased safety awareness, impaired judgement and fall risk  Upon evaluation, pt demonstrating supervision for bed mobility, transfers, and ambulation without an AD  Numerous times during ambulation, he bumps into objects/walls on the R side which worsens when multi tasking such as talking   Pt seems unconcerned following it being pointed out to him by therapy with him stating "I bump into things all the time" although these balance deficits limit his safety  The following objective measures performed on IE also reveal limitations: The patient's AM-PAC Basic Mobility Inpatient Short Form Raw Score is 24  A standardized score greater than 16 suggests the patient may benefit from discharge to home  Please also refer to the recommendation of the Physical Therapist for safe D/C planning  Pt demonstrating unstable/unpredictable clinical presentation due to medical complexity, balance deficits, decreased safety awareness/judgement, gait deviations, and is an increased risk for falling  Pt benefiting from continued PT services to address current deficits and maximize safety and independence upon DC  From PT/mobility standpoint, recommendation at time of D/C would be home with outpatient rehabilitation pending progress in order to facilitate return to PLOF  Barriers to Discharge None   Goals   Patient Goals To go home   STG Expiration Date 03/20/22   Short Term Goal #1 1  Pt will be able to perform bed mobility [place] demonstrating decreased need for caregiver support  2  Pt will be able to perform surface to surface transfers independent demonstrating increased functional mobility  3  Pt will be able to ambulate 300' independent demonstrating increased home and community ambulation without assistance  4  Pt will ascend/descent 12 + 12 steps Lanette for home set-up  5  Pt will improve balance to fair demonstrating improved righting reactions  Plan   Treatment/Interventions Functional transfer training;LE strengthening/ROM; Therapeutic exercise;Elevations; Endurance training;Patient/family training;Equipment eval/education; Bed mobility;Gait training; Compensatory technique education;Continued evaluation;Spoke to nursing   PT Frequency   (3-6x/week)   Recommendation   PT Discharge Recommendation Home with outpatient rehabilitation   [address] Mobility Inpatient   Turning in Bed Without Bedrails 4 Lying on Back to Sitting on Edge of Flat Bed 4   Moving Bed to Chair 4   Standing Up From Chair 4   Walk in Room 4   Climb 3-5 Stairs 4   Basic Mobility Inpatient Raw Score 24   Basic Mobility Standardized Score 57 68   Highest Level Of Mobility   [facility] Goal 8: Walk 250 feet or more   End of Consult   Patient Position at End of Consult Bedside chair;Bed/Chair alarm activated; All needs within reach  (Recliner)     [employer], SPT

## 2022-03-08 NOTE — PROGRESS NOTES
03/08/22 1000   Clinical Encounter Type   Visited With Patient not available   Routine Visit Introduction     Pt resting comfortably at this time  If spiritual care is desired, please contact [facility]  via Arline Baumgarten  Thank you!

## 2022-03-08 NOTE — PLAN OF CARE
Problem: PAIN - ADULT  Goal: Verbalizes/displays adequate comfort level or baseline comfort level  Description: Interventions:  - Encourage patient to monitor pain and request assistance  - Assess pain using appropriate pain scale  - Administer analgesics based on type and severity of pain and evaluate response  - Implement non-pharmacological measures as appropriate and evaluate response  - Consider cultural and social influences on pain and pain management  - Notify physician/advanced practitioner if interventions unsuccessful or patient reports new pain  Outcome: Progressing     Problem: INFECTION - ADULT  Goal: Absence or prevention of progression during hospitalization  Description: INTERVENTIONS:  - Assess and monitor for signs and symptoms of infection  - Monitor lab/diagnostic results  - Monitor all insertion sites, i e  indwelling lines, tubes, and drains  - Monitor endotracheal if appropriate and nasal secretions for changes in amount and color  - [place] appropriate cooling/warming therapies per order  - Administer medications as ordered  - Instruct and encourage patient and family to use good hand hygiene technique  - Identify and instruct in appropriate isolation precautions for identified infection/condition  Outcome: Progressing     Problem: SAFETY ADULT  Goal: Patient will remain free of falls  Description: INTERVENTIONS:  - Educate patient/family on patient safety including physical limitations  - Instruct patient to call for assistance with activity   - Consult OT/PT to assist with strengthening/mobility   - Keep Call bell within reach  - Keep bed low and locked with side rails adjusted as appropriate  - Keep care items and personal belongings within reach  - Initiate and maintain comfort rounds  - Make Fall Risk Sign visible to staff  - Offer Toileting every 2 Hours, in advance of need  - Initiate/Maintain alarm  - Obtain necessary fall risk management equipment:  - Apply yellow socks and bracelet for high fall risk patients  - Consider moving patient to room near nurses station  Outcome: Progressing  Goal: Maintain or return to baseline ADL function  Description: INTERVENTIONS:  -  Assess patient's ability to carry out ADLs; assess patient's baseline for ADL function and identify physical deficits which impact ability to perform ADLs (bathing, care of mouth/teeth, toileting, grooming, dressing, etc )  - Assess/evaluate cause of self-care deficits   - Assess range of motion  - Assess patient's mobility; develop plan if impaired  - Assess patient's need for assistive devices and provide as appropriate  - Encourage maximum independence but intervene and supervise when necessary  - Involve family in performance of ADLs  - Assess for home care needs following discharge   - Consider OT consult to assist with ADL evaluation and planning for discharge  - Provide patient education as appropriate  Outcome: Progressing  Goal: Maintains/Returns to pre admission functional level  Description: INTERVENTIONS:  - Perform BMAT or MOVE assessment daily    - Set and communicate daily mobility goal to care team and patient/family/caregiver  - Collaborate with rehabilitation services on mobility goals if consulted  - Perform Range of Motion 3 times a day  - Reposition patient every 2 hours    -- Out of bed for toileting  - Record patient progress and toleration of activity level   Outcome: Progressing     Problem: DISCHARGE PLANNING  Goal: Discharge to home or other facility with appropriate resources  Description: INTERVENTIONS:  - Identify barriers to discharge w/patient and caregiver  - Arrange for needed discharge resources and transportation as appropriate  - Identify discharge learning needs (meds, wound care, etc )  - Arrange for interpretive services to assist at discharge as needed  - Refer to Case Management Department for coordinating discharge planning if the patient needs post-hospital services based on physician/advanced practitioner order or complex needs related to functional status, cognitive ability, or social support system  Outcome: Progressing     Problem: Knowledge Deficit  Goal: Patient/family/caregiver demonstrates understanding of disease process, treatment plan, medications, and discharge instructions  Description: Complete learning assessment and assess knowledge base    Interventions:  - Provide teaching at level of understanding  - Provide teaching via preferred learning methods  Outcome: Progressing

## 2022-03-08 NOTE — ASSESSMENT & PLAN NOTE
Pt found to have left basal ganglia IPH  Pt presented with right sided weakness and numbness  Intracerebral Hemorrhage (ICH) Score: 0    [place] Coma Sore 13-15 equals +0   Age greater than or equal to [de-identified] No   ICH Volume greater than or equal to 30 ml No   Intraventricular Hemorrhage No   Infratentorial Origin of Hemorrhage No   Total: 0         · Imaging reviewed personally and by attending  Final results as below  · CT head wo 3/8/22: No significant change in left basal ganglia parenchymal hematoma and minimal surrounding vasogenic edema  No midline shift  Plan  · Continue frequent neurological checks  · STAT CT head with any neurological decline or a decrease in GCS of 2pts within 1 hr   · Recommend SBP <160 mmHg  · Hold/ avoid all antiplatelet and anticoagulation medications  · Pain and headache management per primary team   · Mobilize and eval by PT/OT when able to  · DVT PPX: SCDs, pharm dvt ppx when cleared by neurology  · Ongoing medical management per primary team/trauma  · No neurosurgical intervention is anticipated at this time  Neurology following  Will defer further management and follow up for the [address] to neurology  · Neurosurgery will see pt PRN during the remainder of this visit  Please call with any questions/concerns

## 2022-03-08 NOTE — PROGRESS NOTES
Attending admission note- [address] Kwan 64 y o  male MRN: [id number]  Unit/Bed#: ICU 08 Encounter: [id number]    Patient is a 80-year-old male who noticed that something was not right at home tonight  He stated he was on a virtual call and said he did not feel right in that he was feeling tired  He went to bed  He woke up was not interested in eating dinner and went back to bed  He stated he try to have a conversation with his wife and made a reference that words were not communicating appropriately  He also noted that he was dropping things with his right hand  Patient then asked his wife to take him to the emergency department  Of note patient has a known history of hypertension as well as atrial fibrillation  He is on Xarelto for anticoagulation  Patient also admits to having recent change in his medication regimen for managing his hypertension  Three weeks ago he requested his PCP change his medications secondary to a 40 lb weight gain  Patient admits to being compliant with his medications, he denies checking his blood pressure at home as he states that he never gets a good reading in that is always high  Stroke alert was initiated at [facility]  Patient was noted to have a left basal ganglia ICH  Orval Marines was administered and Cardene initiated  Visit Vitals  /74   Pulse 66   SpO2 97%   Smoking Status Former Smoker     GEN: NAD, awake and alert  HEENT: EOMI, PERRLA, MMM, right facial droop  CV: RRR, no  Murmur  Resp:  CTA, no R/R/W  GI: soft,NT/ND  Neuro:  Strength 5/5 x4 extremities, right upper extremity drift, right hand  not as strong as left, right sensory decreased compared to left the lower extremities bilateral, decrease in sensation of right hand compared to left    Patient has difficulty incorporating the correct words in some of his sentences, no dysarthria, right facial droop  Skin: warm, dry     Left basal ganglia ICH with evidence of brain compression and mass effect  Hypertensive emergency  Coagulopathy secondary to Xarelto  Atrial fibrillation-presently normal sinus rhythm  Asthma  HARPER-  Obesity  CKD stage III baseline creatinine 1-1 3    Continue with neurologic checks Q 1 hour  Repeat imaging at 24 hours status initial imaging  MRI ordered  Statin medication ordered  Continue with stroke protocol with PT/OT and speech evaluation  HbA1C, lipid panel and echo ordered  Neurology consult  Neurosurgery consultation in a shirlene Donato Plan to to reimage if patient has decline in neurologic exam or < GCS by 2 points or more  Goal systolic blood pressure 1   Cardene for blood pressure control along with p r n  Medications including labetalol and hydralazine  If patient remains stable overnight restart Bystolic in a   Mir Donato Continue monitor on telemetry with history of atrial fibrillation  Critical care time 33 minutes, critical care time does not include procedures or family update      Intracerebral Hemorrhage (ICH) Score:    [place] Coma Sore 13-15 equals +0   Age greater than or equal to 80 No   ICH Volume greater than or equal to 30 ml No   Intraventricular Hemorrhage No   Infratentorial Origin of Hemorrhage No   Total: 0

## 2022-03-08 NOTE — ASSESSMENT & PLAN NOTE
66-year-old male with a on Xarelto, hypertension, sleep apnea, arthritis, who initially presented to [facility] on 03/07 for right-sided weakness and numbness  Patient reports he initially word-finding difficulties  He went to sleep and woke up feeling off with right-sided weakness  He presented to the ED for further evaluation  BP on presentation 165/87  Stroke alert was initiated in the ED   NIHSS 4  Imaging demonstrated an acute left IPH with minimal surrounding vasogenic edema  Patient was given Kcentra and transferred to [facility] for neurosurgery evaluation  MRI brain completed and demonstrated acute left IPH as noted on CT but also small acute left frontal corona radiata infarct  Discussed with attending neurologist- suspect most likely hemorrhagic stroke in the setting of uncontrolled HTN and anticoagulation use with subsequent ischemic stroke secondary to compression of vessel in the setting of vasogenic edema  Will recommend repeat CTH in 3 weeks and if stable, then may restart anticoagulation at that time  Plan:  - Stroke pathway   Atorvastatin 40 mg   SBP goal < 160; avoid hypotension   Continue telemetry   PT/OT/ST   Frequent neuro checks  Continue to monitor and notify neurology with any changes  - S/p Kcentra  - Hold all AP/AC at this time  - Per attending neurologist, recommend repeat CT head in 3 weeks and if stable, may restart anticoagulation at this time  - Neurosurgery following; appreciate recommendations  - stat CT head with any acute change in neuro exam  - Medical management and supportive care per Critical Care team  Correction of any metabolic or infectious disturbances  Results:  - CT head:  Acute hemorrhage in the left lentiform nuclei measuring 3 7 X 2 1 X 2 3 cm  Minimal surrounding vasogenic edema  No midline shift   - CTA head and neck:  1  No evidence of active bleeding within the left lentiform nuclei hemorrhage    No findings to indicate intracranial aneurysm or vascular malformation  2  No stenosis, dissection or occlusion of the carotid or vertebral arteries or major vessels  - repeat CT head:  No significant change in left basal ganglia parenchymal hematoma and minimal surrounding vasogenic edema  No midline shift   - MRI brain:  1  Small acute infarct in left frontal corona radiata  2  Unchanged 3 3 cm acute intraparenchymal hematoma centered in left posterior putamen with mild surrounding vasogenic edema and mild mass effect on left lateral ventricle  - Echo: EF 65%  No regional wall motion abnormalities  Left atrium moderately dilated  Right atrium mildly dilated    - lipid panel:  Cholesterol 165, triglycerides 143, HDL 36,   - A1c 7 5

## 2022-03-08 NOTE — EMTALA/ACUTE CARE TRANSFER
[address] [place] [address]  Dept: [phone]      EMTALA TRANSFER CONSENT    NAME Brissa Vora                                         1965                              MRN [id number]    I have been informed of my rights regarding examination, treatment, and transfer   by Dr Cecilia Brewer DO    Benefits: Specialized equipment and/or services available at the receiving facility (Include comment)________________________    Risks: Potential for delay in receiving treatment,Potential deterioration of medical condition,Loss of IV,Increased discomfort during transfer,Possible worsening of condition or death during transfer      Consent for Transfer:  I acknowledge that my medical condition has been evaluated and explained to me by the emergency department physician or other qualified medical person and/or my attending physician, who has recommended that I be transferred to the service of  Accepting Physician: Dr Joseline Begum at [address] [place] [address] Name, [address] : [address] [facility], [address] [place] [address]  The above potential benefits of such transfer, the potential risks associated with such transfer, and the probable risks of not being transferred have been explained to me, and I fully understand them  The doctor has explained that, in my case, the benefits of transfer outweigh the risks  I agree to be transferred  I authorize the performance of emergency medical procedures and treatments upon me in both transit and upon arrival at the receiving facility  Additionally, I authorize the release of any and all medical records to the receiving facility and request they be transported with me, if possible  I understand that the safest mode of transportation during a medical emergency is an ambulance and that the Hospital advocates the use of this mode of transport   Risks of traveling to the receiving facility by car, including absence of medical control, life sustaining equipment, such as oxygen, and medical personnel has been explained to me and I fully understand them  (NORMA CORRECT BOX BELOW)  [  ]  I consent to the stated transfer and to be transported by ambulance/helicopter  [  ]  I consent to the stated transfer, but refuse transportation by ambulance and accept full responsibility for my transportation by car  I understand the risks of non-ambulance transfers and I exonerate the Hospital and its staff from any deterioration in my condition that results from this refusal     X___________________________________________    DATE  22  TIME________  Signature of patient or legally responsible individual signing on patient behalf           RELATIONSHIP TO PATIENT_________________________          Provider Certification    NAME Ramona [address]                                         1965                              MRN [id number]    A medical screening exam was performed on the above named patient  Based on the examination:    Condition Necessitating Transfer The primary encounter diagnosis was Hemorrhagic stroke ([address] )  A diagnosis of Acute right-sided weakness was also pertinent to this visit      Patient Condition: The patient has been stabilized such that within reasonable medical probability, no material deterioration of the patient condition or the condition of the unborn child(zeyad) is likely to result from the transfer    Reason for Transfer: Level of Care needed not available at this facility    Transfer Requirements: [facility], [facility]   · Space available and qualified personnel available for treatment as acknowledged by    · Agreed to accept transfer and to provide appropriate medical treatment as acknowledged by       Dr Vero Hurtado  · Appropriate medical records of the examination and treatment of the patient are provided at the time of transfer   [address], [address] _______  · Transfer will be performed by qualified personnel from    and appropriate transfer equipment as required, including the use of necessary and appropriate life support measures  Provider Certification: I have examined the patient and explained the following risks and benefits of being transferred/refusing transfer to the patient/family:  General risk, such as traffic hazards, adverse weather conditions, rough terrain or turbulence, possible failure of equipment (including vehicle or aircraft), or consequences of actions of persons outside the control of the transport personnel,Unanticipated needs of medical equipment and personnel during transport,Risk of worsening condition,The possibility of a transport vehicle being unavailable      Based on these reasonable risks and benefits to the patient and/or the unborn child(zeyad), and based upon the information available at the time of the patients examination, I certify that the medical benefits reasonably to be expected from the provision of appropriate medical treatments at another medical facility outweigh the increasing risks, if any, to the individuals medical condition, and in the case of labor to the unborn child, from effecting the transfer      X____________________________________________ DATE 03/07/22        TIME_______      ORIGINAL - SEND TO MEDICAL RECORDS   COPY - SEND WITH PATIENT DURING TRANSFER

## 2022-03-08 NOTE — H&P
H&P Exam - 411  Bethany Aniya 64 y o  male MRN: [id number]  Unit/Bed#: ICU 08 Encounter: [id number]      -------------------------------------------------------------------------------------------------------------  Chief Complaint: none offered    History of Present Illness   HX and PE limited by: Mary Alcantara is a 64 y o  male with PMHx of hypertension, AFib on Xarelto, morbid obesity who presented to [address] [place] [address] with right-sided weakness and numbness  Per patient, he was at home on a video conference call when he first noted word finding difficulty  He attempted to go to sleep, but woke up feeling "off" and weak on the right side  He was brought to the ER and stroke alert was activated  CT head revealed acute left intraparenchymal hemorrhage measuring 3 7 x 2 1 x 2 3 with minimal surrounding vasogenic edema  Patient was given [facility] and transferred to [facility] for neurosurgery evaluation  ICH score 0  Patient was started on cardene prior to transfer  History obtained from the patient   -------------------------------------------------------------------------------------------------------------  Assessment and Plan:    Neuro:    Acute L ICH  o Q1 hour neuro checks  Neurosurgery consult, appreciate recommendations  SBP goal 130-150  S/p KCentra for Xarelto reversal, hold all AC/AP  PT/OT, PMR consult  F/u repeat CTH   Daily CAM-ICU, delirium precautions  Regulate sleep/wake cycle      CV:    Hypertensive emergency  o Cardene gtt for goal -150 given ICH  Hold home norvasc, nebivolol, hydralazine, and clonidine   AFib on Xarelto  o Hold home xarelto  On tikosyn at home, consider restarting today  Telemetry monitoring  o F/u echo      Pulm:   No active issues  Encourage IS, pulmonary toilet         GI:    No active issues      :    Trend strict I/Os      F/E/N:    No continuous fluids   Replete electrolytes as needed for goal Mag > 2 0, Phos >3 0, K >4 0   NPO      Heme/Onc:    No chemoprophylaxis in setting of ICH  SCDs only      Endo:    Check hgb A1C and lipid panel  Goal -180   SSI algorithm 4      ID:    Trend fever curve/white count      MSK/Skin:    PT/OT  PMR consult      Disposition: Admit to Critical Care   Code Status: Level 1 - Full Code  --------------------------------------------------------------------------------------------------------------  Review of Systems   Neurological: Positive for speech difficulty, weakness and numbness  All other systems reviewed and are negative  A 12-point, complete review of systems was reviewed and negative except as stated above     Physical Exam  Vitals and nursing note reviewed  Constitutional:       General: He is not in acute distress  Appearance: He is obese  He is not ill-appearing, toxic-appearing or diaphoretic  HENT:      Head: Normocephalic and atraumatic  Eyes:      Pupils: Pupils are equal, round, and reactive to light  Cardiovascular:      Rate and Rhythm: Normal rate and regular rhythm  Pulmonary:      Effort: Pulmonary effort is normal       Breath sounds: Normal breath sounds  No decreased breath sounds, wheezing, rhonchi or rales  Abdominal:      General: There is no distension  Palpations: Abdomen is soft  Tenderness: There is no abdominal tenderness  Skin:     General: Skin is warm and dry  Neurological:      Mental Status: He is alert and oriented to person, place, and time  GCS: GCS eye subscore is 4  GCS verbal subscore is 5  GCS motor subscore is 6  Motor: Pronator drift present  Comments: Slight R facial droop  Decreased sensation of R foot, slight weakness to RLE  + R pronator drift  Some word finding difficulty      Psychiatric:         Behavior: Behavior is cooperative        --------------------------------------------------------------------------------------------------------------  Vitals:   Vitals:    03/08/22 0000 BP: 156/74   Pulse: 66   SpO2: 97%     Temp  Min: 98 6 °F (37 °C)  Max: 98 6 °F (37 °C)        There is no height or weight on file to calculate BMI  N/A    Laboratory and Diagnostics:  Results from last 7 days   Lab Units 03/07/22  2132   WBC Thousand/uL 12 44*   HEMOGLOBIN g/dL 15 5   HEMATOCRIT % 44 3   PLATELETS Thousands/uL 222     Results from last 7 days   Lab Units 03/07/22  2132   SODIUM mmol/L 140   POTASSIUM mmol/L 3 7   CHLORIDE mmol/L 103   CO2 mmol/L 28   ANION GAP mmol/L 9   BUN mg/dL 15   CREATININE mg/dL 1 15   CALCIUM mg/dL 8 8   GLUCOSE RANDOM mg/dL 186*          Results from last 7 days   Lab Units 03/07/22  2132   INR  1 27*   PTT seconds 34        Imaging: 3/7 CT head: Acute hemorrhage in the left lentiform nuclei measuring 3 7 x 2 1 x 2 3 cm (9 cc)  Minimal surrounding vasogenic edema  No midline shift  I have personally reviewed pertinent reports        Historical Information   Past Medical History:   Diagnosis Date    Ankle swelling     Arthritis     Asthma     Atrial fibrillation (HCC)     Atrial fibrillation with RVR (HCC) 4/4/2017    Chronic kidney disease     kidney stones    Gout     Hypertension     Kidney stone     Night sweats     Seasonal allergies     Sleep apnea      Past Surgical History:   Procedure Laterality Date    APPENDECTOMY      CARDIOVERSION      X4 last was 2018    COLONOSCOPY      CYSTOSCOPY  08/30/2016    w/removal of object, last assessed 8/30/16    FOOT SURGERY Left     HAND SURGERY      KNEE SURGERY Right 2016    ND CYSTO/URETERO W/LITHOTRIPSY &INDWELL STENT INSRT Left 8/9/2016    Procedure: CYSTOSCOPY URETEROSCOPY WITH LITHOTRIPSY HOLMIUM LASER STONE EXTRACTION, RETROGRADE PYELOGRAM AND INSERTION STENT URETERAL;  Surgeon: Jhoan Price MD;  Location: [facility];  Service: Urology last assessed 8/30/16    ND CYSTO/URETERO W/LITHOTRIPSY &INDWELL STENT INSRT Right 8/24/2020    Procedure: CYSTOSCOPY , cystolitholapaxy of bladder stone WITH HOMIUM LASER RIGHT RETROGRADE AND RIGHT URETERAL STENT, FULGERATION OF BLADDER;  Surgeon: Merry Mckeon MD;  Location: [address] [place] [address] OR;  Service: Urology    WRIST SURGERY Left 2014    Fracture surgery     Social History   Social History     Substance and Sexual Activity   Alcohol Use Yes    Comment: occ     Social History     Substance and Sexual Activity   Drug Use No     Social History     Tobacco Use   Smoking Status Former Smoker    Packs/day: 0 50    Years: 10 00    Pack years: 5 00    Types: Cigarettes    Quit date: 1994    Years since quittin 9   Smokeless Tobacco Never Used       Family History:   Family History   Problem Relation Age of Onset    Atrial fibrillation Mother     Other Mother         blood dyscrasia    Hypertension Mother     Other Father         hypoglycemia     Atrial fibrillation Father     Diabetes Family     Other Family         Thrombocytosis    No Known Problems Sister     Cancer Neg Hx     Thyroid disease Neg Hx      I have reviewed this patient's family history and commented on sigificant items within the HPI      Medications:  Current Facility-Administered Medications   Medication Dose Route Frequency    acetaminophen (TYLENOL) tablet 650 mg  650 mg Oral Q4H PRN    docusate sodium (COLACE) capsule 100 mg  100 mg Oral BID    insulin lispro (HumaLOG) 100 units/mL subcutaneous injection 2-12 Units  2-12 Units Subcutaneous Q6H [address]    niCARdipine (CARDENE) 25 mg (STANDARD CONCENTRATION) in sodium chloride 0 9% 250 mL  1-15 mg/hr Intravenous Titrated    ondansetron (ZOFRAN) injection 4 mg  4 mg Intravenous Q6H PRN     Home medications:  Prior to Admission Medications   Prescriptions Last Dose Informant Patient Reported? Taking?    Xarelto 20 MG tablet  Self No No   Sig: TAKE 1 TABLET BY MOUTH EVERY DAY   amLODIPine (NORVASC) 5 mg tablet  Self No No   Sig: TAKE 1 TABLET BY MOUTH TWICE A DAY   Patient taking differently: 5 mg daily     budesonide (Pulmicort Flexhaler) 180 MCG/ACT inhaler   No No   Sig: Inhale 4 puffs 2 (two) times a day for 14 days Rinse mouth after use  cloNIDine (CATAPRES-TTS-1) 0 1 mg/24 hr   No No   Sig: Place 1 patch (0 1 mg total) on the skin once a week   dofetilide (TIKOSYN) 500 mcg capsule  Self No No   Sig: TAKE 1 CAPSULE BY MOUTH EVERY 12 HOURS   hydrALAZINE (APRESOLINE) 50 mg tablet   No No   Sig: Take 1 tablet (50 mg total) by mouth 2 (two) times a day   nebivolol (Bystolic) 2 5 mg tablet   No No   Sig: Take 1 tablet (2 5 mg total) by mouth daily Hold for heart rate less than 50 beats per minute  olmesartan (BENICAR) 40 mg tablet   No No   Sig: Take 1 tablet (40 mg total) by mouth daily   potassium citrate (UROCIT-K) 10 mEq  Self No No   Sig: TAKE 2 TABLETS (20 MEQ TOTAL) BY MOUTH 2 (TWO) TIMES A DAY      Facility-Administered Medications: None     Allergies:  No Known Allergies  ------------------------------------------------------------------------------------------------------------  Advance Directive and Living Will:      Power of :    POLST:    ------------------------------------------------------------------------------------------------------------  Anticipated Length of Stay is > 2 midnights    Care Time Delivered:   Upon my evaluation, this patient had a high probability of imminent or life-threatening deterioration due to [address], which required my direct attention, intervention, and personal management  I have personally provided 32 minutes (0001 to 0040) of critical care time, exclusive of procedures, teaching, family meetings, and any prior time recorded by providers other than myself         Brian Castro PA-C

## 2022-03-08 NOTE — CONSULTS
Dominic SANON [address] 1965, 64 y o  male MRN: [id number]  Unit/Bed#: ICU 08 Encounter: [id number]  Primary Care Provider: Gill Plascencia MD   Date and time admitted to hospital: 3/7/2022 11:50 PM    Inpatient consult to Neurosurgery  Consult performed by: Cabrera Peck PA-C  Consult ordered by: Long Torres MD          * [address] (intracerebral hemorrhage) [facility])  Assessment & Plan  Pt found to have left basal ganglia IPH  Pt presented with right sided weakness and numbness  Intracerebral Hemorrhage (ICH) Score: 0    [place] Coma Sore 13-15 equals +0   Age greater than or equal to [de-identified] No   ICH Volume greater than or equal to 30 ml No   Intraventricular Hemorrhage No   Infratentorial Origin of Hemorrhage No   Total: 0         · Imaging reviewed personally and by attending  Final results as below  · CT head wo 3/8/22: No significant change in left basal ganglia parenchymal hematoma and minimal surrounding vasogenic edema  No midline shift  Plan  · Continue frequent neurological checks  · STAT CT head with any neurological decline or a decrease in GCS of 2pts within 1 hr   · Recommend SBP <160 mmHg  · Hold/ avoid all antiplatelet and anticoagulation medications  · Pain and headache management per primary team   · Mobilize and eval by PT/OT when able to  · DVT PPX: SCDs, pharm dvt ppx when cleared by neurology  · Ongoing medical management per primary team/trauma  · No neurosurgical intervention is anticipated at this time  Neurology following  Will defer further management and follow up for the [address] to neurology  · Neurosurgery will see pt PRN during the remainder of this visit  Please call with any questions/concerns  History of Present Illness   History, ROS and PFSH obtained from pt and chart review     HPI: Harsha William is a 64 y o  male with PMH including Afib, HTN, hx of nephrolithiasis who presented to the ED with right sided weakness and numbness  Pt was found to have left basal ganglia ICH for which neurosurgery was consulted  Per report, yesterday pt had a video conference call when he first noted word finding difficulties  He attempted to sleep but woke up feeling off and weak on the right side  Today pt reports that the right sided numbness and weakness has improved  He also reports improvement with his word finding difficulties  Pt denies any headache, dizziness or lightheadedness  He denies any changes in his vision or hearing  He denies nausea or vomiting  He reports that this morning he was able to mobilize independently with PT without an assistive device  Review of Systems   Constitutional: Negative for chills and fever  HENT: Negative for hearing loss  Eyes: Negative for photophobia, pain and visual disturbance  Respiratory: Negative for chest tightness and shortness of breath  Cardiovascular: Negative for chest pain and palpitations  Gastrointestinal: Negative for abdominal pain, nausea and vomiting  Genitourinary: Negative for difficulty urinating and dysuria  Musculoskeletal: Negative for back pain, neck pain and neck stiffness  Skin: Negative for color change, pallor, rash and wound  Neurological: Positive for speech difficulty, weakness and numbness  Negative for dizziness, light-headedness and headaches  Psychiatric/Behavioral: Negative for agitation, behavioral problems, confusion and decreased concentration         Historical Information   Past Medical History:   Diagnosis Date    Ankle swelling     Arthritis     Asthma     Atrial fibrillation (HCC)     Atrial fibrillation with RVR (HCC) 4/4/2017    Chronic kidney disease     kidney stones    Gout     Hypertension     Kidney stone     Night sweats     Seasonal allergies     Sleep apnea      Past Surgical History:   Procedure Laterality Date    APPENDECTOMY      CARDIOVERSION      X4 last was 2018    COLONOSCOPY      CYSTOSCOPY  2016    w/removal of object, last assessed 16    FOOT SURGERY Left     HAND SURGERY      KNEE SURGERY Right 2016    CA CYSTO/URETERO W/LITHOTRIPSY &INDWELL STENT INSRT Left 2016    Procedure: CYSTOSCOPY URETEROSCOPY WITH LITHOTRIPSY HOLMIUM LASER STONE EXTRACTION, RETROGRADE PYELOGRAM AND INSERTION STENT URETERAL;  Surgeon: Elina Dunlap MD;  Location:  MAIN OR;  Service: Urology last assessed 16    CA CYSTO/URETERO W/LITHOTRIPSY &INDWELL STENT INSRT Right 2020    Procedure: CYSTOSCOPY , cystolitholapaxy of bladder stone WITH HOMIUM LASER RIGHT RETROGRADE AND RIGHT URETERAL STENT, [address] [facility] [address] [address];  Surgeon: Elina Dunlap MD;  Location: [address] [place] [address] MAIN OR;  Service: Urology    WRIST SURGERY Left 2014    Fracture surgery     Social History     Substance and Sexual Activity   Alcohol Use Yes    Comment: occ     Social History     Substance and Sexual Activity   Drug Use No     Social History     Tobacco Use   Smoking Status Former Smoker    Packs/day: 0 50    Years: 10 00    Pack years: 5 00    Types: Cigarettes    Quit date: 1994    Years since quittin 9   Smokeless Tobacco Never Used     Family History   Problem Relation Age of Onset    Atrial fibrillation Mother     Other Mother         blood dyscrasia    Hypertension Mother     Other Father         hypoglycemia     Atrial fibrillation Father     Diabetes Family     Other Family         Thrombocytosis    No Known Problems Sister     Cancer Neg Hx     Thyroid disease Neg Hx        Meds/Allergies   all current active meds have been reviewed, current meds:   Current Facility-Administered Medications   Medication Dose Route Frequency    acetaminophen (TYLENOL) tablet 650 mg  650 mg Oral Q4H PRN    albuterol (PROVENTIL HFA,VENTOLIN HFA) inhaler 2 puff  2 puff Inhalation Q4H PRN    docusate sodium (COLACE) capsule 100 mg  100 mg Oral BID    dofetilide (TIKOSYN) capsule 500 mcg  500 mcg Oral Q12H [address]    insulin lispro (HumaLOG) 100 units/mL subcutaneous injection 2-12 Units  2-12 Units Subcutaneous Q6H [address]    LORazepam (ATIVAN) tablet 0 5 mg  0 5 mg Oral Once    nebivolol (BYSTOLIC) tablet 2 5 mg  2 5 mg Oral Daily    niCARdipine (CARDENE) 25 mg (STANDARD CONCENTRATION) in sodium chloride 0 9% 250 mL  1-15 mg/hr Intravenous Titrated    ondansetron (ZOFRAN) injection 4 mg  4 mg Intravenous Q6H PRN    and PTA meds:   Prior to Admission Medications   Prescriptions Last Dose Informant Patient Reported? Taking? Xarelto 20 MG tablet  Self No No   Sig: TAKE 1 TABLET BY MOUTH EVERY DAY   amLODIPine (NORVASC) 5 mg tablet  Self No No   Sig: TAKE 1 TABLET BY MOUTH TWICE A DAY   Patient taking differently: 5 mg daily     budesonide (Pulmicort Flexhaler) 180 MCG/ACT inhaler   No No   Sig: Inhale 4 puffs 2 (two) times a day for 14 days Rinse mouth after use  cloNIDine (CATAPRES-TTS-1) 0 1 mg/24 hr   No No   Sig: Place 1 patch (0 1 mg total) on the skin once a week   dofetilide (TIKOSYN) 500 mcg capsule  Self No No   Sig: TAKE 1 CAPSULE BY MOUTH EVERY 12 HOURS   hydrALAZINE (APRESOLINE) 50 mg tablet   No No   Sig: Take 1 tablet (50 mg total) by mouth 2 (two) times a day   nebivolol (Bystolic) 2 5 mg tablet   No No   Sig: Take 1 tablet (2 5 mg total) by mouth daily Hold for heart rate less than 50 beats per minute     olmesartan (BENICAR) 40 mg tablet   No No   Sig: Take 1 tablet (40 mg total) by mouth daily   potassium citrate (UROCIT-K) 10 mEq  Self No No   Sig: TAKE 2 TABLETS (20 MEQ TOTAL) BY MOUTH 2 (TWO) TIMES A DAY      Facility-Administered Medications: None     No Known Allergies    Objective   I/O       03/06 0701 03/07 0700 03/07 0701 03/08 0700 03/08 0701 03/09 0700    I V  (mL/kg)  114 2 (0 8)     Total Intake(mL/kg)  114 2 (0 8)     Urine (mL/kg/hr)  350 550 (0 9)    Total Output  350 550    Net  -235 8 -550           Unmeasured Urine Occurrence  1 x           Physical Exam  Constitutional: Appearance: He is well-developed  HENT:      Head: Normocephalic and atraumatic  Eyes:      General: No scleral icterus  Conjunctiva/sclera: Conjunctivae normal       Pupils: Pupils are equal, round, and reactive to light  Neck:      Trachea: No tracheal deviation  Cardiovascular:      Rate and Rhythm: Normal rate  Pulmonary:      Effort: Pulmonary effort is normal    Abdominal:      Palpations: Abdomen is soft  Tenderness: There is no abdominal tenderness  There is no guarding  Musculoskeletal:      Cervical back: Neck supple  Skin:     General: Skin is warm and dry  Coloration: Skin is not pale  Findings: No rash  Neurological:      Mental Status: He is alert and oriented to person, place, and time  Comments: GCS 15, Awake, Alert, Oriented x 3  Knew the current president  Speech was clear  Motor: LANDAVERDE, strength 5/5 throughout except RUE 4+-5/5  Sensation:  intact to LT X 4     Reflexes: 2+ and symmetric, no caceres's or clonus     Coordination: Slight drift on the right, no drift on the LUE  Psychiatric:         Behavior: Behavior normal        Neurologic Exam     Mental Status   Oriented to person, place, and time  Cranial Nerves     CN III, IV, VI   Pupils are equal, round, and reactive to light  Vitals:Blood pressure 138/73, pulse 58, temperature 98 2 °F (36 8 °C), temperature source Oral, resp  rate 22, height 5' 10" (1 778 m), weight (!) 145 kg (319 lb), SpO2 97 %  ,Body mass index is 45 77 kg/m²       Lab Results:   Results from last 7 days   Lab Units 03/08/22  0500 03/08/22  0146 03/07/22  2132   WBC Thousand/uL 11 15* 11 31* 12 44*   HEMOGLOBIN g/dL 14 7 14 3 15 5   HEMATOCRIT % 42 1 41 1 44 3   PLATELETS Thousands/uL 199 196 222   NEUTROS PCT % 77*  --   --    MONOS PCT % 6  --   --    MONO PCT %  --  2*  --      Results from last 7 days   Lab Units 03/08/22  0500 03/08/22  0146 03/07/22  2132   POTASSIUM mmol/L 3 7 3 6 3 7   CHLORIDE mmol/L 105 107 103   CO2 mmol/L 27 28 28   BUN mg/dL 12 13 15   CREATININE mg/dL 1 17 1 11 1 15   CALCIUM mg/dL 8 5 9 2 8 8     Results from last 7 days   Lab Units 03/08/22  0500 03/08/22  0146   MAGNESIUM mg/dL 1 9 1 9     Results from last 7 days   Lab Units 03/08/22  0500 03/08/22  0146   PHOSPHORUS mg/dL 2 4* 3 0     Results from last 7 days   Lab Units 03/08/22  0146 03/07/22  2132   INR  1 13 1 27*   PTT seconds 34 34       Imaging Studies: I have personally reviewed pertinent reports  and I have personally reviewed pertinent films in PACS    EKG, Pathology, and Other Studies: I have personally reviewed pertinent reports  VTE Prophylaxis: Sequential compression device Pamela Ng)     Code Status: Level 1 - Full Code  Advance Directive and Living Will:      Power of :    POLST:      Counseling / Coordination of Care  I spent 45 minutes with the patient  PLEASE NOTE:  This encounter may have been completed utilizing the [employer] Direct Speech Voice Recognition Software  Grammatical errors, random word insertions, pronoun errors and incomplete sentences are occasional consequences of the system due to software limitations, ambient noise and hardware issues  These may be missed even after proof reading prior to affixing electronic signature  Please do not hesitate to contact me directly if you have any questions or concerns about the content, text or information contained within the body of this dictation

## 2022-03-08 NOTE — CONSULTS
PHYSICAL MEDICINE AND REHABILITATION CONSULT NOTE  Gene Saldaña 64 y o  male MRN: [id number]  Unit/Bed#: ICU 08 Encounter: [id number]      CC: L basal ganglia IPH    History of Present Illness:   Gene Saldaña is a 64 y o  male who presented to the [facility] who p/w right sided weakness and numbness and was found to have L basal ganglia IPH in the setting of being on [facility] for a-fib and was given [place]  Evaluated by neurosurgery and no surgical intervention planned but did recommended goal SBP<160 and to hold AP/AC       Location: L basal ganglia  Quality: IPH  Severity: severe   Duration: presented on 3/7  Timing: acute   Context: on AC for a-fib, HTN  Modifying factors: k-centra, BP control   Associating signs & symptoms: right sided weakness & numbness       Subjective: patient without complaint currently     Review of Systems: A 10-point review of systems was performed  Negative except as listed above       Plan:     L basal ganglia IPH: in the setting of being on [facility] and was given K-centra; evaluated by neurosx who recommended non-op management with holding AP/AC & goal SBP<160    HTN: goal SBP<160, currently on cardene gtt, bystolic, norvasc, and losartan    A-fib: currently on tikosyn & bystolic, AC on hold due to IPH    Ambulatory/ADL dysfunction: patient was independent with ADLs and functional mobility PTA and lives with spouse in a 3  ( setup not available) currently patient is supervision assist for transfer, supervision assist for amb 100' x 2 , modified independent to supervision assist for ADLs therefore patient does not have acute inpt rehab needs at this time   Drug regimen reviewed, all potential adverse effects identified and addressed:    Scheduled Meds:  Current Facility-Administered Medications   Medication Dose Route Frequency Provider Last Rate    acetaminophen  650 mg Oral Q4H PRN Sheila Daniel MD      albuterol  2 puff Inhalation Q4H PRN Franco Argueta DO [place]      docusate sodium  100 mg Oral BID Genet Abreu MD      dofetilide  500 mcg Oral Q12H [address] [place], [place]      insulin lispro  2-12 Units Subcutaneous Q6H [address] [facility] [place] [place]      magnesium sulfate  2 g Intravenous Once [place], [place]      nebivolol  2 5 mg Oral Daily Mariza Heck MD      niCARdipine  1-15 mg/hr Intravenous Titrated Genet Abreu MD 1 mg/hr (03/08/22 0600)    ondansetron  4 mg Intravenous Q6H PRN Genet Abreu MD                 Physical Exam:        General: NAD  HEENT:  Eye: Normal external eye, conjunctiva, lids cornea  Ears: Normal external ears  Nose: Normal external nose, mucus membranes  Pulmonary: respirations unlabored   Cardiovascular: +S1/2  Abdomen: soft NT   Extremity/skin: no cyanosis or clubbing  Neurologic: CN 2-12 grossly intact, lt touch grossly intact, no gross dysmetria on F-N testing B/L, 5/5 throughout on MMT   Psych: mood/affect currently stable         Laboratory:    Results from last 7 days   Lab Units 03/08/22  0500 03/08/22  0146 03/07/22  2132   HEMOGLOBIN g/dL 14 7 14 3 15 5   HEMATOCRIT % 42 1 41 1 44 3   WBC Thousand/uL 11 15* 11 31* 12 44*     Results from last 7 days   Lab Units 03/08/22  0500 03/08/22  0146 03/07/22  2132   BUN mg/dL 12 13 15   SODIUM mmol/L 137 138 140   POTASSIUM mmol/L 3 7 3 6 3 7   CHLORIDE mmol/L 105 107 103   CREATININE mg/dL 1 17 1 11 1 15     Results from last 7 days   Lab Units 03/08/22  0146 03/07/22  2132   PROTIME seconds 14 0 15 8*   INR  1 13 1 27*            Past Medical History:   Past Surgical History:   Family History:   Social history:   Past Medical History:   Diagnosis Date    Ankle swelling     Arthritis     Asthma     Atrial fibrillation ([facility] [address] )     Atrial fibrillation with RVR ([facility] ) 4/4/2017    Chronic kidney disease     kidney stones    Gout     Hypertension     Kidney stone     Night sweats     Seasonal allergies     Sleep apnea     Past Surgical History: Procedure Laterality Date    APPENDECTOMY      CARDIOVERSION      X4 last was 2018    COLONOSCOPY      CYSTOSCOPY  2016    w/removal of object, last assessed 16    FOOT SURGERY Left     HAND SURGERY      KNEE SURGERY Right 2016    CA CYSTO/URETERO W/LITHOTRIPSY &INDWELL STENT INSRT Left 2016    Procedure: CYSTOSCOPY URETEROSCOPY WITH LITHOTRIPSY HOLMIUM LASER STONE EXTRACTION, RETROGRADE PYELOGRAM AND INSERTION STENT URETERAL;  Surgeon: Jhoan Price MD;  Location:  MAIN OR;  Service: Urology last assessed 16    CA CYSTO/URETERO W/LITHOTRIPSY &INDWELL STENT INSRT Right 2020    Procedure: CYSTOSCOPY , cystolitholapaxy of bladder stone WITH HOMIUM LASER RIGHT RETROGRADE AND RIGHT URETERAL STENT, [address];  Surgeon: Jhoan Price MD;  Location: [address] [place] [address] MAIN OR;  Service: Urology    WRIST SURGERY Left 2014    Fracture surgery     Family History   Problem Relation Age of Onset    Atrial fibrillation Mother     Other Mother         blood dyscrasia    Hypertension Mother     Other Father         hypoglycemia     Atrial fibrillation Father     Diabetes Family     Other Family         Thrombocytosis    No Known Problems Sister     Cancer Neg Hx     Thyroid disease Neg Hx       Social History     Socioeconomic History    Marital status: /Civil Union     Spouse name: Not on file    Number of children: 4    Years of education: 12th grade     Highest education level: Not on file   Occupational History    Not on file   Tobacco Use    Smoking status: Former Smoker     Packs/day: 0 50     Years: 10 00     Pack years: 5 00     Types: Cigarettes     Quit date: 1994     Years since quittin 9    Smokeless tobacco: Never Used   Vaping Use    Vaping Use: Never used   Substance and Sexual Activity    Alcohol use: Yes     Comment: occ    Drug use: No    Sexual activity: Yes     Partners: Female     Birth control/protection: None   Other Topics Concern  Not on file   Social History Narrative    No caffeine use      Social Determinants of Health     Financial Resource Strain: Not on file   Food Insecurity: Not on file   Transportation Needs: Not on file   Physical Activity: Not on file   Stress: Not on file   Social Connections: Not on file   Intimate Partner Violence: Not on file   Housing Stability: Not on file          Current Medical Diagnosis Allergies   Patient Active Problem List   Diagnosis    Pelvicaliectasis    Asthma    Benign essential hypertension    Degenerative joint disease of right knee    Morbid obesity with BMI of 45 0-49 9, adult ([place] [address] )    Primary osteoarthritis of left knee    Tremor    Chronic kidney disease, stage III (moderate) ([place] [address] )    Long term current use of antiarrhythmic drug    Anticoagulation adequate    Hypertensive chronic kidney disease with stage 1 through stage 4 chronic kidney disease, or unspecified chronic kidney disease    Vitamin D deficiency    Dyslipidemia    Bladder stones    Situational anxiety    Class 3 severe obesity due to excess calories with serious comorbidity and body mass index (BMI) of 40 0 to 44 9 in adult ([place] [address] )    HARPER (obstructive sleep apnea)    Nocturnal hypoxemia    Impaired glucose tolerance    Chronic atrial fibrillation ([place] [address] )    ICH (intracerebral hemorrhage) (HCC)    No Known Allergies

## 2022-03-08 NOTE — PLAN OF CARE
Problem: PHYSICAL THERAPY ADULT  Goal: Performs mobility at highest level of function for planned discharge setting  See evaluation for individualized goals  Description: Treatment/Interventions: Functional transfer training,LE strengthening/ROM,Therapeutic exercise,Elevations,Endurance training,Patient/family training,Equipment eval/education,Bed mobility,Gait training,Compensatory technique education,Continued evaluation,Spoke to nursing          See flowsheet documentation for full assessment, interventions and recommendations  Note: Prognosis: Excellent  Problem List: Decreased strength,Decreased endurance,Impaired balance,Decreased mobility,Decreased coordination,Impaired judgement,Decreased safety awareness  Assessment: Pt is a 64y o  year old male admitted to [facility] with [address] (intracerebral hemorrhage) ([facility] [address] ) on 3/7/2022  PT consulted to assess pt's functional mobility and D/C needs  Order placed for PT eval and tx, w/ up w/ A order  Prior to hospitalization, pt was independent in ADLs, iADLs, and ambulation without an AD  He works, drives, and lives in a 3 story home with his wife  Currently pt is limited by weakness, impaired balance, decreased endurance, impaired coordination, gait deviations, decreased activity tolerance, decreased functional mobility tolerance, decreased safety awareness, impaired judgement and fall risk  Upon evaluation, pt demonstrating supervision for bed mobility, transfers, and ambulation without an AD  Numerous times during ambulation, he bumps into objects/walls on the R side which worsens when multi tasking such as talking  Pt seems unconcerned following it being pointed out to him by therapy with him stating "I bump into things all the time" although these balance deficits limit his safety  The following objective measures performed on IE also reveal limitations: The patient's AM-PAC Basic Mobility Inpatient Short Form Raw Score is 24   A standardized score greater than 16 suggests the patient may benefit from discharge to home  Please also refer to the recommendation of the Physical Therapist for safe D/C planning  Pt demonstrating unstable/unpredictable clinical presentation due to medical complexity, balance deficits, decreased safety awareness/judgement, gait deviations, and is an increased risk for falling  Pt benefiting from continued PT services to address current deficits and maximize safety and independence upon DC  From PT/mobility standpoint, recommendation at time of D/C would be home with outpatient rehabilitation pending progress in order to facilitate return to PLOF  Barriers to Discharge: None        PT Discharge Recommendation: Home with outpatient rehabilitation          See flowsheet documentation for full assessment

## 2022-03-08 NOTE — OCCUPATIONAL THERAPY NOTE
Occupational Therapy Evaluation     Patient Name: Brissa Vora  WLHRN'C Date: 3/8/2022  Problem List  Principal Problem:    ICH (intracerebral hemorrhage) ([facility] [address] )  Active Problems:    Asthma    Benign essential hypertension    Morbid obesity with BMI of 45 0-49 9, adult (HCC)    Chronic atrial fibrillation (HCC)    Past Medical History  Past Medical History:   Diagnosis Date    Ankle swelling     Arthritis     Asthma     Atrial fibrillation (HCC)     Atrial fibrillation with RVR ([facility] [address] ) 4/4/2017    Chronic kidney disease     kidney stones    Gout     Hypertension     Kidney stone     Night sweats     Seasonal allergies     Sleep apnea      Past Surgical History  Past Surgical History:   Procedure Laterality Date    APPENDECTOMY      CARDIOVERSION      X4 last was 2018    COLONOSCOPY      CYSTOSCOPY  08/30/2016    w/removal of object, last assessed 8/30/16    FOOT SURGERY Left     HAND SURGERY      KNEE SURGERY Right 2016    AR CYSTO/URETERO W/LITHOTRIPSY &INDWELL STENT INSRT Left 8/9/2016    Procedure: CYSTOSCOPY URETEROSCOPY WITH LITHOTRIPSY HOLMIUM LASER STONE EXTRACTION, RETROGRADE PYELOGRAM AND INSERTION STENT URETERAL;  Surgeon: Soila Sanchez MD;  Location:  MAIN OR;  Service: Urology last assessed 8/30/16    AR CYSTO/URETERO W/LITHOTRIPSY &INDWELL STENT INSRT Right 8/24/2020    Procedure: CYSTOSCOPY , cystolitholapaxy of bladder stone WITH HOMIUM LASER RIGHT RETROGRADE AND RIGHT URETERAL STENT, [facility];  Surgeon: Soila Sanchez MD;  Location: [address] [place] [address] MAIN OR;  Service: Urology    WRIST SURGERY Left 2014    Fracture surgery           03/08/22 0915   OT Last Visit   OT Visit Date 03/08/22   Note Type   Note type Evaluation   Restrictions/Precautions   Weight Bearing Precautions Per Order No   Other Precautions Cognitive; Chair Alarm; Bed Alarm;Multiple lines;Telemetry; Fall Risk;Pain; Impulsive;Visual impairment   Pain Assessment   Pain Assessment Tool 0-10   Pain Score No Pain Patient's Stated Pain Goal No pain   Hospital Pain Intervention(s) Repositioned; Ambulation/increased activity; Elevated; Emotional support; Environmental changes   Home Living   Type of [address] Multi-level;1/2 bath on main level;Bed/bath upstairs   Bathroom Shower/Tub Walk-in shower   Bathroom Toilet Standard   Bathroom Equipment   (denies)   Home Equipment   (denies)   Additional Comments Per pt lives in [place] w/ main bed/bath of 2nd floor w/ walk in shower, standard toilet and no DME   Prior Function   Level of [place] Independent with ADLs and functional mobility   Lives With Michel Help From Family   ADL Assistance Independent   IADLs Independent   Falls in the last 6 months 0   Vocational Full time employment   Comments Per pt I w/ all functional tasks and lives w/ spouse   Lifestyle   Autonomy I w/ all ADLS/IADLS, bed mobility, transfers and functional mobility w/ NO DME   Reciprocal Relationships 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, school OT)   Service to Others full time employment; works in sales   Intrinsic Gratification enjoys spending time w/ family    Psychosocial   Psychosocial (WDL) WDL   Subjective   Subjective "I always bump into objects"  "what did I just say?"   ADL   Where Assessed Edge of bed   Eating Assistance 6  Modified independent   Grooming Assistance 6  Modified Independent   UB Bathing Assistance 6  Modified Independent   LB Bathing Assistance 5  Supervision/Setup   UB Dressing Assistance 6  Modified independent   [address] 5  Supervision/Setup   Toileting Assistance  5  Supervision/Setup   Functional Assistance 5  Supervision/Setup   Additional Comments Pt required Mod I w/ UB ADLS and S w/ LB ADLS to ensure safety due to increase fall risk, and poor endurance   Bed Mobility   Supine to Sit 5  Supervision   Additional items Assist x 1; Increased time required; Bedrails;Verbal cues   Additional Comments Pt required S w/ bed mobility and used bedrails without VC  Pt sat EOB for 5 minutes   Transfers   Sit to Stand 5  Supervision   Additional items Assist x 1; Increased time required;Verbal cues   Stand to Sit 5  Supervision   Additional items Assist x 1; Increased time required;Verbal cues   Additional Comments Pt required S w/ STS transfers from EOB, pt needed VC to ensure safety and extended time due to some fatigue   Functional Mobility   Functional Mobility 5  Supervision   Additional Comments Pt ambulated short household distance at S level and needed VC to ensure safety due to some impulsive behaviors  Pt was bumping into objects in R visual field but denies any visual changes/deficits  Balance   Static Sitting Fair   Dynamic Sitting Fair   Static Standing Fair   Dynamic Standing Rebeka Villatoro [phone] -   Activity Tolerance   Activity Tolerance Patient tolerated treatment well   Medical Staff Made Aware yes PT Andre Cortes and PTS Sarahi   Nurse Made Aware yes RN Eula MARTINEZ Assessment   RUE Assessment WNL   LUE Assessment   LUE Assessment WNL   Hand Function   Gross Motor Coordination Functional   Fine Motor Coordination Functional   Sensation   Light Touch No apparent deficits   Proprioception   Proprioception No apparent deficits   Vision-Basic Assessment   Current Vision No visual deficits   Vision - Complex Assessment   Ocular Range of Motion WFL   Tracking Able to track stimulus in all quads without difficulty   Perception   Inattention/Neglect Cues to attend right visual field   Cognition   Overall Cognitive Status Impaired   Arousal/Participation Alert; Responsive;Arousable; Cooperative   Attention Attends with cues to redirect   Orientation Level Oriented to person;Oriented to time;Oriented to situation;Disoriented to place   Memory Decreased recall of precautions   Following Commands Follows one step commands with increased time or repetition   Comments pt was verbal and attentive throuhgout assessment but pt presented w/ slow responses and VC to ensure understanding  Pt seemed confused w/ initial enterance  Pt would command follopw w/ cues  Pt answered a question and then asked "what did I say" after giving answer  Not sure if current cognition is at baseline  Assessment   Limitation Decreased Safe judgement during ADL;Decreased cognition;Decreased endurance;Visual deficit   Prognosis Fair   Assessment Pt is a 64 y o male and was to [facility] 3/07 due to stroke like symptoms, weakness on R, R arm drift, difficulty speaking and R sided facial/leg weakness  Pt transferred to [address] [place] [address] and diagnosed w/ intracerebral hemorrhage (ICH) and no surgical intervention is needed at this time  Pt  has a past medical history of Ankle swelling, Arthritis, Asthma, Atrial fibrillation ([facility] [address] ), Atrial fibrillation with RVR ([facility] [address] ) (4/4/2017), Chronic kidney disease, Gout, Hypertension, Kidney stone, Night sweats, Seasonal allergies, and Sleep apnea  Pt has active OT eval and up w/ assistance orders  Pt currently lives w/ spouse in [place], [place] inside w/ main bedroom/bathroom on 2nd floor w/ walk in shower and standard toilet w/ no DME  PTA pt was I w/ all ADLS/IADLS, bed mobility, transfers and functional mobility w/ NO DME  Pt was assessed and needed MOD I w/ UB ADLS, S w/ LB ADLS, bed mobility, STS transfers and functional mobility w/ no DME  Pt presented w/ some visual deficits and inattention in R visual field during ambulation and some cognitive concerns w/ slow responses and confusion  Pt tolerated treatment well and verbalized no additional concerns  Pt is performing all functional occupations at a [facility] level and from [address] [place] [address] recommend following up w/ outpatient OT for vision and cognition upon DC once medically stable and clinically appropriate  Pt does not need skilled OT services at this time, WILL DC OT     Goals   Patient Goals to go home   Recommendation   OT Discharge Recommendation Home with outpatient rehabilitation   OT - OK to Discharge Yes   Additional Comments  due to some cognitive and visual deficits, recommend follow up w/ outpatient OT   AM-PAC Daily Activity Inpatient   Lower Body Dressing 4   Bathing 4   Toileting 4   Upper Body Dressing 4   Grooming 4   Eating 4   Daily Activity Raw Score 24   Daily Activity Standardized Score (Calc for Raw Score >=11) 57 54   AM-PAC Applied Cognition Inpatient   Following a Speech/Presentation 3   Understanding Ordinary Conversation 3   Taking Medications 4   Remembering Where Things Are Placed or Put Away 4   Remembering List of 4-5 Errands 4   Taking Care of Complicated Tasks 3   Applied Cognition Raw Score 21   Applied Cognition Standardized Score 44 3   Barthel Index   Feeding 10   Bathing 5   Grooming Score 5   Dressing Score 10   Bladder Score 10   Bowels Score 10   Toilet Use Score 10   Transfers (Bed/Chair) Score 10   Mobility (Level Surface) Score 10   Stairs Score 0   Barthel Index Score 80     Anitha Valladares, OTS

## 2022-03-08 NOTE — ASSESSMENT & PLAN NOTE
- On Xarelto at baseline  - S/p Kcentra  - Telemetry  - Recommend repeat CT head in 3 weeks and if stable, then may resume AC at that time  - Medical management per critical care team

## 2022-03-08 NOTE — CONSULTS
Consultation - Neurology   Gene Saldaña 64 y o  male MRN: [id number]  Unit/Bed#: ICU 08 Encounter: [id number]      Assessment/Plan     * ICH (intracerebral hemorrhage) [facility])  Assessment & Plan  49-year-old male with a on Xarelto, hypertension, sleep apnea, arthritis, who initially presented to [facility] on 03/07 for right-sided weakness and numbness  Patient reports he initially word-finding difficulties  He went to sleep and woke up feeling off with right-sided weakness  He presented to the ED for further evaluation  BP on presentation 165/87  Stroke alert was initiated in the ED   NIHSS 4  Imaging demonstrated an acute left IPH with minimal surrounding vasogenic edema  Patient was given Kcentra and transferred to [facility] for neurosurgery evaluation  Plan:  - S/p Kcentra  - SBP goal < 140  - hold all AC/AP at this time; defer timing to restart [facility] to Neurosurgery  - MRI brain  - neurosurgery following; appreciate recommendations  - PMR consult  - stat CT head with any acute change in neuro exam  - PT/OT/ST  - monitor neuro exam; notify with changes  - medical management and supportive care per critical care team   Correction metabolic or infectious disturbances  Results:  - CT head:  Acute hemorrhage in the left lentiform nuclei measuring 3 7 X 2 1 X 2 3 cm  Minimal surrounding vasogenic edema  No midline shift   - CTA head and neck:  1  No evidence of active bleeding within the left lentiform nuclei hemorrhage  No findings to indicate intracranial aneurysm or vascular malformation  2  No stenosis, dissection or occlusion of the carotid or vertebral arteries or major vessels  - repeat CT head:  No significant change in left basal ganglia parenchymal hematoma and minimal surrounding vasogenic edema    No midline shift   - lipid panel:  Cholesterol 165, triglycerides 143, HDL 36,   - A1c 7 5    Chronic atrial fibrillation (HCC)  Assessment & Plan  - On Xarelto at baseline  - S/p Kcentra  - Telemetry  - Medical management per critical care team    Benign essential hypertension  Assessment & Plan  - BP on presentation 165/87  - On nicardipine gtt  - Medical management per critical care team      Cristobal Rod will need follow up in in 6 weeks with neurovascular attending or advance practitioner  He will not require outpatient neurological testing  Case and treatment plan reviewed with attending neurologist, Dr Jv Cueto  History of Present Illness     Reason for Consult / Principal Problem: ICH  HPI: Cristobal Rod is a 64 y o  right handed male with Afib on Xarelto, HTN, sleep apnea, arthritis, who presents with ICH  Per record review, patient initially presented to [facility] on 3/7 for right sided weakness and numbness  Patient reports having first word-finding difficulty  He went to sleep and woke up feeling off and had right sided weakness  He presented to the ED for further evaluation  Stroke alert was initiated in the ED  Imaging demonstrated acute left IPH with minimal surrounding vasogenic edema  He was given Crys Ivor and transferred to [facility] for neurosurgery evaluation  Patient was seen and evaluated at the bedside with attending neurologist  Patient reports his normal SBP ranges in the 180s  He also reports that he has a stressful job, which he feels contributes to his BP being high  At this time, he reports having pins and needles sensation on his right UE/LE along with some weakness as well  He reports that he had some recent med changes approximately 3 weeks ago due to weight gain over the last year  He endorses being compliant with his medications at home  Inpatient consult to Neurology  Consult performed by: GARRY Slade  Consult ordered by: Aram Joseph MD        Review of Systems   Constitutional: Negative for fever  HENT: Negative for trouble swallowing  Eyes: Negative for photophobia and visual disturbance  Respiratory: Negative for shortness of breath  Cardiovascular: Negative for chest pain  Gastrointestinal: Negative for abdominal pain  Musculoskeletal: Negative for arthralgias, back pain, gait problem, myalgias and neck pain  Skin: Negative for rash  Neurological: Positive for weakness and numbness  Negative for dizziness, tremors, seizures, syncope, facial asymmetry, speech difficulty, light-headedness and headaches  Psychiatric/Behavioral: Negative for confusion  All other systems reviewed and are negative        Historical Information   Past Medical History:   Diagnosis Date    Ankle swelling     Arthritis     Asthma     Atrial fibrillation (HCC)     Atrial fibrillation with RVR (HCC) 4/4/2017    Chronic kidney disease     kidney stones    Gout     Hypertension     Kidney stone     Night sweats     Seasonal allergies     Sleep apnea      Past Surgical History:   Procedure Laterality Date    APPENDECTOMY      CARDIOVERSION      X4 last was 2018    COLONOSCOPY      CYSTOSCOPY  08/30/2016    w/removal of object, last assessed 8/30/16    FOOT SURGERY Left     HAND SURGERY      KNEE SURGERY Right 2016    IN CYSTO/URETERO W/LITHOTRIPSY &INDWELL STENT INSRT Left 8/9/2016    Procedure: CYSTOSCOPY URETEROSCOPY WITH LITHOTRIPSY HOLMIUM LASER STONE EXTRACTION, RETROGRADE PYELOGRAM AND INSERTION STENT URETERAL;  Surgeon: Pete Moreira MD;  Location:  MAIN OR;  Service: Urology last assessed 8/30/16    IN CYSTO/URETERO W/LITHOTRIPSY &INDWELL STENT INSRT Right 8/24/2020    Procedure: CYSTOSCOPY , cystolitholapaxy of bladder stone WITH HOMIUM LASER RIGHT RETROGRADE AND RIGHT URETERAL STENT, [facility];  Surgeon: Pete Moreira MD;  Location: [address] [place] [address] MAIN OR;  Service: Urology    WRIST SURGERY Left 2014    Fracture surgery     Social History   Social History     Substance and Sexual Activity   Alcohol Use Yes    Comment: occ     Social History     Substance and Sexual Activity   Drug Use No     E-Cigarette/Vaping    E-Cigarette Use Never User      E-Cigarette/Vaping Substances    Nicotine No     THC No     CBD No     Flavoring No     Other No     Unknown No      Social History     Tobacco Use   Smoking Status Former Smoker    Packs/day: 0 50    Years: 10 00    Pack years: 5 00    Types: Cigarettes    Quit date: 1994    Years since quittin 9   Smokeless Tobacco Never Used     Family History:   Family History   Problem Relation Age of Onset    Atrial fibrillation Mother     Other Mother         blood dyscrasia    Hypertension Mother     Other Father         hypoglycemia     Atrial fibrillation Father     Diabetes Family     Other Family         Thrombocytosis    No Known Problems Sister     Cancer Neg Hx     Thyroid disease Neg Hx        Review of previous medical records was completed  Meds/Allergies   all current active meds have been reviewed, current meds:   Current Facility-Administered Medications   Medication Dose Route Frequency    acetaminophen (TYLENOL) tablet 650 mg  650 mg Oral Q4H PRN    albuterol (PROVENTIL HFA,VENTOLIN HFA) inhaler 2 puff  2 puff Inhalation Q4H PRN    docusate sodium (COLACE) capsule 100 mg  100 mg Oral BID    dofetilide (TIKOSYN) capsule 500 mcg  500 mcg Oral Q12H [address]    insulin lispro (HumaLOG) 100 units/mL subcutaneous injection 2-12 Units  2-12 Units Subcutaneous Q6H [address]    magnesium sulfate 2 g/50 mL IVPB (premix) 2 g  2 g Intravenous Once    nebivolol (BYSTOLIC) tablet 2 5 mg  2 5 mg Oral Daily    niCARdipine (CARDENE) 25 mg (STANDARD CONCENTRATION) in sodium chloride 0 9% 250 mL  1-15 mg/hr Intravenous Titrated    ondansetron (ZOFRAN) injection 4 mg  4 mg Intravenous Q6H PRN    and PTA meds:   Prior to Admission Medications   Prescriptions Last Dose Informant Patient Reported? Taking?    Xarelto 20 MG tablet  Self No No   Sig: TAKE 1 TABLET BY MOUTH EVERY DAY   amLODIPine (NORVASC) 5 mg tablet  Self No No   Sig: TAKE 1 TABLET BY MOUTH TWICE A DAY Patient taking differently: 5 mg daily     budesonide (Pulmicort Flexhaler) 180 MCG/ACT inhaler   No No   Sig: Inhale 4 puffs 2 (two) times a day for 14 days Rinse mouth after use  cloNIDine (CATAPRES-TTS-1) 0 1 mg/24 hr   No No   Sig: Place 1 patch (0 1 mg total) on the skin once a week   dofetilide (TIKOSYN) 500 mcg capsule  Self No No   Sig: TAKE 1 CAPSULE BY MOUTH EVERY 12 HOURS   hydrALAZINE (APRESOLINE) 50 mg tablet   No No   Sig: Take 1 tablet (50 mg total) by mouth 2 (two) times a day   nebivolol (Bystolic) 2 5 mg tablet   No No   Sig: Take 1 tablet (2 5 mg total) by mouth daily Hold for heart rate less than 50 beats per minute  olmesartan (BENICAR) 40 mg tablet   No No   Sig: Take 1 tablet (40 mg total) by mouth daily   potassium citrate (UROCIT-K) 10 mEq  Self No No   Sig: TAKE 2 TABLETS (20 MEQ TOTAL) BY MOUTH 2 (TWO) TIMES A DAY      Facility-Administered Medications: None       No Known Allergies    Objective   Vitals:Blood pressure 164/93, pulse 56, temperature 98 2 °F (36 8 °C), temperature source Oral, resp  rate 16, height 5' 10" (1 778 m), weight (!) 145 kg (319 lb), SpO2 95 %  ,Body mass index is 45 77 kg/m²  Intake/Output Summary (Last 24 hours) at 3/8/2022 1151  Last data filed at 3/8/2022 0740  Gross per 24 hour   Intake 114 17 ml   Output 900 ml   Net -785 83 ml       Invasive Devices: Invasive Devices  Report    Peripheral Intravenous Line            Peripheral IV 03/07/22 Right Antecubital <1 day    Peripheral IV 03/07/22 Right Antecubital <1 day                Physical Exam  Vitals and nursing note reviewed  Constitutional:       General: He is not in acute distress  Appearance: Normal appearance  He is not ill-appearing  HENT:      Head: Normocephalic  Mouth/Throat:      Mouth: Mucous membranes are moist    Eyes:      General: No scleral icterus  Right eye: No discharge  Left eye: No discharge        Extraocular Movements: EOM normal  Conjunctiva/sclera: Conjunctivae normal    Cardiovascular:      Rate and Rhythm: Normal rate  Pulmonary:      Effort: Pulmonary effort is normal  No respiratory distress  Musculoskeletal:         General: Normal range of motion  Cervical back: Normal range of motion  Skin:     General: Skin is warm and dry  Coloration: Skin is not jaundiced or pale  Neurological:      Mental Status: He is alert and oriented to person, place, and time  Coordination: Finger-nose-finger test: Right slow but normal, left normal  Heel-to-shin test: Slight right clumsy, left normal       Deep Tendon Reflexes:      Reflex Scores:       Tricep reflexes are 1+ on the right side and 1+ on the left side  Bicep reflexes are 1+ on the right side and 1+ on the left side  Brachioradialis reflexes are 1+ on the right side and 1+ on the left side  Patellar reflexes are 2+ on the right side and 2+ on the left side  Achilles reflexes are 2+ on the right side and 2+ on the left side  Psychiatric:         Mood and Affect: Mood normal          Behavior: Behavior normal        Neurologic Exam     Mental Status   Oriented to person, place, and time  Level of consciousness: alert  Able to follow commands appropriately  No dysarthria noted  Cranial Nerves     CN II   Right visual field deficit: none  Left visual field deficit: none     CN III, IV, VI   Extraocular motions are normal    Right pupil: Shape: regular  Reactivity: brisk  Left pupil: Shape: regular  Reactivity: brisk  Nystagmus: none     CN V   Facial sensation intact       CN VIII   Hearing: intact    CN IX, X   Palate: symmetric    CN XI   CN XI normal    Subtle decreased right nasolabial fold     Motor Exam   Muscle bulk: normal  Right arm pronator drift: present  Left arm pronator drift: absent  Full strength throughout bilateral UE/LE except:  Right deltoid 4+/5, 4+/5 hand   Decreased right finger tapping  Right hip 4+/5     Sensory Exam   Right leg vibration: decreased from toes  Left leg vibration: normal  Right leg proprioception: decreased from toes  Left leg proprioception: normal  Decreased sensation to light touch, vibration and temperature in right UE/LE (if left 100%, then right UE 80%, right LE 60%)     Gait, Coordination, and Reflexes     Coordination   Finger-nose-finger test: Right slow but normal, left normal   Heel-to-shin test: Slight right clumsy, left normal     Tremor   Resting tremor: absent    Reflexes   Right brachioradialis: 1+  Left brachioradialis: 1+  Right biceps: 1+  Left biceps: 1+  Right triceps: 1+  Left triceps: 1+  Right patellar: 2+  Left patellar: 2+  Right achilles: 2+  Left achilles: 2+  Right plantar: upgoing  Left plantar: normal  Left endpoint tremor with finger to nose       Lab Results:   I have personally reviewed pertinent reports    , CBC:   Results from last 7 days   Lab Units 03/08/22  0500 03/08/22  0146 03/07/22  2132   WBC Thousand/uL 11 15* 11 31* 12 44*   RBC Million/uL 4 87 4 78 5 12   HEMOGLOBIN g/dL 14 7 14 3 15 5   HEMATOCRIT % 42 1 41 1 44 3   MCV fL 86 86 87   PLATELETS Thousands/uL 199 196 222   , BMP/CMP:   Results from last 7 days   Lab Units 03/08/22  0500 03/08/22  0146 03/07/22  2132   SODIUM mmol/L 137 138 140   POTASSIUM mmol/L 3 7 3 6 3 7   CHLORIDE mmol/L 105 107 103   CO2 mmol/L 27 28 28   BUN mg/dL 12 13 15   CREATININE mg/dL 1 17 1 11 1 15   CALCIUM mg/dL 8 5 9 2 8 8   EGFR ml/min/1 73sq m 69 73 70   , Vitamin B12:   , HgBA1C:   Results from last 7 days   Lab Units 03/08/22  0146   HEMOGLOBIN A1C % 7 5*   , TSH:   , Coagulation:   Results from last 7 days   Lab Units 03/08/22  0146   INR  1 13   , Lipid Profile:   Results from last 7 days   Lab Units 03/08/22  0146   HDL mg/dL 36*   LDL CALC mg/dL 100   TRIGLYCERIDES mg/dL 143   , Ammonia:   , Urinalysis:       Invalid input(s): URIBILINOGEN, Drug Screen:   , Medication Drug Levels:       Invalid input(s): CARBAMAZEPINE, PHENOBARB, LACOSAMIDE, OXCARBAZEPINE       Imaging Studies: I have personally reviewed pertinent reports  EKG, Pathology, and Other Studies: I have personally reviewed pertinent reports      VTE Prophylaxis: Sequential compression device Vicenta Kent)     Code Status: Level 1 - Full Code  Advance Directive and Living Will:      Power of :    POLST:

## 2022-03-08 NOTE — SPEECH THERAPY NOTE
Speech-Language Pathology   Bedside Swallow Evaluation 8822-1745  Speech-language Assessment [id number]    Patient Name: Ranulfo Duran    GSSDX'A Date: 3/8/2022     Problem List  Principal Problem:    ICH (intracerebral hemorrhage) ([facility] [address] )  Active Problems:    Asthma    Benign essential hypertension    Morbid obesity with BMI of 45 0-49 9, adult (HCC)    Chronic atrial fibrillation (HCC)    Past Medical History  Past Medical History:   Diagnosis Date    Ankle swelling     Arthritis     Asthma     Atrial fibrillation ([facility] [facility] )     Atrial fibrillation with RVR ([facility] [address] ) 4/4/2017    Chronic kidney disease     kidney stones    Gout     Hypertension     Kidney stone     Night sweats     Seasonal allergies     Sleep apnea      Past Surgical History  Past Surgical History:   Procedure Laterality Date    APPENDECTOMY      CARDIOVERSION      X4 last was 2018    COLONOSCOPY      CYSTOSCOPY  08/30/2016    w/removal of object, last assessed 8/30/16    FOOT SURGERY Left     HAND SURGERY      KNEE SURGERY Right 2016    CT CYSTO/URETERO W/LITHOTRIPSY &INDWELL STENT INSRT Left 8/9/2016    Procedure: CYSTOSCOPY URETEROSCOPY WITH LITHOTRIPSY HOLMIUM LASER STONE EXTRACTION, RETROGRADE PYELOGRAM AND INSERTION STENT URETERAL;  Surgeon: Rebecca Gill MD;  Location:  MAIN OR;  Service: Urology last assessed 8/30/16    CT CYSTO/URETERO W/LITHOTRIPSY &INDWELL STENT INSRT Right 8/24/2020    Procedure: CYSTOSCOPY , cystolitholapaxy of bladder stone WITH HOMIUM LASER RIGHT RETROGRADE AND RIGHT URETERAL STENT, [address];  Surgeon: Rebecca Gill MD;  Location: [facility] MAIN OR;  Service: Urology    WRIST SURGERY Left 2014    Fracture surgery     SWALLOW EVALUATION    Summary  Pt presented with functional appearing oral and pharyngeal stage swallowing skills with materials administered today  No s/s aspiration occurred with any of the trials offered today   Recommend resuming regular diet with thin liquids at this time     Risk/s for Aspiration: low    Recommended Diet: regular diet and thin liquids   Recommended Form of Meds: whole with liquid   Aspiration precautions and swallowing strategies: upright posture  Other Recommendations: Continue frequent oral care      Current Medical Status per H&P 3/8/22  Elisabeth Millard is a 64 y o  male with PMHx of hypertension, AFib on Xarelto, morbid obesity who presented to [facility] with right-sided weakness and numbness  Per patient, he was at home on a video conference call when he first noted word finding difficulty  He attempted to go to sleep, but woke up feeling "off" and weak on the right side  He was brought to the ER and stroke alert was activated  CT head revealed acute left intraparenchymal hemorrhage measuring 3 7 x 2 1 x 2 3 with minimal surrounding vasogenic edema  Patient was given [facility] and transferred to [facility] for neurosurgery evaluation  ICH score 0  Patient was started on cardene prior to transfer  Special Studies:  CT head 3/8/22 IMPRESSION:  No significant change in left basal ganglia parenchymal hematoma and minimal surrounding vasogenic edema  No midline shift  CXR 3/7/22 IMPRESSION:  Clear lungs  Social/Education/Vocational Hx:  Pt lives with spouse    Swallow Information   Current Risks for Dysphagia & Aspiration: CVA  Current Diet: NPO   Baseline Diet: regular diet and thin liquids    Baseline Assessment   Behavior/Cognition: alert  Speech/Language Status: able to participate in conversation and able to follow commands  Patient Positioning: upright in chair  Pain Status/Interventions/Response to Interventions: No report of or nonverbal indications of pain      Swallow Mechanism Exam  Facial: symmetrical  Labial: WFL (possible very slight reduced labial retraction R side)  Lingual: WFL  Velum: symmetrical  Mandible: adequate ROM  Dentition: adequate  Vocal quality:clear/adequate   Volitional Cough: strong/productive   Respiratory Status: on RA Consistencies Assessed and Performance   Consistencies Administered: ice, thin liquids, puree and solid (toast)    Oral Stage: WFL  Mastication was adequate with the materials administered today  Bolus formation and transfer were functional with no significant oral residue noted  No overt s/s reduced oral control  Pharyngeal Stage: WFL  Swallow Mechanics: Swallowing initiation appeared prompt  Laryngeal rise was palpated and judged to be within functional limits  No coughing, throat clearing, change in vocal quality or respiratory status noted today  Esophageal Concerns: none reported    Summary and Recommendations (see above)    Results Reviewed with: patient, RN and MD     Treatment Recommended: No f/u needed for swallowing  See below for language assessment  LANGUAGE ASSESSMENT    Speech/Language Assessment    Patient Name: Josephine Schmitz 64 y o  Today's Date: 3/8/2022      CURRENT MEDICAL   See above     CURRENT COGNITIVE:  Alert and oriented     CURRENT PAIN & RESPONSE TO INTERVENTIONS:  No indication of pain     Pt was assessed using the Western Aphasia Battery revised Bedside with the following findings/results:     AUDITORY COMPREHENSION:  Body part ID: 100%  Object/Picture ID: 100%   Personal yes/no ?'s: 100%  Simple yes/no ?'s: 100%  Moderate y/n ?'s: 90%  One step commands: 100%  Complex or 3 step commands: 90%     READING COMPREHENSION/FLUENCY:  Paragraph comprehension: WFL  Simple paragraph reading fluency: WFL     VERBAL EXPRESSION/EXPR LANGUAGE:  Word/phrase repetition: WNL  Confrontation Namin%  Picture Description: /5  Conversation: Spontaneous speech fluency judged to be mildly reduced observed in a phone call conversation with spouse  Ability to make needs known:  WNL     WRITTEN EXPRESSION:  Name: 100%  Address: 100%  Sentence to dictation: 100%  Sentence formulation: 100%     ORAL MOTOR/SPEECH MECHANISM EXAM:  See above     MOTOR SPEECH:  Dysarthria: Imprecise artic: no              Rate: Mildly slowed               Nasality: WFL              Breath support: WFL              Volume:WFL  Apraxia:              Oral: no              Verbal: no     Needs further motor speech evaluation: No     Cognitive -linguistic skills:  Not assessed, suspect WNL     Symptoms noted:  Aware of deficits: yes       WAB Bedside:  Bedside Aphasia Score 95%  Bedside Language Score 96%     Summary/Impression:  Based on results of the bedside WAB assessment, pt's language function remains grossly intact  Slight difficulty with speech fluency noted in a phone conversation with pt and his wife, however all other interactions were judged to be [facility]  Pt appears to have good awareness of mild deficits  Recommend following up with physicians, if pt has any concerns re: unresolved cognitive-linguistic function upon return home, he may benefit from more in-depth assessment as an outpatient   Pt in agreement       Treatment Recommended and Frequency:   No f/u needed as inpatient    Impression and Recommendations reviewed with:   Patient     Patient Stated Goal:   To go home and promptly return to work     Education Initiated with:   Patient

## 2022-03-09 LAB
ANION GAP SERPL CALCULATED.3IONS-SCNC: 6 MMOL/L (ref 4–13)
ATRIAL RATE: 63 BPM
ATRIAL RATE: 75 BPM
BUN SERPL-MCNC: 16 MG/DL (ref 5–25)
CALCIUM SERPL-MCNC: 8.8 MG/DL (ref 8.3–10.1)
CHLORIDE SERPL-SCNC: 108 MMOL/L (ref 100–108)
CO2 SERPL-SCNC: 26 MMOL/L (ref 21–32)
CREAT SERPL-MCNC: 1.08 MG/DL (ref 0.6–1.3)
ERYTHROCYTE [DISTWIDTH] IN BLOOD BY AUTOMATED COUNT: 13.2 % (ref 11.6–15.1)
GFR SERPL CREATININE-BSD FRML MDRD: 76 ML/MIN/1.73SQ M
GLUCOSE SERPL-MCNC: 146 MG/DL (ref 65–140)
GLUCOSE SERPL-MCNC: 155 MG/DL (ref 65–140)
GLUCOSE SERPL-MCNC: 159 MG/DL (ref 65–140)
GLUCOSE SERPL-MCNC: 164 MG/DL (ref 65–140)
GLUCOSE SERPL-MCNC: 178 MG/DL (ref 65–140)
GLUCOSE SERPL-MCNC: 189 MG/DL (ref 65–140)
HCT VFR BLD AUTO: 43.7 % (ref 36.5–49.3)
HGB BLD-MCNC: 14.9 G/DL (ref 12–17)
MAGNESIUM SERPL-MCNC: 2.1 MG/DL (ref 1.6–2.6)
MCH RBC QN AUTO: 29.6 PG (ref 26.8–34.3)
MCHC RBC AUTO-ENTMCNC: 34.1 G/DL (ref 31.4–37.4)
MCV RBC AUTO: 87 FL (ref 82–98)
P AXIS: 53 DEGREES
P AXIS: 68 DEGREES
PHOSPHATE SERPL-MCNC: 2.8 MG/DL (ref 2.7–4.5)
PLATELET # BLD AUTO: 216 THOUSANDS/UL (ref 149–390)
PMV BLD AUTO: 11.9 FL (ref 8.9–12.7)
POTASSIUM SERPL-SCNC: 3.7 MMOL/L (ref 3.5–5.3)
PR INTERVAL: 154 MS
PR INTERVAL: 168 MS
QRS AXIS: -73 DEGREES
QRS AXIS: -80 DEGREES
QRSD INTERVAL: 83 MS
QRSD INTERVAL: 86 MS
QT INTERVAL: 422 MS
QT INTERVAL: 454 MS
QTC INTERVAL: 431 MS
QTC INTERVAL: 508 MS
RBC # BLD AUTO: 5.03 MILLION/UL (ref 3.88–5.62)
SODIUM SERPL-SCNC: 140 MMOL/L (ref 136–145)
T WAVE AXIS: 62 DEGREES
T WAVE AXIS: 76 DEGREES
VENTRICULAR RATE: 63 BPM
VENTRICULAR RATE: 75 BPM
WBC # BLD AUTO: 9.89 THOUSAND/UL (ref 4.31–10.16)

## 2022-03-09 PROCEDURE — 80048 BASIC METABOLIC PNL TOTAL CA: CPT

## 2022-03-09 PROCEDURE — 84100 ASSAY OF PHOSPHORUS: CPT

## 2022-03-09 PROCEDURE — 93010 ELECTROCARDIOGRAM REPORT: CPT | Performed by: INTERNAL MEDICINE

## 2022-03-09 PROCEDURE — 99233 SBSQ HOSP IP/OBS HIGH 50: CPT | Performed by: PSYCHIATRY & NEUROLOGY

## 2022-03-09 PROCEDURE — 83735 ASSAY OF MAGNESIUM: CPT

## 2022-03-09 PROCEDURE — 85027 COMPLETE CBC AUTOMATED: CPT

## 2022-03-09 PROCEDURE — 82948 REAGENT STRIP/BLOOD GLUCOSE: CPT

## 2022-03-09 PROCEDURE — 93005 ELECTROCARDIOGRAM TRACING: CPT

## 2022-03-09 PROCEDURE — 99232 SBSQ HOSP IP/OBS MODERATE 35: CPT | Performed by: ANESTHESIOLOGY

## 2022-03-09 RX ORDER — ATORVASTATIN CALCIUM 40 MG/1
40 TABLET, FILM COATED ORAL
Status: DISCONTINUED | OUTPATIENT
Start: 2022-03-09 | End: 2022-03-11 | Stop reason: HOSPADM

## 2022-03-09 RX ORDER — DOCUSATE SODIUM 100 MG/1
100 CAPSULE, LIQUID FILLED ORAL DAILY PRN
Status: DISCONTINUED | OUTPATIENT
Start: 2022-03-09 | End: 2022-03-09

## 2022-03-09 RX ORDER — ATORVASTATIN CALCIUM 40 MG/1
40 TABLET, FILM COATED ORAL
Status: DISCONTINUED | OUTPATIENT
Start: 2022-03-09 | End: 2022-03-09

## 2022-03-09 RX ORDER — POLYETHYLENE GLYCOL 3350 17 G/17G
17 POWDER, FOR SOLUTION ORAL DAILY PRN
Status: DISCONTINUED | OUTPATIENT
Start: 2022-03-09 | End: 2022-03-11 | Stop reason: HOSPADM

## 2022-03-09 RX ORDER — CLONIDINE 0.1 MG/24H
0.1 PATCH, EXTENDED RELEASE TRANSDERMAL WEEKLY
Status: DISCONTINUED | OUTPATIENT
Start: 2022-03-09 | End: 2022-03-11 | Stop reason: HOSPADM

## 2022-03-09 RX ORDER — HYDRALAZINE HYDROCHLORIDE 50 MG/1
50 TABLET, FILM COATED ORAL EVERY 12 HOURS
Status: DISCONTINUED | OUTPATIENT
Start: 2022-03-09 | End: 2022-03-11 | Stop reason: HOSPADM

## 2022-03-09 RX ADMIN — INSULIN LISPRO 2 UNITS: 100 INJECTION, SOLUTION INTRAVENOUS; SUBCUTANEOUS at 01:03

## 2022-03-09 RX ADMIN — LOSARTAN POTASSIUM 100 MG: 50 TABLET, FILM COATED ORAL at 08:58

## 2022-03-09 RX ADMIN — AMLODIPINE BESYLATE 10 MG: 10 TABLET ORAL at 08:58

## 2022-03-09 RX ADMIN — HYDRALAZINE HYDROCHLORIDE 50 MG: 50 TABLET, FILM COATED ORAL at 08:58

## 2022-03-09 RX ADMIN — CLONIDINE 0.1 MG: 0.1 PATCH TRANSDERMAL at 11:52

## 2022-03-09 RX ADMIN — NICARDIPINE HYDROCHLORIDE 2 MG/HR: 2.5 INJECTION, SOLUTION INTRAVENOUS at 10:41

## 2022-03-09 RX ADMIN — DOFETILIDE 500 MCG: 500 CAPSULE ORAL at 08:59

## 2022-03-09 RX ADMIN — INSULIN LISPRO 2 UNITS: 100 INJECTION, SOLUTION INTRAVENOUS; SUBCUTANEOUS at 05:39

## 2022-03-09 RX ADMIN — HYDRALAZINE HYDROCHLORIDE 50 MG: 50 TABLET, FILM COATED ORAL at 21:23

## 2022-03-09 RX ADMIN — ATORVASTATIN CALCIUM 40 MG: 40 TABLET, FILM COATED ORAL at 17:23

## 2022-03-09 RX ADMIN — DOFETILIDE 500 MCG: 500 CAPSULE ORAL at 21:23

## 2022-03-09 RX ADMIN — LORAZEPAM 0.5 MG: 0.5 TABLET ORAL at 00:09

## 2022-03-09 RX ADMIN — NEBIVOLOL HYDROCHLORIDE 2.5 MG: 2.5 TABLET ORAL at 08:59

## 2022-03-09 NOTE — UTILIZATION REVIEW
Inpatient Admission Authorization Request   NOTIFICATION OF INPATIENT ADMISSION/INPATIENT AUTHORIZATION REQUEST   SERVICING FACILITY:   [facility]  Address: [address] [place] [address], [address] [place] [address]  Tax ID: [id number]  NPI: [id number]  Place of Service: Inpatient [address]  [address]  Place of Service Code: 24     ATTENDING PROVIDER:  Attending Name and NPI#: Leilani Solomon [[id number]]  Address: [address] [place] [address], [address] [place] [id number]  Phone: [phone]     UTILIZATION REVIEW CONTACT:  Moise Hernández Utilization   Network Utilization Review Department  Phone: [phone]  Fax: [phone]  Email: [email or web address]     PHYSICIAN ADVISORY SERVICES:  FOR CYXA-US-WGDS REVIEW - MEDICAL NECESSITY DENIAL  Phone: [phone]  Fax: [phone]  Email: [email or web address]     TYPE OF REQUEST:  Inpatient Status     ADMISSION INFORMATION:  ADMISSION DATE/TIME: 3/7/22 11:50 PM  PATIENT DIAGNOSIS CODE/DESCRIPTION:  Hemorrhagic stroke ([facility] [address] ) [I61 9]  DISCHARGE DATE/TIME: No discharge date for patient encounter  IMPORTANT INFORMATION:  Please contact the Moise Hern[facility]dez directly with any questions or concerns regarding this request  Department voicemails are confidential     Send requests for admission clinical reviews, concurrent reviews, approvals, and administrative denials due to lack of clinical to fax [phone]

## 2022-03-09 NOTE — PROGRESS NOTES
Daily Progress Note - Critical Care   Mary Stark 64 y o  male MRN: [id number]  Unit/Bed#: ICU 08 Encounter: [id number]        ----------------------------------------------------------------------------------------  HPI/24hr events: 65 yo M was admitted from THE [facility] after he was discovered to have L Basal Ganglia ICH on 03/07/22 after having sudden onset R sided weakness and word finding difficulty  His PMH is significant for uncontrolled HTN amongst other comorbidities  [place]  Patient continues to show slight improvement in neuro exam especially with word finding difficulty  His wife brought in his home CPAP machine however the patient did not use it overnight  No major desaturation events noted by nursing staff  Currently on Cardene gtt @ 3    ---------------------------------------------------------------------------------------  SUBJECTIVE  "I feel okay"  Patient is denying any acute pain or new symptoms and states that he has been tolerating his diet without difficulty  Review of Systems   Constitutional: Negative for chills, fatigue and fever  HENT: Negative for congestion and sore throat  Eyes: Negative for pain and visual disturbance  Respiratory: Negative for cough, chest tightness and shortness of breath  Cardiovascular: Negative for chest pain and palpitations  Gastrointestinal: Negative for abdominal pain, blood in stool, constipation, diarrhea, nausea and vomiting  Genitourinary: Negative for dysuria, flank pain and hematuria  Musculoskeletal: Negative for arthralgias, back pain and neck pain  Skin: Negative for color change and rash  Neurological: Negative for dizziness, syncope and light-headedness  Hematological: Negative for adenopathy  Does not bruise/bleed easily  All other systems reviewed and are negative      Review of systems was reviewed and negative unless stated above in HPI/24-hour events ---------------------------------------------------------------------------------------  Assessment and Plan:    Neuro:    Diagnosis: Left Basal Ganglia ICH  o ICH score initially: 0  o Evidence of compression and mass effect  o CT Head from 3/7/22: L Lentiform Nuclei hemorrhage 3 7 x 2 1 x 2 3 cm, surrounding vasogenic edema, no midline shift  o F/u Head CT from 3/8/22: No significant change in L Basal Ganglia parenchymal hematoma, no midline shift  o Brain MRI from 03/09: waiting on final read as of this note writing  o TTE 03/08: LVEF 65%, no RWMA, Gradee I Diastolic Dysfx  o Neurology following  o Neurosurgery evaluated, no acute interventions indicated at this time  o Passed bedside speech/swallow  o Plan: SBP goal 120-150 (plan in CV section below)  o q2h Neuro checks  o STAT CT head if GCS declines >2  o PMR consult   Diagnosis:  At risk for ICU delerium  o Plan: CAM-ICU daily  o Sleep/wake cycle regulation      CV:    Diagnosis: History of uncontrolled HTN, now Hypertensive Emergency  o Plan: SBP goal 120-150 as above  o Cardene gtt, wean as able today to maintain goal SBP  o Home Amlodipine  o Home ARB (losartan is formulary equivalent)  o Home Bystolic  o Home Clonidine patch  o PRN's for SBP goal   Diagnosis: aFib  o Home xarelto  o Telemetry  o ECG 03/08: NSR, evidence of prior sepatal infarct, no acute ST abnormalities   o Plan: Hold AC in setting of recent [address]  o Restarted home Tikosyn yesterday      Pulm:   Diagnosis: HARPER  o Patient states he has been evaluated for HARPER in the past  o Plan: Home CPAP machine   Diagnosis: Reactive Airway  o Plan: Albuterol PRN      GI:    Diagnosis: Diet + Bowel Regimen  o Plan: Regular House Diet  o Colace scheduled, miralax PRN      :    Diagnosis: CKD stage III  o Plan: Monitor I/O  o Trend daily weights  o Monitor Cr on daily BMP      F/E/N:    F: None indicated   E: Maintain goal K>4 0, Ph>3 0, Mg>2 0   N: Regular House Diet      Heme/Onc:  Diagnosis: ICH in setting of home AC use (xarelto)  o Reversed with Nohemi Camilo on 03/07  o Plan: Monitor Hgb daily CBC   Diagnosis: DVT ppx  o Plan: Hold AC/AP and pharmacologic DVT ppx at this time  o SCD's      Endo:    Diagnosis: Obesity  o Recent 40lb weight gain that patient attributes to changes in his home antihypertensive regimen  o High LDL, low HDL, High TG's on lipid panel  o Plan: Weight control counseling in setting of malignant HTN and CVA   Diagnosis: Blood glucose goals  o A1C on admission: 7 5  o Plan: SSI algorithm 4      ID:    Diagnosis: No acute issues  o Plan: Trend fever curve and WBC daily  o Monitor off ABX for now      MSK/Skin:    Diagnosis: Mobilization plan post ICH  o Plan: PT/OT evaluate and treat    Patient appropriate for transfer out of the ICU today?: Patient does not meet criteria for referral to the ICU Follow-Up Clinic; referral has not been made  Disposition: Transfer to Stepdown Level 1   Code Status: Level 1 - Full Code  ---------------------------------------------------------------------------------------  ICU CORE MEASURES    Prophylaxis   VTE Pharmacologic Prophylaxis: Pharmacologic VTE Prophylaxis contraindicated due to recent ICH  VTE Mechanical Prophylaxis: sequential compression device  Stress Ulcer Prophylaxis: Prophylaxis Not Indicated     ABCDE Protocol (if indicated)  Plan to perform spontaneous awakening trial today? Not applicable  Plan to perform spontaneous breathing trial today? Not applicable  Obvious barriers to extubation? Not applicable  CAM-ICU: Negative    Invasive Devices Review  Invasive Devices  Report    Peripheral Intravenous Line            Peripheral IV 03/07/22 Right Antecubital 1 day    Peripheral IV 03/07/22 Right Antecubital 1 day              Can any invasive devices be discontinued today?  No  ---------------------------------------------------------------------------------------  OBJECTIVE    Vitals   Vitals:    03/09/22 0000 22 0100 22 0200 22 0300   BP: 127/64      BP Location:       Pulse: 56 60 56 (!) 54   Resp: 16 (!) 51 18 16   Temp:       TempSrc:       SpO2: 94% 96% 96% 96%   Weight:       Height:         Temp (24hrs), Av °F (36 7 °C), Min:97 7 °F (36 5 °C), Max:98 2 °F (36 8 °C)  Current: Temperature: 98 °F (36 7 °C)  HR: 56  BP: 127/64  RR: 16  SpO2: 94% on RA    Respiratory: RA       Invasive/non-invasive ventilation settings   Respiratory  Report   Lab Data (Last 4 hours)    None         O2/Vent Data (Last 4 hours)    None                Physical Exam  Vitals and nursing note reviewed  Constitutional:       General: He is not in acute distress  Appearance: He is well-developed  He is obese  He is not toxic-appearing or diaphoretic  HENT:      Head: Normocephalic and atraumatic  Right Ear: External ear normal       Left Ear: External ear normal       Nose: Nose normal  No congestion or rhinorrhea  Mouth/Throat:      Mouth: Mucous membranes are moist       Pharynx: No oropharyngeal exudate or posterior oropharyngeal erythema  Eyes:      General: No visual field deficit or scleral icterus  Extraocular Movements: Extraocular movements intact  Conjunctiva/sclera: Conjunctivae normal       Pupils: Pupils are equal, round, and reactive to light  Cardiovascular:      Rate and Rhythm: Regular rhythm  Bradycardia present  Pulses: Normal pulses  Heart sounds: No murmur heard  No friction rub  No gallop  Pulmonary:      Effort: Pulmonary effort is normal  No respiratory distress  Breath sounds: Normal breath sounds  No wheezing or rales  Abdominal:      Palpations: Abdomen is soft  There is no mass  Tenderness: There is no abdominal tenderness  There is no right CVA tenderness, left CVA tenderness or guarding  Hernia: No hernia is present  Musculoskeletal:         General: No swelling  Normal range of motion        Cervical back: Normal range of motion and neck supple  Right lower leg: No edema  Left lower leg: No edema  Skin:     General: Skin is warm and dry  Capillary Refill: Capillary refill takes less than 2 seconds  Coloration: Skin is not jaundiced  Findings: No bruising  Neurological:      General: No focal deficit present  Mental Status: He is alert and oriented to person, place, and time  Cranial Nerves: Facial asymmetry (R facial droop) present  No dysarthria  Sensory: Sensory deficit (RLE) present  Motor: Pronator drift (RUE) present  No weakness (R = L, 5/5 strength throughout), tremor or seizure activity  Coordination: Romberg sign negative  Coordination abnormal  Finger-Nose-Finger Test abnormal (R sided)        Comments: Occasional word finding difficulty with word replacement   Psychiatric:         Mood and Affect: Mood normal          Behavior: Behavior normal              Laboratory and Diagnostics:  Results from last 7 days   Lab Units 03/08/22  0500 03/08/22  0146 03/07/22  2132   WBC Thousand/uL 11 15* 11 31* 12 44*   HEMOGLOBIN g/dL 14 7 14 3 15 5   HEMATOCRIT % 42 1 41 1 44 3   PLATELETS Thousands/uL 199 196 222   NEUTROS PCT % 77*  --   --    MONOS PCT % 6  --   --    MONO PCT %  --  2*  --      Results from last 7 days   Lab Units 03/08/22  0500 03/08/22  0146 03/07/22  2132   SODIUM mmol/L 137 138 140   POTASSIUM mmol/L 3 7 3 6 3 7   CHLORIDE mmol/L 105 107 103   CO2 mmol/L 27 28 28   ANION GAP mmol/L 5 3* 9   BUN mg/dL 12 13 15   CREATININE mg/dL 1 17 1 11 1 15   CALCIUM mg/dL 8 5 9 2 8 8   GLUCOSE RANDOM mg/dL 212* 191* 186*     Results from last 7 days   Lab Units 03/08/22  0500 03/08/22  0146   MAGNESIUM mg/dL 1 9 1 9   PHOSPHORUS mg/dL 2 4* 3 0      Results from last 7 days   Lab Units 03/08/22  0146 03/07/22  2132   INR  1 13 1 27*   PTT seconds 34 34              ABG:    VBG:          Micro        EKG: NSR, left axis deviation, previous septal infarct, No significant change from prior  Imaging:  I have personally reviewed pertinent reports  Intake and Output  I/O       03/07 0701 03/08 0700 03/08 0701 03/09 0700    P  O   480    I V  (mL/kg) 114 2 (0 8) 365 3 (2 5)    IV Piggyback  50    Total Intake(mL/kg) 114 2 (0 8) 895 3 (6 2)    Urine (mL/kg/hr) 350 1300 (0 4)    Total Output 350 1300    Net -235 8 -404 8          Unmeasured Urine Occurrence 1 x         UOP: 55 ml/hr     Height and Weights   Height: 5' 10" (177 8 cm)  IBW (Ideal Body Weight): 73 kg  Body mass index is 45 77 kg/m²  Weight (last 2 days)     Date/Time Weight    03/08/22 0848 145 (319)    03/08/22 0540 145 (319 67)    03/08/22 0014 145 (319 67)            Nutrition       Diet Orders   (From admission, onward)             Start     Ordered    03/08/22 1016  Diet Regular; Regular House  Diet effective now        References:    Nutrtion Support Algorithm Enteral vs  Parenteral   Question Answer Comment   Diet Type Regular    Regular Regular House    RD to adjust diet per protocol?  No        03/08/22 1016                  Active Medications  Scheduled Meds:  Current Facility-Administered Medications   Medication Dose Route Frequency Provider Last Rate    acetaminophen  650 mg Oral Q4H PRN Milan Mccann MD      albuterol  2 puff Inhalation Q4H PRN Edilma Coles,       amLODIPine  10 mg Oral Daily Sperry, DO      docusate sodium  100 mg Oral BID Milan Mccann MD      dofetilide  500 mcg Oral Q12H [facility] [place], [place]      hydrALAZINE  10 mg Intravenous Q6H PRN Kwaku Muñoz PA-C      insulin lispro  2-12 Units Subcutaneous Q6H [facility] [place] [id number] [facility]      Labetalol HCl  10 mg Intravenous Q6H PRN Jarod, DO      losartan  100 mg Oral Daily [place], [place]      nebivolol  2 5 mg Oral Daily Giovanny Hernandes MD      niCARdipine  1-15 mg/hr Intravenous Titrated Milan Mccann MD 5 mg/hr (03/08/22 2302)    ondansetron  4 mg Intravenous Q6H PRN Milan Mccann MD  polyethylene glycol  17 g Oral Daily PRN Angelica Pimentel MD       Continuous Infusions:  niCARdipine, 1-15 mg/hr, Last Rate: 5 mg/hr (03/08/22 2302)      PRN Meds:   acetaminophen, 650 mg, Q4H PRN  albuterol, 2 puff, Q4H PRN  hydrALAZINE, 10 mg, Q6H PRN  Labetalol HCl, 10 mg, Q6H PRN  ondansetron, 4 mg, Q6H PRN  polyethylene glycol, 17 g, Daily PRN        Allergies   No Known Allergies  ---------------------------------------------------------------------------------------  Advance Directive and Living Will:      Power of :    POLST:    ---------------------------------------------------------------------------------------  Care Time Delivered:   No Critical Care time spent     Angelica Pimentel MD      Portions of the record may have been created with voice recognition software  Occasional wrong word or "sound a like" substitutions may have occurred due to the inherent limitations of voice recognition software    Read the chart carefully and recognize, using context, where substitutions have occurred

## 2022-03-09 NOTE — PLAN OF CARE
Problem: PAIN - ADULT  Goal: Verbalizes/displays adequate comfort level or baseline comfort level  Description: Interventions:  - Encourage patient to monitor pain and request assistance  - Assess pain using appropriate pain scale  - Administer analgesics based on type and severity of pain and evaluate response  - Implement non-pharmacological measures as appropriate and evaluate response  - Consider cultural and social influences on pain and pain management  - Notify physician/advanced practitioner if interventions unsuccessful or patient reports new pain  Outcome: Progressing     Problem: INFECTION - ADULT  Goal: Absence or prevention of progression during hospitalization  Description: INTERVENTIONS:  - Assess and monitor for signs and symptoms of infection  - Monitor lab/diagnostic results  - Monitor all insertion sites, i e  indwelling lines, tubes, and drains  - Monitor endotracheal if appropriate and nasal secretions for changes in amount and color  - [place] appropriate cooling/warming therapies per order  - Administer medications as ordered  - Instruct and encourage patient and family to use good hand hygiene technique  - Identify and instruct in appropriate isolation precautions for identified infection/condition  Outcome: Progressing     Problem: SAFETY ADULT  Goal: Patient will remain free of falls  Description: INTERVENTIONS:  - Educate patient/family on patient safety including physical limitations  - Instruct patient to call for assistance with activity   - Consult OT/PT to assist with strengthening/mobility   - Keep Call bell within reach  - Keep bed low and locked with side rails adjusted as appropriate  - Keep care items and personal belongings within reach  - Initiate and maintain comfort rounds  - Make Fall Risk Sign visible to staff  - Offer Toileting every  Hours, in advance of need  - Initiate/Maintain alarm  - Obtain necessary fall risk management equipment:   - Apply yellow socks and bracelet for high fall risk patients  - Consider moving patient to room near nurses station  Outcome: Progressing  Goal: Maintain or return to baseline ADL function  Description: INTERVENTIONS:  -  Assess patient's ability to carry out ADLs; assess patient's baseline for ADL function and identify physical deficits which impact ability to perform ADLs (bathing, care of mouth/teeth, toileting, grooming, dressing, etc )  - Assess/evaluate cause of self-care deficits   - Assess range of motion  - Assess patient's mobility; develop plan if impaired  - Assess patient's need for assistive devices and provide as appropriate  - Encourage maximum independence but intervene and supervise when necessary  - Involve family in performance of ADLs  - Assess for home care needs following discharge   - Consider OT consult to assist with ADL evaluation and planning for discharge  - Provide patient education as appropriate  Outcome: Progressing  Goal: Maintains/Returns to pre admission functional level  Description: INTERVENTIONS:  - Perform BMAT or MOVE assessment daily    - Set and communicate daily mobility goal to care team and patient/family/caregiver  - Collaborate with rehabilitation services on mobility goals if consulted  - Perform Range of Motion  times a day  - Reposition patient every  hours    - Dangle patient  times a day  - Stand patient  times a day  - Ambulate patient  times a day  - Out of bed to chair  times a day   - Out of bed for meals  times a day  - Out of bed for toileting  - Record patient progress and toleration of activity level   Outcome: Progressing     Problem: DISCHARGE PLANNING  Goal: Discharge to home or other facility with appropriate resources  Description: INTERVENTIONS:  - Identify barriers to discharge w/patient and caregiver  - Arrange for needed discharge resources and transportation as appropriate  - Identify discharge learning needs (meds, wound care, etc )  - Arrange for interpretive services to assist at discharge as needed  - Refer to Case Management Department for coordinating discharge planning if the patient needs post-hospital services based on physician/advanced practitioner order or complex needs related to functional status, cognitive ability, or social support system  Outcome: Progressing     Problem: Knowledge Deficit  Goal: Patient/family/caregiver demonstrates understanding of disease process, treatment plan, medications, and discharge instructions  Description: Complete learning assessment and assess knowledge base    Interventions:  - Provide teaching at level of understanding  - Provide teaching via preferred learning methods  Outcome: Progressing     Problem: Potential for Falls  Goal: Patient will remain free of falls  Description: INTERVENTIONS:  - Educate patient/family on patient safety including physical limitations  - Instruct patient to call for assistance with activity   - Consult OT/PT to assist with strengthening/mobility   - Keep Call bell within reach  - Keep bed low and locked with side rails adjusted as appropriate  - Keep care items and personal belongings within reach  - Initiate and maintain comfort rounds  - Make Fall Risk Sign visible to staff  - Offer Toileting every  Hours, in advance of need  - Initiate/Maintain alarm  - Obtain necessary fall risk management equipment: Apply yellow socks and bracelet for high fall risk patients  - Consider moving patient to room near nurses station  Outcome: Progressing     Problem: MOBILITY - ADULT  Goal: Maintain or return to baseline ADL function  Description: INTERVENTIONS:  -  Assess patient's ability to carry out ADLs; assess patient's baseline for ADL function and identify physical deficits which impact ability to perform ADLs (bathing, care of mouth/teeth, toileting, grooming, dressing, etc )  - Assess/evaluate cause of self-care deficits   - Assess range of motion  - Assess patient's mobility; develop plan if impaired  - Assess patient's need for assistive devices and provide as appropriate  - Encourage maximum independence but intervene and supervise when necessary  - Involve family in performance of ADLs  - Assess for home care needs following discharge   - Consider OT consult to assist with ADL evaluation and planning for discharge  - Provide patient education as appropriate  Outcome: Progressing  Goal: Maintains/Returns to pre admission functional level  Description: INTERVENTIONS:  - Perform BMAT or MOVE assessment daily    - Set and communicate daily mobility goal to care team and patient/family/caregiver  - Collaborate with rehabilitation services on mobility goals if consulted  - Perform Range of Motion  times a day  - Reposition patient every  hours    - Dangle patient  times a day  - Stand patient  times a day  - Ambulate patient  times a day  - Out of bed to chair  times a day   - Out of bed for meals  times a day  - Out of bed for toileting  - Record patient progress and toleration of activity level   Outcome: Progressing

## 2022-03-09 NOTE — PLAN OF CARE
Problem: PAIN - ADULT  Goal: Verbalizes/displays adequate comfort level or baseline comfort level  Description: Interventions:  - Encourage patient to monitor pain and request assistance  - Assess pain using appropriate pain scale  - Administer analgesics based on type and severity of pain and evaluate response  - Implement non-pharmacological measures as appropriate and evaluate response  - Consider cultural and social influences on pain and pain management  - Notify physician/advanced practitioner if interventions unsuccessful or patient reports new pain  Outcome: Progressing     Problem: INFECTION - ADULT  Goal: Absence or prevention of progression during hospitalization  Description: INTERVENTIONS:  - Assess and monitor for signs and symptoms of infection  - Monitor lab/diagnostic results  - Monitor all insertion sites, i e  indwelling lines, tubes, and drains  - Monitor endotracheal if appropriate and nasal secretions for changes in amount and color  - [place] appropriate cooling/warming therapies per order  - Administer medications as ordered  - Instruct and encourage patient and family to use good hand hygiene technique  - Identify and instruct in appropriate isolation precautions for identified infection/condition  Outcome: Progressing     Problem: SAFETY ADULT  Goal: Patient will remain free of falls  Description: INTERVENTIONS:  - Educate patient/family on patient safety including physical limitations  - Instruct patient to call for assistance with activity   - Consult OT/PT to assist with strengthening/mobility   - Keep Call bell within reach  - Keep bed low and locked with side rails adjusted as appropriate  - Keep care items and personal belongings within reach  - Initiate and maintain comfort rounds  - Make Fall Risk Sign visible to staff  - Offer Toileting every 4 Hours, in advance of need  - Initiate/Maintain bed alarm  - Obtain necessary fall risk management equipment: bedalarm  - Apply yellow socks and bracelet for high fall risk patients  - Consider moving patient to room near nurses station  Outcome: Progressing  Goal: Maintain or return to baseline ADL function  Description: INTERVENTIONS:  -  Assess patient's ability to carry out ADLs; assess patient's baseline for ADL function and identify physical deficits which impact ability to perform ADLs (bathing, care of mouth/teeth, toileting, grooming, dressing, etc )  - Assess/evaluate cause of self-care deficits   - Assess range of motion  - Assess patient's mobility; develop plan if impaired  - Assess patient's need for assistive devices and provide as appropriate  - Encourage maximum independence but intervene and supervise when necessary  - Involve family in performance of ADLs  - Assess for home care needs following discharge   - Consider OT consult to assist with ADL evaluation and planning for discharge  - Provide patient education as appropriate  Outcome: Progressing  Goal: Maintains/Returns to pre admission functional level  Description: INTERVENTIONS:  - Perform BMAT or MOVE assessment daily    - Set and communicate daily mobility goal to care team and patient/family/caregiver  - Collaborate with rehabilitation services on mobility goals if consulted  - Perform Range of Motion 4 times a day  - Reposition patient every 2 hours    - Dangle patient 4 times a day  - Stand patient 4 times a day  - Ambulate patient 4 times a day  - Out of bed to chair 4 times a day   - Out of bed for meals 4 times a day  - Out of bed for toileting  - Record patient progress and toleration of activity level   Outcome: Progressing     Problem: DISCHARGE PLANNING  Goal: Discharge to home or other facility with appropriate resources  Description: INTERVENTIONS:  - Identify barriers to discharge w/patient and caregiver  - Arrange for needed discharge resources and transportation as appropriate  - Identify discharge learning needs (meds, wound care, etc )  - Arrange for interpretive services to assist at discharge as needed  - Refer to Case Management Department for coordinating discharge planning if the patient needs post-hospital services based on physician/advanced practitioner order or complex needs related to functional status, cognitive ability, or social support system  Outcome: Progressing     Problem: Knowledge Deficit  Goal: Patient/family/caregiver demonstrates understanding of disease process, treatment plan, medications, and discharge instructions  Description: Complete learning assessment and assess knowledge base    Interventions:  - Provide teaching at level of understanding  - Provide teaching via preferred learning methods  Outcome: Progressing     Problem: Potential for Falls  Goal: Patient will remain free of falls  Description: INTERVENTIONS:  - Educate patient/family on patient safety including physical limitations  - Instruct patient to call for assistance with activity   - Consult OT/PT to assist with strengthening/mobility   - Keep Call bell within reach  - Keep bed low and locked with side rails adjusted as appropriate  - Keep care items and personal belongings within reach  - Initiate and maintain comfort rounds  - Make Fall Risk Sign visible to staff  - Offer Toileting every 4 Hours, in advance of need  - Initiate/Maintain bed alarm  - Obtain necessary fall risk management equipment: bed alarm  - Apply yellow socks and bracelet for high fall risk patients  - Consider moving patient to room near nurses station  Outcome: Progressing     Problem: MOBILITY - ADULT  Goal: Maintain or return to baseline ADL function  Description: INTERVENTIONS:  -  Assess patient's ability to carry out ADLs; assess patient's baseline for ADL function and identify physical deficits which impact ability to perform ADLs (bathing, care of mouth/teeth, toileting, grooming, dressing, etc )  - Assess/evaluate cause of self-care deficits   - Assess range of motion  - Assess patient's mobility; develop plan if impaired  - Assess patient's need for assistive devices and provide as appropriate  - Encourage maximum independence but intervene and supervise when necessary  - Involve family in performance of ADLs  - Assess for home care needs following discharge   - Consider OT consult to assist with ADL evaluation and planning for discharge  - Provide patient education as appropriate  Outcome: Progressing  Goal: Maintains/Returns to pre admission functional level  Description: INTERVENTIONS:  - Perform BMAT or MOVE assessment daily    - Set and communicate daily mobility goal to care team and patient/family/caregiver  - Collaborate with rehabilitation services on mobility goals if consulted  - Perform Range of Motion 4 times a day  - Reposition patient every 2 hours    - Dangle patient 4 times a day  - Stand patient 4 times a day  - Ambulate patient 4 times a day  - Out of bed to chair 4 times a day   - Out of bed for meals 4 times a day  - Out of bed for toileting  - Record patient progress and toleration of activity level   Outcome: Progressing

## 2022-03-09 NOTE — UTILIZATION REVIEW
Initial Clinical Review    Admission: Date/Time/Statement:   Admission Orders (From admission, onward)     Ordered        03/08/22 0005  Inpatient Admission  Once                      Orders Placed This Encounter   Procedures    Inpatient Admission     Standing Status:   Standing     Number of Occurrences:   1     Order Specific Question:   Level of Care     Answer:   Critical Care [15]     Order Specific Question:   Estimated length of stay     Answer:   More than 2 Midnights     Order Specific Question:   Certification     Answer:   I certify that inpatient services are medically necessary for this patient for a duration of greater than two midnights  See H&P and MD Progress Notes for additional information about the patient's course of treatment  ED Arrival Information     Patient not seen in ED                     Initial Presentation:   57y Male, transfer from [facility] ED on 3/7 with right-sided weakness and numbness  He was at home on a video conference call when pt first noted word finding difficulty  He attempted to go to sleep, but woke up feeling "off" and weak on the right side  In ED stroke alert activated  CT Head showed acute left intraparenchymal hemorrhage measuring 3 7 x 2 1 x 2 3 with minimal surrounding vasogenic edema  Pt given Shelby Su and transferred for Neurosurgery evaluation  Pt started on Cardene drip prior to transfer  PMH for HTN, Afib on Xarelto, HTN, Sleep Apnea, Arthritis and Morbid obesity  Admit Inpatient level of care for Acute L ICH and Hypertensive emergency  NPO  Neuro checks q1h  Neurosurgery consult  SBP goal 130-150  S/p KCentra for Xarelto reversal, hold all AC/AP  F/u repeat CTH  Continues on Cardene drip for goal -150 given ICH  Hold home norvasc, nebivolol, hydralazine, clonidine and Xarelto  On tikosyn at home, consider restarting today  Telemetry monitoring  F/u Echo  Check hgb A1C and lipid panel  Goal -180  On exam; Slight R facial droop   Decreased sensation of R foot, slight weakness to RLE  + R pronator drift  Some word finding difficulty  Pronator drift  3/8  Progress notes; Continue with neurologic checks Q 1 hour  Repeat imaging at 24 hours status initial imaging  MRI Brain  Statin medication ordered  Continue with stroke protocol with PT/OT and speech evaluation  HbA1C, lipid panel and echo ordered  Neurology consult  Neurosurgery consultation in am      Neurology cons; ICH  S/p Kcentra  SBP goal <140  Hold all AC/AP at this time  MRI Brain  Monitor neuro exam  Stat CT Head with any acute change in neuro exam  Continue tele monitoring and Cardene drip  Neurosurgery cons; Continue frequent neuro checks  Recommend SBP <160 mmHg  Hold/avoid all antiplatelet and anticoagulation meds  Ongoing medical management  No neurosurgical intervention is anticipated at this time  Date: 3/10  Day 2:   Progress notes; ICH score initially 0  Evidence of compression and mass effect  MRI Brain pending  Plan: SBP goal 120-150  Neuro checks q2h  Continues on Cardene drip, wean as able today to maintain goal SBP  Continue tele monitoring  Restarted home Tikosyn yesterday  On regular diet  Monitor Hgb daily Cbc     CT Head from 3/7/22: L Lentiform Nuclei hemorrhage 3 7 x 2 1 x 2 3 cm, surrounding vasogenic edema, no midline shift  F/u Head CT from 3/8/22: No significant change in L Basal Ganglia parenchymal hematoma, no midline shift    Vitals   Temperature Pulse Respirations Blood Pressure SpO2   03/08/22 0000 03/08/22 0000 03/08/22 0000 03/08/22 0000 03/08/22 0000   98 7 °F (37 1 °C) 66 18 156/74 97 %      Temp Source Heart Rate Source Patient Position - Orthostatic VS BP Location FiO2 (%)   03/08/22 0000 03/08/22 0000 03/08/22 0000 03/08/22 0000 --   Oral Monitor Lying Left arm       Pain Score       03/07/22 2358       No Pain          Wt Readings from Last 1 Encounters:   03/09/22 (!) 145 kg (319 lb 10 7 oz)     Additional Vital Signs:   03/09/22 0732 97 7 °F (36 5 °C) 58 13 157/81 112 96 % None (Room air) Lying   03/09/22 0700 -- 56 15 135/68 -- 94 % -- --   03/09/22 0600 -- 56 16 135/75 -- 94 % -- --   03/09/22 0500 -- 60 14 140/78 -- 96 % --      03/08/22 1600 97 7 °F (36 5 °C) 60 16 144/73 102 95 % -- --   03/08/22 1500 -- 60 16 139/75 93 95 %       03/08/22 1251 -- 58 22 138/73 97 97 % -- Sitting   03/08/22 1200 -- 60 19 186/90   Abnormal  127 96 % -- --   03/08/22 1100 -- 58 19 130/102   Abnormal  118 96 % -- --   03/08/22 1000 -- 60 15 146/79 104 95 %       Pertinent Labs/Diagnostic Test Results:   MRI brain wo contrast   Final Result by Rukhsana Heck MD (03/09 7859)      Small acute infarct in left frontal corona radiata  Unchanged 3 3 cm acute intraparenchymal hematoma centered in left posterior putamen with mild surrounding vasogenic edema and mild mass effect on left lateral ventricle  Workstation performed: [id number]         CT head wo contrast   Final Result by Samuel Packer MD (03/08 0820)      No significant change in left basal ganglia parenchymal hematoma and minimal surrounding vasogenic edema  No midline shift  CT Stroke alert brain 3/7   Acute hemorrhage in the left lentiform nuclei measuring 3 7 x 2 1 x 2 3 cm (9 cc)  Minimal surrounding vasogenic edema  No midline shift       CTA stroke alert (head/neck) 3/7   No evidence of active bleeding within the left lentiform nuclei hemorrhage  No findings to indicate intracranial aneurysm or vascular malformation  No stenosis, dissection or occlusion of the carotid or vertebral arteries or major vessels of the     EKG 3/7  NSR    PCXR 3/7  Clear lungs        Results from last 7 days   Lab Units 03/09/22  0500 03/08/22  0500 03/08/22  0146 03/07/22  2132 03/07/22  2132   WBC Thousand/uL 9 89 11 15* 11 31*   < > 12 44*   HEMOGLOBIN g/dL 14 9 14 7 14 3  --  15 5   HEMATOCRIT % 43 7 42 1 41 1  --  44 3   PLATELETS Thousands/uL 216 199 196   < > 222   NEUTROS ABS Thousands/µL  -- 8 61*  --   --   --     < > = values in this interval not displayed           Results from last 7 days   Lab Units 03/09/22  0500 03/08/22  0500 03/08/22  0146 03/07/22  2132   SODIUM mmol/L 140 137 138 140   POTASSIUM mmol/L 3 7 3 7 3 6 3 7   CHLORIDE mmol/L 108 105 107 103   CO2 mmol/L 26 27 28 28   ANION GAP mmol/L 6 5 3* 9   BUN mg/dL 16 12 13 15   CREATININE mg/dL 1 08 1 17 1 11 1 15   EGFR ml/min/1 73sq m 76 69 73 70   CALCIUM mg/dL 8 8 8 5 9 2 8 8   CALCIUM, IONIZED mmol/L  --   --  1 11*  --    MAGNESIUM mg/dL 2 1 1 9 1 9  --    PHOSPHORUS mg/dL 2 8 2 4* 3 0  --          Results from last 7 days   Lab Units 03/09/22  0616 03/09/22  0010 03/08/22  1811 03/08/22  1203 03/07/22  2131   POC GLUCOSE mg/dl 146* 189* 143* 179* 189*     Results from last 7 days   Lab Units 03/09/22  0500 03/08/22  0500 03/08/22  0146 03/07/22  2132   GLUCOSE RANDOM mg/dL 155* 212* 191* 186*         Results from last 7 days   Lab Units 03/08/22  0146   HEMOGLOBIN A1C % 7 5*   EAG mg/dl 169       Results from last 7 days   Lab Units 03/07/22  2132   HS TNI 0HR ng/L 6         Results from last 7 days   Lab Units 03/08/22  0146 03/07/22  2132   PROTIME seconds 14 0 15 8*   INR  1 13 1 27*   PTT seconds 34 34       Past Medical History:   Diagnosis Date    Ankle swelling     Arthritis     Asthma     Atrial fibrillation (HCC)     Atrial fibrillation with RVR (HCC) 4/4/2017    Chronic kidney disease     kidney stones    Gout     Hypertension     Kidney stone     Night sweats     Seasonal allergies     Sleep apnea      Present on Admission:   Asthma   Chronic atrial fibrillation (HCC)   Benign essential hypertension      Admitting Diagnosis: Hemorrhagic stroke ([facility] [address] ) [I61 9]  Age/Sex: 64 y o  male     Admission Orders:  Scheduled Medications:  amLODIPine, 10 mg, Oral, Daily  atorvastatin, 40 mg, Oral, Daily With Dinner  cloNIDine, 0 1 mg, Transdermal, Weekly  docusate sodium, 100 mg, Oral, BID  dofetilide, 500 mcg, Oral, Q12H [address]  hydrALAZINE, 50 mg, Oral, Q12H  insulin lispro, 2-12 Units, Subcutaneous, Q6H ANUJ  losartan, 100 mg, Oral, Daily  nebivolol, 2 5 mg, Oral, Daily      Continuous IV Infusions:  niCARdipine, 1-15 mg/hr, Intravenous, Titrated      PRN Meds:  acetaminophen, 650 mg, Oral, Q4H PRN  albuterol, 2 puff, Inhalation, Q4H PRN  hydrALAZINE, 10 mg, Intravenous, Q6H PRN  Labetalol HCl, 10 mg, Intravenous, Q6H PRN  ondansetron, 4 mg, Intravenous, Q6H PRN  polyethylene glycol, 17 g, Oral, Daily PRN      Echo  Neuro checks q1h  Speech language evakl and treat  IP CONSULT TO CASE MANAGEMENT  IP CONSULT TO NEUROSURGERY  IP CONSULT TO PHYSICAL MEDICINE REHAB  IP CONSULT TO NEUROLOGY    Network Utilization Review Department  ATTENTION: Please call with any questions or concerns to [phone] and carefully listen to the prompts so that you are directed to the right person  All voicemails are confidential   [facility] all requests for admission clinical reviews, approved or denied determinations and any other requests to dedicated fax number below belonging to the campus where the patient is receiving treatment   List of dedicated fax numbers for the Facilities:  [address] DENIALS (Administrative/Medical Necessity) [phone]   [address] (Maternity/NICU/Pediatrics) [phone]   [address]  [phone]   [address] [place] [address] [id number] [place] [address] [phone] [facility] [place] [phone]

## 2022-03-09 NOTE — PROGRESS NOTES
Progress Note - Neurology   Magen Model 64 y o  male [id number]  Unit/Bed#: ICU 08/ICU 08    Assessment:    * ICH (intracerebral hemorrhage) ([facility] [address] )  Assessment & Plan  41-year-old male with a on Xarelto, hypertension, sleep apnea, arthritis, who initially presented to [facility] on 03/07 for right-sided weakness and numbness  Patient reports he initially word-finding difficulties  He went to sleep and woke up feeling off with right-sided weakness  He presented to the ED for further evaluation  BP on presentation 165/87  Stroke alert was initiated in the ED   NIHSS 4  Imaging demonstrated an acute left IPH with minimal surrounding vasogenic edema  Patient was given Kcentra and transferred to [facility] for neurosurgery evaluation  MRI brain completed and demonstrated acute left IPH as noted on CT but also small acute left frontal corona radiata infarct  Unclear etiology of hemorrhage at this time; may be uncontrolled hypertension in the setting of anticoagulation use vs ischemic stroke with hemorrhagic conversion  Plan:  - Stroke pathway   Atorvastatin 40 mg   SBP goal < 160; avoid hypotension   Continue telemetry   PT/OT/ST   Frequent neuro checks  Continue to monitor and notify neurology with any changes  - S/p Kcentra  - Hold all AP/AC at this time; defer timing to restart [facility] to neurosurgery  - Neurosurgery following; appreciate recommendations  - stat CT head with any acute change in neuro exam  - Medical management and supportive care per Critical Care team  Correction of any metabolic or infectious disturbances  Results:  - CT head:  Acute hemorrhage in the left lentiform nuclei measuring 3 7 X 2 1 X 2 3 cm  Minimal surrounding vasogenic edema  No midline shift   - CTA head and neck:  1  No evidence of active bleeding within the left lentiform nuclei hemorrhage  No findings to indicate intracranial aneurysm or vascular malformation    2  No stenosis, dissection or occlusion of the carotid or vertebral arteries or major vessels  - repeat CT head:  No significant change in left basal ganglia parenchymal hematoma and minimal surrounding vasogenic edema  No midline shift   - MRI brain:  1  Small acute infarct in left frontal corona radiata  2  Unchanged 3 3 cm acute intraparenchymal hematoma centered in left posterior putamen with mild surrounding vasogenic edema and mild mass effect on left lateral ventricle  - Echo: EF 65%  No regional wall motion abnormalities  Left atrium moderately dilated  Right atrium mildly dilated  - lipid panel:  Cholesterol 165, triglycerides 143, HDL 36,   - A1c 7 5    Chronic atrial fibrillation (HCC)  Assessment & Plan  - On Xarelto at baseline  - S/p Kcentra  - Telemetry  - Medical management per critical care team    Benign essential hypertension  Assessment & Plan  - BP on presentation 165/87  - On nicardipine gtt  - Medical management per critical care team       Harsha William will need follow up in in 6 weeks with neurovascular Attending/AP  He will not require outpatient neurological testing  Case and treatment plan reviewed with attending neurologist, Dr Parham Sites  Subjective:   Patient seen and examined at the bedside with attending neurologist   He continues to have slight right-sided weakness and numbness/tingling on his right side  He notes that his symptoms feel unchanged  Patient denies any pain or word-finding difficulties at this time        Past Medical History:   Diagnosis Date    Ankle swelling     Arthritis     Asthma     Atrial fibrillation (HCC)     Atrial fibrillation with RVR (HCC) 4/4/2017    Chronic kidney disease     kidney stones    Gout     Hypertension     Kidney stone     Night sweats     Seasonal allergies     Sleep apnea      Past Surgical History:   Procedure Laterality Date    APPENDECTOMY      CARDIOVERSION      X4 last was 2018    COLONOSCOPY      CYSTOSCOPY  08/30/2016    w/removal of object, last assessed 16    FOOT SURGERY Left     HAND SURGERY      KNEE SURGERY Right     RI CYSTO/URETERO W/LITHOTRIPSY &INDWELL STENT INSRT Left 2016    Procedure: CYSTOSCOPY URETEROSCOPY WITH LITHOTRIPSY HOLMIUM LASER STONE EXTRACTION, RETROGRADE PYELOGRAM AND INSERTION STENT URETERAL;  Surgeon: Daniel Purvis MD;  Location:  MAIN OR;  Service: Urology last assessed 16    RI CYSTO/URETERO W/LITHOTRIPSY &INDWELL STENT INSRT Right 2020    Procedure: CYSTOSCOPY , cystolitholapaxy of bladder stone WITH HOMIUM LASER RIGHT RETROGRADE AND RIGHT URETERAL STENT, [address];  Surgeon: Daniel Purvis MD;  Location: [address] [place] [address] MAIN OR;  Service: Urology    WRIST SURGERY Left 2014    Fracture surgery     Family History   Problem Relation Age of Onset    Atrial fibrillation Mother     Other Mother         blood dyscrasia    Hypertension Mother     Other Father         hypoglycemia     Atrial fibrillation Father     Diabetes Family     Other Family         Thrombocytosis    No Known Problems Sister     Cancer Neg Hx     Thyroid disease Neg Hx      Social History     Socioeconomic History    Marital status: /Civil Union     Spouse name: Not on file    Number of children: 4    Years of education: 12th grade     Highest education level: Not on file   Occupational History    Not on file   Tobacco Use    Smoking status: Former Smoker     Packs/day: 0 50     Years: 10 00     Pack years: 5 00     Types: Cigarettes     Quit date: 1994     Years since quittin 9    Smokeless tobacco: Never Used   Vaping Use    Vaping Use: Never used   Substance and Sexual Activity    Alcohol use: Yes     Comment: occ    Drug use: No    Sexual activity: Yes     Partners: Female     Birth control/protection: None   Other Topics Concern    Not on file   Social History Narrative    No caffeine use      Social Determinants of Health     Financial Resource Strain: Not on file   Food Insecurity: Not on file Transportation Needs: Not on file   Physical Activity: Not on file   Stress: Not on file   Social Connections: Not on file   Intimate Partner Violence: Not on file   Housing Stability: Not on file         Medications: All current active meds have been reviewed and current meds:  Scheduled Meds:  Current Facility-Administered Medications   Medication Dose Route Frequency Provider Last Rate    acetaminophen  650 mg Oral Q4H PRN Rhett Rodriguez MD      albuterol  2 puff Inhalation Q4H PRN John Coles DO      amLODIPine  10 mg Oral Daily Bradly Earnest, [place]      atorvastatin  40 mg Oral Daily With [employer], [place]      cloNIDine  0 1 mg Transdermal Weekly Laura Palafox DO      docusate sodium  100 mg Oral BID Rhett Rodriguez MD      dofetilide  500 mcg Oral Q12H [address] [place], [place]      hydrALAZINE  10 mg Intravenous Q6H PRN Tyree Sharpe PA-C      hydrALAZINE  50 mg Oral Q12H Anmol Taylor MD      insulin lispro  2-12 Units Subcutaneous Q6H [address] Laura BECCA Palafox,       Labetalol HCl  10 mg Intravenous Q6H PRN Zoey Palafox,       losartan  100 mg Oral Daily Bradly [place], [place]      nebivolol  2 5 mg Oral Daily Anmol Taylor MD      niCARdipine  1-15 mg/hr Intravenous Titrated Rhett Rodriguez MD 2 mg/hr (03/09/22 1041)    ondansetron  4 mg Intravenous Q6H PRN Rhett Rodriguez MD      polyethylene glycol  17 g Oral Daily PRN Rhett Rodriguez MD       Continuous Infusions:niCARdipine, 1-15 mg/hr, Last Rate: 2 mg/hr (03/09/22 1041)      PRN Meds:   acetaminophen    albuterol    hydrALAZINE    Labetalol HCl    ondansetron    polyethylene glycol       ROS:   Review of Systems   Constitutional: Positive for fatigue  Negative for fever  HENT: Negative for trouble swallowing  Eyes: Negative for photophobia and visual disturbance  Respiratory: Negative for shortness of breath  Cardiovascular: Negative for chest pain     Gastrointestinal: Negative for abdominal pain  Musculoskeletal: Negative for arthralgias, back pain, gait problem, myalgias and neck pain  Skin: Negative for rash  Neurological: Positive for speech difficulty, weakness and numbness  Negative for dizziness, tremors, seizures, syncope, facial asymmetry, light-headedness and headaches  Psychiatric/Behavioral: Negative for confusion  All other systems reviewed and are negative  Vitals:   /81   Pulse 60   Temp 97 9 °F (36 6 °C) (Oral)   Resp 20   Ht 5' 10" (1 778 m)   Wt (!) 145 kg (319 lb 10 7 oz)   SpO2 97%   BMI 45 87 kg/m²       Physical Exam:   Physical Exam  Vitals and nursing note reviewed  Constitutional:       General: He is not in acute distress  Appearance: Normal appearance  He is not ill-appearing  HENT:      Head: Normocephalic  Mouth/Throat:      Mouth: Mucous membranes are moist       Pharynx: Oropharynx is clear  Eyes:      General: No scleral icterus  Right eye: No discharge  Left eye: No discharge  Extraocular Movements: Extraocular movements intact and EOM normal       Conjunctiva/sclera: Conjunctivae normal    Cardiovascular:      Rate and Rhythm: Normal rate  Pulmonary:      Effort: Pulmonary effort is normal  No respiratory distress  Musculoskeletal:         General: Normal range of motion  Cervical back: Normal range of motion  Skin:     General: Skin is warm and dry  Coloration: Skin is not jaundiced or pale  Neurological:      Mental Status: He is alert and oriented to person, place, and time  Coordination: Finger-Nose-Finger Test normal    Psychiatric:         Mood and Affect: Mood normal          Behavior: Behavior normal        Neurologic Exam     Mental Status   Oriented to person, place, and time  Level of consciousness: alert  Able to follow commands appropriately  Dysarthria noted  Able to read phrases correctly    Able to name objects correctly     Cranial Nerves     CN II Right visual field deficit: none  Left visual field deficit: none     CN III, IV, VI   Extraocular motions are normal    Right pupil: Size: 3 mm  Shape: regular  Reactivity: brisk  Left pupil: Size: 2 mm  Shape: regular  Reactivity: brisk  CN V   Facial sensation intact  CN VIII   Hearing: intact    CN IX, X   Palate: symmetric    CN XI   CN XI normal      CN XII   CN XII normal    Subtle decreased right nasal labial fold     Motor Exam   Muscle bulk: normal  Right arm pronator drift: Pronation without drift  Left arm pronator drift: absent  Full strength throughout bilateral upper extremities/lower extremities except for:  Right deltoid 5-/5  Right hip flexion 4+/5  Slight decreased in right finger tapping but improved compared to yesterday     Sensory Exam   Decreased sensation to light touch in right upper extremity and lower extremity compared to left (more decreased in lower extremity)     Gait, Coordination, and Reflexes     Coordination   Finger to nose coordination: normal    Tremor   Resting tremor: absent  Left endpoint tremor with finger-to-nose         Labs: I have personally reviewed pertinent reports     Recent Results (from the past 24 hour(s))   Fingerstick Glucose (POCT)    Collection Time: 03/08/22 12:03 PM   Result Value Ref Range    POC Glucose 179 (H) 65 - 140 mg/dl   Fingerstick Glucose (POCT)    Collection Time: 03/08/22  6:11 PM   Result Value Ref Range    POC Glucose 143 (H) 65 - 140 mg/dl   Fingerstick Glucose (POCT)    Collection Time: 03/09/22 12:10 AM   Result Value Ref Range    POC Glucose 189 (H) 65 - 140 mg/dl   CBC and Platelet    Collection Time: 03/09/22  5:00 AM   Result Value Ref Range    WBC 9 89 4 31 - 10 16 Thousand/uL    RBC 5 03 3 88 - 5 62 Million/uL    Hemoglobin 14 9 12 0 - 17 0 g/dL    Hematocrit 43 7 36 5 - 49 3 %    MCV 87 82 - 98 fL    MCH 29 6 26 8 - 34 3 pg    MCHC 34 1 31 4 - 37 4 g/dL    RDW 13 2 11 6 - 15 1 %    Platelets 819 847 - 732 Thousands/uL MPV 11 9 8 9 - 12 7 fL   Basic metabolic panel    Collection Time: 03/09/22  5:00 AM   Result Value Ref Range    Sodium 140 136 - 145 mmol/L    Potassium 3 7 3 5 - 5 3 mmol/L    Chloride 108 100 - 108 mmol/L    CO2 26 21 - 32 mmol/L    ANION GAP 6 4 - 13 mmol/L    BUN 16 5 - 25 mg/dL    Creatinine 1 08 0 60 - 1 30 mg/dL    Glucose 155 (H) 65 - 140 mg/dL    Calcium 8 8 8 3 - 10 1 mg/dL    eGFR 76 ml/min/1 73sq m   Magnesium    Collection Time: 03/09/22  5:00 AM   Result Value Ref Range    Magnesium 2 1 1 6 - 2 6 mg/dL   Phosphorus    Collection Time: 03/09/22  5:00 AM   Result Value Ref Range    Phosphorus 2 8 2 7 - 4 5 mg/dL   Fingerstick Glucose (POCT)    Collection Time: 03/09/22  6:16 AM   Result Value Ref Range    POC Glucose 146 (H) 65 - 140 mg/dl   ECG 12 lead    Collection Time: 03/09/22  9:01 AM   Result Value Ref Range    Ventricular Rate 75 BPM    Atrial Rate 75 BPM    UT Interval 154 ms    QRSD Interval 83 ms    QT Interval 454 ms    QTC Interval 508 ms    P [place] 53 degrees    QRS Axis -80 degrees    T Wave Axis 62 degrees   Fingerstick Glucose (POCT)    Collection Time: 03/09/22 11:09 AM   Result Value Ref Range    POC Glucose 178 (H) 65 - 140 mg/dl       Imaging: I have personally reviewed pertinent imaging in PACS, and I have personally reviewed PACS reports  EKG, Pathology, and Other Studies: I have personally reviewed pertinent reports  VTE Prophylaxis: Sequential compression device (Venodyne)         Total Critical Care time spent 28 minutes excluding procedures, teaching and family updates

## 2022-03-10 ENCOUNTER — TELEPHONE (OUTPATIENT)
Dept: NEUROLOGY | Facility: CLINIC | Age: 57
End: 2022-03-10

## 2022-03-10 LAB
ALBUMIN SERPL BCP-MCNC: 3.3 G/DL (ref 3.5–5)
ALP SERPL-CCNC: 60 U/L (ref 46–116)
ALT SERPL W P-5'-P-CCNC: 77 U/L (ref 12–78)
ANION GAP SERPL CALCULATED.3IONS-SCNC: 6 MMOL/L (ref 4–13)
AST SERPL W P-5'-P-CCNC: 35 U/L (ref 5–45)
ATRIAL RATE: 55 BPM
ATRIAL RATE: 58 BPM
BILIRUB SERPL-MCNC: 0.82 MG/DL (ref 0.2–1)
BUN SERPL-MCNC: 21 MG/DL (ref 5–25)
CALCIUM ALBUM COR SERPL-MCNC: 9.1 MG/DL (ref 8.3–10.1)
CALCIUM SERPL-MCNC: 8.5 MG/DL (ref 8.3–10.1)
CHLORIDE SERPL-SCNC: 107 MMOL/L (ref 100–108)
CO2 SERPL-SCNC: 24 MMOL/L (ref 21–32)
CREAT SERPL-MCNC: 1.34 MG/DL (ref 0.6–1.3)
ERYTHROCYTE [DISTWIDTH] IN BLOOD BY AUTOMATED COUNT: 13.6 % (ref 11.6–15.1)
GFR SERPL CREATININE-BSD FRML MDRD: 58 ML/MIN/1.73SQ M
GLUCOSE SERPL-MCNC: 116 MG/DL (ref 65–140)
GLUCOSE SERPL-MCNC: 145 MG/DL (ref 65–140)
GLUCOSE SERPL-MCNC: 146 MG/DL (ref 65–140)
GLUCOSE SERPL-MCNC: 147 MG/DL (ref 65–140)
HCT VFR BLD AUTO: 43.3 % (ref 36.5–49.3)
HGB BLD-MCNC: 14.8 G/DL (ref 12–17)
MCH RBC QN AUTO: 29.7 PG (ref 26.8–34.3)
MCHC RBC AUTO-ENTMCNC: 34.2 G/DL (ref 31.4–37.4)
MCV RBC AUTO: 87 FL (ref 82–98)
P AXIS: 55 DEGREES
P AXIS: 74 DEGREES
PLATELET # BLD AUTO: 211 THOUSANDS/UL (ref 149–390)
PMV BLD AUTO: 12.7 FL (ref 8.9–12.7)
POTASSIUM SERPL-SCNC: 3.7 MMOL/L (ref 3.5–5.3)
PR INTERVAL: 152 MS
PR INTERVAL: 174 MS
PROT SERPL-MCNC: 7.2 G/DL (ref 6.4–8.2)
QRS AXIS: 27 DEGREES
QRS AXIS: 40 DEGREES
QRSD INTERVAL: 84 MS
QRSD INTERVAL: 98 MS
QT INTERVAL: 448 MS
QT INTERVAL: 456 MS
QTC INTERVAL: 436 MS
QTC INTERVAL: 439 MS
RBC # BLD AUTO: 4.99 MILLION/UL (ref 3.88–5.62)
SODIUM SERPL-SCNC: 137 MMOL/L (ref 136–145)
T WAVE AXIS: 58 DEGREES
T WAVE AXIS: 65 DEGREES
VENTRICULAR RATE: 55 BPM
VENTRICULAR RATE: 58 BPM
WBC # BLD AUTO: 10.96 THOUSAND/UL (ref 4.31–10.16)

## 2022-03-10 PROCEDURE — 99232 SBSQ HOSP IP/OBS MODERATE 35: CPT | Performed by: PSYCHIATRY & NEUROLOGY

## 2022-03-10 PROCEDURE — 93010 ELECTROCARDIOGRAM REPORT: CPT | Performed by: INTERNAL MEDICINE

## 2022-03-10 PROCEDURE — 82948 REAGENT STRIP/BLOOD GLUCOSE: CPT

## 2022-03-10 PROCEDURE — 85027 COMPLETE CBC AUTOMATED: CPT

## 2022-03-10 PROCEDURE — 99232 SBSQ HOSP IP/OBS MODERATE 35: CPT | Performed by: ANESTHESIOLOGY

## 2022-03-10 PROCEDURE — NC001 PR NO CHARGE: Performed by: ANESTHESIOLOGY

## 2022-03-10 PROCEDURE — 93005 ELECTROCARDIOGRAM TRACING: CPT

## 2022-03-10 PROCEDURE — 80053 COMPREHEN METABOLIC PANEL: CPT

## 2022-03-10 RX ADMIN — DOFETILIDE 500 MCG: 500 CAPSULE ORAL at 09:48

## 2022-03-10 RX ADMIN — AMLODIPINE BESYLATE 10 MG: 10 TABLET ORAL at 09:46

## 2022-03-10 RX ADMIN — DOCUSATE SODIUM 100 MG: 100 CAPSULE ORAL at 17:58

## 2022-03-10 RX ADMIN — DOFETILIDE 500 MCG: 500 CAPSULE ORAL at 20:25

## 2022-03-10 RX ADMIN — ATORVASTATIN CALCIUM 40 MG: 40 TABLET, FILM COATED ORAL at 17:58

## 2022-03-10 RX ADMIN — LOSARTAN POTASSIUM 100 MG: 50 TABLET, FILM COATED ORAL at 09:50

## 2022-03-10 RX ADMIN — NEBIVOLOL HYDROCHLORIDE 2.5 MG: 2.5 TABLET ORAL at 09:47

## 2022-03-10 RX ADMIN — HYDRALAZINE HYDROCHLORIDE 50 MG: 50 TABLET, FILM COATED ORAL at 09:47

## 2022-03-10 RX ADMIN — HYDRALAZINE HYDROCHLORIDE 50 MG: 50 TABLET, FILM COATED ORAL at 20:25

## 2022-03-10 NOTE — TELEPHONE ENCOUNTER
----- Message from An [address] sent at 3/10/2022 11:33 AM EST -----  Regarding: Repeat CT/Follow up  Fran you are doing well! I just wanted to reach out regarding this patient and his follow up  He came in for a hemorrhagic stroke with subsequent ischemic stroke and is usually on anticoagulation at baseline but that is being held at this time  We are recommending repeat CT head in 3 weeks and if stable, then may resume AC  He will need follow up in 4-6 weeks but since the CT will be done before that, would you be able to keep an eye out for his results/keep him on your radar? That would be much appreciated  Thanks Eleazar Recinos!     Have a great day,  An

## 2022-03-10 NOTE — PROGRESS NOTES
Progress Note - Critical Care   Daren Prieto 64 y o  male MRN: [id number]  Unit/Bed#: [facility] 716-01 Encounter: [id number]    SUBJECTIVE:  Pt examined at bedside this AM  Denies any acute concerns or complaints  States he is annoyed he has not be able to shower and he is going to shower whether he is "allowed to" or not  HPI/24HR Event:  · No acute events overnight    ROS  Review of Systems   Constitutional: Negative for chills and fever  HENT: Negative for ear pain and sore throat  Eyes: Negative for pain and visual disturbance  Respiratory: Negative for cough and shortness of breath  Cardiovascular: Negative for chest pain and palpitations  Gastrointestinal: Negative for abdominal pain and vomiting  Genitourinary: Negative for dysuria and hematuria  Musculoskeletal: Negative for arthralgias and back pain  Skin: Negative for color change and rash  Neurological: Negative for seizures and syncope  All other systems reviewed and are negative  Invasive lines and devices: Invasive Devices  Report    Peripheral Intravenous Line            Peripheral IV 22 Right Antecubital 2 days    Peripheral IV 22 Right Antecubital 2 days                   Physical exam  General: Awake, alert, oriented x3  Resting comfortably in bed in no acute distress  Neuro: GCS 15 (Eye 4, Verbal 5, Motor 6)  HENT:Normocephalic and atraumatic, PERRL  Heart: RRR, pulses intact  Lungs: Bilateral breath sounds present  Abdomen:  Bowel sounds present, soft  Skin:Warm, dry skin/Incision site clean dry and intact    Vitals  Temperature:   Temp (24hrs), Av °F (36 7 °C), Min:97 7 °F (36 5 °C), Max:98 4 °F (36 9 °C)    Current: Temperature: 98 4 °F (36 9 °C)    Vitals:    22 2135 22 2200 03/10/22 0055   BP: 158/82 163/78 145/72 136/83   BP Location:  Left arm     Pulse: 68 67 58 (!) 54   Resp:  19     Temp:  98 4 °F (36 9 °C)     TempSrc:  Oral     SpO2:  96%     Weight:       Height: Labs:   Results from last 7 days   Lab Units 03/10/22  0452 03/09/22  0500 03/08/22  0500 03/08/22  0146 03/08/22  0146   WBC Thousand/uL 10 96* 9 89 11 15*   < > 11 31*   HEMOGLOBIN g/dL 14 8 14 9 14 7   < > 14 3   HEMATOCRIT % 43 3 43 7 42 1   < > 41 1   PLATELETS Thousands/uL 211 216 199   < > 196   NEUTROS PCT %  --   --  77*  --   --    MONOS PCT %  --   --  6  --   --    MONO PCT %  --   --   --   --  2*    < > = values in this interval not displayed  Results from last 7 days   Lab Units 03/10/22  0452 03/09/22  0500 03/08/22  0500   SODIUM mmol/L 137 140 137   POTASSIUM mmol/L 3 7 3 7 3 7   CHLORIDE mmol/L 107 108 105   CO2 mmol/L 24 26 27   BUN mg/dL 21 16 12   CREATININE mg/dL 1 34* 1 08 1 17   CALCIUM mg/dL 8 5 8 8 8 5   ALK PHOS U/L 60  --   --    ALT U/L 77  --   --    AST U/L 35  --   --      Results from last 7 days   Lab Units 03/09/22  0500 03/08/22  0500 03/08/22  0146   MAGNESIUM mg/dL 2 1 1 9 1 9     Results from last 7 days   Lab Units 03/09/22  0500 03/08/22  0500 03/08/22  0146   PHOSPHORUS mg/dL 2 8 2 4* 3 0      Results from last 7 days   Lab Units 03/08/22  0146 03/07/22  2132   INR  1 13 1 27*   PTT seconds 34 34           Other Labs    Intake and Outputs:  I/O       03/08 0701 03/09 0700 03/09 0701  03/10 0700    P  O  480 60    I V  (mL/kg) 635 8 (4 4) 109 (0 8)    IV Piggyback 50     Total Intake(mL/kg) 1165 8 (8) 169 (1 2)    Urine (mL/kg/hr) 1650 (0 5) 400 (0 1)    Total Output 1650 400    Net -484 2 -231                Imaging Studies      Assessment & Plan:   Neuro   Diagnosis: Left Basal Ganglia ICH  o ICH score initially: 0  o Evidence of compression and mass effect  o CT Head from 3/7/22: L Lentiform Nuclei hemorrhage 3 7 x 2 1 x 2 3 cm, surrounding vasogenic edema, no midline shift  o F/u Head CT from 3/8/22: No significant change in L Basal Ganglia parenchymal hematoma, no midline shift  · Brain MRI from 03/09: Small acute infarct in left frontal corona radiata   o Unchanged 3 3 cm acute intraparenchymal hematoma centered in left posterior putamen  o TTE 03/08: LVEF 65%, no RWMA, Gradee I Diastolic Dysfx  o Neurology following  o Neurosurgery evaluated, no acute interventions indicated at this time  o Passed bedside speech/swallow  o Plan: SBP goal 120-150 (plan in CV section below)  o q2h Neuro checks  o STAT CT head if GCS declines >2  o PMR consult   Diagnosis:  At risk for ICU delerium  o Plan: CAM-ICU daily  o Sleep/wake cycle regulation      CV:    Diagnosis: History of uncontrolled HTN, now Hypertensive Emergency  o Plan: SBP goal 120-150 as above  o Weaned off Cardene gtt on 3/9  o Home Amlodipine  o Home ARB (losartan is formulary equivalent)  o Home Bystolic  o Home Clonidine patch  o Started hydralazine 50mg BID  o PRN's for SBP goal: hydralazine, labetalol   Diagnosis: aFib  o Home xarelto  o Telemetry  o ECG 03/08: NSR, evidence of prior sepatal infarct, no acute ST abnormalities   o Plan: Hold AC in setting of recent [address]  o Restarted home Tikosyn      Pulm:   Diagnosis: HARPER  o Patient states he has been evaluated for HARPER in the past  o Plan: Home CPAP machine   Diagnosis: Reactive Airway  o Plan: Albuterol PRN      GI:    Diagnosis: Diet + Bowel Regimen  o Plan: Regular House Diet  o Colace scheduled, miralax PRN      :    Diagnosis: CKD stage III  o Plan: Monitor I/O  o Trend daily weights  o Monitor Cr on daily BMP      F/E/N:    F: None indicated   E: Maintain goal K>4 0, Ph>3 0, Mg>2 0   N: Regular House Diet      Heme/Onc:    Diagnosis: ICH in setting of home AC use (xarelto)  o Reversed with Rachel Simpson on 03/07  o Plan: Monitor Hgb daily CBC   Diagnosis: DVT ppx  o Plan: Hold AC/AP and pharmacologic DVT ppx at this time  o SCD's      Endo:    Diagnosis: Obesity  o Recent 40lb weight gain that patient attributes to changes in his home antihypertensive regimen  o High LDL, low HDL, High TG's on lipid panel  o Plan: Weight control counseling in setting of malignant HTN and CVA   Diagnosis: Blood glucose goals  o A1C on admission: 7 5  o Plan: SSI algorithm 4      ID:    Diagnosis: No acute issues  o Plan: Trend fever curve and WBC daily  o Monitor off ABX for now      MSK/Skin:    Diagnosis: Mobilization plan post ICH  o Plan: PT/OT evaluate and treat          Non-Invasive/Invasive Ventilation Settings:  Respiratory  Report   Lab Data (Last 4 hours)    None         O2/Vent Data (Last 4 hours)    None              No results found for: PHART, ATX2AJE, PO2ART, SNG7ETZ, E8XWSMUA, BEART, SOURCE  SpO2: SpO2: 96 %, SpO2 Device: O2 Device: None (Room air)    Imaging:   MRI brain wo contrast   Final Result by Alisha Pineda MD (03/09 1643)      Small acute infarct in left frontal corona radiata  Unchanged 3 3 cm acute intraparenchymal hematoma centered in left posterior putamen with mild surrounding vasogenic edema and mild mass effect on left lateral ventricle  The study was marked in [facility] for immediate notification  Workstation performed: [id number]         CT head wo contrast   Final Result by Poppy Shaw MD (03/08 0820)      No significant change in left basal ganglia parenchymal hematoma and minimal surrounding vasogenic edema  No midline shift  Workstation performed: [id number]           I have personally reviewed pertinent reports        Micro:  Lab Results   Component Value Date    URINECX No Growth <1000 cfu/mL 11/17/2020    URINECX No Growth <100 cfu/mL 08/24/2020    URINECX No Growth <1000 cfu/mL 08/23/2020         Allergies: No Known Allergies      Medications:   Scheduled Meds:  Current Facility-Administered Medications   Medication Dose Route Frequency Provider Last Rate    acetaminophen  650 mg Oral Q4H PRN Roverto Primus, DO      albuterol  2 puff Inhalation Q4H PRN Roverto Primus, DO      amLODIPine  10 mg Oral Daily Roverto Primus, DO      atorvastatin  40 mg Oral Daily With ideeli Jarod, DO      cloNIDine  0 1 mg Transdermal Weekly [place]      docusate sodium  100 mg Oral BID [place], DO      dofetilide  500 mcg Oral Q12H [address] [place]      hydrALAZINE  10 mg Intravenous Q6H PRN [place], DO      hydrALAZINE  50 mg Oral Q12H [place]      insulin lispro  2-12 Units Subcutaneous Q6H [address] [place]      Labetalol HCl  10 mg Intravenous Q6H PRN Jarod, DO      losartan  100 mg Oral Daily [place]      nebivolol  2 5 mg Oral Daily [place]      niCARdipine  1-15 mg/hr Intravenous Titrated [place], DO Stopped (03/09/22 1541)    ondansetron  4 mg Intravenous Q6H PRN [place], DO      polyethylene glycol  17 g Oral Daily PRN Jarod, DO       Continuous Infusions:niCARdipine, 1-15 mg/hr, Last Rate: Stopped (03/09/22 1541)      PRN Meds:  acetaminophen, 650 mg, Q4H PRN  albuterol, 2 puff, Q4H PRN  hydrALAZINE, 10 mg, Q6H PRN  Labetalol HCl, 10 mg, Q6H PRN  ondansetron, 4 mg, Q6H PRN  polyethylene glycol, 17 g, Daily PRN        Counseling / Coordination of Care  30 minutes      Code Status: Level 1 - Full Code     Portions of the record may have been created with voice recognition software  Occasional wrong word or "sound a like" substitutions may have occurred due to the inherent limitations of voice recognition software  Read the chart carefully and recognize, using context, where substitutions have occurred       DO Jarod  Family Medicine Resident, PGY1  6:51 AM

## 2022-03-10 NOTE — ASSESSMENT & PLAN NOTE
· On xarelto at home, holding this hospitalization  · ECG 03/08: NSR, evidence of prior sepatal infarct, no acute ST abnormalities     Plan:  · Hold AC in setting of recent ICH  · Restarted home Tikosyn

## 2022-03-10 NOTE — PLAN OF CARE
Problem: PAIN - ADULT  Goal: Verbalizes/displays adequate comfort level or baseline comfort level  Description: Interventions:  - Encourage patient to monitor pain and request assistance  - Assess pain using appropriate pain scale  - Administer analgesics based on type and severity of pain and evaluate response  - Implement non-pharmacological measures as appropriate and evaluate response  - Consider cultural and social influences on pain and pain management  - Notify physician/advanced practitioner if interventions unsuccessful or patient reports new pain  Outcome: Progressing     Problem: INFECTION - ADULT  Goal: Absence or prevention of progression during hospitalization  Description: INTERVENTIONS:  - Assess and monitor for signs and symptoms of infection  - Monitor lab/diagnostic results  - Monitor all insertion sites, i e  indwelling lines, tubes, and drains  - Monitor endotracheal if appropriate and nasal secretions for changes in amount and color  - [place] appropriate cooling/warming therapies per order  - Administer medications as ordered  - Instruct and encourage patient and family to use good hand hygiene technique  - Identify and instruct in appropriate isolation precautions for identified infection/condition  Outcome: Progressing     Problem: SAFETY ADULT  Goal: Patient will remain free of falls  Description: INTERVENTIONS:  - Educate patient/family on patient safety including physical limitations  - Instruct patient to call for assistance with activity   - Consult OT/PT to assist with strengthening/mobility   - Keep Call bell within reach  - Keep bed low and locked with side rails adjusted as appropriate  - Keep care items and personal belongings within reach  - Initiate and maintain comfort rounds  - Make Fall Risk Sign visible to staff  - Offer Toileting every 2 Hours, in advance of need  - Initiate/Maintain 2 alarm  - Obtain necessary fall risk management equipment: 2  - Apply yellow socks and bracelet for high fall risk patients  - Consider moving patient to room near nurses station  Outcome: Progressing  Goal: Maintain or return to baseline ADL function  Description: INTERVENTIONS:  -  Assess patient's ability to carry out ADLs; assess patient's baseline for ADL function and identify physical deficits which impact ability to perform ADLs (bathing, care of mouth/teeth, toileting, grooming, dressing, etc )  - Assess/evaluate cause of self-care deficits   - Assess range of motion  - Assess patient's mobility; develop plan if impaired  - Assess patient's need for assistive devices and provide as appropriate  - Encourage maximum independence but intervene and supervise when necessary  - Involve family in performance of ADLs  - Assess for home care needs following discharge   - Consider OT consult to assist with ADL evaluation and planning for discharge  - Provide patient education as appropriate  Outcome: Progressing  Goal: Maintains/Returns to pre admission functional level  Description: INTERVENTIONS:  - Perform BMAT or MOVE assessment daily    - Set and communicate daily mobility goal to care team and patient/family/caregiver  - Collaborate with rehabilitation services on mobility goals if consulted  - Perform Range of Motion 2 times a day  - Reposition patient every 2 hours    - Dangle patient 2 times a day  - Stand patient 2 times a day  - Ambulate patient 2 times a day  - Out of bed to chair 2 times a day   - Out of bed for meals 2 times a day  - Out of bed for toileting  - Record patient progress and toleration of activity level   Outcome: Progressing     Problem: DISCHARGE PLANNING  Goal: Discharge to home or other facility with appropriate resources  Description: INTERVENTIONS:  - Identify barriers to discharge w/patient and caregiver  - Arrange for needed discharge resources and transportation as appropriate  - Identify discharge learning needs (meds, wound care, etc )  - Arrange for interpretive services to assist at discharge as needed  - Refer to Case Management Department for coordinating discharge planning if the patient needs post-hospital services based on physician/advanced practitioner order or complex needs related to functional status, cognitive ability, or social support system  Outcome: Progressing     Problem: Knowledge Deficit  Goal: Patient/family/caregiver demonstrates understanding of disease process, treatment plan, medications, and discharge instructions  Description: Complete learning assessment and assess knowledge base    Interventions:  - Provide teaching at level of understanding  - Provide teaching via preferred learning methods  Outcome: Progressing     Problem: Potential for Falls  Goal: Patient will remain free of falls  Description: INTERVENTIONS:  - Educate patient/family on patient safety including physical limitations  - Instruct patient to call for assistance with activity   - Consult OT/PT to assist with strengthening/mobility   - Keep Call bell within reach  - Keep bed low and locked with side rails adjusted as appropriate  - Keep care items and personal belongings within reach  - Initiate and maintain comfort rounds  - Make Fall Risk Sign visible to staff  - Offer Toileting every 2 Hours, in advance of need  - Initiate/Maintain 2alarm  - Obtain necessary fall risk management equipment: 2  - Apply yellow socks and bracelet for high fall risk patients  - Consider moving patient to room near nurses station  Outcome: Progressing     Problem: MOBILITY - ADULT  Goal: Maintain or return to baseline ADL function  Description: INTERVENTIONS:  -  Assess patient's ability to carry out ADLs; assess patient's baseline for ADL function and identify physical deficits which impact ability to perform ADLs (bathing, care of mouth/teeth, toileting, grooming, dressing, etc )  - Assess/evaluate cause of self-care deficits   - Assess range of motion  - Assess patient's mobility; develop plan if impaired  - Assess patient's need for assistive devices and provide as appropriate  - Encourage maximum independence but intervene and supervise when necessary  - Involve family in performance of ADLs  - Assess for home care needs following discharge   - Consider OT consult to assist with ADL evaluation and planning for discharge  - Provide patient education as appropriate  Outcome: Progressing  Goal: Maintains/Returns to pre admission functional level  Description: INTERVENTIONS:  - Perform BMAT or MOVE assessment daily    - Set and communicate daily mobility goal to care team and patient/family/caregiver  - Collaborate with rehabilitation services on mobility goals if consulted  - Perform Range of Motion 2 times a day  - Reposition patient every 2 hours  - Dangle patient 2 times a day  - Stand patient 2 times a day  - Ambulate patient 2 times a day  - Out of bed to chair 2 times a day   - Out of bed for meals 2 times a day  - Out of bed for toileting  - Record patient progress and toleration of activity level   Outcome: Progressing     Problem: Activity Intolerance/Impaired Mobility  Goal: Mobility/activity is maintained at optimum level for patient  Description: Interventions:  - Assess and monitor patient  barriers to mobility and need for assistive/adaptive devices  - Assess patient's emotional response to limitations  - Collaborate with interdisciplinary team and initiate plans and interventions as ordered  - Encourage independent activity per ability   - Maintain proper body alignment  - Perform active/passive rom as tolerated/ordered    - Plan activities to conserve energy   - Turn patient as appropriate  Outcome: Progressing

## 2022-03-10 NOTE — PLAN OF CARE
Problem: PAIN - ADULT  Goal: Verbalizes/displays adequate comfort level or baseline comfort level  Description: Interventions:  - Encourage patient to monitor pain and request assistance  - Assess pain using appropriate pain scale  - Administer analgesics based on type and severity of pain and evaluate response  - Implement non-pharmacological measures as appropriate and evaluate response  - Consider cultural and social influences on pain and pain management  - Notify physician/advanced practitioner if interventions unsuccessful or patient reports new pain  Outcome: Progressing     Problem: INFECTION - ADULT  Goal: Absence or prevention of progression during hospitalization  Description: INTERVENTIONS:  - Assess and monitor for signs and symptoms of infection  - Monitor lab/diagnostic results  - Monitor all insertion sites, i e  indwelling lines, tubes, and drains  - Monitor endotracheal if appropriate and nasal secretions for changes in amount and color  - [place] appropriate cooling/warming therapies per order  - Administer medications as ordered  - Instruct and encourage patient and family to use good hand hygiene technique  - Identify and instruct in appropriate isolation precautions for identified infection/condition  Outcome: Progressing     Problem: SAFETY ADULT  Goal: Patient will remain free of falls  Description: INTERVENTIONS:  - Educate patient/family on patient safety including physical limitations  - Instruct patient to call for assistance with activity   - Consult OT/PT to assist with strengthening/mobility   - Keep Call bell within reach  - Keep bed low and locked with side rails adjusted as appropriate  - Keep care items and personal belongings within reach  - Initiate and maintain comfort rounds  - Make Fall Risk Sign visible to staff  - Offer Toileting every  Hours, in advance of need  - Initiate/Maintain alarm  - Obtain necessary fall risk management equipment:   - Apply yellow socks and bracelet for high fall risk patients  - Consider moving patient to room near nurses station  Outcome: Progressing  Goal: Maintain or return to baseline ADL function  Description: INTERVENTIONS:  -  Assess patient's ability to carry out ADLs; assess patient's baseline for ADL function and identify physical deficits which impact ability to perform ADLs (bathing, care of mouth/teeth, toileting, grooming, dressing, etc )  - Assess/evaluate cause of self-care deficits   - Assess range of motion  - Assess patient's mobility; develop plan if impaired  - Assess patient's need for assistive devices and provide as appropriate  - Encourage maximum independence but intervene and supervise when necessary  - Involve family in performance of ADLs  - Assess for home care needs following discharge   - Consider OT consult to assist with ADL evaluation and planning for discharge  - Provide patient education as appropriate  Outcome: Progressing  Goal: Maintains/Returns to pre admission functional level  Description: INTERVENTIONS:  - Perform BMAT or MOVE assessment daily    - Set and communicate daily mobility goal to care team and patient/family/caregiver  - Collaborate with rehabilitation services on mobility goals if consulted  - Perform Range of Motion  times a day  - Reposition patient every  hours    - Dangle patient  times a day  - Stand patient  times a day  - Ambulate patient  times a day  - Out of bed to ch times a day   - Out of bed for meals  times a day  - Out of bed for toileting  - Record patient progress and toleration of activity level   Outcome: Progressing     Problem: DISCHARGE PLANNING  Goal: Discharge to home or other facility with appropriate resources  Description: INTERVENTIONS:  - Identify barriers to discharge w/patient and caregiver  - Arrange for needed discharge resources and transportation as appropriate  - Identify discharge learning needs (meds, wound care, etc )  - Arrange for interpretive services to assist at discharge as needed  - Refer to Case Management Department for coordinating discharge planning if the patient needs post-hospital services based on physician/advanced practitioner order or complex needs related to functional status, cognitive ability, or social support system  Outcome: Progressing     Problem: DISCHARGE PLANNING  Goal: Discharge to home or other facility with appropriate resources  Description: INTERVENTIONS:  - Identify barriers to discharge w/patient and caregiver  - Arrange for needed discharge resources and transportation as appropriate  - Identify discharge learning needs (meds, wound care, etc )  - Arrange for interpretive services to assist at discharge as needed  - Refer to Case Management Department for coordinating discharge planning if the patient needs post-hospital services based on physician/advanced practitioner order or complex needs related to functional status, cognitive ability, or social support system  Outcome: Progressing     Problem: Knowledge Deficit  Goal: Patient/family/caregiver demonstrates understanding of disease process, treatment plan, medications, and discharge instructions  Description: Complete learning assessment and assess knowledge base    Interventions:  - Provide teaching at level of understanding  - Provide teaching via preferred learning methods  Outcome: Progressing     Problem: Potential for Falls  Goal: Patient will remain free of falls  Description: INTERVENTIONS:  - Educate patient/family on patient safety including physical limitations  - Instruct patient to call for assistance with activity   - Consult OT/PT to assist with strengthening/mobility   - Keep Call bell within reach  - Keep bed low and locked with side rails adjusted as appropriate  - Keep care items and personal belongings within reach  - Initiate and maintain comfort rounds  - Make Fall Risk Sign visible to staff  - Offer Toileting every  Hours, in advance of need  - Initiate/Maintain alarm  - Obtain necessary fall risk management equipment:   - Apply yellow socks and bracelet for high fall risk patients  - Consider moving patient to room near nurses station  Outcome: Progressing     Problem: MOBILITY - ADULT  Goal: Maintain or return to baseline ADL function  Description: INTERVENTIONS:  -  Assess patient's ability to carry out ADLs; assess patient's baseline for ADL function and identify physical deficits which impact ability to perform ADLs (bathing, care of mouth/teeth, toileting, grooming, dressing, etc )  - Assess/evaluate cause of self-care deficits   - Assess range of motion  - Assess patient's mobility; develop plan if impaired  - Assess patient's need for assistive devices and provide as appropriate  - Encourage maximum independence but intervene and supervise when necessary  - Involve family in performance of ADLs  - Assess for home care needs following discharge   - Consider OT consult to assist with ADL evaluation and planning for discharge  - Provide patient education as appropriate  Outcome: Progressing  Goal: Maintains/Returns to pre admission functional level  Description: INTERVENTIONS:  - Perform BMAT or MOVE assessment daily    - Set and communicate daily mobility goal to care team and patient/family/caregiver  - Collaborate with rehabilitation services on mobility goals if consulted  - Perform Range of Motion  times a day  - Reposition patient every  hours    - Dangle patient  times a day  - Stand patient  times a day  - Ambulate patient  times a day  - Out of bed to chair  times a day   - Out of bed for meals  times a day  - Out of bed for toileting  - Record patient progress and toleration of activity level   Outcome: Progressing

## 2022-03-10 NOTE — PROGRESS NOTES
[facility]  Transfer Note - Bernardo Arechiga 1965, 64 y o  male MRN: [id number]  Unit/Bed#: [facility] 716-01 Encounter: [id number]  Primary Care Provider: Lisbeth Chin MD   Date and time admitted to hospital: 3/7/2022 11:50 PM    * ICH (intracerebral hemorrhage) ([facility] [address] )  Assessment & Plan  · ICH score initially on arrival : 0  · Evidence of compression and mass effect  · CT Head from 3/7/22: L Lentiform Nuclei hemorrhage 3 7 x 2 1 x 2 3 cm, surrounding vasogenic edema, no midline shift  · F/u Head CT from 3/8/22: No significant change in L Basal Ganglia parenchymal hematoma, no midline shift  · Brain MRI from 03/09: Small acute infarct in left frontal corona radiata    · Unchanged 3 3 cm acute intraparenchymal hematoma centered in left posterior putamen  · TTE 03/08: LVEF 65%, no RWMA, Gradee I Diastolic Dysfx  · Neurosurgery evaluated, no acute interventions indicated at this time  · Passed bedside speech/swallow    Plan:  · Neurology following  · SBP goal 120-150 (plan in CV section below)  · q2h Neuro checks  · STAT CT head if GCS declines >2  · PMR consult      Benign essential hypertension  Assessment & Plan  · SBP goal 120-150   · Weaned off Cardene gtt on 3/9    Plan:   · Continue home amlodipine, ARB (losartan is formulary equivalent), Bystolic, and Clonidine patch  · Started hydralazine 50mg BID  · PRN's for SBP goal: hydralazine, labetalol      Chronic atrial fibrillation ([facility] [address] )  Assessment & Plan  · On xarelto at home, holding this hospitalization  · ECG 03/08: NSR, evidence of prior sepatal infarct, no acute ST abnormalities     Plan:  · Hold AC in setting of recent [address]  · Restarted home Tikosyn       HARPER (obstructive sleep apnea)  Assessment & Plan  Patient states he has been evaluated for HARPER in the past    Plan:   · Wife brought in his home CPAP machine, which he may use at night           Code Status: Level 1 - Full Code  POA:    POLST:      Reason for ICU admission:   ICH    Active problems:   Principal Problem:    ICH (intracerebral hemorrhage) (HCC)  Active Problems:    Benign essential hypertension    Chronic atrial fibrillation (HCC)    Asthma    Morbid obesity with BMI of 45 0-49 9, adult (HCC)    HARPER (obstructive sleep apnea)  Resolved Problems:    * No resolved hospital problems  *      Consultants:   · Neurology  · Neurosurgery   · PMR    History of Present Illness:   Per Pauilne Hernandez PA-C on 3/8:     Josefina Jones is a 64 y o  male with PMHx of hypertension, AFib on Xarelto, morbid obesity who presented to [address] [place] [address] with right-sided weakness and numbness  Per patient, he was at home on a video conference call when he first noted word finding difficulty  He attempted to go to sleep, but woke up feeling "off" and weak on the right side  He was brought to the ER and stroke alert was activated  CT head revealed acute left intraparenchymal hemorrhage measuring 3 7 x 2 1 x 2 3 with minimal surrounding vasogenic edema  Patient was given [facility] and transferred to [facility] for neurosurgery evaluation  ICH score 0  Patient was started on cardene prior to transfer  Summary of clinical course:   Pt admitted to ICU on 3/8 after he was discovered to have an acute left intraparenchymal hemorrhage  Was seen by neurosurgery on 3/8, who did not have an recommendations for neurosurgical intervention  Pt remained stable overnight  On 3/9, pt had an MRI brain that showed unchanged intraparenchymal hematoma, as well as a small acute infarct in the left corona radiata  Patient was medically managed with blood pressure control  Remained stable  Was transferred to stepdown level 1 on 3/9, and was deemed stable for transfer to [facility] under the care of [facility] on 3/10         Recent or scheduled procedures:   N/A    Outstanding/pending diagnostics:   N/A    Cultures:   N/A       Mobilization Plan:   Up with assistance     Nutrition Plan:   Regular house diet    Invasive Devices Review  Invasive Devices  Report    Peripheral Intravenous Line            Peripheral IV 03/07/22 Right Antecubital 2 days                Rationale for remaining devices: N/A    VTE Pharmacologic Prophylaxis: Pharmacologic VTE Prophylaxis contraindicated due to acute ICH  VTE Mechanical Prophylaxis: sequential compression device      Spoke with Dr Briseno regarding transfer  Please contact critical care via [employer] with any questions or concerns  Portions of the record may have been created with voice recognition software  Occasional wrong word or "sound a like" substitutions may have occurred due to the inherent limitations of voice recognition software    Read the chart carefully and recognize, using context, where substitutions have occurred    Jarret Mcguire, [address] [place] [address] Resident, PGY1  1:11 PM

## 2022-03-10 NOTE — ASSESSMENT & PLAN NOTE
· ICH score initially on arrival : 0  · Evidence of compression and mass effect  · CT Head from 3/7/22: L Lentiform Nuclei hemorrhage 3 7 x 2 1 x 2 3 cm, surrounding vasogenic edema, no midline shift  · F/u Head CT from 3/8/22: No significant change in L Basal Ganglia parenchymal hematoma, no midline shift  · Brain MRI from 03/09: Small acute infarct in left frontal corona radiata    · Unchanged 3 3 cm acute intraparenchymal hematoma centered in left posterior putamen  · TTE 03/08: LVEF 65%, no RWMA, Gradee I Diastolic Dysfx  · Neurosurgery evaluated, no acute interventions indicated at this time  · Passed bedside speech/swallow    Plan:  · Neurology following  · SBP goal 120-150 (plan in CV section below)  · q2h Neuro checks  · STAT CT head if GCS declines >2  · PMR consult

## 2022-03-10 NOTE — ASSESSMENT & PLAN NOTE
· SBP goal 120-150   · Weaned off Cardene gtt on 3/9    Plan:   · Continue home amlodipine, ARB (losartan is formulary equivalent), Bystolic, and Clonidine patch  · Started hydralazine 50mg BID  · PRN's for SBP goal: hydralazine, labetalol

## 2022-03-10 NOTE — ASSESSMENT & PLAN NOTE
Patient states he has been evaluated for HARPER in the past    Plan:   · Wife brought in his home CPAP machine, which he may use at night

## 2022-03-10 NOTE — PROGRESS NOTES
Progress Note - Neurology   Matt Part 64 y o  male [id number]  Unit/Bed#: [facility] 716/[facility] 716-01    Assessment:    * ICH (intracerebral hemorrhage) ([facility] [address] )  Assessment & Plan  59-year-old male with a on Xarelto, hypertension, sleep apnea, arthritis, who initially presented to [facility] on 03/07 for right-sided weakness and numbness  Patient reports he initially word-finding difficulties  He went to sleep and woke up feeling off with right-sided weakness  He presented to the ED for further evaluation  BP on presentation 165/87  Stroke alert was initiated in the ED   NIHSS 4  Imaging demonstrated an acute left IPH with minimal surrounding vasogenic edema  Patient was given Kcentra and transferred to [facility] for neurosurgery evaluation  MRI brain completed and demonstrated acute left IPH as noted on CT but also small acute left frontal corona radiata infarct  Discussed with attending neurologist- suspect most likely hemorrhagic stroke in the setting of uncontrolled HTN and anticoagulation use with subsequent ischemic stroke secondary to compression of vessel in the setting of vasogenic edema  Will recommend repeat CTH in 3 weeks and if stable, then may restart anticoagulation at that time  Plan:  - Stroke pathway   Atorvastatin 40 mg   SBP goal < 160; avoid hypotension   Continue telemetry   PT/OT/ST   Frequent neuro checks  Continue to monitor and notify neurology with any changes  - S/p Kcentra  - Hold all AP/AC at this time  - Per attending neurologist, recommend repeat CT head in 3 weeks and if stable, may restart anticoagulation at this time  - Neurosurgery following; appreciate recommendations  - stat CT head with any acute change in neuro exam  - Medical management and supportive care per Critical Care team  Correction of any metabolic or infectious disturbances  Results:  - CT head:  Acute hemorrhage in the left lentiform nuclei measuring 3 7 X 2 1 X 2 3 cm    Minimal surrounding vasogenic edema  No midline shift   - CTA head and neck:  1  No evidence of active bleeding within the left lentiform nuclei hemorrhage  No findings to indicate intracranial aneurysm or vascular malformation  2  No stenosis, dissection or occlusion of the carotid or vertebral arteries or major vessels  - repeat CT head:  No significant change in left basal ganglia parenchymal hematoma and minimal surrounding vasogenic edema  No midline shift   - MRI brain:  1  Small acute infarct in left frontal corona radiata  2  Unchanged 3 3 cm acute intraparenchymal hematoma centered in left posterior putamen with mild surrounding vasogenic edema and mild mass effect on left lateral ventricle  - Echo: EF 65%  No regional wall motion abnormalities  Left atrium moderately dilated  Right atrium mildly dilated  - lipid panel:  Cholesterol 165, triglycerides 143, HDL 36,   - A1c 7 5    Chronic atrial fibrillation (HCC)  Assessment & Plan  - On Xarelto at baseline  - S/p Kcentra  - Telemetry  - Recommend repeat CT head in 3 weeks and if stable, then may resume AC at that time  - Medical management per critical care team    Benign essential hypertension  Assessment & Plan  - BP on presentation 165/87  - Weaned off nicardipine gtt 3/9  - Discussed with patient and wife regarding regular BP checks at home with BP diary  - Medical management per critical care team       Rae Hairston will need follow up in in 6 weeks with neurovascular attending/AP  He will require a CT head without contrast within 3 weeks  Case and treatment plan reviewed with attending neurologist, Dr Casie Kirkpatrick  Subjective:   Patient seen and evaluated at the bedside with attending neurologist  Patient reports being in better spirits today as he was able to shower  He continues to have right sided numbness but states he is determined to work with therapy to improve his symptoms           Past Medical History:   Diagnosis Date    Ankle swelling     Arthritis  Asthma     Atrial fibrillation ([facility] [address] )     Atrial fibrillation with RVR ([facility]) 4/4/2017    Chronic kidney disease     kidney stones    Gout     Hypertension     Kidney stone     Night sweats     Seasonal allergies     Sleep apnea      Past Surgical History:   Procedure Laterality Date    APPENDECTOMY      CARDIOVERSION      X4 last was 2018    COLONOSCOPY      CYSTOSCOPY  08/30/2016    w/removal of object, last assessed 8/30/16    FOOT SURGERY Left     HAND SURGERY      KNEE SURGERY Right 2016    UT CYSTO/URETERO W/LITHOTRIPSY &INDWELL STENT INSRT Left 8/9/2016    Procedure: CYSTOSCOPY URETEROSCOPY WITH LITHOTRIPSY HOLMIUM LASER STONE EXTRACTION, RETROGRADE PYELOGRAM AND INSERTION STENT URETERAL;  Surgeon: Sulaiman Kilgore MD;  Location:  MAIN OR;  Service: Urology last assessed 8/30/16    UT CYSTO/URETERO W/LITHOTRIPSY &INDWELL STENT INSRT Right 8/24/2020    Procedure: CYSTOSCOPY , cystolitholapaxy of bladder stone WITH HOMIUM LASER RIGHT RETROGRADE AND RIGHT URETERAL STENT, [address] [facility] [address] [address];  Surgeon: Sulaiman Kilgore MD;  Location: [address] [place] [address] MAIN OR;  Service: Urology    WRIST SURGERY Left 2014    Fracture surgery     Family History   Problem Relation Age of Onset    Atrial fibrillation Mother     Other Mother         blood dyscrasia    Hypertension Mother     Other Father         hypoglycemia     Atrial fibrillation Father     Diabetes Family     Other Family         Thrombocytosis    No Known Problems Sister     Cancer Neg Hx     Thyroid disease Neg Hx      Social History     Socioeconomic History    Marital status: /Civil Union     Spouse name: Not on file    Number of children: 4    Years of education: 12th grade     Highest education level: Not on file   Occupational History    Not on file   Tobacco Use    Smoking status: Former Smoker     Packs/day: 0 50     Years: 10 00     Pack years: 5 00     Types: Cigarettes     Quit date: 4/4/1994     Years since quitting: 27 9    Smokeless tobacco: Never Used   Vaping Use    Vaping Use: Never used   Substance and Sexual Activity    Alcohol use: Yes     Comment: occ    Drug use: No    Sexual activity: Yes     Partners: Female     Birth control/protection: None   Other Topics Concern    Not on file   Social History Narrative    No caffeine use      Social Determinants of Health     Financial Resource Strain: Not on file   Food Insecurity: Not on file   Transportation Needs: Not on file   Physical Activity: Not on file   Stress: Not on file   Social Connections: Not on file   Intimate Partner Violence: Not on file   Housing Stability: Not on file         Medications:   All current active meds have been reviewed and current meds:  Scheduled Meds:  Current Facility-Administered Medications   Medication Dose Route Frequency Provider Last Rate    acetaminophen  650 mg Oral Q4H PRN Shan Arceo, DO      albuterol  2 puff Inhalation Q4H PRN Shan Arceo, DO      amLODIPine  10 mg Oral Daily [place], [place]      atorvastatin  40 mg Oral Daily With Francois & Francois, DO      cloNIDine  0 1 mg Transdermal Weekly [place], [place]      docusate sodium  100 mg Oral BID Shanangela Arceo DO      dofetilide  500 mcg Oral Q12H [address] [place], [place]      hydrALAZINE  10 mg Intravenous Q6H PRN Shan Arceo, DO      hydrALAZINE  50 mg Oral Q12H Shanangela Arceo, DO      insulin lispro  2-12 Units Subcutaneous Q6H [address] [place], [place]      Labetalol HCl  10 mg Intravenous Q6H PRN Shanangela Arceo, DO      losartan  100 mg Oral Daily [place], [place]      nebivolol  2 5 mg Oral Daily [place], [place]      niCARdipine  1-15 mg/hr Intravenous Titrated Shan Arceo DO Stopped (03/09/22 1541)    ondansetron  4 mg Intravenous Q6H PRN Shan Arceo, DO      polyethylene glycol  17 g Oral Daily PRN Shan Arceo DO       Continuous Infusions:niCARdipine, 1-15 mg/hr, Last Rate: Stopped (03/09/22 1541)      PRN Meds:   acetaminophen    albuterol    hydrALAZINE    Labetalol HCl    ondansetron    polyethylene glycol       ROS:   Review of Systems   Constitutional: Negative for fever  HENT: Negative for trouble swallowing  Eyes: Negative for photophobia and visual disturbance  Respiratory: Negative for shortness of breath  Cardiovascular: Negative for chest pain  Gastrointestinal: Negative for abdominal pain  Musculoskeletal: Negative for arthralgias, back pain, gait problem, myalgias and neck pain  Skin: Negative for rash  Neurological: Positive for speech difficulty, weakness and numbness  Negative for dizziness, tremors, seizures, syncope, facial asymmetry, light-headedness and headaches  Psychiatric/Behavioral: Negative for confusion  All other systems reviewed and are negative  Vitals:   /56   Pulse (!) 54   Temp 98 4 °F (36 9 °C) (Oral)   Resp 19   Ht 5' 10" (1 778 m)   Wt (!) 145 kg (319 lb 10 7 oz)   SpO2 96%   BMI 45 87 kg/m²       Physical Exam:   Physical Exam  Vitals and nursing note reviewed  Constitutional:       General: He is not in acute distress  Appearance: Normal appearance  He is not ill-appearing  HENT:      Head: Normocephalic  Mouth/Throat:      Mouth: Mucous membranes are moist       Pharynx: Oropharynx is clear  Eyes:      General: No scleral icterus  Right eye: No discharge  Left eye: No discharge  Extraocular Movements: Extraocular movements intact and EOM normal       Conjunctiva/sclera: Conjunctivae normal    Cardiovascular:      Rate and Rhythm: Normal rate  Pulmonary:      Effort: Pulmonary effort is normal  No respiratory distress  Musculoskeletal:         General: Normal range of motion  Cervical back: Normal range of motion  Skin:     General: Skin is warm and dry  Coloration: Skin is not jaundiced or pale     Neurological:      Mental Status: He is alert and oriented to person, place, and time  Coordination: Heel to Shin Test normal  Finger-nose-finger test: Right slow but normal, left normal    Psychiatric:         Mood and Affect: Mood normal          Behavior: Behavior normal        Neurologic Exam     Mental Status   Oriented to person, place, and time  Level of consciousness: alert  Able to follow commands appropriately  No dysarthria or aphasia noted  Cranial Nerves     CN II   Right visual field deficit: none  Left visual field deficit: none     CN III, IV, VI   Extraocular motions are normal      CN V   Facial sensation intact  CN VIII   Hearing: intact    CN IX, X   Palate: symmetric    CN XI   CN XI normal      CN XII   CN XII normal    Subtle decreased right nasolabial fold      Motor Exam   Muscle bulk: normal  Full strength throughout bilateral UE/LE except for:  Right deltoid 5-/5     Sensory Exam   Decreased sensation to temperature in right UE/LE compared to left (more decreased in LE, especially foot)     Gait, Coordination, and Reflexes     Coordination   Finger-nose-finger test: Right slow but normal, left normal   Heel to shin coordination: normal  Left endpoint tremor with finger to nose testing           Labs: I have personally reviewed pertinent reports     Recent Results (from the past 24 hour(s))   Fingerstick Glucose (POCT)    Collection Time: 03/09/22  4:14 PM   Result Value Ref Range    POC Glucose 164 (H) 65 - 140 mg/dl   Fingerstick Glucose (POCT)    Collection Time: 03/09/22  9:08 PM   Result Value Ref Range    POC Glucose 159 (H) 65 - 140 mg/dl   ECG 12 lead    Collection Time: 03/10/22 12:39 AM   Result Value Ref Range    Ventricular Rate 55 BPM    Atrial Rate 55 BPM    VT Interval 152 ms    QRSD Interval 84 ms    QT Interval 456 ms    QTC Interval 436 ms    P Axis 74 degrees    QRS Axis 40 degrees    T Wave Axis 65 degrees   CBC and Platelet    Collection Time: 03/10/22  4:52 AM   Result Value Ref Range    WBC 10 96 (H) 4 31 - 10 16 Thousand/uL    RBC 4 99 3 88 - 5 62 Million/uL    Hemoglobin 14 8 12 0 - 17 0 g/dL    Hematocrit 43 3 36 5 - 49 3 %    MCV 87 82 - 98 fL    MCH 29 7 26 8 - 34 3 pg    MCHC 34 2 31 4 - 37 4 g/dL    RDW 13 6 11 6 - 15 1 %    Platelets 299 425 - 832 Thousands/uL    MPV 12 7 8 9 - 12 7 fL   Comprehensive metabolic panel    Collection Time: 03/10/22  4:52 AM   Result Value Ref Range    Sodium 137 136 - 145 mmol/L    Potassium 3 7 3 5 - 5 3 mmol/L    Chloride 107 100 - 108 mmol/L    CO2 24 21 - 32 mmol/L    ANION GAP 6 4 - 13 mmol/L    BUN 21 5 - 25 mg/dL    Creatinine 1 34 (H) 0 60 - 1 30 mg/dL    Glucose 147 (H) 65 - 140 mg/dL    Calcium 8 5 8 3 - 10 1 mg/dL    Corrected Calcium 9 1 8 3 - 10 1 mg/dL    AST 35 5 - 45 U/L    ALT 77 12 - 78 U/L    Alkaline Phosphatase 60 46 - 116 U/L    Total Protein 7 2 6 4 - 8 2 g/dL    Albumin 3 3 (L) 3 5 - 5 0 g/dL    Total Bilirubin 0 82 0 20 - 1 00 mg/dL    eGFR 58 ml/min/1 73sq m   Fingerstick Glucose (POCT)    Collection Time: 03/10/22  6:31 AM   Result Value Ref Range    POC Glucose 145 (H) 65 - 140 mg/dl   Fingerstick Glucose (POCT)    Collection Time: 03/10/22 11:05 AM   Result Value Ref Range    POC Glucose 146 (H) 65 - 140 mg/dl       Imaging: I have personally reviewed pertinent imaging in PACS, and I have personally reviewed PACS reports  EKG, Pathology, and Other Studies: I have personally reviewed pertinent reports  VTE Prophylaxis: Sequential compression device (Venodyne)         Total time spent today 28 minutes  Greater than 50% of total time was spent with the patient and / or family counseling and / or coordination of care  A description of the counseling / coordination of care: Patient seen and evaluated  Case reviewed with attending  Chart thoroughly reviewed including imaging and labs  Coordination of care with RN and Radiology  Discussion of imaging, labs, medications, and follow up with patient and wife

## 2022-03-10 NOTE — TELEPHONE ENCOUNTER
Reviewed patient chart, inpatient at [facility] at this time  Admitted under stroke pathway  Plan per inpatient therapy recommendations is for patient to be discharged to home when medically cleared  Called hospital room phone with no answer  Called mobile line with no answer  Left message on voicemail box for call back       I will continue to follow up during hospitalization with inpatient neurology recommendations as well as after hospital discharge to assist

## 2022-03-11 VITALS
DIASTOLIC BLOOD PRESSURE: 69 MMHG | HEIGHT: 70 IN | SYSTOLIC BLOOD PRESSURE: 129 MMHG | BODY MASS INDEX: 45.1 KG/M2 | HEART RATE: 63 BPM | WEIGHT: 315 LBS | RESPIRATION RATE: 18 BRPM | TEMPERATURE: 98.3 F | OXYGEN SATURATION: 97 %

## 2022-03-11 LAB
ATRIAL RATE: 55 BPM
ATRIAL RATE: 56 BPM
ATRIAL RATE: 56 BPM
GLUCOSE SERPL-MCNC: 124 MG/DL (ref 65–140)
GLUCOSE SERPL-MCNC: 134 MG/DL (ref 65–140)
P AXIS: 57 DEGREES
P AXIS: 57 DEGREES
PR INTERVAL: 132 MS
PR INTERVAL: 158 MS
PR INTERVAL: 174 MS
QRS AXIS: 20 DEGREES
QRS AXIS: 26 DEGREES
QRS AXIS: 79 DEGREES
QRSD INTERVAL: 64 MS
QRSD INTERVAL: 88 MS
QRSD INTERVAL: 96 MS
QT INTERVAL: 444 MS
QT INTERVAL: 462 MS
QT INTERVAL: 476 MS
QTC INTERVAL: 424 MS
QTC INTERVAL: 445 MS
QTC INTERVAL: 459 MS
T WAVE AXIS: -1 DEGREES
T WAVE AXIS: 110 DEGREES
T WAVE AXIS: 66 DEGREES
VENTRICULAR RATE: 55 BPM
VENTRICULAR RATE: 56 BPM
VENTRICULAR RATE: 56 BPM

## 2022-03-11 PROCEDURE — 93010 ELECTROCARDIOGRAM REPORT: CPT | Performed by: INTERNAL MEDICINE

## 2022-03-11 PROCEDURE — 82948 REAGENT STRIP/BLOOD GLUCOSE: CPT

## 2022-03-11 PROCEDURE — 97530 THERAPEUTIC ACTIVITIES: CPT

## 2022-03-11 PROCEDURE — 97129 THER IVNTJ 1ST 15 MIN: CPT

## 2022-03-11 PROCEDURE — 99239 HOSP IP/OBS DSCHRG MGMT >30: CPT | Performed by: INTERNAL MEDICINE

## 2022-03-11 PROCEDURE — 93005 ELECTROCARDIOGRAM TRACING: CPT

## 2022-03-11 PROCEDURE — 97168 OT RE-EVAL EST PLAN CARE: CPT

## 2022-03-11 PROCEDURE — 97130 THER IVNTJ EA ADDL 15 MIN: CPT

## 2022-03-11 RX ORDER — ATORVASTATIN CALCIUM 40 MG/1
40 TABLET, FILM COATED ORAL
Qty: 30 TABLET | Refills: 0 | Status: SHIPPED | OUTPATIENT
Start: 2022-03-11 | End: 2022-04-08 | Stop reason: SDUPTHER

## 2022-03-11 RX ORDER — NIFEDIPINE 10 MG/1
10 CAPSULE ORAL EVERY 8 HOURS SCHEDULED
Status: DISCONTINUED | OUTPATIENT
Start: 2022-03-11 | End: 2022-03-11

## 2022-03-11 RX ORDER — NIFEDIPINE 10 MG/1
10 CAPSULE ORAL EVERY 8 HOURS SCHEDULED
Status: DISCONTINUED | OUTPATIENT
Start: 2022-03-11 | End: 2022-03-11 | Stop reason: HOSPADM

## 2022-03-11 RX ORDER — NIFEDIPINE 10 MG/1
10 CAPSULE ORAL EVERY 8 HOURS SCHEDULED
Qty: 90 CAPSULE | Refills: 0 | Status: SHIPPED | OUTPATIENT
Start: 2022-03-11 | End: 2022-03-15

## 2022-03-11 RX ADMIN — AMLODIPINE BESYLATE 10 MG: 10 TABLET ORAL at 09:10

## 2022-03-11 RX ADMIN — DOFETILIDE 500 MCG: 500 CAPSULE ORAL at 09:10

## 2022-03-11 RX ADMIN — HYDRALAZINE HYDROCHLORIDE 50 MG: 50 TABLET, FILM COATED ORAL at 09:10

## 2022-03-11 RX ADMIN — LOSARTAN POTASSIUM 100 MG: 50 TABLET, FILM COATED ORAL at 09:10

## 2022-03-11 RX ADMIN — DOCUSATE SODIUM 100 MG: 100 CAPSULE ORAL at 09:10

## 2022-03-11 RX ADMIN — NEBIVOLOL HYDROCHLORIDE 2.5 MG: 2.5 TABLET ORAL at 09:10

## 2022-03-11 RX ADMIN — NIFEDIPINE 10 MG: 10 CAPSULE ORAL at 11:48

## 2022-03-11 NOTE — PLAN OF CARE
Problem: PHYSICAL THERAPY ADULT  Goal: Performs mobility at highest level of function for planned discharge setting  See evaluation for individualized goals  Description: Treatment/Interventions: Functional transfer training,LE strengthening/ROM,Therapeutic exercise,Elevations,Endurance training,Patient/family training,Equipment eval/education,Bed mobility,Gait training,Compensatory technique education,Continued evaluation,Spoke to nursing          See flowsheet documentation for full assessment, interventions and recommendations  Outcome: Completed  Note: Prognosis: Good  Problem List: Impaired sensation,Impaired balance  Assessment: Pt presents with decreased mobility, strength, balance, and activity tolerance  Pt seen for PT session focusing on bed mobility, transfers, mobility, standing tolerance/balance, and LE strengthening  Pt able to perform all transfers and mobility at Mod I  Pt performed 21 stairs at supervision  Pt was not noted to be veering or bumping into objects  Pt's only complaint is regarding RLE sensation deficits  Pt educated on various techniques as well as continued OP PT for further interventions  Pt appears to be functioning at baseline level with functional mobility; therefore, requires no immediate acute skilled PT services at this time  Pt with no further questions or concerns regarding PT services  Will D/C PT at this time  Please re-consult if pt's medical status changes  Recommendation at this time is OP PT  Pt would benefit from continued ambulation throughout inpatient admission  Barriers to Discharge: None        PT Discharge Recommendation: Home with outpatient rehabilitation          See flowsheet documentation for full assessment

## 2022-03-11 NOTE — DISCHARGE SUMMARY
[facility]  Discharge- Kassi Arrington 1965, 64 y o  male MRN: [id number]  Unit/Bed#: [facility] 716-01 Encounter: [id number]  Primary Care Provider: Fili Mccabe MD   Date and time admitted to hospital: 3/7/2022 11:50 PM    * ICH (intracerebral hemorrhage) ([facility] 75 )  Assessment & Plan  · ICH score initially on arrival : 0  · Evidence of compression and mass effect  · CT Head from 3/7/22: L Lentiform Nuclei hemorrhage 3 7 x 2 1 x 2 3 cm, surrounding vasogenic edema, no midline shift  · F/u Head CT from 3/8/22: No significant change in L Basal Ganglia parenchymal hematoma, no midline shift  · Brain MRI from 03/09: Small acute infarct in left frontal corona radiata  · Unchanged 3 3 cm acute intraparenchymal hematoma centered in left posterior putamen  · TTE 03/08: LVEF 65%, no RWMA, Gradee I Diastolic Dysfx  · Neurosurgery evaluated, no acute interventions indicated at this time  · Passed bedside speech/swallow    · Neurology recs appreciated, can restart anticoagulation 3 weeks with after repeat CT  · PT /OT  · Blood pressure management as below    Benign essential hypertension  Assessment & Plan  · Uncontrolled  · Will discontinue amlodipine 10 mg and start nifedipine 10 mg q 8 and titrate as needed  · Continue with home medications  · Continue with hydralazine    · Blood pressure improved, 748 systolic  · will discharge patient home patient has blood pressure cuff at home    · Patient instructed to take additional 10 mg dose of nifedipine if blood pressure remains high at home  · Patient was instructed to see PCP and nephrologist, who barely manages the blood pressure, next week    Chronic atrial fibrillation ([facility] )  Assessment & Plan  · On xarelto at home, holding this hospitalization  · ECG 03/08: NSR, evidence of prior sepatal infarct, no acute ST abnormalities  · Continue holding home Xarelto in setting of recent ICH, can be restarted 3 weeks after repeat CT  · Continue home Tikosyn       HARPER (obstructive sleep apnea)  Assessment & Plan  · Continue home CPAP/BiPAP    Morbid obesity with BMI of 45 0-49 9, adult (HCC)  Assessment & Plan  · Counseled on weight loss    Asthma  Assessment & Plan  · None exacerbation  · Continue medications      Medical Problems             Resolved Problems  Date Reviewed: 3/11/2022    None              Discharging Physician / Practitioner: Irma Cortes MD  PCP: Zion Borrero MD  Admission Date:   Admission Orders (From admission, onward)     Ordered        03/08/22 0005  Inpatient Admission  Once                      Discharge Date: 03/11/22    Consultations During Hospital Stay:  · Neurology, Neurosurgery, Critical Care    Procedures Performed:   · No intervention    Significant Findings / Test Results:   · CT Head from 3/7/22: L Lentiform Nuclei hemorrhage 3 7 x 2 1 x 2 3 cm, surrounding vasogenic edema, no midline shift  · F/u Head CT from 3/8/22: No significant change in L Basal Ganglia parenchymal hematoma, no midline shift  · Brain MRI from 03/09: Small acute infarct in left frontal corona radiata  · Unchanged 3 3 cm acute intraparenchymal hematoma centered in left posterior putamen  · TTE 03/08: LVEF 65%, no RWMA, Gradee I Diastolic Dysfx    Incidental Findings:   · None    Test Results Pending at Discharge (will require follow up): · None     Outpatient Tests Requested:  · CT head    Complications:  None    Reason for Admission:  None    Hospital Course:   Mathieu Nolasco is a 64 y o  male patient who originally presented to the hospital on 3/7/2022 due to right-sided weakness  Per patient, he was at home on a video conference call when he first noted word finding difficulty  He attempted to go to sleep, but woke up feeling "off" and weak on the right side  He was brought to the ER and stroke alert was activated   CT head revealed acute left intraparenchymal hemorrhage measuring 3 7 x 2 1 x 2 3 with minimal surrounding vasogenic edema  Patient was given [facility] and transferred to [facility] for neurosurgery evaluation  ICH score 0  Patient was started on cardene prior to transfer    Pt admitted to ICU on 3/8 after he was discovered to have an acute left intraparenchymal hemorrhage  Was seen by neurosurgery on 3/8, who did not have an recommendations for neurosurgical intervention  Pt remained stable overnight  On 3/9, pt had an MRI brain that showed unchanged intraparenchymal hematoma, as well as a small acute infarct in the left corona radiata  Patient was medically managed with blood pressure control  Remained stable  Was transferred to stepdown level 1 on 3/9, and was deemed stable for transfer to [facility]  Please see above list of diagnoses and related plan for additional information  Condition at Discharge: good    Discharge Day Visit / Exam:   Subjective:  Patient seen for follow-up for intraparenchymal hemorrhage  Patient denies any new numbness, tingling, headaches, weakness  Patient blood pressure medications were changed to help keep blood pressure in range 120-140  Blood pressure improved to 234 systolic in the afternoon  Patient discharged home    Vitals: Blood Pressure: 129/69 (03/11/22 1447)  Pulse: 63 (03/11/22 0700)  Temperature: 98 3 °F (36 8 °C) (03/10/22 1421)  Temp Source: Oral (03/10/22 1100)  Respirations: 18 (03/11/22 0700)  Height: 5' 10" (177 8 cm) (03/08/22 0848)  Weight - Scale: (!) 145 kg (319 lb 10 7 oz) (03/09/22 0600)  SpO2: 97 % (03/11/22 0700)  Exam:   Physical Exam  Vitals and nursing note reviewed  Constitutional:       Appearance: He is well-developed  HENT:      Head: Normocephalic and atraumatic  Eyes:      Conjunctiva/sclera: Conjunctivae normal    Cardiovascular:      Rate and Rhythm: Normal rate and regular rhythm  Heart sounds: No murmur heard  Pulmonary:      Effort: Pulmonary effort is normal  No respiratory distress  Breath sounds: Normal breath sounds     Abdominal: Palpations: Abdomen is soft  Tenderness: There is no abdominal tenderness  Musculoskeletal:         General: Normal range of motion  Cervical back: Neck supple  Skin:     General: Skin is warm and dry  Neurological:      Mental Status: He is alert and oriented to person, place, and time  Mental status is at baseline  Discussion with Family: Updated  (wife) at bedside  Discharge instructions/Information to patient and family:   See after visit summary for information provided to patient and family  Provisions for Follow-Up Care:  See after visit summary for information related to follow-up care and any pertinent home health orders  Disposition:   Home    Planned Readmission:  No     Discharge Statement:  I spent 35 minutes discharging the patient  This time was spent on the day of discharge  I had direct contact with the patient on the day of discharge  Greater than 50% of the total time was spent examining patient, answering all patient questions, arranging and discussing plan of care with patient as well as directly providing post-discharge instructions  Additional time then spent on discharge activities  Discharge Medications:  See after visit summary for reconciled discharge medications provided to patient and/or family        **Please Note: This note may have been constructed using a voice recognition system**

## 2022-03-11 NOTE — ASSESSMENT & PLAN NOTE
· Uncontrolled  · Will discontinue amlodipine 10 mg and start nifedipine 10 mg q 8 and titrate as needed  · Continue with home medications  · Continue with hydralazine    · Blood pressure improved, 280 systolic  · will discharge patient home patient has blood pressure cuff at home    · Patient instructed to take additional 10 mg dose of nifedipine if blood pressure remains high at home  · Patient was instructed to see PCP and nephrologist, who barely manages the blood pressure, next week

## 2022-03-11 NOTE — ASSESSMENT & PLAN NOTE
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

## 2022-03-11 NOTE — ASSESSMENT & PLAN NOTE
· On xarelto at home, holding this hospitalization  · ECG 03/08: NSR, evidence of prior sepatal infarct, no acute ST abnormalities  · Continue holding home Xarelto in setting of recent ICH, can be restarted 3 weeks after repeat CT  · Continue home Tikosyn

## 2022-03-11 NOTE — DISCHARGE INSTRUCTIONS
Effects of a Stroke   WHAT YOU NEED TO KNOW:   The effects of a stroke may be different for everyone  They may depend on where the stroke happened in your brain and how much damage occurred there  Some people may make a full recovery  Other people may have long-term effects  It is important to talk to your healthcare provider about any symptoms you have after a stroke  There are medicines and therapies to help manage the effects of a stroke  DISCHARGE INSTRUCTIONS:   Call 911 or have someone else call 911 for any of the following:   · You have any of the following signs of a stroke:      ¨ Numbness or drooping on one side of your face     ¨ Weakness in an arm or leg    ¨ Confusion or difficulty speaking    ¨ Dizziness, a severe headache, or vision loss    ·            · You cannot be woken up  · You fall and hit your head  · You have a seizure  · You feel lightheaded, short of breath, and have chest pain  · You cough up blood  Seek care immediately if:   · Your arm or leg is painful, red, or larger than normal      · You feel weak, dizzy, or faint  · You have a fall without hitting your head  · Your blood sugar level or blood pressure is higher or lower than your healthcare provider said it should be  · You bleed heavily or cannot stop bleeding from a cut or injury  · You have a new, severe headache  Contact your healthcare provider if:   · You have a fever  · You have a rash  · You have new or worsening symptoms  · You feel extremely sad or anxious  · You feel you are unable to cope with your condition  · You have trouble sleeping  · You have open sores  · You choke or cough when eating or drinking  · You have questions or concerns about your condition or care    Problems with language, speech, and memory:   · Difficulty finding the right words or putting complete sentences together    · Difficulty putting words together that make sense    · Difficulty paying attention or a short attention span    · Difficulty writing or reading    · Problems with memory or being disoriented to time, place, or situation    · Problems thinking clearly or feeling confused    · Difficulty understanding written or spoken language or learning something new  Muscle and nerve problems:  A stroke may affect one side of your body or part of one side  You may be at an increased risk for falling if you have difficulty moving your leg muscles   You may have any of the following:  · Inability to move your arm, leg, or one side of your face (paralysis)    · Muscle weakness, spasms, or muscles that stay in one position (contracted)    · Poor balance, difficulty walking, or difficulty grasping objects    · Changes in your vision or poor vision    · Ignoring, or being unaware of one side of your body    · Numbness of your arm, hand, fingers, leg, foot, or toes    · Pain, tickling, or prickling in weak or paralyzed parts of your body  Bowel and bladder problems:   · Loss of control of your urine or bowel movements    · Feeling like you have to urinate frequently, even when your bladder is not full    · Difficulty emptying your bladder or constipation  Swallowing and eating problems:   · Difficulty swallowing food    · Difficulty feeding yourself    · A lack of taste or a change in the way you taste things    · An increased risk for aspiration (food moves into the lungs) and pneumonia due to swallowing problems  Changes in personality or mood:   · Depression, sadness, irritability, or hopelessness    · Anger, frustration, or anxiety    · Difficulty controlling emotions or expressing inappropriate emotions    · Quick mood changes  Fatigue:   · Low energy levels    · Tiring easily    · A lack of motivation  Problems sleeping:   · Development of sleep apnea    · Changes in sleep such as sleeping too much or not enough  Problems with sexual function:   · Difficulty having or keeping an erection    · Vaginal dryness    · A decreased interest in sex  Self care after a stroke:   · Go to rehabilitation (rehab) as directed  Rehab is an important part of treatment  A speech therapist helps you relearn or improve your ability to talk and swallow  You may start slowly and start doing more difficult tasks over time  Physical therapists can help you gain strength and build endurance  Occupational therapists teach you new ways to do daily activities, such as getting dressed  Therapy can help you improve your ability to walk or keep your balance  Your therapy may include tasks or movements you will need to do for everyday activities  An example is being able to raise or lower yourself from a chair  · Prevent falls in your home  Remove anything you might trip over  Tape electrical cords down  Keep paths clear throughout your home  Make sure your home is well lighted  Put nonslip materials on surfaces that might be slippery  An example is your bathtub or shower floor  · Use assistive devices  A cane or walker may help you keep your balance as you walk  · Manage other medical conditions  Manage your heart conditions, blood pressure, and diabetes  Management of these conditions can reduce your risk for another stroke  Take your medicines as directed  Follow your healthcare provider's instructions for diet  · Join a support group  Life after a stroke can be difficult  It may be helpful to talk to others who have had a stroke  There are also support groups for family members or support persons of those whom have had a stroke  Ask your healthcare provider for more information about support groups  Know the signs of depression: Depression may happen after you have a stroke  Depression can lower your quality of life and cause you to stop doing things to help you be stronger  Depression can cause you to become less independent  Know the signs of depression so that you can receive help   Tell your family and friends that if they see these signs, to let your healthcare provider know  You may show any of the following signs of depression:  · Extreme sadness    · Avoiding social interaction with family or friends    · A lack of interest in things you once enjoyed    · Irritability    · Trouble sleeping    · Low energy levels    · A change in eating habits or sudden weight gain or loss  Driving after a stroke:  Do not drive a car without talking to your healthcare provider or occupational therapist  The effects of a stroke may make it difficult or unsafe for you to drive  You may need to have a  evaluation before you can safely and legally drive a car  You may also need to take a  training program or get special equipment installed in your car  Ask your healthcare provider for more information about driving after a stroke  For more information and support:   · National St[employer] Association  [address] , [place]  Phone: 4- 205 - [phone]  Web Address: [employer]  Follow up with your healthcare provider as directed: You may need to come in for regular tests of your brain function  If you are taking warfarin, you will need to come in for regular blood tests  Your INR levels will also need to be checked  These tests help make sure you are taking the right amount of warfarin  Write down your questions so you remember to ask them during your visits  © 2017 [address] Information is for End User's use only and may not be sold, redistributed or otherwise used for commercial purposes  All illustrations and images included in CareNotes® are the copyrighted property of [employer] M [employer] Inc  or [employer]  The above information is an  only  It is not intended as medical advice for individual conditions or treatments   Talk to your doctor, nurse or pharmacist before following any medical regimen to see if it is safe and effective for you

## 2022-03-11 NOTE — RESTORATIVE TECHNICIAN NOTE
Restorative Technician Note      Patient Name: Mathieu Nolasco     Note Type: Mobility  Patient Position Upon Consult: Bedside chair  Activity Performed: Ambulated; Dangled; Stood  Assistive Device: Other (Comment) (None)  Education Provided: Yes  Patient Position at End of Consult: Supine;  All needs within reach; Bed/Chair alarm activated      Kristin KHALIL, Restorative Technician, [employer]

## 2022-03-11 NOTE — PLAN OF CARE
Problem: PAIN - ADULT  Goal: Verbalizes/displays adequate comfort level or baseline comfort level  Description: Interventions:  - Encourage patient to monitor pain and request assistance  - Assess pain using appropriate pain scale  - Administer analgesics based on type and severity of pain and evaluate response  - Implement non-pharmacological measures as appropriate and evaluate response  - Consider cultural and social influences on pain and pain management  - Notify physician/advanced practitioner if interventions unsuccessful or patient reports new pain  Outcome: Progressing     Problem: INFECTION - ADULT  Goal: Absence or prevention of progression during hospitalization  Description: INTERVENTIONS:  - Assess and monitor for signs and symptoms of infection  - Monitor lab/diagnostic results  - Monitor all insertion sites, i e  indwelling lines, tubes, and drains  - Monitor endotracheal if appropriate and nasal secretions for changes in amount and color  - [place] appropriate cooling/warming therapies per order  - Administer medications as ordered  - Instruct and encourage patient and family to use good hand hygiene technique  - Identify and instruct in appropriate isolation precautions for identified infection/condition  Outcome: Progressing     Problem: SAFETY ADULT  Goal: Patient will remain free of falls  Description: INTERVENTIONS:  - Educate patient/family on patient safety including physical limitations  - Instruct patient to call for assistance with activity   - Consult OT/PT to assist with strengthening/mobility   - Keep Call bell within reach  - Keep bed low and locked with side rails adjusted as appropriate  - Keep care items and personal belongings within reach  - Initiate and maintain comfort rounds  - Make Fall Risk Sign visible to staff  - Offer Toileting every  Hours, in advance of need  - Initiate/Maintain alarm  - Obtain necessary fall risk management equipment:   - Apply yellow socks and bracelet for high fall risk patients  - Consider moving patient to room near nurses station  Outcome: Progressing  Goal: Maintain or return to baseline ADL function  Description: INTERVENTIONS:  -  Assess patient's ability to carry out ADLs; assess patient's baseline for ADL function and identify physical deficits which impact ability to perform ADLs (bathing, care of mouth/teeth, toileting, grooming, dressing, etc )  - Assess/evaluate cause of self-care deficits   - Assess range of motion  - Assess patient's mobility; develop plan if impaired  - Assess patient's need for assistive devices and provide as appropriate  - Encourage maximum independence but intervene and supervise when necessary  - Involve family in performance of ADLs  - Assess for home care needs following discharge   - Consider OT consult to assist with ADL evaluation and planning for discharge  - Provide patient education as appropriate  Outcome: Progressing  Goal: Maintains/Returns to pre admission functional level  Description: INTERVENTIONS:  - Perform BMAT or MOVE assessment daily    - Set and communicate daily mobility goal to care team and patient/family/caregiver  - Collaborate with rehabilitation services on mobility goals if consulted  - Perform Range of Motion  times a day  - Reposition patient every  hours    - Dangle patient  times a day  - Stand patient  times a day  - Ambulate patient  times a day  - Out of bed to chair  times a day   - Out of bed for meals  times a day  - Out of bed for toileting  - Record patient progress and toleration of activity level   Outcome: Progressing     Problem: DISCHARGE PLANNING  Goal: Discharge to home or other facility with appropriate resources  Description: INTERVENTIONS:  - Identify barriers to discharge w/patient and caregiver  - Arrange for needed discharge resources and transportation as appropriate  - Identify discharge learning needs (meds, wound care, etc )  - Arrange for interpretive services to assist at discharge as needed  - Refer to Case Management Department for coordinating discharge planning if the patient needs post-hospital services based on physician/advanced practitioner order or complex needs related to functional status, cognitive ability, or social support system  Outcome: Progressing     Problem: Knowledge Deficit  Goal: Patient/family/caregiver demonstrates understanding of disease process, treatment plan, medications, and discharge instructions  Description: Complete learning assessment and assess knowledge base    Interventions:  - Provide teaching at level of understanding  - Provide teaching via preferred learning methods  Outcome: Progressing     Problem: Potential for Falls  Goal: Patient will remain free of falls  Description: INTERVENTIONS:  - Educate patient/family on patient safety including physical limitations  - Instruct patient to call for assistance with activity   - Consult OT/PT to assist with strengthening/mobility   - Keep Call bell within reach  - Keep bed low and locked with side rails adjusted as appropriate  - Keep care items and personal belongings within reach  - Initiate and maintain comfort rounds  - Make Fall Risk Sign visible to staff  - Offer Toileting every  Hours, in advance of need  - Initiate/Maintain alarm  - Obtain necessary fall risk management equipment:   - Apply yellow socks and bracelet for high fall risk patients  - Consider moving patient to room near nurses station  Outcome: Progressing     Problem: MOBILITY - ADULT  Goal: Maintain or return to baseline ADL function  Description: INTERVENTIONS:  -  Assess patient's ability to carry out ADLs; assess patient's baseline for ADL function and identify physical deficits which impact ability to perform ADLs (bathing, care of mouth/teeth, toileting, grooming, dressing, etc )  - Assess/evaluate cause of self-care deficits   - Assess range of motion  - Assess patient's mobility; develop plan if impaired  - Assess patient's need for assistive devices and provide as appropriate  - Encourage maximum independence but intervene and supervise when necessary  - Involve family in performance of ADLs  - Assess for home care needs following discharge   - Consider OT consult to assist with ADL evaluation and planning for discharge  - Provide patient education as appropriate  Outcome: Progressing  Goal: Maintains/Returns to pre admission functional level  Description: INTERVENTIONS:  - Perform BMAT or MOVE assessment daily    - Set and communicate daily mobility goal to care team and patient/family/caregiver  - Collaborate with rehabilitation services on mobility goals if consulted  - Perform Range of Motion  times a day  - Reposition patient every  hours  - Dangle patient  times a day  - Stand patient  times a day  - Ambulate patient  times a day  - Out of bed to chair times a day   - Out of bed for meals  times a day  - Out of bed for toileting  - Record patient progress and toleration of activity level   Outcome: Progressing     Problem: Activity Intolerance/Impaired Mobility  Goal: Mobility/activity is maintained at optimum level for patient  Description: Interventions:  - Assess and monitor patient  barriers to mobility and need for assistive/adaptive devices  - Assess patient's emotional response to limitations  - Collaborate with interdisciplinary team and initiate plans and interventions as ordered  - Encourage independent activity per ability   - Maintain proper body alignment  - Perform active/passive rom as tolerated/ordered    - Plan activities to conserve energy   - Turn patient as appropriate  Outcome: Progressing

## 2022-03-11 NOTE — PLAN OF CARE
Problem: OCCUPATIONAL THERAPY ADULT  Goal: Performs self-care activities at highest level of function for planned discharge setting  See evaluation for individualized goals  Description: Treatment Interventions: ADL retraining,Visual perceptual retraining,Functional transfer training,UE strengthening/ROM,Endurance training,Patient/family training,Equipment evaluation/education,Fine motor coordination activities,Compensatory technique education,Continued evaluation          See flowsheet documentation for full assessment, interventions and recommendations  Note: Limitation: Decreased ADL status,Decreased Safe judgement during ADL,Decreased cognition,Decreased endurance,Decreased high-level ADLs,Decreased fine motor control,Decreased sensation  Prognosis: Good  Assessment: Pt seen for an additional OT treatment session with spouse present and with  focus cognition (MOCA with score of 20/30 indicating mild deficits especially in attention, delayed recall), RUE HEP for FMC/GMC and strengthening exercises with therapy ball, yellow therapy putty, playing cards, and issued hand out, number cancellation worksheet with one missed number due to attention deficit  Recommendation is outpatient for above deficits and fit to test driving  No further acute OT needs indicated at this time, physician notified- Anticipate d/c home with family support when medically cleared - d/c from caseload     OT Discharge Recommendation: (S) Home with outpatient rehabilitation (outpatient OT )  OT - OK to Discharge:  Yes

## 2022-03-11 NOTE — OCCUPATIONAL THERAPY NOTE
Occupational Therapy Re-Evaluation     Patient Name: Mary Stark  DXJEE'X Date: 3/11/2022  Problem List  Principal Problem:    ICH (intracerebral hemorrhage) ([facility] )  Active Problems:    Asthma    Benign essential hypertension    Morbid obesity with BMI of 45 0-49 9, adult (HCC)    HARPER (obstructive sleep apnea)    Chronic atrial fibrillation (HCC)    Past Medical History  Past Medical History:   Diagnosis Date    Ankle swelling     Arthritis     Asthma     Atrial fibrillation ([facility] )     Atrial fibrillation with RVR ([facility] ) 4/4/2017    Chronic kidney disease     kidney stones    Gout     Hypertension     Kidney stone     Night sweats     Seasonal allergies     Sleep apnea      Past Surgical History  Past Surgical History:   Procedure Laterality Date    APPENDECTOMY      CARDIOVERSION      X4 last was 2018    COLONOSCOPY      CYSTOSCOPY  08/30/2016    w/removal of object, last assessed 8/30/16    FOOT SURGERY Left     HAND SURGERY      KNEE SURGERY Right 2016    OK CYSTO/URETERO W/LITHOTRIPSY &INDWELL STENT INSRT Left 8/9/2016    Procedure: CYSTOSCOPY URETEROSCOPY WITH LITHOTRIPSY HOLMIUM LASER STONE EXTRACTION, RETROGRADE PYELOGRAM AND INSERTION STENT URETERAL;  Surgeon: Rosette Pires MD;  Location:  MAIN OR;  Service: Urology last assessed 8/30/16    OK CYSTO/URETERO W/LITHOTRIPSY &INDWELL STENT INSRT Right 8/24/2020    Procedure: CYSTOSCOPY , cystolitholapaxy of bladder stone WITH HOMIUM LASER RIGHT RETROGRADE AND RIGHT URETERAL STENT, [facility];  Surgeon: Rosette Pires MD;  Location: [address] [place] [address] MAIN OR;  Service: Urology    WRIST SURGERY Left 2014    Fracture surgery             03/11/22 1118   OT Last Visit   OT Visit Date 03/11/22   Note Type   Note type Re-Evaluation   Restrictions/Precautions   Weight Bearing Precautions Per Order No   Other Precautions Telemetry; Fall Risk   Pain Assessment   Pain Assessment Tool 0-10   Pain Score No Pain   Home Living   Type of Home House Home Layout Multi-level;Bed/bath upstairs   Bathroom Shower/Tub Walk-in shower   Bathroom Toilet Standard   Bathroom Accessibility Accessible   Prior Function   Level of [place] Independent with ADLs and functional mobility   Lives With Spouse   Receives Help From Family   ADL Assistance Independent   IADLs Independent   Falls in the last 6 months 0   Vocational Full time employment   Lifestyle   Autonomy I w/ all ADLS/IADLS, bed mobility, transfers and functional mobility w/ NO DME   Reciprocal Relationships 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 5  [address] [facility] 6  [address] 5  [address] [facility]  [address] [facility] [address] 5  Supervision/Setup   LB Dressing Deficit Don/doff R sock; Don/doff L sock; Increased time to complete with B hands slow motor processing/FMC deficit with Right  (seated at EOB with increased time)   Toileting Assistance  5  Supervision/Setup   Toileting Deficit (pt's spouse reports difficulty at times with clothing management)   Bed Mobility   Supine to Sit Unable to assess   Sit to Supine Unable to assess   Transfers   Sit to Stand 5  Supervision   Stand to Sit 5  Supervision   RUE Assessment   RUE Assessment X- shoulder, elbow, WFL,  strength 3+5   LUE Assessment   LUE Assessment WNL   Hand Function   Gross Motor Coordination Impaired  (right)   Fine Motor Coordination Impaired noted edema to hand  (right-difficulty manipulating small objects)   Sensation   Light Touch No apparent deficits   Stereognosis No apparent deficits  (pt able to determine small objects in right UE with eyes closed)   Proprioception   Proprioception Partial deficits in the RUE   Vision-Basic Assessment Current Vision Pt with decreased visual attention, focus able to read normal print, with scanning  Worksheet (crossing off number 2) missed one in middle of page  Suspect pt have difficulty with attention/focus on reading  Vision - Complex Assessment   Acuity Able to read normal print without difficulty on worksheet   Perception   Inattention/Neglect Cues to attend to right side of body   Cognition   Overall Cognitive Status Impaired   Arousal/Participation Alert; Responsive; Cooperative   Attention Attends with cues to redirect   Orientation Level Oriented X4   Memory Decreased short term memory  (delayed recall)   Following Commands Follows one step commands with increased time or repetition  Cognition -see below for [facility] results, pt with good insight and awareness of deficits, motivated for recovery with all given Home exercise programs  Score 20   Assessment   Limitation Decreased ADL status; Decreased Safe judgement during ADL;Decreased cognition;Decreased endurance;Decreased high-level ADLs; Decreased fine motor control;Decreased sensation   Prognosis Good   Assessment Pt seen for an additional OT treatment session with spouse present and with  focus cognition (MOCA with score of 20/30 indicating mild deficits especially in attention-number cancellation worksheet with one missed number due to attention deficit, delayed recall-see score result below), R UE HEP for FMC/GMC and strengthening exercises with therapy ball, yellow therapy putty, playing cards, with issued pt education worksheets with above exercises, including activity book for cognitive exercises, Recommendation is outpatient for above deficits and fit to test driving   No further acute OT needs indicated at this time, physician notified- Anticipate d/c home with family support when medically cleared - d/c from caseload   Goals   STG Time Frame 1-3   Short Term Goal #1 see goals below   Plan   Treatment Interventions ADL retraining;Visual perceptual retraining;Functional transfer training;UE strengthening/ROM; Endurance training;Patient/family training;Equipment evaluation/education; Fine motor coordination activities; Compensatory technique education;Continued evaluation   Goal Expiration Date 03/14/22   OT Treatment Day 1   OT Frequency 3-5x/wk   Additional Treatment Session   Start Time 6027   End Time 1107   Treatment Assessment Pt seen for an additional OT treatment session with spouse present and with  focus cognition (MOCA with score of 20/30 indicating mild deficits especially in attention, delayed recall), RUE HEP for FMC/GMC and strengthening exercises with therapy ball, yellow therapy putty, playing cards, and issued HEP hand outs inculding activity book for cognitive , number cancellation worksheet with one missed number due to attention deficit  Recommendation is outpatient for above deficits and fit to test driving  No further acute OT needs indicated at this time, physician notified- Anticipate d/c home with family support when medically cleared - d/c from caseload   Recommendation   OT Discharge Recommendation Home with outpatient rehabilitation  (outpatient OT )   OT - OK to Discharge Yes   AM-PAC Daily Activity Inpatient   Lower Body Dressing 3   Bathing 3   Toileting 4   Upper Body Dressing 4   Grooming 4   Eating 4   Daily Activity Raw Score 22   Daily Activity Standardized Score (Calc for Raw Score >=11) 47  1   AM-PAC Applied Cognition Inpatient   Following a Speech/Presentation 3   Understanding Ordinary Conversation 4   Taking Medications 3   Remembering Where Things Are Placed or Put Away 3   Remembering List of 4-5 Errands 3   Taking Care of Complicated Tasks 3   Applied Cognition Raw Score 19   Applied Cognition Standardized Score 39 77   Barthel Index   Feeding 10   Bathing 5   Grooming Score 5   Dressing Score 5   Bladder Score 10   Bowels Score 10   Toilet Use Score 10   Transfers (Bed/Chair) Score 10   Mobility (Level Surface) Score 10   Stairs Score 5   Barthel Index Score 80   STG    Occupational Therapy Goals:  *Mod I Adl's after setup with use of one handed/compensatory techniques PRN  *Pt to participate in further cognitive testing with good attention and participation to assist with safe d/c recommendations  *Demonstrate good carryover of pt/family education and training with good tolerance for increased safety and independence with ADL's/ADl's  *Pt will participate in fine/gross motor coordination/strenthening/dexterity exercises to Good in order to increase participation in functional activities  *Pt will improve right UE  strength to good in order to participate in functional activities  ing to increase cognitive function and independence with tasks        PT SEEN FOR HAY COGNITIVE ASSESSMENT  SCORED  20/30 INDICATING ___MILD________ COGNITIVE IMPAIRMENT FOR AGE/EDUCATION  SCORES ARE AS FOLLOWS:    VISUOSPATIAL/EXECUTIVE FUNCTION: 5/5  Pt was able to complete trail  Pt was able to copy cube  Pt was able to draw a clock, accurately place the numbers, and properly place the hands at ten past eleven  NAMING: 3/3  Pt able to name 3/3 animals  ATTENTION: 2/6  Pt was able to repeat sequence of numbers forwards and backwards  Pt able to attend to sequence of letters  Pt was able to correctly subtract 7 from 100 5/5 times  LANGUAGE: 2/3  Pt was able to repeat sentences back to therapist  Pt was able to produce N of words starting with the letter F in 1 minute  ABSTRACTION: 2/2  Pt was able to identify the similarity of two items x2 trials  DELAYED RECALL: 0/5  Pt recalled 5/5 words without cues  Pt recalled 2/5 words with category cue  Pt recalled 2/5 words with multiple choice options  ORIENTATION: 6/6  Pt is oriented to date, month, year, day, place and city        Willie Mtz MOT, OTR/L

## 2022-03-11 NOTE — PHYSICAL THERAPY NOTE
PHYSICAL THERAPY NOTE  Patient Name: Mary Stark  BVXEZ'X Date: 3/11/2022     03/11/22 1245   PT Last Visit   PT Visit Date 03/11/22   Note Type   Note Type Treatment   Pain Assessment   Pain Assessment Tool 0-10   Pain Score No Pain   Restrictions/Precautions   Weight Bearing Precautions Per Order No   Other Precautions Telemetry   General   Chart Reviewed Yes   Response to Previous Treatment Patient with no complaints from previous session  Family/Caregiver Present No   Cognition   Arousal/Participation Alert; Responsive; Cooperative   Attention Within functional limits   Orientation Level Oriented X4   Following Commands Follows one step commands without difficulty   Comments Pt pleasant and cooperative  Pt often looking to spouse to answer questions   Subjective   Subjective "My R leg is still numb"   Bed Mobility   Supine to Sit Unable to assess   Sit to Supine Unable to assess   Additional Comments Pt seated OOB upon PT arrival  Pt returned seated OOB at end of session w/ all needs within reach   Transfers   Sit to Stand 6  Modified independent   Stand to Sit 6  Modified independent   Additional Comments Transfers w/out AD   Ambulation/Elevation   Gait pattern Inconsistent flex   Gait Assistance 6  Modified independent   Additional items Verbal cues   Assistive Device None   Distance 400ft   Stair Management Assistance 5  Supervision   Additional items Verbal cues   Stair Management Technique One rail R;Step to pattern; Foreward   Number of Stairs 21   Balance   Static Sitting Good   Dynamic Sitting Fair +   Static Standing Fair +   Dynamic Standing Fair   Ambulatory Fair   Endurance Deficit   Endurance Deficit No   Activity Tolerance   Activity Tolerance Patient tolerated treatment well   Nurse Made Aware yes   Exercises   Neuro re-ed Pt continues to c/o of RLE numbness and is requesting interventions in aiding to regain sensation  Pt educated on importance of continued ambulation for increased WBing as well as sensory retraining: rubbing different textures along RLE in attempt to rewire the brain through repetitive stimulus  Pt also educated on attending OP PT for potential modalities to aide in regaining sensation   Assessment   Prognosis Good   Problem List Impaired sensation; Impaired balance   Assessment Pt presents with decreased mobility, strength, balance, and activity tolerance  Pt seen for PT session focusing on bed mobility, transfers, mobility, standing tolerance/balance, and LE strengthening  Pt able to perform all transfers and mobility at Mod I  Pt performed 21 stairs at supervision  Pt was not noted to be veering or bumping into objects  Pt's only complaint is regarding RLE sensation deficits  Pt educated on various techniques as well as continued OP PT for further interventions  Pt appears to be functioning at baseline level with functional mobility; therefore, requires no immediate acute skilled PT services at this time  Pt with no further questions or concerns regarding PT services  Will D/C PT at this time  Please re-consult if pt's medical status changes  Recommendation at this time is OP PT  Pt would benefit from continued ambulation throughout inpatient admission   Barriers to Discharge None   Goals   Patient Goals to regain sensation in RLE   STG Expiration Date 03/20/22   PT Treatment Day 1   Plan   Treatment/Interventions Functional transfer training;LE strengthening/ROM; Elevations; Endurance training;Patient/family training;Equipment eval/education;Gait training;Spoke to nursing   Progress Improving as expected   PT Frequency   (D/C PT)   Recommendation   PT Discharge Recommendation Home with outpatient rehabilitation   AM-PAC Basic Mobility Inpatient   Turning in Bed Without Bedrails 4   Lying on Back to Sitting on Edge of Flat Bed 4   Moving Bed to Chair 4   Standing Up From Chair 4   Walk in Room 3 Climb 3-5 Stairs 3   Basic Mobility Inpatient Raw Score 22   Basic Mobility Standardized Score 47 4   Highest Level Of Mobility   JH-HLM Goal 7: Walk 25 feet or more   JH-HLM Highest Level of Mobility 8: Walk 250 feet ot more   JH-HLM Goal Achieved Yes     The patient's AM-PAC Basic Mobility Inpatient Short Form Raw Score is 22  A Raw score of greater than 16 suggests the patient may benefit from discharge to home  Please also refer to the recommendation of the Physical Therapist for safe discharge planning      Elroy Cruz, PT, DPT

## 2022-03-12 NOTE — CASE MANAGEMENT
Case Management Assessment & Discharge Planning Note    Patient name Mathieu Ing  Location [address] [facility] [address]/[facility] 359-05 MRN [id number]  : 1965 Date 3/11/2022       Current Admission Date: 3/7/2022  Current Admission Diagnosis:ICH (intracerebral hemorrhage) [facility])   Patient Active Problem List    Diagnosis Date Noted    ICH (intracerebral hemorrhage) ([place] [address] ) 2022    Impaired glucose tolerance 2021    Chronic atrial fibrillation ([place] [address] ) 2021    Nocturnal hypoxemia     HARPER (obstructive sleep apnea)     Class 3 severe obesity due to excess calories with serious comorbidity and body mass index (BMI) of 40 0 to 44 9 in adult [facility]) 2020    Situational anxiety 10/13/2020    Bladder stones 2020    Vitamin D deficiency 2020    Dyslipidemia 2020    Hypertensive chronic kidney disease with stage 1 through stage 4 chronic kidney disease, or unspecified chronic kidney disease 2019    Long term current use of antiarrhythmic drug 10/17/2018    Anticoagulation adequate 10/17/2018    Chronic kidney disease, stage III (moderate) ([place] [address] ) 10/05/2018    Morbid obesity with BMI of 45 0-49 9, adult ([place] [address] ) 2016    Pelvicaliectasis 2016    Tremor 2016    Degenerative joint disease of right knee 2015    Primary osteoarthritis of left knee 2014    Asthma 2013    Benign essential hypertension 10/04/2012      LOS (days): 4  Geometric Mean LOS (GMLOS) (days): 4 40  Days to GMLOS:0 7     OBJECTIVE:    Risk of Unplanned Readmission Score: 12         Current admission status: Inpatient       Preferred Pharmacy:   [id number] [address], [facility] [address],   [address]    [address] [place] [address]  Phone: [phone] Fax: [phone]    [address] [place] [address] [address]  [address]  [address] [address]  Phone: [phone] Fax: [phone]    Primary Care Provider: Zion Borrero MD    Primary Insurance: [employer]  Secondary Insurance:     ASSESSMENT:  Active Health Care Proxies    There are no active Health Care Proxies on file  Readmission Root Cause  30 Day Readmission: No    Patient Information  Admitted from[de-identified] Home  Mental Status: Alert  During Assessment patient was accompanied by: Spouse,Daughter  Assessment information provided by[de-identified] Patient,Spouse,Daughter  Primary Caregiver: Self  Support Systems: Spouse/significant other,Daughter  Home entry access options   Select all that apply : Stairs  Number of steps to enter home : 2  Do the steps have railings?: No  Type of Current Residence: 3 story home  Upon entering residence, is there a bedroom on the main floor (no further steps)?: No  A bedroom is located on the following floor levels of residence (select all that apply):: 2nd Floor,3rd Floor  Upon entering residence, is there a bathroom on the main floor (no further steps)?: Yes  Number of steps to 2nd floor from main floor: One Flight  Number of steps to 3rd floor from main floor: One Flight  In the last 12 months, was there a time when you were not able to pay the mortgage or rent on time?: No  In the last 12 months, how many places have you lived?: 1  In the last 12 months, was there a time when you did not have a steady place to sleep or slept in a shelter (including now)?: No  Homeless/housing insecurity resource given?: N/A  Living Arrangements: Lives w/ Spouse/significant other,Lives w/ Daughter  Is patient a ?: Yes  Is patient active with [facility])?: No    Activities of Daily Living Prior to Admission  Functional Status: Independent  Completes ADLs independently?: Yes  Ambulates independently?: Yes  Does patient use assisted devices?: No  Does patient currently own DME?: No  Does patient have a history of Outpatient Therapy (PT/OT)?: Yes  Does the patient have a history of Short-Term Rehab?: No  Does patient have a history of HHC?: No  Does patient currently have [facility]?: No         Patient Information Continued  Income Source: Employed  Within the past 12 months, you worried that your food would run out before you got the money to buy more : Never true  Within the past 12 months, the food you bought just didnt last and you didnt have money to get more : Never true  Food insecurity resource given?: N/A  Does patient receive dialysis treatments?: No  Does patient have a history of substance abuse?: No  Does patient have a history of Mental Health Diagnosis?: No    PHQ 2/9 Screening   Reviewed PHQ 2/9 Depression Screening Score?: No    Means of Transportation  Means of Transport to Appts[de-identified] Drives Self  In the past 12 months, has lack of transportation kept you from medical appointments or from getting medications?: No  In the past 12 months, has lack of transportation kept you from meetings, work, or from getting things needed for daily living?: No  Was application for public transport provided?: N/A        DISCHARGE DETAILS:    Discharge planning discussed with[de-identified] CM spoke with the pt, spouse, dtr  Freedom of Choice: Yes  Comments - Freedom of Choice: Pt accepted a list of [facility] providers for outpt PT/OT  CM contacted family/caregiver?: Yes  Were Treatment Team discharge recommendations reviewed with patient/caregiver?: Yes  Did patient/caregiver verbalize understanding of patient care needs?: Yes  Were patient/caregiver advised of the risks associated with not following Treatment Team discharge recommendations?: Yes    Contacts  Relationship to Patient[de-identified] Family  Contact Method:  In Person  Reason/Outcome: Continuity of [address]         Is the patient interested in Lilibeth Engel at discharge?: No    DME Referral Provided  Referral made for DME?: No    Other Referral/Resources/Interventions Provided:  Interventions: Outpatient OT,Outpatient PT  Referral Comments: Pt provided with SL list    Would you like to participate in our [address] service program?  : No - Declined    Treatment Team Recommendation: Home  Discharge Destination Plan[de-identified] Home  Transport at Discharge : Family

## 2022-03-13 ENCOUNTER — PATIENT MESSAGE (OUTPATIENT)
Dept: NEPHROLOGY | Facility: CLINIC | Age: 57
End: 2022-03-13

## 2022-03-14 ENCOUNTER — TELEPHONE (OUTPATIENT)
Dept: FAMILY MEDICINE CLINIC | Facility: CLINIC | Age: 57
End: 2022-03-14

## 2022-03-14 ENCOUNTER — OFFICE VISIT (OUTPATIENT)
Dept: FAMILY MEDICINE CLINIC | Facility: CLINIC | Age: 57
End: 2022-03-14
Payer: COMMERCIAL

## 2022-03-14 ENCOUNTER — TELEPHONE (OUTPATIENT)
Dept: NEPHROLOGY | Facility: CLINIC | Age: 57
End: 2022-03-14

## 2022-03-14 ENCOUNTER — TRANSITIONAL CARE MANAGEMENT (OUTPATIENT)
Dept: FAMILY MEDICINE CLINIC | Facility: CLINIC | Age: 57
End: 2022-03-14

## 2022-03-14 ENCOUNTER — EVALUATION (OUTPATIENT)
Dept: PHYSICAL THERAPY | Facility: CLINIC | Age: 57
End: 2022-03-14
Payer: COMMERCIAL

## 2022-03-14 ENCOUNTER — EVALUATION (OUTPATIENT)
Dept: OCCUPATIONAL THERAPY | Facility: CLINIC | Age: 57
End: 2022-03-14
Payer: COMMERCIAL

## 2022-03-14 VITALS
HEIGHT: 70 IN | WEIGHT: 310 LBS | TEMPERATURE: 96.8 F | DIASTOLIC BLOOD PRESSURE: 74 MMHG | HEART RATE: 62 BPM | SYSTOLIC BLOOD PRESSURE: 122 MMHG | BODY MASS INDEX: 44.38 KG/M2 | OXYGEN SATURATION: 97 %

## 2022-03-14 DIAGNOSIS — E66.01 MORBID OBESITY WITH BMI OF 45.0-49.9, ADULT (HCC): ICD-10-CM

## 2022-03-14 DIAGNOSIS — I62.9 INTRACRANIAL BLEED (HCC): ICD-10-CM

## 2022-03-14 DIAGNOSIS — I48.20 CHRONIC ATRIAL FIBRILLATION (HCC): ICD-10-CM

## 2022-03-14 DIAGNOSIS — E11.649 UNCONTROLLED TYPE 2 DIABETES MELLITUS WITH HYPOGLYCEMIA WITHOUT COMA (HCC): ICD-10-CM

## 2022-03-14 DIAGNOSIS — I62.9 INTRACRANIAL BLEED (HCC): Primary | ICD-10-CM

## 2022-03-14 DIAGNOSIS — R26.89 FUNCTIONAL GAIT ABNORMALITY: Primary | ICD-10-CM

## 2022-03-14 DIAGNOSIS — I61.9 LEFT-SIDED NONTRAUMATIC INTRACEREBRAL HEMORRHAGE, UNSPECIFIED CEREBRAL LOCATION (HCC): ICD-10-CM

## 2022-03-14 DIAGNOSIS — I10 BENIGN ESSENTIAL HYPERTENSION: Primary | ICD-10-CM

## 2022-03-14 DIAGNOSIS — I12.9 HYPERTENSIVE CHRONIC KIDNEY DISEASE WITH STAGE 1 THROUGH STAGE 4 CHRONIC KIDNEY DISEASE, OR UNSPECIFIED CHRONIC KIDNEY DISEASE: ICD-10-CM

## 2022-03-14 PROCEDURE — 97162 PT EVAL MOD COMPLEX 30 MIN: CPT

## 2022-03-14 PROCEDURE — 97112 NEUROMUSCULAR REEDUCATION: CPT

## 2022-03-14 PROCEDURE — 97530 THERAPEUTIC ACTIVITIES: CPT

## 2022-03-14 PROCEDURE — 97166 OT EVAL MOD COMPLEX 45 MIN: CPT

## 2022-03-14 PROCEDURE — 99496 TRANSJ CARE MGMT HIGH F2F 7D: CPT | Performed by: PHYSICIAN ASSISTANT

## 2022-03-14 NOTE — TELEPHONE ENCOUNTER
Appointment Confirmation   Person confirmed appointment with  If not patient, name of the person LM 3/14   Date and time of appointment 3/15  4:00    Patient acknowledged and will be at appointment? no    Did you advise the patient that they will need a urine sample if they are a new patient?  N/A    Did you advise the patient to bring their current medications for verification? (including any OTC) Yes    Additional Information

## 2022-03-14 NOTE — PATIENT INSTRUCTIONS
Type 2 Diabetes in Adults: New Diagnosis   AMBULATORY CARE:   Type 2 diabetes  is a disease that affects how your body uses glucose (sugar)  Normally, when the blood sugar level increases, the pancreas makes more insulin  Insulin helps move sugar out of the blood so it can be used for energy  Type 2 diabetes develops because either the body cannot make enough insulin, or it cannot use the insulin correctly  Type 2 diabetes can be controlled to prevent damage to your heart, blood vessels, and other organs  Common symptoms include the following:   · Numbness in your fingers or toes    · Blurred vision    · Frequent urination    · More hunger or thirst than usual    Have someone call your local emergency number (911 in the [address] [facility]) if:   · You have any of the following signs of a stroke:      ? Numbness or drooping on one side of your face     ? Weakness in an arm or leg    ? Confusion or difficulty speaking    ? Dizziness, a severe headache, or vision loss    · You have any of the following signs of a heart attack:      ? Squeezing, pressure, or pain in your chest    ? You may  also have any of the following:     § Discomfort or pain in your back, neck, jaw, stomach, or arm    § Shortness of breath    § Nausea or vomiting    § Lightheadedness or a sudden cold sweat    · You have trouble breathing  Seek care immediately if:   · You have severe abdominal pain  · You vomit for more than 2 hours  · You have trouble staying awake or focusing  · You are shaking or sweating  · You feel weak or more tired than usual     · You have blurred or double vision  · Your breath has a fruity, sweet smell  Call your doctor or diabetes care team if:   · Your arms and legs are swollen  · You have an upset stomach and cannot eat the foods on your meal plan  · You feel dizzy, have headaches, or are easily irritated  · Your skin is red, warm, dry, or swollen      · You have a wound that does not heal     · You have numbness in your arms or legs  · You have trouble coping with your illness, or you feel anxious or depressed  · You have questions or concerns about your condition or care  Treatment for type 2 diabetes  helps prevent or delay complications, including heart and kidney disease  You must eat healthy meals and do regular physical activity  You may  need any of the following:  · Diabetes medicines or insulin  may be given to decrease the amount of sugar in your blood  · Blood pressure medicine  may be given to lower your blood pressure  · Cholesterol-lowering medicine  may be given to prevent heart disease  · Antiplatelets , such as aspirin, help prevent blood clots  Take your antiplatelet medicine exactly as directed  These medicines make it more likely for you to bleed or bruise  If you are told to take aspirin, do not take acetaminophen or ibuprofen instead  · Take your medicine as directed  Contact your healthcare provider if you think your medicine is not helping or if you have side effects  Tell him or her if you are allergic to any medicine  Keep a list of the medicines, vitamins, and herbs you take  Include the amounts, and when and why you take them  Bring the list or the pill bottles to follow-up visits  Carry your medicine list with you in case of an emergency  Diabetes education:  Diabetes education will start right away  Diabetes education may also happen later to refresh your memory  Your diabetes care team may include physicians, nurse practitioners, and physician assistants  It may also include nurses, dietitians, exercise specialists, pharmacists, dentists, and podiatrists  Family members, or others who are close to you, may also be part of the team  You and your team will make goals and plans to manage diabetes and other health problems  The plans and goals will be specific to your needs   Members of your diabetes care team teach you the following:  · About nutrition:  A dietitian will help you make a meal plan to keep your blood sugar level steady  You will learn how food affects your blood sugar levels  You will also learn to keep track of sugar and starchy foods (carbohydrates)  Do not skip meals  Your blood sugar level may drop too low if you have taken diabetes medicine and do not eat  You may be taught to use the plate method for portion control  With the plate method, ½ of your plate contains vegetables  The other half is divided so that ¼ contains protein or meat, and ¼ contains starches, such as potatoes  · About physical activity and diabetes: You will learn why physical activity, such as walking, is important  You and your care team provider will make a plan for your activity  Your care team provider will tell you what a healthy weight is for you  He or she will help you make a plan to get to that weight and stay there  Maintain a healthy weight to help delay or prevent complications of diabetes  · About your blood sugar level: You will learn what your blood sugar level should be  You will be given information on when and how to check your blood sugar level  You may need to check by testing a drop of blood in a glucose monitor  You may instead be given a continuous glucose monitoring (CGM) device  A sensor is placed in your abdomen or on your arm  You put a transmitter on the sensor to get a reading that shows up on the monitor  You will learn what to do if your level is too high or too low  Write down the times of your checks and your levels  Take them to all follow-up appointments  · About diabetes medicine: You may need oral diabetes medicine, insulin, or both to help control your blood sugar levels  Your healthcare provider will teach you how and when to take oral diabetes medicine  You will also be taught about side effects oral diabetes medicine can cause   Insulin may be added if oral diabetes medicine becomes less effective over time  Insulin may be injected, or given through a pump or pen  You and your care team will discuss which method is best for you  ? An insulin pump  is an implanted device that gives your insulin 24 hours a day  An insulin pump prevents the need for multiple insulin injections in a day  ? An insulin pen  is a device prefilled with the right amount of insulin  ? You and your family members will be taught how to draw up and give insulin  if this is the best method for you  Your education team will also teach you how to dispose of needles and syringes  ? You will learn how much insulin you need  and when to give it  You will be taught when not to give insulin  You will also be taught what to do if your blood sugar level drops too low  This may happen if you take insulin and do not eat the right amount of carbohydrates  Other ways to help manage type 2 diabetes:   · Talk to your care team if you have increased stress about your diagnosis  When you feel stressed about your diagnosis, it can cause you not to take care of yourself properly  Your care team can help by offering tips about self-care  Your care team may suggest you talk to a mental health provider  The provider can listen and offer help with self-care issues  · Check your feet each day for sores  Wear shoes and socks that fit correctly  Do not trim your toenails  Go to a podiatrist  Ask your care team for more information about foot care  · Do not smoke  Nicotine and other chemicals in cigarettes and cigars can cause lung damage and make diabetes harder to manage  Ask your care team provider for information if you currently smoke and need help to quit  E-cigarettes or smokeless tobacco still contain nicotine  Talk to your care team provider before you use these products  · Drink water instead of sugary drinks such as soft drinks and fruit juices    Sugary drinks cause high blood sugar and cause an increase in your weight  Your healthcare provider may tell you that diet drinks do not help with weight loss  · Know the risks if you choose to drink alcohol  Alcohol can cause your blood sugar levels to be low if you use insulin  Alcohol can cause high blood sugar levels and weight gain if you drink too much  A drink of alcohol is 12 ounces of beer, 5 ounces of wine, or 1½ ounces of liquor  Your care team can tell you how many drinks are okay to have in 24 hours and in 1 week  · Check your blood pressure as directed  If you have high blood pressure (BP), talk to your care team about your BP goals  Together you can create a plan to lower your BP if needed and keep it in a healthy range  The plan may include lifestyle changes or medicines  A normal BP is 119/79 or lower  A normal blood pressure can help prevent or delay certain complications from diabetes  Examples include retinopathy (eye damage) and kidney damage  · Wear medical alert identification  Wear medical alert jewelry or carry a card that says you have diabetes  Ask your care team provider where to get these items  · Ask about vaccines you may need  You have a higher risk for serious illness if you get the flu, pneumonia, COVID-19, or hepatitis  Ask your provider if you should get a flu, pneumonia, or hepatitis B vaccine, and when to get the COVID-19 vaccine  Risks of type 2 diabetes: It is important to learn to keep your diabetes in control  Uncontrolled diabetes can damage your nerves, veins, and arteries  Your risk for dementia increases faster the longer your diabetes is not controlled  High blood sugar levels may damage other body tissues and organs over time  Damage to arteries may increase your risk for heart attack and stroke  Nerve damage may also lead to other heart, stomach, and nerve problems  Diabetes can become life-threatening if it is not controlled  Follow up with your care team providers as directed:   You may need to return to have your A1c checked every 3 months  You will need to have your feet checked during at least 1 visit each year  You will need an eye exam 1 time each year to check for retinopathy  You will also need urine tests every year to check for kidney problems  You may need tests to monitor for heart disease, such as an EKG, stress test, blood pressure monitoring, and blood tests  Write down your questions so you remember to ask them during your visits  © Copyright [employer] 2022 Information is for End User's use only and may not be sold, redistributed or otherwise used for commercial purposes  All illustrations and images included in CareNotes® are the copyrighted property of A [employer] A M [employer] Inc  or [address] [place], [place] [address]bradly   The above information is an  only  It is not intended as medical advice for individual conditions or treatments  Talk to your doctor, nurse or pharmacist before following any medical regimen to see if it is safe and effective for you

## 2022-03-14 NOTE — PROGRESS NOTES
OCCUPATIONAL THERAPY INITIAL EVALUATION:    3/14/2022  Petr Kwan  [id number]  [id number]  Scarlett Castañeda*  1  Intracranial bleed ([facility] [address] )        Subjective    "I wanted to get started as soon as possible"    PATIENT GOAL: "I want to make sure that I get back to work without any hiccups"      Assessment/Plan    Skilled Analysis:  Pt is a 64 y o  male referred to Occupational Therapy s/p Intracranial bleed ([facility] [address] ) [I62 9]  Pt participated in skilled OT evaluation and following formalized testing, presents with the following areas of deficit: FMC/FMS, GMC/GMS, hand to target accuracy, in hand manipulation/dexterity, proprioception/body awareness and sensation impacting indep and completion of ADL/IADL, salient, and leisure tasks  Pt does demo the need for skilled Occupational Therapy services 3x/week for 4 weeks with focus on ADL re-training, IADL re-training, UE NMR, UE strengthening, UE endurance, FMC/GMC, FMS/GMS, return to work simulation and HEP development to address the goals as listed below  Pt in agreement with POC, POC to  w/in 90 days  Following evaluation, treatment focused on education to pt and spouse on OT POC development and goals  With additional recommendations for ST evaluation for word finding and higher level EF deficits  10 min of education provided    Goals:   Motor Short Term Goals (4)WKS      Strength/Endurance    ·  Pt will increase R UE strength to 5/5  through the use of strengthening exercises and home program for eventual return to life and work roles and salient tasks    · Pt will demo with G tolerance to supine, seated, and in stance exercise x 45 minutes with minimal rest breaks required for increased engagement in life roles and weekly exercise regimen     · Pt will demo with G carryover of Home Exercise Program to improve functional progression towards goals in Plan of care and for improved functional use of RUE         FMC/GMC    · Pt will increase RUE rate of manipulation by 5% for all FM tests for improved functional performance with salient tasks     · Pt will increase Reaction time  to < 1 5 seconds with hand to target timed trials for improved reaction time and automaticity of coordination for improved functional performance with ADLs/IADLs and salient tasks    · Pt will increase RUE prehension patterns for improved utensil and container management  with <05% droppage     · Pt will demo improved motor learning of constructional and new motor actions for improved b/l coordination and motor planning evident by 100% accuracy for fxnl ADL/IADL performance        Functional Performance    · Pt will increase RUE to refined functional assist with for ADL/IADL and tabletop tasks for improved functional performance of life roles and salient tasks   · Pt will perform mCIMT program with use of RUE as prime  with G carryover of program strategies and exercises to inc fxnl use of RUE      Modalities    · Pt will tolerate BIONESS/NMES/IASTM for improved motor and sensory performance for overall improved strength, and sensation to inc safety and engagement with ADL/IADL fxn        Treatment Interventions  Supine, seated, and in stance neuro re-ed  NMES for sensory re-ed  Fxnl Grasp and Release  FMC/prehension patterns  Fxnl Tool use (tweezers, tongs)  In hand manipulation  Timed Trials to improve automaticity  Manual tx (IASTM if able, PROM/stretching)  Hand to target tasks  Sensory re-ed as needed (sensory bins)  Seated functional reach: crossing midline  Supine place and hold  WBearing strategies  (seated, Prone/quad)  Closed chain activities  Open chain activities  HEP (putty, dexterity, mCIMT)            HISTORY OF PRESENT ILLNESS:     Pt is a 64 y o  male who was referred to Occupational Therapy s/p  Intracranial bleed ([facility] [address] ) [I62 9] Per chart review and pt/family interview, pt initially presented to [address] [place] [address] on 03/07/2021 with s/s of stroke including R arm weakness, R arm drift, difficulty with expressive speech, R sided facial weakness, and R LE weakness  Pt was immediately transferred to [address] [place] [address] and s/p imaging was dx with ICH with no recommended surgical intervention at this time    Pt  has a past medical history of Ankle swelling, Arthritis, Asthma, Atrial fibrillation ([facility] [address] ), Atrial fibrillation with RVR ([facility] ) (2017), Chronic kidney disease, Gout, Hypertension, Kidney stone, Night sweats, Seasonal allergies, and Sleep apnea  Pt currently lives w/ spouse in [place], [place] inside w/ main bedroom/bathroom on 2nd floor w/ walk in shower and standard toilet w/ no DME  PTA pt was I w/ all areas of ADLS/IADLS, bed mobility, transfers and functional mobility w/ NO DME  Pt received "some" inpatient therapies and was recommended for OP OT and PT services  Pt reports that he recently got home and is anxious to return to work  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 and virtual computer use  Pts spouse has noted deficits in EF skills limiting processing speed  In addition pt continues to present with R sided weakness, R sided coordination and sensory deficits, attention deficits, EF deficits and delayed RT and automaticity           PMH:   Past Medical History:   Diagnosis Date    Ankle swelling     Arthritis     Asthma     At[facility] fibrillation ([facility])     Atrial fibrillation with RVR ([facility]) 2017    Chronic kidney disease     kidney stones    Gout     Hypertension     Kidney stone     Night sweats     Seasonal allergies     Sleep apnea          Pain Levels:     Restin    With Activity:  4    Objective    Impairment Observations:    Upper Extremity       RUE ARA Comments                 UPPER EXTREMITY FXN Impaired Intact Dominant Hand: R/L ampidexterous                          /Pinch Strength         Dynamometer      - Gross Grasp 80 lbs 100 lbs WFL however below pt's normal   Pinch Meter - PINCER 14 lbs 18 lbs     - TRIPOD 18 lbs 15 lbs     - LATERAL 22 lbs  20 lbs              AROM (seated)       Elevation/Depression      Shoulder Flex/Ext      Shoulder Abd      Shoulder Add      Horizontal Abd      Horizontal Add      Elbow Flex      Elbow Ext      Pronation      Supination      Wrist Flex      Wrist Ext      Digit Flex      Digit Ex      Composite Grasp      Hook Grasp      Opposition       Full range all planes Full range all planes                         MMT         Elevation 4+/5 5/5    Shoulder Flex/Ext 4+/5 5/5    Shoulder Abd 4+/5 5/5    Shoulder ADd 4+/5 5/5    Elbow Flex 5/5 5/5    Elbow Ext 5/5 5/5    Wrist Flex 4+/5 5/5    Wrist Ext 4+/5 5/5    Gross Grasp 5/5 5/5          SENSATION      Myofilaments (2 83 Wnl) 3 61 ulnar/radial  4 31 median distribution 3 61    Sharp Dull  Intact Intact    Proprioception Intact Intact    Hot/Cold Temp Intact Intact          COORDINATION      9 Hole Peg Test 34 seconds 21 seconds subnormal   Fxnl Dexterity Test 42 seconds 25 seconds droppage x 1 RUE, compensatory forearm rotation   Jebsen Hand fxn         - Handwriting 38 seconds 16 seconds subnormal      - Card Turning 6 seconds 5 seconds subnormal      - Small Item p/u 12 seconds 7 seconds subnormal      - Simulated Feeding 11 seconds 8 seconds subnormal      - Stacking Checkers 14 seconds 6 seconds subnormal      - Moving Light Objects 8 seconds 4 seconds subnormal      - Moving Heavy Objects 6 seconds 4 seconds subnormal             INTERVENTION COMMENTS:  Diagnosis: Intracranial bleed ([facility]) [I62 9]  Precautions: HTN  FOTO: to complete  Insurance: Payor: Colby Whittaker / Plan: Colby Whittaker / Product Type: HMO Commercial /   1 of 12 (visits BOMN) visits, PN due 4/15/22

## 2022-03-14 NOTE — PROGRESS NOTES
PT Evaluation     Today's date: 3/15/2022  Patient name: Bernardo Arechiga  : 1965  MRN: [id number]  Referring provider: Liz Contreras  Dx:   Encounter Diagnosis     ICD-10-CM    1  Functional gait abnormality  R26 [id number]    2  Intracranial bleed ([facility] [address] )  I62 9 Ambulatory Referral to Physical Therapy       Start Time: 1500  Stop Time: 1600  Total time in clinic (min): 60 minutes    Assessment  Assessment details: Mr Bernardo Arechiga is a 64year-old male reporting to [facility] OP PT for initial evaluation of c/o gait and balance abnormalities s/p hemorrhagic CVA secondary to HTN suffered on 3/7/22  Pt was referred for consultation by Dr Lisy Sen MD  PMHx significant for CKD III and HTN, as well as ongoing workup for diabetes mellitus  Upon examination testing, pt was found to present with objective findings of diminished balance integration per MCTSIB and FGA findings, specifically with somatosensory function  These current deficits limit his balance, balance confidence, and gait quality preventing him from returning to his active outdoor sportsman lifestyle, as well as his confidence with returning to work  Based on Roland's additional subjective c/o difficulties with word finding and higher level executive cognitive functions, I feel he would be a good candidate for a skilled speech therapy evaluation  Araceli Gleason would benefit from best-practice HIGT s/p CVA to improve his function and help him in achieving his personal goals  Impairments: abnormal coordination, abnormal gait, impaired balance and lacks appropriate home exercise program  Barriers to therapy: None  Understanding of Dx/Px/POC: good   Prognosis: good    Goals  ST weeks  1  Charles Copping will demonstrate independence with initial HEP    2  Charles Copping will improve MCTSIB to globally 35s consistently  3  Charles Copping will improve 6 MWT minimally by 150'    [address] will subjectively report significant improvements in his balance and balance confidence  LT weeks  1  Belkis Lee will report no limitations in outdoor activities relative to his balance  2  Belkis Lee will report full return to work-related responsibilities uninfluenced by balance  Plan  Patient would benefit from: skilled physical therapy, skilled occupational therapy, speech eval and skilled speech therapy  Referral necessary: Yes  Planned modality interventions: biofeedback  Planned therapy interventions: gait training, home exercise program, therapeutic exercise, therapeutic activities, patient education, neuromuscular re-education and manual therapy  Frequency: 3x week  Duration in weeks: 8  Plan of Care beginning date: 3/14/2022  Plan of Care expiration date: 2022  Treatment plan discussed with: patient and family        Subjective Evaluation    History of Present Illness  Date of onset: 3/7/2022  Mechanism of injury: Presents for evaluation of function s/p hemorrhagic secondary to HTN on 3/7/22 with subsequent hospitalization as highlighted per chart review below  Is an active outdoorsman at baseline and enjoys hunting, fishing, and hiking  Wants to be able to have the confidence to walk/suraj through streams fly [employer]  Additionally works for [employer] 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  Overall feels his R-sided weakness has improved, however his foremost c/o s/p CVA involve ongoing R-sided global pins/needles/tingling influencing his balance, as well as some coordination deficits and word finding/higher level function cognitive deficits  Hospital Course:   Aniya Leon is a 64 y o  male patient who originally presented to the hospital on 3/7/2022 due to right-sided weakness  Per patient, he was at home on a video conference call when he first noted word finding difficulty  He attempted to go to sleep, but woke up feeling "off" and weak on the right side  He was brought to the ER and stroke alert was activated   CT head revealed acute left intraparenchymal hemorrhage measuring 3 7 x 2 1 x 2 3 with minimal surrounding vasogenic edema  Patient was given [facility] and transferred to [facility] for neurosurgery evaluation  ICH score 0  Patient was started on cardene prior to transfer    Pt admitted to ICU on 3/8 after he was discovered to have an acute left intraparenchymal hemorrhage  Was seen by neurosurgery on 3/8, who did not have an recommendations for neurosurgical intervention  Pt remained stable overnight  On 3/9, pt had an MRI brain that showed unchanged intraparenchymal hematoma, as well as a small acute infarct in the left corona radiata  Patient was medically managed with blood pressure control  Remained stable  [facility], and was deemed stable for [facility] to [facility]    Quality of life: fair    Pain  No pain reported    Social Support  Steps to enter house: yes  Stairs in house: yes   Lives in: multiple-level home  Lives with: spouse    Employment status: working (Taking time off from work - [employer] )  Hand dominance: ambidextrous (R dominant)  Exercise history: See above    Treatments  Previous treatment: medication  Current treatment: medication, physical therapy, speech therapy and occupational therapy  Discharged from (in last 30 days): inpatient hospitalization  Patient Goals  Patient goals for therapy: improved balance, return to sport/leisure activities and return to work  Patient goal: "To control blood pressure "        Objective    VITALS: Seated, manual, L UE  BP: 138/84 mmHg  HR: 60 BPM  O2: 97 %  RR: 16 RPM    Balance Test     MCTSIB (s): IE: Firm EO: 30s  Firm EC: 30s  Foam EO: 30s  Foam EC: 17 72s, 30s      Gait Analysis: Deviations to R when vision obstructed secondary to R global sensory deficits      Stair Navigation: Reciprocal, no UE ascending/descending     Coordination Left Right   Heel To Shin Smooth Slow, intermittently stops   Finger To Nose Accurate, fast  Accurate, slower than L   Rapid Alternating Movement       UE Coordinated, moderate speed Coordinated, moderate speed   LE Discoordinated with RLE, faster Discoordinated with LLE, slower         Sensation Left Right   Kinesthesia     Light Touch Intact Diminished throughout R side including R LE and R dorsum/volar foot   Sharp/Dull       2 Point Discrimination             Manual Muscle Testing  Left Right   LE Hip: 5/5  Knee: 5/5  Ankle: 5/5 Hip: 5/5  Knee: 5/5  Ankle: 5/5      Initial  Discharge   Months Since Event     Stroke Impact Scale     Physical     Memory     Mood     Communication     Mobility     Hand     Participation     Recovery     strength     Self Efficacy NV    ABC Scale NV    5x STS 8 71s no UE    6 mwt 1050 ft no AD    TUG 5 03s no AD    Gait Speed 1 19 m/s no AD    BBS 55    FGA 27/30    FOTO Satisfaction Score     FOTO -tx for conditions     FOTO - Overall results         Patient education provided today 3/14/22 for neuromuscular re-education revolving around CVA pathophysiology, neuroplasticity, and the influence of HIGT on maximizing recovery  (10 minutes)        Short Term Goal Expiration Date:(4/20/22)  Long Term Goal Expiration Date: (5/20/22)  POC Expiration Date: (5/20/22)    Precautions: HTN, CKD III    target heart rate range: 112-136 BPM  Beta Blockers - YES     Today's Date:     Resting HR:     BP Pre exercise:  BP during exercise:  BP post exercise: Total minutes spent  within 70-85% Max HR:  (jonathan flowsheet)    Description of activity: (treadmill, overground, assist, distance, speed etc) Peak HR observed Peak RPE observed Total Minutes in target zone Total minutes Timed Charges   (Nmr, TE, TA, GT)                                                       *Therapeutic rest breaks throughout session as needed due to high intensity interval training per ACSM guidelines for recovery before return to exercise   Vitals monitored, documented and discussed with pt during this time

## 2022-03-14 NOTE — ASSESSMENT & PLAN NOTE
Lab Results   Component Value Date    HGBA1C 7 5 (H) 03/08/2022   Newly diagnosed diabetic  Worsening over the past year

## 2022-03-14 NOTE — TELEPHONE ENCOUNTER
----- Message from Chance Hurst sent at 3/13/2022 10:10 PM EDT -----  Regarding: Simón Springer Stroke 3/7/22  I had a stroke on 3/7/22  I was hospitalized until 3/11/22  The neurologist felt that the stroke was due to high blood pressure  The internal medicine dr prescribed Procardia 10mg every 8 hours  I was given the first dose on 3/11 @ noon  That initial dose brought my pressure down below 140 (129/69)( the target set by the [facility])  I was discharged on 3/11 at 4pm because my pressure was lowered  However, since that point my pressure has been consistently higher  I would really like your input and guidance as to the next steps  I will send this note to  Dr Man Tucker and my primary care physician Dr Merry Johansen   Thank you

## 2022-03-14 NOTE — ASSESSMENT & PLAN NOTE
Newly diagnosed diabetes  Lab Results   Component Value Date    HGBA1C 7 5 (H) 03/08/2022   Educated patient on diabetes  Ordered diabetes education class  - Referral for Clinical pharmacy due to renal concerns from CKD  Metformin may be limited or contraindicated  Encouraged FU in 2 weeks to discuss/educate diabetes further

## 2022-03-14 NOTE — PROGRESS NOTES
Assessment/Plan:    Impaired glucose tolerance  Lab Results   Component Value Date    HGBA1C 7 5 (H) 03/08/2022   Newly diagnosed diabetic  Worsening over the past year    Uncontrolled type 2 diabetes mellitus with hypoglycemia without coma ([facility] )  Newly diagnosed diabetes  Lab Results   Component Value Date    HGBA1C 7 5 (H) 03/08/2022   Educated patient on diabetes  Ordered diabetes education class  - Referral for Clinical pharmacy due to renal concerns from CKD  Metformin may be limited or contraindicated  Encouraged FU in 2 weeks to discuss/educate diabetes further  Benign essential hypertension  Currently managed on nifedipine TID varying between 10 and 40 mg, managed by wife  Currently on hydralazine 50 mg BID, olmestartan 40 mg, nebivolol 2 5 mg  Managed with Nephrology    Followed by Cardiology  Nephrology visit tomorrow  Encouraged FU in few weeks to discuss new diagnosis of diabetes  Diagnoses and all orders for this visit:    Benign essential hypertension    Chronic atrial fibrillation (HCC)    Left-sided nontraumatic intracerebral hemorrhage, unspecified cerebral location [facility])    Hypertensive chronic kidney disease with stage 1 through stage 4 chronic kidney disease, or unspecified chronic kidney disease    Morbid obesity with BMI of 45 0-49 9, adult ([facility] )    Uncontrolled type 2 diabetes mellitus with hypoglycemia without coma ([facility] )  -     Ambulatory referral to Diabetic Education; Future  -     Hemoglobin A1c (w/out EAG) ([facility] ONLY); Future  -     Ambulatory referral to clinical pharmacy; Future              Subjective:    Patient ID: Magen Rodriguez is a 64 y o  male  Pt is presenting today with wife for HPI    TCM from hospalization from 3/7-3/11 due to intracerebral hemorrhage, likely due to uncontrolled HTN  Initially presenting for right sided weakness  CT head 3/7 showed showed hemorrhage, with follow up on 3/8 showing ho significant change    MRI head on 3/9 showed small acute infarction of left frontal corona radiata and unchanged 3 3 cm acute intraparenchymal hematoma at left posterior putamen  TTE on 3/8 showed 65% LVEF  Passed his swallow and speech evaluation    Discharged on hydralazine and nifedipine 10 mg Q8 hours  Told to hold Xarelto for 3 weeks  May continue Tikosyn    The following portions of the patient's history were reviewed and updated as appropriate: allergies, current medications and problem list     Review of Systems   Constitutional: Negative for activity change, fatigue, fever and unexpected weight change  HENT: Negative for rhinorrhea and sore throat  Respiratory: Negative for cough, shortness of breath and wheezing  Cardiovascular: Negative for chest pain, palpitations and leg swelling  Gastrointestinal: Negative for constipation, diarrhea, nausea and vomiting  Musculoskeletal: Negative for back pain, neck pain and neck stiffness  Skin: Negative for rash  Objective:  /74 (BP Location: Left arm, Patient Position: Sitting, Cuff Size: Standard)   Pulse 62   Temp (!) 96 8 °F (36 °C)   Ht 5' 10" (1 778 m)   Wt (!) 141 kg (310 lb)   SpO2 97%   BMI 44 48 kg/m²      Physical Exam  Vitals reviewed  Constitutional:       General: He is not in acute distress  Appearance: He is well-developed  He is obese  He is not diaphoretic  HENT:      Head: Normocephalic and atraumatic  Eyes:      Pupils: Pupils are equal, round, and reactive to light  Cardiovascular:      Rate and Rhythm: Normal rate and regular rhythm  Heart sounds: Normal heart sounds  No murmur heard  No friction rub  No gallop  Pulmonary:      Effort: Pulmonary effort is normal  No respiratory distress  Breath sounds: Normal breath sounds  No wheezing  Skin:     General: Skin is warm and dry  Neurological:      Mental Status: He is alert and oriented to person, place, and time     Psychiatric:         Behavior: Behavior normal          Thought Content: Thought content normal            TCM Call (since 2/11/2022)     Date and time call was made  3/14/2022  8:15 AM    Patient was hospitialized at  [facility]        Date of Admission  03/07/22    Date of discharge  03/11/22    Diagnosis  ICH    Disposition  Home    Were the patients medications reviewed and updated  Yes    Current Symptoms  None      TCM Call (since 2/11/2022)     Post hospital issues  None    Scheduled for follow up? Yes    Did you obtain your prescribed medications  Yes    Do you need help managing your prescriptions or medications  No    Is transportation to your appointment needed  Yes    Specify why  Not driving right now, wife will bring to visit    I have advised the patient to call PCP with any new or worsening symptoms  [address] or [address] other    Support System  Spouse    The type of support provided  Emotional; Financial; Physical    Do you have social support  Yes, as much as I need    Are you recieving any outpatient services  No    Are you recieving home care services  No    Are you using any community resources  No    Current waiver services  No    Have you fallen in the last 12 months  No    Interperter language line needed  No    Counseling  Family    Counseling topics  Diagnostic results;  Importance of RX compliance

## 2022-03-14 NOTE — ASSESSMENT & PLAN NOTE
Currently managed on nifedipine TID varying between 10 and 40 mg, managed by wife  Currently on hydralazine 50 mg BID, olmestartan 40 mg, nebivolol 2 5 mg  Managed with Nephrology

## 2022-03-14 NOTE — TELEPHONE ENCOUNTER
Reviewed patient chart - appears he is discharged to home  Called, reached patient and wife (he consented to speaking with her), I introduced myself and explained neurovascular nurse navigator role  Since discharge, he denies experiencing any new or worsening stroke-like symptoms  Offered to schedule stroke hospital follow up appointment, patient is agreeable to this  I scheduled appointment 4/6 with [facility]  Offered to schedule CT scan with them - they state that the patient has PT now and will call on their own time  I reviewed medications  Reports he is taking all as prescribed with no missed doses, medication side effects, or signs of bleeding  I addressed all questions  At the conclusion of the conversation, patient and wife deny having any further questions or concerns

## 2022-03-14 NOTE — TELEPHONE ENCOUNTER
Received a call from patient's wife Miky Galicia he had a stroke on 3/7 and was advised to follow up with nephrology after being discharged 3/11  Patient's wife stated he has been experiencing high bps since hospital stay and requested to be seen by Dr Rocio Kelley asap  Patient has been scheduled with Dr Rocio Kelley in  3/15     Any additional questions may be directed to patient/wife Miky Galicia at [phone]

## 2022-03-14 NOTE — PROGRESS NOTES
RENAL FOLLOW UP NOTE: td     ASSESSMENT AND PLAN:  1  Hypertension:  Most compatible with essential hypertension/resistant hypertension:  Secondary workup:  -normal thyroid function study  -severe HARPER on BiPAP  -aldosterone/renin ratio:  POSITIVE RATIO OF 37 DESPITE BORDERLINE ALDOSTERONE 12 8:  I WOULD RECOMMEND A FORMAL SALINE SUPPRESSION TEST WHEN STABLE IN THE INTERIM I WILL REPEAT AN ALDOSTERONE/RENIN RATIO  -plasma free metanephrines:  Negative  -24 hour urine for free cortisol:  Negative  -renal artery duplex:  No evidence of significant renal artery disease; but somewhat limited by body habitus and overlying bowel gas  Again obesity may be contributing to his hypertension  · Current home blood pressure readings:  · -a  m :  132/72  · -p  m :  143/75  · -heart rate:  60 range     · GOAL BLOOD PRESSURE:  LESS THAN 125/80 GIVEN CKD/recent hemorrhagic stroke     · Recommendations:  · continue to push nonmedical regimen including isotonic exercise, avoidance of salt and weight loss; patient counseled as such  · GIVEN TIKOSYN:  AMILORIDE/SPIRONOLACTONE/HCTZ ARE CONTRAINDICATED  LOOP DIURETIC SUCH AS FUROSEMIDE OR TORSEMIDE ARE ACCEPTABLE  · Obtain aldosterone workup please see above  · Short-acting Procardia is not a recommended treatment of twice long-term given rapid reduction which can lead to hypoperfusion  Better to have a longer-acting agent I am okay to try Procardia XL twice a day in its place  · Medication changes today:  · Torsemide 5 mg daily  · Continue olmesartan 40 mg daily  · Continue nebivolol 2 5 mg daily  · Continue hydralazine 50 mg twice a day  · Continue clonidine patch# 1  Weekly  · In place of Procardia 10 mg a very 8 hours I would place my Procardia XL 30 mg once a day in the morning           2   CKD stage 3:    · Baseline creatinine is ranged anywhere from 1 1-1 6:  · 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  · Current creatinine:  just slightly above recent values but close to baseline 1 34 just repeat at this time  · UA:  No proteinuria and no hematuria, 2-4 WBCs:  Repeat  · Repeat urine protein creatinine ratio at this time  · Electrolytes: All normal including potassium 3 7  · MBD:  Of CKD:  Calcium/magnesium/phosphorus all normal  · Renal ultrasound:  12/03/2020:  Echogenic focus in the left lower pole possible fat versus calculus without shadowing  Thickened bladder with UV junction still present although perhaps slightly pronounced  Followed by Urology:  · Visit with Dr Jon Henry for possible right-sided ureterocele, history of cystoscopy and cystolitholapaxy status post right stent removal   Bladder wall thickening resolution of hydronephrosis  Possibility of congenital ureterocele was discussed 1 year follow-up with retroperitoneal ultrasound which would be November 2022          3  Nephrolithiasis:  As mentioned previously he has had numerous stones  May have passed a stone since last time  · 1 more stable obtain 24 hour urine for stone risk evaluation prior to the next visit  · Continue with General stone diet     4  Dyslipidemia:  · Current lipid profile:  /HDL 36/triglycerides 140  · Goal:  LDL less than 70 given CKD/hemorrhagic stroke  Recommend:  · Diet, exercise and weight loss, patient counseled as such  · Patient just begun on atorvastatin 40 mg daily       5  Recent admission 03/07/2022 secondary to right-sided weakness with word-finding difficulty, found to have acute left intraparenchymal hemorrhage with vasogenic edema  Patient admitted [facility] with neurosurgery evaluation  Started on Cardene due to hypertension  No neuro surgical intervention at that time was deemed necessary  MRI in 3/9 demonstrated unchanged intraparenchymal hematoma as well as small acute infarct in the left corona radiata  Patient was maintained on blood pressure control  TTE demonstrated EF 65% otherwise grade 1 diastolic dysfunction    Seen by Neurology as well recommended restarting anticoagulation 3 weeks after repeat CT scan along with PT/OT  In regards to blood pressure: Amlodipine was stopped nifedipine 10 mg every 8 hours titrate as needed, continuing hydralazine  PATIENT INSTRUCTIONS:    Patient Instructions   1  Medication changes today:   Please stop Procardia short-acting   Please begin Procardia XL 30 mg daily in the morning in its place:  Watch for worsening swelling of your legs and call if you developed any   Begin torsemide 5 mg daily in the morning    2  Please go for non fasting  lab work 1 week exactly after starting the new medication in particular torsemide, please go in the morning approximately 8 am after you have been walking around for an hour to     3  Please take 1 week a blood pressure readings  please begin tomorrow in send in a week a readings: On your left, on an empty bladder (if you feel dizzy or lightheaded please check her blood pressure or if you have a headache)    If your blood pressure is above 160 on the top number please contact me or if the bottom number is over 90 consistently    AS FOLLOWS  MORNING AND EVENING, SITTING AND STANDING as follows:  · TAKE THE MORNING READINGS BEFORE ANY MEDICATIONS AND WHEN YOU ARE RELAXED FOR SEVERAL MINUTES  · TAKE THE EVENING READINGS:  BETWEEN 7-10 P M ; PRIOR TO ANY MEDICATIONS; AT LEAST IN OUR  FROM DINNER; AND CERTAINLY AFTER RELAXING FOR A FEW MINUTES  · PLEASE INCLUDE HEART RATE WITH YOUR BLOOD PRESSURE READINGS  · When taking standing readings, keep your arm supported at heart level and not dangling  · Make sure you are sitting with your back supported and feet on the ground and do not cross your legs or feet  · Make sure you have not taken any coffee or caffeine products or exercised or smoke cigarettes at least 30 minutes before taking your blood pressure  Then please mail these readings into the office    4   Follow-up in 3  months:  Prior to your next appointment purchase an Omron-10 series blood pressure machine through the Internet her [address] and bring that with you next appointment and use that for your next appointments readings   Please bring in 1 week a blood pressure readings morning evening, sitting and standing is outlined above   PLEASE BRING AN YOUR BLOOD PRESSURE MACHINE TO CORRELATE WITH THE OFFICE MACHINE AT THIS NEXT SCHEDULED VISIT   Please go for fasting lab work 1-2 weeks prior to your appointment      5  General instructions:   AVOID SALT BUT NOT ADDING AN READING LABELS TO MAKE SURE THERE IS LOW-SALT IN THE FOOD THAT YOU ARE EATING  o Goal is less than 2 g of sodium intake or less than 5 g of sodium chloride intake per day     Avoid nonsteroidal anti-inflammatory drugs such as Naprosyn, ibuprofen, Aleve, Advil, Celebrex, Meloxicam (Mobic) etc   You can use Tylenol as needed if you do not have any liver condition to be concerned about     Avoid medications such as Sudafed or decongestants and antihistamines that contained the D component which is the decongestant  You can take antihistamines without the decongestant or D component   Try to avoid medications such as pantoprazole or  Protonix/Nexium or Esomeprazole)/Prilosec or omeprazole/Prevacid or lansoprazole/AcipHex or Rabeprazole  If you are able to, use Pepcid as this is safer for your kidneys   Try to exercise at least 30 minutes 3 days a week to begin with with an ultimate goal of 5 days a week for at least 30 minutes     Try to lose 5-10 lb by your next visit     Please do not drink more than 2 glasses of alcohol/wine on a daily basis as this may contribute to your high blood pressure  Subjective:     No fevers, chills, or cough or colds    Good appetite and slightly decreased energy  No hematuria, dysuria, voiding symptoms or foamy urine  No gastrointestinal symptoms  No cardiovascular symptoms , some slight swelling over the right side which is the side that was affected by the stroke  Occasional mild headaches, no dizziness or lightheadedness  Right-sided weakness and word-finding issues, lower extremities slightly worse than the upper extremity  Blood pressure medications:   Olmesartan 40 mg daily morning   Procardia 10 mg every 8 hours   Bystolic 2 5 mg daily   Hydralazine 50 mg twice a day   Clonidine patch 1   Weekly      ROS:  See HPI, otherwise review of systems as completely reviewed with the patient are negative    Past Medical History:   Diagnosis Date    Ankle swelling     Arthritis     Asthma     Atrial fibrillation (HCC)     Atrial fibrillation with RVR ([address] ) 4/4/2017    Chronic kidney disease     kidney stones    Gout     Hypertension     Kidney stone     Night sweats     Seasonal allergies     Sleep apnea      Past Surgical History:   Procedure Laterality Date    APPENDECTOMY      CARDIOVERSION      X4 last was 2018    COLONOSCOPY      CYSTOSCOPY  08/30/2016    w/removal of object, last assessed 8/30/16    FOOT SURGERY Left     HAND SURGERY      KNEE SURGERY Right 2016    IA CYSTO/URETERO W/LITHOTRIPSY &INDWELL STENT INSRT Left 8/9/2016    Procedure: CYSTOSCOPY URETEROSCOPY WITH LITHOTRIPSY HOLMIUM LASER STONE EXTRACTION, RETROGRADE PYELOGRAM AND INSERTION STENT URETERAL;  Surgeon: Rosette Pires MD;  Location:  MAIN OR;  Service: Urology last assessed 8/30/16    IA CYSTO/URETERO W/LITHOTRIPSY &INDWELL STENT INSRT Right 8/24/2020    Procedure: CYSTOSCOPY , cystolitholapaxy of bladder stone WITH HOMIUM LASER RIGHT RETROGRADE AND RIGHT URETERAL Lelon Freeman OF BLADDER;  Surgeon: Rosette Pires MD;  Location: [address] [place] [address] MAIN OR;  Service: Urology    WRIST SURGERY Left 2014    Fracture surgery     Family History   Problem Relation Age of Onset    Atrial fibrillation Mother     Other Mother         blood dyscrasia    Hypertension Mother     Other Father         hypoglycemia     Atrial fibrillation Father     Diabetes Family     Other Family         Thrombocytosis    No Known Problems Sister     Cancer Neg Hx     Thyroid disease Neg Hx       reports that he quit smoking about 27 years ago  His smoking use included cigarettes  He has a 5 00 pack-year smoking history  He has never used smokeless tobacco  He reports current alcohol use  He reports that he does not use drugs  I COMPLETELY REVIEWED THE PAST MEDICAL HISTORY/PAST SURGICAL HISTORY/SOCIAL HISTORY/FAMILY HISTORY/AND MEDICATIONS  AND UPDATED ALL    Objective:     Vitals:   BP sitting on left:  138/76 with a heart rate of 64 regular, same on right  BP standing on right:  138/76 with a heart rate of 76 and regular  Weight (last 2 days)     Date/Time Weight    03/15/22 1601 141 (311)        Wt Readings from Last 3 Encounters:   03/15/22 (!) 141 kg (311 lb)   03/14/22 (!) 141 kg (310 lb)   03/09/22 (!) 145 kg (319 lb 10 7 oz)       Body mass index is 44 62 kg/m²      Physical Exam: General:  No acute distress/obese  Skin:  No acute rash  Eyes:  No scleral icterus, noninjected, no discharge from eyes  ENT:  Moist mucous membranes  Neck:  Supple, no jugular venous distention, trachea is midline, no lymphadenopathy and no thyromegaly  Back   No CVAT  Chest:  Clear to auscultation and percussion, good respiratory effort  CVS:  Regular rate and rhythm without a rub, or gallops or murmurs  Abdomen:  Obese,Soft and nontender with normal bowel sounds  Extremities:  No cyanosis and trace-1+ lower extremity edema slightly greater on the right than the left 1/2 the way up the pretibial region bilaterally, no arthritic changes, normal range of motion  Neuro:  Grossly intact:  No overt major differences at the moment  Psych:  Alert, oriented x3 and appropriate      Medications:    Current Outpatient Medications:     atorvastatin (LIPITOR) 40 mg tablet, Take 1 tablet (40 mg total) by mouth daily with dinner, Disp: 30 tablet, Rfl: 0    cloNIDine (CATAPRES-TTS-1) 0 1 mg/24 hr, Place 1 patch (0 1 mg total) on the skin once a week, Disp: 4 patch, Rfl: 11    dofetilide (TIKOSYN) 500 mcg capsule, TAKE 1 CAPSULE BY MOUTH EVERY 12 HOURS, Disp: 60 capsule, Rfl: 8    hydrALAZINE (APRESOLINE) 50 mg tablet, Take 1 tablet (50 mg total) by mouth 2 (two) times a day, Disp: 180 tablet, Rfl: 3    nebivolol (Bystolic) 2 5 mg tablet, Take 1 tablet (2 5 mg total) by mouth daily Hold for heart rate less than 50 beats per minute , Disp: 90 tablet, Rfl: 3    olmesartan (BENICAR) 40 mg tablet, Take 1 tablet (40 mg total) by mouth daily, Disp: 90 tablet, Rfl: 3    budesonide (Pulmicort Flexhaler) 180 MCG/ACT inhaler, Inhale 4 puffs 2 (two) times a day for 14 days Rinse mouth after use  (Patient not taking: Reported on 3/14/2022 ), Disp: 1 each, Rfl: 0    NIFEdipine (PROCARDIA XL) 30 mg 24 hr tablet, Take 1 tablet (30 mg total) by mouth daily, Disp: 30 tablet, Rfl: 5    torsemide (DEMADEX) 5 MG tablet, Take 1 tablet (5 mg total) by mouth daily, Disp: 30 tablet, Rfl: 5    Lab, Imaging and other studies: I have personally reviewed pertinent labs  Laboratory Results:  Results for orders placed or performed during the hospital encounter of 03/07/22   Phosphorus   Result Value Ref Range    Phosphorus 3 0 2 7 - 4 5 mg/dL   Magnesium   Result Value Ref Range    Magnesium 1 9 1 6 - 2 6 mg/dL   Lipid Panel with Direct LDL reflex   Result Value Ref Range    Cholesterol 165 See Comment mg/dL    Triglycerides 143 See Comment mg/dL    HDL, Direct 36 (L) >=40 mg/dL    LDL Calculated 100 0 - 100 mg/dL   Hemoglobin A1C   Result Value Ref Range    Hemoglobin A1C 7 5 (H) Normal 3 8-5 6%; PreDiabetic 5 7-6 4%;  Diabetic >=6 5%; Glycemic control for adults with diabetes <7 0% %     mg/dl   Basic metabolic panel   Result Value Ref Range    Sodium 138 136 - 145 mmol/L    Potassium 3 6 3 5 - 5 3 mmol/L    Chloride 107 100 - 108 mmol/L    CO2 28 21 - 32 mmol/L    ANION GAP 3 (L) 4 - 13 mmol/L    BUN 13 5 - 25 mg/dL Creatinine 1 11 0 60 - 1 30 mg/dL    Glucose 191 (H) 65 - 140 mg/dL    Calcium 9 2 8 3 - 10 1 mg/dL    eGFR 73 ml/min/1 73sq m   Calcium, ionized   Result Value Ref Range    Calcium, Ionized 1 11 (L) 1 12 - 1 32 mmol/L   Protime-INR   Result Value Ref Range    Protime 14 0 11 6 - 14 5 seconds    INR 1 13 0 84 - 1 19   APTT   Result Value Ref Range    PTT 34 23 - 37 seconds   CBC and differential   Result Value Ref Range    WBC 11 31 (H) 4 31 - 10 16 Thousand/uL    RBC 4 78 3 88 - 5 62 Million/uL    Hemoglobin 14 3 12 0 - 17 0 g/dL    Hematocrit 41 1 36 5 - 49 3 %    MCV 86 82 - 98 fL    MCH 29 9 26 8 - 34 3 pg    MCHC 34 8 31 4 - 37 4 g/dL    RDW 13 0 11 6 - 15 1 %    MPV 12 1 8 9 - 12 7 fL    Platelets 550 350 - 628 Thousands/uL   CBC and differential   Result Value Ref Range    WBC 11 15 (H) 4 31 - 10 16 Thousand/uL    RBC 4 87 3 88 - 5 62 Million/uL    Hemoglobin 14 7 12 0 - 17 0 g/dL    Hematocrit 42 1 36 5 - 49 3 %    MCV 86 82 - 98 fL    MCH 30 2 26 8 - 34 3 pg    MCHC 34 9 31 4 - 37 4 g/dL    RDW 13 0 11 6 - 15 1 %    MPV 11 7 8 9 - 12 7 fL    Platelets 108 755 - 401 Thousands/uL    nRBC 0 /100 WBCs    Neutrophils Relative 77 (H) 43 - 75 %    Immat GRANS % 0 0 - 2 %    Lymphocytes Relative 15 14 - 44 %    Monocytes Relative 6 4 - 12 %    Eosinophils Relative 1 0 - 6 %    Basophils Relative 1 0 - 1 %    Neutrophils Absolute 8 61 (H) 1 85 - 7 62 Thousands/µL    Immature Grans Absolute 0 04 0 00 - 0 20 Thousand/uL    Lymphocytes Absolute 1 62 0 60 - 4 47 Thousands/µL    Monocytes Absolute 0 68 0 17 - 1 22 Thousand/µL    Eosinophils Absolute 0 13 0 00 - 0 61 Thousand/µL    Basophils Absolute 0 07 0 00 - 0 10 Thousands/µL   Basic metabolic panel   Result Value Ref Range    Sodium 137 136 - 145 mmol/L    Potassium 3 7 3 5 - 5 3 mmol/L    Chloride 105 100 - 108 mmol/L    CO2 27 21 - 32 mmol/L    ANION GAP 5 4 - 13 mmol/L    BUN 12 5 - 25 mg/dL    Creatinine 1 17 0 60 - 1 30 mg/dL    Glucose 212 (H) 65 - 140 mg/dL Calcium 8 5 8 3 - 10 1 mg/dL    eGFR 69 ml/min/1 73sq m   Magnesium   Result Value Ref Range    Magnesium 1 9 1 6 - 2 6 mg/dL   Phosphorus   Result Value Ref Range    Phosphorus 2 4 (L) 2 7 - 4 5 mg/dL   CBC and Platelet   Result Value Ref Range    WBC 9 89 4 31 - 10 16 Thousand/uL    RBC 5 03 3 88 - 5 62 Million/uL    Hemoglobin 14 9 12 0 - 17 0 g/dL    Hematocrit 43 7 36 5 - 49 3 %    MCV 87 82 - 98 fL    MCH 29 6 26 8 - 34 3 pg    MCHC 34 1 31 4 - 37 4 g/dL    RDW 13 2 11 6 - 15 1 %    Platelets 525 634 - 598 Thousands/uL    MPV 11 9 8 9 - 12 7 fL   Basic metabolic panel   Result Value Ref Range    Sodium 140 136 - 145 mmol/L    Potassium 3 7 3 5 - 5 3 mmol/L    Chloride 108 100 - 108 mmol/L    CO2 26 21 - 32 mmol/L    ANION GAP 6 4 - 13 mmol/L    BUN 16 5 - 25 mg/dL    Creatinine 1 08 0 60 - 1 30 mg/dL    Glucose 155 (H) 65 - 140 mg/dL    Calcium 8 8 8 3 - 10 1 mg/dL    eGFR 76 ml/min/1 73sq m   Magnesium   Result Value Ref Range    Magnesium 2 1 1 6 - 2 6 mg/dL   Phosphorus   Result Value Ref Range    Phosphorus 2 8 2 7 - 4 5 mg/dL   CBC and Platelet   Result Value Ref Range    WBC 10 96 (H) 4 31 - 10 16 Thousand/uL    RBC 4 99 3 88 - 5 62 Million/uL    Hemoglobin 14 8 12 0 - 17 0 g/dL    Hematocrit 43 3 36 5 - 49 3 %    MCV 87 82 - 98 fL    MCH 29 7 26 8 - 34 3 pg    MCHC 34 2 31 4 - 37 4 g/dL    RDW 13 6 11 6 - 15 1 %    Platelets 040 368 - 084 Thousands/uL    MPV 12 7 8 9 - 12 7 fL   Comprehensive metabolic panel   Result Value Ref Range    Sodium 137 136 - 145 mmol/L    Potassium 3 7 3 5 - 5 3 mmol/L    Chloride 107 100 - 108 mmol/L    CO2 24 21 - 32 mmol/L    ANION GAP 6 4 - 13 mmol/L    BUN 21 5 - 25 mg/dL    Creatinine 1 34 (H) 0 60 - 1 30 mg/dL    Glucose 147 (H) 65 - 140 mg/dL    Calcium 8 5 8 3 - 10 1 mg/dL    Corrected Calcium 9 1 8 3 - 10 1 mg/dL    AST 35 5 - 45 U/L    ALT 77 12 - 78 U/L    Alkaline Phosphatase 60 46 - 116 U/L    Total Protein 7 2 6 4 - 8 2 g/dL    Albumin 3 3 (L) 3 5 - 5 0 g/dL Total Bilirubin 0 82 0 20 - 1 00 mg/dL    eGFR 58 ml/min/1 73sq m   ECG 12 lead   Result Value Ref Range    Ventricular Rate 60 BPM    Atrial Rate 60 BPM    MO Interval 158 ms    QRSD Interval 83 ms    QT Interval 421 ms    QTC Interval 421 ms    P Axis 46 degrees    QRS Axis -75 degrees    T Wave Axis 70 degrees   ECG 12 lead   Result Value Ref Range    Ventricular Rate 75 BPM    Atrial Rate 75 BPM    MO Interval 154 ms    QRSD Interval 83 ms    QT Interval 454 ms    QTC Interval 508 ms    P [place] 53 degrees    QRS Axis -80 degrees    T Wave Axis 62 degrees   ECG 12 lead   Result Value Ref Range    Ventricular Rate 55 BPM    Atrial Rate 55 BPM    MO Interval 152 ms    QRSD Interval 84 ms    QT Interval 456 ms    QTC Interval 436 ms    P Axis 74 degrees    QRS Axis 40 degrees    T Wave Axis 65 degrees   ECG 12 lead   Result Value Ref Range    Ventricular Rate 58 BPM    Atrial Rate 58 BPM    MO Interval 174 ms    QRSD Interval 98 ms    QT Interval 448 ms    QTC Interval 439 ms    P [place] 55 degrees    QRS Axis 27 degrees    T Wave [place] 58 degrees   ECG 12 lead   Result Value Ref Range    Ventricular Rate 56 BPM    Atrial Rate 56 BPM    MO Interval 132 ms    QRSD Interval 96 ms    QT Interval 476 ms    QTC Interval 459 ms    P Axis  degrees    QRS Axis 20 degrees    T Wave Axis -1 degrees   ECG 12 lead   Result Value Ref Range    Ventricular Rate 56 BPM    Atrial Rate 56 BPM    MO Interval 158 ms    QRSD Interval 64 ms    QT Interval 462 ms    QTC Interval 445 ms    P [place] 57 degrees    QRS Axis 79 degrees    T Wave Axis 110 degrees   ECG 12 lead   Result Value Ref Range    Ventricular Rate 55 BPM    Atrial Rate 55 BPM    MO Interval 174 ms    QRSD Interval 88 ms    QT Interval 444 ms    QTC Interval 424 ms    P [place] 57 degrees    QRS Axis 26 degrees    T Wave Axis 66 degrees   Echo complete w/ contrast if indicated   Result Value Ref Range    LA size 4 5 cm    LVPWd 1 40 0 59 - 1 12 cm    Left Atrium Area-systolic Apical Two Chamber 28 6 cm2    Left Atrium Area-systolic Four Chamber 30 cm2    MV E' Tissue Velocity Septal 8 cm/s    IVSd 4 26 cm    LV DIASTOLIC VOLUME (MOD BIPLANE) 2D 142 mL    LEFT VENTRICLE SYSTOLIC VOLUME (MOD BIPLANE) 2D 41 mL    Left ventricular stroke volume (2D) 101 00 mL    A4C EF 63 %    LA length (A2C) 7 00 cm    LVIDd 5 40 25 09 - 37 41 cm    IVS 1 4 0 60 - 1 13 cm    LVIDS 3 20 14 33 - 21 75 cm    FS 41 28 - 44 %    Asc Ao 4 1 2 38 - 3 56 cm    Ao root 3 50 cm    RVID d 4 5 cm    MV valve area p 1/2 method 2 16 cm2    E wave deceleration time 352 ms    AV peak gradient 15 mmHg    MV Peak E Johnny 71 cm/s    MV Peak A Johnny 1 01 m/s    RAA A4C 21 9 cm2    MV stenosis pressure 1/2 time 102 ms    LVSV, 2D 101 mL    Ao asc z-score 3 79     ZLVPWD 4 05     ZLVIDS -13 50     ZLVIDD -17 36     ZIVSD 3 93     LV EF 65    Fingerstick Glucose (POCT)   Result Value Ref Range    POC Glucose 179 (H) 65 - 140 mg/dl   Fingerstick Glucose (POCT)   Result Value Ref Range    POC Glucose 143 (H) 65 - 140 mg/dl   Fingerstick Glucose (POCT)   Result Value Ref Range    POC Glucose 189 (H) 65 - 140 mg/dl   Fingerstick Glucose (POCT)   Result Value Ref Range    POC Glucose 146 (H) 65 - 140 mg/dl   Fingerstick Glucose (POCT)   Result Value Ref Range    POC Glucose 178 (H) 65 - 140 mg/dl   Fingerstick Glucose (POCT)   Result Value Ref Range    POC Glucose 164 (H) 65 - 140 mg/dl   Fingerstick Glucose (POCT)   Result Value Ref Range    POC Glucose 159 (H) 65 - 140 mg/dl   Fingerstick Glucose (POCT)   Result Value Ref Range    POC Glucose 145 (H) 65 - 140 mg/dl   Fingerstick Glucose (POCT)   Result Value Ref Range    POC Glucose 146 (H) 65 - 140 mg/dl   Fingerstick Glucose (POCT)   Result Value Ref Range    POC Glucose 116 65 - 140 mg/dl   Fingerstick Glucose (POCT)   Result Value Ref Range    POC Glucose 134 65 - 140 mg/dl   Fingerstick Glucose (POCT)   Result Value Ref Range    POC Glucose 124 65 - 140 mg/dl   Manual Differential(PHLEBS Do Not Order)   Result Value Ref Range    Segmented % 78 (H) 43 - 75 %    Lymphocytes % 16 14 - 44 %    Monocytes % 2 (L) 4 - 12 %    Eosinophils, % 2 0 - 6 %    Basophils % 2 (H) 0 - 1 %    Absolute Neutrophils 8 82 (H) 1 85 - 7 62 Thousand/uL    Lymphocytes Absolute 1 81 0 60 - 4 47 Thousand/uL    Monocytes Absolute 0 23 0 00 - 1 22 Thousand/uL    Eosinophils Absolute 0 23 0 00 - 0 40 Thousand/uL    Basophils Absolute 0 23 (H) 0 00 - 0 10 Thousand/uL    Total Counted      RBC Morphology Normal     Platelet Estimate Decreased (A) Adequate    Artifact Present        Results from last 7 days   Lab Units 03/10/22  0452 03/09/22  0500   WBC Thousand/uL 10 96* 9 89   HEMOGLOBIN g/dL 14 8 14 9   HEMATOCRIT % 43 3 43 7   PLATELETS Thousands/uL 211 216   POTASSIUM mmol/L 3 7 3 7   CHLORIDE mmol/L 107 108   CO2 mmol/L 24 26   BUN mg/dL 21 16   CREATININE mg/dL 1 34* 1 08   CALCIUM mg/dL 8 5 8 8   MAGNESIUM mg/dL  --  2 1   PHOSPHORUS mg/dL  --  2 8         Radiology review:   chest X-ray    Ultrasound      Portions of the record may have been created with voice recognition software  Occasional wrong word or "sound a like" substitutions may have occurred due to the inherent limitations of voice recognition software  Read the chart carefully and recognize, using context, where substitutions have occurred

## 2022-03-15 ENCOUNTER — OFFICE VISIT (OUTPATIENT)
Dept: NEPHROLOGY | Facility: CLINIC | Age: 57
End: 2022-03-15
Payer: COMMERCIAL

## 2022-03-15 VITALS — WEIGHT: 311 LBS | BODY MASS INDEX: 44.52 KG/M2 | HEIGHT: 70 IN

## 2022-03-15 DIAGNOSIS — E55.9 VITAMIN D DEFICIENCY: ICD-10-CM

## 2022-03-15 DIAGNOSIS — E78.5 DYSLIPIDEMIA: ICD-10-CM

## 2022-03-15 DIAGNOSIS — E66.01 CLASS 3 SEVERE OBESITY DUE TO EXCESS CALORIES WITH SERIOUS COMORBIDITY AND BODY MASS INDEX (BMI) OF 40.0 TO 44.9 IN ADULT (HCC): ICD-10-CM

## 2022-03-15 DIAGNOSIS — I10 ACCELERATED HYPERTENSION: ICD-10-CM

## 2022-03-15 DIAGNOSIS — N18.31 STAGE 3A CHRONIC KIDNEY DISEASE (HCC): ICD-10-CM

## 2022-03-15 DIAGNOSIS — I12.9 HYPERTENSIVE CHRONIC KIDNEY DISEASE WITH STAGE 1 THROUGH STAGE 4 CHRONIC KIDNEY DISEASE, OR UNSPECIFIED CHRONIC KIDNEY DISEASE: Primary | ICD-10-CM

## 2022-03-15 PROCEDURE — 1111F DSCHRG MED/CURRENT MED MERGE: CPT | Performed by: INTERNAL MEDICINE

## 2022-03-15 PROCEDURE — 3008F BODY MASS INDEX DOCD: CPT | Performed by: INTERNAL MEDICINE

## 2022-03-15 PROCEDURE — 99215 OFFICE O/P EST HI 40 MIN: CPT | Performed by: INTERNAL MEDICINE

## 2022-03-15 PROCEDURE — 1036F TOBACCO NON-USER: CPT | Performed by: INTERNAL MEDICINE

## 2022-03-15 RX ORDER — TORSEMIDE 5 MG/1
5 TABLET ORAL DAILY
Qty: 30 TABLET | Refills: 5 | Status: SHIPPED | OUTPATIENT
Start: 2022-03-15 | End: 2022-06-08

## 2022-03-15 RX ORDER — NIFEDIPINE 30 MG/1
30 TABLET, EXTENDED RELEASE ORAL DAILY
Qty: 30 TABLET | Refills: 5 | Status: SHIPPED | OUTPATIENT
Start: 2022-03-15 | End: 2022-04-22 | Stop reason: SDUPTHER

## 2022-03-15 NOTE — LETTER
March 15, 2022     Gabriele Galindo MD  [address] [place] [id number]    Patient: Nidia Saba   YOB: 1965   Date of Visit: 3/15/2022       Dear Dr Bonilla Homes: Thank you for referring Jasiel Ochoa to me for evaluation  Below are my notes for this consultation  If you have questions, please do not hesitate to call me  I look forward to following your patient along with you  Sincerely,        Emani Aguilera MD        CC: DO Raissa Tenorio MD Melinda Bays, MD  3/15/2022  4:46 PM  Sign when Signing Visit  RENAL FOLLOW UP NOTE: td     ASSESSMENT AND PLAN:  1  Hypertension:  Most compatible with essential hypertension/resistant hypertension:  Secondary workup:  -normal thyroid function study  -severe HARPER on BiPAP  -aldosterone/renin ratio:  POSITIVE RATIO OF 37 DESPITE BORDERLINE ALDOSTERONE 12 8:  I WOULD RECOMMEND A FORMAL SALINE SUPPRESSION TEST WHEN STABLE IN THE INTERIM I WILL REPEAT AN ALDOSTERONE/RENIN RATIO  -plasma free metanephrines:  Negative  -24 hour urine for free cortisol:  Negative  -renal artery duplex:  No evidence of significant renal artery disease; but somewhat limited by body habitus and overlying bowel gas  Again obesity may be contributing to his hypertension  · Current home blood pressure readings:  · -a  m :  132/72  · -p  m :  143/75  · -heart rate:  60 range     · GOAL BLOOD PRESSURE:  LESS THAN 125/80 GIVEN CKD/recent hemorrhagic stroke     · Recommendations:  · continue to push nonmedical regimen including isotonic exercise, avoidance of salt and weight loss; patient counseled as such  · GIVEN TIKOSYN:  AMILORIDE/SPIRONOLACTONE/HCTZ ARE CONTRAINDICATED  LOOP DIURETIC SUCH AS FUROSEMIDE OR TORSEMIDE ARE ACCEPTABLE  · Obtain aldosterone workup please see above  · Short-acting Procardia is not a recommended treatment of twice long-term given rapid reduction which can lead to hypoperfusion    Better to have a longer-acting agent I am okay to try Procardia XL twice a day in its place  · Medication changes today:  · Torsemide 5 mg daily  · Continue olmesartan 40 mg daily  · Continue nebivolol 2 5 mg daily  · Continue hydralazine 50 mg twice a day  · Continue clonidine patch# 1  Weekly  · In place of Procardia 10 mg a very 8 hours I would place my Procardia XL 30 mg once a day in the morning           2  CKD stage 3:    · Baseline creatinine is ranged anywhere from 1 1-1 6:  · 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  · Current creatinine:  just slightly above recent values but close to baseline 1 34 just repeat at this time  · UA:  No proteinuria and no hematuria, 2-4 WBCs:  Repeat  · Repeat urine protein creatinine ratio at this time  · Electrolytes: All normal including potassium 3 7  · MBD:  Of CKD:  Calcium/magnesium/phosphorus all normal  · Renal ultrasound:  12/03/2020:  Echogenic focus in the left lower pole possible fat versus calculus without shadowing  Thickened bladder with UV junction still present although perhaps slightly pronounced  Followed by Urology:  · Visit with Dr Louise Laguerre for possible right-sided ureterocele, history of cystoscopy and cystolitholapaxy status post right stent removal   Bladder wall thickening resolution of hydronephrosis  Possibility of congenital ureterocele was discussed 1 year follow-up with retroperitoneal ultrasound which would be November 2022          3  Nephrolithiasis:  As mentioned previously he has had numerous stones  May have passed a stone since last time  · 1 more stable obtain 24 hour urine for stone risk evaluation prior to the next visit  · Continue with General stone diet     4  Dyslipidemia:  · Current lipid profile:  /HDL 36/triglycerides 140  · Goal:  LDL less than 70 given CKD/hemorrhagic stroke  Recommend:  · Diet, exercise and weight loss, patient counseled as such  · Patient just begun on atorvastatin 40 mg daily       5   Recent admission 03/07/2022 secondary to right-sided weakness with word-finding difficulty, found to have acute left intraparenchymal hemorrhage with vasogenic edema  Patient admitted [facility] with neurosurgery evaluation  Started on Cardene due to hypertension  No neuro surgical intervention at that time was deemed necessary  MRI in 3/9 demonstrated unchanged intraparenchymal hematoma as well as small acute infarct in the left corona radiata  Patient was maintained on blood pressure control  TTE demonstrated EF 65% otherwise grade 1 diastolic dysfunction  Seen by Neurology as well recommended restarting anticoagulation 3 weeks after repeat CT scan along with PT/OT  In regards to blood pressure: Amlodipine was stopped nifedipine 10 mg every 8 hours titrate as needed, continuing hydralazine  PATIENT INSTRUCTIONS:    Patient Instructions   1  Medication changes today:   Please stop Procardia short-acting   Please begin Procardia XL 30 mg daily in the morning in its place:  Watch for worsening swelling of your legs and call if you developed any   Begin torsemide 5 mg daily in the morning    2  Please go for non fasting  lab work 1 week exactly after starting the new medication in particular torsemide, please go in the morning approximately 8 am after you have been walking around for an hour to     3  Please take 1 week a blood pressure readings  please begin tomorrow in send in a week a readings: On your left, on an empty bladder (if you feel dizzy or lightheaded please check her blood pressure or if you have a headache)      If your blood pressure is above 160 on the top number please contact me or if the bottom number is over 90 consistently    AS FOLLOWS  MORNING AND EVENING, SITTING AND STANDING as follows:  · TAKE THE MORNING READINGS BEFORE ANY MEDICATIONS AND WHEN YOU ARE RELAXED FOR SEVERAL MINUTES  · TAKE THE EVENING READINGS:  BETWEEN 7-10 P M ; PRIOR TO ANY MEDICATIONS; AT LEAST IN OUR  FROM DINNER; AND CERTAINLY AFTER RELAXING FOR A FEW MINUTES  · PLEASE INCLUDE HEART RATE WITH YOUR BLOOD PRESSURE READINGS  · When taking standing readings, keep your arm supported at heart level and not dangling  · Make sure you are sitting with your back supported and feet on the ground and do not cross your legs or feet  · Make sure you have not taken any coffee or caffeine products or exercised or smoke cigarettes at least 30 minutes before taking your blood pressure  Then please mail these readings into the office    4  Follow-up in 3  months:  Prior to your next appointment purchase an Omron-10 series blood pressure machine through the Internet her [address] and bring that with you next appointment and use that for your next appointments readings   Please bring in 1 week a blood pressure readings morning evening, sitting and standing is outlined above   PLEASE BRING AN YOUR BLOOD PRESSURE MACHINE TO CORRELATE WITH THE OFFICE MACHINE AT THIS NEXT SCHEDULED VISIT   Please go for fasting lab work 1-2 weeks prior to your appointment      5  General instructions:   AVOID SALT BUT NOT ADDING AN READING LABELS TO MAKE SURE THERE IS LOW-SALT IN THE FOOD THAT YOU ARE EATING  o Goal is less than 2 g of sodium intake or less than 5 g of sodium chloride intake per day     Avoid nonsteroidal anti-inflammatory drugs such as Naprosyn, ibuprofen, Aleve, Advil, Celebrex, Meloxicam (Mobic) etc   You can use Tylenol as needed if you do not have any liver condition to be concerned about     Avoid medications such as Sudafed or decongestants and antihistamines that contained the D component which is the decongestant  You can take antihistamines without the decongestant or D component   Try to avoid medications such as pantoprazole or  Protonix/Nexium or Esomeprazole)/Prilosec or omeprazole/Prevacid or lansoprazole/AcipHex or Rabeprazole  If you are able to, use Pepcid as this is safer for your kidneys       Try to exercise at least 30 minutes 3 days a week to begin with with an ultimate goal of 5 days a week for at least 30 minutes     Try to lose 5-10 lb by your next visit     Please do not drink more than 2 glasses of alcohol/wine on a daily basis as this may contribute to your high blood pressure  Subjective:     No fevers, chills, or cough or colds  Good appetite and slightly decreased energy  No hematuria, dysuria, voiding symptoms or foamy urine  No gastrointestinal symptoms  No cardiovascular symptoms , some slight swelling over the right side which is the side that was affected by the stroke  Occasional mild headaches, no dizziness or lightheadedness  Right-sided weakness and word-finding issues, lower extremities slightly worse than the upper extremity  Blood pressure medications:   Olmesartan 40 mg daily morning   Procardia 10 mg every 8 hours   Bystolic 2 5 mg daily   Hydralazine 50 mg twice a day   Clonidine patch 1   Weekly      ROS:  See HPI, otherwise review of systems as completely reviewed with the patient are negative    Past Medical History:   Diagnosis Date    Ankle swelling     Arthritis     Asthma     Atrial fibrillation (HCC)     Atrial fibrillation with RVR ([facility] [address] ) 4/4/2017    Chronic kidney disease     kidney stones    Gout     Hypertension     Kidney stone     Night sweats     Seasonal allergies     Sleep apnea      Past Surgical History:   Procedure Laterality Date    APPENDECTOMY      CARDIOVERSION      X4 last was 2018    COLONOSCOPY      CYSTOSCOPY  08/30/2016    w/removal of object, last assessed 8/30/16    FOOT SURGERY Left     HAND SURGERY      KNEE SURGERY Right 2016    FL CYSTO/URETERO W/LITHOTRIPSY &INDWELL STENT INSRT Left 8/9/2016    Procedure: CYSTOSCOPY URETEROSCOPY WITH LITHOTRIPSY HOLMIUM LASER STONE EXTRACTION, RETROGRADE PYELOGRAM AND INSERTION STENT URETERAL;  Surgeon: Sulaiman Kilgore MD;  Location[facility];  Service: Urology last assessed 8/30/16    CO CYSTO/URETERO W/LITHOTRIPSY &INDWELL STENT INSRT Right 8/24/2020    Procedure: CYSTOSCOPY , cystolitholapaxy of bladder stone WITH HOMIUM LASER RIGHT RETROGRADE AND RIGHT URETERAL STENT, Barrera Rolling OF BLADDER;  Surgeon: Daniel Purvis MD;  Location: [address] [place] [address];  Service: Urology    WRIST SURGERY Left 2014    Fracture surgery     Family History   Problem Relation Age of Onset    Atrial fibrillation Mother     Other Mother         blood dyscrasia    Hypertension Mother     Other Father         hypoglycemia     Atrial fibrillation Father     Diabetes Family     Other Family         Thrombocytosis    No Known Problems Sister     Cancer Neg Hx     Thyroid disease Neg Hx       reports that he quit smoking about 27 years ago  His smoking use included cigarettes  He has a 5 00 pack-year smoking history  He has never used smokeless tobacco  He reports current alcohol use  He reports that he does not use drugs  I COMPLETELY REVIEWED THE PAST MEDICAL HISTORY/PAST SURGICAL HISTORY/SOCIAL HISTORY/FAMILY HISTORY/AND MEDICATIONS  AND UPDATED ALL    Objective:     Vitals:   BP sitting on left:  138/76 with a heart rate of 64 regular, same on right  BP standing on right:  138/76 with a heart rate of 76 and regular  Weight (last 2 days)     Date/Time Weight    03/15/22 1601 141 (311)        Wt Readings from Last 3 Encounters:   03/15/22 (!) 141 kg (311 lb)   03/14/22 (!) 141 kg (310 lb)   03/09/22 (!) 145 kg (319 lb 10 7 oz)       Body mass index is 44 62 kg/m²      Physical Exam: General:  No acute distress/obese  Skin:  No acute rash  Eyes:  No scleral icterus, noninjected, no discharge from eyes  ENT:  Moist mucous membranes  Neck:  Supple, no jugular venous distention, trachea is midline, no lymphadenopathy and no thyromegaly  Back   No CVAT  Chest:  Clear to auscultation and percussion, good respiratory effort  CVS:  Regular rate and rhythm without a rub, or gallops or murmurs  Abdomen: Obese,Soft and nontender with normal bowel sounds  Extremities:  No cyanosis and trace-1+ lower extremity edema slightly greater on the right than the left 1/2 the way up the pretibial region bilaterally, no arthritic changes, normal range of motion  Neuro:  Grossly intact:  No overt major differences at the moment  Psych:  Alert, oriented x3 and appropriate      Medications:    Current Outpatient Medications:     atorvastatin (LIPITOR) 40 mg tablet, Take 1 tablet (40 mg total) by mouth daily with dinner, Disp: 30 tablet, Rfl: 0    cloNIDine (CATAPRES-TTS-1) 0 1 mg/24 hr, Place 1 patch (0 1 mg total) on the skin once a week, Disp: 4 patch, Rfl: 11    dofetilide (TIKOSYN) 500 mcg capsule, TAKE 1 CAPSULE BY MOUTH EVERY 12 HOURS, Disp: 60 capsule, Rfl: 8    hydrALAZINE (APRESOLINE) 50 mg tablet, Take 1 tablet (50 mg total) by mouth 2 (two) times a day, Disp: 180 tablet, Rfl: 3    nebivolol (Bystolic) 2 5 mg tablet, Take 1 tablet (2 5 mg total) by mouth daily Hold for heart rate less than 50 beats per minute , Disp: 90 tablet, Rfl: 3    olmesartan (BENICAR) 40 mg tablet, Take 1 tablet (40 mg total) by mouth daily, Disp: 90 tablet, Rfl: 3    budesonide (Pulmicort Flexhaler) 180 MCG/ACT inhaler, Inhale 4 puffs 2 (two) times a day for 14 days Rinse mouth after use  (Patient not taking: Reported on 3/14/2022 ), Disp: 1 each, Rfl: 0    NIFEdipine (PROCARDIA XL) 30 mg 24 hr tablet, Take 1 tablet (30 mg total) by mouth daily, Disp: 30 tablet, Rfl: 5    torsemide (DEMADEX) 5 MG tablet, Take 1 tablet (5 mg total) by mouth daily, Disp: 30 tablet, Rfl: 5    Lab, Imaging and other studies: I have personally reviewed pertinent labs    Laboratory Results:  Results for orders placed or performed during the hospital encounter of 03/07/22   Phosphorus   Result Value Ref Range    Phosphorus 3 0 2 7 - 4 5 mg/dL   Magnesium   Result Value Ref Range    Magnesium 1 9 1 6 - 2 6 mg/dL   Lipid Panel with Direct LDL reflex   Result Value Ref Range    Cholesterol 165 See Comment mg/dL    Triglycerides 143 See Comment mg/dL    HDL, Direct 36 (L) >=40 mg/dL    LDL Calculated 100 0 - 100 mg/dL   Hemoglobin A1C   Result Value Ref Range    Hemoglobin A1C 7 5 (H) Normal 3 8-5 6%; PreDiabetic 5 7-6 4%;  Diabetic >=6 5%; Glycemic control for adults with diabetes <7 0% %     mg/dl   Basic metabolic panel   Result Value Ref Range    Sodium 138 136 - 145 mmol/L    Potassium 3 6 3 5 - 5 3 mmol/L    Chloride 107 100 - 108 mmol/L    CO2 28 21 - 32 mmol/L    ANION GAP 3 (L) 4 - 13 mmol/L    BUN 13 5 - 25 mg/dL    Creatinine 1 11 0 60 - 1 30 mg/dL    Glucose 191 (H) 65 - 140 mg/dL    Calcium 9 2 8 3 - 10 1 mg/dL    eGFR 73 ml/min/1 73sq m   Calcium, ionized   Result Value Ref Range    Calcium, Ionized 1 11 (L) 1 12 - 1 32 mmol/L   Protime-INR   Result Value Ref Range    Protime 14 0 11 6 - 14 5 seconds    INR 1 13 0 84 - 1 19   APTT   Result Value Ref Range    PTT 34 23 - 37 seconds   CBC and differential   Result Value Ref Range    WBC 11 31 (H) 4 31 - 10 16 Thousand/uL    RBC 4 78 3 88 - 5 62 Million/uL    Hemoglobin 14 3 12 0 - 17 0 g/dL    Hematocrit 41 1 36 5 - 49 3 %    MCV 86 82 - 98 fL    MCH 29 9 26 8 - 34 3 pg    MCHC 34 8 31 4 - 37 4 g/dL    RDW 13 0 11 6 - 15 1 %    MPV 12 1 8 9 - 12 7 fL    Platelets 101 124 - 894 Thousands/uL   CBC and differential   Result Value Ref Range    WBC 11 15 (H) 4 31 - 10 16 Thousand/uL    RBC 4 87 3 88 - 5 62 Million/uL    Hemoglobin 14 7 12 0 - 17 0 g/dL    Hematocrit 42 1 36 5 - 49 3 %    MCV 86 82 - 98 fL    MCH 30 2 26 8 - 34 3 pg    MCHC 34 9 31 4 - 37 4 g/dL    RDW 13 0 11 6 - 15 1 %    MPV 11 7 8 9 - 12 7 fL    Platelets 319 974 - 542 Thousands/uL    nRBC 0 /100 WBCs    Neutrophils Relative 77 (H) 43 - 75 %    Immat GRANS % 0 0 - 2 %    Lymphocytes Relative 15 14 - 44 %    Monocytes Relative 6 4 - 12 %    Eosinophils Relative 1 0 - 6 %    Basophils Relative 1 0 - 1 %    Neutrophils Absolute 8 61 (H) 1 85 - 7 62 Thousands/µL    Immature Grans Absolute 0 04 0 00 - 0 20 Thousand/uL    Lymphocytes Absolute 1 62 0 60 - 4 47 Thousands/µL    Monocytes Absolute 0 68 0 17 - 1 22 Thousand/µL    Eosinophils Absolute 0 13 0 00 - 0 61 Thousand/µL    Basophils Absolute 0 07 0 00 - 0 10 Thousands/µL   Basic metabolic panel   Result Value Ref Range    Sodium 137 136 - 145 mmol/L    Potassium 3 7 3 5 - 5 3 mmol/L    Chloride 105 100 - 108 mmol/L    CO2 27 21 - 32 mmol/L    ANION GAP 5 4 - 13 mmol/L    BUN 12 5 - 25 mg/dL    Creatinine 1 17 0 60 - 1 30 mg/dL    Glucose 212 (H) 65 - 140 mg/dL    Calcium 8 5 8 3 - 10 1 mg/dL    eGFR 69 ml/min/1 73sq m   Magnesium   Result Value Ref Range    Magnesium 1 9 1 6 - 2 6 mg/dL   Phosphorus   Result Value Ref Range    Phosphorus 2 4 (L) 2 7 - 4 5 mg/dL   CBC and Platelet   Result Value Ref Range    WBC 9 89 4 31 - 10 16 Thousand/uL    RBC 5 03 3 88 - 5 62 Million/uL    Hemoglobin 14 9 12 0 - 17 0 g/dL    Hematocrit 43 7 36 5 - 49 3 %    MCV 87 82 - 98 fL    MCH 29 6 26 8 - 34 3 pg    MCHC 34 1 31 4 - 37 4 g/dL    RDW 13 2 11 6 - 15 1 %    Platelets 753 199 - 689 Thousands/uL    MPV 11 9 8 9 - 12 7 fL   Basic metabolic panel   Result Value Ref Range    Sodium 140 136 - 145 mmol/L    Potassium 3 7 3 5 - 5 3 mmol/L    Chloride 108 100 - 108 mmol/L    CO2 26 21 - 32 mmol/L    ANION GAP 6 4 - 13 mmol/L    BUN 16 5 - 25 mg/dL    Creatinine 1 08 0 60 - 1 30 mg/dL    Glucose 155 (H) 65 - 140 mg/dL    Calcium 8 8 8 3 - 10 1 mg/dL    eGFR 76 ml/min/1 73sq m   Magnesium   Result Value Ref Range    Magnesium 2 1 1 6 - 2 6 mg/dL   Phosphorus   Result Value Ref Range    Phosphorus 2 8 2 7 - 4 5 mg/dL   CBC and Platelet   Result Value Ref Range    WBC 10 96 (H) 4 31 - 10 16 Thousand/uL    RBC 4 99 3 88 - 5 62 Million/uL    Hemoglobin 14 8 12 0 - 17 0 g/dL    Hematocrit 43 3 36 5 - 49 3 %    MCV 87 82 - 98 fL    MCH 29 7 26 8 - 34 3 pg    MCHC 34 2 31 4 - 37 4 g/dL    RDW 13 6 11 6 - 15 1 %    Platelets 138 548 - 390 Thousands/uL    MPV 12 7 8 9 - 12 7 fL   Comprehensive metabolic panel   Result Value Ref Range    Sodium 137 136 - 145 mmol/L    Potassium 3 7 3 5 - 5 3 mmol/L    Chloride 107 100 - 108 mmol/L    CO2 24 21 - 32 mmol/L    ANION GAP 6 4 - 13 mmol/L    BUN 21 5 - 25 mg/dL    Creatinine 1 34 (H) 0 60 - 1 30 mg/dL    Glucose 147 (H) 65 - 140 mg/dL    Calcium 8 5 8 3 - 10 1 mg/dL    Corrected Calcium 9 1 8 3 - 10 1 mg/dL    AST 35 5 - 45 U/L    ALT 77 12 - 78 U/L    Alkaline Phosphatase 60 46 - 116 U/L    Total Protein 7 2 6 4 - 8 2 g/dL    Albumin 3 3 (L) 3 5 - 5 0 g/dL    Total Bilirubin 0 82 0 20 - 1 00 mg/dL    eGFR 58 ml/min/1 73sq m   ECG 12 lead   Result Value Ref Range    Ventricular Rate 60 BPM    Atrial Rate 60 BPM    MS Interval 158 ms    QRSD Interval 83 ms    QT Interval 421 ms    QTC Interval 421 ms    P Axis 46 degrees    QRS Axis -75 degrees    T Wave Axis 70 degrees   ECG 12 lead   Result Value Ref Range    Ventricular Rate 75 BPM    Atrial Rate 75 BPM    MS Interval 154 ms    QRSD Interval 83 ms    QT Interval 454 ms    QTC Interval 508 ms    P [place] 53 degrees    QRS Axis -80 degrees    T Wave Axis 62 degrees   ECG 12 lead   Result Value Ref Range    Ventricular Rate 55 BPM    Atrial Rate 55 BPM    MS Interval 152 ms    QRSD Interval 84 ms    QT Interval 456 ms    QTC Interval 436 ms    P Axis 74 degrees    QRS Axis 40 degrees    T Wave Axis 65 degrees   ECG 12 lead   Result Value Ref Range    Ventricular Rate 58 BPM    Atrial Rate 58 BPM    MS Interval 174 ms    QRSD Interval 98 ms    QT Interval 448 ms    QTC Interval 439 ms    P [place] 55 degrees    QRS Axis 27 degrees    T Wave [place] 58 degrees   ECG 12 lead   Result Value Ref Range    Ventricular Rate 56 BPM    Atrial Rate 56 BPM    MS Interval 132 ms    QRSD Interval 96 ms    QT Interval 476 ms    QTC Interval 459 ms    P Axis  degrees    QRS Axis 20 degrees    T Wave Axis -1 degrees   ECG 12 lead   Result Value Ref Range    Ventricular Rate 56 BPM    Atrial Rate 56 BPM    MO Interval 158 ms    QRSD Interval 64 ms    QT Interval 462 ms    QTC Interval 445 ms    P [place] 57 degrees    QRS Axis 79 degrees    T Wave Axis 110 degrees   ECG 12 lead   Result Value Ref Range    Ventricular Rate 55 BPM    Atrial Rate 55 BPM    MO Interval 174 ms    QRSD Interval 88 ms    QT Interval 444 ms    QTC Interval 424 ms    P [place] 57 degrees    QRS Axis 26 degrees    T Wave Axis 66 degrees   Echo complete w/ contrast if indicated   Result Value Ref Range    LA size 4 5 cm    LVPWd 1 40 0 59 - 1 12 cm    Left Atrium Area-systolic Apical Two Chamber 28 6 cm2    Left Atrium Area-systolic Four Chamber 30 cm2    MV E' Tissue Velocity Septal 8 cm/s    IVSd 4 78 cm    LV DIASTOLIC VOLUME (MOD BIPLANE) 2D 142 mL    LEFT VENTRICLE SYSTOLIC VOLUME (MOD BIPLANE) 2D 41 mL    Left ventricular stroke volume (2D) 101 00 mL    A4C EF 63 %    LA length (A2C) 7 00 cm    LVIDd 5 40 25 09 - 37 41 cm    IVS 1 4 0 60 - 1 13 cm    LVIDS 3 20 14 33 - 21 75 cm    FS 41 28 - 44 %    Asc Ao 4 1 2 38 - 3 56 cm    Ao root 3 50 cm    RVID d 4 5 cm    MV valve area p 1/2 method 2 16 cm2    E wave deceleration time 352 ms    AV peak gradient 15 mmHg    MV Peak E Johnny 71 cm/s    MV Peak A Johnny 1 01 m/s    RAA A4C 21 9 cm2    MV stenosis pressure 1/2 time 102 ms    LVSV, 2D 101 mL    Ao asc z-score 3 79     ZLVPWD 4 05     ZLVIDS -13 50     ZLVIDD -17 36     ZIVSD 3 93     LV EF 65    Fingerstick Glucose (POCT)   Result Value Ref Range    POC Glucose 179 (H) 65 - 140 mg/dl   Fingerstick Glucose (POCT)   Result Value Ref Range    POC Glucose 143 (H) 65 - 140 mg/dl   Fingerstick Glucose (POCT)   Result Value Ref Range    POC Glucose 189 (H) 65 - 140 mg/dl   Fingerstick Glucose (POCT)   Result Value Ref Range    POC Glucose 146 (H) 65 - 140 mg/dl   Fingerstick Glucose (POCT)   Result Value Ref Range    POC Glucose 178 (H) 65 - 140 mg/dl   Fingerstick Glucose (POCT)   Result Value Ref Range    POC Glucose 164 (H) 65 - 140 mg/dl   Fingerstick Glucose (POCT)   Result Value Ref Range    POC Glucose 159 (H) 65 - 140 mg/dl   Fingerstick Glucose (POCT)   Result Value Ref Range    POC Glucose 145 (H) 65 - 140 mg/dl   Fingerstick Glucose (POCT)   Result Value Ref Range    POC Glucose 146 (H) 65 - 140 mg/dl   Fingerstick Glucose (POCT)   Result Value Ref Range    POC Glucose 116 65 - 140 mg/dl   Fingerstick Glucose (POCT)   Result Value Ref Range    POC Glucose 134 65 - 140 mg/dl   Fingerstick Glucose (POCT)   Result Value Ref Range    POC Glucose 124 65 - 140 mg/dl   Manual Differential(PHLEBS Do Not Order)   Result Value Ref Range    Segmented % 78 (H) 43 - 75 %    Lymphocytes % 16 14 - 44 %    Monocytes % 2 (L) 4 - 12 %    Eosinophils, % 2 0 - 6 %    Basophils % 2 (H) 0 - 1 %    Absolute Neutrophils 8 82 (H) 1 85 - 7 62 Thousand/uL    Lymphocytes Absolute 1 81 0 60 - 4 47 Thousand/uL    Monocytes Absolute 0 23 0 00 - 1 22 Thousand/uL    Eosinophils Absolute 0 23 0 00 - 0 40 Thousand/uL    Basophils Absolute 0 23 (H) 0 00 - 0 10 Thousand/uL    Total Counted      RBC Morphology Normal     Platelet Estimate Decreased (A) Adequate    Artifact Present        Results from last 7 days   Lab Units 03/10/22  0452 03/09/22  0500   WBC Thousand/uL 10 96* 9 89   HEMOGLOBIN g/dL 14 8 14 9   HEMATOCRIT % 43 3 43 7   PLATELETS Thousands/uL 211 216   POTASSIUM mmol/L 3 7 3 7   CHLORIDE mmol/L 107 108   CO2 mmol/L 24 26   BUN mg/dL 21 16   CREATININE mg/dL 1 34* 1 08   CALCIUM mg/dL 8 5 8 8   MAGNESIUM mg/dL  --  2 1   PHOSPHORUS mg/dL  --  2 8         Radiology review:   chest X-ray    Ultrasound      Portions of the record may have been created with voice recognition software  Occasional wrong word or "sound a like" substitutions may have occurred due to the inherent limitations of voice recognition software  Read the chart carefully and recognize, using context, where substitutions have occurred

## 2022-03-15 NOTE — PATIENT INSTRUCTIONS
1  Medication changes today:   Please stop Procardia short-acting   Please begin Procardia XL 30 mg daily in the morning in its place:  Watch for worsening swelling of your legs and call if you developed any   Begin torsemide 5 mg daily in the morning    2  Please go for non fasting  lab work 1 week exactly after starting the new medication in particular torsemide, please go in the morning approximately 8 am after you have been walking around for an hour to     3  Please take 1 week a blood pressure readings  please begin tomorrow in send in a week a readings: On your left, on an empty bladder (if you feel dizzy or lightheaded please check her blood pressure or if you have a headache)    If your blood pressure is above 160 on the top number please contact me or if the bottom number is over 90 consistently    AS FOLLOWS  MORNING AND EVENING, SITTING AND STANDING as follows:  · TAKE THE MORNING READINGS BEFORE ANY MEDICATIONS AND WHEN YOU ARE RELAXED FOR SEVERAL MINUTES  · TAKE THE EVENING READINGS:  BETWEEN 7-10 P M ; PRIOR TO ANY MEDICATIONS; AT LEAST IN OUR  FROM DINNER; AND CERTAINLY AFTER RELAXING FOR A FEW MINUTES  · PLEASE INCLUDE HEART RATE WITH YOUR BLOOD PRESSURE READINGS  · When taking standing readings, keep your arm supported at heart level and not dangling  · Make sure you are sitting with your back supported and feet on the ground and do not cross your legs or feet  · Make sure you have not taken any coffee or caffeine products or exercised or smoke cigarettes at least 30 minutes before taking your blood pressure  Then please mail these readings into the office    4   Follow-up in 3  months:  Prior to your next appointment purchase an Omron-10 series blood pressure machine through the Internet her [employer] and bring that with you next appointment and use that for your next appointments readings   Please bring in 1 week a blood pressure readings morning evening, sitting and standing is outlined above   PLEASE BRING AN YOUR BLOOD PRESSURE MACHINE TO CORRELATE WITH THE OFFICE MACHINE AT THIS NEXT SCHEDULED VISIT   Please go for fasting lab work 1-2 weeks prior to your appointment      5  General instructions:   AVOID SALT BUT NOT ADDING AN READING LABELS TO MAKE SURE THERE IS LOW-SALT IN THE FOOD THAT YOU ARE EATING  o Goal is less than 2 g of sodium intake or less than 5 g of sodium chloride intake per day     Avoid nonsteroidal anti-inflammatory drugs such as Naprosyn, ibuprofen, Aleve, Advil, Celebrex, Meloxicam (Mobic) etc   You can use Tylenol as needed if you do not have any liver condition to be concerned about     Avoid medications such as Sudafed or decongestants and antihistamines that contained the D component which is the decongestant  You can take antihistamines without the decongestant or D component   Try to avoid medications such as pantoprazole or  Protonix/Nexium or Esomeprazole)/Prilosec or omeprazole/Prevacid or lansoprazole/AcipHex or Rabeprazole  If you are able to, use Pepcid as this is safer for your kidneys   Try to exercise at least 30 minutes 3 days a week to begin with with an ultimate goal of 5 days a week for at least 30 minutes     Try to lose 5-10 lb by your next visit     Please do not drink more than 2 glasses of alcohol/wine on a daily basis as this may contribute to your high blood pressure

## 2022-03-15 NOTE — UTILIZATION REVIEW
Notification of Discharge   This is a Notification of Discharge from our facility [address]  Please be advised that this patient has been discharge from our facility  Below you will find the admission and discharge date and time including the patients disposition  UTILIZATION REVIEW CONTACT:  Jonathan Room  Utilization   Network Utilization Review Department  Phone: [phone] x carefully listen to the prompts  All voicemails are confidential   Email: [email or web address]     PHYSICIAN ADVISORY SERVICES:  FOR [id number] REVIEW - MEDICAL NECESSITY DENIAL  Phone: [phone]  Fax: [phone]  Email: [email or web address]     PRESENTATION DATE: 3/7/2022 11:50 PM  OBERVATION ADMISSION DATE:   INPATIENT ADMISSION DATE: 3/7/22 11:50 PM   DISCHARGE DATE: 3/11/2022  4:33 PM  DISPOSITION: Home/Self Care Home/Self Care      IMPORTANT INFORMATION:  Send all requests for admission clinical reviews, approved or denied determinations and any other requests to dedicated fax number below belonging to the campus where the patient is receiving treatment   List of dedicated fax numbers:  [address] DENIALS (Administrative/Medical Necessity) [phone]   [address] (Maternity/NICU/Pediatrics) [phone]   Felix Seen [phone]   [address] [facility] [phone]   [address] [place] [address] [phone]   [address] [phone]   [facility]  [phone]   [address] [place] [address], [place] [address]11 [address] [phone]

## 2022-03-16 ENCOUNTER — OFFICE VISIT (OUTPATIENT)
Dept: PHYSICAL THERAPY | Facility: CLINIC | Age: 57
End: 2022-03-16
Payer: COMMERCIAL

## 2022-03-16 DIAGNOSIS — R26.89 FUNCTIONAL GAIT ABNORMALITY: Primary | ICD-10-CM

## 2022-03-16 DIAGNOSIS — I62.9 INTRACRANIAL BLEED (HCC): ICD-10-CM

## 2022-03-16 PROCEDURE — 97116 GAIT TRAINING THERAPY: CPT

## 2022-03-16 PROCEDURE — 97112 NEUROMUSCULAR REEDUCATION: CPT

## 2022-03-16 NOTE — TELEPHONE ENCOUNTER
Reviewed patient chart - CT scan scheduled 4/1 at [facility]  Please assist with auth if needed  Thanks!

## 2022-03-16 NOTE — PROGRESS NOTES
Daily Note     Today's date: 3/16/2022  Patient name: Romeo Braxton  : 1965  MRN: [id number]  Referring provider: Rambo Beasley  Dx:   Encounter Diagnosis     ICD-10-CM    1  Functional gait abnormality  R26 89    2  Intracranial bleed (HCC)  I62 9        Start Time: 0800  Stop Time: 0900  Total time in clinic (min): 60 minutes    Subjective: Presents to his OP PT session doing well; BP reportedly 145/85 mmHg at home this morning, walked 6500 steps yesterday  No new c/o fatigue, discomfort, or pain  Objective: See treatment diary below      Assessment: Michael Dickerson participated in his first skilled PT HIGT session focused on improving conditioning, movement quality, and balance integration  Able to reach [facility] for 25% of session; limited by beta-blockers  mRPE of 7/10 sustained for 75% of session  Would continue to benefit from skilled PT intervention to address deficits in balance and gait to maximize overall functional mobility, quality of life, and return as close to PLOF as possible  Plan: Continue per plan of care  Continue HIGT          Short Term Goal Expiration Date:(22)  Long Term Goal Expiration Date: (22)  POC Expiration Date: (22)    Precautions: HTN, CKD III    target heart rate range: 112-136 BPM  Beta Blockers - YES     Today's Date:     Resting HR: 68 BPM    BP Pre exercise: 144/84 mmHg  BP during exercise: 140/80 mmHg  BP post exercise: 140/80 mmHg       Total minutes spent  within 70-85% Max HR:  (jonathan flowsheet)    Description of activity: (treadmill, overground, assist, distance, speed etc) Peak HR observed Peak RPE observed Total Minutes in target zone Total minutes Timed Charges   (Nmr, TE, TA, GT)   TM  2 2 mph x2 min 80 BPM  2 2 mph 3 0% grade x2 min 87 BPM  2 2 mph 5 0% grade x2 min 96 BPM  2 4 mph 5 0% grade x2 min 104 BPM  2 4 mph 7 0% grade x2 min 110 BPM  CD ->1 8 mph 0 0% grade x1 min 108  7/10 0 11 GT   Lunge w/6# MB twist, 4# BL AW x40-50'  Bwd amb w/ HT, 4# BL AW x40-50'  15# bolster carry x40-50'  x15 box squats  x3 rounds total 112 7 5/10 25 30 NMR   FTEO foam, 4# BL AW w/ HT 2x60'  FTEC firm, 4# BL AW w/HT x60' 83 7/10 0 5 NMR   Step Ups, // bars, no UE support  From foam to 8" step, 4# BL AW leading with R LE ascending x15  102 7/10 0 8 NMR                       *Therapeutic rest breaks throughout session as needed due to high intensity interval training per ACSM guidelines for recovery before return to exercise  Vitals monitored, documented and discussed with pt during this time

## 2022-03-17 ENCOUNTER — EVALUATION (OUTPATIENT)
Dept: SPEECH THERAPY | Facility: CLINIC | Age: 57
End: 2022-03-17
Payer: COMMERCIAL

## 2022-03-17 DIAGNOSIS — R48.8 OTHER SYMBOLIC DYSFUNCTIONS: Primary | ICD-10-CM

## 2022-03-17 DIAGNOSIS — I61.9 HEMORRHAGIC STROKE (HCC): ICD-10-CM

## 2022-03-17 PROCEDURE — 92523 SPEECH SOUND LANG COMPREHEN: CPT

## 2022-03-17 PROCEDURE — 96125 COGNITIVE TEST BY HC PRO: CPT

## 2022-03-17 NOTE — PROGRESS NOTES
Daily Speech Treatment Note    Today's date: 3/18/2022  Patients name: Bernardo Arechiga  : 1965  MRN: [id number]  Safety measures: CVA  Referring provider: Prashanth Morales MD     Primary diagnosis/billing code: R47 0  Secondary diagnosis/billing code: I61 9     Visit tracking:  -Referring provider: Epic  -Billing guidelines: AMA  -Visit #2  -Insurance: [address] [facility] due 2022      Subjective/Behavioral:  - Patient was in good spirits at start of session  Objective/Assessment:  -Word finding and Memory strategies were reviewed during today's session and handouts were provided  Word finding homework (fill in the blanks) was given  Short-term goals:  1  Patient will be educated on the use of internal and external memory aids and compensatory strategies with 80% accuracy to facilitate increased recall of routine, personal information, and recent events, to be achieved in 4-6 weeks    Patient was provided with a handout of strategies for recall and strategies were discussed throughout session  2  Patient will complete auditory immediate and short term memory tasks to 80% accuracy to facilitate increased ability to retell narratives and recall information within functional living environment, to be achieved in 4-6 weeks  Patient was read a brief story (large paragraph) about [place]  He was then asked a series of questions about what was just read  - Completed with 90% accuracy  Patient benefited from us of paper and pen for note taking as a strategy for recall      3  Patient will complete complex auditory attention processing tasks (e g , sentence unscramble, ranking numbers/words, etc ) to improve working memory with 80% accuracy, to be achieved in 4-6 weeks      4  Patient will facilitate planning by completing thought organization tasks (e g , sequencing, deduction puzzles, etc ) with 80% accuracy to facilitate increased executive functioning, working memory, problem solving, and processing skills, to be achieved in 4-6 weeks  BITS: [facility]      To target attention and sequencing; patient completed the following tasks:     Trail making (field of 52 - numbers/letters)  Task completed over 1 trial   First trial 80 95% acc (5 min, 23 sec) Reaction 6 33    Motor Reaction time:  Level 1 (Goal is 50 hits in 60 seconds): 49 (trial 1), 45 (trial 2), 55 (trial 3)  Level 2 (Goal is 40 hits in 60 seconds): 45  Level 3 (Goal is 30 hits in 60 seconds): 38  Level 4: 187/194      5  Patient will name an average of 15+ items in a category in 60 seconds over 5 trials using compensatory strategies and min cues to facilitate improved word retrieval skills, to be achieved in 4-6 weeks      6  Patient will name an average of 10+ words that begin with a specific letter in 60 seconds over 5 trials using compensatory strategies and min cues to facilitate improved word retrieval skills, to be achieved in 4-6 weeks      7  Patient will name an appropriate synonym/antonym for a given word with 80% accuracy to build expressive vocabulary for conversation, to be achieved in 4-6 weeks  Patient was provided with a given word (e g  beautiful) and asked to provide 3 synonyms  Patient completed task with 100% accuracy,  with moderate verbal cueing (e g  utilizing strategies of talking through definitions for words and placing them in sentences)       8  Patient will complete word generation tasks (e g , analogies, category matrices, etc ) with 80% accuracy using word finding strategies to facilitate improved word retrieval skills, to be achieved in 4-6 weeks    Patient was educated on word finding strategies  A Word Retrieval Strategies handout was provided        Plan:  -Patient was provided with home exercises/activities to target goals in plan of care at the end of today's session   -Continue with current plan of care

## 2022-03-17 NOTE — PROGRESS NOTES
Speech-Language Pathology Initial Evaluation    Today's date: 3/17/2022  Patients name: Travis Nguyen  : 1965  MRN: [id number]  Safety measures: CVA  Referring provider: Dalia Roberts MD    Primary diagnosis/billing code: O17 2  Secondary diagnosis/billing code: I61 9    Visit tracking:  -Referring provider: Epic  -Billing guidelines: AMA  -Visit #1  -Insurance: [address] [facility] due 2022    Subjective comments: Patient present today's appointment with his wife  How did the patient hear about us? Internal referral      Patient's goal(s): "To find out the areas that I am struggling with and relearn the pathways  I want to learn strategies and get back to the way I was "    Reason for referral: Change in cognitive status and Decreased language skills  Prior functional status: Communication effective and appropriate in all situations  Clinically complex situations: Discharge from SNF or Hospital in the last 30 days    History: Patient is a 64 y o  male who was referred to outpatient skilled Speech Therapy services for a aphasia and cognitive-linguistic evaluation  Per patient, he was at home on a video conference call when he first noted word finding difficulty  He attempted to go to sleep, but woke up feeling "off" and weak on the right side  He was brought to the ER with symptoms, weakness on R, R arm drift, difficulty speaking and R sided facial/leg weakness, and stroke alert was activated  CT head revealed acute left intraparenchymal hemorrhage measuring 3 7 x 2 1 x 2 3 with minimal surrounding vasogenic edema  Pt transferred to [address] [place] [address] and diagnosed w/ intracerebral hemorrhage (ICH) and no surgical intervention is needed at this time  Pt  has a past medical history of Ankle swelling, Arthritis, Asthma, Atrial fibrillation ([facility] [address] ), Atrial fibrillation with RVR ([facility] [address] ) (2017), Chronic kidney disease, Gout, Hypertension, Kidney stone, Night sweats, Seasonal allergies, and Sleep apnea    The speech therapist stated at Pt's  discharge from hospital, "Based on results of the bedside WAB assessment, pt's language function remains grossly intact  Slight difficulty with speech fluency noted in a phone conversation with pt and his wife, however all other interactions were judged to be [facility] [place]  Pt appears to have good awareness of mild deficits  Recommend following up with physicians, if pt has any concerns re: unresolved cognitive-linguistic function upon return home, he may benefit from more in-depth assessment as an outpatient  Pt in agreement "    Today the pt reports complaints of word-finding difficulties  His wife reports concerns with his executive functioning skills and his ability to follow directions  She has concerns about this areas due to her 's high pressure job which requires high levels of executive functioning  [facility]  Vision: [facility] for testing with reading glasses  Home environment/lifestyle: He lives at home with his wife  Highest level of education: College  Vocational status: Full time (works from home for [employer])  Mental status: Alert  Behavior status: Cooperative  Communication modalities: Verbal  Rehabilitation prognosis: Excellent rehab potential to reach and maintain prior level of function    Assessments    The Test of Adult Language - Fourth Edition (TOAL-4) is a standardized, norm-referenced assessment measuring important communicative abilities in spoken and written language  The test in normed on individuals 12:0-24:11  Due to these age restrictions, these standardized scores should not be compared to standard or percentile rank  Objective measures were taken to complete a diagnostic impression and prognosis for this pt  The TOAL-4 has 6 subtests; their results are reported as percentile ranks and scaled scores   The results of the TOAL-4 are generally used for three purposes; (a) to identify adolescents and adults who score significantly below their peers and therefore might need help improving their language proficiency, (b) to determine areas of relative strength and weakness among language abilities, and (c) to serve as a research tool in studies investigating language problems in adolescents and adults  Due to time constraints, only portions of the standardized assessment were administered on this date of service      Subtest: Raw Score:   1  Word Opposites 25/34   3  Spoken Analogies 20/26   4  Word Similarities 20/40     *Pt scored average for this assessment; however, due to his his age and level of education I would expect higher scores on these subtests  The Repeatable Battery for the Assessment of Neuropsychological Status (RBANS) is a brief, individually-administered assessment which measures attention, language, visuospatial/constructional abilities, and immediate & delayed memory  The RBANS is intended for use with adolescents to adults, ages 15 to 80 years  The following results were obtained during the administration of the assessment  Form: A    Cognitive Domain/Subtest: Index Score: Percentile Rank: Classification:   IMMEDIATE MEMORY 83 13%ile LOW AVERAGE        1  List Learning (21/40)        2  Story Memory (16/24)       VISUOSPATIAL/  CONSTRUCTIONAL 84 14%ile LOW AVERAGE        3  Figure Copy (16/20)        4  Line Orientation (16/20)       LANGUAGE 79 8%ile BORDERLINE        5  Picture Naming (10/10)        6  Semantic Fluency (11/40)       ATTENTION 85 16%ile LOW AVERAGE        7  Digit Span (10/16)        8  Coding (31/89)       DELAYED MEMORY 99 47%ile AVERAGE        9  List Recall (3/10)        10  List Recognition (20/20)        11  Story Recall (9/12)        12  Figure Recall (16/20)         Sum of Index Scores:  430   Total Score:  81   Percentile: 10%ile   Classification: LOW AVERAGE       *Patient named 11 concrete category members (Fruits and vegetables) in 60 sec (norm=15+)   -- BELOW AVERAGE    *Patient named 7 abstract category members (words beginning with letter 'B') in 60 sec (norm=10+)  -- BELOW AVERAGE      Goals    Short-term goals:  1  Patient will be educated on the use of internal and external memory aids and compensatory strategies with 80% accuracy to facilitate increased recall of routine, personal information, and recent events, to be achieved in 4-6 weeks      2  Patient will complete auditory immediate and short term memory tasks to 80% accuracy to facilitate increased ability to retell narratives and recall information within functional living environment, to be achieved in 4-6 weeks      3  Patient will complete complex auditory attention processing tasks (e g , sentence unscramble, ranking numbers/words, etc ) to improve working memory with 80% accuracy, to be achieved in 4-6 weeks      4  Patient will facilitate planning by completing thought organization tasks (e g , sequencing, deduction puzzles, etc ) with 80% accuracy to facilitate increased executive functioning, working memory, problem solving, and processing skills, to be achieved in 4-6 weeks      5  Patient will name an average of 15+ items in a category in 60 seconds over 5 trials using compensatory strategies and min cues to facilitate improved word retrieval skills, to be achieved in 4-6 weeks  6  Patient will name an average of 10+ words that begin with a specific letter in 60 seconds over 5 trials using compensatory strategies and min cues to facilitate improved word retrieval skills, to be achieved in 4-6 weeks  7  Patient will name an appropriate synonym/antonym for a given word with 80% accuracy to build expressive vocabulary for conversation, to be achieved in 4-6 weeks  8  Patient will complete word generation tasks (e g , analogies, category matrices, etc ) with 80% accuracy using word finding strategies to facilitate improved word retrieval skills, to be achieved in 4-6 weeks             Long-term goals:  1   Patient will demonstrate cognitive-communication skills consistent with age and education given use of compensatory strategies when needed to resume baseline activities and responsibilities in home, community, and work/school settings by discharge       2  Patient will complete cognitive-linguistic therapy that addresses patients specific deficits in processing speed, short-term working memory, attention to detail, monitoring, sequencing, and organization skills, with instruction, to alleviate effects of executive functioning disorder deficits by discharge  3  Patient will utilize word finding strategies during semantic feature analysis treatment activity for improved naming and verbal expression skills         Impressions/Recommendations    Impressions:   -Patient presents with a mild cognitive-linguistic impairment s/p CVA  Testing today revealed he has borderline deficits in language, and low average deficits in immediate memory, visuospatial, and attention skills post CVA  The patient has average skills in word opposites, spoken analogies, and word similarities  The TOAL-4 in normed on individuals 12:0-24:11  Due to these age restrictions, these standardized scores should not be compared to standard or percentile rank  Objectively an adult with his level of education and occupation, that he would score above average on these subtests  The patient stated the he has difficulty with word finding and with his cognition  He wants to work on the deficits and get better  He stated that he is good at masking his word finding, but he is afraid he will struggle talking for work  His wife helps him by giving him clues for when he cannot think of a word, but she is unable to do this while he is working  He wants to learn strategies for when he is working  Patient would benefit from outpatient skilled Speech Therapy services to promote increased communication success, and his executive functioning skills   Patient has strong family support and excellent motivation to get better      Recommendations:  -Patient would benefit from outpatient skilled Speech Therapy services: Speech-language therapy and Cognitive-linguistic therapy    -Frequency: 2x weekly  -Duration: 4-6 weeks    -Intervention certification from: 8/52/2410  -Intervention certification to: 9/84/0377    -Intervention comments:   Initial evaluation/administration of standardized test with patient (60 minutes)  Scoring and interpretation of standardized test for the development of POC (60 minutes)

## 2022-03-18 ENCOUNTER — OFFICE VISIT (OUTPATIENT)
Dept: PHYSICAL THERAPY | Facility: CLINIC | Age: 57
End: 2022-03-18
Payer: COMMERCIAL

## 2022-03-18 ENCOUNTER — OFFICE VISIT (OUTPATIENT)
Dept: SPEECH THERAPY | Facility: CLINIC | Age: 57
End: 2022-03-18
Payer: COMMERCIAL

## 2022-03-18 ENCOUNTER — OFFICE VISIT (OUTPATIENT)
Dept: OCCUPATIONAL THERAPY | Facility: CLINIC | Age: 57
End: 2022-03-18
Payer: COMMERCIAL

## 2022-03-18 DIAGNOSIS — I62.9 INTRACRANIAL BLEED (HCC): ICD-10-CM

## 2022-03-18 DIAGNOSIS — I61.9 HEMORRHAGIC STROKE (HCC): ICD-10-CM

## 2022-03-18 DIAGNOSIS — R26.89 FUNCTIONAL GAIT ABNORMALITY: Primary | ICD-10-CM

## 2022-03-18 DIAGNOSIS — R48.8 OTHER SYMBOLIC DYSFUNCTIONS: Primary | ICD-10-CM

## 2022-03-18 DIAGNOSIS — I62.9 INTRACRANIAL BLEED (HCC): Primary | ICD-10-CM

## 2022-03-18 PROCEDURE — 92507 TX SP LANG VOICE COMM INDIV: CPT

## 2022-03-18 PROCEDURE — 97530 THERAPEUTIC ACTIVITIES: CPT

## 2022-03-18 PROCEDURE — 97112 NEUROMUSCULAR REEDUCATION: CPT

## 2022-03-18 PROCEDURE — 97110 THERAPEUTIC EXERCISES: CPT

## 2022-03-18 PROCEDURE — 97116 GAIT TRAINING THERAPY: CPT

## 2022-03-18 NOTE — PROGRESS NOTES
Daily Speech Treatment Note    Today's date: 3/23/2022  Patients name: Nidia Saba  : 1965  MRN: [id number]  Safety measures: CVA  Referring provider: Paulie Ji MD     Primary diagnosis/billing code: H89 2  Secondary diagnosis/billing code: I61 9     Visit tracking:  -Referring provider: Epic  -Billing guidelines: AMA  -Visit #3  -Insurance: [address] [facility]8W due 2022      Subjective/Behavioral:  - Patient returned to work on Monday  He is easing into work, averaging 5 to 6 hours a day  Utilizing strategies provided to him in therapy and completing tasks well  Objective/Assessment:  -Reviewed patient's home exercises/activities completed since last appointment  Completed fill in the blank word categories with 100% accuracy  Short-term goals:  1  Patient will be educated on the use of internal and external memory aids and compensatory strategies with 80% accuracy to facilitate increased recall of routine, personal information, and recent events, to be achieved in 4-6 weeks      2  Patient will complete auditory immediate and short term memory tasks to 80% accuracy to facilitate increased ability to retell narratives and recall information within functional living environment, to be achieved in 4-6 weeks  3  Patient will complete complex auditory attention processing tasks (e g , sentence unscramble, ranking numbers/words, etc ) to improve working memory with 80% accuracy, to be achieved in 4-6 weeks          4  Patient will facilitate planning by completing thought organization tasks (e g , sequencing, deduction puzzles, etc ) with 80% accuracy to facilitate increased executive functioning, working memory, problem solving, and processing skills, to be achieved in 4-6 weeks  Patient completed a math worksheet with 80% accuracy, requiring moderate cueing  Patient required 3 points of redirection  He easily corrected his mistakes and was able to follow symbols and math challenges well  5  Patient will name an average of 15+ items in a category in 60 seconds over 5 trials using compensatory strategies and min cues to facilitate improved word retrieval skills, to be achieved in 4-6 weeks        6  Patient will name an average of 10+ words that begin with a specific letter in 60 seconds over 5 trials using compensatory strategies and min cues to facilitate improved word retrieval skills, to be achieved in 4-6 weeks    Patient was able name up to 7-14 words for each given letter  Task completed with 90% accuracy  Letters targeted: Emigdio Jaime, S, M and P    7  Patient will name an appropriate synonym/antonym for a given word with 80% accuracy to build expressive vocabulary for conversation, to be achieved in 4-6 weeks          8  Patient will complete word generation tasks (e g , analogies, category matrices, etc ) with 80% accuracy using word finding strategies to facilitate improved word retrieval skills, to be achieved in 4-6 weeks  To target higher level generative naming, patient completed MideoMe activity/game using multiple word and letter restrictions (i e , word associated with sports, contains the letter T, is exactly 2 syllables)  Patient completed using red and yellow cards/restrictions over 25 trials with 100% acc , requiring minimal verbal cueing          Plan:  -Patient was provided with home exercises/activities to target goals in plan of care at the end of today's session   -Continue with current plan of care

## 2022-03-18 NOTE — PROGRESS NOTES
Occupational Therapy Daily Note:    Today's date: 3/18/2022  Patient name: Josephine Schmitz  : 1965  MRN: [id number]  Referring provider: Suzette Dominguez  Dx:   Encounter Diagnosis   Name Primary?  Intracranial bleed (HCC) Yes       Subjective: "Things aren't bad from my stroke but I'm not the same"  Objective: Pt engaged in skilled OT treatment session with focus on NMRE, proximal strength/stabilization, GMS/GMC, FMC/FMS and mixed prehension patterns improving motor skills to increase indep in ADL/IADL's and successful return to worker role  CPT Code Minutes                                           Task Details        Therapeutic Activity  Pt educated on functional coordination HEP focusing on BL integration, B coordination, dexterity and flexibility of R hand for increased precision and accuracy in home environment and worker role  Neuro Re-Ed  Pt engaged in ball bounce in stance focusing on coordination in dynamic stance initiating task with single tennis ball bounce unilateral grading up to incorporating BUE grading to small super ball  Improvement noted in coordination of unilateral and bilateral coordination w/grading of activity w/no noted LOB  Therapeutic Exercise  Pt tolerated UEB for 5 min prograde/5 min retrograde on L2 0 resistance to address proximal strength, gross grasp and endurance  Theraputty Exercises   Patient educated and instructed on HEP for FMS/FMC with use of green resist theraputty to inc indep with ADL fxn including container management, fastener management, and item retrieval  Pt provided with demonstration and verbal instruction and demo G understanding of task   Exercises instructed as followed:  · Putty Squeezes - 10 reps - 3 sets - 1x daily - 3x weekly  · Tip Pinch with Putty - 10 reps - 3 sets - 1x daily - 3x weekly  · Finger Lumbricals with Putty - 10 reps - 3 sets - 1x daily - 3x weekly  · 3-Point Pinch with Putty - 10 reps - 3 sets - 1x daily - 3x weekly  · Finger Key  with Putty - 10 reps - 3 sets - 1x daily - 3x weekly  · Thumb Opposition with Putty - 10 reps - 3 sets - 1x daily - 3x weekly               Manual          Self Care           Assessment: Tolerated treatment well  Pt completed 5 reps of each exercise demo G carryover  Pt reported fatigue post session  Pt presented with G uncerstanding of fxl coordination exercises as evidenced by engagement in demonstration of activities  Patient would benefit from continued skilled OT      Plan: Continued skilled OT per POC    INTERVENTION COMMENTS:  Diagnosis: Intracranial bleed ([facility] [address] ) [I62 9]  Precautions: HTN  FOTO: to complete  Insurance: Payor: Hanna Fischerchure / Plan: Hanna Brochure / Product Type: HMO Commercial /   2 of [facility] visits, PN due 4/15/22

## 2022-03-18 NOTE — PROGRESS NOTES
Daily Note     Today's date: 3/18/2022  Patient name: Lroena Franks  : 1965  MRN: [id number]  Referring provider: Alejandra Angeles  Dx:   Encounter Diagnosis     ICD-10-CM    1  Functional gait abnormality  R26 89    2  Intracranial bleed (HCC)  I62 9        Start Time: 0900  Stop Time: 1000  Total time in clinic (min): 60 minutes    Subjective: Presents to his OP PT session doing well; felt great after his first session  No new c/o fatigue, discomfort, or pain  Objective: See treatment diary below      Assessment: Abel Ojeda participated in his first skilled PT HIGT session focused on improving conditioning, movement quality, and balance integration  mRPE of 7-8/10 sustained for more than 75% of session  Would continue to benefit from skilled PT intervention to address deficits in balance and gait to maximize overall functional mobility, quality of life, and return as close to PLOF as possible  Plan: Continue per plan of care  Continue HIGT          Short Term Goal Expiration Date:(22)  Long Term Goal Expiration Date: (22)  POC Expiration Date: (22)    Precautions: HTN, CKD III    target heart rate range: 112-136 BPM  Beta Blockers - YES     Today's Date:     Resting HR: 62 BPM    BP Pre exercise: 136/80 mmHg  BP during exercise: 140/80 mmHg  BP post exercise:  144/80 mmHg       Total minutes spent  within 70-85% Max HR:  (jonathan flowsheet)    Description of activity: (treadmill, overground, assist, distance, speed etc) Peak HR observed Peak RPE observed Total Minutes in target zone Total minutes Timed Charges   (Nmr, TE, TA, GT)   Foam beams: 4# BL AW  x2 laps lat   x3 laps lat w/HT  x2 laps lat w/HN 90 8 - 10 NMR   W/u: Fast amb hallway 4# BL AW  100' 28"  100' 22"  100' 20"  100' 18"     C/D: TM no UE  2 2 mph, 3% grade x8 min 94 7 - 12 GT   Carioca/graprevine hallway 4# BL AW  x300' 90 7 - 10 NMR   Agility Ladder:  Lat in/in/out/out FAST 4# BL AW  Overshoulder bolster drop x4 alt shoulders  x3 rounds     Fwd in/in/out/out FAST 4# BL AW  Box squats x15  x3 rounds 114 8 4 15 NMR   HKM: Reciprocal UE swing hallway 4# BL AW focus on LE triple extension  x300'   102 7 - 10 NMR       *Therapeutic rest breaks throughout session as needed due to high intensity interval training per ACSM guidelines for recovery before return to exercise  Vitals monitored, documented and discussed with pt during this time

## 2022-03-21 ENCOUNTER — OFFICE VISIT (OUTPATIENT)
Dept: OCCUPATIONAL THERAPY | Facility: CLINIC | Age: 57
End: 2022-03-21
Payer: COMMERCIAL

## 2022-03-21 ENCOUNTER — NURSE TRIAGE (OUTPATIENT)
Dept: OTHER | Facility: OTHER | Age: 57
End: 2022-03-21

## 2022-03-21 ENCOUNTER — TELEPHONE (OUTPATIENT)
Dept: OTHER | Facility: HOSPITAL | Age: 57
End: 2022-03-21

## 2022-03-21 ENCOUNTER — TELEPHONE (OUTPATIENT)
Dept: FAMILY MEDICINE CLINIC | Facility: CLINIC | Age: 57
End: 2022-03-21

## 2022-03-21 ENCOUNTER — OFFICE VISIT (OUTPATIENT)
Dept: PHYSICAL THERAPY | Facility: CLINIC | Age: 57
End: 2022-03-21
Payer: COMMERCIAL

## 2022-03-21 DIAGNOSIS — E11.649 UNCONTROLLED TYPE 2 DIABETES MELLITUS WITH HYPOGLYCEMIA WITHOUT COMA (HCC): Primary | ICD-10-CM

## 2022-03-21 DIAGNOSIS — R26.89 FUNCTIONAL GAIT ABNORMALITY: Primary | ICD-10-CM

## 2022-03-21 DIAGNOSIS — I62.9 INTRACRANIAL BLEED (HCC): ICD-10-CM

## 2022-03-21 DIAGNOSIS — I62.9 INTRACRANIAL BLEED (HCC): Primary | ICD-10-CM

## 2022-03-21 PROCEDURE — 97530 THERAPEUTIC ACTIVITIES: CPT

## 2022-03-21 PROCEDURE — 97110 THERAPEUTIC EXERCISES: CPT

## 2022-03-21 PROCEDURE — 97112 NEUROMUSCULAR REEDUCATION: CPT

## 2022-03-21 NOTE — TELEPHONE ENCOUNTER
Regarding: /104, hx of CVA two weeks ago  ----- Message from Silas Osuna sent at 3/21/2022  7:15 PM EDT -----  "My blood pressure is 176/104, HR is 62 (resting)   I was told to call if it was over a certain reading, I just had a stroke 2 weeks ago "

## 2022-03-21 NOTE — TELEPHONE ENCOUNTER
I called the patient and wife and we spoke over the phone  7 pm BP today: 176/104 sitting 155/85 standing  Home cuff runs 12 points higher than office cuff  BP has been usually 140s to 160s/80s to 90s at 7 pm      Current medication: Torsemide 5 mg daily  Olmesartan 40 mg daily  Nebivolol 2 5 mg daily  Hydralazine 50 mg twice a day  Clonidine patch  Procardia XL 30 mg daily    He took 7 pm dose of Hydralazine after BP readings taken  Plan:   Advised to check BP in 1 hour    if SBP >160 mm Hg, then take extra 50 mg of Hydralazine   Starting tomorrow, increase Hydralazine to 50 mg TID

## 2022-03-21 NOTE — PROGRESS NOTES
Occupational Therapy Daily Note:    Today's date: 3/21/2022  Patient name: Cristobal Rod  : 1965  MRN: [id number]  Referring provider: Gay Corral*  Dx:   Encounter Diagnosis   Name Primary?  Intracranial bleed (HCC) Yes       Subjective: "I have my first partial day of work today"  Objective: Pt engaged in skilled OT treatment session with focus on NMRE, proximal strength/stabilization, GMS/GMC, FMC/FMS and mixed prehension patterns improving motor skills to increase indep in ADL/IADL's and successful return to worker role  CPT Code Minutes                                           Task Details        Therapeutic Activity 20 Pt engaged in fine motor coordination and targeting activity with focus on fxnl proprioceptive skills, hand to target accuracy, and in hand manipulation skills to improve fxnl coordination and accuracy for refined container management and object manipulation  Pt required to target small mushroom pegs to dowel top and thread pony bead to mushroom peg while increasing focus and attention to R UE and grading pressure appropriately  Pt completed with 50% droppage that improved when cued to dec speed of task  Pt then required to remove pegs and beads, unthread within palm and complete palm to finger translation to place to target  Droppage 25% of the time  Neuro Re-Ed               Therapeutic Exercise 15 For proximal upper extremity strengthening and endurance benefit, Pt tolerated 6 min x 2 (prograde/retrograde motions at 3 0 resist) seated on UBE with focus on increasing proximal UE strength, endurance, act tolerance and ROM to inc indep and safety with ADL/IADL fxn and fxnl reaching tasks  Brief period of rest to manage fatigue  25 Pt engaged in large and small dowel placement with use of calibrated hand gripper positioned on 3rd setting to inc gross grasp strength, intrinsic strength and fxnl targeting  Pt with droppage 10% of the time   Pt completed to place and remove from dowel board target  Rest breaks to manage fatigue  Pt removing narrow pegs from large pegs with use of mod resist clothes pin and refined pincer grasp  Manual          Self Care           Assessment: Tolerated treatment well  Pt demonstrates increased droppage with in hand manipulation and fine motor coordination task demands  Pt utilized resistive tools effectively however with increased droppage and impaired in hand manipulation skills that limit accuracy with refined tasks    Patient would benefit from continued skilled OT      Plan: Continued skilled OT per POC    INTERVENTION COMMENTS:  Diagnosis: Intracranial bleed ([facility] [address] ) [I62 9]  Precautions: HTN  FOTO: to complete  Insurance: Payor: Dread Mackenzie / Plan: Dread Mackenzie / Product Type: HMO Commercial /   3 of 12 (visits [facility]) visits, PN due 4/15/22

## 2022-03-21 NOTE — TELEPHONE ENCOUNTER
Left message for patient to return call  We received paperwork from [address] in regards to c-pap machine  Dr Jeet Dennis wanted to verify that he needed a new machine

## 2022-03-21 NOTE — TELEPHONE ENCOUNTER
Pt and wife called in stating pt's blood pressure is 176/104 and was told to call in right away if his blood pressure goes over 160  Pt had a ICH two weeks ago due to his HTN  Pt denies any neurological sx's at this time  He has not missed any doses of medications either  Pt is on a few different blood pressure medications as well to help control his B/P  How should I advise  Per on call provider, he will call pt to speak with him  Phone number provided

## 2022-03-21 NOTE — TELEPHONE ENCOUNTER
[address] [facility]  Duc Briscoe, PharmD, BCPS, BCACP     Reason for Outreach: Patient referred to clinical pharmacist by Rosanna Mercado PA-C for disease management and medication education  First outreach attempt to patient to introduce services and schedule an appointment  Patient unavailable at time of call  Left voicemail with contact information for return call   Will follow-up

## 2022-03-21 NOTE — TELEPHONE ENCOUNTER
Reason for Disposition   Systolic BP  >= 190 OR Diastolic >= 285    Answer Assessment - Initial Assessment Questions  1  BLOOD PRESSURE: "What is the blood pressure?" "Did you take at least two measurements 5 minutes apart?"      176/104 62 HR   2  ONSET: "When did you take your blood pressure?"      Taken about 20mins ago   3  HOW: "How did you obtain the blood pressure?" (e g , visiting nurse, automatic home BP monitor)     Automatic home BP Cuff  4  HISTORY: "Do you have a history of high blood pressure?"      Yes, Hx of HTN and having a Hemorraghic stroke two weeks ago due to Blood pressure   5  MEDICATIONS: "Are you taking any medications for blood pressure?" "Have you missed any doses recently?"       6   OTHER SYMPTOMS: "Do you have any symptoms?" (e g , headache, chest pain, blurred vision, difficulty breathing, weakness)      Denies    Protocols used: HIGH BLOOD PRESSURE-ADULT-AH

## 2022-03-21 NOTE — PROGRESS NOTES
Daily Note     Today's date: 3/21/2022  Patient name: Janeen Velasquez  : 1965  MRN: [id number]  Referring provider: Isai Joe  Dx:   Encounter Diagnosis     ICD-10-CM    1  Functional gait abnormality  R26 89    2  Intracranial bleed (HCC)  I62 9        Start Time: 0800  Stop Time: 0900  Total time in clinic (min): 60 minutes    Subjective: Presents to his OP PT session doing well; no new c/o fatigue, discomfort, or pain  Reports BP was asystolically very high this weekend; 170 yesterday  I recommended he follow up with cardiology following PT today  Objective: See treatment diary below      Assessment: Nuris Scott participated in his skilled PT session focused on improving balance integration  Session focused more on detailed vestibular and somatosensory integration interventions with adaptations to HEP with safe and effective intensity progressions  Would continue to benefit from skilled PT intervention to address deficits in balance and gait to maximize overall functional mobility, quality of life, and return as close to PLOF as possible  Plan: Continue per plan of care  Continue HIGT          Short Term Goal Expiration Date:(22)  Long Term Goal Expiration Date: (22)  POC Expiration Date: (22)    Precautions: HTN, CKD III    target heart rate range: 112-136 BPM  Beta Blockers - YES     Today's Date:     Resting HR: 60 BPM    BP Pre exercise: 144/80 mmHg  BP during exercise: 140/80 mmHg  BP post exercise:  144/80 mmHg       Total minutes spent  within 70-85% Max HR:  (jonathan flowsheet)    Description of activity: (treadmill, overground, assist, distance, speed etc) Peak HR observed Peak RPE observed Total Minutes in target zone Total minutes Timed Charges   (Nmr, TE, TA, GT)   FTEC w/HT  2x1'  HEP    FTEC w/HN  1x1'  HEP    Foam FAEC w/HT  10 in x30"  8 in x30"  6 in x30"  4 in x30"  2 in x30"  FT x30"    TM:  No UE  2 2 mph  HT 30" on/off  x12 min    Hallway:  360* turns  50' x4 rounds ea    UL UE lettered ball juggle w/VOR Cx  Hallway 3x100'                                                      *Therapeutic rest breaks throughout session as needed due to high intensity interval training per ACSM guidelines for recovery before return to exercise  Vitals monitored, documented and discussed with pt during this time

## 2022-03-21 NOTE — PROGRESS NOTES
Daily Speech Treatment Note    Today's date: 3/28/2022  Patients name: Gene Saldaña  : 1965  MRN: [id number]  Safety measures: CVA  Referring provider: Leslie Bowen MD     Primary diagnosis/billing code: V92 7  Secondary diagnosis/billing code: I61 9     Visit tracking:  -Referring provider: Epic  -Billing guidelines: AMA  -Visit # Gregg Band expires 2022**)  -Insurance: [address] [facility]8W due 2022    Subjective/Behavioral:  -Patient with no concerns upon presentation to today's appointment  Objective/Assessment:  -Reviewed patient's home exercises/activities completed since last appointment  Patient accidently forgot to bring along his HEP worksheet to today's appointment  Patient indicated that it was simple  Short-term goals:  1  Patient will be educated on the use of internal and external memory aids and compensatory strategies with 80% accuracy to facilitate increased recall of routine, personal information, and recent events, to be achieved in 4-6 weeks  2  Patient will complete auditory immediate and short term memory tasks to 80% accuracy to facilitate increased ability to retell narratives and recall information within functional living environment, to be achieved in 4-6 weeks   -Word/mental picture association (part/whole coding & category coding): Patient was asked to code words based on associations (e g , When I say Mammie Dunks, you say RADIO  )  Patient was presented with words and asked to code them in the order  On an immediate recall task, patient recalled 20/20 words  Then, another activity (see below) was used to distract patient for 15 minutes  On a 15-min delayed recall task, patient recalled 20/20 words using the strategy of word/mental picture associations      -Word/mental picture association (mixed associations (level 1) coding): Patient was asked to code words based on associations (e g , When I say RED, you say STOP SIGN  )   Patient was presented with words and asked to code them in the order  On an immediate recall task, patient recalled 20/20 words  Then, another activity (see below) was used to distract patient for another 15 minutes  On a 15-min delayed recall task, patient recalled 20/20 words using the strategy of word/mental picture associations      -Short-term recall: Patient recalled 40/40 words at the end of session  3  Patient will complete complex auditory attention processing tasks (e g , sentence unscramble, ranking numbers/words, etc ) to improve working memory with 80% accuracy, to be achieved in 4-6 weeks      4  Patient will facilitate planning by completing thought organization tasks (e g , sequencing, deduction puzzles, etc ) with 80% accuracy to facilitate increased executive functioning, working memory, problem solving, and processing skills, to be achieved in 4-6 weeks  DISTRACTION TASK:    -Attention/reasoning/working memory (Compass Activity, Day ): Patient was presented with a series of numbers/directional terms (e g , 4NW, 11S, 2NE)  Patient shaded in grid blocks based upon directional terms to discover a secret word  Task completed with 1 error  Lost spot x1, but was able to independently locate where he left off with double checking his work  5  Patient will name an average of 15+ items in a category in 60 seconds over 5 trials using compensatory strategies and min cues to facilitate improved word retrieval skills, to be achieved in 4-6 weeks      6  Patient will name an average of 10+ words that begin with a specific letter in 60 seconds over 5 trials using compensatory strategies and min cues to facilitate improved word retrieval skills, to be achieved in 4-6 weeks      7  Patient will name an appropriate synonym/antonym for a given word with 80% accuracy to build expressive vocabulary for conversation, to be achieved in 4-6 weeks      8   Patient will complete word generation tasks (e g , analogies, category matrices, etc ) with 80% accuracy using word finding strategies to facilitate improved word retrieval skills, to be achieved in 4-6 weeks   -Word finding: Patient played AnoBioSTL card game to target speed of naming/processing based upon category labels (e g , flower, pop band, candy bar)  Level 7 cards utilized  Patient named an average of 18 cards/min (norm 10+)  Clinician educated patient on strategies to assist with thought organization and word retrieval speed (circumlocution)      Plan:  -Patient was provided with home exercises/activities to target goals in plan of care at the end of today's session   -Continue with current plan of care

## 2022-03-23 ENCOUNTER — OFFICE VISIT (OUTPATIENT)
Dept: PHYSICAL THERAPY | Facility: CLINIC | Age: 57
End: 2022-03-23
Payer: COMMERCIAL

## 2022-03-23 ENCOUNTER — OFFICE VISIT (OUTPATIENT)
Dept: SPEECH THERAPY | Facility: CLINIC | Age: 57
End: 2022-03-23
Payer: COMMERCIAL

## 2022-03-23 ENCOUNTER — APPOINTMENT (OUTPATIENT)
Dept: LAB | Facility: CLINIC | Age: 57
End: 2022-03-23
Payer: COMMERCIAL

## 2022-03-23 ENCOUNTER — OFFICE VISIT (OUTPATIENT)
Dept: OCCUPATIONAL THERAPY | Facility: CLINIC | Age: 57
End: 2022-03-23
Payer: COMMERCIAL

## 2022-03-23 DIAGNOSIS — E55.9 VITAMIN D DEFICIENCY: ICD-10-CM

## 2022-03-23 DIAGNOSIS — I62.9 INTRACRANIAL BLEED (HCC): ICD-10-CM

## 2022-03-23 DIAGNOSIS — I62.9 INTRACRANIAL BLEED (HCC): Primary | ICD-10-CM

## 2022-03-23 DIAGNOSIS — E78.5 DYSLIPIDEMIA: ICD-10-CM

## 2022-03-23 DIAGNOSIS — I61.9 HEMORRHAGIC STROKE (HCC): ICD-10-CM

## 2022-03-23 DIAGNOSIS — R26.89 FUNCTIONAL GAIT ABNORMALITY: Primary | ICD-10-CM

## 2022-03-23 DIAGNOSIS — E66.01 CLASS 3 SEVERE OBESITY DUE TO EXCESS CALORIES WITH SERIOUS COMORBIDITY AND BODY MASS INDEX (BMI) OF 40.0 TO 44.9 IN ADULT (HCC): ICD-10-CM

## 2022-03-23 DIAGNOSIS — I12.9 HYPERTENSIVE CHRONIC KIDNEY DISEASE WITH STAGE 1 THROUGH STAGE 4 CHRONIC KIDNEY DISEASE, OR UNSPECIFIED CHRONIC KIDNEY DISEASE: ICD-10-CM

## 2022-03-23 DIAGNOSIS — N18.31 STAGE 3A CHRONIC KIDNEY DISEASE (HCC): ICD-10-CM

## 2022-03-23 DIAGNOSIS — I10 ACCELERATED HYPERTENSION: ICD-10-CM

## 2022-03-23 DIAGNOSIS — R48.8 OTHER SYMBOLIC DYSFUNCTIONS: Primary | ICD-10-CM

## 2022-03-23 LAB
ALBUMIN SERPL BCP-MCNC: 3.8 G/DL (ref 3.5–5)
ALP SERPL-CCNC: 59 U/L (ref 46–116)
ALT SERPL W P-5'-P-CCNC: 79 U/L (ref 12–78)
ANION GAP SERPL CALCULATED.3IONS-SCNC: 2 MMOL/L (ref 4–13)
AST SERPL W P-5'-P-CCNC: 48 U/L (ref 5–45)
BILIRUB SERPL-MCNC: 0.53 MG/DL (ref 0.2–1)
BUN SERPL-MCNC: 17 MG/DL (ref 5–25)
CALCIUM SERPL-MCNC: 9.2 MG/DL (ref 8.3–10.1)
CHLORIDE SERPL-SCNC: 107 MMOL/L (ref 100–108)
CO2 SERPL-SCNC: 31 MMOL/L (ref 21–32)
CREAT SERPL-MCNC: 1.46 MG/DL (ref 0.6–1.3)
CREAT UR-MCNC: 41 MG/DL
GFR SERPL CREATININE-BSD FRML MDRD: 53 ML/MIN/1.73SQ M
GLUCOSE P FAST SERPL-MCNC: 145 MG/DL (ref 65–99)
POTASSIUM SERPL-SCNC: 4.6 MMOL/L (ref 3.5–5.3)
PROT SERPL-MCNC: 7.9 G/DL (ref 6.4–8.2)
PROT UR-MCNC: <6 MG/DL
PROT/CREAT UR: <0.15 MG/G{CREAT} (ref 0–0.1)
SODIUM SERPL-SCNC: 140 MMOL/L (ref 136–145)

## 2022-03-23 PROCEDURE — 36415 COLL VENOUS BLD VENIPUNCTURE: CPT | Performed by: INTERNAL MEDICINE

## 2022-03-23 PROCEDURE — 80053 COMPREHEN METABOLIC PANEL: CPT | Performed by: INTERNAL MEDICINE

## 2022-03-23 PROCEDURE — 92507 TX SP LANG VOICE COMM INDIV: CPT

## 2022-03-23 PROCEDURE — 97112 NEUROMUSCULAR REEDUCATION: CPT

## 2022-03-23 PROCEDURE — 97110 THERAPEUTIC EXERCISES: CPT

## 2022-03-23 PROCEDURE — 82570 ASSAY OF URINE CREATININE: CPT | Performed by: INTERNAL MEDICINE

## 2022-03-23 PROCEDURE — 97116 GAIT TRAINING THERAPY: CPT

## 2022-03-23 PROCEDURE — 82088 ASSAY OF ALDOSTERONE: CPT

## 2022-03-23 PROCEDURE — 97530 THERAPEUTIC ACTIVITIES: CPT

## 2022-03-23 PROCEDURE — 84244 ASSAY OF RENIN: CPT

## 2022-03-23 PROCEDURE — 84156 ASSAY OF PROTEIN URINE: CPT | Performed by: INTERNAL MEDICINE

## 2022-03-23 NOTE — PROGRESS NOTES
Occupational Therapy Daily Note:    Today's date: 3/23/2022  Patient name: Mary Stakr  : 1965  MRN: [id number]  Referring provider: Geetha Wild*  Dx:   No diagnosis found  Subjective: "This isn't as easy as it looks"  Objective: Pt engaged in skilled OT treatment session with focus on NMRE,  proximal strength/stabilization and mixed prehension patterns improving motor skills to increase indep in ADL/IADL's    CPT Code Minutes                                           Task Details        Therapeutic Activity 25 Pt engaged in therapeutic activity to improve functional performance of fm skills and fxl fwd and OH reach focusing on sustained pinch patterns w/fxl tool use, intrinsic strengthening, proximal strength/stability and activity endurance  Pt utilized tweezers for application of small beads with activity placed on slant board simulating reaching task  Neuro Re-Ed 25 Pt engaged in HEP development seated on EOM and in stance with use of blue theraband to increase proximal UE strengthstabilization, endurance, ROM, core control, coordination of BUE and flexibility to inc indep and safety with ADL/IADL fxn, fxl mob/transfers and for improved fxnl reach  Pt provided with verbal education and demonstration on exercises as listed below and demo G understanding       Exercises  · Shoulder Horizontal and Diagonal Pulls with Resistance - 10 reps - 3 sets - 1x daily - 7x weekly  · Shoulder W - External Rotation and Scapular Retraction with Resistance - 10 reps - 3 sets - 1x daily - 7x weekly  · Shoulder Lat Pull Down with Resistance - 10 reps - 3 sets - 1x daily - 7x weekly  · Shoulder Horizontal Abduction with Resistance - 10 reps - 3 sets - 1x daily - 7x weekly  · Alternating Shoulder Flexion with Resistance- 10 reps - 3 sets - 1x daily - 7x weekly                    Therapeutic Exercise 10 Pt tolerated UEB for 6 min prograde/6 min retrograde on L3 0 resistance to address proximal strength, gross grasp and overall activity endurance  Manual          Self Care           Assessment: Tolerated treatment well  Pt demo-G postural control and form completing 10 reps of each exercises in HEP  Frequent droppage noted using tweezers for small peg retrieval and placement, cues provided to sustain grasp on tweezers until peg is securely placed  Patient would benefit from continued skilled OT      Plan: Continued skilled OT per POC    INTERVENTION COMMENTS:  Diagnosis: Intracranial bleed ([facility], [address] ) [I62 9]  Precautions: HTN  FOTO: to complete  Insurance: Payor: Inna Valente / Plan: Inna Valente / Product Type: HMO Commercial /   4 of 12 (visits [facility]) visits, PN due 4/15/22

## 2022-03-23 NOTE — PROGRESS NOTES
Daily Note     Today's date: 3/23/2022  Patient name: Cristobal Rod  : 1965  MRN: [id number]  Referring provider: Donna Villegas  Dx:   Encounter Diagnosis     ICD-10-CM    1  Functional gait abnormality  R26 89    2  Intracranial bleed ([address] )  I62 9        Start Time: 1700  Stop Time: 1800  Total time in clinic (min): 60 minutes    Subjective: Presents to his OP PT session this evening doing well; no new c/o discomfort or pain, however more fatigued already having OT/ST today and fasting blood work earlier this morning  Objective: See treatment diary below      Assessment: Marky Gutiérrez participated in his skilled PT session focused on improving balance integration and conditioning through HIGT  Challenged with maintaining balance with visual obstructions in the presence of fatigue  Would continue to benefit from skilled PT intervention to address deficits in balance and gait to maximize overall functional mobility, quality of life, and return as close to PLOF as possible  Plan: Continue per plan of care  Continue HIGT          Short Term Goal Expiration Date:(22)  Long Term Goal Expiration Date: (22)  POC Expiration Date: (22)    Precautions: HTN, CKD III    target heart rate range: 112-136 BPM  Beta Blockers - YES     Today's Date:     Resting HR: 60 BPM    BP Pre exercise: 144/80 mmHg  BP during exercise: 140/80 mmHg  BP post exercise:  144/80 mmHg       Total minutes spent  within 70-85% Max HR:  (jonathan flowsheet)    Description of activity: (treadmill, overground, assist, distance, speed etc) Peak HR observed Peak RPE observed Total Minutes in target zone Total minutes Timed Charges   (Nmr, TE, TA, GT)              TM  2 2 mph w/ HT 30" on/off x5 min  2 2 mph, 3% grade x2 min  2 2 mph, 5% grade x3 min  2 2 mph, 7% grade x5 min   1 8 mph x2 min CD  x17 min total 100 8/10 - 17 GT/NMR   Circuit: 5# BL AW  Sled push ( steps) x100'  Box Squat + blk therex pad x15  FTEC foam HT x30 reps    x3 rounds 120 8/10 18 20 NMR   Therex pads/6"/12" mary combo "crossing the creek" x15 min varying intensities 110 8/10 - 20 NMR               *Therapeutic rest breaks throughout session as needed due to high intensity interval training per ACSM guidelines for recovery before return to exercise  Vitals monitored, documented and discussed with pt during this time

## 2022-03-23 NOTE — PROGRESS NOTES
Daily Speech Treatment Note    Today's date: 3/25/2022   Patients name: Aquilino Radford  : 1965  MRN: [id number]  Safety measures: CVA  Referring provider: Rose Mary Dillon MD     Primary diagnosis/billing code: N48 9  Secondary diagnosis/billing code: I61 9     Visit tracking:  -Referring provider: Epic  -Billing guidelines: AMA  -Visit #  -Insurance: [address] [facility],8W due 2022      Subjective/Behavioral:  - Patient stated that he is at work again and conversations with companies have been easy  He stated that he is feeling way better, but still would like speech to address his wife's concerns  Objective/Assessment:  -Reviewed patient's home exercises/activities completed since last appointment  Short-term goals:  1  Patient will be educated on the use of internal and external memory aids and compensatory strategies with 80% accuracy to facilitate increased recall of routine, personal information, and recent events, to be achieved in 4-6 weeks  2  Patient will complete auditory immediate and short term memory tasks to 80% accuracy to facilitate increased ability to retell narratives and recall information within functional living environment, to be achieved in 4-6 weeks  To target auditory recall, patient completed short stories from auditory comprehension cards (1-8)  The patient listened to the paragraph and then had to answer questions about the paragraph  He was able to answer the question about the story with about about 90% acc independently, increasing to 100 % acc with min-mod verbal cues        3  Patient will complete complex auditory attention processing tasks (e g , sentence unscramble, ranking numbers/words, etc ) to improve working memory with 80% accuracy, to be achieved in 4-6 weeks      4  Patient will facilitate planning by completing thought organization tasks (e g , sequencing, deduction puzzles, etc ) with 80% accuracy to facilitate increased executive functioning, working memory, problem solving, and processing skills, to be achieved in 4-6 weeks  Patient completed an anagram task  He was given a word and had to rearrange the letters to form a different word (I e - item can be rearranged to time) We tried the first on auditorily, but patient stated that it was too difficult  He stated that even before his CVA, he is not sure he would be able to complete this activity  We change changed it to writing the word on a piece of paper  He as able to complete all 30 of the words with about 87% acc independently and increasing to 100% acc with min verbal cues  5  Patient will name an average of 15+ items in a category in 60 seconds over 5 trials using compensatory strategies and min cues to facilitate improved word retrieval skills, to be achieved in 4-6 weeks      6  Patient will name an average of 10+ words that begin with a specific letter in 60 seconds over 5 trials using compensatory strategies and min cues to facilitate improved word retrieval skills, to be achieved in 4-6 weeks      7  Patient will name an appropriate synonym/antonym for a given word with 80% accuracy to build expressive vocabulary for conversation, to be achieved in 4-6 weeks          8  Patient will complete word generation tasks (e g , analogies, category matrices, etc ) with 80% accuracy using word finding strategies to facilitate improved word retrieval skills, to be achieved in 4-6 weeks          Plan:  -Patient was provided with home exercises/activities to target goals in plan of care at the end of today's session   -Continue with current plan of care

## 2022-03-23 NOTE — PROGRESS NOTES
Occupational Therapy Daily Note:    Today's date: 3/23/2022  Patient name: Kassi Arrington  : 1965  MRN: [id number]  Referring provider: Dreama Severance  Dx:   Encounter Diagnosis   Name Primary?  Intracranial bleed (HCC) Yes       Subjective: "I can feel things are coming along"  Objective: Pt engaged in skilled OT treatment session with focus on NMRE, proximal strength/stabilization and mixed prehension patterns improving motor skills to increase indep in ADL/IADL's    CPT Code Minutes                                           Task Details        Therapeutic Activity               Neuro Re-Ed               Therapeutic Exercise 25 Pt engaged in HEP development seated on EOM and in stance with use of blue theraband to increase proximal UE strength/stabilization, endurance, ROM, core control, and flexibility to inc indep and safety with ADL/IADL fxn, fxl mob/transfers and for improved fxnl reach  Pt provided with verbal education and demonstration on exercises as listed below and demo FELICITAS understanding  Exercises  · Shoulder Horizontal and Diagonal Pulls with Resistance - 10 reps - 3 sets - 1x daily - 7x weekly  · Shoulder W - External Rotation and Scapular Retraction with Resistance - 10 reps - 3 sets - 1x daily - 7x weekly  · Shoulder Lat Pull Down with Resistance - 10 reps - 3 sets - 1x daily - 7x weekly  · Shoulder Horizontal Abduction with Resistance - 10 reps - 3 sets - 1x daily - 7x weekly  · Alternating Shoulder Flexion with Resistance- 10 reps - 3 sets - 1x daily - 7x weekly               Manual          Self Care           Assessment: Tolerated treatment {Tolerated treatment :[id number]}  Patient would benefit from continued skilled OT      Plan: {plan:[id number]}    INTERVENTION COMMENTS:  Diagnosis: ***  Precautions: ***  FOTO: ***  Insurance:   1 of *** visits, PN due ***

## 2022-03-24 ENCOUNTER — TELEPHONE (OUTPATIENT)
Dept: NEPHROLOGY | Facility: CLINIC | Age: 57
End: 2022-03-24

## 2022-03-24 DIAGNOSIS — N18.31 STAGE 3A CHRONIC KIDNEY DISEASE (HCC): Primary | ICD-10-CM

## 2022-03-24 DIAGNOSIS — I12.9 HYPERTENSIVE CHRONIC KIDNEY DISEASE WITH STAGE 1 THROUGH STAGE 4 CHRONIC KIDNEY DISEASE, OR UNSPECIFIED CHRONIC KIDNEY DISEASE: ICD-10-CM

## 2022-03-24 NOTE — TELEPHONE ENCOUNTER
----- Message from Rama Harrell MD sent at 3/24/2022  6:37 AM EDT -----  Labs are reasonably stable  I would make sure to recheck a CMP in the next 1 week or so given mild elevation in liver function studies which may be related in part to his atorvastatin    Check and see how his blood pressure is at this time as well    Thank you

## 2022-03-25 ENCOUNTER — DOCUMENTATION (OUTPATIENT)
Dept: NEPHROLOGY | Facility: CLINIC | Age: 57
End: 2022-03-25

## 2022-03-25 ENCOUNTER — OFFICE VISIT (OUTPATIENT)
Dept: PHYSICAL THERAPY | Facility: CLINIC | Age: 57
End: 2022-03-25
Payer: COMMERCIAL

## 2022-03-25 ENCOUNTER — OFFICE VISIT (OUTPATIENT)
Dept: OCCUPATIONAL THERAPY | Facility: CLINIC | Age: 57
End: 2022-03-25
Payer: COMMERCIAL

## 2022-03-25 ENCOUNTER — PATIENT MESSAGE (OUTPATIENT)
Dept: NEPHROLOGY | Facility: CLINIC | Age: 57
End: 2022-03-25

## 2022-03-25 ENCOUNTER — OFFICE VISIT (OUTPATIENT)
Dept: SPEECH THERAPY | Facility: CLINIC | Age: 57
End: 2022-03-25
Payer: COMMERCIAL

## 2022-03-25 DIAGNOSIS — I62.9 INTRACRANIAL BLEED (HCC): Primary | ICD-10-CM

## 2022-03-25 DIAGNOSIS — I62.9 INTRACRANIAL BLEED (HCC): ICD-10-CM

## 2022-03-25 DIAGNOSIS — R26.89 FUNCTIONAL GAIT ABNORMALITY: Primary | ICD-10-CM

## 2022-03-25 DIAGNOSIS — R48.8 OTHER SYMBOLIC DYSFUNCTIONS: Primary | ICD-10-CM

## 2022-03-25 DIAGNOSIS — I61.9 HEMORRHAGIC STROKE (HCC): ICD-10-CM

## 2022-03-25 PROCEDURE — 92507 TX SP LANG VOICE COMM INDIV: CPT

## 2022-03-25 PROCEDURE — 97116 GAIT TRAINING THERAPY: CPT

## 2022-03-25 PROCEDURE — 97112 NEUROMUSCULAR REEDUCATION: CPT

## 2022-03-25 PROCEDURE — 97110 THERAPEUTIC EXERCISES: CPT

## 2022-03-25 PROCEDURE — 97140 MANUAL THERAPY 1/> REGIONS: CPT

## 2022-03-25 NOTE — PROGRESS NOTES
Occupational Therapy Daily Note:    Today's date: 3/25/2022  Patient name: Cristobal Rod  : 1965  MRN: [id number]  Referring provider: Gay Corral  Dx:   Encounter Diagnosis   Name Primary?  Intracranial bleed (HCC) Yes       Subjective: "This isn't as easy as it looks"  Objective: Pt engaged in skilled OT treatment session with focus on NMRE,  proximal strength/stabilization and mixed prehension patterns improving motor skills to increase indep in ADL/IADL's    CPT Code Minutes                                           Task Details        Therapeutic Activity 50   Pt performed in stance refined motor task to improve simple rotation, dexterity, fine motor control/coordination in addition to UE strength/endurance and GMC/GMS  Pt utilized simple rotation to turn small screws from board positioned on vertical surface, with 3# weight cuff donned to distal UE for inc GM demand  No droppage noted, required inc time to complete task  Pt completed BITS reaction time task to improve speed, automaticity & proprioceptive awareness of RUE  First trial: Achieved 100% accuracy, 1 78 reaction time with 44 hits  Second trial: 93% accuracy, 1 15 sec reaction time, 52 hits  Pt completed directional arrow chart task with use of 2KG wt ball in BUE to improve GMC/GMS in mixed motor planes/patterns  Pt reported fatigue post task  Pt completed partial pixy cube task with #3 weight cuff donned to distal UE, pt req to  cubes from top of mirror and rotate, turn and manipulate cubes to create matching pattern  Pt demo freq use of left hand during task, req VC for reminder to utilize right hand during task  Neuro Re-Ed                      Therapeutic Exercise 10 Pt tolerated UEB for 6 min prograde/6 min retrograde on L3 0 resistance to address proximal strength, gross grasp and overall activity endurance  Manual          Self Care           Assessment: Tolerated treatment well  Pt cont to demo dec speed with refined motor task d/t refined motor coordination deficits  Pt demo improved reaction time with massed practice  Patient would benefit from continued skilled OT      Plan: Continued skilled OT per POC    INTERVENTION COMMENTS:  Diagnosis: Intracranial bleed ([facility] [address] ) [I62 9]  Precautions: HTN  FOTO: to complete  Insurance: Payor: Awa Thornton / Plan: Awa Thornton / Product Type: HMO Commercial /   5 of 12 (visits [facility]) visits, PN due 4/15/22

## 2022-03-25 NOTE — PROGRESS NOTES
Daily Note     Today's date: 3/25/2022  Patient name: Rae Hairston  : 1965  MRN: [id number]   Referring provider: Adeel Barrett  Dx:   Encounter Diagnosis     ICD-10-CM    1  Functional gait abnormality  R26 89    2  Intracranial bleed (HCC)  I62 9        Start Time: 1100  Stop Time: 1200  Total time in clinic (min): 60 minutes    Subjective: Presents to his OP PT session this morning doing well; no new c/o discomfort or pain  Sore after last session but felt like he had a great workout  Objective: See treatment diary below      Assessment: Kalpana Nava participated in his skilled PT session focused on improving balance integration and conditioning through HIGT  Able to incorporate outdoor uneven terrain with circuit training reaching highest voluntary HR and exercise intensity yet  Would continue to benefit from skilled PT intervention to address deficits in balance and gait to maximize overall functional mobility, quality of life, and return as close to PLOF as possible  Plan: Continue per plan of care  Continue HIGT          Short Term Goal Expiration Date:(22)  Long Term Goal Expiration Date: (22)  POC Expiration Date: (22)    Precautions: HTN, CKD III     target heart rate range: 112-136 BPM  Beta Blockers - YES     Today's Date:     Resting HR: 52 BPM    BP Pre exercise: 146/86 mmHg  BP during exercise: 144/80 mmHg  BP post exercise:  144/80 mmHg       Total minutes spent  within 70-85% Max HR: 18  (jonathan flowsheet)    Description of activity: (treadmill, overground, assist, distance, speed etc) Peak HR observed Peak RPE observed Total Minutes in target zone Total minutes Timed Charges   (Nmr, TE, TA, GT)   TM 3# BL AW  2 2 mph x2 min w/u  2 2 mph, 7% grade x5 min  2 2 mph, 10% grade x5 min  2 2 mph, 5% grade w/HT 30" on/off x5 min  1 8 mph CD x2 min  x19 min total 93 8/10 - 19 GT/NMR   SLS Balance  3x30" R LE UE PRN  Basketball toss x3'  Grn MB toss (500gr) x6' 89 8/10 -  NMR   Outside: Circuit  6" mary ski jumps x10  6# med ball thrusters over 200'  Suicides over 100' in 25' increments    x2 rounds 136 9/10 18 20 NMR               *Therapeutic rest breaks throughout session as needed due to high intensity interval training per ACSM guidelines for recovery before return to exercise  Vitals monitored, documented and discussed with pt during this time

## 2022-03-25 NOTE — TELEPHONE ENCOUNTER
I spoke with patient and wife relayed message, patient and wife verbalized understanding with no questions or concerns  CMP placed in epic to be repeated in 1 week  Cathleen Dennis will upload BP readings to [employer] and send through there  Thank you

## 2022-03-25 NOTE — TELEPHONE ENCOUNTER
LM for patient letting him know overall labs are stable and LFTs are slightly elevated possibly due to atorvastatin  Asked patient to call back to let us know how his BP is doing

## 2022-03-25 NOTE — PROGRESS NOTES
Home blood pressure readings:  -a m :  140/77 without orthostatic changes   -p  m :  129/63 without orthostatic changes    Evening readings are great  A m  reading still somewhat high even despite the discordance of his device an hours    Recommend:  1  Increase Procardia XL to 30 mg twice a day both morning and evening  Please watch for swelling and let me know if he develops any    2   Please wait 2-3 weeks after making the change and then send in an additional week of readings    Thank you

## 2022-03-25 NOTE — TELEPHONE ENCOUNTER
[facility]  Urmila Pérez, PharmD, BCPS, BCACP    Reason for Outreach: Patient referred to clinical pharmacist by Darryl Castro PA-C for disease management and medication education      Patient amenable to schedule telephone appointment at this time  Appointment scheduled for April 5, 2022 at [address] [facility] patient to have available prescription medications and over the counter medications for review  He is not currently performing SMBGs

## 2022-03-28 ENCOUNTER — OFFICE VISIT (OUTPATIENT)
Dept: PHYSICAL THERAPY | Facility: CLINIC | Age: 57
End: 2022-03-28
Payer: COMMERCIAL

## 2022-03-28 ENCOUNTER — OFFICE VISIT (OUTPATIENT)
Dept: OCCUPATIONAL THERAPY | Facility: CLINIC | Age: 57
End: 2022-03-28
Payer: COMMERCIAL

## 2022-03-28 ENCOUNTER — OFFICE VISIT (OUTPATIENT)
Dept: SPEECH THERAPY | Facility: CLINIC | Age: 57
End: 2022-03-28
Payer: COMMERCIAL

## 2022-03-28 DIAGNOSIS — I62.9 INTRACRANIAL BLEED (HCC): Primary | ICD-10-CM

## 2022-03-28 DIAGNOSIS — R48.8 OTHER SYMBOLIC DYSFUNCTIONS: Primary | ICD-10-CM

## 2022-03-28 DIAGNOSIS — I61.9 HEMORRHAGIC STROKE (HCC): ICD-10-CM

## 2022-03-28 DIAGNOSIS — I62.9 INTRACRANIAL BLEED (HCC): ICD-10-CM

## 2022-03-28 DIAGNOSIS — R26.89 FUNCTIONAL GAIT ABNORMALITY: Primary | ICD-10-CM

## 2022-03-28 PROCEDURE — 97112 NEUROMUSCULAR REEDUCATION: CPT

## 2022-03-28 PROCEDURE — 97530 THERAPEUTIC ACTIVITIES: CPT

## 2022-03-28 PROCEDURE — 92507 TX SP LANG VOICE COMM INDIV: CPT | Performed by: SPEECH-LANGUAGE PATHOLOGIST

## 2022-03-28 PROCEDURE — 97116 GAIT TRAINING THERAPY: CPT

## 2022-03-28 NOTE — PROGRESS NOTES
Daily Note     Today's date: 3/28/2022  Patient name: Josephine Schmitz  : 1965  MRN: [id number]   Referring provider: Suzette Dominguez  Dx:   Encounter Diagnosis     ICD-10-CM    1  Functional gait abnormality  R26 89    2  Intracranial bleed (HCC)  I62 9        Start Time: 0800  Stop Time: 0900  Total time in clinic (min): 60 minutes    Subjective: Presents to his OP PT session this morning doing well; no new c/o discomfort or pain  Says he has been working on developing processes to remember to consistently take his BP medications; frequency of dosing was increased as of 3/25/22 per neuro chart review  Objective: See treatment diary below      Assessment: Tim Hinojosa participated in his skilled PT session focused on improving balance integration and conditioning through HIGT  Focused more on SLS and compliant surfaces to encourage sensory seeking feedback for R LE sensation return  Would continue to benefit from skilled PT intervention to address deficits in balance and gait to maximize overall functional mobility, quality of life, and return as close to PLOF as possible  Plan: Continue per plan of care  Continue HIGT  Short Term Goal Expiration Date:(22)  Long Term Goal Expiration Date: (22)  POC Expiration Date: (22)    Precautions: HTN, CKD III     target heart rate range: 112-136 BPM  Beta Blockers - YES     Today's Date:     Resting HR: 62 BPM    BP Pre exercise: 146/78 mmHg  BP during exercise:  BP post exercise:           Total minutes spent  within 70-85% Max HR: 18  (jonathan flowsheet)    Description of activity: (treadmill, overground, assist, distance, speed etc) Peak HR observed Peak RPE observed Total Minutes in target zone Total minutes Timed Charges   (Nmr, TE, TA, GT)   TM   4# BL AW  No UE  2 2 mph 3% grade x3 min  2 2 mph 5% grade x10 min  2 2 mph 10% grade x2 min  1 8 mph 0% grade x2 min  7/10   GT   Circuit:  Hallway: HKM 4# BL AW fwd/bwd alt by 50' x400'  FTEC foam (1), L UE PRN w/HT x30 reps total  SLS R LE/R UE Tennis ball single bounce/catch x15  Shoulder/scap around the worlds w/6# MB x20 total     x3 rounds 104 7/10   NMR   Step Ups  4# BL AW  Foam to 8" -> RLE SLS progression x20 112 7/10   NMR               *Therapeutic rest breaks throughout session as needed due to high intensity interval training per ACSM guidelines for recovery before return to exercise  Vitals monitored, documented and discussed with pt during this time

## 2022-03-28 NOTE — PROGRESS NOTES
OCCUPATIONAL THERAPY Re- EVALUATION:         3/28/2022   Francis Kwan   1965   [id number]   Donna Villegas*   [facility]           Subjective "I really only have trouble feeling my foot"       PATIENT GOAL: "I am back to work and I trust your judgement, I really feel like I need the PT the most now"         Assessment/Plan   Skilled Analysis:   Pt is a 64 y o  male referred to Occupational Therapy s/p No primary diagnosis found  Pt reports that since participation in OP OT services, he has made an "80 % improvement" in his R UE function including strength, coordination, and in hand manipulation skills  Pt states he has returned to work and is able to complete all necessary ADL and IADL tasks with use of RUE and LUE appropriately  Pt has noted that his overall sensation of his R hand has drastically improved and he does not feel impeded by his sensation in R hand, however continued limitations reported regarding sensation in distal R LE  Per clinical observations and assessment, pt has made drastic improvements and achievement of goals  Pt is able to accurately perform HEP and is compliant with all recommendations of exercises for the home and performs appropriately  Following formalized testing, pt demonstrates a drastic improvement in gross gip strength, manual muscle testing/gross motor strength, and improved overall scoring in testing for functional fine motor coordination  Pt at this time is appropriate to be placed on 30Day hold from OT services with recommendations to continue with HEP as prescribed and to continue to perform UB exercises and encourage use of RUE at home environment  Pt demo G understanding and capabilities to continue  Pt in agreement with OT POC  Goals:    Motor Short Term Goals (4)WKS   Strength/Endurance   Pt will increase R UE strength to 5/5 through the use of strengthening exercises and home program for eventual return to life and work roles and salient tasks: MET  Pt will demo with G tolerance to supine, seated, and in stance exercise x 45 minutes with minimal rest breaks required for increased engagement in life roles and weekly exercise regimen : MET  Pt will demo with G carryover of Home Exercise Program to improve functional progression towards goals in Plan of care and for improved functional use of RUE : MET  FMC/GMC   Pt will increase RUE rate of manipulation by 5% for all FM tests for improved functional performance with salient tasks : MET  Pt will increase Reaction time to < 1 5 seconds with hand to target timed trials for improved reaction time and automaticity of coordination for improved functional performance with ADLs/IADLs and salient tasks: MET  Pt will increase RUE prehension patterns for improved utensil and container management with <05% droppage : PROGRESSING  Pt will demo improved motor learning of constructional and new motor actions for improved b/l coordination and motor planning evident by 100% accuracy for fxnl ADL/IADL performance: MET  Functional Performance   Pt will increase RUE to refined functional assist with for ADL/IADL and tabletop tasks for improved functional performance of life roles and salient tasks:MET   Pt will perform mCIMT program with use of RUE as prime  with G carryover of program strategies and exercises to inc fxnl use of RUE: MET  Modalities   Pt will tolerate BIONESS/NMES/IASTM for improved motor and sensory performance for overall improved strength, and sensation to inc safety and engagement with ADL/IADL fxn: MET      Treatment Interventions   Supine, seated, and in stance neuro re-ed   NMES for sensory re-ed   Fxnl Grasp and Release   FMC/prehension patterns   Fxnl Tool use (tweezers, tongs)   In hand manipulation   Timed Trials to improve automaticity   Manual tx (IASTM if able, PROM/stretching)   Hand to target tasks   Sensory re-ed as needed (sensory bins)   Seated functional reach: crossing midline   Supine place and hold   WBearing strategies (seated, Prone/quad)   Closed chain activities   Open chain activities   HEP (putty, dexterity, mCIMT)         Pain Levels:   Restin   With Activity:   4   Objective   Impairment Observations:   Upper Extremity    RUE LUE Comments   UPPER EXTREMITY FXN Impaired Intact Dominant Hand: R/L ampidexterous          /Pinch Strength      Dynamometer      - Gross Grasp 115 lbs 128 lbs RUE: improved 35lbs   LUE: improved 28lbs   Pinch Meter      - PINCER 16 lbs 21 lbs RUE: improved 2lbs   LUE: improved 3lbs   - TRIPOD 18 lbs 23 lbs RUE: remains   LUE: improved 8lbs   - LATERAL 25 lbs  25 lbs RUE: improved 3lbs   LUE: improved 5lbs         AROM (seated)     Elevation/Depression      Shoulder Flex/Ext      Shoulder Abd      Shoulder Add      Horizontal Abd      Horizontal Add      Elbow Flex      Elbow Ext      Pronation      Supination      Wrist Flex      Wrist Ext      Digit Flex      Digit Ex      Composite Grasp      Hook Grasp      Opposition       Full range all planes Full range all planes    MMT      Elevation 5/5 5/5    Shoulder Flex/Ext 5/5 5/5    Shoulder Abd 5/5 5/5    Shoulder ADd 5/5 5/5    Elbow Flex 5/5 5/5    Elbow Ext 5/5 5/5    Wrist Flex 5/5 5/5    Wrist Ext 5/5 5/5    Gross Grasp 5/5 5/5          SENSATION      Myofilaments (2 83 Wnl) 3 61 3 61 improved   Sharp Dull  Intact Intact    Proprioception Intact Intact    Hot/Cold Temp Intact Intact          COORDINATION      9 Hole Peg Test 23 seconds 19 seconds RUE: improved 9 seconds   LUE: improved 2 seconds   Fxnl Dexterity Test 30 seconds 28 seconds RUE: improved 12 seconds   LUE:inc 3 seconds   Jebsen Hand fxn      - Handwriting 31 seconds 12 seconds RUE: improved 9 seconds   LUE: improved 4 seconds   - Card Turning 4 seconds 5 seconds RUE: improved 2 seconds   LUE: remains   - Small Item p/u 7 seconds 7 seconds RUE: improved 5 seconds   LUE: remains   - Simulated Feeding 9 seconds 7 seconds RUE: improved 2 seconds LUE: improved 1 second   - Stacking Checkers 6 seconds 5 seconds RUE: improved 8 seconds   LUE: improved 1 second   - Moving Light Objects 4 seconds seconds RUE: improved 4 seconds   LUE: improved 1 second   - Moving Heavy Objects 3 seconds 2 seconds RUE: improved 3 seconds   LUE: improved 2 second           INTERVENTION COMMENTS:   Diagnosis: No primary diagnosis found     Precautions: HTN   FOTO: to complete   Insurance: Payor: PANK[employer] / Plan: Jodie Hernanedzer / Product Type: HMO Commercial /   6 of 12 (visits Gianni Metcalf) visits, PN due 4/15/22

## 2022-03-28 NOTE — PROGRESS NOTES
LM for patient letting them know that his morning BP is still above goal, so we are increasing the nifedipine 30mg to twice a day instead of once daily  Then do a week's worth of BP readings in about 2-3 weeks after the changes  Asked patient to call back if he has any questions

## 2022-03-29 LAB — ALDOST SERPL-MCNC: 8.7 NG/DL (ref 0–30)

## 2022-03-31 ENCOUNTER — APPOINTMENT (OUTPATIENT)
Dept: OCCUPATIONAL THERAPY | Facility: CLINIC | Age: 57
End: 2022-03-31
Payer: COMMERCIAL

## 2022-03-31 ENCOUNTER — OFFICE VISIT (OUTPATIENT)
Dept: PHYSICAL THERAPY | Facility: CLINIC | Age: 57
End: 2022-03-31
Payer: COMMERCIAL

## 2022-03-31 DIAGNOSIS — R26.89 FUNCTIONAL GAIT ABNORMALITY: Primary | ICD-10-CM

## 2022-03-31 DIAGNOSIS — I62.9 INTRACRANIAL BLEED (HCC): ICD-10-CM

## 2022-03-31 PROCEDURE — 97112 NEUROMUSCULAR REEDUCATION: CPT

## 2022-03-31 PROCEDURE — 97116 GAIT TRAINING THERAPY: CPT

## 2022-03-31 NOTE — PROGRESS NOTES
Daily Note     Today's date: 3/31/2022  Patient name: Hero Schwartz  : 1965  MRN: [id number]   Referring provider: Christian Monge  Dx:   Encounter Diagnosis     ICD-10-CM    1  Functional gait abnormality  R26 89    2  Intracranial bleed ([address] )  I62 9        Start Time: 4690  Stop Time: 0900  Total time in clinic (min): 48 minutes    Subjective: Presents to his OP PT session 12 minutes late this morning due to traffic doing well; no new c/o discomfort or pain  Had his BP meds readjusted since last visit; BP is now reading consistently lower  Continues to experience sensory deficits in his R foot and R hip  Objective: See treatment diary below      Assessment: [facility] participated in his skilled PT session focused on improving balance integration and conditioning through HIGT  Continued to focus on R LE SLS balance with varying tasks to encourage sensory seeking strategies and neuroplasticity to work around current sensory deficits  THRR continues to be difficult to achieve for extended periods secondary to beta blockers however continues to maintain mRPE 7-9/10 during sessions  Would continue to benefit from skilled PT intervention to address deficits in balance and gait to maximize overall functional mobility, quality of life, and return as close to PLOF as possible  Plan: Continue per plan of care  Continue HIGT  Short Term Goal Expiration Date:(22)  Long Term Goal Expiration Date: (22)  POC Expiration Date: (22)    Precautions: HTN, CKD III     target heart rate range: 112-136 BPM  Beta Blockers - YES     Today's Date:     Resting HR: 58 BPM    BP Pre exercise: 138/80 mmHg  BP during exercise:  BP post exercise:           Total minutes spent  within 70-85% Max HR: 18  (jonathan flowsheet)    Description of activity: (treadmill, overground, assist, distance, speed etc) Peak HR observed Peak RPE observed Total Minutes in target zone Total minutes Timed Charges   (Nmr, TE, TA, GT)   TM, No AW, No UE  2 2 mph x2 min  2 2 mph 3% grade x4 min  2 2 mph 7% grade x6 min    CD 1 8 mph x2 min    14 min total 107 7/10 - 14 GT   R SLS Cone retrieval RDL from 6" elevation alt UE to side   3x12    Rockerboard A-P taps x20 ea  R SLS Heel-toe on black therex pad x10 ea  Foam beams lat w/prpl TB static Paloff press x1 lap ea 108 9/10 - 34 NMR                       *Therapeutic rest breaks throughout session as needed due to high intensity interval training per ACSM guidelines for recovery before return to exercise  Vitals monitored, documented and discussed with pt during this time

## 2022-03-31 NOTE — PROGRESS NOTES
Daily Speech Treatment Note    Today's date: 2022   Patients name: Cynthia Cotton  : 1965  MRN: [id number]  Safety measures: CVA  Referring provider: Stephani Paz MD     Primary diagnosis/billing code: W81 7  Secondary diagnosis/billing code: I61 9     Visit tracking:  -Referring provider: Epic  -Billing guidelines: AMA  -Visit # Roberto Salcedofrankie expires 2022**)  -Insurance: [address] [place] [address],8W due 2022    Subjective/Behavioral:  -Patient with no concerns upon presentation to today's appointment  Objective/Assessment:    Short-term goals:  1  Patient will be educated on the use of internal and external memory aids and compensatory strategies with 80% accuracy to facilitate increased recall of routine, personal information, and recent events, to be achieved in 4-6 weeks  2  Patient will complete auditory immediate and short term memory tasks to 80% accuracy to facilitate increased ability to retell narratives and recall information within functional living environment, to be achieved in 4-6 weeks   -Word/mental picture association (mixed associations (level 2) coding): Patient was asked to code words based on associations (e g , When I say RED, you say STOP SIGN  )  Patient was presented with words and asked to code them in the order  On an immediate recall task, patient recalled 20/20 words  Then, another activity (see below) was used to distract patient for another 15 minutes  On a 15-min delayed recall task, patient recalled 20/20 words using the strategy of word/mental picture associations      -Short-term recall: Patient recalled 40/40 words at the end of session    3  Patient will complete complex auditory attention processing tasks (e g , sentence unscramble, ranking numbers/words, etc ) to improve working memory with 80% accuracy, to be achieved in 4-6 weeks      4  Patient will facilitate planning by completing thought organization tasks (e g , sequencing, deduction puzzles, etc ) with 80% accuracy to facilitate increased executive functioning, working memory, problem solving, and processing skills, to be achieved in 4-6 weeks  5  Patient will name an average of 15+ items in a category in 60 seconds over 5 trials using compensatory strategies and min cues to facilitate improved word retrieval skills, to be achieved in 4-6 weeks      6  Patient will name an average of 10+ words that begin with a specific letter in 60 seconds over 5 trials using compensatory strategies and min cues to facilitate improved word retrieval skills, to be achieved in 4-6 weeks      7  Patient will name an appropriate synonym/antonym for a given word with 80% accuracy to build expressive vocabulary for conversation, to be achieved in 4-6 weeks      8  Patient will complete word generation tasks (e g , analogies, category matrices, etc ) with 80% accuracy using word finding strategies to facilitate improved word retrieval skills, to be achieved in 4-6 weeks  To target education and practice of the circumlocution strategy, patient was asked to provide verbal/semantic descriptions of different people/places/things using random word list  Patient described words in 29/30 opportunities  He then had to determine what the clinician was describing in 30/30 opportunities          Plan:  -Patient was provided with home exercises/activities to target goals in plan of care at the end of today's session   -Continue with current plan of care

## 2022-04-01 ENCOUNTER — TELEPHONE (OUTPATIENT)
Dept: NEPHROLOGY | Facility: CLINIC | Age: 57
End: 2022-04-01

## 2022-04-01 ENCOUNTER — APPOINTMENT (OUTPATIENT)
Dept: LAB | Facility: CLINIC | Age: 57
End: 2022-04-01
Payer: COMMERCIAL

## 2022-04-01 ENCOUNTER — OFFICE VISIT (OUTPATIENT)
Dept: PHYSICAL THERAPY | Facility: CLINIC | Age: 57
End: 2022-04-01
Payer: COMMERCIAL

## 2022-04-01 ENCOUNTER — OFFICE VISIT (OUTPATIENT)
Dept: SPEECH THERAPY | Facility: CLINIC | Age: 57
End: 2022-04-01
Payer: COMMERCIAL

## 2022-04-01 ENCOUNTER — TELEPHONE (OUTPATIENT)
Dept: NEUROLOGY | Facility: CLINIC | Age: 57
End: 2022-04-01

## 2022-04-01 DIAGNOSIS — R79.89 ELEVATED LIVER FUNCTION TESTS: Primary | ICD-10-CM

## 2022-04-01 DIAGNOSIS — N18.31 STAGE 3A CHRONIC KIDNEY DISEASE (HCC): ICD-10-CM

## 2022-04-01 DIAGNOSIS — R48.8 OTHER SYMBOLIC DYSFUNCTIONS: Primary | ICD-10-CM

## 2022-04-01 DIAGNOSIS — R26.89 FUNCTIONAL GAIT ABNORMALITY: Primary | ICD-10-CM

## 2022-04-01 DIAGNOSIS — I12.9 HYPERTENSIVE CHRONIC KIDNEY DISEASE WITH STAGE 1 THROUGH STAGE 4 CHRONIC KIDNEY DISEASE, OR UNSPECIFIED CHRONIC KIDNEY DISEASE: ICD-10-CM

## 2022-04-01 DIAGNOSIS — I61.9 HEMORRHAGIC STROKE (HCC): ICD-10-CM

## 2022-04-01 DIAGNOSIS — I62.9 INTRACRANIAL BLEED (HCC): ICD-10-CM

## 2022-04-01 LAB
ALBUMIN SERPL BCP-MCNC: 3.9 G/DL (ref 3.5–5)
ALP SERPL-CCNC: 61 U/L (ref 46–116)
ALT SERPL W P-5'-P-CCNC: 98 U/L (ref 12–78)
ANION GAP SERPL CALCULATED.3IONS-SCNC: 8 MMOL/L (ref 4–13)
AST SERPL W P-5'-P-CCNC: 51 U/L (ref 5–45)
BILIRUB SERPL-MCNC: 0.56 MG/DL (ref 0.2–1)
BUN SERPL-MCNC: 20 MG/DL (ref 5–25)
CALCIUM SERPL-MCNC: 8.9 MG/DL (ref 8.3–10.1)
CHLORIDE SERPL-SCNC: 104 MMOL/L (ref 100–108)
CO2 SERPL-SCNC: 27 MMOL/L (ref 21–32)
CREAT SERPL-MCNC: 1.41 MG/DL (ref 0.6–1.3)
GFR SERPL CREATININE-BSD FRML MDRD: 55 ML/MIN/1.73SQ M
GLUCOSE P FAST SERPL-MCNC: 133 MG/DL (ref 65–99)
POTASSIUM SERPL-SCNC: 4.1 MMOL/L (ref 3.5–5.3)
PROT SERPL-MCNC: 8.2 G/DL (ref 6.4–8.2)
SODIUM SERPL-SCNC: 139 MMOL/L (ref 136–145)

## 2022-04-01 PROCEDURE — 80053 COMPREHEN METABOLIC PANEL: CPT

## 2022-04-01 PROCEDURE — 92507 TX SP LANG VOICE COMM INDIV: CPT

## 2022-04-01 PROCEDURE — 97116 GAIT TRAINING THERAPY: CPT

## 2022-04-01 PROCEDURE — 36415 COLL VENOUS BLD VENIPUNCTURE: CPT

## 2022-04-01 PROCEDURE — 97112 NEUROMUSCULAR REEDUCATION: CPT

## 2022-04-01 NOTE — TELEPHONE ENCOUNTER
Called patient leave a message to confirm appointment for Wednesday April 6, 2022 with Indra Villatoro, Neelam Sparks

## 2022-04-01 NOTE — TELEPHONE ENCOUNTER
----- Message from Adrian Hollis MD sent at 4/1/2022  1:32 PM EDT -----  His AST/ALT liver tests are remaining elevated slightly higher  Although this is not extremely high I would recommend the following  1  Right upper quadrant ultrasound at this time diagnosis increase liver function studies    2   Consult GI over the next few weeks regarding increase liver function studies  Thank you

## 2022-04-01 NOTE — TELEPHONE ENCOUNTER
Spoke with patient letting him know his AST/ALT liver test are still elevated so he recommends an US of upper right quadrant and a consult with GI  He expressed understanding and asked for the numbers to make those appts  Numbers given and patient thanked us for the call

## 2022-04-01 NOTE — PROGRESS NOTES
Daily Note     Today's date: 2022  Patient name: Sherita Posey  : 1965  MRN: [id number]   Referring provider: Oliva Live  Dx:   Encounter Diagnosis     ICD-10-CM    1  Functional gait abnormality  R26 89    2  Intracranial bleed (HCC)  I62 9        Start Time: 0900  Stop Time: 1000  Total time in clinic (min): 60 minutes    Subjective: Presents to his OP PT session doing well; no new c/o discomfort or pain  Feels like he can feel more of what his R foot is doing since last session  Objective: See treatment diary below      Assessment: Shirley Ferrer participated in his skilled PT session focused on improving balance integration and conditioning through HIGT  Continued to focus on R LE SLS balance with varying tasks to encourage sensory seeking strategies and neuroplasticity to work around current sensory deficits incorporating multitasking demands and sports today  THRR continues to be difficult to achieve for extended periods secondary to beta blockers however continues to maintain mRPE 7-8/10 during sessions  Noting continued improvement in R LE SLS balance  Would continue to benefit from skilled PT intervention to address deficits in balance and gait to maximize overall functional mobility, quality of life, and return as close to PLOF as possible  Plan: Continue per plan of care  Continue HIGT  Short Term Goal Expiration Date:(22)  Long Term Goal Expiration Date: (22)  POC Expiration Date: (22)    Precautions: HTN, CKD III     target heart rate range: 112-136 BPM  Beta Blockers - YES     Today's Date:     Resting HR: 60 BPM    BP Pre exercise: 130/78 mmHg  BP during exercise:  BP post exercise:           Total minutes spent  within 70-85% Max HR:   (jonathan flowsheet)    Description of activity: (treadmill, overground, assist, distance, speed etc) Peak HR observed Peak RPE observed Total Minutes in target zone Total minutes Timed Charges   (Nmr, TE, TA, GT)   TM, 4# BL AW, No UE  2 2 mph 3% grade x4 min 90 BPM  2 2 mph 6% grade x8 min 109 BPM    CD 1 8 mph x2 min    14 min total 109 8/10 - 14 GT   R SLS on blue airex, L LE elevated on 8" step  4# BL AW  W/HT x10 attempt  No foam w/HT x30  No foam w/HN x30    Tennis ball R UE catch x10 min    Tennis ball R UE under/overhand wall bounce x10 min 108 8/10 - 25 NMR   Sports 4# BL AW  Hallway  Basketball R UE dribble fwd/bwd x50' ea  Lat BL UE dribble x100'    Soccer BL LE dribble fwd/bwd x50' ea  Lat BL LE dribble x100' 116 7/10 10 8 NMR   HK  Hallway  4# BL AW  50' fwd/bwd ea x2 100 7/10 - 10 NMR       *Therapeutic rest breaks throughout session as needed due to high intensity interval training per ACSM guidelines for recovery before return to exercise  Vitals monitored, documented and discussed with pt during this time

## 2022-04-04 ENCOUNTER — OFFICE VISIT (OUTPATIENT)
Dept: PHYSICAL THERAPY | Facility: CLINIC | Age: 57
End: 2022-04-04
Payer: COMMERCIAL

## 2022-04-04 ENCOUNTER — OFFICE VISIT (OUTPATIENT)
Dept: SPEECH THERAPY | Facility: CLINIC | Age: 57
End: 2022-04-04
Payer: COMMERCIAL

## 2022-04-04 DIAGNOSIS — R26.89 FUNCTIONAL GAIT ABNORMALITY: Primary | ICD-10-CM

## 2022-04-04 DIAGNOSIS — I61.9 HEMORRHAGIC STROKE (HCC): ICD-10-CM

## 2022-04-04 DIAGNOSIS — I62.9 INTRACRANIAL BLEED (HCC): ICD-10-CM

## 2022-04-04 DIAGNOSIS — R48.8 OTHER SYMBOLIC DYSFUNCTIONS: Primary | ICD-10-CM

## 2022-04-04 PROCEDURE — 97116 GAIT TRAINING THERAPY: CPT

## 2022-04-04 PROCEDURE — 97112 NEUROMUSCULAR REEDUCATION: CPT

## 2022-04-04 PROCEDURE — 92507 TX SP LANG VOICE COMM INDIV: CPT

## 2022-04-04 NOTE — PROGRESS NOTES
Daily Note     Today's date: 2022  Patient name: Macario Ludwig  : 1965  MRN: [id number]   Referring provider: Tony Cox  Dx:   Encounter Diagnosis     ICD-10-CM    1  Functional gait abnormality  R26 89    2  Intracranial bleed (HCC)  I62 9        Start Time: 0900  Stop Time: 1000  Total time in clinic (min): 60 minutes    Subjective: Presents to his OP PT session doing well; no new c/o discomfort or pain  Says he is now on a diuretic from his physician; needs to watch for swelling in his LE's  Objective: See treatment diary below      Assessment: Bárbara Sumner participated in his skilled PT session focused on improving balance integration and conditioning through HIGT  Noted further improvements in R LE grounding and balance with mirror feedback today  Incorporated UL UE support with lunges to encourage WB through ball of foot rather than outside of foot  Would continue to benefit from skilled PT intervention to address deficits in balance and gait to maximize overall functional mobility, quality of life, and return as close to PLOF as possible  Plan: Continue per plan of care  Continue HIGT  Short Term Goal Expiration Date:(22)  Long Term Goal Expiration Date: (22)  POC Expiration Date: (22)    Precautions: HTN, CKD III     target heart rate range: 112-136 BPM  Beta Blockers - YES     Today's Date:     Resting HR: 56 BPM    BP Pre exercise: 136/78 mmHg  BP during exercise:  BP post exercise:           Total minutes spent  within 70-85% Max HR:   (jonathan flowsheet)    Description of activity: (treadmill, overground, assist, distance, speed etc) Peak HR observed Peak RPE observed Total Minutes in target zone  mRPE Total minutes Timed Charges   (Nmr, TE, TA, GT)   TM, 4# BL AW, No UE  2 2 mph 3% grade x4 min 86 BPM  2 2 mph 6% grade x4 min 94 BPM 7/5/10  2 2 mph 9% grade x7 min 111 BPM 8/10    CD 1 8 mph x2 min    17 min total 111 8/10 11 min 17 GT   R SLS on blue airex, L LE elevated on 8" step  4# BL AW  3x1' w/ mirror feedback    No foam w/HT x30  No foam w/HN x30   8/10 20 20 NMR   Step Ups:  Airex foam to foam, 8" step  4# BL AW  x10 fwd/lat ea   HR: 89  7/10 7 10 NMR   Lunges w/ L UE dowel support  Hallway  4# BL AW  2x100' 1 7/10 10 10 NMR       *Therapeutic rest breaks throughout session as needed due to high intensity interval training per ACSM guidelines for recovery before return to exercise  Vitals monitored, documented and discussed with pt during this time

## 2022-04-04 NOTE — PROGRESS NOTES
Daily Speech Treatment Note    Today's date: 2022   Patients name: Abrahan Gamboa  : 1965  MRN: [id number]  Safety measures: CVA  Referring provider: Mariella Rider MD     Primary diagnosis/billing code: Z72 6  Secondary diagnosis/billing code: I61 9     Visit tracking:  -Referring provider: Epic  -Billing guidelines: AMA  -Visit # Patti Lui expires 2022**)  -Insurance: [address] [place] [address],8W due 2022    Subjective/Behavioral:  -"Cognitively, I feel back to myself!"    Patient expressed he feels great about his cognitive abilities  He reports at times he can struggle to find a word; but has been using strategies successfully  We plan to re-test Friday to assess improvement  Patient is hopeful for discharge  Objective/Assessment:    Short-term goals:  1  Patient will be educated on the use of internal and external memory aids and compensatory strategies with 80% accuracy to facilitate increased recall of routine, personal information, and recent events, to be achieved in 4-6 weeks  2  Patient will complete auditory immediate and short term memory tasks to 80% accuracy to facilitate increased ability to retell narratives and recall information within functional living environment, to be achieved in 4-6 weeks  3  Patient will complete complex auditory attention processing tasks (e g , sentence unscramble, ranking numbers/words, etc ) to improve working memory with 80% accuracy, to be achieved in 4-6 weeks      4  Patient will facilitate planning by completing thought organization tasks (e g , sequencing, deduction puzzles, etc ) with 80% accuracy to facilitate increased executive functioning, working memory, problem solving, and processing skills, to be achieved in 4-6 weeks      Divided Attention: Pt was asked to complete a hand on task where they must copy a pattern onto a peg board using multi-colored pegs; while simultaneously answering a math series (add 3, subtract 2), starting the number 56      Patient completed task with math errors noted x1and losing their place x0  The peg board pattern was completed to 95% acc  5  Patient will name an average of 15+ items in a category in 60 seconds over 5 trials using compensatory strategies and min cues to facilitate improved word retrieval skills, to be achieved in 4-6 weeks      6  Patient will name an average of 10+ words that begin with a specific letter in 60 seconds over 5 trials using compensatory strategies and min cues to facilitate improved word retrieval skills, to be achieved in 4-6 weeks      7  Patient will name an appropriate synonym/antonym for a given word with 80% accuracy to build expressive vocabulary for conversation, to be achieved in 4-6 weeks      8  Patient will complete word generation tasks (e g , analogies, category matrices, etc ) with 80% accuracy using word finding strategies to facilitate improved word retrieval skills, to be achieved in 4-6 weeks  Intangible Words: To target semantic feature analysis and thought organization; patient was asked to describe abstract words (I e , hurt, luxury) to clinician  Task completed over 25 trials with min difficulty  Plan:  -Patient was provided with home exercises/activities to target goals in plan of care at the end of today's session   -Continue with current plan of care

## 2022-04-05 ENCOUNTER — TELEPHONE (OUTPATIENT)
Dept: NEUROLOGY | Facility: CLINIC | Age: 57
End: 2022-04-05

## 2022-04-05 ENCOUNTER — CLINICAL SUPPORT (OUTPATIENT)
Dept: FAMILY MEDICINE CLINIC | Facility: CLINIC | Age: 57
End: 2022-04-05

## 2022-04-05 DIAGNOSIS — E11.649 UNCONTROLLED TYPE 2 DIABETES MELLITUS WITH HYPOGLYCEMIA WITHOUT COMA (HCC): Primary | ICD-10-CM

## 2022-04-05 NOTE — PROGRESS NOTES
[address]  Lashanda Andrade, PharmD      Jonathon Evangelista is a 64 y o  male referred by Shade Mcclure for Diabetes Education/medication management and was seen by the Clinical Pharmacist, Lashanda Andrade, on 4/5/22 as aevaluation virtually    Assessment and Plan:  1  Diabetes: Patient to  sample Fátima 2 and wear x 14 days to gather more information with regard to need for glucometer and/or medication  Invite sent to [facility] for practice viewing  Metformin cleared by nephrology if necessary  a  Goals  i  A1C <7, FBG 80-130mg/dL  1  Last A1C March 2022 7 5 (new diagnosis)  b  Medications  i  None at the moment  c  Preventative Care (exams, referrals, labs, immunizations)  i  Eye exam due NOW  ii  Foot exam due NOW  iii  Urine microalbumin UTD  iv  Immunizations due NOW   v  On statin  vi  On ARB  d  Education provided  i  Discussed basic nutrition guidelines and handouts provided on nutrition labels, building a plate, foods to avoid or add, and dining out  ii  Discussed signs and symptoms of uncontrolled diabetes  1  Prior to stroke patient experiencing fatigue, polyuria, polydipsia and has noticed those symptoms have improved since lifestyle changes  iii  Discussed blood glucose monitoring options and potential therapies  2  HTN  a  Patient recently hospitalized with hemorrhagic stroke secondary to uncontrolled BP  b  Closely monitored by nephrology/cardiologyH  c  Home cuff calibrated by nephrology: bp 12 points higher systolic, 10 points on diastolic    845/11 sitting 134/73 standing am  125/75 sitting 123/65 standing am  163/93 sitting 147/80 standing pm   119/72 sitting 122/63 standing am  148/86 sitting 140/72 standing pm     3  Afib  a  On dofetilide   b  Xarelto on hold since stroke, CT scan repeated tomorrow, and then will determine resumption of blood thinner status  4  TLC  a   Patient reports major changes to lifestyle including diet changes and exercise  b  Recent weight loss of approx  21 pounds per patient    The plan detailed above was discussed at length with the patient and applicable patient education materials were provided in the Patient Instructions portion of the EMR  Follow-Up and Monitoring:     Appointment: 1-2 weeks pending Jagdeep  Labs: none  Referrals: none  Medications/Immunizations ordered: none      Subjective:      CC Earnest Edwards is a 64 y o  male who presents for evaluation of diabetes and medication mangement  Pt is feeling good today with no problems   HPI Diagnosis of diabetes was  1 month(s) ago, clinical course gradually improving since that time  o Previous medications and tolerability: none  o Hypoglycemic episodes/hypertensive crises/etc reported since last visit: none  o Lifestyle changes progress assessment: patient lost 21 pounds, cut out dessert, not drinking juice, watching what he eats, exercising   Adherence Assessment 0 missed doses in last 2 weeks  Objective:    Vitals   Wt Readings from Last 3 Encounters:   03/15/22 (!) 141 kg (311 lb)   03/14/22 (!) 141 kg (310 lb)   03/09/22 (!) 145 kg (319 lb 10 7 oz)      BP Readings from Last 3 Encounters:   03/14/22 122/74   03/11/22 129/69   03/07/22 136/66      Pulse Readings from Last 3 Encounters:   03/14/22 62   03/11/22 63   03/07/22 62     Estimated body mass index is 44 62 kg/m² as calculated from the following:    Height as of 3/15/22: 5' 10" (1 778 m)  Weight as of 3/15/22: 141 kg (311 lb)    Allergies   No Known Allergies   Meds   Current Outpatient Medications on File Prior to Visit   Medication Sig Dispense Refill    atorvastatin (LIPITOR) 40 mg tablet Take 1 tablet (40 mg total) by mouth daily with dinner 30 tablet 0    cloNIDine (CATAPRES-TTS-1) 0 1 mg/24 hr Place 1 patch (0 1 mg total) on the skin once a week 4 patch 11    dofetilide (TIKOSYN) 500 mcg capsule TAKE 1 CAPSULE BY MOUTH EVERY 12 HOURS 60 capsule 8    hydrALAZINE (APRESOLINE) 50 mg tablet Take 1 tablet (50 mg total) by mouth 2 (two) times a day (Patient taking differently: Take 50 mg by mouth 3 (three) times a day  ) 180 tablet 3    nebivolol (Bystolic) 2 5 mg tablet Take 1 tablet (2 5 mg total) by mouth daily Hold for heart rate less than 50 beats per minute  90 tablet 3    NIFEdipine (PROCARDIA XL) 30 mg 24 hr tablet Take 1 tablet (30 mg total) by mouth daily (Patient taking differently: Take 30 mg by mouth 2 (two) times a day  ) 30 tablet 5    olmesartan (BENICAR) 40 mg tablet Take 1 tablet (40 mg total) by mouth daily 90 tablet 3    torsemide (DEMADEX) 5 MG tablet Take 1 tablet (5 mg total) by mouth daily 30 tablet 5    [DISCONTINUED] budesonide (Pulmicort Flexhaler) 180 MCG/ACT inhaler Inhale 4 puffs 2 (two) times a day for 14 days Rinse mouth after use  (Patient not taking: Reported on 3/14/2022 ) 1 each 0    [DISCONTINUED] losartan (COZAAR) 50 mg tablet TAKE 1 TABLET BY MOUTH EVERY DAY 90 tablet 1     No current facility-administered medications on file prior to visit  Social  Social History     Tobacco Use    Smoking status: Former Smoker     Packs/day: 0 50     Years: 10 00     Pack years: 5 00     Types: Cigarettes     Quit date: 1994     Years since quittin 0    Smokeless tobacco: Never Used   Vaping Use    Vaping Use: Never used   Substance Use Topics    Alcohol use: Yes     Comment: occ    Drug use: No     PMH  Past Medical History:   Diagnosis Date    Ankle swelling     Arthritis     Asthma     Atrial fibrillation (HCC)     Atrial fibrillation with RVR (HCC) 2017    Chronic kidney disease     kidney stones    Gout     Hypertension     Kidney stone     Night sweats     Seasonal allergies     Sleep apnea      I have reviewed the patient's allergies, medications and history as noted in the electronic medical record and updated as necessary    Relevant Labs   Hemoglobin A1C   Date Value   2022 7 5 % (H)   2013 5 8 % of total Hgb (H)       Cholesterol   Date Value Ref Range Status   03/08/2022 165 See Comment mg/dL Final     Comment:     Cholesterol:         Pediatric <18 Years        Desirable          <170 mg/dL      Borderline High    170-199 mg/dL      High               >=200 mg/dL        Adult >=18 Years            Desirable        <200 mg/dL      Borderline High  200-239 mg/dL      High             >239 mg/dL     10/13/2020 189 50 - 200 mg/dL Final     Comment:     Cholesterol:       Desirable         <200 mg/dl       Borderline         200-239 mg/dl       High              >239          07/24/2020 171 50 - 200 mg/dL Final     Comment:       Cholesterol:       Desirable         <200 mg/dl       Borderline         200-239 mg/dl       High              >239            Triglycerides   Date Value Ref Range Status   03/08/2022 143 See Comment mg/dL Final     Comment:     Triglyceride:     0-9Y            <75mg/dL     10Y-17Y         <90 mg/dL       >=18Y     Normal          <150 mg/dL     Borderline High 150-199 mg/dL     High            200-499 mg/dL        Very High       >499 mg/dL    Specimen collection should occur prior to N-Acetylcysteine or Metamizole administration due to the potential for falsely depressed results  10/13/2020 175 (H) <=150 mg/dL Final     Comment:     Triglyceride:     Normal          <150 mg/dl     Borderline High 150-199 mg/dl     High            200-499 mg/dl        Very High       >499 mg/dl    Specimen collection should occur prior to N-Acetylcysteine or Metamizole administration due to the potential for falsely depressed results  07/24/2020 150 <=150 mg/dL Final     Comment:       Triglyceride:     Normal          <150 mg/dl     Borderline High 150-199 mg/dl     High            200-499 mg/dl        Very High       >499 mg/dl    Specimen collection should occur prior to N-Acetylcysteine or Metamizole administration due to the potential for falsely depressed results     10/08/2012 150 (H) <150 mg/dL Final HDL   Date Value Ref Range Status   10/08/2012 37 (L) > OR = 40 mg/dL Final     HDL, Direct   Date Value Ref Range Status   03/08/2022 36 (L) >=40 mg/dL Final     Comment:     Specimen collection should occur prior to Metamizole administration due to the potential for falsley depressed results  10/13/2020 38 (L) >=40 mg/dL Final     Comment:     HDL Cholesterol:       Low     <41 mg/dL  Specimen collection should occur prior to Metamizole administration due to the potential for falsley depressed results  07/24/2020 34 (L) >=40 mg/dL Final     Comment:       HDL Cholesterol:       Low     <41 mg/dL  Specimen collection should occur prior to Metamizole administration due to the potential for falsley depressed results  LDL Calculated   Date Value Ref Range Status   03/08/2022 100 0 - 100 mg/dL Final     Comment:     LDL Cholesterol:     Optimal           <100 mg/dl     Near Optimal      100-129 mg/dl     Above Optimal       Borderline High 130-159 mg/dl       High            160-189 mg/dl       Very High       >189 mg/dl         This screening LDL is a calculated result  It does not have the accuracy of the Direct Measured LDL in the monitoring of patients with hyperlipidemia and/or statin therapy  Direct Measure LDL ([id number]) must be ordered separately in these patients  10/13/2020 116 (H) 0 - 100 mg/dL Final     Comment:     LDL Cholesterol:     Optimal           <100 mg/dl     Near Optimal      100-129 mg/dl     Above Optimal       Borderline High 130-159 mg/dl       High            160-189 mg/dl       Very High       >189 mg/dl         This screening LDL is a calculated result  It does not have the accuracy of the Direct Measured LDL in the monitoring of patients with hyperlipidemia and/or statin therapy  Direct Measure LDL (JXT511) must be ordered separately in these patients     07/24/2020 107 (H) 0 - 100 mg/dL Final     Comment:       LDL Cholesterol:     Optimal           <100 mg/dl     Near Optimal      100-129 mg/dl     Above Optimal       Borderline High 130-159 mg/dl       High            160-189 mg/dl       Very High       >189 mg/dl         This screening LDL is a calculated result  It does not have the accuracy of the Direct Measured LDL in the monitoring of patients with hyperlipidemia and/or statin therapy  Direct Measure LDL ([id number]) must be ordered separately in these patients  No components found for: CREATSERUM, CREATFLUID, CREATADULT, MICROALBUMIN, MICROALBUPOC, POTASSIUM  Estimated Creatinine Clearance: 82 7 mL/min (A) (by C-G formula based on SCr of 1 41 mg/dL (H))  Applicable Home monitoring values reported (BP, SMBG, etc)  Detailed above  Relevant ROS/general appearance  General appearance: alert, well appearing, and in no distress  Review of Systems   Constitutional: Negative for fatigue and unexpected weight change  Respiratory: Negative for shortness of breath  Cardiovascular: Negative for leg swelling  Gastrointestinal: Negative for abdominal pain  Endocrine: Positive for polyuria (diuretic and drinking a lot of water to help with BP)  Negative for polydipsia       Applicable Risk Scores  The 10-year ASCVD risk score (Mark Anthony Meredith et al , 2013) is: 13 1%    Values used to calculate the score:      Age: 64 years      Sex: Male      Is Non- : No      Diabetic: Yes      Tobacco smoker: No      Systolic Blood Pressure: 482 mmHg      Is BP treated: Yes      HDL Cholesterol: 36 mg/dL      Total Cholesterol: 023 mg/dL       Applicable Preventative Care Dates (immunizations, foot exam, eye exam, dental,etc)  Immunization History   Administered Date(s) Administered    COVID-19 MODERNA VACC 0 5 ML IM 02/11/2021, 03/12/2021    Influenza, recombinant, quadrivalent,injectable, preservative free 10/05/2018       Reason For Outreach  Embedded Pharmacist    Demographics  Interaction Method: Virtual  Type of Intervention: New    Topic(s) Addressed  Diabetes; Transitions of Care; Hypertension; Medication management    Intervention(s) Made    Pharmacologic:    -- Prevent or Manage Adverse Drug Reaction  -- Medication reconciliation    Non-Pharmacologic:    -- Adherence addressed  -- Care coordination  -- Disease state education  -- Home monitoring discussed or provided  -- Lifestyle education  -- Preventive care gap closure recommendation    Tool(s) Used  Not Applicable    Time Spent:   Time Spent in Direct Patient Care: 60 minutes  Time Spent in Care Coordination: 45 minutes    Recommendations  Recipient: Patient/caregiver  Outcome:  All Accepted

## 2022-04-05 NOTE — TELEPHONE ENCOUNTER
21-30 Day Post [facility] Discharge Follow Up    Hospitalization: 3/7/2022 - 3/11/2022  The purpose of this phone call is to assess patient's general wellbeing or for any assistance needed with follow-up care  As I was about to call patient, his wife Mark vences called in  She was questioning the CT scan that had to be rescheduled from 4/1 to tomorrow  Informed her that I will be following up after resulted to speak with an attending neurovascular neurologist, then I will be reaching back out to the patient  She verbalizes understanding  Since discharge, she denies patient experiencing any new or worsening stroke-like symptoms  Reports he is recovering and doing really well  States he is working and still progressing with therapy  OT was discharged, speech is scheduled next week, and PT scheduled through the 18th  Patient continues to have sensation loss of the left side but is improving  As for risk factors, patient has been working on his risk factors  Reports he lost 25 lbs since discharge, glucose levels are stable, and wife has been assisting with his care as needed  I addressed all questions  At the conclusion of the conversation, wife denies having any further questions or concerns

## 2022-04-05 NOTE — PATIENT INSTRUCTIONS
Here are some other ideas to help with healthy eating:   Schedule regular meals and snacks   Use a smaller plate     Strive to INCREASE:  o Lean protein  o Fruits and vegetables   Strive to DECREASE:  o Fast food meals  o Sugary beverages, juice, and regular soda (Drink more water instead!)  o Snack chips or crackers  o Desserts and other sweets  o Use of butter and meet fats when cooking   Keep track of your progress on a food diary or smart phone caridad ([employer], [employer])

## 2022-04-06 ENCOUNTER — OFFICE VISIT (OUTPATIENT)
Dept: PHYSICAL THERAPY | Facility: CLINIC | Age: 57
End: 2022-04-06
Payer: COMMERCIAL

## 2022-04-06 ENCOUNTER — HOSPITAL ENCOUNTER (OUTPATIENT)
Dept: RADIOLOGY | Facility: MEDICAL CENTER | Age: 57
Discharge: HOME | End: 2022-04-06
Payer: COMMERCIAL

## 2022-04-06 DIAGNOSIS — I61.9 ICH (INTRACEREBRAL HEMORRHAGE) (HCC): ICD-10-CM

## 2022-04-06 DIAGNOSIS — R26.89 FUNCTIONAL GAIT ABNORMALITY: Primary | ICD-10-CM

## 2022-04-06 DIAGNOSIS — I62.9 INTRACRANIAL BLEED (HCC): ICD-10-CM

## 2022-04-06 LAB — RENIN PLAS-CCNC: 0.43 NG/ML/HR (ref 0.17–5.38)

## 2022-04-06 PROCEDURE — G1004 CDSM NDSC: HCPCS

## 2022-04-06 PROCEDURE — 97116 GAIT TRAINING THERAPY: CPT

## 2022-04-06 PROCEDURE — 97112 NEUROMUSCULAR REEDUCATION: CPT

## 2022-04-06 PROCEDURE — 70450 CT HEAD/BRAIN W/O DYE: CPT

## 2022-04-06 NOTE — PROGRESS NOTES
Daily Note     Today's date: 2022  Patient name: Tru Horner  : 1965  MRN: [id number]   Referring provider: Maria De Jesus Rodriguez  Dx:   Encounter Diagnosis     ICD-10-CM    1  Functional gait abnormality  R26 89    2  Intracranial bleed ([address] )  I62 9        Start Time: 0808  Stop Time: 7306  Total time in clinic (min): 57 minutes    Subjective: Presents to his OP PT session doing well; no new c/o discomfort or pain  Objective: See treatment diary below      Assessment: Tremaine Lee participated in his skilled PT session focused on improving balance integration and conditioning through HIGT  Continuing to progress R SLS balance intervention intensity levels with further improvements in R ankle neuromuscular control resulting in greater ankle stability  Would continue to benefit from skilled PT intervention to address deficits in balance and gait to maximize overall functional mobility, quality of life, and return as close to PLOF as possible  Plan: Continue per plan of care  Continue HIGT  Short Term Goal Expiration Date:(22)  Long Term Goal Expiration Date: (22)  POC Expiration Date: (22)    Precautions: HTN, CKD III     target heart rate range: 112-136 BPM  Beta Blockers - YES     Today's Date:     Resting HR: 60 BPM    BP Pre exercise: 142/78 mmHg  BP during exercise:  BP post exercise:           Total minutes spent  within 70-85% Max HR:   (jonathan flowsheet)    Description of activity: (treadmill, overground, assist, distance, speed etc) Peak HR observed Peak RPE observed Total Minutes in target zone  mRPE Total minutes Timed Charges   (Nmr, TE, TA, GT)   TM, 5# BL AW, No UE (END OF SESSION)  2 2 mph 3% grade x4 min   2 2 mph 6% grade x4 min   2 2 mph 9% grade x4 min     CD 1 8 mph x2 min    17 min total 124 8 5/10 8 min 14 GT   Hallway Ambulation 15# BL AW  x1000'  6/10 - 10 GT   Hallway HKM SLS 5# BL AW w/cross-body tennis ball bounce  x200' 110 8/10 10 10 NMR Sports:    15# R shoulder bolster carry, 5# BL AW, soccer dribble fwd x100'  30# bolster x100'  8/10 10 10 NMR   BACKWARDS Lunges w/ R UE dowel support  Hallway  5# BL AW  1x100'  8/10 10 10 NMR       *Therapeutic rest breaks throughout session as needed due to high intensity interval training per ACSM guidelines for recovery before return to exercise  Vitals monitored, documented and discussed with pt during this time

## 2022-04-08 ENCOUNTER — EVALUATION (OUTPATIENT)
Dept: SPEECH THERAPY | Facility: CLINIC | Age: 57
End: 2022-04-08
Payer: COMMERCIAL

## 2022-04-08 ENCOUNTER — TELEPHONE (OUTPATIENT)
Dept: NEUROLOGY | Facility: CLINIC | Age: 57
End: 2022-04-08

## 2022-04-08 ENCOUNTER — OFFICE VISIT (OUTPATIENT)
Dept: PHYSICAL THERAPY | Facility: CLINIC | Age: 57
End: 2022-04-08
Payer: COMMERCIAL

## 2022-04-08 DIAGNOSIS — I61.9 HEMORRHAGIC STROKE (HCC): ICD-10-CM

## 2022-04-08 DIAGNOSIS — E78.5 DYSLIPIDEMIA: ICD-10-CM

## 2022-04-08 DIAGNOSIS — R26.89 FUNCTIONAL GAIT ABNORMALITY: ICD-10-CM

## 2022-04-08 DIAGNOSIS — I62.9 INTRACRANIAL BLEED (HCC): Primary | ICD-10-CM

## 2022-04-08 DIAGNOSIS — R48.8 OTHER SYMBOLIC DYSFUNCTIONS: Primary | ICD-10-CM

## 2022-04-08 PROCEDURE — 92507 TX SP LANG VOICE COMM INDIV: CPT

## 2022-04-08 PROCEDURE — 97112 NEUROMUSCULAR REEDUCATION: CPT

## 2022-04-08 PROCEDURE — 97116 GAIT TRAINING THERAPY: CPT

## 2022-04-08 PROCEDURE — 96125 COGNITIVE TEST BY HC PRO: CPT

## 2022-04-08 RX ORDER — ATORVASTATIN CALCIUM 40 MG/1
40 TABLET, FILM COATED ORAL
Qty: 30 TABLET | Refills: 4 | Status: SHIPPED | OUTPATIENT
Start: 2022-04-08 | End: 2022-06-27

## 2022-04-08 NOTE — PROGRESS NOTES
Speech-Language Pathology Discharge    Today's date: 2022  Patients name: Jackeline Diaz  : 1965  MRN: [id number]  Safety measures: CVA  Referring provider: Lamont Chatterjee MD    Primary diagnosis/billing code: K60 4  Secondary diagnosis/billing code: I61 9    Visit tracking:  -Referring provider: Epic  -Billing guidelines: AMA  -Visit #8  -Insurance: Madhavi Taylor      Subjective comments: Patient I am doing so much better  Work has been going really well and so is my language skills  Patient's goal(s): "To find out the areas that I am struggling with and relearn the pathways  I want to learn strategies and get back to the way I was "      Assessments    The Repeatable Battery for the Assessment of Neuropsychological Status (RBANS) is a brief, individually-administered assessment which measures attention, language, visuospatial/constructional abilities, and immediate & delayed memory  The RBANS is intended for use with adolescents to adults, ages 15 to 80 years  The following results were obtained during the administration of the assessment      Form: B     Cognitive Domain/Subtest: Index Score: Percentile Rank: Classification: IE Score: Status:   IMMEDIATE MEMORY 103 58%ile AVERAGE 83 IMPROVEMENT        1  List Learning ()            2  Story Memory ()               VISUOSPATIAL/  CONSTRUCTIONAL 112    79%ile HIGH AVERAGE 84 IMPROVEMENT        3  Figure Copy ()            4  Line Orientation ()               LANGUAGE 105 63%ile AVERAGE 79 IMPROVEMENT        5  Picture Naming (10/10)            6  Semantic Fluency ()               ATTENTION 103 58%ile AVERAGE 85 IMPROVEMENT        7  Digit Span ()            8  Coding ( )               DELAYED MEMORY 105 63%ile AVERAGE 99 IMPROVEMENT        9  List Recall (5/10)            10  List Recognition ()            11  Story Recall ()            12   Figure Recall ()                Sum of Index Scores:  528   430 IMPROVEMENT   Total Score:  107         Percentile: 68%ile         Classification: AVERAGE               IE indicates the scores from the initial evaluation (03/17/2022)  Form: A        *Patient named 25 concrete category members (animals) in 60 sec (norm=15+)  -- AVERAGE     *Patient named 12 abstract category members (words beginning with letter 'm') in 60 sec (norm=10+)  -- AVERAGE        Goals    Short-term goals:  1  Patient will be educated on the use of internal and external memory aids and compensatory strategies with 80% accuracy to facilitate increased recall of routine, personal information, and recent events, to be achieved in 4-6 weeks      2  Patient will complete auditory immediate and short term memory tasks to 80% accuracy to facilitate increased ability to retell narratives and recall information within functional living environment, to be achieved in 4-6 weeks  MET     3  Patient will complete complex auditory attention processing tasks (e g , sentence unscramble, ranking numbers/words, etc ) to improve working memory with 80% accuracy, to be achieved in 4-6 weeks  MET     4  Patient will facilitate planning by completing thought organization tasks (e g , sequencing, deduction puzzles, etc ) with 80% accuracy to facilitate increased executive functioning, working memory, problem solving, and processing skills, to be achieved in 4-6 weeks  MET     5  Patient will name an average of 15+ items in a category in 60 seconds over 5 trials using compensatory strategies and min cues to facilitate improved word retrieval skills, to be achieved in 4-6 weeks  MET     6  Patient will name an average of 10+ words that begin with a specific letter in 60 seconds over 5 trials using compensatory strategies and min cues to facilitate improved word retrieval skills, to be achieved in 4-6 weeks  MET     7   Patient will name an appropriate synonym/antonym for a given word with 80% accuracy to build expressive vocabulary for conversation, to be achieved in 4-6 weeks  MET     8  Patient will complete word generation tasks (e g , analogies, category matrices, etc ) with 80% accuracy using word finding strategies to facilitate improved word retrieval skills, to be achieved in 4-6 weeks  MET     Treatment today:  Patient participated in playing [address]  The clinician and patient took turns describing a word for the other to guess, but had to avoid using certain words listed on the card  He was able to describe the word with 100% acc and guess the word with 100% acc         Long-term goals:  1  Patient will demonstrate cognitive-communication skills consistent with age and education given use of compensatory strategies when needed to resume baseline activities and responsibilities in home, community, and work/school settings by discharge  MET      2  Patient will complete cognitive-linguistic therapy that addresses patients specific deficits in processing speed, short-term working memory, attention to detail, monitoring, sequencing, and organization skills, with instruction, to alleviate effects of executive functioning disorder deficits by discharge  MET    3  Patient will utilize word finding strategies during semantic feature analysis treatment activity for improved naming and verbal expression skills  MET       Impressions/Recommendations  Impressions:   Patient presents with continued improvements in overall cognition and language and has met his goals for OP ST  Discharge from therapy at this time        -Intervention comments:   Initial evaluation/administration of standardized test with patient (30 minutes) 30 Minutes therapy    Scoring and interpretation of standardized test for the development of POC (60 minutes)

## 2022-04-08 NOTE — PROGRESS NOTES
Daily Note     Today's date: 2022  Patient name: Terry Nelson  : 1965  MRN: [id number]   Referring provider: Brittany Sandoval  Dx:   Encounter Diagnosis     ICD-10-CM    1  Intracranial bleed (HCC)  I62 9    2  Functional gait abnormality  R26 89        Start Time: 0900  Stop Time: 1000  Total time in clinic (min): 60 minutes    Subjective: Presents to his OP PT session doing well; no new c/o discomfort or pain  Feels like "therapy is working!"       Objective: See treatment diary below      Assessment: Yuriy Beasley participated in his skilled PT session focused on improving balance integration and conditioning through HIGT  Focused on progression of agility movements today with rapid R SLS balance; subjectively feeling return of R LE sensation  Continuing to note trending of lower systolic number Would continue to benefit from skilled PT intervention to address deficits in balance and gait to maximize overall functional mobility, quality of life, and return as close to PLOF as possible  Plan: Continue per plan of care  Continue HIGT  Short Term Goal Expiration Date:(22)  Long Term Goal Expiration Date: (22)  POC Expiration Date: (22)    Precautions: HTN, CKD III     target heart rate range: 112-136 BPM  Beta Blockers - YES     Today's Date:     Resting HR: 60 BPM    BP Pre exercise: 136/80 mmHg  BP during exercise:  BP post exercise:           Total minutes spent  within 70-85% Max HR:   (jonathan flowsheet)    Description of activity: (treadmill, overground, assist, distance, speed etc) Peak HR observed Peak RPE observed Total Minutes in target zone  mRPE Total minutes Timed Charges   (Nmr, TE, TA, GT)   TM, 5# BL AW, No UE (SESSION START)  2 2 mph 3% grade x4 min   2 2 mph 6% grade x4 min   2 2 mph 9% grade x4 min     CD 1 8 mph x2 min    14 min total 104 8/10 8 min 14 GT   Agility Ladder: 5# BL AW  In-in out-out combo    Fwd:  x1 round practice  x3 rounds negative splits HR 114  x200' CD    Lat:  x1 round practice  x3 rounds negative splits  x200' CD    Alternating blue airex foam  Lat: x4 rounds SLS  x2 rounds full fwd/bwd  Fwd: x4 rounds SLS  x2 rounds fwd/bwd alt     Add: 12" hurdles between encouraging lateral stepping outside FRANKY x4 rounds      126 8/10 35 40 NMR                   BACKWARDS Lunges w/ R UE dowel support  Hallway  5# BL AW  1x180'  8/10 3 3 NMR       *Therapeutic rest breaks throughout session as needed due to high intensity interval training per ACSM guidelines for recovery before return to exercise  Vitals monitored, documented and discussed with pt during this time

## 2022-04-08 NOTE — TELEPHONE ENCOUNTER
Called and spoke with patient   appointment was schd incorrectly for  Wednesday April 20, 2022 with Cara Pt was upset he don't want r/s Appt he want a call back with a sooner appt  I informed the patient that he's appointment was schd wrong and we need to r/s appt and he refused  Goldie Reese please advice?  Pt is upset he don't want to r/s appointment

## 2022-04-11 ENCOUNTER — TELEPHONE (OUTPATIENT)
Dept: NEUROLOGY | Facility: CLINIC | Age: 57
End: 2022-04-11

## 2022-04-11 ENCOUNTER — OFFICE VISIT (OUTPATIENT)
Dept: PHYSICAL THERAPY | Facility: CLINIC | Age: 57
End: 2022-04-11
Payer: COMMERCIAL

## 2022-04-11 ENCOUNTER — APPOINTMENT (OUTPATIENT)
Dept: SPEECH THERAPY | Facility: CLINIC | Age: 57
End: 2022-04-11
Payer: COMMERCIAL

## 2022-04-11 DIAGNOSIS — I62.9 INTRACRANIAL BLEED (HCC): Primary | ICD-10-CM

## 2022-04-11 DIAGNOSIS — R26.89 FUNCTIONAL GAIT ABNORMALITY: ICD-10-CM

## 2022-04-11 PROCEDURE — 97112 NEUROMUSCULAR REEDUCATION: CPT

## 2022-04-11 PROCEDURE — 97116 GAIT TRAINING THERAPY: CPT

## 2022-04-11 NOTE — TELEPHONE ENCOUNTER
Called Artemio Fernandez (she is on consent form) and read Dr Zuleyka Ashraf message  She verbalizes understanding of information  Reports they do not have any further questions or concerns at this time

## 2022-04-11 NOTE — TELEPHONE ENCOUNTER
I looked at the prior MRI and the CT scan  Yes he is clear to restart from my standpoint  If he is taking aspirin or any other anticoagulation right now he should stop, should remember to take her out there with food and especially keep control of his blood pressure with monitoring away from the doctors office    11:46 AM

## 2022-04-11 NOTE — TELEPHONE ENCOUNTER
Please also send this to Adair Aleman due to the time frame [facility] schedule at 7:30AM appointment not 10:00 am  The notes stated can be seen but it's the time schedule is the question  Rob Silver will need follow up in in 6 weeks with neurovascular Attending/AP  He will not require outpatient neurological testing

## 2022-04-11 NOTE — TELEPHONE ENCOUNTER
----- Message from An [facility] sent at 3/10/2022 11:33 AM EST -----  Regarding: Repeat CT/Follow up  Stone Moore you are doing well! I just wanted to reach out regarding this patient and his follow up  He came in for a hemorrhagic stroke with subsequent ischemic stroke and is usually on anticoagulation at baseline but that is being held at this time  We are recommending repeat CT head in 3 weeks and if stable, then may resume AC  He will need follow up in 4-6 weeks but since the CT will be done before that, would you be able to keep an eye out for his results/keep him on your radar? That would be much appreciated   Thanks Kate!     Have a great day,  An

## 2022-04-11 NOTE — PROGRESS NOTES
Daily Note     Today's date: 2022  Patient name: Gene Amos  : 1965  MRN: [id number]   Referring provider: Juan Tomlinson  Dx:   Encounter Diagnosis     ICD-10-CM    1  Intracranial bleed (HCC)  I62 9    2  Functional gait abnormality  R26 89        Start Time: 0900  Stop Time: 1000  Total time in clinic (min): 60 minutes    Subjective: Presents to his OP PT session doing well; no new c/o discomfort or pain  Jogged his local hill this weekend; was surprised at how intense it was with few laps  Objective: See treatment diary below      Assessment: Мария Torres participated in his skilled PT session focused on improving balance integration and conditioning through HIGT  Continued high-intensity SLS balance on R LE noting further improvements in form to progressions  Would continue to benefit from skilled PT intervention to address deficits in balance and gait to maximize overall functional mobility, quality of life, and return as close to PLOF as possible  Plan: Continue per plan of care  Continue HIGT  PROGRESS UPDATE NV        Short Term Goal Expiration Date:(22)  Long Term Goal Expiration Date: (22)  POC Expiration Date: (22)    Precautions: HTN, CKD III     target heart rate range: 112-136 BPM  Beta Blockers - YES     Today's Date: 22    Resting HR: 58 BPM    BP Pre exercise: 136/76 mmHg  BP during exercise:  BP post exercise:           Total minutes spent  within 70-85% Max HR:   (jonathan flowsheet)    Description of activity: (treadmill, overground, assist, distance, speed etc) Peak HR observed Peak RPE observed Total Minutes in target zone  mRPE Total minutes Timed Charges   (Nmr, TE, TA, GT)   TM, 5# BL AW, No UE (SESSION START)  2 2 mph 3% grade x3 min    2 2 mph 6% grade x3 min   2 2 mph 9% grade x4 min  2 4 mph 9% grade x4 min    CD 1 8 mph x2 min    16 min total 110 8/10 12 16 GT           R LE SLS med ball rolls   6# x30 A-P, x30 M-L  Soccer ball x30 A-P, x30 M-L    R LE SLS Cone taps (4)  x10 rounds total    Single R LE RDL, 10# R DB  2x10    10# L DB  x10  8/10 30 30 NMR           Clinic Lunges fwd 5# BL AW w/ JUAN riley  2x93 ft consec  8/10 14 14 NMR       *Therapeutic rest breaks throughout session as needed due to high intensity interval training per ACSM guidelines for recovery before return to exercise  Vitals monitored, documented and discussed with pt during this time

## 2022-04-13 ENCOUNTER — EVALUATION (OUTPATIENT)
Dept: PHYSICAL THERAPY | Facility: CLINIC | Age: 57
End: 2022-04-13
Payer: COMMERCIAL

## 2022-04-13 DIAGNOSIS — I62.9 INTRACRANIAL BLEED (HCC): ICD-10-CM

## 2022-04-13 DIAGNOSIS — R26.89 FUNCTIONAL GAIT ABNORMALITY: Primary | ICD-10-CM

## 2022-04-13 PROCEDURE — 97116 GAIT TRAINING THERAPY: CPT

## 2022-04-13 PROCEDURE — 97112 NEUROMUSCULAR REEDUCATION: CPT

## 2022-04-13 PROCEDURE — 97110 THERAPEUTIC EXERCISES: CPT

## 2022-04-13 NOTE — PROGRESS NOTES
Progress Note    Today's date: 2022  Patient name: Sherita Posey  : 1965  MRN: [id number]  Referring provider: Oliva Live  Dx:   Encounter Diagnosis     ICD-10-CM    1  Functional gait abnormality  R26 89    2  Intracranial bleed (HCC)  I62 9        Start Time: 0800  Stop Time: 0900  Total time in clinic (min): 60 minutes    Subjective: Presents for progress assessment doing very well today; feels in the last month of skilled PT care he has made significant improvements in balance, balance confidence, and has experienced significant sensory return in his R LE  Still feels he has a ways to go in terms of reaching as close to his baseline as possible; concerned about being able to maintain his balance independently with his highly active outdoorsman lifestyle as the weather warms up  Wants to continue to work on his balance and activity facilitating R LE sensory return to maximize his quality of life  Objective: See treatment diary below    Balance Test     MCTSIB (s): IE: Firm EO: 30s  Firm EC: 30s  Foam EO: 30s  Foam EC: 17 72s, 30s   PN 22: 30s      Gait Analysis: Deviations to R when vision obstructed secondary to R global sensory deficits    PN 22: No deviations to R when visually obstructed      Stair Navigation: Reciprocal, no UE ascending/descending   PN 22: Same       Initial  Discharge   Months Since Event       Stroke Impact Scale       Physical       Memory       Mood       Communication       Mobility       Hand       Participation       Recovery       strength       Self Efficacy 97 5% PN 22: 92 6%   ABC Scale 82 5% PN 22: 90 6%   5x STS 8 71s no UE PN 22: 7 34s no UE    6 mwt 1050 ft no AD PN 22:1800 ft no AD   TUG 5 03s no AD PN 22: 4 09s no AD   Gait Speed 1 19 m/s no AD PN 22: 1 33 m/s no AD   BBS 55 PN 22: 55   FGA 27/30 PN 22: 30/30   FOTO Satisfaction Score       FOTO -tx for conditions       FOTO - Overall results          DEBRA  PN 22: Firm:  DLS: 0 errors  SLS: 6 errors  TS: 5 errors  11 total    Foam:  DLS: 0 errors   SLS: 7 errors  TS: 8 erros  15 total    26 total errors    Assessment: Skilled progress assessment performed noting overall singificant improvements in balance per FGA and MCTSIB, mobility per TUG, LE power per 5xSTS, gait speed, and endurance per 6 MWT  DEBRA assessed today supporting subjective reports of difficulties with higher-level balance activities representative of his current lifestyle  Discussed current return of sensation in R LE and influence on balance performance, balance confidence, and self efficacy with Modesto Arndt most notably saying "now I do not want to be a burden (to my wife)  " Would continue to benefit from skilled PT care to maximize balance and mobility and assist in reaching personal goals  Impairments: abnormal coordination, abnormal gait, impaired balance and lacks appropriate home exercise program  Barriers to therapy: None  Understanding of Dx/Px/POC: good   Prognosis: good   Goals  ST weeks  1  Modesto Arndt will demonstrate independence with initial HEP   - MET  2  Modesto Arndt will improve MCTSIB to globally 35s consistently  - MET  3  Modesto Arndt will improve 6 MWT minimally by 150'  - MET  4  Modesto Arndt will subjectively report significant improvements in his balance and balance confidence  - ONGOING, experienced significant improvements however does not fully feel confident secondary to residual sensory deficits  LT weeks  1  Modesto Arndt will report no limitations in outdoor activities relative to his balance  - ONGOING  2  Modesto Arndt will report full return to work-related responsibilities uninfluenced by balance  - ONGOING  (NEW AS OF 22) 3  Score 19 total errors or less per DEBRA       Plan: Continue per POC    Patient would benefit from: skilled physical therapy, skilled occupational therapy, speech eval and skilled speech therapy  Referral necessary: Yes  Planned modality interventions: biofeedback  Planned therapy interventions: gait training, home exercise program, therapeutic exercise, therapeutic activities, patient education, neuromuscular re-education and manual therapy  Frequency: 3x week  Duration in weeks: 8  Plan of Care beginning date: 3/14/2022  Plan of Care expiration date: 5/20/2022  Treatment plan discussed with: patient and family          Short Term Goal Expiration Date:(4/20/22)  Long Term Goal Expiration Date: (5/20/22)  POC Expiration Date: (5/20/22)    Precautions: HTN, CKD III     target heart rate range: 112-136 BPM  Beta Blockers - YES     Today's Date:     Resting HR: 58 BPM    BP Pre exercise: 136/76 mmHg  BP during exercise:  BP post exercise: Total minutes spent  within 70-85% Max HR:   (jonathan flowsheet)    Description of activity: (treadmill, overground, assist, distance, speed etc) Peak HR observed Peak RPE observed Total Minutes in target zone  mRPE Total minutes Timed Charges   (Nmr, TE, TA, GT)                                               *Therapeutic rest breaks throughout session as needed due to high intensity interval training per ACSM guidelines for recovery before return to exercise  Vitals monitored, documented and discussed with pt during this time

## 2022-04-15 ENCOUNTER — APPOINTMENT (OUTPATIENT)
Dept: PHYSICAL THERAPY | Facility: CLINIC | Age: 57
End: 2022-04-15
Payer: COMMERCIAL

## 2022-04-15 ENCOUNTER — APPOINTMENT (OUTPATIENT)
Dept: SPEECH THERAPY | Facility: CLINIC | Age: 57
End: 2022-04-15
Payer: COMMERCIAL

## 2022-04-18 ENCOUNTER — APPOINTMENT (OUTPATIENT)
Dept: SPEECH THERAPY | Facility: CLINIC | Age: 57
End: 2022-04-18
Payer: COMMERCIAL

## 2022-04-18 ENCOUNTER — APPOINTMENT (OUTPATIENT)
Dept: PHYSICAL THERAPY | Facility: CLINIC | Age: 57
End: 2022-04-18
Payer: COMMERCIAL

## 2022-04-19 ENCOUNTER — CLINICAL SUPPORT (OUTPATIENT)
Dept: FAMILY MEDICINE CLINIC | Facility: CLINIC | Age: 57
End: 2022-04-19

## 2022-04-19 DIAGNOSIS — E11.649 UNCONTROLLED TYPE 2 DIABETES MELLITUS WITH HYPOGLYCEMIA WITHOUT COMA (HCC): Primary | ICD-10-CM

## 2022-04-19 NOTE — PROGRESS NOTES
[address] [place] [address]  Melissa Her is a 64 y o  male who presents via telephone for follow-up  Reason for visit: diabetes and CGM review  Plan:    The following actions were taken today by the Clinical Pharmacist:   1  Medications: Start Ozempic 0 25mg once weekly x 4 doses then titrate per schedule/tolerability  Pended new RX for PCP  Follow-up:   Follow-up with pharmacist in 1 week  Next PCP visit: not yet scheduled    - Home Monitoring Records: SMBGs  - Labs: up to date  - Referrals: none         Chronic Conditions Addressed at this Visit:       Diabetes: Goals - A1c: <7%; SMBGs: Preprandial: 80-130mg/dL per ADA Guidelines  - Most recent A1c: above goal   - SMBG: below goal (see attached Fátima report)  Current DM Regimen:   none  MEDICATIONS: Start Ozempic 0 25mg once weekly  HOME MONITORING: Check blood sugars prn and record  HTN: BP goal: <130/80 mmHg based on ADA Guidelines [proteinuria, existing ASCVD, or 10-year ASCVD risk ?15%]  - In-office BP below goal, HR acceptable   MEDICATIONS: Clonidine, Hydralazine, Nebivolol, Nifedipine, Olmesartan, Torsemide  Continue  as prescribed  Managed by cardiology/nephrology/neurology   LABS: labs are reviewed    Hyperlipidemia with clinical ASCVD event;   2018 ACC/AHA blood cholesterol guidelines recommends high-intensity statin  Patient tolerating statin well without side effects   MEDICATIONS: Atorvastatin 40mg daily  Continue as prescribed   LABS: labs are reviewed    Medication Reconciliation: Medication list reviewed with patient at today's visit and updated to reflect medications patient is currently taking   - Medication adherence is excellent  Efforts to improve compliance will be directed at continued diet modifications and weight loss  Education:  - Discussed ABCs of diabetes management (A1c/BP/cholesterol) and goals with patient today    Patient-specific goals:  1  Obtain ozempic and start dose  2  Continue lifestyle changes you've implemented     Subjective:     DM History:  Microvascular complications: cardiovascular disease and cerebrovascular disease  Cardiovascular complications: stroke/TIA  - Aspirin (Men>50, Women>60): Not Indicated  - Statin: Yes   - ACEi/ARB: Yes    Previous DM medications:    none    DM Self-Management:  - Self-monitoring: are performed regularly  He checks BG weekly, including FBG  He did provide blood glucose log today  - SMBG readings (no log, per memory): see Ming Factor report from recent sample CGM session  - Hypoglycemia: (+) awareness/unawareness/denies any episodes   Glucometer: No, Brand: na   CGM: No, Brand: na    Reviewed recent sample CGM session with patient to evaluate need for additional glycemic control in light of recent dramatic lifestyle changes and weight loss despite elevated A1C  CGM shows patient with >90% TIR, no hypoglycemic events  Patient interested in [address] as reported by his friend  Discussed indications, dosing, side effects, considerations for diabetes as well as weight loss, administration, and cost  Patient would like to trial medication  Will pend for PCP and follow up for titration, etc      Diabetes  He presents for his follow-up diabetic visit  He has type 2 diabetes mellitus  His disease course has been fluctuating  There are no hypoglycemic associated symptoms  There are no hypoglycemic complications  Symptoms are improving  Diabetic complications include a CVA  Risk factors for coronary artery disease include diabetes mellitus, dyslipidemia, family history, hypertension, male sex and obesity  When asked about current treatments, none were reported  He is compliant with treatment all of the time  He is following a generally healthy diet  He has not had a previous visit with a dietitian  He participates in exercise daily  His home blood glucose trend is decreasing steadily   An ACE inhibitor/angiotensin II receptor blocker is being taken  He does not see a podiatrist Eye exam is not current  Medication Adherence: Responsible for medication management: patient  Wife involved  Medication adherence: taking as prescribed  Patient denies missed/extra doses  Medication Efficacy/Safety:   Clinically significant drug interactions identified:  None   Side effects from medication(s) reported:  None    Lifestyle:  Social History     Tobacco Use    Smoking status: Former Smoker     Packs/day: 0 50     Years: 10 00     Pack years: 5 00     Types: Cigarettes     Quit date: 1994     Years since quittin 0    Smokeless tobacco: Never Used   Vaping Use    Vaping Use: Never used   Substance Use Topics    Alcohol use: Yes     Comment: occ    Drug use: No          Objective:   SMBG readings ( ):  See attached  Current Outpatient Medications   Medication Instructions    atorvastatin (LIPITOR) 40 mg, Oral, Daily with dinner    cloNIDine (CATAPRES-TTS-1) 0 1 mg, Transdermal, Weekly    dofetilide (TIKOSYN) 500 mcg capsule TAKE 1 CAPSULE BY MOUTH EVERY 12 HOURS    hydrALAZINE (APRESOLINE) 50 mg, Oral, 2 times daily    nebivolol (BYSTOLIC) 2 5 mg, Oral, Daily, Hold for heart rate less than 50 beats per minute   NIFEdipine (PROCARDIA XL) 30 mg, Oral, Daily    olmesartan (BENICAR) 40 mg, Oral, Daily    rivaroxaban (XARELTO) 20 mg, Oral, Daily with breakfast    Semaglutide(0 25 or 0 5MG/DOS) 0 25 mg, Subcutaneous, Weekly    torsemide (DEMADEX) 5 mg, Oral, Daily       I have reviewed the patient's allergies, medications and history as noted in the electronic medical record and updated as necessary  Vital Signs:  BP Readings from Last 3 Encounters:   22 122/74   22 129/69   22 136/66     Pulse Readings from Last 3 Encounters:   22 62   22 63   22 62      Estimated body mass index is 44 62 kg/m² as calculated from the following:    Height as of 3/15/22: 5' 10" (1 778 m)  Weight as of 3/15/22: 141 kg (311 lb)  Pertinent Lab Data:  Lab Results   Component Value Date    SODIUM 139 04/01/2022    K 4 1 04/01/2022     04/01/2022    CO2 27 04/01/2022    BUN 20 04/01/2022    CREATININE 1 41 (H) 04/01/2022    GLUC 147 (H) 03/10/2022    CALCIUM 8 9 04/01/2022     Lab Results   Component Value Date    HGBA1C 7 5 (H) 03/08/2022        Preventative Care:  A1c measurement: Up to date  Dilated eye exam: Overdue  Foot exam: Overdue  Dental exam: Up to date  Urinary microalbumin measurement: Up to date  Health Maintenance Due   Topic Date Due    Hepatitis C Screening  Never done    Pneumococcal Vaccine: Pediatrics (0 to 5 Years) and At-Risk Patients (6 to 59 Years) (1 of 2 - PPSV23) Never done    Diabetic Foot Exam  Never done    DM Eye Exam  Never done    HIV Screening  Never done    DTaP,Tdap,and Td Vaccines (1 - Tdap) Never done    Colorectal Cancer Screening  Never done    COVID-19 Vaccine (3 - Booster for Moderna series) 08/12/2021    Influenza Vaccine (1) 09/01/2021    Depression Screening  10/13/2021    Annual Physical  10/13/2021    BMI: Followup Plan  07/21/2022       Reason For Outreach  Embedded Pharmacist    Demographics  Interaction Method: Phone  Type of Intervention: Follow-Up    Topic(s) Addressed  Diabetes; CGM    Intervention(s) Made    Pharmacologic:    -- Medication Initiation  -- Prevent or Manage Adverse Drug Reaction  -- Drug Interaction  -- Medication reconciliation    Non-Pharmacologic:    -- Adherence addressed  -- Chart update  -- Cost  -- Disease state education  -- Home monitoring discussed or provided  -- Preventive care gap closure recommendation    Tool(s) Used  Not Applicable    Time Spent:   Time Spent in Direct Patient Care: 30 minutes  Time Spent in Care Coordination: 30 minutes    Recommendations  Recipient: Patient/caregiver  Outcome:  All Accepted

## 2022-04-20 ENCOUNTER — OFFICE VISIT (OUTPATIENT)
Dept: NEUROLOGY | Facility: CLINIC | Age: 57
End: 2022-04-20
Payer: COMMERCIAL

## 2022-04-20 ENCOUNTER — OFFICE VISIT (OUTPATIENT)
Dept: PHYSICAL THERAPY | Facility: CLINIC | Age: 57
End: 2022-04-20
Payer: COMMERCIAL

## 2022-04-20 VITALS
BODY MASS INDEX: 43.75 KG/M2 | HEIGHT: 70 IN | TEMPERATURE: 97.8 F | HEART RATE: 60 BPM | SYSTOLIC BLOOD PRESSURE: 132 MMHG | DIASTOLIC BLOOD PRESSURE: 67 MMHG | WEIGHT: 305.6 LBS

## 2022-04-20 DIAGNOSIS — R26.89 FUNCTIONAL GAIT ABNORMALITY: Primary | ICD-10-CM

## 2022-04-20 DIAGNOSIS — I61.9 LEFT-SIDED NONTRAUMATIC INTRACEREBRAL HEMORRHAGE, UNSPECIFIED CEREBRAL LOCATION (HCC): Primary | ICD-10-CM

## 2022-04-20 DIAGNOSIS — I62.9 INTRACRANIAL BLEED (HCC): ICD-10-CM

## 2022-04-20 PROCEDURE — 97112 NEUROMUSCULAR REEDUCATION: CPT

## 2022-04-20 PROCEDURE — 3008F BODY MASS INDEX DOCD: CPT | Performed by: INTERNAL MEDICINE

## 2022-04-20 PROCEDURE — 99214 OFFICE O/P EST MOD 30 MIN: CPT

## 2022-04-20 PROCEDURE — 97116 GAIT TRAINING THERAPY: CPT

## 2022-04-20 NOTE — PROGRESS NOTES
Patient ID: Claudia Ramirez is a 64 y o  male  Assessment/Plan:    No problem-specific Assessment & Plan notes found for this encounter  {Assess/PlanSmartLinks:50064}       Subjective:    HPI    {[facility] HPI texts:52987}    {Common ambulatory SmartLinks:22342}         Objective:    Blood pressure 132/67, pulse 60, temperature 97 8 °F (36 6 °C), temperature source Skin, height 5' 10" (1 778 m), weight (!) 139 kg (305 lb 9 6 oz)  Physical Exam    Neurological Exam      ROS:    Review of Systems   Constitutional: Negative  Negative for appetite change and fever  HENT: Negative  Negative for hearing loss, tinnitus, trouble swallowing and voice change  Eyes: Negative  Negative for photophobia and pain  Respiratory: Negative  Negative for shortness of breath  Cardiovascular: Negative  Negative for palpitations  Gastrointestinal: Negative  Negative for nausea and vomiting  Endocrine: Negative  Negative for cold intolerance  Genitourinary: Negative  Negative for dysuria, frequency and urgency  Musculoskeletal: Negative  Negative for myalgias and neck pain  Skin: Negative  Negative for rash  Neurological: Positive for numbness (right leg below the right knee) and headaches  Negative for dizziness, tremors, seizures, syncope, facial asymmetry, speech difficulty, weakness and light-headedness  Hematological: Negative  Does not bruise/bleed easily  Psychiatric/Behavioral: Negative  Negative for confusion, hallucinations and sleep disturbance

## 2022-04-20 NOTE — PATIENT INSTRUCTIONS
--Continue to focus on good/tight blood pressure control, cholesterol control,  and blood sugar control  --Stay well hydrated   --Good diet, high in lean meats fish, turkey, chicken  Low in fats, cholesterol, sugars and sodium  Avoid canned foods,  get lets of fresh/frozen vegetables/fruits  --Get routine exercise; stay physically active  --Keep follow ups with cardiology, PCP, and nephrology  --Continue Xarelto 20 mg (Presccribed by cardiology) and Atrorvastatin 40 mg  -- Review stroke education booklet provided today    I will plan for him to return to the office in 6 months time but would be happy to see him sooner if the need should arise  If he has any symptoms concerning for TIA or stroke including sudden painless loss of vision or double vision, difficulty speaking or swallowing, vertigo/room spinning that does not quickly resolve, or weakness/numbness affecting 1 side of the face or body he should proceed by ambulance to the nearest emergency room immediately

## 2022-04-20 NOTE — PROGRESS NOTES
Daily Note     Today's date: 2022  Patient name: Macario Ludwig  : 1965  MRN: [id number]  Referring provider: Tony Cox  Dx:   Encounter Diagnosis     ICD-10-CM    1  Functional gait abnormality  R26 89    2  Intracranial bleed (HCC)  I62 9        Start Time: 1200  Stop Time: 1300  Total time in clinic (min): 60 minutes    Subjective: Doing well today with no new c/o fatigue, discomfort, or pain  Had his f/u with neurology this morning to review his recovery post-CVA, feels he has experienced a significant return in R LE sensation now with only a strip of numbness on his outer lower leg and volar surface of his foot  [place]  Objective: See treatment diary below      Assessment: Session focused on continued HIGT  Transitioned to outdoor ambulation with navigation of grassy hills to demand greater attention to surface changes  Replicated resisted movements that challenged the same level myotome at the level of dermatomal sensory loss with VC's of maintaining R foot flat  Plan: Continue per plan of care  Progress treatment as tolerated  Short Term Goal Expiration Date:(22)  Long Term Goal Expiration Date: (22)  POC Expiration Date: (22)    Precautions: HTN, CKD III     target heart rate range: 112-136 BPM  Beta Blockers - YES     Today's Date:     Resting HR: 60 BPM    BP Pre exercise: 138/78 mmHg  BP during exercise:  BP post exercise:           Total minutes spent  within 70-85% Max HR:   (jonathan flowsheet)    Description of activity: (treadmill, overground, assist, distance, speed etc) Peak HR observed Peak RPE observed Total Minutes in target zone  mRPE Total minutes Timed Charges   (Nmr, TE, TA, GT)   TM No SOLO, 5# BL AW  2 2 mph 3% grade x3 min  2 2 mph 6% grade x3 min  2 2 mph 9% grade x6 min    1 8 mph CD x2 min  8/10 9 14 GT   Outdoor ambulation 5# BL AW  Grassy hill navigation x3 rounds focused on R LE stability and stepping negotiation  9/10 25 25 NMR   Lateral banded stepping over 15 ft (prpl TB doubled)  x10 rounds ea  8/10 10 10 NMR   R SLS on foam w/L 8" toe touch WB, CL tennis ball bounce/catch  x9 min  8 5/10 9 9 NMR               *Therapeutic rest breaks throughout session as needed due to high intensity interval training per ACSM guidelines for recovery before return to exercise  Vitals monitored, documented and discussed with pt during this time

## 2022-04-20 NOTE — ASSESSMENT & PLAN NOTE
Left frontal corona radiata stroke, etiology suspected to be most likely hemorrhagic stroke in the setting of uncontrolled HTN and anticoagulation use with subsequent ischemic stroke secondary to compression of vessel in the setting of vasogenic edema  · CT Head from 3/7/22: L Lentiform Nuclei hemorrhage 3 7 x 2 1 x 2 3 cm, surrounding vasogenic edema, no midline shift  · F/u Head CT from 3/8/22: No significant change in L Basal Ganglia parenchymal hematoma, no midline shift  · Brain MRI from 03/09: Small acute infarct in left frontal corona radiata  Unchanged 3 3 cm acute intraparenchymal hematoma centered in left posterior putamen  · TTE 03/08: LVEF 65%, no RWMA, Gradee I Diastolic Dysfx  · Neurosurgery evaluated, no acute interventions indicated at this time  · Repeat CT head 4/6/22: Evolving left basal ganglia hematoma and small left corona radiata infarct  · Restarted Xarelto 20 mg daily (held for 3 weeks), continues on atorvastatin 40 mg and his antihypertnesives  · He continues with physical therapy, and has made great improvement (he feels he is about 90% recovered)  · His only residual deficit is patchy numbness/ diminished sensation to temperature in the right lower leg from the knee down to his foot as well as a little in the face and on the right side of the head  · He is aggressively trying to lose weight with diet, exercise and is starting Ozempic  He is monitoring his BP twice daily and is at goal per patient  He is highly motivated to make lifestyle changes to mitigate his risk for future stroke  Recommendations provided to patient today include:  --Continue to focus on good/tight blood pressure control, cholesterol control,  and blood sugar control  --Stay well hydrated   --Good diet, high in lean meats fish, turkey, chicken  Low in fats, cholesterol, sugars and sodium  Avoid canned foods,  get lets of fresh/frozen vegetables/fruits    --Get routine exercise; stay physically active  --Keep follow ups with cardiology, PCP, and nephrology  --Continue Xarelto 20 mg (Presccribed by cardiology) and Atrorvastatin 40 mg  -- Review stroke education booklet provided today    I will plan for him to return to the office in 6 months time but would be happy to see him sooner if the need should arise  If he has any symptoms concerning for TIA or stroke including sudden painless loss of vision or double vision, difficulty speaking or swallowing, vertigo/room spinning that does not quickly resolve, or weakness/numbness affecting 1 side of the face or body he should proceed by ambulance to the nearest emergency room immediately

## 2022-04-22 ENCOUNTER — OFFICE VISIT (OUTPATIENT)
Dept: PHYSICAL THERAPY | Facility: CLINIC | Age: 57
End: 2022-04-22
Payer: COMMERCIAL

## 2022-04-22 DIAGNOSIS — I12.9 HYPERTENSIVE CHRONIC KIDNEY DISEASE WITH STAGE 1 THROUGH STAGE 4 CHRONIC KIDNEY DISEASE, OR UNSPECIFIED CHRONIC KIDNEY DISEASE: ICD-10-CM

## 2022-04-22 DIAGNOSIS — R26.89 FUNCTIONAL GAIT ABNORMALITY: Primary | ICD-10-CM

## 2022-04-22 DIAGNOSIS — E78.5 DYSLIPIDEMIA: ICD-10-CM

## 2022-04-22 DIAGNOSIS — I10 ACCELERATED HYPERTENSION: ICD-10-CM

## 2022-04-22 DIAGNOSIS — E66.01 CLASS 3 SEVERE OBESITY DUE TO EXCESS CALORIES WITH SERIOUS COMORBIDITY AND BODY MASS INDEX (BMI) OF 40.0 TO 44.9 IN ADULT (HCC): ICD-10-CM

## 2022-04-22 DIAGNOSIS — E55.9 VITAMIN D DEFICIENCY: ICD-10-CM

## 2022-04-22 DIAGNOSIS — N18.31 STAGE 3A CHRONIC KIDNEY DISEASE (HCC): ICD-10-CM

## 2022-04-22 DIAGNOSIS — I62.9 INTRACRANIAL BLEED (HCC): ICD-10-CM

## 2022-04-22 PROCEDURE — 97112 NEUROMUSCULAR REEDUCATION: CPT

## 2022-04-22 RX ORDER — NIFEDIPINE 30 MG/1
30 TABLET, EXTENDED RELEASE ORAL 2 TIMES DAILY
Qty: 180 TABLET | Refills: 3 | Status: SHIPPED | OUTPATIENT
Start: 2022-04-22

## 2022-04-22 NOTE — PROGRESS NOTES
Daily Note     Today's date: 2022  Patient name: Macario Ludwig  : 1965  MRN: [id number]  Referring provider: Tony Cox  Dx:   Encounter Diagnosis     ICD-10-CM    1  Functional gait abnormality  R26 89    2  Intracranial bleed (HCC)  I62 9        Start Time: 1200  Stop Time: 1300  Total time in clinic (min): 60 minutes    Subjective: Doing well today with no new c/o fatigue, discomfort, or pain  Objective: See treatment diary below      Assessment: Session focused on continued HIGT  Continued with outdoor mobility focused on lateral control of R ankle will hill ascent and descent  Challenged with agility ladder navigation on incline  Plan: Continue per plan of care  Progress treatment as tolerated  Short Term Goal Expiration Date:(22)  Long Term Goal Expiration Date: (22)  POC Expiration Date: (22)    Precautions: HTN, CKD III     target heart rate range: 112-136 BPM  Beta Blockers - YES     Today's Date:     Resting HR: 62 BPM    BP Pre exercise: 140/80 mmHg  BP during exercise:  BP post exercise: Total minutes spent  within 70-85% Max HR:   (jonathan flowsheet)    Description of activity: (treadmill, overground, assist, distance, speed etc) Peak HR observed Peak RPE observed Total Minutes in target zone  mRPE Total minutes Timed Charges   (Nmr, TE, TA, GT)           Outdoor ambulation No AW  Grassy hill navigation fwd x1 round  Lat w/ 6# MB carry encouraging pelvic/trunk dissociation x1 round  9/10 30 30 NMR   Grassy agility ladder:    Flat: x4 rounds total lateral progressions    Hill: x1 round lateral progression  9/10 15 15 NMR   R SLS on foam w/L 8" toe touch WB, CL tennis ball bounce/catch  R shoe removed  8/10 15 15 NMR               *Therapeutic rest breaks throughout session as needed due to high intensity interval training per ACSM guidelines for recovery before return to exercise   Vitals monitored, documented and discussed with pt during this time

## 2022-04-25 ENCOUNTER — OFFICE VISIT (OUTPATIENT)
Dept: PHYSICAL THERAPY | Facility: CLINIC | Age: 57
End: 2022-04-25
Payer: COMMERCIAL

## 2022-04-25 ENCOUNTER — APPOINTMENT (OUTPATIENT)
Dept: SPEECH THERAPY | Facility: CLINIC | Age: 57
End: 2022-04-25
Payer: COMMERCIAL

## 2022-04-25 DIAGNOSIS — I62.9 INTRACRANIAL BLEED (HCC): ICD-10-CM

## 2022-04-25 DIAGNOSIS — R26.89 FUNCTIONAL GAIT ABNORMALITY: Primary | ICD-10-CM

## 2022-04-25 PROCEDURE — 97116 GAIT TRAINING THERAPY: CPT

## 2022-04-25 PROCEDURE — 97112 NEUROMUSCULAR REEDUCATION: CPT

## 2022-04-25 NOTE — PROGRESS NOTES
Daily Note     Today's date: 2022  Patient name: Caro Lentz  : 1965  MRN: [id number]  Referring provider: Naida Su  Dx:   Encounter Diagnosis     ICD-10-CM    1  Functional gait abnormality  R26 89    2  Intracranial bleed ([address] )  I62 9        Start Time: 0800  Stop Time: 0900  Total time in clinic (min): 60 minutes    Subjective: Patient reports that he has been practicing at home and feels he is doing better  Patient states that he needs to leave right at 9  Objective: See treatment diary below      Assessment: Tolerated treatment well  Giovany Castrejon participated in skilled PT session focused on HIGT  Session focused on   outdoor ambulation with navigation of grassy hills to demand greater attention to surface changes and strengthening R ankle with SLS activities  Patient would continue to benefit from skilled PT interventions to address deficits with gait, endurance, and RLE stability    Patient demonstrated fatigue post treatment      Plan: Continue per plan of care           Short Term Goal Expiration Date:(22)  Long Term Goal Expiration Date: (22)  POC Expiration Date: (22)    Precautions: HTN, CKD III     target heart rate range: 112-136 BPM  Beta Blockers - YES     Today's Date:     Resting HR: 74 BPM    BP Pre exercise: 130/77 mmHg  BP during exercise: 150/75 mmHg  BP post exercise:   160/75 mmHg       Total minutes spent  within 70-85% Max HR:   (jonathan flowsheet)    Description of activity: (treadmill, overground, assist, distance, speed etc) Peak HR observed Peak RPE observed Total Minutes in target zone  mRPE Total minutes Timed Charges   (Nmr, TE, TA, GT)   TM No SOLO  5# B/L AW  2 2 mph 3% incline x 3 min    bpm  2 2 mph 6% incline  X 3 min  bpm  2 2 mph 9% incline  X 6 min  HR 117bph    1 8 mph 0% incline x 2 min     Bp 150/75 mmHg                6 5 - 7/10               8 min               14 min             GT   Outdoor ambulation 5# B/L AW  Grassy hill navigation fwd x2 round  Focusing on RLE stability and stepping negotiation   8/10 30 30 NMR           R SLS on foam w/L 8" toe touch WB, CL tennis ball bounce/catch  R shoe removed  7/10 15 15 NMR               *Therapeutic rest breaks throughout session as needed due to high intensity interval training per ACSM guidelines for recovery before return to exercise  Vitals monitored, documented and discussed with pt during this time

## 2022-04-26 ENCOUNTER — CLINICAL SUPPORT (OUTPATIENT)
Dept: FAMILY MEDICINE CLINIC | Facility: CLINIC | Age: 57
End: 2022-04-26

## 2022-04-26 DIAGNOSIS — E11.649 UNCONTROLLED TYPE 2 DIABETES MELLITUS WITH HYPOGLYCEMIA WITHOUT COMA (HCC): Primary | ICD-10-CM

## 2022-04-26 NOTE — PROGRESS NOTES
[address] [place] [address]  ANEUDY Barbosa is a 64 y o  male who presents via video for follow-up  Reason for visit: diabetes and medication review  Plan: The following actions were taken today by the Clinical Pharmacist:   1  Medications: Continue Ozempic titration as tolerated  Started 0 25mg once weekly on 4/20/22      Follow-up:   Follow-up with pharmacist in 2 weeks for dose titration  Next PCP visit: not yet scheduled    - Home Monitoring Records: SMBGs  - Labs: up to date  - Referrals: none         Chronic Conditions Addressed at this Visit:       Diabetes: Goals - A1c: <7%; SMBGs: Preprandial: 80-130mg/dL per ADA Guidelines  - Most recent A1c: above goal   - SMBG: below goal (see attached Fátima report)  Current DM Regimen:  none  MEDICATIONS: Continue Ozempic 0 25mg once weekly  HOME MONITORING: Check blood sugars prn and record  HTN: BP goal: <130/80 mmHg based on ADA Guidelines [proteinuria, existing ASCVD, or 10-year ASCVD risk =15%]  - In-office BP below goal, HR acceptable  MEDICATIONS: Clonidine, Hydralazine, Nebivolol, Nifedipine, Olmesartan, Torsemide  Continue  as prescribed  Managed by cardiology/nephrology/neurology  LABS: labs are reviewed    Hyperlipidemia with clinical ASCVD event;   2018 ACC/AHA blood cholesterol guidelines recommends high-intensity statin  Patient tolerating statin well without side effects  MEDICATIONS: Atorvastatin 40mg daily  Continue as prescribed  LABS: labs are reviewed    Medication Reconciliation: Medication list reviewed with patient at today's visit and updated to reflect medications patient is currently taking   - Medication adherence is excellent  Efforts to improve compliance will be directed at continued diet modifications and weight loss  Education:  - Discussed ABCs of diabetes management (A1c/BP/cholesterol) and goals with patient today    Patient-specific goals:  1   Obtain ozempic and start dose  2  Continue lifestyle changes you've implemented     Subjective:     DM History:  Microvascular complications: cardiovascular disease and cerebrovascular disease  Cardiovascular complications: stroke/TIA  - Aspirin (Men>50, Women>60): Not Indicated  - Statin: Yes   - ACEi/ARB: Yes    Previous DM medications:   none    DM Self-Management:  - Self-monitoring: are performed regularly  He checks BG weekly, including FBG  He did provide blood glucose log today  - SMBG readings (no log, per memory): see Royal Servin report from recent sample CGM session  - Hypoglycemia: (+) awareness/unawareness/denies any episodes  Glucometer: No, Brand: na  CGM: No, Brand: na    Reviewed recent sample CGM session with patient to evaluate need for additional glycemic control in light of recent dramatic lifestyle changes and weight loss despite elevated A1C  CGM shows patient with >90% TIR, no hypoglycemic events  Patient interested in [address] as reported by his friend  Discussed indications, dosing, side effects, considerations for diabetes as well as weight loss, administration, and cost  Patient would like to trial medication  Will follow up for titration, etc      Diabetes  He presents for his follow-up diabetic visit  He has type 2 diabetes mellitus  His disease course has been fluctuating  There are no hypoglycemic associated symptoms  There are no hypoglycemic complications  Symptoms are improving  Diabetic complications include a CVA  Risk factors for coronary artery disease include diabetes mellitus, dyslipidemia, family history, hypertension, male sex and obesity  When asked about current treatments, none were reported  He is compliant with treatment all of the time  He is following a generally healthy diet  He has not had a previous visit with a dietitian  He participates in exercise daily  His home blood glucose trend is decreasing steadily  An ACE inhibitor/angiotensin II receptor blocker is being taken   He does not see a podiatrist Eye exam is not current  Medication Adherence: Responsible for medication management: patient  Wife involved  Medication adherence: taking as prescribed  Patient denies missed/extra doses  Medication Efficacy/Safety:  Clinically significant drug interactions identified:  None  Side effects from medication(s) reported:  None    Lifestyle:  Social History     Tobacco Use    Smoking status: Former Smoker     Packs/day: 0 50     Years: 10 00     Pack years: 5 00     Types: Cigarettes     Quit date: 1994     Years since quittin 0    Smokeless tobacco: Never Used   Vaping Use    Vaping Use: Never used   Substance Use Topics    Alcohol use: Yes     Comment: occ    Drug use: No          Objective:   SMBG readings ( ):  none  Current Outpatient Medications   Medication Instructions    atorvastatin (LIPITOR) 40 mg, Oral, Daily with dinner    cloNIDine (CATAPRES-TTS-1) 0 1 mg, Transdermal, Weekly    dofetilide (TIKOSYN) 500 mcg capsule TAKE 1 CAPSULE BY MOUTH EVERY 12 HOURS    hydrALAZINE (APRESOLINE) 50 mg, Oral, 2 times daily    nebivolol (BYSTOLIC) 2 5 mg, Oral, Daily, Hold for heart rate less than 50 beats per minute   NIFEdipine (PROCARDIA XL) 30 mg, Oral, 2 times daily    olmesartan (BENICAR) 40 mg, Oral, Daily    rivaroxaban (XARELTO) 20 mg, Oral, Daily with breakfast    Semaglutide(0 25 or 0 5MG/DOS) 0 25 mg, Subcutaneous, Weekly    torsemide (DEMADEX) 5 mg, Oral, Daily       I have reviewed the patient's allergies, medications and history as noted in the electronic medical record and updated as necessary  Vital Signs:  BP Readings from Last 3 Encounters:   22 132/67   22 122/74   22 129/69     Pulse Readings from Last 3 Encounters:   22 60   22 62   22 63      Estimated body mass index is 43 85 kg/m² as calculated from the following:    Height as of 22: 5' 10" (1 778 m)  Weight as of 22: 139 kg (305 lb 9 6 oz)  Pertinent Lab Data:  Lab Results   Component Value Date    SODIUM 139 04/01/2022    K 4 1 04/01/2022     04/01/2022    CO2 27 04/01/2022    BUN 20 04/01/2022    CREATININE 1 41 (H) 04/01/2022    GLUC 147 (H) 03/10/2022    CALCIUM 8 9 04/01/2022     Lab Results   Component Value Date    HGBA1C 7 5 (H) 03/08/2022        Preventative Care:  A1c measurement: Up to date  Dilated eye exam: Overdue  Foot exam: Overdue  Dental exam: Up to date  Urinary microalbumin measurement: Up to date  Health Maintenance Due   Topic Date Due    Hepatitis C Screening  Never done    Pneumococcal Vaccine: Pediatrics (0 to 5 Years) and At-Risk Patients (6 to 59 Years) (1 of 2 - PPSV23) Never done    Diabetic Foot Exam  Never done    DM Eye Exam  Never done    HIV Screening  Never done    DTaP,Tdap,and Td Vaccines (1 - Tdap) Never done    Colorectal Cancer Screening  Never done    COVID-19 Vaccine (3 - Booster for Moderna series) 08/12/2021    Influenza Vaccine (1) 09/01/2021    Depression Screening  10/13/2021    Annual Physical  10/13/2021    PT PLAN OF CARE  04/14/2022    BMI: Followup Plan  07/21/2022       Reason For Outreach  Embedded Pharmacist    Demographics  Interaction Method: Virtual  Type of Intervention: Follow-Up    Topic(s) Addressed  Diabetes; Obesity    Intervention(s) Made    Pharmacologic:    -- Prevent or Manage Adverse Drug Reaction    Non-Pharmacologic:    -- Adherence addressed  -- Home monitoring discussed or provided    Tool(s) Used  Not Applicable    Time Spent:   Time Spent in Direct Patient Care: 15 minutes  Time Spent in Care Coordination: 15 minutes    Recommendations  Recipient: Patient/caregiver  Outcome:  All Accepted

## 2022-04-27 ENCOUNTER — OFFICE VISIT (OUTPATIENT)
Dept: PHYSICAL THERAPY | Facility: CLINIC | Age: 57
End: 2022-04-27
Payer: COMMERCIAL

## 2022-04-27 DIAGNOSIS — R26.89 FUNCTIONAL GAIT ABNORMALITY: Primary | ICD-10-CM

## 2022-04-27 DIAGNOSIS — I62.9 INTRACRANIAL BLEED (HCC): ICD-10-CM

## 2022-04-27 PROCEDURE — 97116 GAIT TRAINING THERAPY: CPT

## 2022-04-27 PROCEDURE — 97112 NEUROMUSCULAR REEDUCATION: CPT

## 2022-04-29 ENCOUNTER — APPOINTMENT (OUTPATIENT)
Dept: PHYSICAL THERAPY | Facility: CLINIC | Age: 57
End: 2022-04-29
Payer: COMMERCIAL

## 2022-05-02 ENCOUNTER — OFFICE VISIT (OUTPATIENT)
Dept: PHYSICAL THERAPY | Facility: CLINIC | Age: 57
End: 2022-05-02
Payer: COMMERCIAL

## 2022-05-02 DIAGNOSIS — R26.89 FUNCTIONAL GAIT ABNORMALITY: Primary | ICD-10-CM

## 2022-05-02 DIAGNOSIS — I62.9 INTRACRANIAL BLEED (HCC): ICD-10-CM

## 2022-05-02 PROCEDURE — 97116 GAIT TRAINING THERAPY: CPT

## 2022-05-02 PROCEDURE — 97112 NEUROMUSCULAR REEDUCATION: CPT

## 2022-05-02 NOTE — PROGRESS NOTES
Daily Note     Today's date: 2022  Patient name: Flaco Linder  : 1965   MRN: [id number]  Referring provider: Lorenza Velásquez  Dx:   Encounter Diagnosis     ICD-10-CM    1  Functional gait abnormality  R26 89    2  Intracranial bleed (HCC)  I62 9        Start Time: 0900  Stop Time: 1000  Total time in clinic (min): 60 minutes    Subjective: Doing well today, ready to get back into balance training  Will have to miss this Wednesday's appt; has important business meeting he will be traveling for Wednesday/Thursday this week  Objective: See treatment diary below      Assessment: Session focused on continued HIGT and balance training  Progressed speed on TM with gait training noting appropriate intensity increase  Focused more on R SLS activities with compliant surface balance and coordination components today  Noting betting improvements in R LE SLS balance and feedback with gradual sensory return; especially with interventions such as positioning downhill on 15 deg incline to encourage increased WB through R forefoot versus heel  Plan: Continue per plan of care  Progress treatment as tolerated  Short Term Goal Expiration Date:(22)  Long Term Goal Expiration Date: (22)  POC Expiration Date: (22)    Precautions: HTN, CKD III     target heart rate range: 112-136 BPM  Beta Blockers - YES     Today's Date:     Resting HR: 58 BPM    BP Pre exercise: 126/78 mmHg  BP during exercise:  BP post exercise:           Total minutes spent  within 70-85% Max HR:   (jonathan flowsheet)    Description of activity: (treadmill, overground, assist, distance, speed etc) Peak HR observed Peak RPE observed Total Minutes in target zone  mRPE Total minutes Timed Charges   (Nmr, TE, TA, GT)   TM No SOLO No AW    2 4 mph 3% x3 min  2 4 mph 6% x3 min  2 4 mph 9% x3 min  2 4 mph 12% x3 min  1 8 mph 0% x2 min    x14 min total 134 8 5/10 12 14 GT   Single R LE RDL, 10# IL DB  2x10     Add: 15 degree wedge x10  8 5/10 20 20 NMR   R SLS foam w/tennis ball V bounce/wall bounce R UE catch only  x10    R SLS foam w/tennis ball V bounce back to R UE x10  8/10 10 10 NMR   Hallway Lateral Lunge w/UL UE [place] (on leading side)  x50' ea  8/10 8 8 NMR   STS from lo table, black therex pads  x10  Add: Green TB posterior over shoulder resistance, BL x20  8/10 8 8 NMR       *Therapeutic rest breaks throughout session as needed due to high intensity interval training per ACSM guidelines for recovery before return to exercise  Vitals monitored, documented and discussed with pt during this time

## 2022-05-04 ENCOUNTER — APPOINTMENT (OUTPATIENT)
Dept: PHYSICAL THERAPY | Facility: CLINIC | Age: 57
End: 2022-05-04
Payer: COMMERCIAL

## 2022-05-06 ENCOUNTER — OFFICE VISIT (OUTPATIENT)
Dept: PHYSICAL THERAPY | Facility: CLINIC | Age: 57
End: 2022-05-06
Payer: COMMERCIAL

## 2022-05-06 DIAGNOSIS — R26.89 FUNCTIONAL GAIT ABNORMALITY: Primary | ICD-10-CM

## 2022-05-06 DIAGNOSIS — I62.9 INTRACRANIAL BLEED (HCC): ICD-10-CM

## 2022-05-06 PROCEDURE — 97116 GAIT TRAINING THERAPY: CPT

## 2022-05-06 PROCEDURE — 97112 NEUROMUSCULAR REEDUCATION: CPT
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
## 2022-05-09 ENCOUNTER — EVALUATION (OUTPATIENT)
Dept: PHYSICAL THERAPY | Facility: CLINIC | Age: 57
End: 2022-05-09
Payer: COMMERCIAL

## 2022-05-09 DIAGNOSIS — R26.89 FUNCTIONAL GAIT ABNORMALITY: Primary | ICD-10-CM

## 2022-05-09 DIAGNOSIS — I62.9 INTRACRANIAL BLEED (HCC): ICD-10-CM

## 2022-05-09 PROCEDURE — 97112 NEUROMUSCULAR REEDUCATION: CPT

## 2022-05-09 NOTE — PROGRESS NOTES
Discharge Note    Today's date: 2022  Patient name: Jackeline Diaz  : 1965  MRN: [id number]  Referring provider: Zurdo Grissom  Dx:   Encounter Diagnosis     ICD-10-CM    1  Functional gait abnormality  R26 89    2  Intracranial bleed (HCC)  I62 9        Start Time: 0800  Stop Time: 0900  Total time in clinic (min): 60 minutes    Subjective: Presents for progress assessment doing very well today; feels that skilled PT care has helped to significantly improve his mobility, R LE balance, and quality of life  Reports gradual sensory returns throughout his R LE; now numbness most notably is present below his knee to his right lateral lower leg that improves throughout the day however has not fully resolved  Most prominent in the mornings as a burning sensation; evenings as a tingling sensation  Objective: See treatment diary below    Balance Test     MCTSIB (s): IE: Firm EO: 30s  Firm EC: 30s  Foam EO: 30s  Foam EC: 17 72s, 30s   PN 22: 30s      Gait Analysis: Deviations to R when vision obstructed secondary to R global sensory deficits  PN 22: No deviations to R when visually obstructed    DC 22: WNL     Stair Navigation: Reciprocal, no UE ascending/descending   PN 22: Same     Initial  Discharge   Months Since Event  1 week 2 months    Stroke Impact Scale       Physical      Memory 89 3 100    Mood  75 75    Communication  89 3 100    Mobility  80 6 97 2    Hand  65 100    Participation     Recovery       strength  50 100    Self Efficacy 97 5% PN 22: 92 6%  DC 22:   ABC Scale 82 5% PN 22: 90 6%  DC 22:   5x STS 8 71s no UE PN 22: 7 34s no UE    6 mwt 1050 ft no AD PN 22:1800 ft no AD  DC 22: 2200 ft no AD   TUG 5 03s no AD PN 22: 4 09s no AD   Gait Speed 1 19 m/s no AD PN 22: 1 33 m/s no AD  DC 22: 1 69 m/s no AD   BBS 55 PN 22: 55   FGA 27/30 PN 22: 30/30   FOTO Satisfaction Score       FOTO -tx for conditions     FOTO - Overall results          DEBRA  PN 22: Firm:  DLS: 0 errors  53: 0 errors  SLS: 6 errors  22: 5 errors  TS: 5 errors  22: 2 errors  11 total  22: 7 total    Foam:  DLS: 0 errors   96: 0 errors  SLS: 7 errors  22: 4 errors  TS: 8 errors  22: 4 errors  15 total  22: 8 errors    26 total errors  DC 22: 15 errors    Assessment: Skilled progress assessment performed noting overall singificant improvements in balance per DEBRA test, gait speed, and endurance per 6 MWT  DEBRA scoring currently within standard deviations of acceptable norms highlighting significant improvements in overall balance from baseline s/p CVA  At this time Lucila Mortimer is agreeable to DC from skilled PT care having met his personal and therapeutic goals! Impairments: abnormal coordination, abnormal gait, impaired balance and lacks appropriate home exercise program  Barriers to therapy: None  Understanding of Dx/Px/POC: good   Prognosis: good   Goals  ST weeks  1  Lucila Mortimer will demonstrate independence with initial HEP   - MET  2  Lucila Mortimer will improve MCTSIB to globally 35s consistently  - MET  3  Lucila Mortimer will improve 6 MWT minimally by 150'  - MET  4  Lucila Mortimer will subjectively report significant improvements in his balance and balance confidence  - 22 - MET  LT weeks  1  Lucila Mortimer will report no limitations in outdoor activities relative to his balance  - MET  2  Lucila Mortimer will report full return to work-related responsibilities uninfluenced by balance  - MET  (NEW AS OF 22) 3  Score 19 total errors or less per DEBRA - MET       Plan: Discharge from skilled PT care  Short Term Goal Expiration Date:(22)  Long Term Goal Expiration Date: (22)  POC Expiration Date: (22)    Precautions: HTN, CKD III     target heart rate range: 112-136 BPM  Beta Blockers - YES     Today's Date: 22    Resting HR: 58 BPM    BP Pre exercise: 138/74 mmHg  BP during exercise:  BP post exercise:           Total minutes spent  within 70-85% Max HR:   (jonathan flowsheet)    Description of activity: (treadmill, overground, assist, distance, speed etc) Peak HR observed Peak RPE observed Total Minutes in target zone  mRPE Total minutes Timed Charges   (Nmr, TE, TA, GT)   Outcome measure testing, assessment, review and reflection of overall progress  54 NMR                                       *Therapeutic rest breaks throughout session as needed due to high intensity interval training per ACSM guidelines for recovery before return to exercise  Vitals monitored, documented and discussed with pt during this time

## 2022-05-10 ENCOUNTER — CLINICAL SUPPORT (OUTPATIENT)
Dept: FAMILY MEDICINE CLINIC | Facility: CLINIC | Age: 57
End: 2022-05-10

## 2022-05-10 DIAGNOSIS — R73.02 IMPAIRED GLUCOSE TOLERANCE: Primary | ICD-10-CM

## 2022-05-10 DIAGNOSIS — E11.649 UNCONTROLLED TYPE 2 DIABETES MELLITUS WITH HYPOGLYCEMIA WITHOUT COMA (HCC): ICD-10-CM

## 2022-05-10 DIAGNOSIS — E66.01 MORBID OBESITY WITH BMI OF 45.0-49.9, ADULT (HCC): ICD-10-CM

## 2022-05-10 NOTE — PROGRESS NOTES
[address] [place] [address]  Jackeline Diaz is a 64 y o  male who presents via video for follow-up  Reason for visit: diabetes and medication review  Plan: The following actions were taken today by the Clinical Pharmacist:   1  Medications: Continue Ozempic titration as tolerated  Started 0 25mg once weekly on 4/20/22  Instructed to increase to 0 5mg 5/18/22      Follow-up:   Follow-up with pharmacist in 2 weeks for dose titration  Next PCP visit: not yet scheduled    - Home Monitoring Records: SMBGs  - Labs: up to date  - Referrals: none         Chronic Conditions Addressed at this Visit:       Diabetes: Goals - A1c: <7%; SMBGs: Preprandial: 80-130mg/dL per ADA Guidelines  - Most recent A1c: above goal   - SMBG: below goal (see attached Fátima report)  Current DM Regimen:  none  MEDICATIONS: Continue Ozempic 0 5mg once weekly  HOME MONITORING: Check blood sugars prn and record  HTN: BP goal: <130/80 mmHg based on ADA Guidelines [proteinuria, existing ASCVD, or 10-year ASCVD risk =15%]  - In-office BP below goal, HR acceptable  MEDICATIONS: Clonidine, Hydralazine, Nebivolol, Nifedipine, Olmesartan, Torsemide  Continue  as prescribed  Managed by cardiology/nephrology/neurology  LABS: labs are reviewed    Hyperlipidemia with clinical ASCVD event;   2018 ACC/AHA blood cholesterol guidelines recommends high-intensity statin  Patient tolerating statin well without side effects  MEDICATIONS: Atorvastatin 40mg daily  Continue as prescribed  LABS: labs are reviewed    Medication Reconciliation: Medication list reviewed with patient at today's visit and updated to reflect medications patient is currently taking   - Medication adherence is excellent  Efforts to improve compliance will be directed at continued diet modifications and weight loss      Education:  - Discussed ABCs of diabetes management (A1c/BP/cholesterol) and goals with patient today    Patient-specific goals:  1  Obtain ozempic and start dose  2  Continue lifestyle changes you've implemented     Subjective:     DM History:  Microvascular complications: cardiovascular disease and cerebrovascular disease  Cardiovascular complications: stroke/TIA  - Aspirin (Men>50, Women>60): Not Indicated  - Statin: Yes   - ACEi/ARB: Yes    Previous DM medications:   none    DM Self-Management:  - Self-monitoring: are performed regularly  He checks BG weekly, including FBG  He did provide blood glucose log today  - SMBG readings (no log, per memory): see Mai Tan report from recent sample CGM session  - Hypoglycemia: (+) awareness/unawareness/denies any episodes  Glucometer: No, Brand: na  CGM: No, Brand: na    Patient reports side effects are present but tolerable  Notices decrease in cravings and hunger  No blood glucose measurements provided  Diabetes  He presents for his follow-up diabetic visit  He has type 2 diabetes mellitus  His disease course has been fluctuating  There are no hypoglycemic associated symptoms  There are no hypoglycemic complications  Symptoms are improving  Diabetic complications include a CVA  Risk factors for coronary artery disease include diabetes mellitus, dyslipidemia, family history, hypertension, male sex and obesity  When asked about current treatments, none were reported  He is compliant with treatment all of the time  He is following a generally healthy diet  He has not had a previous visit with a dietitian  He participates in exercise daily  His home blood glucose trend is decreasing steadily  An ACE inhibitor/angiotensin II receptor blocker is being taken  He does not see a podiatrist Eye exam is not current  Medication Adherence: Responsible for medication management: patient  Wife involved  Medication adherence: taking as prescribed  Patient denies missed/extra doses       Medication Efficacy/Safety:  Clinically significant drug interactions identified: None  Side effects from medication(s) reported:  None    Lifestyle:  Social History     Tobacco Use    Smoking status: Former Smoker     Packs/day: 0 50     Years: 10 00     Pack years: 5 00     Types: Cigarettes     Quit date: 1994     Years since quittin 1    Smokeless tobacco: Never Used   Vaping Use    Vaping Use: Never used   Substance Use Topics    Alcohol use: Yes     Comment: occ    Drug use: No          Objective:   SMBG readings ( ):  none  Current Outpatient Medications   Medication Instructions    atorvastatin (LIPITOR) 40 mg, Oral, Daily with dinner    cloNIDine (CATAPRES-TTS-1) 0 1 mg, Transdermal, Weekly    dofetilide (TIKOSYN) 500 mcg capsule TAKE 1 CAPSULE BY MOUTH EVERY 12 HOURS    hydrALAZINE (APRESOLINE) 50 mg, Oral, 2 times daily    nebivolol (BYSTOLIC) 2 5 mg, Oral, Daily, Hold for heart rate less than 50 beats per minute   NIFEdipine (PROCARDIA XL) 30 mg, Oral, 2 times daily    olmesartan (BENICAR) 40 mg, Oral, Daily    rivaroxaban (XARELTO) 20 mg, Oral, Daily with breakfast    Semaglutide(0 25 or 0 5MG/DOS) 0 5 mg, Subcutaneous, Weekly    torsemide (DEMADEX) 5 mg, Oral, Daily       I have reviewed the patient's allergies, medications and history as noted in the electronic medical record and updated as necessary  Vital Signs:  BP Readings from Last 3 Encounters:   22 132/67   22 122/74   22 129/69     Pulse Readings from Last 3 Encounters:   22 60   22 62   22 63      Estimated body mass index is 43 85 kg/m² as calculated from the following:    Height as of 22: 5' 10" (1 778 m)  Weight as of 22: 139 kg (305 lb 9 6 oz)       Pertinent Lab Data:  Lab Results   Component Value Date    SODIUM 139 2022    K 4 1 2022     2022    CO2 27 2022    BUN 20 2022    CREATININE 1 41 (H) 2022    GLUC 147 (H) 03/10/2022    CALCIUM 8 9 2022     Lab Results   Component Value Date    HGBA1C 7 5 (H) 03/08/2022        Preventative Care:  A1c measurement: Up to date  Dilated eye exam: Overdue  Foot exam: Overdue  Dental exam: Up to date  Urinary microalbumin measurement: Up to date  Health Maintenance Due   Topic Date Due    Hepatitis C Screening  Never done    Pneumococcal Vaccine: Pediatrics (0 to 5 Years) and At-Risk Patients (6 to 59 Years) (1 - PCV) Never done    Diabetic Foot Exam  Never done    DM Eye Exam  Never done    HIV Screening  Never done    DTaP,Tdap,and Td Vaccines (1 - Tdap) Never done    Colorectal Cancer Screening  Never done    COVID-19 Vaccine (3 - Booster for [place] series) 08/12/2021    Depression Screening  10/13/2021    Annual Physical  10/13/2021    PT PLAN OF CARE  04/14/2022    BMI: Followup Plan  07/21/2022       Reason For Outreach  Embedded Pharmacist    Demographics  Interaction Method: Virtual  Type of Intervention: Follow-Up    Topic(s) Addressed  Diabetes; Obesity    Intervention(s) Made    Pharmacologic:    -- Medication Adjustment - Dose or Frequency  -- Prevent or Manage Adverse Drug Reaction    Non-Pharmacologic:    -- Adherence addressed  -- Home monitoring discussed or provided    Tool(s) Used  Not Applicable    Time Spent:   Time Spent in Direct Patient Care: 15 minutes  Time Spent in Care Coordination: 15 minutes    Recommendations  Recipient: Patient/caregiver  Outcome:  All Accepted

## 2022-05-11 ENCOUNTER — APPOINTMENT (OUTPATIENT)
Dept: PHYSICAL THERAPY | Facility: CLINIC | Age: 57
End: 2022-05-11
Payer: COMMERCIAL

## 2022-05-12 DIAGNOSIS — I48.20 CHRONIC ATRIAL FIBRILLATION (HCC): Primary | ICD-10-CM

## 2022-05-12 NOTE — TELEPHONE ENCOUNTER
Requested medication(s) are due for refill today: Yes  Patient has already received a courtesy refill: No  Other reason request has been forwarded to provider:   Hematology: Anticoagulants - rivaroxaban Failed 05/12/2022 09:26 AM   Protocol Details  ALT in normal range and within 180 days    AST in normal range and within 180 days    Cr in normal range and within 360 days

## 2022-05-13 ENCOUNTER — APPOINTMENT (OUTPATIENT)
Dept: PHYSICAL THERAPY | Facility: CLINIC | Age: 57
End: 2022-05-13
Payer: COMMERCIAL

## 2022-05-16 ENCOUNTER — APPOINTMENT (OUTPATIENT)
Dept: PHYSICAL THERAPY | Facility: CLINIC | Age: 57
End: 2022-05-16
Payer: COMMERCIAL

## 2022-05-18 ENCOUNTER — APPOINTMENT (OUTPATIENT)
Dept: PHYSICAL THERAPY | Facility: CLINIC | Age: 57
End: 2022-05-18
Payer: COMMERCIAL

## 2022-05-20 ENCOUNTER — APPOINTMENT (OUTPATIENT)
Dept: PHYSICAL THERAPY | Facility: CLINIC | Age: 57
End: 2022-05-20
Payer: COMMERCIAL

## 2022-05-23 ENCOUNTER — OFFICE VISIT (OUTPATIENT)
Dept: GASTROENTEROLOGY | Facility: AMBULARY SURGERY CENTER | Age: 57
End: 2022-05-23
Payer: COMMERCIAL

## 2022-05-23 VITALS
HEIGHT: 70 IN | SYSTOLIC BLOOD PRESSURE: 128 MMHG | HEART RATE: 64 BPM | WEIGHT: 297.2 LBS | OXYGEN SATURATION: 100 % | DIASTOLIC BLOOD PRESSURE: 70 MMHG | RESPIRATION RATE: 18 BRPM | BODY MASS INDEX: 42.55 KG/M2

## 2022-05-23 DIAGNOSIS — R19.4 CHANGE IN BOWEL HABITS: Primary | ICD-10-CM

## 2022-05-23 DIAGNOSIS — R79.89 ELEVATED LIVER FUNCTION TESTS: ICD-10-CM

## 2022-05-23 PROCEDURE — 99204 OFFICE O/P NEW MOD 45 MIN: CPT | Performed by: INTERNAL MEDICINE

## 2022-05-23 PROCEDURE — 3078F DIAST BP <80 MM HG: CPT | Performed by: INTERNAL MEDICINE

## 2022-05-23 PROCEDURE — 1036F TOBACCO NON-USER: CPT | Performed by: INTERNAL MEDICINE

## 2022-05-23 PROCEDURE — 3074F SYST BP LT 130 MM HG: CPT | Performed by: INTERNAL MEDICINE

## 2022-05-23 PROCEDURE — 3008F BODY MASS INDEX DOCD: CPT | Performed by: INTERNAL MEDICINE

## 2022-05-23 NOTE — PROGRESS NOTES
Consultation - [facility]  Gary Ba 64 y o  male MRN: [id number]  Unit/Bed#:  Encounter: [id number]        Consults    ASSESSMENT/PLAN:       1  Elevated LFTs-hepatocellular pattern with ALT greater than AST normal total bilirubin  Normal platelets  I suspect this is likely secondary to statin use  Patient was started on a statin after his CVA in March, however at this time is important to rule out any other causes these  Also recommend to avoid any other hepatotoxic medications and herbal supplements  Recommend minimizing the intake of alcohol  May continue statin as deemed indicated from neurology standpoint while monitoring the liver enzymes   -obtain right upper quadrant ultrasound   -check chronic hepatitis panel, CORDELL, AMA, anti smooth muscle antibody, ceruloplasmin, alpha-1 antitrypsin, iron/TIBC ratio  2  Change in bowel habits with constipation-likely due to medications, reports that since starting his medications after stroke, he has noted that his bowel movements are not as frequent as he once did previously  He reports that he can go several days without having a bowel movement  No hematochezia  Patient reports last colonoscopy being over 15 years ago  Would recommend to start adding a fiber supplement such as psyllium husk 1 tbsp on daily basis  Would also recommend colonoscopy to rule out any colon polyps or lesions however can wait 3-4 months until patient can safely be held off of Xarelto for few days  Will need to obtain clearance from Neurology before doing so  Both patient and wife agree with this plan  We will re-evaluate the need for sooner colonoscopy at the next visit  Fortunately he does not have any signs of melena or hematochezia at this time        ______________________________________________________________________    Reason for Consult / Principal Problem: [unfilled]    HPI: Gary Ba is a 64y o  year old male with history of AFib, CKD, arthritis, recent CVA in March on Xarelto, has been referred for elevated LFTs  Patient has been noted to have elevated LFTs starting at the end of March and extending into April  His most recent liver enzymes are notable for AST of 51, ALT 98  Platelets are normal   Bilirubin is normal   No recent abdominal imaging to assess the gallbladder or the liver  He reports drinking socially only, denies any other herbal supplements  He tells me that he recently started on a statin after his CVA  He denies prior history of liver disease  No family history of liver disease  Denies jaundice, abdominal distention or peripheral edema  He further endorses change in bowel habits, reports that since his diagnosis of CVA, he has noted that his bowel movements are not as frequent  He reports that he used to have multiple loose bowel movements on daily basis however now has bowel movement which are formed every few days  Sometimes he can have several days without a bowel movement  He denies any blood in the stool  Denies any abdominal pain  He denies any symptoms of acid reflux, dysphagia, odynophagia loss of appetite or early satiety  He does tell me that he was diagnosed with peptic ulcer disease when he was a child  He does not remember whether he ever had endoscopy or not  He does report having had a colonoscopy over 15 years ago  No family history of colon cancer  Review of Systems: The remainder of the review of systems was negative except for the pertinent positives noted in HPI       Historical Information   Past Medical History:   Diagnosis Date    Ankle swelling     Arthritis     Asthma     Atrial fibrillation (HCC)     Atrial fibrillation with RVR (HCC) 4/4/2017    Chronic kidney disease     kidney stones    Gout     Hypertension     Kidney stone     Night sweats     Seasonal allergies     Sleep apnea      Past Surgical History:   Procedure Laterality Date    APPENDECTOMY      CARDIOVERSION      X4 last was 2018    COLONOSCOPY      CYSTOSCOPY  2016    w/removal of object, last assessed 16    FOOT SURGERY Left     HAND SURGERY      KNEE SURGERY Right 2016    NV CYSTO/URETERO W/LITHOTRIPSY &INDWELL STENT INSRT Left 2016    Procedure: CYSTOSCOPY URETEROSCOPY WITH LITHOTRIPSY HOLMIUM LASER STONE EXTRACTION, RETROGRADE PYELOGRAM AND INSERTION STENT URETERAL;  Surgeon: Milly Fischer MD;  Location:  MAIN OR;  Service: Urology last assessed 16    NV CYSTO/URETERO W/LITHOTRIPSY &INDWELL STENT INSRT Right 2020    Procedure: CYSTOSCOPY , cystolitholapaxy of bladder stone WITH HOMIUM LASER RIGHT RETROGRADE AND RIGHT URETERAL STENT, [address];  Surgeon: Milly Fischer MD;  Location: [address] [place] [address] MAIN OR;  Service: Urology    WRIST SURGERY Left 2014    Fracture surgery     Social History   Social History     Substance and Sexual Activity   Alcohol Use Yes    Comment: occ     Social History     Substance and Sexual Activity   Drug Use No     Social History     Tobacco Use   Smoking Status Former Smoker    Packs/day: 0 50    Years: 10 00    Pack years: 5 00    Types: Cigarettes    Quit date: 1994    Years since quittin 1   Smokeless Tobacco Never Used     Family History   Problem Relation Age of Onset    Atrial fibrillation Mother     Other Mother         blood dyscrasia    Hypertension Mother     Other Father         hypoglycemia     Atrial fibrillation Father     Diabetes Family     Other Family         Thrombocytosis    No Known Problems Sister     Cancer Neg Hx     Thyroid disease Neg Hx        Meds/Allergies     (Not in a hospital admission)    No current facility-administered medications for this visit  No Known Allergies    Objective     Blood pressure 128/70, pulse 64, resp  rate 18, height 5' 10" (1 778 m), weight 135 kg (297 lb 3 2 oz), SpO2 100 %      [unfilled]    PHYSICAL EXAM     GEN: well nourished, well developed, no acute distress  HEENT: anicteric, MMM, no cervical or supraclavicular lymphadenopathy  CV: RRR, no m/r/g  CHEST: CTA b/l, no WRR  ABD: +BS, soft, NT/ND, no hepatosplenomegaly  EXT: no c/c/e  SKIN: no rashes,  NEURO: aaox3    Lab Results:   No visits with results within 1 Day(s) from this visit     Latest known visit with results is:   Appointment on 04/01/2022   Component Date Value    Sodium 04/01/2022 139     Potassium 04/01/2022 4 1     Chloride 04/01/2022 104     CO2 04/01/2022 27     ANION GAP 04/01/2022 8     BUN 04/01/2022 20     Creatinine 04/01/2022 1 41 (A)    Glucose, Fasting 04/01/2022 133 (A)    Calcium 04/01/2022 8 9     AST 04/01/2022 51 (A)    ALT 04/01/2022 98 (A)    Alkaline Phosphatase 04/01/2022 61     Total Protein 04/01/2022 8 2     Albumin 04/01/2022 3 9     Total Bilirubin 04/01/2022 0 56     eGFR 04/01/2022 55      Imaging Studies: I have personally reviewed pertinent films in PACS

## 2022-05-23 NOTE — LETTER
May 23, 2022     Karena Verma MD  [id number]    Patient: Jose Miguel Ramirez   YOB: 1965   Date of Visit: 5/23/2022       Dear Dr Tony Aguirre: Thank you for referring Trang Turcios to me for evaluation  Below are my notes for this consultation  If you have questions, please do not hesitate to call me  I look forward to following your patient along with you  Sincerely,        Arelis Jones MD        CC: MD Arelis Calvillo MD  5/23/2022  5:25 PM  Sign when Signing Visit  Consultation - [address] [facility]  Jose Miguel Ramirez 64 y o  male MRN: [id number]  Unit/Bed#:  Encounter: [id number]        Consults    ASSESSMENT/PLAN:       1  Elevated LFTs-hepatocellular pattern with ALT greater than AST normal total bilirubin  Normal platelets  I suspect this is likely secondary to statin use  Patient was started on a statin after his CVA in March, however at this time is important to rule out any other causes these  Also recommend to avoid any other hepatotoxic medications and herbal supplements  Recommend minimizing the intake of alcohol  May continue statin as deemed indicated from neurology standpoint while monitoring the liver enzymes   -obtain right upper quadrant ultrasound   -check chronic hepatitis panel, CORDELL, AMA, anti smooth muscle antibody, ceruloplasmin, alpha-1 antitrypsin, iron/TIBC ratio  2  Change in bowel habits with constipation-likely due to medications, reports that since starting his medications after stroke, he has noted that his bowel movements are not as frequent as he once did previously  He reports that he can go several days without having a bowel movement  No hematochezia  Patient reports last colonoscopy being over 15 years ago  Would recommend to start adding a fiber supplement such as psyllium husk 1 tbsp on daily basis    Would also recommend colonoscopy to rule out any colon polyps or lesions however can wait 3-4 months until patient can safely be held off of Xarelto for few days  Will need to obtain clearance from Neurology before doing so  Both patient and wife agree with this plan  We will re-evaluate the need for sooner colonoscopy at the next visit  Fortunately he does not have any signs of melena or hematochezia at this time  ______________________________________________________________________    Reason for Consult / Principal Problem: [unfilled]    HPI: Ashanti Jc is a 64y o  year old male with history of AFib, CKD, arthritis, recent CVA in March on Xarelto, has been referred for elevated LFTs  Patient has been noted to have elevated LFTs starting at the end of March and extending into April  His most recent liver enzymes are notable for AST of 51, ALT 98  Platelets are normal   Bilirubin is normal   No recent abdominal imaging to assess the gallbladder or the liver  He reports drinking socially only, denies any other herbal supplements  He tells me that he recently started on a statin after his CVA  He denies prior history of liver disease  No family history of liver disease  Denies jaundice, abdominal distention or peripheral edema  He further endorses change in bowel habits, reports that since his diagnosis of CVA, he has noted that his bowel movements are not as frequent  He reports that he used to have multiple loose bowel movements on daily basis however now has bowel movement which are formed every few days  Sometimes he can have several days without a bowel movement  He denies any blood in the stool  Denies any abdominal pain  He denies any symptoms of acid reflux, dysphagia, odynophagia loss of appetite or early satiety  He does tell me that he was diagnosed with peptic ulcer disease when he was a child  He does not remember whether he ever had endoscopy or not  He does report having had a colonoscopy over 15 years ago    No family history of colon cancer  Review of Systems: The remainder of the review of systems was negative except for the pertinent positives noted in HPI       Historical Information   Past Medical History:   Diagnosis Date    Ankle swelling     Arthritis     Asthma     Atrial fibrillation (HCC)     Atrial fibrillation with RVR (HCC) 2017    Chronic kidney disease     kidney stones    Gout     Hypertension     Kidney stone     Night sweats     Seasonal allergies     Sleep apnea      Past Surgical History:   Procedure Laterality Date    APPENDECTOMY      CARDIOVERSION      X4 last was 2018    COLONOSCOPY      CYSTOSCOPY  2016    w/removal of object, last assessed 16    FOOT SURGERY Left     HAND SURGERY      KNEE SURGERY Right 2016    VT CYSTO/URETERO W/LITHOTRIPSY &INDWELL STENT INSRT Left 2016    Procedure: CYSTOSCOPY URETEROSCOPY WITH LITHOTRIPSY HOLMIUM LASER STONE EXTRACTION, RETROGRADE PYELOGRAM AND INSERTION STENT URETERAL;  Surgeon: Grant Jones MD;  Location:  MAIN OR;  Service: Urology last assessed 16    VT CYSTO/URETERO W/LITHOTRIPSY &INDWELL STENT INSRT Right 2020    Procedure: CYSTOSCOPY , cystolitholapaxy of bladder stone WITH HOMIUM LASER RIGHT RETROGRADE AND RIGHT URETERAL Solis Vyas;  Surgeon: Grant Jones MD;  Location: [facility] MAIN OR;  Service: Urology    WRIST SURGERY Left     Fracture surgery     Social History   Social History     Substance and Sexual Activity   Alcohol Use Yes    Comment: occ     Social History     Substance and Sexual Activity   Drug Use No     Social History     Tobacco Use   Smoking Status Former Smoker    Packs/day: 0 50    Years: 10 00    Pack years: 5 00    Types: Cigarettes    Quit date: 1994    Years since quittin 1   Smokeless Tobacco Never Used     Family History   Problem Relation Age of Onset    Atrial fibrillation Mother     Other Mother         blood dyscrasia    Hypertension Mother    Chepe Liu Other Father         hypoglycemia     Atrial fibrillation Father     Diabetes Family     Other Family         Thrombocytosis    No Known Problems Sister     Cancer Neg Hx     Thyroid disease Neg Hx        Meds/Allergies     (Not in a hospital admission)    No current facility-administered medications for this visit  No Known Allergies    Objective     Blood pressure 128/70, pulse 64, resp  rate 18, height 5' 10" (1 778 m), weight 135 kg (297 lb 3 2 oz), SpO2 100 %  [unfilled]    PHYSICAL EXAM     GEN: well nourished, well developed, no acute distress  HEENT: anicteric, MMM, no cervical or supraclavicular lymphadenopathy  CV: RRR, no m/r/g  CHEST: CTA b/l, no WRR  ABD: +BS, soft, NT/ND, no hepatosplenomegaly  EXT: no c/c/e  SKIN: no rashes,  NEURO: aaox3    Lab Results:   No visits with results within 1 Day(s) from this visit     Latest known visit with results is:   Appointment on 04/01/2022   Component Date Value    Sodium 04/01/2022 139     Potassium 04/01/2022 4 1     Chloride 04/01/2022 104     CO2 04/01/2022 27     ANION GAP 04/01/2022 8     BUN 04/01/2022 20     Creatinine 04/01/2022 1 41 (A)    Glucose, Fasting 04/01/2022 133 (A)    Calcium 04/01/2022 8 9     AST 04/01/2022 51 (A)    ALT 04/01/2022 98 (A)    Alkaline Phosphatase 04/01/2022 61     Total Protein 04/01/2022 8 2     Albumin 04/01/2022 3 9     Total Bilirubin 04/01/2022 0 56     eGFR 04/01/2022 55      Imaging Studies: I have personally reviewed pertinent films in PACS

## 2022-05-24 ENCOUNTER — HOSPITAL ENCOUNTER (OUTPATIENT)
Dept: ULTRASOUND IMAGING | Facility: HOSPITAL | Age: 57
Discharge: HOME | End: 2022-05-24
Attending: INTERNAL MEDICINE
Payer: COMMERCIAL

## 2022-05-24 DIAGNOSIS — R79.89 ELEVATED LIVER FUNCTION TESTS: ICD-10-CM

## 2022-05-24 PROCEDURE — 76705 ECHO EXAM OF ABDOMEN: CPT

## 2022-06-06 ENCOUNTER — CLINICAL SUPPORT (OUTPATIENT)
Dept: FAMILY MEDICINE CLINIC | Facility: CLINIC | Age: 57
End: 2022-06-06

## 2022-06-06 DIAGNOSIS — E11.649 UNCONTROLLED TYPE 2 DIABETES MELLITUS WITH HYPOGLYCEMIA WITHOUT COMA (HCC): Primary | ICD-10-CM

## 2022-06-06 NOTE — PROGRESS NOTES
[address] [place] [address]  Armida Membreno is a 64 y o  male who presents via telephone for follow-up  Reason for visit: diabetes and medication review  Plan: The following actions were taken today by the Clinical Pharmacist:   1  Medications: Continue Ozempic titration as tolerated  Started 0 25mg once weekly on 4/20/22  Instructed to increase to 0 5mg 5/18/22  Scheduled to increase to 1mg 6/15/22      Follow-up:   Follow-up with pharmacist in 4 weeks for dose titration  Next PCP visit: not yet scheduled    - Home Monitoring Records: SMBGs  - Labs: up to date  - Referrals: none         Chronic Conditions Addressed at this Visit:       Diabetes: Goals - A1c: <7%; SMBGs: Preprandial: 80-130mg/dL per ADA Guidelines  - Most recent A1c: above goal   - SMBG: below goal (see attached Fátima report)  Current DM Regimen:  Ozempic 0 5mg once weekly  MEDICATIONS: Continue Ozempic 0 5mg once weekly  HOME MONITORING: Check blood sugars prn and record  HTN: BP goal: <130/80 mmHg based on ADA Guidelines [proteinuria, existing ASCVD, or 10-year ASCVD risk =15%]  - In-office BP below goal, HR acceptable  MEDICATIONS: Clonidine, Hydralazine, Nebivolol, Nifedipine, Olmesartan, Torsemide  Continue  as prescribed  Managed by cardiology/nephrology/neurology  LABS: labs are reviewed    Hyperlipidemia with clinical ASCVD event;   2018 ACC/AHA blood cholesterol guidelines recommends high-intensity statin  Patient tolerating statin well without side effects  MEDICATIONS: Atorvastatin 40mg daily  Continue as prescribed  LABS: labs are reviewed    Medication Reconciliation: Medication list reviewed with patient at today's visit and updated to reflect medications patient is currently taking   - Medication adherence is excellent  Efforts to improve compliance will be directed at continued diet modifications and weight loss      Education:  - Discussed ABCs of diabetes management (A1c/BP/cholesterol) and goals with patient today    Patient-specific goals:  1  Obtain ozempic and start dose  2  Continue lifestyle changes you've implemented     Subjective:     DM History:  Microvascular complications: cardiovascular disease and cerebrovascular disease  Cardiovascular complications: stroke/TIA  - Aspirin (Men>50, Women>60): Not Indicated  - Statin: Yes   - ACEi/ARB: Yes    Previous DM medications:   none    DM Self-Management:  - Self-monitoring: are performed regularly  He checks BG weekly, including FBG  He did provide blood glucose log today  - SMBG readings (no log, per memory): see India Lopez report from recent sample CGM session  - Hypoglycemia: (+) awareness/unawareness/denies any episodes  Glucometer: No, Brand: na  CGM: No, Brand: na    Patient reports side effects are present but tolerable  Notices decrease in cravings and hunger  No blood glucose measurements provided  Diabetes  He presents for his follow-up diabetic visit  He has type 2 diabetes mellitus  His disease course has been fluctuating  There are no hypoglycemic associated symptoms  There are no hypoglycemic complications  Symptoms are improving  Diabetic complications include a CVA  Risk factors for coronary artery disease include diabetes mellitus, dyslipidemia, family history, hypertension, male sex and obesity  When asked about current treatments, none were reported  He is compliant with treatment all of the time  He is following a generally healthy diet  He has not had a previous visit with a dietitian  He participates in exercise daily  His home blood glucose trend is decreasing steadily  An ACE inhibitor/angiotensin II receptor blocker is being taken  He does not see a podiatrist Eye exam is not current  Medication Adherence: Responsible for medication management: patient  Wife involved  Medication adherence: taking as prescribed  Patient denies missed/extra doses       Medication Efficacy/Safety:  Clinically significant drug interactions identified:  None  Side effects from medication(s) reported:  None    Lifestyle:  Social History     Tobacco Use    Smoking status: Former Smoker     Packs/day: 0 50     Years: 10 00     Pack years: 5 00     Types: Cigarettes     Quit date: 1994     Years since quittin 1    Smokeless tobacco: Never Used   Vaping Use    Vaping Use: Never used   Substance Use Topics    Alcohol use: Yes     Comment: occ    Drug use: No          Objective:   SMBG readings ( ):  none  Current Outpatient Medications   Medication Instructions    atorvastatin (LIPITOR) 40 mg, Oral, Daily with dinner    cloNIDine (CATAPRES-TTS-1) 0 1 mg, Transdermal, Weekly    dofetilide (TIKOSYN) 500 mcg capsule TAKE 1 CAPSULE BY MOUTH EVERY 12 HOURS    hydrALAZINE (APRESOLINE) 50 mg, Oral, 2 times daily    nebivolol (BYSTOLIC) 2 5 mg, Oral, Daily, Hold for heart rate less than 50 beats per minute   NIFEdipine (PROCARDIA XL) 30 mg, Oral, 2 times daily    olmesartan (BENICAR) 40 mg, Oral, Daily    rivaroxaban (XARELTO) 20 mg, Oral, Daily with breakfast    Semaglutide(0 25 or 0 5MG/DOS) 0 5 mg, Subcutaneous, Weekly    torsemide (DEMADEX) 5 mg, Oral, Daily       I have reviewed the patient's allergies, medications and history as noted in the electronic medical record and updated as necessary  Vital Signs:  BP Readings from Last 3 Encounters:   22 128/70   22 132/67   22 122/74     Pulse Readings from Last 3 Encounters:   22 64   22 60   22 62      Estimated body mass index is 42 64 kg/m² as calculated from the following:    Height as of 22: 5' 10" (1 778 m)  Weight as of 22: 135 kg (297 lb 3 2 oz)       Pertinent Lab Data:  Lab Results   Component Value Date    SODIUM 139 2022    K 4 1 2022     2022    CO2 27 2022    BUN 20 2022    CREATININE 1 41 (H) 2022    GLUC 147 (H) 03/10/2022 CALCIUM 8 9 04/01/2022     Lab Results   Component Value Date    HGBA1C 7 5 (H) 03/08/2022        Preventative Care:  A1c measurement: Up to date  Dilated eye exam: Overdue  Foot exam: Overdue  Dental exam: Up to date  Urinary microalbumin measurement: Up to date  Health Maintenance Due   Topic Date Due    Hepatitis C Screening  Never done    Pneumococcal Vaccine: Pediatrics (0 to 5 Years) and At-Risk Patients (6 to 59 Years) (1 - PCV) Never done    Diabetic Foot Exam  Never done    DM Eye Exam  Never done    HIV Screening  Never done    DTaP,Tdap,and Td Vaccines (1 - Tdap) Never done    Colorectal Cancer Screening  Never done    COVID-19 Vaccine (3 - Booster for Moderna series) 08/12/2021    Depression Screening  10/13/2021    Annual Physical  10/13/2021    PT PLAN OF CARE  04/14/2022    BMI: Followup Plan  07/21/2022    Influenza Vaccine (Season Ended) 09/01/2022       Reason For Outreach  Embedded Pharmacist    Demographics  Interaction Method: Virtual  Type of Intervention: Follow-Up    Topic(s) Addressed  Diabetes; Obesity    Intervention(s) Made    Pharmacologic:    -- Medication Adjustment - Dose or Frequency  -- Prevent or Manage Adverse Drug Reaction    Non-Pharmacologic:    -- Adherence addressed  -- Home monitoring discussed or provided    Tool(s) Used  Not Applicable    Time Spent:   Time Spent in Direct Patient Care: 15 minutes  Time Spent in Care Coordination: 15 minutes    Recommendations  Recipient: Patient/caregiver  Outcome:  All Accepted

## 2022-06-06 NOTE — PROGRESS NOTES
RENAL FOLLOW UP NOTE: td     ASSESSMENT AND PLAN:  1  Hypertension:  Most compatible with essential hypertension/resistant hypertension:  Secondary workup:  -normal thyroid function study  -severe HARPER on BiPAP  -aldosterone/renin ratio:  Essentially negative at 20 with an aldosterone level of 8 7  -plasma free metanephrines:  Negative  -24 hour urine for free cortisol:  Negative  -renal artery duplex:  No evidence of significant renal artery disease; but somewhat limited by body habitus and overlying bowel gas  Again obesity may be contributing to his hypertension  · Current home blood pressure readings:  · -a  m :   117/62, standing 106/53  · -p  m :   122/54, standing 115/55  · -heart rate:   70-80 range     · GOAL BLOOD PRESSURE:  LESS VYXH 676/34 GIVEN CKD/recent hemorrhagic stroke     · Recommendations:  · continue to push nonmedical regimen including isotonic exercise, avoidance of salt and weight loss; patient counseled as such  · GIVEN TIKOSYN:  AMILORIDE/SPIRONOLACTONE/HCTZ ARE CONTRAINDICATED  LOOP DIURETIC SUCH AS FUROSEMIDE OR TORSEMIDE ARE ACCEPTABLE  · Medication changes today:  · Torsemide 5 mg daily  · Continue olmesartan 40 mg daily  · Continue nebivolol 2 5 mg daily  · Continue hydralazine 50 mg twice a day  · Continue clonidine patch# 1  Weekly  At some point the near future we may lower dose of medications he is borderline low in the morning  He is going to push diet and exercise recheck blood pressures in 2 months with a new blood pressure machine as well  We will check his new machine at that time  He knows to call if he feels dizzy or lightheaded              2   CKD stage 3:    · Baseline creatinine is ranged anywhere from 1 1-1 6:  · 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  · Current creatinine:  At baseline at 1 31  · Electrolytes are normal including potassium of 4 0  · UA:  No proteinuria and no hematuria, 2-4 WBCs:  Repeat  · Urine protein creatinine ratio:  0 10 g at goal  · MBD:  Of CKD:  Calcium/magnesium are normal  · CBC:  Normal with a hemoglobin of 14 7  · Renal ultrasound:  12/03/2020:  Echogenic focus in the left lower pole possible fat versus calculus without shadowing  Thickened bladder with UV junction still present although perhaps slightly pronounced  Followed by Urology:  · Visit with Dr Elyssa Balderas for possible right-sided ureterocele, history of cystoscopy and cystolitholapaxy status post right stent removal   Bladder wall thickening resolution of hydronephrosis  Possibility of congenital ureterocele was discussed 1 year follow-up with retroperitoneal ultrasound which would be November 2022            3  Nephrolithiasis:  As mentioned previously he has had numerous stones   May have passed a stone since last time  · We will obtain 24 hour urine for stone risk evaluation prior to the next visit  · Continue with General stone diet     4  Dyslipidemia:  · Current lipid profile:  LDL  42/HDL 29/triglycerides 118  · Goal:  LDL less than 70 given CKD/hemorrhagic stroke  Recommend:  · Diet, exercise and weight loss, patient counseled as such  · No change in medication as patient is at goal        5  Recent admission 03/07/2022 secondary to right-sided weakness with word-finding difficulty, found to have acute left intraparenchymal hemorrhage with vasogenic edema  Patient admitted [facility] with neurosurgery evaluation  Started on Cardene due to hypertension  No neuro surgical intervention at that time was deemed necessary  MRI in 3/9 demonstrated unchanged intraparenchymal hematoma as well as small acute infarct in the left corona radiata  Patient was maintained on blood pressure control  TTE demonstrated EF 65% otherwise grade 1 diastolic dysfunction  Seen by Neurology as well recommended restarting anticoagulation 3 weeks after repeat CT scan along with PT/OT    In regards to blood pressure: Amlodipine was stopped nifedipine 10 mg every 8 hours titrate as needed, continuing hydralazine  6  Increased liver function studies:  Right upper quadrant ultrasound showed cholelithiasis hepatomegaly and hepatic steatosis  Now followed by Dr Julio César Torre who believes it is related to statin therapy but is agreeable to continue the statin  Further evaluation will be done by Dr Roderick Prince     GI health maintenance:  Seen by Dr Roderick Prince we will plan for colonoscopy 3-4 months after CVA  PATIENT INSTRUCTIONS:    Patient Instructions     1  Medication changes today:   No medication changes today  2 Please go for a 24 hour urine stone risk evaluation at this time     3  In 2 months:  Please go for nonfasting lab work in the morning  And:  Please take 1 week a blood pressure readings  in 2 months with your new blood pressure machine    AS FOLLOWS  MORNING AND EVENING, SITTING AND STANDING as follows:  · TAKE THE MORNING READINGS BEFORE ANY MEDICATIONS AND WHEN YOU ARE RELAXED FOR SEVERAL MINUTES  · TAKE THE EVENING READINGS:  BETWEEN 7-10 P M ; PRIOR TO ANY MEDICATIONS; AT LEAST IN OUR  FROM DINNER; AND CERTAINLY AFTER RELAXING FOR A FEW MINUTES  · PLEASE INCLUDE HEART RATE WITH YOUR BLOOD PRESSURE READINGS  · When taking standing readings, keep your arm supported at heart level and not dangling  · Make sure you are sitting with your back supported and feet on the ground and do not cross your legs or feet  · Make sure you have not taken any coffee or caffeine products or exercised or smoke cigarettes at least 30 minutes before taking your blood pressure  Then please come into the office to see 1 of my advanced practitioner's and please bring any new machine with your blood pressure readings    4  Follow-up in 6  months   Please bring in 1 week a blood pressure readings morning evening, sitting and standing is outlined above     Please go for fasting lab work 1-2 weeks prior to your appointment      5    General instructions:   AVOID SALT BUT NOT ADDING AN READING LABELS TO MAKE SURE THERE IS LOW-SALT IN THE FOOD THAT YOU ARE EATING  o Goal is less than 2 g of sodium intake or less than 5 g of sodium chloride intake per day     Avoid nonsteroidal anti-inflammatory drugs such as Naprosyn, ibuprofen, Aleve, Advil, Celebrex, Meloxicam (Mobic) etc   You can use Tylenol as needed if you do not have any liver condition to be concerned about     Avoid medications such as Sudafed or decongestants and antihistamines that contained the D component which is the decongestant  You can take antihistamines without the decongestant or D component   Try to avoid medications such as pantoprazole or  Protonix/Nexium or Esomeprazole)/Prilosec or omeprazole/Prevacid or lansoprazole/AcipHex or Rabeprazole  If you are able to, use Pepcid as this is safer for your kidneys   Try to exercise at least 30 minutes 3 days a week to begin with with an ultimate goal of 5 days a week for at least 30 minutes     Try to lose 10 lb by your next visit     Please do not drink more than 2 glasses of alcohol/wine on a daily basis as this may contribute to your high blood pressure  Measures to reduce stone development:    · Please Drink  oz of water or 2 5L-3 L a day, throughout the day  · Avoid salt/low-salt diet   · Try to decrease animal protein intake, dairy protein and vegetable base protein are better  · Increase fruits and vegetables as much as possible  · Avoid calcium products such as Tums or other types of calcium containing antacids, you can use Pepcid for indigestion (but you do not have to restrict your dietary calcium)  · Avoid excessive vitamin D   · Avoid excessive vitamin C  · Try to avoid oxalate products (please refer to the diet sheet)  · Limit fructose and sucrose type drinks such as coke      Low Oxalate Diet   WHAT YOU NEED TO KNOW:   Oxalate is a chemical found in plant foods   You may need to eat foods that are low in oxalate to help clear kidney stones or prevent them from forming  People who have had kidney stones are at a higher risk of forming kidney stones again  The most common type of kidney stone is made up of crystals that contain calcium and oxalate  Your healthcare provider or dietitian may recommend that you limit oxalate if you get this type of kidney stone often  DISCHARGE INSTRUCTIONS:   Foods to include: Include the following foods that have a low to medium amount of oxalate  1  Grains:      ? Egg noodles    ? Rene crackers    ? Pancakes and waffles    ? Cooked and dry cereals without nuts or bran    ? White or wild rice    ? White bread, cornbread, bagels, and white English muffins (medium oxalate)    ? Saltine or soda crackers and vanilla wafers (medium oxalate)    ? Brown rice, spaghetti, and other noodles and pastas (medium oxalate)    2  Fruit:      ? Apples, bananas, grapes    ? Cranberries    ? Peaches, nectarines, apricots, and pears    ? Papayas and strawberries    ? Melons and pineapples    ? Blackberries, blueberries, mangoes, and prunes (medium oxalate)    3  Vegetables:      ? Artichokes, asparagus, and brussels sprouts    ? Broccoli and cauliflower    ? Kale, endive, cabbage, and lettuce    ? Cucumbers, peas, and zucchini    ? Mushrooms, onions, and peppers    ? Corn    ? Carrots, celery, and green beans (medium oxalate)    ? Parsnips, summer squash, tomatoes, and turnips (medium oxalate)    4  Dairy:      ? American cheese, Swiss cheese, cottage cheese, ricotta cheese, and cheddar cheese    ? Milk and buttermilk    ? Yogurt    5  Protein foods:      ? Meat, fish, shellfish, chicken, and turkey    ? Lunch meat and ham (medium oxalate)    ? Hot dogs, bratwurst, wilkins, and sausage (medium oxalate)    6  Drinks and desserts:      ? Coffee    ? Fruit punch and lemonade or limeade without added vitamin C    7   Desserts:      ? Cookies, cakes, and ice cream    ? Pudding without chocolate    Foods to limit or avoid:  Limit the following foods that are high in oxalate  1  Grains:      ? Wheat bran, wheat germ, and barley    ? Grits and bran cereal    ? White corn flour and buckwheat flour    ? Whole wheat bread    2  Fruit:      ? Dried apricots    ? Red currants, figs, and rhubarb    ? Enrique Jerod    ? Grapefruit    3  Vegetables:      ? Potatoes and yams    ? Zach greens, leeks, okra, and spinach    ? Wax beans     ? Eggplant    ? Beets and beet greens    ? Swiss chard, escarole, parsley, and rutabagas    ? Tomato paste    4  Protein foods:      ? Baked beans with tomato sauce    ? Nut butters and nuts (peanuts, almonds, pecans, cashews, hazelnuts)    ? Soy burgers    ? Miso    ? Dried beans    5  Desserts:      ? Fruitcake    ? Chocolate    ? Carob and marmalade    6  Beverages:      ? Chocolate drink mixes    ? Soy milk    ? Instant iced tea    7  Other foods:      ? Sesame seeds and tahini (paste made of sesame seeds)    ? Poppy seeds    Other dietary guidelines:   · Drink about 12 to 16 (eight-ounce) cups of liquid each day  Liquids help clear kidney stones and prevent them from forming again  Water is the best liquid to drink  You may need more liquid if you are physically active  Ask your healthcare provider or dietitian how much liquid you need to drink each day  · Your healthcare provider may suggest that you make other diet changes to help prevent kidney stones  This may include decreasing the amount of sodium you eat each day  © Copyright [employer] 2022 Information is for End User's use only and may not be sold, redistributed or otherwise used for commercial purposes  All illustrations and images included in CareNotes® are the copyrighted property of [employer] M [employer] Inc  or Marie Lerner   The above information is an  only  It is not intended as medical advice for individual conditions or treatments   Talk to your doctor, nurse or pharmacist before following any medical regimen to see if it is safe and effective for you  Subjective: There has been no hospitalizations or acute illnesses since last visit  The patient overall is feeling well  No fevers, chills, or cough or colds    Fair appetite and fair energy  No hematuria, dysuria, voiding symptoms or foamy urine  No gastrointestinal symptoms except for occasional nausea possibly with Ozempic  No cardiovascular symptoms including swelling of the legs  No headaches, dizziness or lightheadedness  Blood pressure medications:   Torsemide 5 mg daily in the morning   Olmesartan 40 mg daily morning   Procardia XL 30 mg daily in the morning   Bystolic 2 5 mg daily morning   Apresoline/hydralazine 50 mg 3 times a day   Clonidine patch #1 weekly      ROS:  See HPI, otherwise review of systems as completely reviewed with the patient are negative    Past Medical History:   Diagnosis Date    Ankle swelling     Arthritis     Asthma     At[facility] fibrillation [facility] [address] )     Atrial fibrillation with RVR ([facility] [address] ) 4/4/2017    Chronic kidney disease     kidney stones    Gout     Hypertension     Kidney stone     Night sweats     Seasonal allergies     Sleep apnea      Past Surgical History:   Procedure Laterality Date    APPENDECTOMY      CARDIOVERSION      X4 last was 2018    COLONOSCOPY      CYSTOSCOPY  08/30/2016    w/removal of object, last assessed 8/30/16    FOOT SURGERY Left     HAND SURGERY      KNEE SURGERY Right 2016    AL CYSTO/URETERO W/LITHOTRIPSY &INDWELL STENT INSRT Left 8/9/2016    Procedure: CYSTOSCOPY URETEROSCOPY WITH LITHOTRIPSY HOLMIUM LASER STONE EXTRACTION, RETROGRADE PYELOGRAM AND INSERTION STENT URETERAL;  Surgeon: Aleksandra Montes MD;  Location: [facility];  Service: Urology last assessed 8/30/16    AL CYSTO/URETERO W/LITHOTRIPSY &INDWELL STENT INSRT Right 8/24/2020    Procedure: CYSTOSCOPY , cystolitholapaxy of bladder stone WITH HOMIUM LASER RIGHT RETROGRADE AND RIGHT URETERAL STENT, [facility]; Surgeon: Delfino Piña MD;  Location: [address] [place] [address] OR;  Service: Urology    WRIST SURGERY Left 2014    Fracture surgery     Family History   Problem Relation Age of Onset    Atrial fibrillation Mother     Other Mother         blood dyscrasia    Hypertension Mother     Other Father         hypoglycemia     Atrial fibrillation Father     Diabetes Family     Other Family         Thrombocytosis    No Known Problems Sister     Cancer Neg Hx     Thyroid disease Neg Hx       reports that he quit smoking about 28 years ago  His smoking use included cigarettes  He has a 5 00 pack-year smoking history  He has never used smokeless tobacco  He reports current alcohol use  He reports that he does not use drugs  I COMPLETELY REVIEWED THE PAST MEDICAL HISTORY/PAST SURGICAL HISTORY/SOCIAL HISTORY/FAMILY HISTORY/AND MEDICATIONS  AND UPDATED ALL    Objective:     Vitals:   BP sitting on left:  132/68 with a heart rate of 68 and regular  BP standing on left:  124/70 with a heart rate of 72 and regular    Weight (last 2 days)     Date/Time Weight    06/15/22 0823 134 (294 8)        Wt Readings from Last 3 Encounters:   06/15/22 134 kg (294 lb 12 8 oz)   05/23/22 135 kg (297 lb 3 2 oz)   04/20/22 (!) 139 kg (305 lb 9 6 oz)       Body mass index is 42 3 kg/m²      Physical Exam: General:  No acute distress/obese  Skin:  No acute rash  Eyes:  No scleral icterus, noninjected, no discharge from eyes  ENT:  Moist mucous membranes  Neck:  Supple, no jugular venous distention, trachea is midline, no lymphadenopathy and no thyromegaly  Back   No CVAT  Chest:  Clear to auscultation and percussion, good respiratory effort  CVS:  Regular rate and rhythm without a rub, or gallops or murmurs  Abdomen:  Obese,Soft and nontender with normal bowel sounds  Extremities:  No cyanosis and no edema, no arthritic changes, normal range of motion  Neuro:  Grossly intact  Psych:  Alert, oriented x3 and appropriate      Medications:    Current Outpatient Medications:     atorvastatin (LIPITOR) 40 mg tablet, Take 1 tablet (40 mg total) by mouth daily with dinner, Disp: 30 tablet, Rfl: 4    cloNIDine (CATAPRES-TTS-1) 0 1 mg/24 hr, Place 1 patch (0 1 mg total) on the skin once a week, Disp: 4 patch, Rfl: 11    dofetilide (TIKOSYN) 500 mcg capsule, TAKE 1 CAPSULE BY MOUTH EVERY 12 HOURS, Disp: 60 capsule, Rfl: 8    hydrALAZINE (APRESOLINE) 50 mg tablet, Take 1 tablet (50 mg total) by mouth 2 (two) times a day (Patient taking differently: Take 50 mg by mouth 3 (three) times a day), Disp: 180 tablet, Rfl: 3    nebivolol (Bystolic) 2 5 mg tablet, Take 1 tablet (2 5 mg total) by mouth daily Hold for heart rate less than 50 beats per minute , Disp: 90 tablet, Rfl: 3    NIFEdipine (PROCARDIA XL) 30 mg 24 hr tablet, Take 1 tablet (30 mg total) by mouth 2 (two) times a day (Patient taking differently: Take 30 mg by mouth daily), Disp: 180 tablet, Rfl: 3    olmesartan (BENICAR) 40 mg tablet, Take 1 tablet (40 mg total) by mouth daily, Disp: 90 tablet, Rfl: 3    rivaroxaban (Xarelto) 20 mg tablet, Take 1 tablet (20 mg total) by mouth daily with breakfast, Disp: 30 tablet, Rfl: 10    Semaglutide,0 25 or 0 5MG/DOS, (Ozempic, 0 25 or 0 5 MG/DOSE,) 2 MG/1 5ML SOPN, Inject 0 5 mg under the skin once a week, Disp: , Rfl:     torsemide (DEMADEX) 5 MG tablet, TAKE 1 TABLET (5 MG TOTAL) BY MOUTH DAILY  , Disp: 90 tablet, Rfl: 1    Lab, Imaging and other studies: I have personally reviewed pertinent labs    Laboratory Results:  Results for orders placed or performed in visit on 06/09/22   Comprehensive metabolic panel   Result Value Ref Range    Sodium 140 135 - 147 mmol/L    Potassium 4 0 3 5 - 5 3 mmol/L    Chloride 106 96 - 108 mmol/L    CO2 27 21 - 32 mmol/L    ANION GAP 7 4 - 13 mmol/L    BUN 19 5 - 25 mg/dL    Creatinine 1 31 (H) 0 60 - 1 30 mg/dL    Glucose, Fasting 119 (H) 65 - 99 mg/dL    Calcium 9 0 8 4 - 10 2 mg/dL    AST 21 13 - 39 U/L    ALT 27 7 - 52 U/L Alkaline Phosphatase 66 34 - 104 U/L    Total Protein 7 4 6 4 - 8 4 g/dL    Albumin 3 9 3 5 - 5 0 g/dL    Total Bilirubin 0 41 0 20 - 1 00 mg/dL    eGFR 60 ml/min/1 73sq m   CBC   Result Value Ref Range    WBC 9 79 4 31 - 10 16 Thousand/uL    RBC 4 80 3 88 - 5 62 Million/uL    Hemoglobin 14 7 12 0 - 17 0 g/dL    Hematocrit 44 1 36 5 - 49 3 %    MCV 92 82 - 98 fL    MCH 30 6 26 8 - 34 3 pg    MCHC 33 3 31 4 - 37 4 g/dL    RDW 13 2 11 6 - 15 1 %    Platelets 812 175 - 737 Thousands/uL    MPV 12 0 8 9 - 12 7 fL   CK   Result Value Ref Range    Total  39 - 308 U/L   Lipid Panel with Direct LDL reflex   Result Value Ref Range    Cholesterol 95 See Comment mg/dL    Triglycerides 118 See Comment mg/dL    HDL, Direct 29 (L) >=40 mg/dL    LDL Calculated 42 0 - 100 mg/dL   Magnesium   Result Value Ref Range    Magnesium 1 9 1 9 - 2 7 mg/dL   Antimitochondrial antibody   Result Value Ref Range    Mitochondrial Ab <20 0 0 0 - 20 0 Units   Antinuclear Antibodies, IFA   Result Value Ref Range    Antinuclear Antibodies, IFA Negative    Anti-smooth muscle antibody, IgG   Result Value Ref Range    Smooth Muscle Ab 13 0 - 19 Units   Celiac Disease Antibody Profile   Result Value Ref Range    IgA 368 90 - 386 mg/dL    Gliadin IgA 3 0 - 19 units    Gliadin IgG 2 0 - 19 units    Tissue Transglut Ab IGG 4 0 - 5 U/mL    TISSUE TRANSGLUTAMINASE IGA <2 0 - 3 U/mL    Endomysial IgA Negative Negative   Ceruloplasmin   Result Value Ref Range    Ceruloplasmin 23 4 16 0 - 31 0 mg/dL   Chronic Hepatitis Panel   Result Value Ref Range    Hepatitis B Surface Ag Non-reactive Non-reactive, NonReactive - Confirmed    Hepatitis C Ab Non-reactive Non-reactive    Hep B C IgM Non-reactive Non-reactive    Hep B Core Total Ab Non-reactive Non-reactive   Alpha-1-antitrypsin   Result Value Ref Range    A-1 Antitrypsin 153 101 - 187 mg/dL   Protein / creatinine ratio, urine   Result Value Ref Range    Creatinine, Ur 103 1 mg/dL    Protein Urine Random 10 mg/dL    Prot/Creat Ratio, Ur 0 10 0 00 - 0 10   IgG, IgA, IgM   Result Value Ref Range     0 70 0 - 400 0 mg/dL    IGG 1,290 0 700 0 - 1,600 0 mg/dL    IGM 66 0 40 0 - 230 0 mg/dL       Results from last 7 days   Lab Units 06/09/22  0846   WBC Thousand/uL 9 79   HEMOGLOBIN g/dL 14 7   HEMATOCRIT % 44 1   PLATELETS Thousands/uL 231   POTASSIUM mmol/L 4 0   CHLORIDE mmol/L 106   CO2 mmol/L 27   BUN mg/dL 19   CREATININE mg/dL 1 31*   CALCIUM mg/dL 9 0   MAGNESIUM mg/dL 1 9         Radiology review:   chest X-ray    Ultrasound      Portions of the record may have been created with voice recognition software  Occasional wrong word or "sound a like" substitutions may have occurred due to the inherent limitations of voice recognition software  Read the chart carefully and recognize, using context, where substitutions have occurred

## 2022-06-07 NOTE — PROGRESS NOTES
Daily Note     Today's date: 2022  Patient name: Samuel Trejo  : 1965   MRN: [id number]  Referring provider: Sonia Simon  Dx:   Encounter Diagnosis     ICD-10-CM    1  Functional gait abnormality  R26 89    2  Intracranial bleed (HCC)  I62 9        Start Time: 0800  Stop Time: 0900  Total time in clinic (min): 60 minutes    Subjective: More sore today likely from previous outdoor sessions  Performed a hill workout session of his own yesterday at home; a little more fatigued today overall  Objective: See treatment diary below      Assessment: Session focused on continued HIGT  Overall more fatigued today likely from previous two outdoor therapy sessions as well as personal session yesterday performed at home  Challenged with SLS compliant balance progressions eliminating visual feedback  Plan: Continue per plan of care  Progress treatment as tolerated  Short Term Goal Expiration Date:(22)  Long Term Goal Expiration Date: (22)  POC Expiration Date: (22)    Precautions: HTN, CKD III     target heart rate range: 112-136 BPM  Beta Blockers - YES     Today's Date:     Resting HR: 62 BPM    BP Pre exercise: 140/80 mmHg  BP during exercise:  BP post exercise:           Total minutes spent  within 70-85% Max HR:   (jonathan flowsheet)    Description of activity: (treadmill, overground, assist, distance, speed etc) Peak HR observed Peak RPE observed Total Minutes in target zone  mRPE Total minutes Timed Charges   (Nmr, TE, TA, GT)   TM No SOLO 5# BL AW    2 2 mph 3% x3 min  2 2 mph 6% x3 min  2 2 mph 9% x6 min  1 8 mph 0% x2 min    x14 min total 122 8/10 10 14 GT   // bars R LE SLS foam, L LE split stance elevated on 8" step + foam  6# MB bar twist taps  2x20    EC balance hold x2'    Hallway: Tennis wall ball toss overhead  R SLS foam no AW x8 rounds 3-6 bounces ea  No foam R LE decline block x6 rounds 4-6 bounces ea    8/10 33 33 NMR   Hallway Reverse Lunge no AW R UE osvaldo  Triple extension focus R LE  x100'      9/10 8 8 NMR                       *Therapeutic rest breaks throughout session as needed due to high intensity interval training per ACSM guidelines for recovery before return to exercise  Vitals monitored, documented and discussed with pt during this time  Detail Level: Zone Detail Level: Detailed

## 2022-06-08 ENCOUNTER — TELEPHONE (OUTPATIENT)
Dept: NEPHROLOGY | Facility: CLINIC | Age: 57
End: 2022-06-08

## 2022-06-08 DIAGNOSIS — E55.9 VITAMIN D DEFICIENCY: ICD-10-CM

## 2022-06-08 DIAGNOSIS — E78.5 DYSLIPIDEMIA: ICD-10-CM

## 2022-06-08 DIAGNOSIS — I10 ACCELERATED HYPERTENSION: ICD-10-CM

## 2022-06-08 DIAGNOSIS — N18.31 STAGE 3A CHRONIC KIDNEY DISEASE (HCC): ICD-10-CM

## 2022-06-08 DIAGNOSIS — I12.9 HYPERTENSIVE CHRONIC KIDNEY DISEASE WITH STAGE 1 THROUGH STAGE 4 CHRONIC KIDNEY DISEASE, OR UNSPECIFIED CHRONIC KIDNEY DISEASE: ICD-10-CM

## 2022-06-08 DIAGNOSIS — E66.01 CLASS 3 SEVERE OBESITY DUE TO EXCESS CALORIES WITH SERIOUS COMORBIDITY AND BODY MASS INDEX (BMI) OF 40.0 TO 44.9 IN ADULT (HCC): ICD-10-CM

## 2022-06-08 PROCEDURE — 3066F NEPHROPATHY DOC TX: CPT | Performed by: INTERNAL MEDICINE

## 2022-06-08 RX ORDER — TORSEMIDE 5 MG/1
5 TABLET ORAL DAILY
Qty: 90 TABLET | Refills: 1 | Status: SHIPPED | OUTPATIENT
Start: 2022-06-08

## 2022-06-08 NOTE — TELEPHONE ENCOUNTER
MILA for patient reminding him to go for lab work and to bring his BP readings to his appt on 6/15  Asked him to call back if he has any questions

## 2022-06-09 ENCOUNTER — APPOINTMENT (OUTPATIENT)
Dept: LAB | Facility: CLINIC | Age: 57
End: 2022-06-09
Payer: COMMERCIAL

## 2022-06-09 DIAGNOSIS — I10 ACCELERATED HYPERTENSION: ICD-10-CM

## 2022-06-09 DIAGNOSIS — N18.31 STAGE 3A CHRONIC KIDNEY DISEASE (HCC): ICD-10-CM

## 2022-06-09 DIAGNOSIS — E11.649 UNCONTROLLED TYPE 2 DIABETES MELLITUS WITH HYPOGLYCEMIA WITHOUT COMA (HCC): ICD-10-CM

## 2022-06-09 DIAGNOSIS — E66.01 CLASS 3 SEVERE OBESITY DUE TO EXCESS CALORIES WITH SERIOUS COMORBIDITY AND BODY MASS INDEX (BMI) OF 40.0 TO 44.9 IN ADULT (HCC): ICD-10-CM

## 2022-06-09 DIAGNOSIS — R79.89 ELEVATED LIVER FUNCTION TESTS: ICD-10-CM

## 2022-06-09 DIAGNOSIS — E78.5 DYSLIPIDEMIA: ICD-10-CM

## 2022-06-09 DIAGNOSIS — I12.9 HYPERTENSIVE CHRONIC KIDNEY DISEASE WITH STAGE 1 THROUGH STAGE 4 CHRONIC KIDNEY DISEASE, OR UNSPECIFIED CHRONIC KIDNEY DISEASE: ICD-10-CM

## 2022-06-09 DIAGNOSIS — E55.9 VITAMIN D DEFICIENCY: ICD-10-CM

## 2022-06-09 LAB
ALBUMIN SERPL BCP-MCNC: 3.9 G/DL (ref 3.5–5)
ALP SERPL-CCNC: 66 U/L (ref 34–104)
ALT SERPL W P-5'-P-CCNC: 27 U/L (ref 7–52)
ANION GAP SERPL CALCULATED.3IONS-SCNC: 7 MMOL/L (ref 4–13)
AST SERPL W P-5'-P-CCNC: 21 U/L (ref 13–39)
BILIRUB SERPL-MCNC: 0.41 MG/DL (ref 0.2–1)
BUN SERPL-MCNC: 19 MG/DL (ref 5–25)
CALCIUM SERPL-MCNC: 9 MG/DL (ref 8.4–10.2)
CHLORIDE SERPL-SCNC: 106 MMOL/L (ref 96–108)
CHOLEST SERPL-MCNC: 95 MG/DL
CK SERPL-CCNC: 104 U/L (ref 39–308)
CO2 SERPL-SCNC: 27 MMOL/L (ref 21–32)
CREAT SERPL-MCNC: 1.31 MG/DL (ref 0.6–1.3)
CREAT UR-MCNC: 103.1 MG/DL
ERYTHROCYTE [DISTWIDTH] IN BLOOD BY AUTOMATED COUNT: 13.2 % (ref 11.6–15.1)
GFR SERPL CREATININE-BSD FRML MDRD: 60 ML/MIN/1.73SQ M
GLUCOSE P FAST SERPL-MCNC: 119 MG/DL (ref 65–99)
HBV CORE AB SER QL: NORMAL
HBV CORE IGM SER QL: NORMAL
HBV SURFACE AG SER QL: NORMAL
HCT VFR BLD AUTO: 44.1 % (ref 36.5–49.3)
HCV AB SER QL: NORMAL
HDLC SERPL-MCNC: 29 MG/DL
HGB BLD-MCNC: 14.7 G/DL (ref 12–17)
IGA SERPL-MCNC: 339 MG/DL (ref 70–400)
IGG SERPL-MCNC: 1290 MG/DL (ref 700–1600)
IGM SERPL-MCNC: 66 MG/DL (ref 40–230)
LDLC SERPL CALC-MCNC: 42 MG/DL (ref 0–100)
MAGNESIUM SERPL-MCNC: 1.9 MG/DL (ref 1.9–2.7)
MCH RBC QN AUTO: 30.6 PG (ref 26.8–34.3)
MCHC RBC AUTO-ENTMCNC: 33.3 G/DL (ref 31.4–37.4)
MCV RBC AUTO: 92 FL (ref 82–98)
PLATELET # BLD AUTO: 231 THOUSANDS/UL (ref 149–390)
PMV BLD AUTO: 12 FL (ref 8.9–12.7)
POTASSIUM SERPL-SCNC: 4 MMOL/L (ref 3.5–5.3)
PROT SERPL-MCNC: 7.4 G/DL (ref 6.4–8.4)
PROT UR-MCNC: 10 MG/DL
PROT/CREAT UR: 0.1 MG/G{CREAT} (ref 0–0.1)
RBC # BLD AUTO: 4.8 MILLION/UL (ref 3.88–5.62)
SODIUM SERPL-SCNC: 140 MMOL/L (ref 135–147)
TRIGL SERPL-MCNC: 118 MG/DL
WBC # BLD AUTO: 9.79 THOUSAND/UL (ref 4.31–10.16)

## 2022-06-09 PROCEDURE — 80053 COMPREHEN METABOLIC PANEL: CPT

## 2022-06-09 PROCEDURE — 86258 DGP ANTIBODY EACH IG CLASS: CPT

## 2022-06-09 PROCEDURE — 86705 HEP B CORE ANTIBODY IGM: CPT

## 2022-06-09 PROCEDURE — 85027 COMPLETE CBC AUTOMATED: CPT

## 2022-06-09 PROCEDURE — 86381 MITOCHONDRIAL ANTIBODY EACH: CPT

## 2022-06-09 PROCEDURE — 86704 HEP B CORE ANTIBODY TOTAL: CPT

## 2022-06-09 PROCEDURE — 83735 ASSAY OF MAGNESIUM: CPT

## 2022-06-09 PROCEDURE — 86038 ANTINUCLEAR ANTIBODIES: CPT

## 2022-06-09 PROCEDURE — 82570 ASSAY OF URINE CREATININE: CPT

## 2022-06-09 PROCEDURE — 82390 ASSAY OF CERULOPLASMIN: CPT

## 2022-06-09 PROCEDURE — 80061 LIPID PANEL: CPT

## 2022-06-09 PROCEDURE — 36415 COLL VENOUS BLD VENIPUNCTURE: CPT

## 2022-06-09 PROCEDURE — 82784 ASSAY IGA/IGD/IGG/IGM EACH: CPT

## 2022-06-09 PROCEDURE — 86231 EMA EACH IG CLASS: CPT

## 2022-06-09 PROCEDURE — 86364 TISS TRNSGLTMNASE EA IG CLAS: CPT

## 2022-06-09 PROCEDURE — 86803 HEPATITIS C AB TEST: CPT

## 2022-06-09 PROCEDURE — 87340 HEPATITIS B SURFACE AG IA: CPT

## 2022-06-09 PROCEDURE — 86015 ACTIN ANTIBODY EACH: CPT

## 2022-06-09 PROCEDURE — 82103 ALPHA-1-ANTITRYPSIN TOTAL: CPT

## 2022-06-09 PROCEDURE — 84156 ASSAY OF PROTEIN URINE: CPT

## 2022-06-09 PROCEDURE — 82550 ASSAY OF CK (CPK): CPT

## 2022-06-09 PROCEDURE — 3061F NEG MICROALBUMINURIA REV: CPT | Performed by: INTERNAL MEDICINE

## 2022-06-10 LAB
A1AT SERPL-MCNC: 153 MG/DL (ref 101–187)
ACTIN IGG SERPL-ACNC: 13 UNITS (ref 0–19)
CERULOPLASMIN SERPL-MCNC: 23.4 MG/DL (ref 16–31)
MITOCHONDRIA M2 IGG SER-ACNC: <20 UNITS (ref 0–20)

## 2022-06-11 LAB
ANA TITR SER IF: NEGATIVE {TITER}
ENDOMYSIUM IGA SER QL: NEGATIVE
GLIADIN PEPTIDE IGA SER-ACNC: 3 UNITS (ref 0–19)
GLIADIN PEPTIDE IGG SER-ACNC: 2 UNITS (ref 0–19)
IGA SERPL-MCNC: 368 MG/DL (ref 90–386)
TTG IGA SER-ACNC: <2 U/ML (ref 0–3)
TTG IGG SER-ACNC: 4 U/ML (ref 0–5)

## 2022-06-13 ENCOUNTER — CLINICAL SUPPORT (OUTPATIENT)
Dept: FAMILY MEDICINE CLINIC | Facility: CLINIC | Age: 57
End: 2022-06-13

## 2022-06-13 DIAGNOSIS — E11.649 UNCONTROLLED TYPE 2 DIABETES MELLITUS WITH HYPOGLYCEMIA WITHOUT COMA (HCC): Primary | ICD-10-CM

## 2022-06-13 DIAGNOSIS — E66.01 CLASS 3 SEVERE OBESITY DUE TO EXCESS CALORIES WITH SERIOUS COMORBIDITY AND BODY MASS INDEX (BMI) OF 40.0 TO 44.9 IN ADULT (HCC): ICD-10-CM

## 2022-06-13 NOTE — ASSESSMENT & PLAN NOTE
Assessment: Patient is doing well on ozempic, has had 2 doses of 0 5mg so far, total weight loss since stroke approx  25lbs, side effects bearable include nausea and constipation  No episodes of hypoglycemia in terms of dizziness, shakiness, etc      Plan: [facility] to 1mg dose pending side effect profile

## 2022-06-13 NOTE — ASSESSMENT & PLAN NOTE
Lab Results   Component Value Date    HGBA1C 7 5 (H) 03/08/2022       Assessment: Patient is doing well on ozempic, has had 2 doses of 0 5mg so far, total weight loss since stroke approx  25lbs, side effects bearable include nausea and constipation  No episodes of hypoglycemia in terms of dizziness, shakiness, etc      [facility] 1mg dose pending side effect profile

## 2022-06-13 NOTE — PROGRESS NOTES
[address] [place] [address]  ANEUDY Ramirez is a 64 y o  male who presents via telephone for follow-up  Reason for visit: diabetes and medication review  1  Uncontrolled type 2 diabetes mellitus with hypoglycemia without coma [facility])  Assessment & Plan:    Lab Results   Component Value Date    HGBA1C 7 5 (H) 03/08/2022       Assessment: Patient is doing well on ozempic, has had 2 doses of 0 5mg so far, total weight loss since stroke approx  25lbs, side effects bearable include nausea and constipation  No episodes of hypoglycemia in terms of dizziness, shakiness, etc      Plan: follow up in two weeks via mychart for ozempic titration to 1mg dose pending side effect profile  2  Class 3 severe obesity due to excess calories with serious comorbidity and body mass index (BMI) of 40 0 to 44 9 in adult [facility])               Chronic Conditions Addressed at this Visit:       Diabetes: Goals - A1c: <7%; SMBGs: Preprandial: 80-130mg/dL per ADA Guidelines  - Most recent A1c: above goal   - SMBG: below goal (see attached Fátima report)  Current DM Regimen:  Ozempic 0 5mg once weekly  MEDICATIONS: Continue Ozempic 0 5mg once weekly  HOME MONITORING: Check blood sugars prn and record  HTN: BP goal: <130/80 mmHg based on ADA Guidelines [proteinuria, existing ASCVD, or 10-year ASCVD risk =15%]  - In-office BP below goal, HR acceptable  MEDICATIONS: Clonidine, Hydralazine, Nebivolol, Nifedipine, Olmesartan, Torsemide  Continue  as prescribed  Managed by cardiology/nephrology/neurology  LABS: labs are reviewed    Hyperlipidemia with clinical ASCVD event;   2018 ACC/AHA blood cholesterol guidelines recommends high-intensity statin  Patient tolerating statin well without side effects  MEDICATIONS: Atorvastatin 40mg daily  Continue as prescribed    LABS: labs are reviewed    Medication Reconciliation: Medication list reviewed with patient at today's visit and updated to reflect medications patient is currently taking   - Medication adherence is excellent  Efforts to improve compliance will be directed at continued diet modifications and weight loss  Education:  - Discussed ABCs of diabetes management (A1c/BP/cholesterol) and goals with patient today    Patient-specific goals:  1  Obtain ozempic and start dose  2  Continue lifestyle changes you've implemented     Subjective:     DM History:  Microvascular complications: cardiovascular disease and cerebrovascular disease  Cardiovascular complications: stroke/TIA  - Aspirin (Men>50, Women>60): Not Indicated  - Statin: Yes   - ACEi/ARB: Yes    Previous DM medications:   none    DM Self-Management:  - Self-monitoring: are performed regularly  He checks BG weekly, including FBG  He did provide blood glucose log today  - SMBG readings (no log, per memory): see Edilberto Flores report from recent sample CGM session  - Hypoglycemia: (+) awareness/unawareness/denies any episodes  Glucometer: No, Brand: na  CGM: No, Brand: na    Patient reports side effects are present but tolerable  Notices decrease in cravings and hunger  No blood glucose measurements provided  Diabetes  He presents for his follow-up diabetic visit  He has type 2 diabetes mellitus  His disease course has been fluctuating  There are no hypoglycemic associated symptoms  There are no hypoglycemic complications  Symptoms are improving  Diabetic complications include a CVA  Risk factors for coronary artery disease include diabetes mellitus, dyslipidemia, family history, hypertension, male sex and obesity  When asked about current treatments, none were reported  He is compliant with treatment all of the time  He is following a generally healthy diet  He has not had a previous visit with a dietitian  He participates in exercise daily  His home blood glucose trend is decreasing steadily  An ACE inhibitor/angiotensin II receptor blocker is being taken   He does not see a podiatrist Eye exam is not current  Medication Adherence: Responsible for medication management: patient  Wife involved  Medication adherence: taking as prescribed  Patient denies missed/extra doses  Medication Efficacy/Safety:  Clinically significant drug interactions identified:  None  Side effects from medication(s) reported:  None    Lifestyle:  Social History     Tobacco Use    Smoking status: Former Smoker     Packs/day: 0 50     Years: 10 00     Pack years: 5 00     Types: Cigarettes     Quit date: 1994     Years since quittin 2    Smokeless tobacco: Never Used   Vaping Use    Vaping Use: Never used   Substance Use Topics    Alcohol use: Yes     Comment: occ    Drug use: No          Objective:   SMBG readings ( ):  none  Current Outpatient Medications   Medication Instructions    atorvastatin (LIPITOR) 40 mg, Oral, Daily with dinner    cloNIDine (CATAPRES-TTS-1) 0 1 mg, Transdermal, Weekly    dofetilide (TIKOSYN) 500 mcg capsule TAKE 1 CAPSULE BY MOUTH EVERY 12 HOURS    hydrALAZINE (APRESOLINE) 50 mg, Oral, 2 times daily    nebivolol (BYSTOLIC) 2 5 mg, Oral, Daily, Hold for heart rate less than 50 beats per minute   NIFEdipine (PROCARDIA XL) 30 mg, Oral, 2 times daily    olmesartan (BENICAR) 40 mg, Oral, Daily    rivaroxaban (XARELTO) 20 mg, Oral, Daily with breakfast    torsemide (DEMADEX) 5 mg, Oral, Daily       I have reviewed the patient's allergies, medications and history as noted in the electronic medical record and updated as necessary  Vital Signs:  BP Readings from Last 3 Encounters:   22 128/70   22 132/67   22 122/74     Pulse Readings from Last 3 Encounters:   22 64   22 60   22 62      Estimated body mass index is 42 64 kg/m² as calculated from the following:    Height as of 22: 5' 10" (1 778 m)  Weight as of 22: 135 kg (297 lb 3 2 oz)       Pertinent Lab Data:  Lab Results   Component Value Date    SODIUM 140 06/09/2022    K 4 0 06/09/2022     06/09/2022    CO2 27 06/09/2022    BUN 19 06/09/2022    CREATININE 1 31 (H) 06/09/2022    GLUC 147 (H) 03/10/2022    CALCIUM 9 0 06/09/2022     Lab Results   Component Value Date    HGBA1C 7 5 (H) 03/08/2022        Preventative Care:  A1c measurement: Up to date  Dilated eye exam: Overdue  Foot exam: Overdue  Dental exam: Up to date  Urinary microalbumin measurement: Up to date  Health Maintenance Due   Topic Date Due    Pneumococcal Vaccine: Pediatrics (0 to 5 Years) and At-Risk Patients (6 to 59 Years) (1 - PCV) Never done    Diabetic Foot Exam  Never done    DM Eye Exam  Never done    HIV Screening  Never done    DTaP,Tdap,and Td Vaccines (1 - Tdap) Never done    Colorectal Cancer Screening  Never done    COVID-19 Vaccine (3 - Booster for Moderna series) 08/12/2021    Depression Screening  10/13/2021    Annual Physical  10/13/2021    PT PLAN OF CARE  04/14/2022    BMI: Followup Plan  07/21/2022    Influenza Vaccine (Season Ended) 09/01/2022    HEMOGLOBIN A1C  09/08/2022       Reason For Outreach  Embedded Pharmacist    Demographics  Interaction Method: Phone  Type of Intervention: Follow-Up    Topic(s) Addressed  Diabetes; Obesity    Intervention(s) Made    Pharmacologic:    -- Prevent or Manage Adverse Drug Reaction    Non-Pharmacologic:    -- Adherence addressed  -- Home monitoring discussed or provided    Tool(s) Used  Not Applicable    Time Spent:   Time Spent in Direct Patient Care: 15 minutes  Time Spent in Care Coordination: 15 minutes    Recommendations  Recipient: Patient/caregiver  Outcome:  All Accepted

## 2022-06-15 ENCOUNTER — OFFICE VISIT (OUTPATIENT)
Dept: NEPHROLOGY | Facility: CLINIC | Age: 57
End: 2022-06-15
Payer: COMMERCIAL

## 2022-06-15 VITALS — BODY MASS INDEX: 42.2 KG/M2 | HEIGHT: 70 IN | WEIGHT: 294.8 LBS

## 2022-06-15 DIAGNOSIS — E78.5 DYSLIPIDEMIA: ICD-10-CM

## 2022-06-15 DIAGNOSIS — I10 ACCELERATED HYPERTENSION: ICD-10-CM

## 2022-06-15 DIAGNOSIS — I12.9 HYPERTENSIVE CHRONIC KIDNEY DISEASE WITH STAGE 1 THROUGH STAGE 4 CHRONIC KIDNEY DISEASE, OR UNSPECIFIED CHRONIC KIDNEY DISEASE: Primary | ICD-10-CM

## 2022-06-15 DIAGNOSIS — E55.9 VITAMIN D DEFICIENCY: ICD-10-CM

## 2022-06-15 DIAGNOSIS — R79.89 ELEVATED LIVER FUNCTION TESTS: ICD-10-CM

## 2022-06-15 DIAGNOSIS — E66.01 CLASS 3 SEVERE OBESITY DUE TO EXCESS CALORIES WITH SERIOUS COMORBIDITY AND BODY MASS INDEX (BMI) OF 40.0 TO 44.9 IN ADULT (HCC): ICD-10-CM

## 2022-06-15 DIAGNOSIS — N18.31 STAGE 3A CHRONIC KIDNEY DISEASE (HCC): ICD-10-CM

## 2022-06-15 PROCEDURE — 3008F BODY MASS INDEX DOCD: CPT | Performed by: INTERNAL MEDICINE

## 2022-06-15 PROCEDURE — 99214 OFFICE O/P EST MOD 30 MIN: CPT | Performed by: INTERNAL MEDICINE

## 2022-06-15 PROCEDURE — 1036F TOBACCO NON-USER: CPT | Performed by: INTERNAL MEDICINE

## 2022-06-15 RX ORDER — SEMAGLUTIDE 1.34 MG/ML
0.5 INJECTION, SOLUTION SUBCUTANEOUS WEEKLY
COMMUNITY
End: 2022-07-08 | Stop reason: DRUGHIGH

## 2022-06-15 NOTE — PATIENT INSTRUCTIONS
1  Medication changes today:  No medication changes today  2 Please go for a 24 hour urine stone risk evaluation at this time     3  In 2 months:  Please go for nonfasting lab work in the morning  And:  Please take 1 week a blood pressure readings  in 2 months with your new blood pressure machine    AS FOLLOWS  MORNING AND EVENING, SITTING AND STANDING as follows:  TAKE THE MORNING READINGS BEFORE ANY MEDICATIONS AND WHEN YOU ARE RELAXED FOR SEVERAL MINUTES  TAKE THE EVENING READINGS:  BETWEEN 7-10 P M ; PRIOR TO ANY MEDICATIONS; AT LEAST IN OUR  FROM DINNER; AND CERTAINLY AFTER RELAXING FOR A FEW MINUTES  PLEASE INCLUDE HEART RATE WITH YOUR BLOOD PRESSURE READINGS  When taking standing readings, keep your arm supported at heart level and not dangling  Make sure you are sitting with your back supported and feet on the ground and do not cross your legs or feet  Make sure you have not taken any coffee or caffeine products or exercised or smoke cigarettes at least 30 minutes before taking your blood pressure  Then please come into the office to see 1 of my advanced practitioner's and please bring any new machine with your blood pressure readings    4  Follow-up in 6  months  Please bring in 1 week a blood pressure readings morning evening, sitting and standing is outlined above    Please go for fasting lab work 1-2 weeks prior to your appointment      5    General instructions:  AVOID SALT BUT NOT ADDING AN READING LABELS TO MAKE SURE THERE IS LOW-SALT IN THE FOOD THAT YOU ARE EATING  Goal is less than 2 g of sodium intake or less than 5 g of sodium chloride intake per day    Avoid nonsteroidal anti-inflammatory drugs such as Naprosyn, ibuprofen, Aleve, Advil, Celebrex, Meloxicam (Mobic) etc   You can use Tylenol as needed if you do not have any liver condition to be concerned about    Avoid medications such as Sudafed or decongestants and antihistamines that contained the D component which is the decongestant  You can take antihistamines without the decongestant or D component  Try to avoid medications such as pantoprazole or  Protonix/Nexium or Esomeprazole)/Prilosec or omeprazole/Prevacid or lansoprazole/AcipHex or Rabeprazole  If you are able to, use Pepcid as this is safer for your kidneys  Try to exercise at least 30 minutes 3 days a week to begin with with an ultimate goal of 5 days a week for at least 30 minutes    Try to lose 10 lb by your next visit    Please do not drink more than 2 glasses of alcohol/wine on a daily basis as this may contribute to your high blood pressure  Measures to reduce stone development:    Please Drink  oz of water or 2 5L-3 L a day, throughout the day  Avoid salt/low-salt diet   Try to decrease animal protein intake, dairy protein and vegetable base protein are better  Increase fruits and vegetables as much as possible  Avoid calcium products such as Tums or other types of calcium containing antacids, you can use Pepcid for indigestion (but you do not have to restrict your dietary calcium)  Avoid excessive vitamin D   Avoid excessive vitamin C  Try to avoid oxalate products (please refer to the diet sheet)  Limit fructose and sucrose type drinks such as coke      Low Oxalate Diet   WHAT YOU NEED TO KNOW:   Oxalate is a chemical found in plant foods  You may need to eat foods that are low in oxalate to help clear kidney stones or prevent them from forming  People who have had kidney stones are at a higher risk of forming kidney stones again  The most common type of kidney stone is made up of crystals that contain calcium and oxalate  Your healthcare provider or dietitian may recommend that you limit oxalate if you get this type of kidney stone often  DISCHARGE INSTRUCTIONS:   Foods to include: Include the following foods that have a low to medium amount of oxalate    Grains:      Egg noodles    Rene crackers    Pancakes and waffles    Cooked and dry cereals without nuts or bran    White or wild rice    White bread, cornbread, bagels, and white English muffins (medium oxalate)    Saltine or soda crackers and vanilla wafers (medium oxalate)    Brown rice, spaghetti, and other noodles and pastas (medium oxalate)    Fruit:      Apples, bananas, grapes    Cranberries    Peaches, nectarines, apricots, and pears    Papayas and strawberries    Melons and pineapples    Blackberries, blueberries, mangoes, and prunes (medium oxalate)    Vegetables:      Artichokes, asparagus, and brussels sprouts    Broccoli and cauliflower    Kale, endive, cabbage, and lettuce    Cucumbers, peas, and zucchini    Mushrooms, onions, and peppers    Corn    Carrots, celery, and green beans (medium oxalate)    Parsnips, summer squash, tomatoes, and turnips (medium oxalate)    Dairy:      American cheese, Swiss cheese, cottage cheese, ricotta cheese, and cheddar cheese    Milk and buttermilk    Yogurt    Protein foods:      Meat, fish, shellfish, chicken, and turkey    Lunch meat and ham (medium oxalate)    Hot dogs, bratwurst, wilkins, and sausage (medium oxalate)    Drinks and desserts:      Coffee    Fruit punch and lemonade or limeade without added vitamin C    Desserts:      Cookies, cakes, and ice cream    Pudding without chocolate    Foods to limit or avoid:  Limit the following foods that are high in oxalate    Grains:      Wheat bran, wheat germ, and barley    Grits and bran cereal    White corn flour and buckwheat flour    Whole wheat bread    Fruit:      Dried apricots    Red currants, figs, and rhubarb    Kiwi    Grapefruit    Vegetables:      Potatoes and yams    Zahc greens, leeks, okra, and spinach    Wax beans     Eggplant    Beets and beet greens    Swiss chard, escarole, parsley, and rutabagas    Tomato paste    Protein foods:      Baked beans with tomato sauce    Nut butters and nuts (peanuts, almonds, pecans, cashews, hazelnuts)    Soy burgers    Miso    Dried beans    Desserts:      Fruitcake    Chocolate    Carob and marmalade    Beverages:      Chocolate drink mixes    Soy milk    Instant iced tea    Other foods:      Sesame seeds and tahini (paste made of sesame seeds)    Poppy seeds    Other dietary guidelines:   Drink about 12 to 16 (eight-ounce) cups of liquid each day  Liquids help clear kidney stones and prevent them from forming again  Water is the best liquid to drink  You may need more liquid if you are physically active  Ask your healthcare provider or dietitian how much liquid you need to drink each day  Your healthcare provider may suggest that you make other diet changes to help prevent kidney stones  This may include decreasing the amount of sodium you eat each day  © Copyright [employer] 2022 Information is for End User's use only and may not be sold, redistributed or otherwise used for commercial purposes  All illustrations and images included in CareNotes® are the copyrighted property of [employer]  or [facility] Guille Lerner   The above information is an  only  It is not intended as medical advice for individual conditions or treatments  Talk to your doctor, nurse or pharmacist before following any medical regimen to see if it is safe and effective for you

## 2022-06-15 NOTE — LETTER
Iliana 15, 2022     Erie Meigs, MD  [address] [place] [id number]    Patient: Tru Horner   YOB: 1965   Date of Visit: 6/15/2022       Dear Dr Estrellita Huerta: Thank you for referring Rios Loya to me for evaluation  Below are my notes for this consultation  If you have questions, please do not hesitate to call me  I look forward to following your patient along with you  Sincerely,        Erin Ramírez MD        CC: DO Yadira Neri MD Jacqulyne Bunch, MD  6/15/2022  9:00 AM  Sign when Signing Visit  RENAL FOLLOW UP NOTE: td     ASSESSMENT AND PLAN:  1  Hypertension:  Most compatible with essential hypertension/resistant hypertension:  Secondary workup:  -normal thyroid function study  -severe HARPER on BiPAP  -aldosterone/renin ratio:  Essentially negative at 20 with an aldosterone level of 8 7  -plasma free metanephrines:  Negative  -24 hour urine for free cortisol:  Negative  -renal artery duplex:  No evidence of significant renal artery disease; but somewhat limited by body habitus and overlying bowel gas  Again obesity may be contributing to his hypertension  · Current home blood pressure readings:  · -a  m :   117/62, standing 106/53  · -p  m :   122/54, standing 115/55  · -heart rate:   70-80 range     · GOAL BLOOD PRESSURE:  LESS XCAS 263/05 GIVEN CKD/recent hemorrhagic stroke     · Recommendations:  · continue to push nonmedical regimen including isotonic exercise, avoidance of salt and weight loss; patient counseled as such  · GIVEN TIKOSYN:  AMILORIDE/SPIRONOLACTONE/HCTZ ARE CONTRAINDICATED  LOOP DIURETIC SUCH AS FUROSEMIDE OR TORSEMIDE ARE ACCEPTABLE  · Medication changes today:  · Torsemide 5 mg daily  · Continue olmesartan 40 mg daily  · Continue nebivolol 2 5 mg daily  · Continue hydralazine 50 mg twice a day  · Continue clonidine patch# 1   Weekly  At some point the near future we may lower dose of medications he is borderline low in the morning  He is going to push diet and exercise recheck blood pressures in 2 months with a new blood pressure machine as well  We will check his new machine at that time  He knows to call if he feels dizzy or lightheaded              2  CKD stage 3:    · Baseline creatinine is ranged anywhere from 1 1-1 6:  · 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  · Current creatinine:  At baseline at 1 31  · Electrolytes are normal including potassium of 4 0  · UA:  No proteinuria and no hematuria, 2-4 WBCs:  Repeat  · Urine protein creatinine ratio:  0 10 g at goal  · MBD:  Of CKD:  Calcium/magnesium are normal  · CBC:  Normal with a hemoglobin of 14 7  · Renal ultrasound:  12/03/2020:  Echogenic focus in the left lower pole possible fat versus calculus without shadowing  Thickened bladder with UV junction still present although perhaps slightly pronounced  Followed by Urology:  · Visit with Dr Shabana Chen for possible right-sided ureterocele, history of cystoscopy and cystolitholapaxy status post right stent removal   Bladder wall thickening resolution of hydronephrosis  Possibility of congenital ureterocele was discussed 1 year follow-up with retroperitoneal ultrasound which would be November 2022            3  Nephrolithiasis:  As mentioned previously he has had numerous stones   May have passed a stone since last time  · We will obtain 24 hour urine for stone risk evaluation prior to the next visit  · Continue with General stone diet     4  Dyslipidemia:  · Current lipid profile:  LDL  42/HDL 29/triglycerides 118  · Goal:  LDL less than 70 given CKD/hemorrhagic stroke  Recommend:  · Diet, exercise and weight loss, patient counseled as such  · No change in medication as patient is at goal        5  Recent admission 03/07/2022 secondary to right-sided weakness with word-finding difficulty, found to have acute left intraparenchymal hemorrhage with vasogenic edema    Patient admitted [place] [address] [place] with neurosurgery evaluation  Started on Cardene due to hypertension  No neuro surgical intervention at that time was deemed necessary  MRI in 3/9 demonstrated unchanged intraparenchymal hematoma as well as small acute infarct in the left corona radiata  Patient was maintained on blood pressure control  TTE demonstrated EF 65% otherwise grade 1 diastolic dysfunction  Seen by Neurology as well recommended restarting anticoagulation 3 weeks after repeat CT scan along with PT/OT  In regards to blood pressure: Amlodipine was stopped nifedipine 10 mg every 8 hours titrate as needed, continuing hydralazine  6  Increased liver function studies:  Right upper quadrant ultrasound showed cholelithiasis hepatomegaly and hepatic steatosis  Now followed by Dr Guillermina Gilman who believes it is related to statin therapy but is agreeable to continue the statin  Further evaluation will be done by Dr Malgorzata Davidson     GI health maintenance:  Seen by Dr Malgorzata Davidson we will plan for colonoscopy 3-4 months after CVA  PATIENT INSTRUCTIONS:    Patient Instructions     1  Medication changes today:   No medication changes today  2 Please go for a 24 hour urine stone risk evaluation at this time     3   In 2 months:  Please go for nonfasting lab work in the morning  And:  Please take 1 week a blood pressure readings  in 2 months with your new blood pressure machine    AS FOLLOWS  MORNING AND EVENING, SITTING AND STANDING as follows:  · TAKE THE MORNING READINGS BEFORE ANY MEDICATIONS AND WHEN YOU ARE RELAXED FOR SEVERAL MINUTES  · TAKE THE EVENING READINGS:  BETWEEN 7-10 P M ; PRIOR TO ANY MEDICATIONS; AT LEAST IN OUR  FROM DINNER; AND CERTAINLY AFTER RELAXING FOR A FEW MINUTES  · PLEASE INCLUDE HEART RATE WITH YOUR BLOOD PRESSURE READINGS  · When taking standing readings, keep your arm supported at heart level and not dangling  · Make sure you are sitting with your back supported and feet on the ground and do not cross your legs or feet  · Make sure you have not taken any coffee or caffeine products or exercised or smoke cigarettes at least 30 minutes before taking your blood pressure  Then please come into the office to see 1 of my advanced practitioner's and please bring any new machine with your blood pressure readings    4  Follow-up in 6  months   Please bring in 1 week a blood pressure readings morning evening, sitting and standing is outlined above     Please go for fasting lab work 1-2 weeks prior to your appointment      5  General instructions:   AVOID SALT BUT NOT ADDING AN READING LABELS TO MAKE SURE THERE IS LOW-SALT IN THE FOOD THAT YOU ARE EATING  o Goal is less than 2 g of sodium intake or less than 5 g of sodium chloride intake per day     Avoid nonsteroidal anti-inflammatory drugs such as Naprosyn, ibuprofen, Aleve, Advil, Celebrex, Meloxicam (Mobic) etc   You can use Tylenol as needed if you do not have any liver condition to be concerned about     Avoid medications such as Sudafed or decongestants and antihistamines that contained the D component which is the decongestant  You can take antihistamines without the decongestant or D component   Try to avoid medications such as pantoprazole or  Protonix/Nexium or Esomeprazole)/Prilosec or omeprazole/Prevacid or lansoprazole/AcipHex or Rabeprazole  If you are able to, use Pepcid as this is safer for your kidneys   Try to exercise at least 30 minutes 3 days a week to begin with with an ultimate goal of 5 days a week for at least 30 minutes     Try to lose 10 lb by your next visit     Please do not drink more than 2 glasses of alcohol/wine on a daily basis as this may contribute to your high blood pressure            Measures to reduce stone development:    · Please Drink  oz of water or 2 5L-3 L a day, throughout the day  · Avoid salt/low-salt diet   · Try to decrease animal protein intake, dairy protein and vegetable base protein are better  · Increase fruits and vegetables as much as possible  · Avoid calcium products such as Tums or other types of calcium containing antacids, you can use Pepcid for indigestion (but you do not have to restrict your dietary calcium)  · Avoid excessive vitamin D   · Avoid excessive vitamin C  · Try to avoid oxalate products (please refer to the diet sheet)  · Limit fructose and sucrose type drinks such as coke      Low Oxalate Diet   WHAT YOU NEED TO KNOW:   Oxalate is a chemical found in plant foods  You may need to eat foods that are low in oxalate to help clear kidney stones or prevent them from forming  People who have had kidney stones are at a higher risk of forming kidney stones again  The most common type of kidney stone is made up of crystals that contain calcium and oxalate  Your healthcare provider or dietitian may recommend that you limit oxalate if you get this type of kidney stone often  DISCHARGE INSTRUCTIONS:   Foods to include: Include the following foods that have a low to medium amount of oxalate  1  Grains:      ? Egg noodles    ? Rene crackers    ? Pancakes and waffles    ? Cooked and dry cereals without nuts or bran    ? White or wild rice    ? White bread, cornbread, bagels, and white English muffins (medium oxalate)    ? Saltine or soda crackers and vanilla wafers (medium oxalate)    ? Brown rice, spaghetti, and other noodles and pastas (medium oxalate)    2  Fruit:      ? Apples, bananas, grapes    ? Cranberries    ? Peaches, nectarines, apricots, and pears    ? Papayas and strawberries    ? Melons and pineapples    ? Blackberries, blueberries, mangoes, and prunes (medium oxalate)    3  Vegetables:      ? Artichokes, asparagus, and brussels sprouts    ? Broccoli and cauliflower    ? Kale, endive, cabbage, and lettuce    ? Cucumbers, peas, and zucchini    ? Mushrooms, onions, and peppers    ? Corn    ? Carrots, celery, and green beans (medium oxalate)    ?  Parsnips, summer squash, tomatoes, and turnips (medium oxalate)    4  Dairy:      ? American cheese, Swiss cheese, cottage cheese, ricotta cheese, and cheddar cheese    ? Milk and buttermilk    ? Yogurt    5  Protein foods:      ? Meat, fish, shellfish, chicken, and turkey    ? Lunch meat and ham (medium oxalate)    ? Hot dogs, bratwurst, wilkins, and sausage (medium oxalate)    6  Drinks and desserts:      ? Coffee    ? Fruit punch and lemonade or limeade without added vitamin C    7  Desserts:      ? Cookies, cakes, and ice cream    ? Pudding without chocolate    Foods to limit or avoid:  Limit the following foods that are high in oxalate  1  Grains:      ? Wheat bran, wheat germ, and barley    ? Grits and bran cereal    ? White corn flour and buckwheat flour    ? Whole wheat bread    2  Fruit:      ? Dried apricots    ? Red currants, figs, and rhubarb    ? Maureen Jakes    ? Grapefruit    3  Vegetables:      ? Potatoes and yams    ? Zach greens, leeks, okra, and spinach    ? Wax beans     ? Eggplant    ? Beets and beet greens    ? Swiss chard, escarole, parsley, and rutabagas    ? Tomato paste    4  Protein foods:      ? Baked beans with tomato sauce    ? Nut butters and nuts (peanuts, almonds, pecans, cashews, hazelnuts)    ? Soy burgers    ? Miso    ? Dried beans    5  Desserts:      ? Fruitcake    ? Chocolate    ? Carob and marmalade    6  Beverages:      ? Chocolate drink mixes    ? Soy milk    ? Instant iced tea    7  Other foods:      ? Sesame seeds and tahini (paste made of sesame seeds)    ? Poppy seeds    Other dietary guidelines:   · Drink about 12 to 16 (eight-ounce) cups of liquid each day  Liquids help clear kidney stones and prevent them from forming again  Water is the best liquid to drink  You may need more liquid if you are physically active  Ask your healthcare provider or dietitian how much liquid you need to drink each day      · Your healthcare provider may suggest that you make other diet changes to help prevent kidney stones  This may include decreasing the amount of sodium you eat each day  © Copyright [employer] 2022 Information is for End User's use only and may not be sold, redistributed or otherwise used for commercial purposes  All illustrations and images included in CareNotes® are the copyrighted property of [employer]  or Marie Lerner   The above information is an  only  It is not intended as medical advice for individual conditions or treatments  Talk to your doctor, nurse or pharmacist before following any medical regimen to see if it is safe and effective for you  Subjective: There has been no hospitalizations or acute illnesses since last visit  The patient overall is feeling well  No fevers, chills, or cough or colds    Fair appetite and fair energy  No hematuria, dysuria, voiding symptoms or foamy urine  No gastrointestinal symptoms except for occasional nausea possibly with Ozempic  No cardiovascular symptoms including swelling of the legs  No headaches, dizziness or lightheadedness  Blood pressure medications:   Torsemide 5 mg daily in the morning   Olmesartan 40 mg daily morning   Procardia XL 30 mg daily in the morning   Bystolic 2 5 mg daily morning   Apresoline/hydralazine 50 mg 3 times a day   Clonidine patch #1 weekly      ROS:  See HPI, otherwise review of systems as completely reviewed with the patient are negative    Past Medical History:   Diagnosis Date    Ankle swelling     Arthritis     Asthma     [facility] [address] )     Atrial fibrillation with RVR ([facility] [address] ) 4/4/2017    Chronic kidney disease     kidney stones    Gout     Hypertension     Kidney stone     Night sweats     Seasonal allergies     Sleep apnea      Past Surgical History:   Procedure Laterality Date    APPENDECTOMY      CARDIOVERSION      X4 last was 2018    COLONOSCOPY      CYSTOSCOPY  08/30/2016    w/removal of object, last assessed 8/30/16    FOOT SURGERY Left  HAND SURGERY      KNEE SURGERY Right 2016    VA CYSTO/URETERO W/LITHOTRIPSY &INDWELL STENT INSRT Left 8/9/2016    Procedure: CYSTOSCOPY URETEROSCOPY WITH LITHOTRIPSY HOLMIUM LASER STONE EXTRACTION, RETROGRADE PYELOGRAM AND INSERTION STENT URETERAL;  Surgeon: Merry Jara MD;  Location:  MAIN OR;  Service: Urology last assessed 8/30/16    VA CYSTO/URETERO W/LITHOTRIPSY &INDWELL STENT INSRT Right 8/24/2020    Procedure: CYSTOSCOPY , cystolitholapaxy of bladder stone WITH HOMIUM LASER RIGHT RETROGRADE AND RIGHT URETERAL STENT, [address];  Surgeon: Merry Jara MD;  [facility] MAIN OR;  Service: Urology    WRIST SURGERY Left 2014    Fracture surgery     Family History   Problem Relation Age of Onset    Atrial fibrillation Mother     Other Mother         blood dyscrasia    Hypertension Mother     Other Father         hypoglycemia     Atrial fibrillation Father     Diabetes Family     Other Family         Thrombocytosis    No Known Problems Sister     Cancer Neg Hx     Thyroid disease Neg Hx       reports that he quit smoking about 28 years ago  His smoking use included cigarettes  He has a 5 00 pack-year smoking history  He has never used smokeless tobacco  He reports current alcohol use  He reports that he does not use drugs  I COMPLETELY REVIEWED THE PAST MEDICAL HISTORY/PAST SURGICAL HISTORY/SOCIAL HISTORY/FAMILY HISTORY/AND MEDICATIONS  AND UPDATED ALL    Objective:     Vitals:   BP sitting on left:  132/68 with a heart rate of 68 and regular  BP standing on left:  124/70 with a heart rate of 72 and regular    Weight (last 2 days)     Date/Time Weight    06/15/22 0823 134 (294 8)        Wt Readings from Last 3 Encounters:   06/15/22 134 kg (294 lb 12 8 oz)   05/23/22 135 kg (297 lb 3 2 oz)   04/20/22 (!) 139 kg (305 lb 9 6 oz)       Body mass index is 42 3 kg/m²      Physical Exam: General:  No acute distress/obese  Skin:  No acute rash  Eyes:  No scleral icterus, noninjected, no discharge from eyes  ENT:  Moist mucous membranes  Neck:  Supple, no jugular venous distention, trachea is midline, no lymphadenopathy and no thyromegaly  Back   No CVAT  Chest:  Clear to auscultation and percussion, good respiratory effort  CVS:  Regular rate and rhythm without a rub, or gallops or murmurs  Abdomen:  Obese,Soft and nontender with normal bowel sounds  Extremities:  No cyanosis and no edema, no arthritic changes, normal range of motion  Neuro:  Grossly intact  Psych:  Alert, oriented x3 and appropriate      Medications:    Current Outpatient Medications:     atorvastatin (LIPITOR) 40 mg tablet, Take 1 tablet (40 mg total) by mouth daily with dinner, Disp: 30 tablet, Rfl: 4    cloNIDine (CATAPRES-TTS-1) 0 1 mg/24 hr, Place 1 patch (0 1 mg total) on the skin once a week, Disp: 4 patch, Rfl: 11    dofetilide (TIKOSYN) 500 mcg capsule, TAKE 1 CAPSULE BY MOUTH EVERY 12 HOURS, Disp: 60 capsule, Rfl: 8    hydrALAZINE (APRESOLINE) 50 mg tablet, Take 1 tablet (50 mg total) by mouth 2 (two) times a day (Patient taking differently: Take 50 mg by mouth 3 (three) times a day), Disp: 180 tablet, Rfl: 3    nebivolol (Bystolic) 2 5 mg tablet, Take 1 tablet (2 5 mg total) by mouth daily Hold for heart rate less than 50 beats per minute , Disp: 90 tablet, Rfl: 3    NIFEdipine (PROCARDIA XL) 30 mg 24 hr tablet, Take 1 tablet (30 mg total) by mouth 2 (two) times a day (Patient taking differently: Take 30 mg by mouth daily), Disp: 180 tablet, Rfl: 3    olmesartan (BENICAR) 40 mg tablet, Take 1 tablet (40 mg total) by mouth daily, Disp: 90 tablet, Rfl: 3    rivaroxaban (Xarelto) 20 mg tablet, Take 1 tablet (20 mg total) by mouth daily with breakfast, Disp: 30 tablet, Rfl: 10    Semaglutide,0 25 or 0 5MG/DOS, (Ozempic, 0 25 or 0 5 MG/DOSE,) 2 MG/1 5ML SOPN, Inject 0 5 mg under the skin once a week, Disp: , Rfl:     torsemide (DEMADEX) 5 MG tablet, TAKE 1 TABLET (5 MG TOTAL) BY MOUTH DAILY  , Disp: 90 tablet, Rfl: 1    Lab, Imaging and other studies: I have personally reviewed pertinent labs    Laboratory Results:  Results for orders placed or performed in visit on 06/09/22   Comprehensive metabolic panel   Result Value Ref Range    Sodium 140 135 - 147 mmol/L    Potassium 4 0 3 5 - 5 3 mmol/L    Chloride 106 96 - 108 mmol/L    CO2 27 21 - 32 mmol/L    ANION GAP 7 4 - 13 mmol/L    BUN 19 5 - 25 mg/dL    Creatinine 1 31 (H) 0 60 - 1 30 mg/dL    Glucose, Fasting 119 (H) 65 - 99 mg/dL    Calcium 9 0 8 4 - 10 2 mg/dL    AST 21 13 - 39 U/L    ALT 27 7 - 52 U/L    Alkaline Phosphatase 66 34 - 104 U/L    Total Protein 7 4 6 4 - 8 4 g/dL    Albumin 3 9 3 5 - 5 0 g/dL    Total Bilirubin 0 41 0 20 - 1 00 mg/dL    eGFR 60 ml/min/1 73sq m   CBC   Result Value Ref Range    WBC 9 79 4 31 - 10 16 Thousand/uL    RBC 4 80 3 88 - 5 62 Million/uL    Hemoglobin 14 7 12 0 - 17 0 g/dL    Hematocrit 44 1 36 5 - 49 3 %    MCV 92 82 - 98 fL    MCH 30 6 26 8 - 34 3 pg    MCHC 33 3 31 4 - 37 4 g/dL    RDW 13 2 11 6 - 15 1 %    Platelets 454 123 - 330 Thousands/uL    MPV 12 0 8 9 - 12 7 fL   CK   Result Value Ref Range    Total  39 - 308 U/L   Lipid Panel with Direct LDL reflex   Result Value Ref Range    Cholesterol 95 See Comment mg/dL    Triglycerides 118 See Comment mg/dL    HDL, Direct 29 (L) >=40 mg/dL    LDL Calculated 42 0 - 100 mg/dL   Magnesium   Result Value Ref Range    Magnesium 1 9 1 9 - 2 7 mg/dL   Antimitochondrial antibody   Result Value Ref Range    Mitochondrial Ab <20 0 0 0 - 20 0 Units   Antinuclear Antibodies, IFA   Result Value Ref Range    Antinuclear Antibodies, IFA Negative    Anti-smooth muscle antibody, IgG   Result Value Ref Range    Smooth Muscle Ab 13 0 - 19 Units   Celiac Disease Antibody Profile   Result Value Ref Range    IgA 368 90 - 386 mg/dL    Gliadin IgA 3 0 - 19 units    Gliadin IgG 2 0 - 19 units    Tissue Transglut Ab IGG 4 0 - 5 U/mL    TISSUE TRANSGLUTAMINASE IGA <2 0 - 3 U/mL Endomysial IgA Negative Negative   Ceruloplasmin   Result Value Ref Range    Ceruloplasmin 23 4 16 0 - 31 0 mg/dL   Chronic Hepatitis Panel   Result Value Ref Range    Hepatitis B Surface Ag Non-reactive Non-reactive, NonReactive - Confirmed    Hepatitis C Ab Non-reactive Non-reactive    Hep B C IgM Non-reactive Non-reactive    Hep B Core Total Ab Non-reactive Non-reactive   Alpha-1-antitrypsin   Result Value Ref Range    A-1 Antitrypsin 153 101 - 187 mg/dL   Protein / creatinine ratio, urine   Result Value Ref Range    Creatinine, Ur 103 1 mg/dL    Protein Urine Random 10 mg/dL    Prot/Creat Ratio, Ur 0 10 0 00 - 0 10   IgG, IgA, IgM   Result Value Ref Range     0 70 0 - 400 0 mg/dL    IGG 1,290 0 700 0 - 1,600 0 mg/dL    IGM 66 0 40 0 - 230 0 mg/dL       Results from last 7 days   Lab Units 06/09/22  0846   WBC Thousand/uL 9 79   HEMOGLOBIN g/dL 14 7   HEMATOCRIT % 44 1   PLATELETS Thousands/uL 231   POTASSIUM mmol/L 4 0   CHLORIDE mmol/L 106   CO2 mmol/L 27   BUN mg/dL 19   CREATININE mg/dL 1 31*   CALCIUM mg/dL 9 0   MAGNESIUM mg/dL 1 9         Radiology review:   chest X-ray    Ultrasound      Portions of the record may have been created with voice recognition software  Occasional wrong word or "sound a like" substitutions may have occurred due to the inherent limitations of voice recognition software  Read the chart carefully and recognize, using context, where substitutions have occurred

## 2022-06-26 DIAGNOSIS — E78.5 DYSLIPIDEMIA: ICD-10-CM

## 2022-06-27 RX ORDER — ATORVASTATIN CALCIUM 40 MG/1
TABLET, FILM COATED ORAL
Qty: 90 TABLET | Refills: 1 | Status: SHIPPED | OUTPATIENT
Start: 2022-06-27

## 2022-06-28 ENCOUNTER — PATIENT MESSAGE (OUTPATIENT)
Dept: FAMILY MEDICINE CLINIC | Facility: CLINIC | Age: 57
End: 2022-06-28

## 2022-06-28 DIAGNOSIS — E11.649 UNCONTROLLED TYPE 2 DIABETES MELLITUS WITH HYPOGLYCEMIA WITHOUT COMA (HCC): Primary | ICD-10-CM

## 2022-07-08 NOTE — PATIENT COMMUNICATION
Pharmacist Tracking Tool  Reason For Outreach: Embedded Pharmacist    Demographics:  Intervention Method: Mobiplexhart Message  Type of Intervention: Follow-Up  Topics Addressed: Diabetes  Pharmacologic Interventions: Dose or Frequency Adjusted and Prevent or Manage DOLORES  Non-Pharmacologic Interventions: Adherence addressed, Care coordination, Chart update and Home Monitoring  Time:  Direct Patient Care: 15 mins   Care Coordination: 15 mins  Recommendation Recipient: Patient/Caregiver  Outcome: Accepted

## 2022-07-17 ENCOUNTER — PATIENT MESSAGE (OUTPATIENT)
Dept: CARDIOLOGY CLINIC | Facility: CLINIC | Age: 57
End: 2022-07-17

## 2022-07-18 ENCOUNTER — TELEPHONE (OUTPATIENT)
Dept: CARDIOLOGY CLINIC | Facility: CLINIC | Age: 57
End: 2022-07-18

## 2022-07-18 DIAGNOSIS — I48.20 CHRONIC ATRIAL FIBRILLATION (HCC): Primary | ICD-10-CM

## 2022-07-18 NOTE — TELEPHONE ENCOUNTER
Good morning Dr Andrea Muñoz,   Patient would like to inform you that he is currently admitted with a fib at [facility]  If patient needs a CV, he would rather it be under your supervision  I am requesting the EKGs/labs and will hopefully have those for you today  Patient will be heading back home this afternoon and would like your recommendation on how to proceed  No medication changes  Patient is still on bystolic, Irvin Otto 54 and tikosyn

## 2022-07-21 ENCOUNTER — HOSPITAL ENCOUNTER (OUTPATIENT)
Dept: NON INVASIVE DIAGNOSTICS | Facility: HOSPITAL | Age: 57
Discharge: HOME | End: 2022-07-21
Payer: COMMERCIAL

## 2022-07-21 ENCOUNTER — TELEPHONE (OUTPATIENT)
Dept: NEPHROLOGY | Facility: CLINIC | Age: 57
End: 2022-07-21

## 2022-07-21 DIAGNOSIS — I48.20 CHRONIC ATRIAL FIBRILLATION (HCC): ICD-10-CM

## 2022-07-21 LAB
ATRIAL RATE: 69 BPM
P AXIS: 21 DEGREES
PR INTERVAL: 166 MS
QRS AXIS: -3 DEGREES
QRSD INTERVAL: 96 MS
QT INTERVAL: 408 MS
QTC INTERVAL: 437 MS
T WAVE AXIS: 38 DEGREES
VENTRICULAR RATE: 69 BPM

## 2022-07-21 PROCEDURE — 93010 ELECTROCARDIOGRAM REPORT: CPT | Performed by: INTERNAL MEDICINE

## 2022-07-21 PROCEDURE — 93005 ELECTROCARDIOGRAM TRACING: CPT

## 2022-07-21 NOTE — TELEPHONE ENCOUNTER
Patient called and wanted to verify that he should be on NIFEdipine 30mg two times a day  Please advise  Patient states he has been taking it twice a day and not once a day

## 2022-07-21 NOTE — TELEPHONE ENCOUNTER
Spoke with patient about the following, he expressed understanding and thanked us for the call back:     For now have him take Procardia XL 30 mg daily as he has been doing  He should send in a week a blood pressure readings at his convenience

## 2022-07-21 NOTE — TELEPHONE ENCOUNTER
For now have him take Procardia XL 30 mg daily as he has been doing  He should send in a week a blood pressure readings at his convenience  Thank you

## 2022-07-21 NOTE — QUICK NOTE
Mr Jose tOt presented for electrical cardioversion for atrial fibrillation management  His presenting rhythm was sinus rhythm thus no cardioversion was needed  Ordering physician, Dr Elisabeth Lawrence, was notified  Patient is discharged home

## 2022-08-03 ENCOUNTER — TELEPHONE (OUTPATIENT)
Dept: GASTROENTEROLOGY | Facility: AMBULARY SURGERY CENTER | Age: 57
End: 2022-08-03

## 2022-08-03 ENCOUNTER — OFFICE VISIT (OUTPATIENT)
Dept: GASTROENTEROLOGY | Facility: AMBULARY SURGERY CENTER | Age: 57
End: 2022-08-03
Payer: COMMERCIAL

## 2022-08-03 VITALS
HEIGHT: 70 IN | OXYGEN SATURATION: 96 % | WEIGHT: 293 LBS | DIASTOLIC BLOOD PRESSURE: 80 MMHG | BODY MASS INDEX: 41.95 KG/M2 | SYSTOLIC BLOOD PRESSURE: 122 MMHG | HEART RATE: 66 BPM

## 2022-08-03 DIAGNOSIS — R19.4 CHANGE IN BOWEL HABITS: ICD-10-CM

## 2022-08-03 DIAGNOSIS — R79.89 ELEVATED LIVER FUNCTION TESTS: ICD-10-CM

## 2022-08-03 DIAGNOSIS — Z12.11 COLON CANCER SCREENING: Primary | ICD-10-CM

## 2022-08-03 PROCEDURE — 99214 OFFICE O/P EST MOD 30 MIN: CPT | Performed by: PHYSICIAN ASSISTANT

## 2022-08-03 RX ORDER — SEMAGLUTIDE 1.34 MG/ML
INJECTION, SOLUTION SUBCUTANEOUS
COMMUNITY
Start: 2022-07-11

## 2022-08-03 NOTE — PATIENT INSTRUCTIONS
Scheduled date of colonoscopy (as of today): 11/1/2022  Physician performing colonoscopy: Dr Mary Mcknight  Location of colonoscopy:  [facility]  Bowel prep reviewed with patient: Herbie/Dulcolax  Instructions reviewed with patient by: Damien Yadav  Clearances: [facility]

## 2022-08-03 NOTE — TELEPHONE ENCOUNTER
Our mutual patient is scheduled for procedure: colonoscopy    On: November 1 , 2022     With: Dr Raz Jaime MD    He/She is taking the following blood thinner: Xarelto (Rivaroxaban)    Can this be stopped  2 days prior to the procedure    Physician Approving clearance:  Dr Rohit Maldonado

## 2022-08-03 NOTE — TELEPHONE ENCOUNTER
Patient is scheduled for colonoscopy on November 1 , 2022 at [facility]  with Jelly Mccallum MD  Patient is aware of pre-procedure prep of Golytley/Dulcolax and they will be called the day prior between 2 and 6 pm for time to report for procedure  Pre-procedure prep has been given to the patient  in person  on August 3 , 2022

## 2022-08-03 NOTE — ASSESSMENT & PLAN NOTE
Appears resolved at this time, question for medication induced liver injury which was transient, his serologic workup return unremarkable and ultrasound showed nonobstructing cholelithiasis and hepatic steatosis    -periodically monitor liver enzymes, if elevated transaminases are again noted we can pursue further workup if indicated     -also recommend weight loss and regular exercise, given findings of fatty liver

## 2022-08-03 NOTE — ASSESSMENT & PLAN NOTE
Patient reports his bowel habits are fairly regular at this time, but noted a change in his bowel habits after sustaining a hemorrhagic stroke in March of this year    He also has not had colonoscopy for over 15 years, rule out underlying adenomatous polyp or malignancy     -plan for colonoscopy     -advised patient regarding risks and benefits of the procedure, risks including but not limited to infection, perforation and bleeding    -prescription and instructions provided for colonoscopy prep     -advised patient to obtain clearance from his cardiologist to hold Xarelto for 48 hours prior to procedure

## 2022-08-03 NOTE — PROGRESS NOTES
Follow-up Note -  [facility]  Hero Schwartz 1965 64 y o  male         Reason:  Follow-up; change in bowel habits, colon cancer screening, Elevated transaminases    HPI:  19-year-old male with history of hemorrhagic stroke in March, atrial fibrillation anticoagulated with Xarelto who presents for follow-up; he was seen in the office with Dr Mikel Sharma in May for evaluation of change in bowel habits that he had noted since his stroke, had also been noted with mildly elevated transaminases at that time (ALT 98, AST 51), he had also reported at that time that he had not had colonoscopy since he was about 3years old  He was recommended to wait a few months to plan for colonoscopy, given his recent stroke, and was ordered for ultrasound and serologic workup to investigate his elevated transaminases  Liver enzymes were repeated in June and actually found to be normal, serologic workup including autoimmune serologies, ceruloplasmin, alpha-1 antitrypsin level, celiac disease panel and hepatitis panel all returned unremarkable  Ultrasound done in May showed some gallbladder stones and moderate steatosis of the liver but no morphologic changes of cirrhosis and no evidence of biliary obstruction or cholecystitis  At this time the patient says he is feeling relatively well, he thinks that the change in his bowel habits after his stroke was actually a net positive, as prior to the stroke he had had issues with stool urgency and diarrhea, but since the stroke his stools have been more solid, less regular but still having bowel movements about once every other day without sensation of severe constipation  He uses CPAP, denies any supplemental oxygen dependence, still takes Xarelto but he says this is for the atrial fibrillation any has had clearance to hold this for 48 hours prior to procedures in the past   He denies any known family history of colon cancer    He denies any rectal bleeding or melena, denies any acid reflux or heartburn, any dysphagia, any unintentional weight loss, any loss of appetite, any nausea or vomiting, any abdominal pain at this time  Denies alcohol use  REVIEW OF SYSTEMS:      CONSTITUTIONAL: Denies any fever, chills, or rigors  Good appetite, and no recent weight loss  HEENT: No earache or tinnitus  Denies hearing loss or visual disturbances  CARDIOVASCULAR: No chest pain or palpitations  RESPIRATORY: Denies any cough, hemoptysis, shortness of breath or dyspnea on exertion  GASTROINTESTINAL: As noted in the History of Present Illness  GENITOURINARY: No problems with urination  Denies any hematuria or dysuria  NEUROLOGIC: No dizziness or vertigo, denies headaches  MUSCULOSKELETAL: Denies any muscle or joint pain  SKIN: Denies skin rashes or itching  ENDOCRINE: Denies excessive thirst  Denies intolerance to heat or cold  PSYCHOSOCIAL: Denies depression or anxiety  Denies any recent memory loss       Past Medical History:   Diagnosis Date    Ankle swelling     Arthritis     Asthma     Atrial fibrillation (HCC)     Atrial fibrillation with RVR (HCC) 4/4/2017    Chronic kidney disease     kidney stones    Gout     Hypertension     Kidney stone     Night sweats     Seasonal allergies     Sleep apnea       Past Surgical History:   Procedure Laterality Date    APPENDECTOMY      CARDIOVERSION      X4 last was 2018    COLONOSCOPY      CYSTOSCOPY  08/30/2016    w/removal of object, last assessed 8/30/16    FOOT SURGERY Left     HAND SURGERY      KNEE SURGERY Right 2016    PA CYSTO/URETERO W/LITHOTRIPSY &INDWELL STENT INSRT Left 08/09/2016    Procedure: CYSTOSCOPY URETEROSCOPY WITH LITHOTRIPSY HOLMIUM LASER STONE EXTRACTION, RETROGRADE PYELOGRAM AND INSERTION STENT URETERAL;  Surgeon: Elaine Spangler MD;  Location: [facility];  Service: Urology last assessed 8/30/16    PA CYSTO/URETERO W/LITHOTRIPSY &INDWELL STENT INSRT Right 08/24/2020    Procedure: CYSTOSCOPY , cystolitholapaxy of bladder stone WITH HOMIUM LASER RIGHT RETROGRADE AND RIGHT URETERAL STENT, [address];  Surgeon: Cullen Alvarenga MD;  Location: [address] [place] [address];  Service: Urology    UPPER GASTROINTESTINAL ENDOSCOPY      WRIST SURGERY Left 2014    Fracture surgery     Social History     Socioeconomic History    Marital status: /Civil Union     Spouse name: Not on file    Number of children: 4    Years of education: 12th grade     Highest education level: Not on file   Occupational History    Not on file   Tobacco Use    Smoking status: Former Smoker     Packs/day: 0 50     Years: 10 00     Pack years: 5 00     Types: Cigarettes     Quit date: 1994     Years since quittin 3    Smokeless tobacco: Never Used   Vaping Use    Vaping Use: Never used   Substance and Sexual Activity    Alcohol use: Yes     Comment: occ    Drug use: No    Sexual activity: Yes     Partners: Female     Birth control/protection: None   Other Topics Concern    Not on file   Social History Narrative    No caffeine use      Social Determinants of Health     Financial Resource Strain: Not on file   Food Insecurity: No Food Insecurity    Worried About Running Out of Food in the Last Year: Never true    Castillo of Food in the Last Year: Never true   Transportation Needs: No Transportation Needs    Lack of Transportation (Medical): No    Lack of Transportation (Non-Medical):  No   Physical Activity: Not on file   Stress: Not on file   Social Connections: Not on file   Intimate Partner Violence: Not on file   Housing Stability: Low Risk     Unable to Pay for Housing in the Last Year: No    Number of Places Lived in the Last Year: 1    Unstable Housing in the Last Year: No     Family History   Problem Relation Age of Onset    Atrial fibrillation Mother     Other Mother         blood dyscrasia    Hypertension Mother    Sean Schneider Other Father         hypoglycemia     Atrial fibrillation Father     Diabetes Family     Other Family         Thrombocytosis    No Known Problems Sister     Cancer Neg Hx     Thyroid disease Neg Hx      Patient has no known allergies  Current Outpatient Medications   Medication Sig Dispense Refill    atorvastatin (LIPITOR) 40 mg tablet TAKE 1 TABLET BY MOUTH DAILY WITH DINNER 90 tablet 1    bisacodyl (DULCOLAX) 5 mg EC tablet Take 2 tablets (10 mg total) by mouth once for 1 dose 2 tablet 0    cloNIDine (CATAPRES-TTS-1) 0 1 mg/24 hr Place 1 patch (0 1 mg total) on the skin once a week 4 patch 11    dofetilide (TIKOSYN) 500 mcg capsule TAKE 1 CAPSULE BY MOUTH EVERY 12 HOURS 60 capsule 8    hydrALAZINE (APRESOLINE) 50 mg tablet Take 1 tablet (50 mg total) by mouth 2 (two) times a day (Patient taking differently: Take 50 mg by mouth 3 (three) times a day) 180 tablet 3    nebivolol (Bystolic) 2 5 mg tablet Take 1 tablet (2 5 mg total) by mouth daily Hold for heart rate less than 50 beats per minute  90 tablet 3    NIFEdipine (PROCARDIA XL) 30 mg 24 hr tablet Take 1 tablet (30 mg total) by mouth 2 (two) times a day (Patient taking differently: Take 30 mg by mouth daily) 180 tablet 3    olmesartan (BENICAR) 40 mg tablet Take 1 tablet (40 mg total) by mouth daily 90 tablet 3    Ozempic, 1 MG/DOSE, 4 MG/3ML SOPN injection pen INJECT 1 MG UNDER THE SKIN ONCE A WEEK FOR 4 DOSES      polyethylene glycol (GOLYTELY) 4000 mL solution Take 4,000 mL by mouth once for 1 dose 4000 mL 0    rivaroxaban (Xarelto) 20 mg tablet Take 1 tablet (20 mg total) by mouth daily with breakfast 30 tablet 10    torsemide (DEMADEX) 5 MG tablet TAKE 1 TABLET (5 MG TOTAL) BY MOUTH DAILY  90 tablet 1     No current facility-administered medications for this visit  Blood pressure 122/80, pulse 66, height 5' 10" (1 778 m), weight 133 kg (293 lb), SpO2 96 %  PHYSICAL EXAM:      General Appearance:   Alert, cooperative, no distress, appears stated age    HEENT:   Normocephalic, atraumatic, anicteric      Neck:  Supple, symmetrical, trachea midline, no adenopathy;    thyroid: no enlargement/tenderness/nodules; no carotid  bruit or JVD    Lungs:   Clear to auscultation bilaterally; no rales, rhonchi or wheezing; respirations unlabored    Heart[de-identified]   S1 and S2 normal; regular rate and rhythm; no murmur, rub, or gallop  Abdomen:   Soft, non-tender, non-distended; normal bowel sounds; no masses, no organomegaly    Extremities: No edema, erythema, wounds, rashes   Rectal:   Deferred                      Lab Results   Component Value Date    WBC 9 79 06/09/2022    HGB 14 7 06/09/2022    HCT 44 1 06/09/2022    MCV 92 06/09/2022     06/09/2022     Lab Results   Component Value Date    GLUCOSE 94 08/18/2015    CALCIUM 9 0 06/09/2022     08/18/2015    K 4 0 06/09/2022    CO2 27 06/09/2022     06/09/2022    BUN 19 06/09/2022    CREATININE 1 31 (H) 06/09/2022     Lab Results   Component Value Date    ALT 27 06/09/2022    AST 21 06/09/2022    ALKPHOS 66 06/09/2022    BILITOT 0 4 12/02/2014     Lab Results   Component Value Date    INR 1 13 03/08/2022    INR 1 27 (H) 03/07/2022    PROTIME 14 0 03/08/2022    PROTIME 15 8 (H) 03/07/2022       No results found  ASSESSMENT & PLAN:    Elevated liver function tests  Appears resolved at this time, question for medication induced liver injury which was transient, his serologic workup return unremarkable and ultrasound showed nonobstructing cholelithiasis and hepatic steatosis    -periodically monitor liver enzymes, if elevated transaminases are again noted we can pursue further workup if indicated     -also recommend weight loss and regular exercise, given findings of fatty liver    Change in bowel habits  Patient reports his bowel habits are fairly regular at this time, but noted a change in his bowel habits after sustaining a hemorrhagic stroke in March of this year    He also has not had colonoscopy for over 15 years, rule out underlying adenomatous polyp or malignancy     -plan for colonoscopy     -advised patient regarding risks and benefits of the procedure, risks including but not limited to infection, perforation and bleeding    -prescription and instructions provided for colonoscopy prep     -advised patient to obtain clearance from his cardiologist to hold Xarelto for 48 hours prior to procedure

## 2022-08-24 ENCOUNTER — TELEPHONE (OUTPATIENT)
Dept: NEPHROLOGY | Facility: CLINIC | Age: 57
End: 2022-08-24

## 2022-08-24 NOTE — TELEPHONE ENCOUNTER
Spoke with patient reminding him to go for lab work before his appt on 8/31  He had to reschedule due to be out of town

## 2022-09-26 ENCOUNTER — TELEPHONE (OUTPATIENT)
Dept: NEPHROLOGY | Facility: CLINIC | Age: 57
End: 2022-09-26

## 2022-09-26 NOTE — TELEPHONE ENCOUNTER
Pt called for refill on Hydralazine  He has been taking 50 mg  TID (had been increased back in March by Dr Shad Knight)  Dr Cindy Hanson note of June says continue Hydralazine 50 mg  BID  Should we refill BID or TID  (he would rather taking BID if able  States home Bps are "awesome"

## 2022-09-27 DIAGNOSIS — I10 BENIGN ESSENTIAL HYPERTENSION: ICD-10-CM

## 2022-09-27 DIAGNOSIS — I12.9 HYPERTENSIVE CHRONIC KIDNEY DISEASE WITH STAGE 1 THROUGH STAGE 4 CHRONIC KIDNEY DISEASE, OR UNSPECIFIED CHRONIC KIDNEY DISEASE: ICD-10-CM

## 2022-09-27 DIAGNOSIS — N18.30 CHRONIC KIDNEY DISEASE, STAGE III (MODERATE) (HCC): ICD-10-CM

## 2022-09-27 PROCEDURE — 3066F NEPHROPATHY DOC TX: CPT | Performed by: PHYSICIAN ASSISTANT

## 2022-09-27 RX ORDER — HYDRALAZINE HYDROCHLORIDE 50 MG/1
50 TABLET, FILM COATED ORAL 2 TIMES DAILY
Qty: 180 TABLET | Refills: 3 | Status: SHIPPED | OUTPATIENT
Start: 2022-09-27

## 2022-09-27 NOTE — TELEPHONE ENCOUNTER
Pt advised Hydralazine to be taken  50 mg  BID instead of TID  Per Dr Ron Garcia, to monitor BP readings and send in for review

## 2022-10-06 DIAGNOSIS — E78.5 DYSLIPIDEMIA: ICD-10-CM

## 2022-10-06 DIAGNOSIS — I48.0 PAROXYSMAL ATRIAL FIBRILLATION (HCC): ICD-10-CM

## 2022-10-06 RX ORDER — DOFETILIDE 0.5 MG/1
CAPSULE ORAL
Qty: 60 CAPSULE | Refills: 8 | Status: SHIPPED | OUTPATIENT
Start: 2022-10-06

## 2022-10-06 RX ORDER — ATORVASTATIN CALCIUM 40 MG/1
TABLET, FILM COATED ORAL
Qty: 90 TABLET | Refills: 1 | Status: SHIPPED | OUTPATIENT
Start: 2022-10-06

## 2022-10-07 ENCOUNTER — TELEPHONE (OUTPATIENT)
Dept: NEUROLOGY | Facility: CLINIC | Age: 57
End: 2022-10-07

## 2022-10-07 NOTE — TELEPHONE ENCOUNTER
LVM for patient to call office to confirm appointment with Cara on 10/26/22 at 7:30am in our [facility] [place] 
Labs reviewed: HST negative. CBC/BMP unremarkable.     CXR personally reviewed: No infiltrate or acute abnormalities, no pleural effusions or congestion

## 2022-10-11 PROBLEM — Z12.11 COLON CANCER SCREENING: Status: RESOLVED | Noted: 2022-08-03 | Resolved: 2022-10-11

## 2022-11-02 ENCOUNTER — TELEPHONE (OUTPATIENT)
Dept: GASTROENTEROLOGY | Facility: AMBULARY SURGERY CENTER | Age: 57
End: 2022-11-02

## 2022-11-02 NOTE — TELEPHONE ENCOUNTER
LVM for pt re: reschedule of GI procedures pt missed/provided direct phone # in scheduling on pt's phone message

## 2022-11-14 NOTE — TELEPHONE ENCOUNTER
Call patient, I sent him an anti-anxiety med to take 30-60 mins before his procedure ordered by his nephrologist, do not take while driving or combine with alcohol  Also, patient has not been seen since 2018, so advised him to make a routine HM appt, can defer to establishing with new provider in 2-3 months since non-urgent Body Location Override (Optional - Billing Will Still Be Based On Selected Body Map Location If Applicable): left lower abdomen Detail Level: Simple Depth Of Biopsy: dermis Was A Bandage Applied: Yes Size Of Lesion In Cm: 0.6 X Size Of Lesion In Cm: 0 Biopsy Type: H and E Biopsy Method: Dermablade Anesthesia Type: 1% lidocaine with epinephrine Anesthesia Volume In Cc (Will Not Render If 0): 0.1 Hemostasis: Drysol Wound Care: Petrolatum Dressing: bandage Destruction After The Procedure: No Type Of Destruction Used: Curettage Curettage Text: The wound bed was treated with curettage after the biopsy was performed. Cryotherapy Text: The wound bed was treated with cryotherapy after the biopsy was performed. Electrodesiccation Text: The wound bed was treated with electrodesiccation after the biopsy was performed. Electrodesiccation And Curettage Text: The wound bed was treated with electrodesiccation and curettage after the biopsy was performed. Silver Nitrate Text: The wound bed was treated with silver nitrate after the biopsy was performed. Lab: -[id number] Consent: Written consent was obtained and risks were reviewed including but not limited to scarring, infection, bleeding, scabbing, incomplete removal, nerve damage and allergy to anesthesia. Post-Care Instructions: I reviewed with the patient in detail post-care instructions. Patient is to keep the biopsy site dry overnight, and then apply bacitracin twice daily until healed. Patient may apply hydrogen peroxide soaks to remove any crusting. Notification Instructions: Patient will be notified of biopsy results. However, patient instructed to call the office if not contacted within 2 weeks. Billing Type: [employer] Information: Selecting Yes will display possible errors in your note based on the variables you have selected. This validation is only offered as a suggestion for you. PLEASE NOTE THAT THE VALIDATION TEXT WILL BE REMOVED WHEN YOU FINALIZE YOUR NOTE. IF YOU WANT TO FAX A PRELIMINARY NOTE YOU WILL NEED TO TOGGLE THIS TO 'NO' IF YOU DO NOT WANT IT IN YOUR FAXED NOTE. Body Location Override (Optional - Billing Will Still Be Based On Selected Body Map Location If Applicable): left lower back Size Of Lesion In Cm: 1 Hemostasis: [facility] Billing Type: Third-Party Bill

## 2022-11-22 DIAGNOSIS — I48.0 PAROXYSMAL ATRIAL FIBRILLATION (HCC): ICD-10-CM

## 2022-11-22 RX ORDER — DOFETILIDE 0.5 MG/1
CAPSULE ORAL
Qty: 180 CAPSULE | Refills: 3 | Status: SHIPPED | OUTPATIENT
Start: 2022-11-22

## 2022-11-28 ENCOUNTER — TELEPHONE (OUTPATIENT)
Dept: NEPHROLOGY | Facility: CLINIC | Age: 57
End: 2022-11-28

## 2022-11-28 NOTE — TELEPHONE ENCOUNTER
MILA for patient reminding him to go for lab work before his appt on 12/2  Asked him to call back if he has any questions

## 2022-11-28 NOTE — PROGRESS NOTES
RENAL FOLLOW UP NOTE: td    • ASSESSMENT AND PLAN:  1  Hypertension:  Most compatible with essential hypertension/resistant hypertension:  Secondary workup:  -normal thyroid function study  -severe HARPER on BiPAP  -aldosterone/renin ratio:  Essentially negative at 20 with an aldosterone level of 8 7  -plasma free metanephrines:  Negative  -24 hour urine for free cortisol:  Negative  -renal artery duplex:  No evidence of significant renal artery disease; but somewhat limited by body habitus and overlying bowel gas  Again obesity may be contributing to his hypertension  • Current home blood pressure readings:  • A m :  150/86  • P m  :    156/83 but only based on 2 readings  • heart rate:  50 70 range     • GOAL BLOOD PRESSURE:  LESS NTFZ 238/33 GIVEN CKD/recent hemorrhagic stroke     • Recommendations:  • continue to push nonmedical regimen including isotonic exercise, avoidance of salt and weight loss; patient counseled as such  • GIVEN TIKOSYN:  AMILORIDE/SPIRONOLACTONE/HCTZ ARE CONTRAINDICATED   LOOP DIURETIC SUCH AS FUROSEMIDE OR TORSEMIDE ARE ACCEPTABLE      • Medication changes today:  • I would recommend Eplerenone which does not appear to have an effect on Tikosyn unlike spironolactone  I will double check Dr Darling Person 1st  • Continue olmesartan 40 mg daily  • Continue nebivolol 2 5 mg daily  • Continue hydralazine 50 mg twice a day  • Continue clonidine patch# 1  Weekly  • Torsemide 5 mg daily    Future options would be to increase torsemide, or Eplerenone if and acceptable medication  And of course clonidine patch as well  DISCUSSED WITH DR Roel Dubois EPLERENONE IS TOTALLY ACCEPTABLE!              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  • Current creatinine:  At baseline at 1 23  • Electrolytes are normal including potassium of 4 0  • UA:  No proteinuria and no hematuria, 2-4 WBCs:  Repeat  • Urine protein creatinine ratio:  0 10 g at goal  • MBD:  Of CKD:  Calcium/magnesium are normal/PTH 51  • CBC:  Normal with a hemoglobin of 14 6  • Renal ultrasound:  12/03/2020:  Echogenic focus in the left lower pole possible fat versus calculus without shadowing   Thickened bladder with UV junction still present although perhaps slightly pronounced   Followed by Urology:  • Visit with Dr Kaitlyn Lopez for possible right-sided ureterocele, history of cystoscopy and cystolitholapaxy status post right stent removal   Bladder wall thickening resolution of hydronephrosis   Possibility of congenital ureterocele was discussed 1 year follow-up with retroperitoneal ultrasound which would be November 2022            3  Nephrolithiasis:  As mentioned previously he has had numerous stones   No kidney stones at this time  • Continue with General stone diet; in particular water intake 2 and half to 3 L a day all  • Only further investigation with a 24 hour urine stone risk evaluation if recurrent stone     4  Dyslipidemia:  • Current lipid profile:  LDL   52/HDL 39/triglycerides 150  • Goal:  LDL less GRQL 30 DYJNX CKD/hemorrhagic stroke  Recommend:  • Diet, exercise and weight loss, patient counseled as such  • No change in medication as patient is at goal        5   Recent admission 03/07/2022 secondary to right-sided weakness with word-finding difficulty, found to have acute left intraparenchymal hemorrhage with vasogenic edema   Patient admitted [facility] with neurosurgery evaluation   Started on Cardene due to hypertension   No neuro surgical intervention at that time was deemed necessary   MRI in 3/9 demonstrated unchanged intraparenchymal hematoma as well as small acute infarct in the left corona radiata   Patient was maintained on blood pressure control   TTE demonstrated EF 65% otherwise grade 1 diastolic dysfunction   Seen by Neurology as well recommended restarting anticoagulation 3 weeks after repeat CT scan along with PT/OT  In regards to blood pressure: Amlodipine was stopped nifedipine 10 mg every 8 hours titrate as needed, continuing hydralazine      6  Increased liver function studies:  Right upper quadrant ultrasound showed cholelithiasis hepatomegaly and hepatic steatosis  Now followed by Dr Monty Cristina who believes it is related to statin therapy but is agreeable to continue the statin  Further evaluation will be done by Dr Abilio Philip     GI health maintenance:  Seen by Dr Abilio Philip we will plan for colonoscopy 3-4 months after CVA:  Patient knows he is due for the colonoscopy will schedule with Dr Abilio Philip        PATIENT INSTRUCTIONS:    Patient Instructions   1  Medication changes today:  • Begin Eplerenone 25 mg daily in the morning but:  PLEASE WAIT UNTIL I GET APPROVAL FROM DR TAVERA AND I WILL CONTACT YOU IMMEDIATELY  • Continue to push a low-salt  • CONTINUE TO PUSH DIET AND EXERCISE THIS IS IMPERATIVE AND WILL HELP YOUR BLOOD PRESSURE AND HEART AND POTENTIALLY WILL BE ABLE TO REDUCE HER MEDICATION LOAD IN THE FUTURE  • CONTINUE TO DRINK 2 AND HALF TO 3 L OF WATER A DAY WHICH IS  OZ    2  Please go for non fasting  lab work 1-2 weeks after making the above medication change      3  Please take 1 week a blood pressure readings  3-4 weeks after making the above medication change     AS FOLLOWS  MORNING AND EVENING, SITTING as follows:  · TAKE THE MORNING READINGS BEFORE ANY MEDICATIONS AND WHEN YOU ARE RELAXED FOR SEVERAL MINUTES  · TAKE THE EVENING READINGS:  BETWEEN 7-10 P M ; PRIOR TO ANY MEDICATIONS; AT LEAST IN OUR  FROM DINNER; AND CERTAINLY AFTER RELAXING FOR A FEW MINUTES  · PLEASE INCLUDE HEART RATE WITH YOUR BLOOD PRESSURE READINGS  · When taking standing readings, keep your arm supported at heart level and not dangling  · Make sure you are sitting with your back supported and feet on the ground and do not cross your legs or feet  · Make sure you have not taken any coffee or caffeine products or exercised or smoke cigarettes at least 30 minutes before taking your blood pressure  Then please mail these readings into the office    4  Follow-up in 4  months  • Please bring in 1 week a blood pressure readings morning evening, sitting and standing is outlined above    • Please go for fasting lab work 1-2 weeks prior to your appointment      5  General instructions:  • AVOID SALT BUT NOT ADDING AN READING LABELS TO MAKE SURE THERE IS LOW-SALT IN THE FOOD THAT YOU ARE EATING  o Goal is less than 2 g of sodium intake or less than 5 g of sodium chloride intake per day    • Avoid nonsteroidal anti-inflammatory drugs such as Naprosyn, ibuprofen, Aleve, Advil, Celebrex, Meloxicam (Mobic) etc   You can use Tylenol as needed if you do not have any liver condition to be concerned about    • Avoid medications such as Sudafed or decongestants and antihistamines that contained the D component which is the decongestant  You can take antihistamines without the decongestant or D component  • Try to avoid medications such as pantoprazole or  Protonix/Nexium or Esomeprazole)/Prilosec or omeprazole/Prevacid or lansoprazole/AcipHex or Rabeprazole  If you are able to, use Pepcid as this is safer for your kidneys  • Try to exercise at least 30 minutes 3 days a week to begin with with an ultimate goal of 5 days a week for at least 30 minutes    • Try to lose 5-10 lb by your next visit    • Please do not drink more than 2 glasses of alcohol/wine on a daily basis as this may contribute to your high blood pressure  Subjective: There has been no hospitalizations or acute illnesses since last visit  The patient overall is feeling well  No fevers, chills, or cough or colds    Good appetite and good energy  No hematuria, dysuria, voiding symptoms or foamy urine  No gastrointestinal symptoms  No cardiovascular symptoms occasional swelling at times when he eats a lot of salt but it usually just intermittent  No headaches, dizziness or lightheadedness  Blood pressure medications:  • Clonidine TTS 1 weekly  • Torsemide 5 mg daily in the morning  • Nebivolol 2 5 mg daily in the morning  • Olmesartan 40 mg daily in the morning  • Hydralazine 50 mg twice a day  • Nifedipine XL 30 mg daily in the morning       ROS:  See HPI, otherwise review of systems as completely reviewed with the patient are negative    Past Medical History:   Diagnosis Date   • Ankle swelling    • Arthritis    • Asthma    • Atrial fibrillation (HCC)    • Atrial fibrillation with RVR ([facility] [address] ) 4/4/2017   • Chronic kidney disease     kidney stones   • Gout    • Hypertension    • Kidney stone    • Night sweats    • Seasonal allergies    • Sleep apnea      Past Surgical History:   Procedure Laterality Date   • APPENDECTOMY     • CARDIOVERSION      X4 last was 2018   • COLONOSCOPY     • CYSTOSCOPY  08/30/2016    w/removal of object, last assessed 8/30/16   • FOOT SURGERY Left    • HAND SURGERY     • KNEE SURGERY Right 2016   • [place] CYSTO/URETERO W/LITHOTRIPSY &INDWELL STENT INSRT Left 08/09/2016    Procedure: CYSTOSCOPY URETEROSCOPY WITH LITHOTRIPSY HOLMIUM LASER STONE EXTRACTION, RETROGRADE PYELOGRAM AND INSERTION STENT URETERAL;  Surgeon: Mildred Fraser MD;  Location:  MAIN OR;  Service: Urology last assessed 8/30/16   • [place] CYSTO/URETERO W/LITHOTRIPSY &INDWELL STENT INSRT Right 08/24/2020    Procedure: CYSTOSCOPY , cystolitholapaxy of bladder stone WITH HOMIUM LASER RIGHT RETROGRADE AND RIGHT URETERAL STENT, [address];  Surgeon: Mildred Fraser MD;  Location: [facility] MAIN OR;  Service: Urology   • UPPER GASTROINTESTINAL ENDOSCOPY     • WRIST SURGERY Left 2014    Fracture surgery     Family History   Problem Relation Age of Onset   • Atrial fibrillation Mother    • Other Mother         blood dyscrasia   • Hypertension Mother    • Other Father         hypoglycemia    • Atrial fibrillation Father    • Diabetes Family    • Other Family         Thrombocytosis • No Known Problems Sister    • Cancer Neg Hx    • Thyroid disease Neg Hx       reports that he quit smoking about 28 years ago  His smoking use included cigarettes  He has a 5 00 pack-year smoking history  He has never used smokeless tobacco  He reports current alcohol use  He reports that he does not use drugs  I COMPLETELY REVIEWED THE PAST MEDICAL HISTORY/PAST SURGICAL HISTORY/SOCIAL HISTORY/FAMILY HISTORY/AND MEDICATIONS  AND UPDATED ALL    Objective:     Vitals:   BP sitting on left:  128/72 with a heart rate of 64 regular  BP standing on right:  132/70 with a heart rate of 64 regular  Vitals:    11/30/22 1549   BP: 124/63   Pulse: 61       Weight (last 2 days)     Date/Time Weight    11/30/22 1549 137 (302)        Wt Readings from Last 3 Encounters:   11/30/22 (!) 137 kg (302 lb)   08/03/22 133 kg (293 lb)   06/15/22 134 kg (294 lb 12 8 oz)       Body mass index is 43 33 kg/m²      Physical Exam: General:  No acute distress/obese  Skin:  No acute rash  Eyes:  No scleral icterus, noninjected, no discharge from eyes  ENT:  Moist mucous membranes  Neck:  Supple, no jugular venous distention, trachea is midline, no lymphadenopathy and no thyromegaly  Back   No CVAT  Chest:  Clear to auscultation and percussion, good respiratory effort  CVS:  Regular rate and rhythm without a rub, or gallops or murmurs  Abdomen:  Obese,Soft and nontender with normal bowel sounds  Extremities:  No cyanosis and no edema, no arthritic changes, normal range of motion  Neuro:  Grossly intact  Psych:  Alert, oriented x3 and appropriate      Medications:    Current Outpatient Medications:   •  atorvastatin (LIPITOR) 40 mg tablet, TAKE 1 TABLET BY MOUTH EVERY DAY WITH DINNER, Disp: 90 tablet, Rfl: 1  •  cloNIDine (CATAPRES-TTS-1) 0 1 mg/24 hr, Place 1 patch (0 1 mg total) on the skin once a week, Disp: 4 patch, Rfl: 11  •  dofetilide (TIKOSYN) 500 mcg capsule, TAKE 1 CAPSULE BY MOUTH EVERY 12 HOURS, Disp: 180 capsule, Rfl: 3  • eplerenone (INSPRA) 25 mg tablet, Take 1 tablet (25 mg total) by mouth daily, Disp: 30 tablet, Rfl: 5  •  hydrALAZINE (APRESOLINE) 50 mg tablet, Take 1 tablet (50 mg total) by mouth 2 (two) times a day, Disp: 180 tablet, Rfl: 3  •  nebivolol (Bystolic) 2 5 mg tablet, Take 1 tablet (2 5 mg total) by mouth daily Hold for heart rate less than 50 beats per minute , Disp: 90 tablet, Rfl: 3  •  NIFEdipine (PROCARDIA XL) 30 mg 24 hr tablet, Take 1 tablet (30 mg total) by mouth 2 (two) times a day (Patient taking differently: Take 30 mg by mouth daily), Disp: 180 tablet, Rfl: 3  •  olmesartan (BENICAR) 40 mg tablet, Take 1 tablet (40 mg total) by mouth daily, Disp: 90 tablet, Rfl: 3  •  rivaroxaban (Xarelto) 20 mg tablet, Take 1 tablet (20 mg total) by mouth daily with breakfast, Disp: 30 tablet, Rfl: 10  •  torsemide (DEMADEX) 5 MG tablet, TAKE 1 TABLET (5 MG TOTAL) BY MOUTH DAILY  , Disp: 90 tablet, Rfl: 1  •  bisacodyl (DULCOLAX) 5 mg EC tablet, Take 2 tablets (10 mg total) by mouth once for 1 dose, Disp: 2 tablet, Rfl: 0  •  Ozempic, 1 MG/DOSE, 4 MG/3ML SOPN injection pen, INJECT 1 MG UNDER THE SKIN ONCE A WEEK FOR 4 DOSES (Patient not taking: Reported on 11/30/2022), Disp: , Rfl:   •  polyethylene glycol (GOLYTELY) 4000 mL solution, Take 4,000 mL by mouth once for 1 dose, Disp: 4000 mL, Rfl: 0    Lab, Imaging and other studies: I have personally reviewed pertinent labs    Laboratory Results:  Results for orders placed or performed in visit on 11/29/22   Magnesium   Result Value Ref Range    Magnesium 1 9 1 9 - 2 7 mg/dL   Comprehensive metabolic panel   Result Value Ref Range    Sodium 140 135 - 147 mmol/L    Potassium 4 0 3 5 - 5 3 mmol/L    Chloride 105 96 - 108 mmol/L    CO2 28 21 - 32 mmol/L    ANION GAP 7 4 - 13 mmol/L    BUN 26 (H) 5 - 25 mg/dL    Creatinine 1 23 0 60 - 1 30 mg/dL    Glucose, Fasting 154 (H) 65 - 99 mg/dL    Calcium 8 7 8 4 - 10 2 mg/dL    AST 15 13 - 39 U/L    ALT 17 7 - 52 U/L    Alkaline Phosphatase 61 34 - 104 U/L    Total Protein 7 5 6 4 - 8 4 g/dL    Albumin 4 1 3 5 - 5 0 g/dL    Total Bilirubin 0 51 0 20 - 1 00 mg/dL    eGFR 64 ml/min/1 73sq m   CBC   Result Value Ref Range    WBC 8 90 4 31 - 10 16 Thousand/uL    RBC 4 94 3 88 - 5 62 Million/uL    Hemoglobin 14 6 12 0 - 17 0 g/dL    Hematocrit 44 6 36 5 - 49 3 %    MCV 90 82 - 98 fL    MCH 29 6 26 8 - 34 3 pg    MCHC 32 7 31 4 - 37 4 g/dL    RDW 13 0 11 6 - 15 1 %    Platelets 255 171 - 813 Thousands/uL    MPV 12 4 8 9 - 12 7 fL   CK   Result Value Ref Range    Total CK 97 39 - 308 U/L   Lipid Panel with Direct LDL reflex   Result Value Ref Range    Cholesterol 121 See Comment mg/dL    Triglycerides 150 (H) See Comment mg/dL    HDL, Direct 39 (L) >=40 mg/dL    LDL Calculated 52 0 - 100 mg/dL   Phosphorus   Result Value Ref Range    Phosphorus 3 5 2 7 - 4 5 mg/dL   Protein / creatinine ratio, urine   Result Value Ref Range    Creatinine, Ur 99 1 mg/dL    Protein Urine Random 10 mg/dL    Prot/Creat Ratio, Ur 0 10 0 00 - 0 10   PTH, intact   Result Value Ref Range    PTH 51 0 18 4 - 80 1 pg/mL       Results from last 7 days   Lab Units 11/29/22  0626   WBC Thousand/uL 8 90   HEMOGLOBIN g/dL 14 6   HEMATOCRIT % 44 6   PLATELETS Thousands/uL 205   POTASSIUM mmol/L 4 0   CHLORIDE mmol/L 105   CO2 mmol/L 28   BUN mg/dL 26*   CREATININE mg/dL 1 23   CALCIUM mg/dL 8 7   MAGNESIUM mg/dL 1 9   PHOSPHORUS mg/dL 3 5         Radiology review:   chest X-ray    Ultrasound      Portions of the record may have been created with voice recognition software  Occasional wrong word or "sound a like" substitutions may have occurred due to the inherent limitations of voice recognition software  Read the chart carefully and recognize, using context, where substitutions have occurred

## 2022-11-29 ENCOUNTER — APPOINTMENT (OUTPATIENT)
Dept: LAB | Facility: CLINIC | Age: 57
End: 2022-11-29

## 2022-11-29 DIAGNOSIS — I12.9 HYPERTENSIVE CHRONIC KIDNEY DISEASE WITH STAGE 1 THROUGH STAGE 4 CHRONIC KIDNEY DISEASE, OR UNSPECIFIED CHRONIC KIDNEY DISEASE: ICD-10-CM

## 2022-11-29 DIAGNOSIS — E55.9 VITAMIN D DEFICIENCY: ICD-10-CM

## 2022-11-29 DIAGNOSIS — N18.31 STAGE 3A CHRONIC KIDNEY DISEASE (HCC): ICD-10-CM

## 2022-11-29 DIAGNOSIS — R79.89 ELEVATED LIVER FUNCTION TESTS: ICD-10-CM

## 2022-11-29 DIAGNOSIS — E78.5 DYSLIPIDEMIA: ICD-10-CM

## 2022-11-29 DIAGNOSIS — E66.01 CLASS 3 SEVERE OBESITY DUE TO EXCESS CALORIES WITH SERIOUS COMORBIDITY AND BODY MASS INDEX (BMI) OF 40.0 TO 44.9 IN ADULT (HCC): ICD-10-CM

## 2022-11-29 DIAGNOSIS — I10 ACCELERATED HYPERTENSION: ICD-10-CM

## 2022-11-29 LAB
ALBUMIN SERPL BCP-MCNC: 4.1 G/DL (ref 3.5–5)
ALP SERPL-CCNC: 61 U/L (ref 34–104)
ALT SERPL W P-5'-P-CCNC: 17 U/L (ref 7–52)
ANION GAP SERPL CALCULATED.3IONS-SCNC: 7 MMOL/L (ref 4–13)
AST SERPL W P-5'-P-CCNC: 15 U/L (ref 13–39)
BILIRUB SERPL-MCNC: 0.51 MG/DL (ref 0.2–1)
BUN SERPL-MCNC: 26 MG/DL (ref 5–25)
CALCIUM SERPL-MCNC: 8.7 MG/DL (ref 8.4–10.2)
CHLORIDE SERPL-SCNC: 105 MMOL/L (ref 96–108)
CHOLEST SERPL-MCNC: 121 MG/DL
CK SERPL-CCNC: 97 U/L (ref 39–308)
CO2 SERPL-SCNC: 28 MMOL/L (ref 21–32)
CREAT SERPL-MCNC: 1.23 MG/DL (ref 0.6–1.3)
CREAT UR-MCNC: 99.1 MG/DL
ERYTHROCYTE [DISTWIDTH] IN BLOOD BY AUTOMATED COUNT: 13 % (ref 11.6–15.1)
GFR SERPL CREATININE-BSD FRML MDRD: 64 ML/MIN/1.73SQ M
GLUCOSE P FAST SERPL-MCNC: 154 MG/DL (ref 65–99)
HCT VFR BLD AUTO: 44.6 % (ref 36.5–49.3)
HDLC SERPL-MCNC: 39 MG/DL
HGB BLD-MCNC: 14.6 G/DL (ref 12–17)
LDLC SERPL CALC-MCNC: 52 MG/DL (ref 0–100)
MAGNESIUM SERPL-MCNC: 1.9 MG/DL (ref 1.9–2.7)
MCH RBC QN AUTO: 29.6 PG (ref 26.8–34.3)
MCHC RBC AUTO-ENTMCNC: 32.7 G/DL (ref 31.4–37.4)
MCV RBC AUTO: 90 FL (ref 82–98)
PHOSPHATE SERPL-MCNC: 3.5 MG/DL (ref 2.7–4.5)
PLATELET # BLD AUTO: 205 THOUSANDS/UL (ref 149–390)
PMV BLD AUTO: 12.4 FL (ref 8.9–12.7)
POTASSIUM SERPL-SCNC: 4 MMOL/L (ref 3.5–5.3)
PROT SERPL-MCNC: 7.5 G/DL (ref 6.4–8.4)
PROT UR-MCNC: 10 MG/DL
PROT/CREAT UR: 0.1 MG/G{CREAT} (ref 0–0.1)
PTH-INTACT SERPL-MCNC: 51 PG/ML (ref 18.4–80.1)
RBC # BLD AUTO: 4.94 MILLION/UL (ref 3.88–5.62)
SODIUM SERPL-SCNC: 140 MMOL/L (ref 135–147)
TRIGL SERPL-MCNC: 150 MG/DL
WBC # BLD AUTO: 8.9 THOUSAND/UL (ref 4.31–10.16)

## 2022-11-30 ENCOUNTER — OFFICE VISIT (OUTPATIENT)
Dept: NEPHROLOGY | Facility: CLINIC | Age: 57
End: 2022-11-30

## 2022-11-30 VITALS
HEIGHT: 70 IN | BODY MASS INDEX: 43.23 KG/M2 | WEIGHT: 302 LBS | DIASTOLIC BLOOD PRESSURE: 63 MMHG | SYSTOLIC BLOOD PRESSURE: 124 MMHG | HEART RATE: 61 BPM

## 2022-11-30 DIAGNOSIS — E66.01 CLASS 3 SEVERE OBESITY DUE TO EXCESS CALORIES WITH SERIOUS COMORBIDITY AND BODY MASS INDEX (BMI) OF 40.0 TO 44.9 IN ADULT (HCC): ICD-10-CM

## 2022-11-30 DIAGNOSIS — I10 ACCELERATED HYPERTENSION: ICD-10-CM

## 2022-11-30 DIAGNOSIS — I12.9 HYPERTENSIVE CHRONIC KIDNEY DISEASE WITH STAGE 1 THROUGH STAGE 4 CHRONIC KIDNEY DISEASE, OR UNSPECIFIED CHRONIC KIDNEY DISEASE: Primary | ICD-10-CM

## 2022-11-30 DIAGNOSIS — E55.9 VITAMIN D DEFICIENCY: ICD-10-CM

## 2022-11-30 DIAGNOSIS — N18.31 STAGE 3A CHRONIC KIDNEY DISEASE (HCC): ICD-10-CM

## 2022-11-30 RX ORDER — EPLERENONE 25 MG/1
25 TABLET, FILM COATED ORAL DAILY
Qty: 30 TABLET | Refills: 5 | Status: SHIPPED | OUTPATIENT
Start: 2022-11-30

## 2022-11-30 NOTE — PATIENT INSTRUCTIONS
1  Medication changes today:  Begin Eplerenone 25 mg daily in the morning but:  PLEASE WAIT UNTIL I GET APPROVAL FROM DR TAVERA AND I WILL CONTACT YOU IMMEDIATELY  Continue to push a low-salt  CONTINUE TO PUSH DIET AND EXERCISE THIS IS IMPERATIVE AND WILL HELP YOUR BLOOD PRESSURE AND HEART AND POTENTIALLY WILL BE ABLE TO REDUCE HER MEDICATION LOAD IN THE FUTURE  CONTINUE TO DRINK 2 AND HALF TO 3 L OF WATER A DAY WHICH IS  OZ    2  Please go for non fasting  lab work 1-2 weeks after making the above medication change  3  Please take 1 week a blood pressure readings  3-4 weeks after making the above medication change     AS FOLLOWS  MORNING AND EVENING, SITTING as follows:  TAKE THE MORNING READINGS BEFORE ANY MEDICATIONS AND WHEN YOU ARE RELAXED FOR SEVERAL MINUTES  TAKE THE EVENING READINGS:  BETWEEN 7-10 P M ; PRIOR TO ANY MEDICATIONS; AT LEAST IN OUR  FROM DINNER; AND CERTAINLY AFTER RELAXING FOR A FEW MINUTES  PLEASE INCLUDE HEART RATE WITH YOUR BLOOD PRESSURE READINGS  When taking standing readings, keep your arm supported at heart level and not dangling  Make sure you are sitting with your back supported and feet on the ground and do not cross your legs or feet  Make sure you have not taken any coffee or caffeine products or exercised or smoke cigarettes at least 30 minutes before taking your blood pressure  Then please mail these readings into the office    4  Follow-up in 4  months  Please bring in 1 week a blood pressure readings morning evening, sitting and standing is outlined above    Please go for fasting lab work 1-2 weeks prior to your appointment      5   General instructions:  AVOID SALT BUT NOT ADDING AN READING LABELS TO MAKE SURE THERE IS LOW-SALT IN THE FOOD THAT YOU ARE EATING  Goal is less than 2 g of sodium intake or less than 5 g of sodium chloride intake per day    Avoid nonsteroidal anti-inflammatory drugs such as Naprosyn, ibuprofen, Aleve, Advil, Celebrex, Meloxicam (Mobic) etc   You can use Tylenol as needed if you do not have any liver condition to be concerned about    Avoid medications such as Sudafed or decongestants and antihistamines that contained the D component which is the decongestant  You can take antihistamines without the decongestant or D component  Try to avoid medications such as pantoprazole or  Protonix/Nexium or Esomeprazole)/Prilosec or omeprazole/Prevacid or lansoprazole/AcipHex or Rabeprazole  If you are able to, use Pepcid as this is safer for your kidneys  Try to exercise at least 30 minutes 3 days a week to begin with with an ultimate goal of 5 days a week for at least 30 minutes    Try to lose 5-10 lb by your next visit    Please do not drink more than 2 glasses of alcohol/wine on a daily basis as this may contribute to your high blood pressure

## 2022-11-30 NOTE — LETTER
November 30, 2022     Munira Adler MD  [address] [place] [address]    Patient: Claudia Ramirez   YOB: 1965   Date of Visit: 11/30/2022       Dear Dr Blaire Tinajero: Thank you for referring Stanislaw Minor to me for evaluation  Below are my notes for this consultation  If you have questions, please do not hesitate to call me  I look forward to following your patient along with you  Sincerely,        Héctor Stephens MD        CC: DO Héctor Griffin MD  11/30/2022  4:40 PM  Sign when Signing Visit  RENAL FOLLOW UP NOTE: td    • ASSESSMENT AND PLAN:  1  Hypertension:  Most compatible with essential hypertension/resistant hypertension:  Secondary workup:  -normal thyroid function study  -severe HARPER on BiPAP  -aldosterone/renin ratio:  Essentially negative at 20 with an aldosterone level of 8 7  -plasma free metanephrines:  Negative  -24 hour urine for free cortisol:  Negative  -renal artery duplex:  No evidence of significant renal artery disease; but somewhat limited by body habitus and overlying bowel gas  Again obesity may be contributing to his hypertension  • Current home blood pressure readings:  • A m :  150/86  • P m  :    156/83 but only based on 2 readings  • heart rate:  50 70 range     • GOAL BLOOD PRESSURE:  LESS RMUI 964/71 GIVEN CKD/recent hemorrhagic stroke     • Recommendations:  • continue to push nonmedical regimen including isotonic exercise, avoidance of salt and weight loss; patient counseled as such  • GIVEN TIKOSYN:  AMILORIDE/SPIRONOLACTONE/HCTZ ARE CONTRAINDICATED   LOOP DIURETIC SUCH AS FUROSEMIDE OR TORSEMIDE ARE ACCEPTABLE      • Medication changes today:  • I would recommend Eplerenone which does not appear to have an effect on Tikosyn unlike spironolactone  I will double check Dr Ang Drilling 1st  • Continue olmesartan 40 mg daily  • Continue nebivolol 2 5 mg daily  • Continue hydralazine 50 mg twice a day  • Continue clonidine patch# 1  Weekly  • Torsemide 5 mg daily    Future options would be to increase torsemide, or Eplerenone if and acceptable medication  And of course clonidine patch as well               2  CKD stage 3:    • Baseline creatinine is ranged anywhere from 1 1-1 6:  • 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  • Current creatinine:  At baseline at 1 23  • Electrolytes are normal including potassium of 4 0  • UA:  No proteinuria and no hematuria, 2-4 WBCs:  Repeat  • Urine protein creatinine ratio:  0 10 g at goal  • MBD:  Of CKD:  Calcium/magnesium are normal/PTH 51  • CBC:  Normal with a hemoglobin of 14 6  • Renal ultrasound:  12/03/2020:  Echogenic focus in the left lower pole possible fat versus calculus without shadowing   Thickened bladder with UV junction still present although perhaps slightly pronounced   Followed by Urology:  • Visit with Dr Sera Frost for possible right-sided ureterocele, history of cystoscopy and cystolitholapaxy status post right stent removal   Bladder wall thickening resolution of hydronephrosis   Possibility of congenital ureterocele was discussed 1 year follow-up with retroperitoneal ultrasound which would be November 2022            3  Nephrolithiasis:  As mentioned previously he has had numerous stones   No kidney stones at this time  • Continue with General stone diet; in particular water intake 2 and half to 3 L a day all  • Only further investigation with a 24 hour urine stone risk evaluation if recurrent stone     4  Dyslipidemia:  • Current lipid profile:  LDL   52/HDL 39/triglycerides 150  • Goal:  LDL less NMER 63 JEFFERSONI CKD/hemorrhagic stroke  Recommend:  • Diet, exercise and weight loss, patient counseled as such  • No change in medication as patient is at goal        5   Recent admission 03/07/2022 secondary to right-sided weakness with word-finding difficulty, found to have acute left intraparenchymal hemorrhage with vasogenic edema   Patient admitted [facility] with neurosurgery evaluation   Started on Cardene due to hypertension   No neuro surgical intervention at that time was deemed necessary   MRI in 3/9 demonstrated unchanged intraparenchymal hematoma as well as small acute infarct in the left corona radiata   Patient was maintained on blood pressure control   TTE demonstrated EF 65% otherwise grade 1 diastolic dysfunction   Seen by Neurology as well recommended restarting anticoagulation 3 weeks after repeat CT scan along with PT/OT  In regards to blood pressure: Amlodipine was stopped nifedipine 10 mg every 8 hours titrate as needed, continuing hydralazine      6  Increased liver function studies:  Right upper quadrant ultrasound showed cholelithiasis hepatomegaly and hepatic steatosis  Now followed by Dr Avi White who believes it is related to statin therapy but is agreeable to continue the statin  Further evaluation will be done by Dr Chris Larsen     GI health maintenance:  Seen by Dr Chris Larsen we will plan for colonoscopy 3-4 months after CVA:  Patient knows he is due for the colonoscopy will schedule with Dr Chris Larsen        PATIENT INSTRUCTIONS:    Patient Instructions   1  Medication changes today:  • Begin Eplerenone 25 mg daily in the morning but:  PLEASE WAIT UNTIL I GET APPROVAL FROM DR TAVERA AND I WILL CONTACT YOU IMMEDIATELY  • Continue to push a low-salt  • CONTINUE TO PUSH DIET AND EXERCISE THIS IS IMPERATIVE AND WILL HELP YOUR BLOOD PRESSURE AND HEART AND POTENTIALLY WILL BE ABLE TO REDUCE HER MEDICATION LOAD IN THE FUTURE  • CONTINUE TO DRINK 2 AND HALF TO 3 L OF WATER A DAY WHICH IS  OZ    2  Please go for non fasting  lab work 1-2 weeks after making the above medication change      3  Please take 1 week a blood pressure readings  3-4 weeks after making the above medication change     AS FOLLOWS  MORNING AND EVENING, SITTING as follows:  · TAKE THE MORNING READINGS BEFORE ANY MEDICATIONS AND WHEN YOU ARE RELAXED FOR SEVERAL MINUTES  · TAKE THE EVENING READINGS:  BETWEEN 7-10 P M ; PRIOR TO ANY MEDICATIONS; AT LEAST IN OUR  FROM DINNER; AND CERTAINLY AFTER RELAXING FOR A FEW MINUTES  · PLEASE INCLUDE HEART RATE WITH YOUR BLOOD PRESSURE READINGS  · When taking standing readings, keep your arm supported at heart level and not dangling  · Make sure you are sitting with your back supported and feet on the ground and do not cross your legs or feet  · Make sure you have not taken any coffee or caffeine products or exercised or smoke cigarettes at least 30 minutes before taking your blood pressure  Then please mail these readings into the office    4  Follow-up in 4  months  • Please bring in 1 week a blood pressure readings morning evening, sitting and standing is outlined above    • Please go for fasting lab work 1-2 weeks prior to your appointment      5  General instructions:  • AVOID SALT BUT NOT ADDING AN READING LABELS TO MAKE SURE THERE IS LOW-SALT IN THE FOOD THAT YOU ARE EATING  o Goal is less than 2 g of sodium intake or less than 5 g of sodium chloride intake per day    • Avoid nonsteroidal anti-inflammatory drugs such as Naprosyn, ibuprofen, Aleve, Advil, Celebrex, Meloxicam (Mobic) etc   You can use Tylenol as needed if you do not have any liver condition to be concerned about    • Avoid medications such as Sudafed or decongestants and antihistamines that contained the D component which is the decongestant  You can take antihistamines without the decongestant or D component  • Try to avoid medications such as pantoprazole or  Protonix/Nexium or Esomeprazole)/Prilosec or omeprazole/Prevacid or lansoprazole/AcipHex or Rabeprazole  If you are able to, use Pepcid as this is safer for your kidneys      • Try to exercise at least 30 minutes 3 days a week to begin with with an ultimate goal of 5 days a week for at least 30 minutes    • Try to lose 5-10 lb by your next visit    • Please do not drink more than 2 glasses of alcohol/wine on a daily basis as this may contribute to your high blood pressure  Subjective: There has been no hospitalizations or acute illnesses since last visit  The patient overall is feeling well  No fevers, chills, or cough or colds    Good appetite and good energy  No hematuria, dysuria, voiding symptoms or foamy urine  No gastrointestinal symptoms  No cardiovascular symptoms occasional swelling at times when he eats a lot of salt but it usually just intermittent  No headaches, dizziness or lightheadedness  Blood pressure medications:  • Clonidine TTS 1 weekly  • Torsemide 5 mg daily in the morning  • Nebivolol 2 5 mg daily in the morning  • Olmesartan 40 mg daily in the morning  • Hydralazine 50 mg twice a day  • Nifedipine XL 30 mg daily in the morning       ROS:  See HPI, otherwise review of systems as completely reviewed with the patient are negative    Past Medical History:   Diagnosis Date   • Ankle swelling    • Arthritis    • Asthma    • Atrial fibrillation ([address] )    • Atrial fibrillation with RVR ([address] ) 4/4/2017   • Chronic kidney disease     kidney stones   • Gout    • Hypertension    • Kidney stone    • Night sweats    • Seasonal allergies    • Sleep apnea      Past Surgical History:   Procedure Laterality Date   • APPENDECTOMY     • CARDIOVERSION      X4 last was 2018   • COLONOSCOPY     • CYSTOSCOPY  08/30/2016    w/removal of object, last assessed 8/30/16   • FOOT SURGERY Left    • HAND SURGERY     • KNEE SURGERY Right 2016   • AK CYSTO/URETERO W/LITHOTRIPSY &INDWELL STENT INSRT Left 08/09/2016    Procedure: CYSTOSCOPY URETEROSCOPY WITH LITHOTRIPSY HOLMIUM LASER STONE EXTRACTION, RETROGRADE PYELOGRAM AND INSERTION STENT URETERAL;  Surgeon: Jairo Pelayo MD;  Location: [facility];  Service: Urology last assessed 8/30/16   • AK CYSTO/URETERO W/LITHOTRIPSY &INDWELL STENT INSRT Right 08/24/2020    Procedure: CYSTOSCOPY , cystolitholapaxy of bladder stone WITH HOMIUM LASER RIGHT RETROGRADE AND RIGHT URETERAL STENT, FULGERATION OF BLADDER;  Surgeon: Ken Fuchs MD;  Location: [address] [place] [address] OR;  Service: Urology   • UPPER GASTROINTESTINAL ENDOSCOPY     • WRIST SURGERY Left 2014    Fracture surgery     Family History   Problem Relation Age of Onset   • Atrial fibrillation Mother    • Other Mother         blood dyscrasia   • Hypertension Mother    • Other Father         hypoglycemia    • Atrial fibrillation Father    • Diabetes Family    • Other Family         Thrombocytosis   • No Known Problems Sister    • Cancer Neg Hx    • Thyroid disease Neg Hx       reports that he quit smoking about 28 years ago  His smoking use included cigarettes  He has a 5 00 pack-year smoking history  He has never used smokeless tobacco  He reports current alcohol use  He reports that he does not use drugs  I COMPLETELY REVIEWED THE PAST MEDICAL HISTORY/PAST SURGICAL HISTORY/SOCIAL HISTORY/FAMILY HISTORY/AND MEDICATIONS  AND UPDATED ALL    Objective:     Vitals:   BP sitting on left:  128/72 with a heart rate of 64 regular  BP standing on right:  132/70 with a heart rate of 64 regular  Vitals:    11/30/22 1549   BP: 124/63   Pulse: 61       Weight (last 2 days)     Date/Time Weight    11/30/22 1549 137 (302)        Wt Readings from Last 3 Encounters:   11/30/22 (!) 137 kg (302 lb)   08/03/22 133 kg (293 lb)   06/15/22 134 kg (294 lb 12 8 oz)       Body mass index is 43 33 kg/m²      Physical Exam: General:  No acute distress/obese  Skin:  No acute rash  Eyes:  No scleral icterus, noninjected, no discharge from eyes  ENT:  Moist mucous membranes  Neck:  Supple, no jugular venous distention, trachea is midline, no lymphadenopathy and no thyromegaly  Back   No CVAT  Chest:  Clear to auscultation and percussion, good respiratory effort  CVS:  Regular rate and rhythm without a rub, or gallops or murmurs  Abdomen:  Obese,Soft and nontender with normal bowel sounds  Extremities:  No cyanosis and no edema, no arthritic changes, normal range of motion  Neuro:  Grossly intact  Psych:  Alert, oriented x3 and appropriate      Medications:    Current Outpatient Medications:   •  atorvastatin (LIPITOR) 40 mg tablet, TAKE 1 TABLET BY MOUTH EVERY DAY WITH DINNER, Disp: 90 tablet, Rfl: 1  •  cloNIDine (CATAPRES-TTS-1) 0 1 mg/24 hr, Place 1 patch (0 1 mg total) on the skin once a week, Disp: 4 patch, Rfl: 11  •  dofetilide (TIKOSYN) 500 mcg capsule, TAKE 1 CAPSULE BY MOUTH EVERY 12 HOURS, Disp: 180 capsule, Rfl: 3  •  eplerenone (INSPRA) 25 mg tablet, Take 1 tablet (25 mg total) by mouth daily, Disp: 30 tablet, Rfl: 5  •  hydrALAZINE (APRESOLINE) 50 mg tablet, Take 1 tablet (50 mg total) by mouth 2 (two) times a day, Disp: 180 tablet, Rfl: 3  •  nebivolol (Bystolic) 2 5 mg tablet, Take 1 tablet (2 5 mg total) by mouth daily Hold for heart rate less than 50 beats per minute , Disp: 90 tablet, Rfl: 3  •  NIFEdipine (PROCARDIA XL) 30 mg 24 hr tablet, Take 1 tablet (30 mg total) by mouth 2 (two) times a day (Patient taking differently: Take 30 mg by mouth daily), Disp: 180 tablet, Rfl: 3  •  olmesartan (BENICAR) 40 mg tablet, Take 1 tablet (40 mg total) by mouth daily, Disp: 90 tablet, Rfl: 3  •  rivaroxaban (Xarelto) 20 mg tablet, Take 1 tablet (20 mg total) by mouth daily with breakfast, Disp: 30 tablet, Rfl: 10  •  torsemide (DEMADEX) 5 MG tablet, TAKE 1 TABLET (5 MG TOTAL) BY MOUTH DAILY  , Disp: 90 tablet, Rfl: 1  •  bisacodyl (DULCOLAX) 5 mg EC tablet, Take 2 tablets (10 mg total) by mouth once for 1 dose, Disp: 2 tablet, Rfl: 0  •  Ozempic, 1 MG/DOSE, 4 MG/3ML SOPN injection pen, INJECT 1 MG UNDER THE SKIN ONCE A WEEK FOR 4 DOSES (Patient not taking: Reported on 11/30/2022), Disp: , Rfl:   •  polyethylene glycol (GOLYTELY) 4000 mL solution, Take 4,000 mL by mouth once for 1 dose, Disp: 4000 mL, Rfl: 0    Lab, Imaging and other studies: I have personally reviewed pertinent labs    Laboratory Results:  Results for orders placed or performed in visit on 11/29/22   Magnesium   Result Value Ref Range    Magnesium 1 9 1 9 - 2 7 mg/dL   Comprehensive metabolic panel   Result Value Ref Range    Sodium 140 135 - 147 mmol/L    Potassium 4 0 3 5 - 5 3 mmol/L    Chloride 105 96 - 108 mmol/L    CO2 28 21 - 32 mmol/L    ANION GAP 7 4 - 13 mmol/L    BUN 26 (H) 5 - 25 mg/dL    Creatinine 1 23 0 60 - 1 30 mg/dL    Glucose, Fasting 154 (H) 65 - 99 mg/dL    Calcium 8 7 8 4 - 10 2 mg/dL    AST 15 13 - 39 U/L    ALT 17 7 - 52 U/L    Alkaline Phosphatase 61 34 - 104 U/L    Total Protein 7 5 6 4 - 8 4 g/dL    Albumin 4 1 3 5 - 5 0 g/dL    Total Bilirubin 0 51 0 20 - 1 00 mg/dL    eGFR 64 ml/min/1 73sq m   CBC   Result Value Ref Range    WBC 8 90 4 31 - 10 16 Thousand/uL    RBC 4 94 3 88 - 5 62 Million/uL    Hemoglobin 14 6 12 0 - 17 0 g/dL    Hematocrit 44 6 36 5 - 49 3 %    MCV 90 82 - 98 fL    MCH 29 6 26 8 - 34 3 pg    MCHC 32 7 31 4 - 37 4 g/dL    RDW 13 0 11 6 - 15 1 %    Platelets 192 225 - 678 Thousands/uL    MPV 12 4 8 9 - 12 7 fL   CK   Result Value Ref Range    Total CK 97 39 - 308 U/L   Lipid Panel with Direct LDL reflex   Result Value Ref Range    Cholesterol 121 See Comment mg/dL    Triglycerides 150 (H) See Comment mg/dL    HDL, Direct 39 (L) >=40 mg/dL    LDL Calculated 52 0 - 100 mg/dL   Phosphorus   Result Value Ref Range    Phosphorus 3 5 2 7 - 4 5 mg/dL   Protein / creatinine ratio, urine   Result Value Ref Range    Creatinine, Ur 99 1 mg/dL    Protein Urine Random 10 mg/dL    Prot/Creat Ratio, Ur 0 10 0 00 - 0 10   PTH, intact   Result Value Ref Range    PTH 51 0 18 4 - 80 1 pg/mL       Results from last 7 days   Lab Units 11/29/22  0626   WBC Thousand/uL 8 90   HEMOGLOBIN g/dL 14 6   HEMATOCRIT % 44 6   PLATELETS Thousands/uL 205   POTASSIUM mmol/L 4 0   CHLORIDE mmol/L 105   CO2 mmol/L 28   BUN mg/dL 26*   CREATININE mg/dL 1 23   CALCIUM mg/dL 8 7   MAGNESIUM mg/dL 1 9   PHOSPHORUS mg/dL 3 5         Radiology review:   chest X-ray    Ultrasound      Portions of the record may have been created with voice recognition software  Occasional wrong word or "sound a like" substitutions may have occurred due to the inherent limitations of voice recognition software  Read the chart carefully and recognize, using context, where substitutions have occurred

## 2022-12-01 ENCOUNTER — TELEPHONE (OUTPATIENT)
Dept: NEPHROLOGY | Facility: CLINIC | Age: 57
End: 2022-12-01

## 2022-12-01 NOTE — TELEPHONE ENCOUNTER
LM for patient about the following, asked him to call back if he has any questions:    ----- Message from Melissa Reyes MD sent at 12/1/2022 10:37 AM EST -----  You can let the patient know it is safe to start the Eplerenone 25 mg daily!   I did send a prescription in for

## 2022-12-04 LAB
25(OH)D2 SERPL-MCNC: <1 NG/ML
25(OH)D3 SERPL-MCNC: 24 NG/ML
25(OH)D3+25(OH)D2 SERPL-MCNC: 24 NG/ML

## 2022-12-05 DIAGNOSIS — E55.9 VITAMIN D DEFICIENCY: Primary | ICD-10-CM

## 2022-12-05 RX ORDER — ERGOCALCIFEROL 1.25 MG/1
50000 CAPSULE ORAL WEEKLY
Qty: 8 CAPSULE | Refills: 0 | Status: SHIPPED | OUTPATIENT
Start: 2022-12-05 | End: 2023-01-24

## 2022-12-05 NOTE — TELEPHONE ENCOUNTER
----- Message from Oleg Blackmon MD sent at 12/4/2022  7:04 AM EST -----  I would place the patient on ergo calciferol 79124 units once a week for 8 weeks and then vitamin-D 1000 units daily thereafter for low vitamin-D level  Thank you  ----- Message -----  From: Lab, Background User  Sent: 11/29/2022   7:30 AM EST  To: Oleg Blackmon MD

## 2022-12-05 NOTE — TELEPHONE ENCOUNTER
Left message for patient advising him of the message from Dr Michael Tran below:    I would place the patient on ergo calciferol 47190 units once a week for  8 weeks and then vitamin-D 1000 units daily thereafter for low  vitamin-D level    Advised the patient to call back with any questions or concerns

## 2022-12-06 ENCOUNTER — OFFICE VISIT (OUTPATIENT)
Dept: NEUROLOGY | Facility: CLINIC | Age: 57
End: 2022-12-06

## 2022-12-06 VITALS
WEIGHT: 303 LBS | BODY MASS INDEX: 43.38 KG/M2 | DIASTOLIC BLOOD PRESSURE: 78 MMHG | TEMPERATURE: 97.6 F | HEART RATE: 69 BPM | OXYGEN SATURATION: 98 % | SYSTOLIC BLOOD PRESSURE: 136 MMHG | RESPIRATION RATE: 18 BRPM | HEIGHT: 70 IN

## 2022-12-06 DIAGNOSIS — G47.33 OSA (OBSTRUCTIVE SLEEP APNEA): ICD-10-CM

## 2022-12-06 DIAGNOSIS — I10 BENIGN ESSENTIAL HYPERTENSION: ICD-10-CM

## 2022-12-06 DIAGNOSIS — E11.9 TYPE 2 DIABETES MELLITUS, WITHOUT LONG-TERM CURRENT USE OF INSULIN (HCC): Primary | ICD-10-CM

## 2022-12-06 DIAGNOSIS — Z86.73 HISTORY OF STROKE: ICD-10-CM

## 2022-12-06 DIAGNOSIS — I48.20 CHRONIC ATRIAL FIBRILLATION (HCC): ICD-10-CM

## 2022-12-06 DIAGNOSIS — E66.01 MORBID OBESITY WITH BMI OF 40.0-44.9, ADULT (HCC): ICD-10-CM

## 2022-12-06 NOTE — ASSESSMENT & PLAN NOTE
Uncontrolled hypertension resistant to antihypertensives  Currently managed by Nephrology  BP not currently at goal with average reported readings of 140-166/79-92

## 2022-12-06 NOTE — PATIENT INSTRUCTIONS
- Continue with good blood pressure control; I would recommend monitoring at home at least 3 times per week; Goal of <125/80  - Continue with good cholesterol control; Goal LDL <70  - Continue with good blood sugar control; Goal HgbA1c <7 0; due for a repeat HgbA1c  - Will defer monitoring of cholesterol and blood sugar and management of hypertensive medications to the primary care provider  - Stay well hydrated by drinking enough water   - Eat a healthy diet, high in lean meats fish, turkey, chicken  Low in fats, cholesterol, sugars and sodium  Avoid canned foods,  get lots of fresh/frozen vegetables/fruits  - Get routine exercise/physical activity as much as able to tolerate  - Prioritize weight loss   - Keep follow ups with your other health care providers  - Continue Xarelto 20 mg daily (with food) and Lipitor 40 mg daily  I will plan for him to return to the office in 6 months time but would be happy to see him sooner if the need should arise  If he has any symptoms concerning for TIA or stroke including sudden painless loss of vision or double vision, difficulty speaking or swallowing, vertigo/room spinning that does not quickly resolve, or weakness/numbness affecting 1 side of the face or body he should proceed by ambulance to the nearest emergency room immediately

## 2022-12-06 NOTE — PROGRESS NOTES
Patient ID: Travis Nguyen is a 62 y o  male  Assessment/Plan:    History of stroke  Travis Nguyen is a 62year old male with a history of Afib on Xarelto, hypertension, sleep apnea, arthritis, who presents to the office today to follow up on his hx of left frontal corona radiata hemorrhagic stroke 3/2022  His residual deficits include patchy numbness/ diminished sensation to temperature in the RLE and face, and some very minor difficulty with word finding occasionally  He continues on Xarelto 20mg daily for Afib and Atorvastatin 40 mg for secondary stroke prevention  He continues to struggle with BP control (managed by nephrology) and weight loss due to admitted excess calorie consumption and lack of routine physical activity  He is not taking any antidiabetics, despite his HgbA1c of 7 5 during his admission 3/8/2022  He has not had a repeat, but his most recent fasting blood glucose was elevated at 154  We reviewed his stroke risk factors and management of the same  He was provided stroke education and we reviewed stroke warning signs/symptoms and reasons to return by ambulance to the ER  We discussed the importance of weight loss to aide in obtaining better BP control and BS control  He continues to decline referral to medical or surgical weight management at this time  I reminded him he is due for a repeat HgbA1c and if still elevated in a diabetic range that he should follow up with his PCP to discuss treatment of Diabetes   We discussed risks associated with diabetes including neuropathy, nephropathy, retinopathy etc  He will continue to follow up closely with his nephrologist and cardiologist  Plan as outlined below        Plan:  - Continue with good blood pressure control; I would recommend monitoring at home at least 3 times per week; Goal of <125/80 (managed by nephrology)  - Continue with good cholesterol control; Goal LDL <70  - Continue with good blood sugar control; Goal HgbA1c <7 0; due for a repeat HgbA1c, when last checked you were at 7 5 in the Diabetic range  - Will defer monitoring of cholesterol and blood sugar and management of hypertensive medications to the primary care provider  - Stay well hydrated by drinking enough water   - Continue with no smoking (remote hx >20 years ago)  - Eat a healthy diet, high in lean meats fish, turkey, chicken  Low in fats, cholesterol, sugars and sodium  Avoid canned foods,  get lots of fresh/frozen vegetables/fruits  - Get routine exercise/physical activity as much as able to tolerate  - Prioritize weight loss; discussed portion control, not over indulging, making better food choices, limiting etoh   - Keep follow ups with your other health care providers  - Continue Xarelto 20 mg daily (with food) and Lipitor 40 mg daily  I will plan for him to return to the office in 6 months time but would be happy to see him sooner if the need should arise  If he has any symptoms concerning for TIA or stroke including sudden painless loss of vision or double vision, difficulty speaking or swallowing, vertigo/room spinning that does not quickly resolve, or weakness/numbness affecting 1 side of the face or body he should proceed by ambulance to the nearest emergency room immediately  Type 2 diabetes mellitus, without long-term current use of insulin (HCC)    Lab Results   Component Value Date    HGBA1C 7 5 (H) 03/08/2022     He is not currently on any antidiabetics at this time  He was unable to tolerate Ozempic, so self discontinued  His most recent fasting BS was 154 although he was not fasting (drank cherry juice prior)  I reminded him to complete ordered updated HgbA1c to assess for DM2 and need for oral antidiabetics  I also educated him on the risks and complications associated with diabetes mellitus type 2  I encouraged him to work on better diet and routine physical activity to assist in weight loss and improving blood sugars overall      HARPER (obstructive sleep apnea)  Hx of HARPER  Reports 100% CPAP compliance      Chronic atrial fibrillation (HCC)  Chronic Afib maintained appropriately on Xarelto 20 mg daily, managed by Cardiology  Denies any excessive bruising or bleeding  Educated him that this should be taken with a meal (I e  breakfast) daily  Benign essential hypertension  Uncontrolled hypertension resistant to antihypertensives  Currently managed by Nephrology  BP not currently at goal with average reported readings of 140-166/79-92  Morbid obesity with BMI of 40 0-44 9, adult [facility])  He is struggling to lose weight but admits to poor diet (eating out most meals related to work) and states he has not made weight loss a priority  His weight is 303 lb now, was 305 lb 4/20/22  He could not tolerate Ozempic unfortunately due to side effects  He has seen medical weight management in the past and states it does not fit into his lifestyle  He does not wish to consider bariatric surgery  We discussed the risks associated with obesity and it's effects on his hypertension, hyperglycemia etc  I encouraged him to work on making small diet modifications such as portion control and limiting his over indulging  He declines referral back to weight management at this time  Diagnoses and all orders for this visit:    Type 2 diabetes mellitus, without long-term current use of insulin (HCC)    HARPER (obstructive sleep apnea)    Chronic atrial fibrillation (HCC)    Benign essential hypertension    Morbid obesity with BMI of 40 0-44 9, adult (HCC)    History of stroke         I have spent a total of 30 minutes in face to face time and chart review with this patient today        Subjective:     LISA  Harsha William is a 62year old male with a history of Afib on Xarelto, hypertension, sleep apnea, arthritis, who presents to the office today to follow up on his hx of left frontal corona radiata hemorrhagic stroke 3/2022  Etiology suspected to be most likely hemorrhagic stroke in the setting of uncontrolled HTN and anticoagulation use with subsequent ischemic stroke secondary to compression of vessel in the setting of vasogenic edema  His AC was discontinued for 3 weeks and he had a repeat CT head 4/6/22 before restarting Xarelto 20mg daily  He has continued on Atorvastatin 40 mg and his antihypertensives with good compliance since discharge      He returns today and states he continues to do well  He continues to follow closely with cardiology and nephrology  Dr Bogdan Dawson has set his goal blood pressure in the setting of CKD and recent hemorrhagic stroke to be 125/80  He has been monitoring his blood pressure once daily at home and it has been fluctuating despite his combination of multi antihypertensives, still elevated passed his goal therefore he notes recent antihypertensive changes made  Recent readings he shows me from his phone include: 147/83, 141/83, 166/87, 163/88, 148/83, 166/92, 144/86, 146/79 and 140/82  He completed speech therapy, occupational therapy and physical therapy s/p stroke  His only residual deficit is patchy numbness/ diminished sensation to temperature in the right lower leg from the knee down to his foot as well as a little in the face and on the right side of the head  He also notes some very minor difficulty with word finding occasionally  He denies any difficulty with speech, swallowing, weakness, dizziness, headaches, vision change, chest pain, or palpitations  He denies any concerns for significant anxiety or depression  He is exercising by walking 20-30 minutes a day  He is struggling to lose weight but admits to poor diet and states he has not made this a priority  His weight is 303 lb now, was 305 lb 4/20/22  He could not tolerate Ozempic unfortunately due to side effects  He has seen medical weight management in the past and states it does not fit into his lifestyle  He does not wish to consider bariatric surgery   He continues to be completely independent of all ADLs, is driving, and returned to work 2 weeks after his stroke  He denies any falls at home  He sleeps well and uses a CPAP for HARPER  He denies any concerns for excessive bruising or bleeding since restarting Xarelto  He is not taking any antidiabetics, despite his HgbA1c of 7 5 during his admission 3/8/2022  He has not had a repeat, but his most recent fasting blood glucose was elevated at 154 (although he states he did drink cherry juice to take his morning medications that day, so was not actually fasting)        Lab Results   Component Value Date/Time    CHOLESTEROL 121 11/29/2022 06:26 AM     Lab Results   Component Value Date/Time    TRIG 150 (H) 11/29/2022 06:26 AM    TRIG 150 (H) 10/08/2012 11:01 AM     Lab Results   Component Value Date/Time    HDL 39 (L) 11/29/2022 06:26 AM    HDL 37 (L) 10/08/2012 11:01 AM     Lab Results   Component Value Date/Time    LDLCALC 52 11/29/2022 06:26 AM       Lab Results   Component Value Date/Time    HGBA1C 7 5 (H) 03/08/2022 01:46 AM    HGBA1C 5 8 (H) 09/03/2013 03:31 PM     Lab Results   Component Value Date/Time     03/08/2022 01:46 AM       The following portions of the patient's history were reviewed and updated as appropriate: allergies, current medications, past family history, past medical history, past social history and past surgical history  Objective:    Blood pressure 136/78, pulse 69, temperature 97 6 °F (36 4 °C), temperature source Tympanic, resp  rate 18, height 5' 10" (1 778 m), weight (!) 137 kg (303 lb), SpO2 98 %  Neurological Exam    On neurological examination patient is alert, awake, oriented and in no distress  Speech is fluent without dysarthria or aphasia  Cranial nerves 2-12 were symmetrically intact bilaterally, with the exception of diminished sensation in the right V1-V2 distribution of the face compared to the left (equal symmetric V3 bilaterally)   No evidence of any focal weakness or sensory loss in the upper or lower extremities  Motor testing reveals 5/5 strength of the bilateral upper and lower extremities  There was no pronator drift  No fasciculations present  No abnormal involuntary movements  Finger- to-nose reveals no tremor or ataxia and intact proprioceptive function, no dysmetria was noted  Sensation was intact to vibration, light touch, pin prick and temperature in bilateral upper and lower extremities, with the exception of diminished sensation to vibration in the RLE distally compared to proximally and compared to the LLE  Deep tendon reflexes were 2+ and symmetric in the bilateral upper and lower extremities  He is able to rise easily without assistance from a seated position  Casual gait is normal including stance, stride, and arm swing  He ambulates without any assistive devices  Normal tandem gait  Romberg is absent  ROS:    Review of Systems   Constitutional: Negative  Negative for appetite change and fever  HENT: Negative  Negative for hearing loss, tinnitus, trouble swallowing and voice change  Eyes: Negative  Negative for photophobia, pain and visual disturbance  Respiratory: Negative  Negative for shortness of breath  Cardiovascular: Negative  Negative for palpitations  Gastrointestinal: Negative  Negative for nausea and vomiting  Endocrine: Negative  Negative for cold intolerance  Genitourinary: Negative  Negative for dysuria, frequency and urgency  Musculoskeletal: Negative  Negative for gait problem, myalgias and neck pain  Skin: Negative  Negative for rash  Allergic/Immunologic: Negative  Neurological: Negative  Negative for dizziness, tremors, seizures, syncope, facial asymmetry, speech difficulty, weakness, light-headedness, numbness (right side of his body due to stroke- since 3/2022) and headaches  Hematological: Negative  Does not bruise/bleed easily  Psychiatric/Behavioral: Negative  Negative for confusion, hallucinations and sleep disturbance  Reviewed ROS as entered by medical assistant

## 2022-12-06 NOTE — ASSESSMENT & PLAN NOTE
He is struggling to lose weight but admits to poor diet (eating out most meals related to work) and states he has not made weight loss a priority  His weight is 303 lb now, was 305 lb 4/20/22  He could not tolerate Ozempic unfortunately due to side effects  He has seen medical weight management in the past and states it does not fit into his lifestyle  He does not wish to consider bariatric surgery  We discussed the risks associated with obesity and it's effects on his hypertension, hyperglycemia etc  I encouraged him to work on making small diet modifications such as portion control and limiting his over indulging  He declines referral back to weight management at this time

## 2022-12-06 NOTE — ASSESSMENT & PLAN NOTE
Chronic Afib maintained appropriately on Xarelto 20 mg daily, managed by Cardiology  Denies any excessive bruising or bleeding  Educated him that this should be taken with a meal (I e  breakfast) daily

## 2022-12-06 NOTE — ASSESSMENT & PLAN NOTE
Amrita Holt is a 62year old male with a history of Afib on Xarelto, hypertension, sleep apnea, arthritis, who presents to the office today to follow up on his hx of left frontal corona radiata hemorrhagic stroke 3/2022  His residual deficits include patchy numbness/ diminished sensation to temperature in the RLE and face, and some very minor difficulty with word finding occasionally  He continues on Xarelto 20mg daily for Afib and Atorvastatin 40 mg for secondary stroke prevention  He continues to struggle with BP control (managed by nephrology) and weight loss due to admitted excess calorie consumption and lack of routine physical activity  He is not taking any antidiabetics, despite his HgbA1c of 7 5 during his admission 3/8/2022  He has not had a repeat, but his most recent fasting blood glucose was elevated at 154  We reviewed his stroke risk factors and management of the same  He was provided stroke education and we reviewed stroke warning signs/symptoms and reasons to return by ambulance to the ER  We discussed the importance of weight loss to aide in obtaining better BP control and BS control  He continues to decline referral to medical or surgical weight management at this time  I reminded him he is due for a repeat HgbA1c and if still elevated in a diabetic range that he should follow up with his PCP to discuss treatment of Diabetes   We discussed risks associated with diabetes including neuropathy, nephropathy, retinopathy etc  He will continue to follow up closely with his nephrologist and cardiologist  Plan as outlined below        Plan:  - Continue with good blood pressure control; I would recommend monitoring at home at least 3 times per week; Goal of <125/80 (managed by nephrology)  - Continue with good cholesterol control; Goal LDL <70  - Continue with good blood sugar control; Goal HgbA1c <7 0; due for a repeat HgbA1c, when last checked you were at 7 5 in the Diabetic range  - Will defer monitoring of cholesterol and blood sugar and management of hypertensive medications to the primary care provider  - Stay well hydrated by drinking enough water   - Continue with no smoking (remote hx >20 years ago)  - Eat a healthy diet, high in lean meats fish, turkey, chicken  Low in fats, cholesterol, sugars and sodium  Avoid canned foods,  get lots of fresh/frozen vegetables/fruits  - Get routine exercise/physical activity as much as able to tolerate  - Prioritize weight loss; discussed portion control, not over indulging, making better food choices, limiting etoh   - Keep follow ups with your other health care providers  - Continue Xarelto 20 mg daily (with food) and Lipitor 40 mg daily  I will plan for him to return to the office in 6 months time but would be happy to see him sooner if the need should arise  If he has any symptoms concerning for TIA or stroke including sudden painless loss of vision or double vision, difficulty speaking or swallowing, vertigo/room spinning that does not quickly resolve, or weakness/numbness affecting 1 side of the face or body he should proceed by ambulance to the nearest emergency room immediately

## 2022-12-28 DIAGNOSIS — I12.9 HYPERTENSIVE CHRONIC KIDNEY DISEASE WITH STAGE 1 THROUGH STAGE 4 CHRONIC KIDNEY DISEASE, OR UNSPECIFIED CHRONIC KIDNEY DISEASE: ICD-10-CM

## 2022-12-28 DIAGNOSIS — N18.31 STAGE 3A CHRONIC KIDNEY DISEASE (HCC): ICD-10-CM

## 2022-12-28 DIAGNOSIS — E55.9 VITAMIN D DEFICIENCY: ICD-10-CM

## 2022-12-28 DIAGNOSIS — E66.01 CLASS 3 SEVERE OBESITY DUE TO EXCESS CALORIES WITH SERIOUS COMORBIDITY AND BODY MASS INDEX (BMI) OF 40.0 TO 44.9 IN ADULT (HCC): ICD-10-CM

## 2022-12-28 DIAGNOSIS — I10 ACCELERATED HYPERTENSION: ICD-10-CM

## 2022-12-28 RX ORDER — EPLERENONE 25 MG/1
25 TABLET, FILM COATED ORAL DAILY
Qty: 90 TABLET | Refills: 2 | Status: SHIPPED | OUTPATIENT
Start: 2022-12-28

## 2022-12-31 DIAGNOSIS — I12.9 HYPERTENSIVE CHRONIC KIDNEY DISEASE WITH STAGE 1 THROUGH STAGE 4 CHRONIC KIDNEY DISEASE, OR UNSPECIFIED CHRONIC KIDNEY DISEASE: ICD-10-CM

## 2022-12-31 DIAGNOSIS — E55.9 VITAMIN D DEFICIENCY: ICD-10-CM

## 2022-12-31 DIAGNOSIS — E78.5 DYSLIPIDEMIA: ICD-10-CM

## 2022-12-31 DIAGNOSIS — I10 ACCELERATED HYPERTENSION: ICD-10-CM

## 2022-12-31 DIAGNOSIS — I48.91 ATRIAL FIBRILLATION WITH RVR (HCC): ICD-10-CM

## 2022-12-31 DIAGNOSIS — N18.31 STAGE 3A CHRONIC KIDNEY DISEASE (HCC): ICD-10-CM

## 2022-12-31 DIAGNOSIS — I10 BENIGN ESSENTIAL HYPERTENSION: ICD-10-CM

## 2022-12-31 DIAGNOSIS — E66.01 CLASS 3 SEVERE OBESITY DUE TO EXCESS CALORIES WITH SERIOUS COMORBIDITY AND BODY MASS INDEX (BMI) OF 40.0 TO 44.9 IN ADULT (HCC): ICD-10-CM

## 2023-01-03 RX ORDER — NEBIVOLOL 2.5 MG/1
TABLET ORAL
Qty: 90 TABLET | Refills: 3 | Status: SHIPPED | OUTPATIENT
Start: 2023-01-03

## 2023-01-03 RX ORDER — CLONIDINE 0.1 MG/24H
PATCH, EXTENDED RELEASE TRANSDERMAL
Qty: 12 PATCH | Refills: 3 | Status: SHIPPED | OUTPATIENT
Start: 2023-01-03

## 2023-01-03 RX ORDER — TORSEMIDE 5 MG/1
5 TABLET ORAL DAILY
Qty: 90 TABLET | Refills: 1 | Status: SHIPPED | OUTPATIENT
Start: 2023-01-03

## 2023-01-03 RX ORDER — OLMESARTAN MEDOXOMIL 40 MG/1
TABLET ORAL
Qty: 90 TABLET | Refills: 3 | Status: SHIPPED | OUTPATIENT
Start: 2023-01-03

## 2023-01-25 ENCOUNTER — DOCUMENTATION (OUTPATIENT)
Dept: NEPHROLOGY | Facility: CLINIC | Age: 58
End: 2023-01-25

## 2023-01-25 ENCOUNTER — TELEPHONE (OUTPATIENT)
Dept: NEPHROLOGY | Facility: CLINIC | Age: 58
End: 2023-01-25

## 2023-01-25 DIAGNOSIS — N18.31 STAGE 3A CHRONIC KIDNEY DISEASE (HCC): Primary | ICD-10-CM

## 2023-01-25 NOTE — PROGRESS NOTES
Home bp readings  /72  /78   HR 50 range     I would recommend  We need a basic metabolic profile now depending on that I will adjust his medications  Thank you it is extremely important he does so now in regards to the blood test

## 2023-01-25 NOTE — TELEPHONE ENCOUNTER
Spoke with patient about the following, he expressed understanding and thanked us for the call:    I would recommend  We need a basic metabolic profile now depending on that I will adjust his medications  Thank you it is extremely important he does so now in regards to the blood test

## 2023-01-26 DIAGNOSIS — E55.9 VITAMIN D DEFICIENCY: ICD-10-CM

## 2023-01-26 RX ORDER — ERGOCALCIFEROL 1.25 MG/1
CAPSULE ORAL
Qty: 4 CAPSULE | Refills: 1 | Status: SHIPPED | OUTPATIENT
Start: 2023-01-26

## 2023-02-12 ENCOUNTER — OFFICE VISIT (OUTPATIENT)
Dept: URGENT CARE | Facility: MEDICAL CENTER | Age: 58
End: 2023-02-12

## 2023-02-12 VITALS
BODY MASS INDEX: 43.38 KG/M2 | OXYGEN SATURATION: 99 % | DIASTOLIC BLOOD PRESSURE: 72 MMHG | TEMPERATURE: 98.9 F | HEART RATE: 80 BPM | WEIGHT: 303 LBS | SYSTOLIC BLOOD PRESSURE: 130 MMHG | HEIGHT: 70 IN | RESPIRATION RATE: 18 BRPM

## 2023-02-12 DIAGNOSIS — L03.119 CELLULITIS OF THIGH: Primary | ICD-10-CM

## 2023-02-12 RX ORDER — CLINDAMYCIN HYDROCHLORIDE 300 MG/1
300 CAPSULE ORAL 3 TIMES DAILY
Qty: 21 CAPSULE | Refills: 0 | Status: SHIPPED | OUTPATIENT
Start: 2023-02-12 | End: 2023-02-19

## 2023-02-12 NOTE — PROGRESS NOTES
[facility]        NAME: Jimbo Shore is a 62 y o  male  : 1965    MRN: [id number]  DATE: 2023  TIME: 4:41 PM    Assessment and Plan   Cellulitis of thigh [G34 488]  1  Cellulitis of thigh  clindamycin (CLEOCIN) 300 MG capsule            Patient Instructions     1  Take Clindamycin 300mg  3x daily x 7 days  2  Motrin as needed for pain  3  Apply warm compresses to skin 10-15min 3-4x daily  4  Follow-up with PCP in 3-5 days for wound check  5  Take Acidophilus while on Clindamycin  6  Proceed to  ER if symptoms worsen  Chief Complaint     Chief Complaint   Patient presents with   • Wound Check     Patient states he has infected ingrown hair on right upper leg (thigh); draining white/yellow     Patient states he now has redness, swelling around the site          History of Present Illness       Katelyn Morales is a 15-year-old male who presents with a painful lesion on his right upper thigh that is been present for 2 weeks  Patient reports he got an "ingrown hair" in his right upper thigh 2 weeks prior  Patient reports the area became fluctuant and drained on its own approximately 1 week prior  Patient reports he had initial relief but the area is now become red swollen and painful over the past 3 days  He reports no new fever or active skin drainage  Wound Check        Review of Systems   Review of Systems   Constitutional: Negative  Skin: Positive for color change and wound           Current Medications       Current Outpatient Medications:   •  clindamycin (CLEOCIN) 300 MG capsule, Take 1 capsule (300 mg total) by mouth 3 (three) times a day for 7 days, Disp: 21 capsule, Rfl: 0  •  atorvastatin (LIPITOR) 40 mg tablet, TAKE 1 TABLET BY MOUTH EVERY DAY WITH DINNER, Disp: 90 tablet, Rfl: 1  •  bisacodyl (DULCOLAX) 5 mg EC tablet, Take 2 tablets (10 mg total) by mouth once for 1 dose, Disp: 2 tablet, Rfl: 0  •  cloNIDine (CATAPRES-TTS-1) 0 1 mg/24 hr, PLACE 1 PATCH ON THE SKIN ONCE A WEEK, Disp: 12 patch, Rfl: 3  •  dofetilide (TIKOSYN) 500 mcg capsule, TAKE 1 CAPSULE BY MOUTH EVERY 12 HOURS, Disp: 180 capsule, Rfl: 3  •  eplerenone (INSPRA) 25 mg tablet, TAKE 1 TABLET (25 MG TOTAL) BY MOUTH DAILY  , Disp: 90 tablet, Rfl: 2  •  ergocalciferol (VITAMIN D2) 50,000 units, TAKE 1 CAPSULE BY MOUTH ONE TIME PER WEEK FOR 8 DOSES, Disp: 4 capsule, Rfl: 1  •  hydrALAZINE (APRESOLINE) 50 mg tablet, Take 1 tablet (50 mg total) by mouth 2 (two) times a day, Disp: 180 tablet, Rfl: 3  •  nebivolol (BYSTOLIC) 2 5 mg tablet, TAKE 1 TABLET BY MOUTH DAILY HOLD FOR HEART RATE LESS THAN 50 BEATS PER MINUTE , Disp: 90 tablet, Rfl: 3  •  NIFEdipine (PROCARDIA XL) 30 mg 24 hr tablet, Take 1 tablet (30 mg total) by mouth 2 (two) times a day (Patient taking differently: Take 30 mg by mouth daily), Disp: 180 tablet, Rfl: 3  •  olmesartan (BENICAR) 40 mg tablet, TAKE 1 TABLET BY MOUTH EVERY DAY, Disp: 90 tablet, Rfl: 3  •  polyethylene glycol (GOLYTELY) 4000 mL solution, Take 4,000 mL by mouth once for 1 dose, Disp: 4000 mL, Rfl: 0  •  rivaroxaban (Xarelto) 20 mg tablet, Take 1 tablet (20 mg total) by mouth daily with breakfast, Disp: 30 tablet, Rfl: 10  •  torsemide (DEMADEX) 5 MG tablet, TAKE 1 TABLET (5 MG TOTAL) BY MOUTH DAILY  , Disp: 90 tablet, Rfl: 1    Current Allergies     Allergies as of 02/12/2023   • (No Known Allergies)            The following portions of the patient's history were reviewed and updated as appropriate: allergies, current medications, past family history, past medical history, past social history, past surgical history and problem list      Past Medical History:   Diagnosis Date   • Ankle swelling    • Arthritis    • Asthma    • Atrial fibrillation ([facility] [address] )    • Atrial fibrillation with RVR ([facility] [address] ) 4/4/2017   • Chronic kidney disease     kidney stones   • Gout    • Hypertension    • Kidney stone    • Night sweats    • Seasonal allergies    • Sleep apnea        Past Surgical History:   Procedure Laterality Date   • APPENDECTOMY     • CARDIOVERSION      X4 last was 2018   • COLONOSCOPY     • CYSTOSCOPY  08/30/2016    w/removal of object, last assessed 8/30/16   • FOOT SURGERY Left    • HAND SURGERY     • KNEE SURGERY Right 2016   • SD CYSTO/URETERO W/LITHOTRIPSY &INDWELL STENT INSRT Left 08/09/2016    Procedure: CYSTOSCOPY URETEROSCOPY WITH LITHOTRIPSY HOLMIUM LASER STONE EXTRACTION, RETROGRADE PYELOGRAM AND INSERTION STENT URETERAL;  Surgeon: Ron Medrano MD;  Location:  MAIN OR;  Service: Urology last assessed 8/30/16   • SD CYSTO/URETERO W/LITHOTRIPSY &INDWELL STENT INSRT Right 08/24/2020    Procedure: CYSTOSCOPY , cystolitholapaxy of bladder stone WITH HOMIUM LASER RIGHT RETROGRADE AND RIGHT URETERAL STENT, [address];  Surgeon: Ron Medrano MD;  Location: [facility] MAIN OR;  Service: Urology   • UPPER GASTROINTESTINAL ENDOSCOPY     • WRIST SURGERY Left 2014    Fracture surgery       Family History   Problem Relation Age of Onset   • Atrial fibrillation Mother    • Other Mother         blood dyscrasia   • Hypertension Mother    • Other Father         hypoglycemia    • Atrial fibrillation Father    • Diabetes Family    • Other Family         Thrombocytosis   • No Known Problems Sister    • Cancer Neg Hx    • Thyroid disease Neg Hx          Medications have been verified  Objective   /72   Pulse 80   Temp 98 9 °F (37 2 °C) (Temporal)   Resp 18   Ht 5' 10" (1 778 m)   Wt (!) 137 kg (303 lb)   SpO2 99%   BMI 43 48 kg/m²   No LMP for male patient  Physical Exam     Physical Exam  Constitutional:       General: He is not in acute distress  Appearance: Normal appearance  He is not ill-appearing  Cardiovascular:      Rate and Rhythm: Normal rate and regular rhythm  Heart sounds: Normal heart sounds  No murmur heard  Pulmonary:      Effort: Pulmonary effort is normal       Breath sounds: Normal breath sounds  Skin:     Comments:  There is a 3 x 5 erythematous area on the right upper thigh  Skin is slightly warm to touch and tender to palpation  No fluctuance to the skin, no active drainage  Neurological:      Mental Status: He is alert

## 2023-02-12 NOTE — PATIENT INSTRUCTIONS
1  Take Clindamycin 300mg  3x daily x 7 days  2  Motrin as needed for pain  3  Apply warm compresses to skin 10-15min 3-4x daily  4  Follow-up with PCP in 3-5 days for wound check  5  Take Acidophilus while on Clindamycin  6  Proceed to  ER if symptoms worsen

## 2023-02-13 ENCOUNTER — APPOINTMENT (OUTPATIENT)
Dept: LAB | Facility: CLINIC | Age: 58
End: 2023-02-13

## 2023-02-13 DIAGNOSIS — N18.31 STAGE 3A CHRONIC KIDNEY DISEASE (HCC): ICD-10-CM

## 2023-02-13 LAB
ANION GAP SERPL CALCULATED.3IONS-SCNC: 5 MMOL/L (ref 4–13)
BUN SERPL-MCNC: 20 MG/DL (ref 5–25)
CALCIUM SERPL-MCNC: 9 MG/DL (ref 8.4–10.2)
CHLORIDE SERPL-SCNC: 107 MMOL/L (ref 96–108)
CO2 SERPL-SCNC: 28 MMOL/L (ref 21–32)
CREAT SERPL-MCNC: 1.22 MG/DL (ref 0.6–1.3)
GFR SERPL CREATININE-BSD FRML MDRD: 65 ML/MIN/1.73SQ M
GLUCOSE P FAST SERPL-MCNC: 125 MG/DL (ref 65–99)
POTASSIUM SERPL-SCNC: 4.5 MMOL/L (ref 3.5–5.3)
SODIUM SERPL-SCNC: 140 MMOL/L (ref 135–147)

## 2023-02-21 ENCOUNTER — TELEPHONE (OUTPATIENT)
Dept: OBGYN CLINIC | Facility: HOSPITAL | Age: 58
End: 2023-02-21

## 2023-02-21 NOTE — TELEPHONE ENCOUNTER
I called patient to schedule from ortho care request and got disconnected    Where Does it Hurt? [place]  Are you considering joint replacement? No   Are you seeking a second opinion? No  If yes, who is your doctor?

## 2023-02-23 ENCOUNTER — APPOINTMENT (OUTPATIENT)
Dept: RADIOLOGY | Age: 58
End: 2023-02-23

## 2023-02-23 ENCOUNTER — OFFICE VISIT (OUTPATIENT)
Dept: OBGYN CLINIC | Facility: CLINIC | Age: 58
End: 2023-02-23

## 2023-02-23 VITALS
HEART RATE: 54 BPM | DIASTOLIC BLOOD PRESSURE: 78 MMHG | WEIGHT: 303 LBS | BODY MASS INDEX: 43.38 KG/M2 | SYSTOLIC BLOOD PRESSURE: 168 MMHG | HEIGHT: 70 IN

## 2023-02-23 DIAGNOSIS — M25.512 ACUTE PAIN OF LEFT SHOULDER: ICD-10-CM

## 2023-02-23 DIAGNOSIS — M75.82 TENDINITIS OF LEFT ROTATOR CUFF: ICD-10-CM

## 2023-02-23 DIAGNOSIS — M25.512 ACUTE PAIN OF LEFT SHOULDER: Primary | ICD-10-CM

## 2023-02-23 DIAGNOSIS — M75.42 IMPINGEMENT SYNDROME OF LEFT SHOULDER: ICD-10-CM

## 2023-02-23 RX ORDER — BUPIVACAINE HYDROCHLORIDE 2.5 MG/ML
2 INJECTION, SOLUTION INFILTRATION; PERINEURAL
Status: COMPLETED | OUTPATIENT
Start: 2023-02-23 | End: 2023-02-23

## 2023-02-23 RX ORDER — TRIAMCINOLONE ACETONIDE 40 MG/ML
80 INJECTION, SUSPENSION INTRA-ARTICULAR; INTRAMUSCULAR
Status: COMPLETED | OUTPATIENT
Start: 2023-02-23 | End: 2023-02-23

## 2023-02-23 RX ADMIN — BUPIVACAINE HYDROCHLORIDE 2 ML: 2.5 INJECTION, SOLUTION INFILTRATION; PERINEURAL at 09:57

## 2023-02-23 RX ADMIN — TRIAMCINOLONE ACETONIDE 80 MG: 40 INJECTION, SUSPENSION INTRA-ARTICULAR; INTRAMUSCULAR at 09:57

## 2023-02-23 NOTE — PROGRESS NOTES
Orthopaedic Surgery - Office Note  Romeo Braxton (73 y o  male)   : 1965   MRN: [id number]  Encounter Date: 2023    Chief Complaint   Patient presents with   • Left Shoulder - Pain         Assessment/Plan  Diagnoses and all orders for this visit:    Acute pain of left shoulder  -     XR shoulder 2+ vw left; Future    Tendinitis of left rotator cuff    Impingement syndrome of left shoulder    Michael Dickerson has a longstanding left shoulder pain with acutely worsening rotator cuff tendinitis symptoms  We had a long discussion regarding risks and benefits of a cortisone injection for acute pain and inflammation and he adamantly denies being diabetic or taking any medications for the diabetes  He would like to have the cortisone injection performed today  He noted significant relief after the cortisone injection in the office  I clearly advised him in the office today I would not recommend use of oral NSAIDs on the Xarelto and recommended discontinuing the ibuprofen  He should ice the shoulder for comfort 20 minutes on 1 hour off 3 times a day  The importance of attending formal physical therapy and performing the home exercise program for long-term management of his symptoms were reviewed at length  Return for Recheck 4-6 weeks with Dr Bertha Massey  History of Present Illness  This is a new patient with left shoulder pain  He reports he is not diabetic after extensive testing  He states that he thought he was but then monitored him recently and told him he was just "prediabetic  He reports he has been using 600 of ibuprofen 3 times a day  Patient also has history of A-fib and is on Xarelto chronically  No trauma is reported to the left shoulder  He reports the pain is in the anterior and lateral aspect of the shoulder and does not radiate past the arm  He denies any neck pain    He reports the pain is starting to keep him up at night and he is having difficulty finding a comfortable position to sleep  Overhead motion increases the pain  Patient describes crepitus in the shoulder with activities such as driving with his extended left arm  He reports he is ambidextrous  Review of Systems  Pertinent items are noted in HPI  All other systems were reviewed and are negative  Physical Exam  /78 (BP Location: Right arm, Patient Position: Sitting, Cuff Size: Standard)   Pulse (!) 54   Ht 5' 10" (1 778 m)   Wt (!) 137 kg (303 lb)   BMI 43 48 kg/m²   Cons: Appears well  No apparent distress  Psych: Alert  Oriented x3  Mood and affect normal   Eyes: PERRLA, EOMI  Resp: Normal effort  No audible wheezing or stridor  CV: Palpable pulse  No discernable arrhythmia  Lymph:  No palpable cervical, axillary, or inguinal lymphadenopathy  Skin: Warm  No palpable masses  No visible lesions  Neuro: Normal muscle tone  Normal and symmetric DTR's  Left shoulder has no skin breakdown lesion or signs of infection  He has no AC joint tenderness or tenderness on the clavicle  He is active forward flexion is to 165 degrees with end motion pain  Passively to 165 degrees with end motion pain  Internal rotation is to L2-L3 with end motion pain  He has a positive drop arm test with pain and weakness to 4 out of 5  External rotation and internal rotation strength is to 4 out of 5 with pain  He is neurovascular intact in the left upper extremity without any gross neurological deficits appreciated  He has a markedly positive impingement sign          Studies Reviewed  X-rays performed in the office today 3 views of the left shoulder show AC joint degenerative changes  No acute fractures or dislocations are seen  Type II acromion is noted  No calcific tendinitis is seen  X-rays were reviewed from an orthopedic standpoint will await official radiologist interpretation    Large joint arthrocentesis: L subacromial bursa  Universal Protocol:  Consent: Verbal consent obtained    Risks and benefits: risks, benefits and alternatives were discussed  Consent given by: patient  Time out: Immediately prior to procedure a "time out" was called to verify the correct patient, procedure, equipment, support staff and site/side marked as required  Patient understanding: patient states understanding of the procedure being performed  Patient consent: the patient's understanding of the procedure matches consent given  Relevant documents: relevant documents present and verified  Test results: test results available and properly labeled  Site marked: the operative site was marked  Radiology Images displayed and confirmed  If images not available, report reviewed: imaging studies available  Patient identity confirmed: verbally with patient    Supporting Documentation  Indications: pain   Procedure Details  Location: shoulder - L subacromial bursa  Preparation: Patient was prepped and draped in the usual sterile fashion  Needle size: 22 G  Ultrasound guidance: no  Approach: posterior  Medications administered: 2 mL bupivacaine 0 25 %; 80 mg triamcinolone acetonide 40 mg/mL    Patient tolerance: patient tolerated the procedure well with no immediate complications  Dressing:  Sterile dressing applied    Patient noted significant relief of symptoms prior to leaving the exam today        Medical, Surgical, Family, and Social History  The patient's medical history, family history, and social history, were reviewed and updated as appropriate      Past Medical History:   Diagnosis Date   • Ankle swelling    • Arthritis    • Asthma    • Atrial fibrillation [facility])    • Atrial fibrillation with RVR ([facility]) 4/4/2017   • Chronic kidney disease     kidney stones   • Gout    • Hypertension    • Kidney stone    • Night sweats    • Seasonal allergies    • Sleep apnea        Past Surgical History:   Procedure Laterality Date   • APPENDECTOMY     • CARDIOVERSION      X4 last was 2018   • COLONOSCOPY     • CYSTOSCOPY  08/30/2016    w/removal of object, last assessed 16   • FOOT SURGERY Left    • HAND SURGERY     • KNEE SURGERY Right 2016   • CT CYSTO/URETERO W/LITHOTRIPSY &INDWELL STENT INSRT Left 2016    Procedure: CYSTOSCOPY URETEROSCOPY WITH LITHOTRIPSY HOLMIUM LASER STONE EXTRACTION, RETROGRADE PYELOGRAM AND INSERTION STENT URETERAL;  Surgeon: Brian Lazcano MD;  Location:  MAIN OR;  Service: Urology last assessed 16   • CT CYSTO/URETERO W/LITHOTRIPSY &INDWELL STENT INSRT Right 2020    Procedure: CYSTOSCOPY , cystolitholapaxy of bladder stone WITH HOMIUM LASER RIGHT RETROGRADE AND RIGHT URETERAL Miguel Rodrick;  Surgeon: Brian Lazcano MD;  Location: [address] [place] [address] MAIN OR;  Service: Urology   • UPPER GASTROINTESTINAL ENDOSCOPY     • WRIST SURGERY Left 2014    Fracture surgery       Family History   Problem Relation Age of Onset   • Atrial fibrillation Mother    • Other Mother         blood dyscrasia   • Hypertension Mother    • Other Father         hypoglycemia    • Atrial fibrillation Father    • Diabetes Family    • Other Family         Thrombocytosis   • No Known Problems Sister    • Cancer Neg Hx    • Thyroid disease Neg Hx        Social History     Occupational History   • Not on file   Tobacco Use   • Smoking status: Former     Packs/day: 0 50     Years: 10 00     Pack years: 5 00     Types: Cigarettes     Quit date: 1994     Years since quittin 9   • Smokeless tobacco: Never   Vaping Use   • Vaping Use: Never used   Substance and Sexual Activity   • Alcohol use: Yes     Comment: occ   • Drug use: No   • Sexual activity: Yes     Partners: Female     Birth control/protection: None       No Known Allergies      Current Outpatient Medications:   •  atorvastatin (LIPITOR) 40 mg tablet, TAKE 1 TABLET BY MOUTH EVERY DAY WITH DINNER, Disp: 90 tablet, Rfl: 1  •  cloNIDine (CATAPRES-TTS-1) 0 1 mg/24 hr, PLACE 1 PATCH ON THE SKIN ONCE A WEEK, Disp: 12 patch, Rfl: 3  •  dofetilide (TIKOSYN) 500 mcg capsule, TAKE 1 CAPSULE BY MOUTH EVERY 12 HOURS, Disp: 180 capsule, Rfl: 3  •  eplerenone (INSPRA) 25 mg tablet, TAKE 1 TABLET (25 MG TOTAL) BY MOUTH DAILY  , Disp: 90 tablet, Rfl: 2  •  ergocalciferol (VITAMIN D2) 50,000 units, TAKE 1 CAPSULE BY MOUTH ONE TIME PER WEEK FOR 8 DOSES, Disp: 4 capsule, Rfl: 1  •  hydrALAZINE (APRESOLINE) 50 mg tablet, Take 1 tablet (50 mg total) by mouth 2 (two) times a day, Disp: 180 tablet, Rfl: 3  •  nebivolol (BYSTOLIC) 2 5 mg tablet, TAKE 1 TABLET BY MOUTH DAILY HOLD FOR HEART RATE LESS THAN 50 BEATS PER MINUTE , Disp: 90 tablet, Rfl: 3  •  NIFEdipine (PROCARDIA XL) 30 mg 24 hr tablet, Take 1 tablet (30 mg total) by mouth 2 (two) times a day (Patient taking differently: Take 30 mg by mouth daily), Disp: 180 tablet, Rfl: 3  •  olmesartan (BENICAR) 40 mg tablet, TAKE 1 TABLET BY MOUTH EVERY DAY, Disp: 90 tablet, Rfl: 3  •  rivaroxaban (Xarelto) 20 mg tablet, Take 1 tablet (20 mg total) by mouth daily with breakfast, Disp: 30 tablet, Rfl: 10  •  torsemide (DEMADEX) 5 MG tablet, TAKE 1 TABLET (5 MG TOTAL) BY MOUTH DAILY  , Disp: 90 tablet, Rfl: 1  •  bisacodyl (DULCOLAX) 5 mg EC tablet, Take 2 tablets (10 mg total) by mouth once for 1 dose, Disp: 2 tablet, Rfl: 0  •  polyethylene glycol (GOLYTELY) 4000 mL solution, Take 4,000 mL by mouth once for 1 dose, Disp: 4000 mL, Rfl: 0      Deshaun Dorantes PA-C

## 2023-02-23 NOTE — PATIENT INSTRUCTIONS
Rotator Cuff Tendinitis   AMBULATORY CARE:   Rotator cuff tendinitis  is inflammation of the tendons in your shoulder joint  A tendon is a cord of tough tissue that connects your muscles to your bones  The rotator cuff is made up of a group of muscles and tendons that hold the shoulder joint in place  Common signs and symptoms:   Pain and swelling in your shoulder, especially when you lift your arm over your head    Pain that is worse after you sleep on the affected shoulder    Pain can become worse and you may have pain even when you are resting    Shoulder and arm weakness    Call your doctor or orthopedist if:   You have sudden shortness of breath or chest pain  Any part of your arm is numb, tingly, cold, blue, or pale  You have pain and swelling in your shoulder even after you take pain medicine  Your skin is itchy, swollen, or has a rash  Your symptoms are not getting better or are getting worse  You have questions or concerns about your condition or care  Treatment  may include any of the following:  Medicines  such as steroids or NSAIDs may be used to reduce swelling  This can help relieve pain  Steroids may be injected into the rotator cuff area  NSAIDs are available without a doctor's order  Ask your healthcare provider which medicine is right for you  Ask how much to take and when to take it  Take as directed  NSAIDs can cause stomach bleeding or kidney problems if not taken correctly  Surgery  may be needed if the pain and tightening in your shoulder do not go away  This may also be done if pain worsens or is so severe that it affects your daily activities  During surgery, your healthcare provider may remove bone spurs and inflamed tissue around the shoulder  Physical therapy  can help you improve movement and strength, and decrease pain  A physical therapist will teach you safe exercises   The exercises may help you move your shoulder normally again and strengthen your rotator cuff  You may learn changes to make to your daily activities that will help decrease stress on your tendons  Care for your rotator cuff tendinitis at home:   Rest as directed  Limit activity on your affected shoulder to decrease stress on the tendon  This may help prevent further damage, decrease pain, and promote healing  Apply ice on your shoulder area  Ice helps decrease swelling and pain  Ice may also help prevent tissue damage  Use an ice pack, or put crushed ice in a plastic bag  Cover it with a towel and place it on your shoulder for 15 to 20 minutes every hour or as directed  Keep your shoulder in the correct position so it will heal faster  This may be done by increasing the height of armrests while you work, drive, and sit  Try not to sleep on the side of your injured shoulder  If you are a woman, wear a sports bra so that the straps are closer to your neck  This may help decrease stress in the affected shoulder  Follow up with your doctor or orthopedist as directed:  Write down your questions so you remember to ask them during your visits  © Copyright V[employer] 2022 Information is for End User's use only and may not be sold, redistributed or otherwise used for commercial purposes  The above information is an  only  It is not intended as medical advice for individual conditions or treatments  Talk to your doctor, nurse or pharmacist before following any medical regimen to see if it is safe and effective for you

## 2023-02-24 ENCOUNTER — EVALUATION (OUTPATIENT)
Dept: PHYSICAL THERAPY | Facility: CLINIC | Age: 58
End: 2023-02-24

## 2023-02-24 DIAGNOSIS — M25.512 ACUTE PAIN OF LEFT SHOULDER: Primary | ICD-10-CM

## 2023-02-24 DIAGNOSIS — M75.82 TENDINITIS OF LEFT ROTATOR CUFF: ICD-10-CM

## 2023-02-24 DIAGNOSIS — M75.42 IMPINGEMENT SYNDROME OF LEFT SHOULDER: ICD-10-CM

## 2023-02-24 NOTE — PROGRESS NOTES
PT Evaluation     Today's date: 2023  Patient name: Loli Singh  : 1965  MRN: [id number]  Referring provider: URIEL Martinez*  Dx:   Encounter Diagnosis     ICD-10-CM    1  Acute pain of left shoulder  M25 [id number] Ambulatory Referral to Physical Therapy      2  Tendinitis of left rotator cuff  M75 82 Ambulatory Referral to Physical Therapy      3  Impingement syndrome of left shoulder  M75 42 Ambulatory Referral to Physical Therapy                     Assessment  Assessment details: Loli Singh is a 62 y o  male who presents with signs and symptoms consistent of referring diagnosis with possible cervical/thoracic involvement based off of subjective/objective findings  Patient presents with pain, decreased strength, decreased joint mobility and postural dysfunction  Cervical spine retractions and extension reproduced patient discomfort signaling some involvement of this area  Continue to assess  Due to these impairments, Patient has difficulty performing a/iadls, recreational activities and work-related activities  Of note, patient is most limited with scapular motor control and strength which has led to impaired activity tolerance  Patient would benefit from a comprehensive HEP focusing on improving UE strength, motor control, cervical/thoracic mobility, and functional mobility  Patient was educated on and provided with an at home exercise program this date to be completed  Patient verbalized understanding of the importance of completion  Patient would benefit from skilled physical therapy to address the impairments, improve their level of function, and to improve their overall quality of life  PT POC 2x/wk for 6 weeks   Thank you for this referral     Impairments: activity intolerance, impaired physical strength, lacks appropriate home exercise program, pain with function and poor posture     Symptom irritability: moderateUnderstanding of Dx/Px/POC: good   Prognosis: good    Goals  Short Term Goals: to be achieved by 4 weeks  1) Patient to be independent with basic HEP  2) Decrease pain to 3/10 at its worst   3) Increase shoulder ROM by 5-10 degrees in all planes  4) Increase shoulder strength by 1/2 MMT grade in all deficient planes  Long Term Goals: to be achieved by discharge  1) FOTO equal to or greater than expected value  2) Patient to be independent with comprehensive HEP  3) Patient will demonstrate maximal over head reaching  4) Increase UE strength to 5/5 MMT grade in all planes to improve a/iadls  5) Patient to report no sleep interruption secondary to pain  Plan  Patient would benefit from: PT eval and skilled physical therapy  Planned modality interventions: thermotherapy: hydrocollator packs, TENS and cryotherapy  Planned therapy interventions: joint mobilization, manual therapy, massage, neuromuscular re-education, patient education, stretching, strengthening, therapeutic activities, therapeutic exercise, home exercise program, functional ROM exercises, flexibility and activity modification  Frequency: 2x week  Duration in weeks: 6  Plan of Care beginning date: 2/24/2023  Plan of Care expiration date: 4/7/2023  Treatment plan discussed with: patient        Subjective Evaluation    History of Present Illness  Mechanism of injury: History of Current Injury: Patient notes chronic L shoulder pain without improvement  He notes that usually this improves with time and medication although this time it did not  Patient went to see ortho where an injection which was given and some relief was felt  He notes that he has trouble performing any activities around the home currently with a constant "toothache" in his shoulder     Pain location/Descriptors: Back of the L shoulder radiating into the scaoular  Aggravating factors: sleeping, any movement  Easing factors: injection  Imaging: X-rays  Special Questions: Kalpana Shells denies a new onset of Bladder incontinence, Bowel dysfunction, Dizziness, Dysphagia, Dysarthria, Diplopia, Jaw pain, Tingling and Numbness  Patient goals:  "make pain go away"  Hobbies/Interest: Hiking, playing with dog   Occupation: Works at [employer]    Pain  Current pain ratin  At best pain ratin  At worst pain ratin  Quality: tight and dull ache    Patient Goals  Patient goals for therapy: return to sport/leisure activities, increased strength, decreased pain and increased motion          Objective     Palpation   Left   No palpable tenderness to the rhomboids, thoracic paraspinals and upper trapezius  Tenderness of the latissimus, lower trapezius and middle trapezius  Tenderness   Cervical Spine   Tenderness in the left scapula  No tenderness in the left transverse process, right scapula and right transverse process  Left Shoulder   No tenderness in the [facility] joint, biceps tendon (proximal) and coracoid process       Neurological Testing     Sensation   Cervical/Thoracic   Left   Intact: light touch    Right   Intact: light touch    Reflexes   Left   Biceps (C5/C6): normal (2+)  Brachioradialis (C6): normal (2+)  Triceps (C7): normal (2+)  David's reflex: negative    Right   Biceps (C5/C6): normal (2+)  Brachioradialis (C6): normal (2+)  Triceps (C7): normal (2+)  David's reflex: negative    Active Range of Motion   Cervical/Thoracic Spine       Cervical  Subcranial protraction:  WFL   Subcranial retraction: Active cervical subcranial retraction: in shoulder   with pain   Flexion:  WFL  Extension:  WFL  Left lateral flexion:  WFL  Right lateral flexion:  WFL  Left rotation:  WFL  Right rotation:  WFL  Left Shoulder   Flexion: 170 degrees   Abduction: 175 degrees   External rotation BTH: C7   Internal rotation BTB: L4 with pain    Right Shoulder   Flexion: 170 degrees   Abduction: 170 degrees   External rotation BTH: C7   Internal rotation BTB: L2     Joint Play     Hypomobile: C7, T1, T2 and T3     Strength/Myotome Testing     Left Shoulder     Planes of Motion Flexion: 4   Abduction: 4   External rotation at 0°: 4   Internal rotation at 0°: 4     Isolated Muscles   Latissimus: 4-   Middle trapezius: 4   Upper trapezius: 4     Right Shoulder     Planes of Motion   Flexion: 5   Abduction: 5   External rotation at 0°: 5   Internal rotation at 0°: 5     Isolated Muscles   Latissimus: 5   Middle trapezius: 5   Upper trapezius: 5     Tests     Left Shoulder   Positive empty can, Hawkin's and Neer's  Negative painful arc, Speed's and bicep load                 Diagnosis: L shoulder pain   Precautions: history of stroke, check BP   POC Expires: 4/7   Re-evaluation Date: 4/7    FOTO Scores/Date: 37 (2/24)   Visit Count 1       Manuals 2/24       Thoracic mobs        Cervical mobs        Scapular mobs        GH Mobs                 Ther Ex        UBE        UT stretch 10x HEP       Levator stretch 10x HEP       Thoracic extensions 10x HEP                                               Neuro Re-Ed        Scap retractions 10x HEP       TB rows        TB pulldown        SA Rows         Y, T, I         Serratus punch                                                                        Ther Act                                         Modalities

## 2023-02-27 ENCOUNTER — OFFICE VISIT (OUTPATIENT)
Dept: FAMILY MEDICINE CLINIC | Facility: CLINIC | Age: 58
End: 2023-02-27

## 2023-02-27 VITALS
WEIGHT: 304 LBS | BODY MASS INDEX: 43.52 KG/M2 | DIASTOLIC BLOOD PRESSURE: 84 MMHG | HEART RATE: 78 BPM | SYSTOLIC BLOOD PRESSURE: 138 MMHG | TEMPERATURE: 97.1 F | OXYGEN SATURATION: 96 % | RESPIRATION RATE: 18 BRPM | HEIGHT: 70 IN

## 2023-02-27 DIAGNOSIS — G47.33 OSA (OBSTRUCTIVE SLEEP APNEA): ICD-10-CM

## 2023-02-27 DIAGNOSIS — H91.93 DECREASED HEARING OF BOTH EARS: ICD-10-CM

## 2023-02-27 DIAGNOSIS — E11.69 TYPE 2 DIABETES MELLITUS WITH OTHER SPECIFIED COMPLICATION, WITHOUT LONG-TERM CURRENT USE OF INSULIN (HCC): ICD-10-CM

## 2023-02-27 DIAGNOSIS — Z86.73 HISTORY OF STROKE: ICD-10-CM

## 2023-02-27 DIAGNOSIS — Z00.00 ANNUAL PHYSICAL EXAM: Primary | ICD-10-CM

## 2023-02-27 PROBLEM — R73.02 IMPAIRED GLUCOSE TOLERANCE: Status: RESOLVED | Noted: 2021-04-14 | Resolved: 2023-02-27

## 2023-02-27 NOTE — PROGRESS NOTES
[address]    NAME: Erwin Hackett  AGE: 62 y o  SEX: male  : 1965     DATE: 2023     Assessment and Plan:     Problem List Items Addressed This Visit        Endocrine    Type 2 diabetes mellitus, without long-term current use of insulin ([address] )    Relevant Orders    Microalbumin / creatinine urine ratio    Comprehensive metabolic panel    Hemoglobin A1C    Lipid Panel with Direct LDL reflex       Respiratory    HARPER (obstructive sleep apnea)       Other    History of stroke   Other Visit Diagnoses     Annual physical exam    -  Primary    Decreased hearing of both ears        Relevant Orders    Ambulatory Referral to Audiology        Patient adamant that he is not have diabetes  We will repeat a hemoglobin A1c in 3 months  Counseled patient on diet and exercise  Orders above  You following closely with nephrology for CKD stage III and hypertension  Immunizations and preventive care screenings were discussed with patient today  Appropriate education was printed on patient's after visit summary  Counseling:  Alcohol/drug use: discussed moderation in alcohol intake, the recommendations for healthy alcohol use, and avoidance of illicit drug use  Dental Health: discussed importance of regular tooth brushing, flossing, and dental visits  Injury prevention: discussed safety/seat belts, safety helmets, smoke detectors, carbon dioxide detectors, and smoking near bedding or upholstery  Sexual health: discussed sexually transmitted diseases, partner selection, use of condoms, avoidance of unintended pregnancy, and contraceptive alternatives  Exercise: the importance of regular exercise/physical activity was discussed  Recommend exercise 3-5 times per week for at least 30 minutes  BMI Counseling: Body mass index is 43 62 kg/m²   The BMI is above normal  Nutrition recommendations include decreasing portion sizes, consuming healthier snacks, reducing intake of saturated and trans fat and reducing intake of cholesterol  Exercise recommendations include moderate physical activity 150 minutes/week  No pharmacotherapy was ordered  Patient referred to PCP  Rationale for BMI follow-up plan is due to patient being overweight or obese  No follow-ups on file  Chief Complaint:     No chief complaint on file  History of Present Illness:     Adult Annual Physical   Patient here for a comprehensive physical exam  The patient reports problems - questions of diabetes  Patient believes he is not a diabetic   Diet and Physical Activity  Diet/Nutrition: well balanced diet  Exercise: no formal exercise  Depression Screening  PHQ-2/9 Depression Screening    Little interest or pleasure in doing things: 0 - not at all  Feeling down, depressed, or hopeless: 0 - not at all  PHQ-2 Score: 0  PHQ-2 Interpretation: Negative depression screen       General Health  Sleep: Wears BiPap   Hearing: decreased - bilateral  Mild decrease  Open to hearing eval  Vision: no vision problems  Dental: regular dental visits   Health  Symptoms include: none Follows with urology  History of kidney and bladder stones  Review of Systems:     Review of Systems   Constitutional: Negative for fatigue and fever  HENT: Negative for sore throat  Eyes: Negative for visual disturbance  Respiratory: Negative for cough, chest tightness and shortness of breath  Cardiovascular: Negative for chest pain, palpitations and leg swelling  Gastrointestinal: Negative for abdominal pain, constipation, diarrhea and nausea  Endocrine: Negative for cold intolerance and heat intolerance  Genitourinary: Negative for flank pain  Musculoskeletal: Negative for back pain and neck pain  Skin: Negative for rash  Neurological: Negative for headaches  Psychiatric/Behavioral: Negative for behavioral problems and confusion        Past Medical History: Past Medical History:   Diagnosis Date   • Ankle swelling    • Arthritis    • Asthma    • Atrial fibrillation (HCC)    • Atrial fibrillation with RVR ([facility] ) 04/04/2017   • Chronic kidney disease     kidney stones   • Gout    • Hypertension    • Kidney stone    • Night sweats    • Obesity    • Seasonal allergies    • Sleep apnea    • Stroke ([facility] ) 03/22      Past Surgical History:     Past Surgical History:   Procedure Laterality Date   • APPENDECTOMY     • CARDIOVERSION      X4 last was 2018   • COLONOSCOPY     • CYSTOSCOPY  08/30/2016    w/removal of object, last assessed 8/30/16   • FOOT SURGERY Left    • HAND SURGERY     • KNEE SURGERY Right 2016   • RI CYSTO/URETERO W/LITHOTRIPSY &INDWELL STENT INSRT Left 08/09/2016    Procedure: CYSTOSCOPY URETEROSCOPY WITH LITHOTRIPSY HOLMIUM LASER STONE EXTRACTION, RETROGRADE PYELOGRAM AND INSERTION STENT URETERAL;  Surgeon: Wild Baron MD;  Location:  MAIN OR;  Service: Urology last assessed 8/30/16   • RI CYSTO/URETERO W/LITHOTRIPSY &INDWELL STENT INSRT Right 08/24/2020    Procedure: CYSTOSCOPY , cystolitholapaxy of bladder stone WITH HOMIUM LASER RIGHT RETROGRADE AND RIGHT URETERAL STENT, [address];  Surgeon: Wild Baron MD;  Location: [address] [place] [address] MAIN OR;  Service: Urology   • UPPER GASTROINTESTINAL ENDOSCOPY     • WRIST SURGERY Left 2014    Fracture surgery      Family History:     Family History   Problem Relation Age of Onset   • Atrial fibrillation Mother    • Other Mother         blood dyscrasia   • Hypertension Mother    • Arthritis Mother    • Other Father         hypoglycemia    • Atrial fibrillation Father    • Diabetes Family    • Other Family         Thrombocytosis   • No Known Problems Sister    • Cancer Neg Hx    • Thyroid disease Neg Hx       Social History:     Social History     Socioeconomic History   • Marital status: /Civil Union     Spouse name: None   • Number of children: 4   • Years of education: 12th grade    • Highest education level: None   Occupational History   • None   Tobacco Use   • Smoking status: Former     Packs/day: 0 50     Years: 15 00     Pack years: 7 50     Types: Cigarettes     Quit date: 1994     Years since quittin 9   • Smokeless tobacco: Never   Vaping Use   • Vaping Use: Never used   Substance and Sexual Activity   • Alcohol use: Yes     Comment: occ   • Drug use: No   • Sexual activity: Yes     Partners: Female     Birth control/protection: None   Other Topics Concern   • None   Social History Narrative    No caffeine use      Social Determinants of Health     Financial Resource Strain: Not on file   Food Insecurity: No Food Insecurity   • Worried About Running Out of Food in the Last Year: Never true   • Ran Out of Food in the Last Year: Never true   Transportation Needs: No Transportation Needs   • Lack of Transportation (Medical): No   • Lack of Transportation (Non-Medical): No   Physical Activity: Not on file   Stress: Not on file   Social Connections: Not on file   Intimate Partner Violence: Not on file   Housing Stability: Low Risk    • Unable to Pay for Housing in the Last Year: No   • Number of Places Lived in the Last Year: 1   • Unstable Housing in the Last Year: No      Current Medications:     Current Outpatient Medications   Medication Sig Dispense Refill   • atorvastatin (LIPITOR) 40 mg tablet TAKE 1 TABLET BY MOUTH EVERY DAY WITH DINNER 90 tablet 1   • bisacodyl (DULCOLAX) 5 mg EC tablet Take 2 tablets (10 mg total) by mouth once for 1 dose 2 tablet 0   • cloNIDine (CATAPRES-TTS-1) 0 1 mg/24 hr PLACE 1 PATCH ON THE SKIN ONCE A WEEK 12 patch 3   • dofetilide (TIKOSYN) 500 mcg capsule TAKE 1 CAPSULE BY MOUTH EVERY 12 HOURS 180 capsule 3   • eplerenone (INSPRA) 25 mg tablet TAKE 1 TABLET (25 MG TOTAL) BY MOUTH DAILY   90 tablet 2   • ergocalciferol (VITAMIN D2) 50,000 units TAKE 1 CAPSULE BY MOUTH ONE TIME PER WEEK FOR 8 DOSES 4 capsule 1   • hydrALAZINE (APRESOLINE) 50 mg tablet Take 1 tablet (50 mg total) by mouth 2 (two) times a day 180 tablet 3   • nebivolol (BYSTOLIC) 2 5 mg tablet TAKE 1 TABLET BY MOUTH DAILY HOLD FOR HEART RATE LESS THAN 50 BEATS PER MINUTE  90 tablet 3   • NIFEdipine (PROCARDIA XL) 30 mg 24 hr tablet Take 1 tablet (30 mg total) by mouth 2 (two) times a day (Patient taking differently: Take 30 mg by mouth daily) 180 tablet 3   • olmesartan (BENICAR) 40 mg tablet TAKE 1 TABLET BY MOUTH EVERY DAY 90 tablet 3   • rivaroxaban (Xarelto) 20 mg tablet Take 1 tablet (20 mg total) by mouth daily with breakfast 30 tablet 10   • torsemide (DEMADEX) 5 MG tablet TAKE 1 TABLET (5 MG TOTAL) BY MOUTH DAILY  90 tablet 1   • polyethylene glycol (GOLYTELY) 4000 mL solution Take 4,000 mL by mouth once for 1 dose 4000 mL 0     No current facility-administered medications for this visit  Allergies:     No Known Allergies   Physical Exam:     /84 (BP Location: Left arm, Patient Position: Sitting, Cuff Size: Standard)   Pulse 78   Temp (!) 97 1 °F (36 2 °C) (Temporal)   Resp 18   Ht 5' 10" (1 778 m)   Wt (!) 138 kg (304 lb)   SpO2 96%   BMI 43 62 kg/m²     Physical Exam  Vitals and nursing note reviewed  Constitutional:       General: He is not in acute distress  Appearance: Normal appearance  He is well-developed  HENT:      Head: Normocephalic and atraumatic  Nose: Nose normal    Eyes:      Extraocular Movements: Extraocular movements intact  Conjunctiva/sclera: Conjunctivae normal       Pupils: Pupils are equal, round, and reactive to light  Cardiovascular:      Rate and Rhythm: Normal rate and regular rhythm  Pulses: no weak pulses     Heart sounds: Normal heart sounds  Pulmonary:      Effort: Pulmonary effort is normal       Breath sounds: Normal breath sounds  Abdominal:      General: Bowel sounds are normal       Palpations: Abdomen is soft  Musculoskeletal:         General: Normal range of motion  Cervical back: Normal range of motion  Feet:      Right foot:      Skin integrity: No ulcer, skin breakdown, erythema, warmth, callus or dry skin  Left foot:      Skin integrity: No ulcer, skin breakdown, erythema, warmth, callus or dry skin  Skin:     General: Skin is warm and dry  Neurological:      General: No focal deficit present  Mental Status: He is alert and oriented to person, place, and time  Psychiatric:         Mood and Affect: Mood normal          Speech: Speech normal          Behavior: Behavior normal         Patient's shoes and socks removed  Right Foot/Ankle   Right Foot Inspection  Skin Exam: skin normal and skin intact  No dry skin, no warmth, no callus, no erythema, no maceration, no abnormal color, no pre-ulcer, no ulcer and no callus  Left Foot/Ankle  Left Foot Inspection  Skin Exam: skin normal and skin intact  No dry skin, no warmth, no erythema, no maceration, normal color, no pre-ulcer, no ulcer and no callus       Assign Risk Category  No deformity present  No loss of protective sensation  No weak pulses  Risk: 0        Rise MD Kiet  [id number] [address]

## 2023-03-01 ENCOUNTER — OFFICE VISIT (OUTPATIENT)
Dept: PHYSICAL THERAPY | Facility: CLINIC | Age: 58
End: 2023-03-01

## 2023-03-01 DIAGNOSIS — M75.42 IMPINGEMENT SYNDROME OF LEFT SHOULDER: ICD-10-CM

## 2023-03-01 DIAGNOSIS — M25.512 ACUTE PAIN OF LEFT SHOULDER: Primary | ICD-10-CM

## 2023-03-01 DIAGNOSIS — M75.82 TENDINITIS OF LEFT ROTATOR CUFF: ICD-10-CM

## 2023-03-01 NOTE — PROGRESS NOTES
Daily Note     Today's date: 3/1/2023  Patient name: Naye Loving  : 1965  MRN: [id number]  Referring provider: URIEL Jerome*  Dx:   Encounter Diagnosis     ICD-10-CM    1  Acute pain of left shoulder  M25 512       2  Tendinitis of left rotator cuff  M75 82       3  Impingement syndrome of left shoulder  M75 42           Start Time: 0800  Stop Time: 0839  Total time in clinic (min): 39 minutes    Subjective: Patient reports being "extremely uncomfortable" after the evaluation on Friday  This began to alleviate yesterday  He has been completing HEP regularly 2x per day  Objective: See treatment diary below      Assessment: Tolerated treatment well  Responded well to manuals this date with mild discomfort noted during scapular mobilizations  Sxs centralized by end of session with most discomfort shifting from lateral border of scapula to medial border near the thoracic spine  Reproduced sxs with extension of cervical spine  Added cervical retractions into extension as well as pure cervical extension with improved extension noted with repetition  Soreness still present on lateral border of shoulder blade by end of session  Continued suspicion of cervical involvement this date  Updated HEP  Progress as able  Patient would benefit from continued PT      Plan: Continue per plan of care        Diagnosis: L shoulder pain   Precautions: history of stroke, check BP   POC Expires:    Re-evaluation Date:     FOTO Scores/Date: 37 ()   Visit Count 1 2      Manuals 2/24 3/1      Thoracic mobs        Cervical mobs        Scapular mobs  EB      GH Mobs   EB      CS Distraction  EB       Ther Ex        UBE  4' retro       UT stretch 10x HEP 10x5" L only       Levator stretch 10x HEP 10x5" L only       Thoracic extensions 10x HEP       Rhomboid stretch        Door way stretch                                Neuro Re-Ed        Scap retractions 10x HEP       TB rows        TB pulldown        SA Rows Y, T, I         Serratus punch   2x15       Cervical retractions  10x into extension x10                                                              Ther Act                                         Modalities

## 2023-03-06 ENCOUNTER — OFFICE VISIT (OUTPATIENT)
Dept: PHYSICAL THERAPY | Facility: CLINIC | Age: 58
End: 2023-03-06

## 2023-03-06 DIAGNOSIS — M25.512 ACUTE PAIN OF LEFT SHOULDER: Primary | ICD-10-CM

## 2023-03-06 DIAGNOSIS — M75.42 IMPINGEMENT SYNDROME OF LEFT SHOULDER: ICD-10-CM

## 2023-03-06 DIAGNOSIS — M75.82 TENDINITIS OF LEFT ROTATOR CUFF: ICD-10-CM

## 2023-03-06 NOTE — PROGRESS NOTES
Daily Note     Today's date: 3/6/2023  Patient name: Alina Cabrera  : 1965  MRN: [id number]  Referring provider: URIEL Horton*  Dx:   Encounter Diagnosis     ICD-10-CM    1  Acute pain of left shoulder  M25 512       2  Tendinitis of left rotator cuff  M75 82       3  Impingement syndrome of left shoulder  M75 42           Start Time: 08  Stop Time: 928  Total time in clinic (min): 42 minutes    Subjective: Patient reports improvements in shoulder sxs since last visit with little to no discomfort noted this date  Has found that the exercises have been beneficial and he has been completing them regularly  Objective: See treatment diary below      Assessment: Tolerated treatment well  Progressed cervical and thoracic mobility this date  Discomfort noted with cervical sideglide mobilizations this date  Improved cervical extension with less discomfort noted at end range  Soreness noted with end range cervical extension still present although improved from last visit  Began to provide resistance for shoulder exercises this date without increases in discomfort present  Patient provided with updated HEP  Progress as able  Patient would benefit from continued PT      Plan: Continue per plan of care        Diagnosis: L shoulder pain   Precautions: history of stroke, check BP   POC Expires:    Re-evaluation Date:     FOTO Scores/Date: 37 ()   Visit Count 1 2 3     Manuals 2/24 3/ 3/6     Thoracic mobs        Cervical mobs   EB     Scapular mobs  EB EB     GH Mobs   EB      CS Distraction  EB  EB     Ther Ex        UBE  4' retro  2 5/2 5     UT stretch 10x HEP 10x5" L only       Levator stretch 10x HEP 10x5" L only       Thoracic extensions 10x HEP  15x     Rhomboid stretch        Door way stretch        B/L Towel stretch   10x5"                     Neuro Re-Ed        Scap retractions 10x HEP       TB rows   30x gtb      TB pulldown   30x gtb      SA Rows         Y, T, I         Serratus punch   2x15       Cervical retractions  10x into extension x10  x15 with towel      Cervical extensions    x20     Self snags    x15 ea direction      Prone scap retractions         B/L ER    2x15 gtb                             Ther Act                                         Modalities

## 2023-03-14 ENCOUNTER — OFFICE VISIT (OUTPATIENT)
Dept: PHYSICAL THERAPY | Facility: CLINIC | Age: 58
End: 2023-03-14

## 2023-03-14 DIAGNOSIS — M75.82 TENDINITIS OF LEFT ROTATOR CUFF: ICD-10-CM

## 2023-03-14 DIAGNOSIS — M25.512 ACUTE PAIN OF LEFT SHOULDER: Primary | ICD-10-CM

## 2023-03-14 DIAGNOSIS — M75.42 IMPINGEMENT SYNDROME OF LEFT SHOULDER: ICD-10-CM

## 2023-03-14 NOTE — PROGRESS NOTES
Daily Note     Today's date: 3/14/2023  Patient name: Nathan Wang  : 1965  MRN: [id number]  Referring provider: URIEL Solis*  Dx:   Encounter Diagnosis     ICD-10-CM    1  Acute pain of left shoulder  M25 512       2  Tendinitis of left rotator cuff  M75 82       3  Impingement syndrome of left shoulder  M75 42           Start Time: 0850  Stop Time: 0930  Total time in clinic (min): 40 minutes    Subjective: Patient reports "marked" improvement in shoulder discomfort  Was able to move furniture around the home without large amounts of difficulty  Objective: See treatment diary below      Assessment: Tolerated treatment well  Focused on cervical and thoracic mobility this date prior to loading the shoulder  Continues to have noted improvement in cervical extension tolerance with less discomfort noted  Improved thoracic mobility also present  Able to progress shoulder exercises with focus on scapular stability  Progress as able  Patient would benefit from continued PT      Plan: Continue per plan of care        Diagnosis: L shoulder pain   Precautions: history of stroke, check BP   POC Expires:    Re-evaluation Date:     FOTO Scores/Date: 37 ()   Visit Count 1 2 3 4    Manuals 2/24 3/1 3/6 3/14    Thoracic mobs        Cervical mobs   EB     Scapular mobs  EB EB     GH Mobs   EB      CS Distraction  EB  EB     Ther Ex        UBE  4' retro  2 5/2 5 2 5/2 5    UT stretch 10x HEP 10x5" L only       Levator stretch 10x HEP 10x5" L only       Thoracic extensions 10x HEP  15x 20x 3"     Rhomboid stretch    15x5" ea     Door way stretch        B/L Towel stretch   10x5"                     Neuro Re-Ed        Scap retractions 10x HEP       TB rows   30x gtb      TB pulldown   30x gtb      SA Rows         Y, T, I         Serratus punch   2x15       Cervical retractions  10x into extension x10  x15 with towel      Cervical extensions    x20 10x5"     Self snags    x15 ea direction  15x5" ea plus sidebend     Prone scap retractions         B/L ER    2x15 gtb  20x rtb    TB Walkouts     x10 ea side btb                   Ther Act                                         Modalities

## 2023-03-17 ENCOUNTER — APPOINTMENT (OUTPATIENT)
Dept: PHYSICAL THERAPY | Facility: CLINIC | Age: 58
End: 2023-03-17

## 2023-03-22 ENCOUNTER — OFFICE VISIT (OUTPATIENT)
Dept: PHYSICAL THERAPY | Facility: CLINIC | Age: 58
End: 2023-03-22

## 2023-03-22 DIAGNOSIS — I48.20 CHRONIC ATRIAL FIBRILLATION (HCC): ICD-10-CM

## 2023-03-22 DIAGNOSIS — M75.82 TENDINITIS OF LEFT ROTATOR CUFF: ICD-10-CM

## 2023-03-22 DIAGNOSIS — M25.512 ACUTE PAIN OF LEFT SHOULDER: Primary | ICD-10-CM

## 2023-03-22 DIAGNOSIS — M75.42 IMPINGEMENT SYNDROME OF LEFT SHOULDER: ICD-10-CM

## 2023-03-22 RX ORDER — RIVAROXABAN 20 MG/1
TABLET, FILM COATED ORAL
Qty: 30 TABLET | Refills: 1 | Status: SHIPPED | OUTPATIENT
Start: 2023-03-22

## 2023-03-22 NOTE — PROGRESS NOTES
PT Discharge    Today's date: 3/22/2023  Patient name: Margie Moody  : 1965  MRN: [id number]  Referring provider: URIEL Torres*  Dx:   Encounter Diagnosis     ICD-10-CM    1  Acute pain of left shoulder  M25 512       2  Tendinitis of left rotator cuff  M75 82       3  Impingement syndrome of left shoulder  M75 42                      Assessment  Assessment details: Keyur Owusu is a 62 y o  male who presented with signs and symptoms consistent of referring diagnosis  Patient has made excellent progress during his time at [facility]  Patient has made large functional gains in ROM, strength, motor control, and mobility which has led to increased activity tolerance  Patient has met all of his functional and rehabilitation goals with FOTO surpassing expected score  Patient was provided with a comprehensive HEP and educated on importance of performing HEP to minimize future complaints  Patient was receptive to information provided by therapist  At this time, patient is agreeable to and appropriate for d/c from therapy services  Thank you for this referral  D/c POC  Goals  Short Term Goals: to be achieved by 4 weeks  1) Patient to be independent with basic HEP  - met  2) Decrease pain to 3/10 at its worst - met   3) Increase shoulder ROM by 5-10 degrees in all planes-met  4) Increase shoulder strength by 1/2 MMT grade in all deficient planes  - met     Long Term Goals: to be achieved by discharge  1) FOTO equal to or greater than expected value  - met   2) Patient to be independent with comprehensive HEP  - met   3) Patient will demonstrate maximal over head reaching- met   4) Increase UE strength to 5/5 MMT grade in all planes to improve a/iadls  - met   5) Patient to report no sleep interruption secondary to pain  - met     Plan  Planned therapy interventions: home exercise program  Plan of Care beginning date: 2023  Plan of Care expiration date: 3/22/2023  Treatment plan discussed with: patient        Subjective Evaluation    History of Present Illness  Mechanism of injury: History of Current Injury: Patient notes chronic L shoulder pain without improvement  He notes that usually this improves with time and medication although this time it did not  Patient went to see ortho where an injection which was given and some relief was felt  He notes that he has trouble performing any activities around the home currently with a constant "toothache" in his shoulder  Pain location/Descriptors: Back of the L shoulder radiating into the scaoular  Aggravating factors: sleeping, any movement  Easing factors: injection  Imaging: X-rays  Special Questions: Araceli Gleason denies a new onset of Bladder incontinence, Bowel dysfunction, Dizziness, Dysphagia, Dysarthria, Diplopia, Jaw pain, Tingling and Numbness  Patient goals:  "make pain go away"  Hobbies/Interest: Hiking, playing with dog   Occupation: Works at [employer]    Pain  Current pain ratin  At best pain ratin  At worst pain ratin  Quality: tight and dull ache    Patient Goals  Patient goals for therapy: return to sport/leisure activities, increased strength, decreased pain and increased motion      Araceli Gleason reports a perceived improvement of 100%  Patient notes pain has reduced to 0 and notes little to no discomfort  Patient reports being able to lift and carry a case of water without pain  Notes that he can go about his day to day tasks without difficulty or increases in pain  Overall, patient has made steady progress toward goals and will be d/c from OPPT and continue with a HEP  Objective     Palpation   Left   No palpable tenderness to the latissimus, lower trapezius, middle trapezius, rhomboids, thoracic paraspinals and upper trapezius  Tenderness   Cervical Spine   No tenderness in the left scapula, left transverse process, right scapula and right transverse process       Left Shoulder   No tenderness in the [facility] [place] joint, biceps tendon (proximal) and coracoid process  Neurological Testing     Sensation   Cervical/Thoracic   Left   Intact: light touch    Right   Intact: light touch    Reflexes   Left   Biceps (C5/C6): normal (2+)  Brachioradialis (C6): normal (2+)  Triceps (C7): normal (2+)  David's reflex: negative    Right   Biceps (C5/C6): normal (2+)  Brachioradialis (C6): normal (2+)  Triceps (C7): normal (2+)  David's reflex: negative    Active Range of Motion   Cervical/Thoracic Spine       Cervical  Subcranial protraction:  WFL   Subcranial retraction:  WFL   Flexion:  WFL  Extension:  WFL  Left lateral flexion:  WFL  Right lateral flexion:  WFL  Left rotation:  WFL  Right rotation:  WFL  Left Shoulder   Flexion: 170 degrees   Abduction: 175 degrees   External rotation BTH: C7   Internal rotation BTB: L2     Right Shoulder   Flexion: 170 degrees   Abduction: 170 degrees   External rotation BTH: C7   Internal rotation BTB: L2     Joint Play   Joints within functional limits: C7, T1, T2 and T3     Strength/Myotome Testing     Left Shoulder     Planes of Motion   Flexion: 5   Abduction: 5   External rotation at 0°: 5   Internal rotation at 0°: 5     Isolated Muscles   Latissimus: 5   Middle trapezius: 5   Upper trapezius: 5     Right Shoulder     Planes of Motion   Flexion: 5   Abduction: 5   External rotation at 0°: 5   Internal rotation at 0°: 5     Isolated Muscles   Latissimus: 5   Middle trapezius: 5   Upper trapezius: 5     Tests     Left Shoulder   Negative empty can, Hawkin's, Neer's, painful arc, Speed's and bicep load                    Diagnosis: L shoulder pain   Precautions: history of stroke, check BP   POC Expires: 4/7   Re-evaluation Date: 4/7    FOTO Scores/Date: 37 (2/24)   Visit Count 1 2 3 4 5   Manuals 2/24 3/1 3/6 3/14 3/22   Thoracic mobs        Cervical mobs   EB     Scapular mobs  EB EB     GH Mobs   EB      Re-eval     EB   CS Distraction  EB  EB     Ther Ex        UBE  4' retro  2 5/2 5 2 5/2 5 4' retro    UT stretch 10x HEP 10x5" L only       Levator stretch 10x HEP 10x5" L only       Thoracic extensions 10x HEP  15x 20x 3"     Rhomboid stretch    15x5" ea     Door way stretch        B/L Towel stretch   10x5"     Patient education     EB on prognosis, importance of HEP, review of HEP           Neuro Re-Ed        Scap retractions 10x HEP       TB rows   30x gtb      TB pulldown   30x gtb      SA Rows         Y, T, I         Serratus punch   2x15       Cervical retractions  10x into extension x10  x15 with towel      Cervical extensions    x20 10x5"     Self snags    x15 ea direction  15x5" ea plus sidebend     Prone scap retractions         B/L ER    2x15 gtb  20x rtb    TB Walkouts     x10 ea side btb                   Ther Act                                         Modalities

## 2023-03-23 ENCOUNTER — APPOINTMENT (OUTPATIENT)
Dept: LAB | Facility: CLINIC | Age: 58
End: 2023-03-23

## 2023-03-23 ENCOUNTER — OFFICE VISIT (OUTPATIENT)
Dept: AUDIOLOGY | Age: 58
End: 2023-03-23

## 2023-03-23 DIAGNOSIS — H90.3 SENSORY HEARING LOSS, BILATERAL: Primary | ICD-10-CM

## 2023-03-23 DIAGNOSIS — I12.9 HYPERTENSIVE CHRONIC KIDNEY DISEASE WITH STAGE 1 THROUGH STAGE 4 CHRONIC KIDNEY DISEASE, OR UNSPECIFIED CHRONIC KIDNEY DISEASE: ICD-10-CM

## 2023-03-23 DIAGNOSIS — E55.9 VITAMIN D DEFICIENCY: ICD-10-CM

## 2023-03-23 DIAGNOSIS — N18.31 STAGE 3A CHRONIC KIDNEY DISEASE (HCC): ICD-10-CM

## 2023-03-23 DIAGNOSIS — E66.01 CLASS 3 SEVERE OBESITY DUE TO EXCESS CALORIES WITH SERIOUS COMORBIDITY AND BODY MASS INDEX (BMI) OF 40.0 TO 44.9 IN ADULT (HCC): ICD-10-CM

## 2023-03-23 DIAGNOSIS — I10 ACCELERATED HYPERTENSION: ICD-10-CM

## 2023-03-23 LAB
ANION GAP SERPL CALCULATED.3IONS-SCNC: 6 MMOL/L (ref 4–13)
BUN SERPL-MCNC: 21 MG/DL (ref 5–25)
CALCIUM SERPL-MCNC: 8.8 MG/DL (ref 8.4–10.2)
CHLORIDE SERPL-SCNC: 106 MMOL/L (ref 96–108)
CO2 SERPL-SCNC: 29 MMOL/L (ref 21–32)
CREAT SERPL-MCNC: 1.27 MG/DL (ref 0.6–1.3)
CREAT UR-MCNC: 67.5 MG/DL
GFR SERPL CREATININE-BSD FRML MDRD: 62 ML/MIN/1.73SQ M
GLUCOSE P FAST SERPL-MCNC: 120 MG/DL (ref 65–99)
MAGNESIUM SERPL-MCNC: 2 MG/DL (ref 1.9–2.7)
POTASSIUM SERPL-SCNC: 4.5 MMOL/L (ref 3.5–5.3)
PROT UR-MCNC: 4 MG/DL
PROT/CREAT UR: 0.06 MG/G{CREAT} (ref 0–0.1)
SODIUM SERPL-SCNC: 141 MMOL/L (ref 135–147)

## 2023-03-23 NOTE — PROGRESS NOTES
HEARING EVALUATION    Name:  Lorena Franks  :  1965  Age:  62 y o  Date of Evaluation: 23     History: Difficulty Understanding  Reason for visit: Lorena Franks is being seen today at the request of Dr Suzie Escobedo for an evaluation of hearing  Patient reports difficulty understanding at times, especially in noisy environments  He denies ear pain, tinnitus and dizziness  A history of noise exposure () is reported  He also reports that his father has a problem with the bones in his ear  EVALUATION:    Otoscopic Evaluation:   Right Ear: Clear and healthy ear canal and tympanic membrane   Left Ear: Clear and healthy ear canal and tympanic membrane    Tympanometry:   Right: Type A - normal middle ear pressure and compliance   Left: Type A - normal middle ear pressure and compliance    Audiogram Results:  Right:  Normal/borderline normal through 2k Hz sloping to mild/moderate sensorineural hearing loss with excellent speech discrimination ability  Left: Normal/borderline normal through 2k Hz sloping to moderate/severe mixed hearing loss with excellent speech discrimination ability  Note: Unexpected air/bone gaps at 3k and 4k Hz in the left ear confirmed  Left:    *see attached audiogram      RECOMMENDATIONS:  Annual hearing eval, Consult ENT and Copy to Patient/Caregiver    PATIENT EDUCATION:   Discussed results and recommendations with patient  Questions were addressed and the patient was encouraged to contact our department should concerns arise        Chata Cortes   Clinical Audiologist

## 2023-03-24 ENCOUNTER — APPOINTMENT (OUTPATIENT)
Dept: PHYSICAL THERAPY | Facility: CLINIC | Age: 58
End: 2023-03-24

## 2023-03-29 PROBLEM — E11.22 TYPE 2 DIABETES MELLITUS WITH STAGE 3A CHRONIC KIDNEY DISEASE, WITHOUT LONG-TERM CURRENT USE OF INSULIN (HCC): Status: ACTIVE | Noted: 2022-03-14

## 2023-03-29 PROBLEM — N18.31 TYPE 2 DIABETES MELLITUS WITH STAGE 3A CHRONIC KIDNEY DISEASE, WITHOUT LONG-TERM CURRENT USE OF INSULIN (HCC): Status: ACTIVE | Noted: 2022-03-14

## 2023-03-29 NOTE — PROGRESS NOTES
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, arteriolar nephrosclerosis  -Baseline creatinine 1 1-1 6  -Most recent renal ultrasound in February 2022 with stable right upper pole renal cyst and no hydronephrosis or shadowing calculi  -Follows with Dr Daniella Connor  -recent creatinine 1 27, stable at baseline  -UPCr 0 06, insignificant and at goal  -electrolytes and acid-base stable  No prior history of anemia  -Treat modifiable risk factors  -avoid nephrotoxins, NSAIDs, hypotension and IV contrast if possible  -stay well hydrated  -follow-up with Dr Daniella Connor in 6 months with repeat blood and urine studies  Hypertension/Volume status:  -BP near goal  -Secondary work-up including renal artery duplex previously negative with the exception of severe HARPER, on BiPAP  -volume status euvolemic  -continue current medications: Olmesartan 40 mg Daily, hydralazine 50 mg twice daily, eplerenone 25 mg daily, clonidine patch 0 1 mg weekly, Bystolic 2 5 mg daily, torsemide 5 mg daily, nifedipine 30 mg daily  -recommend medication modifications for BP control towards goal but patient requesting to make no changes and will be pushing lifestyle modifications, including weight loss  Advised patient if BP remains elevated, will recommend further medication modifications   -Avoid NSAIDs  -Avoid hypotension or fluctuations in blood pressure    -low sodium (2 gm) diet  Encourage regular exercise  -continue to monitor BP at home    Bone Mineral Disease of CKD:  -Calcium and magnesium at goal  -Phosphorus 3 5 and PTH 51 on 11/29/2022, at goal  -Vitamin D deficiency: Vitamin D 25 24 on 11/29/2022, s/p ergocalciferol 50,000 units weekly x8 weeks  -continue to monitor  Check calcium, magnesium, phosphorus, PTH and vitamin D 25 prior to next appointment      Ureterocele:  -Previous renal ultrasound 2020 with thickened bladder with UV junction slightly more pronounced  -S/p cystoscopy and cystolitholopaxy, right stent removal  -Most recent renal ultrasound on 2/25/2022 with normal echogenicity and contour, no hydronephrosis, stable 1 2 x 1 8 right upper pole cyst and 6 x 7 x 2 mm echogenic focus in the left lower pole  No shadowing calculi  Bladder normally distended without focal thickening or mass lesions  Bilateral ureteral jets not detected  -Followed by urology    Nephrolithiasis:  -hx numerous stones  -+ recent stone episode 1 month ago with brief episode of gross hematuria, passed spontaneously  -Continue to push hydration  Encourage 2-3 L water/day to maintain urine output greater than 2 L  -Low-sodium diet, low oxalate diet  -Recommend 24-hour urine stone risk profile but patient prefers to defer at this time and may consider referral urine if he has another stone episode  -Continue follow-up with urology  -Continue to monitor    Dyslipidemia:  -stable     -continue statin  Hx of elevated LFTs likely due to statin   -low-cholesterol and low-fat diet, aerobic exercise  -management per PCP    Atrial fibrillation:  -stable, rate controlled  Primarily in sinus rhythm  -on Tikosyn and anticoagulation with Xarelto  -management per Cardiology    Obesity:  -continue to encourage weight management, lifestyle modifications and diet modifications to help control HTN and slow progession of CKD and proteinuria  -continue follow-up and management with PCP    HARPER:  -On BiPAP  -Management per primary team    Age related screening: Your primary caregiver may do yearly screening for colorectal cancer  It is recommended in all men and women over 48years old  You may have screening earlier if you have colon disease or a family history of colorectal cancer  Plan for colonoscopy by Dr Quenton Peabody once recovered from CVA  Tato Adams was seen today for follow-up and ckd iii  Diagnoses and all orders for this visit:    Stage 3a chronic kidney disease ([facility] [address] )  -     CBC; Future  -     Magnesium;  Future  - Comprehensive metabolic panel; Future  -     PTH, intact; Future  -     Phosphorus; Future  -     Protein / creatinine ratio, urine; Future  -     Vitamin D 25 hydroxy; Future    Vitamin D deficiency  -     Vitamin D 25 hydroxy; Future    Morbid obesity with BMI of 40 0-44 9, adult (HCC)    Dyslipidemia    Class 3 severe obesity due to excess calories with serious comorbidity and body mass index (BMI) of 40 0 to 44 9 in adult [facility])    Hypertensive chronic kidney disease with stage 1 through stage 4 chronic kidney disease, or unspecified chronic kidney disease    Benign hypertension with chronic kidney disease, stage III (HCC)    Chronic atrial fibrillation (HCC)    HARPER (obstructive sleep apnea)    Type 2 diabetes mellitus with stage 3a chronic kidney disease, without long-term current use of insulin ([facility]        Follow up with Dr Daniella Connor in 4 months with repeat blood and urine studies  Please call the office with any questions or concerns  Reason for Visit: Follow-up and CKD III    HPI: Bushra Guillermo is a 62 y o  male former smoker with CKD 3, HTN, HLD, atrial fibrillation on antiarrhythmics and Xarelto, gout, CVA, morbid obesity, HARPER, nephrolithiasis, ureterocele who presents for follow up of CKD  Patient followed by Dr Daniella Connor, last seen 11/30/22  Most recent creatinine 1 27, stable at baseline  Urine protein creatinine ratio minimal and insignificant     Patient denies recent hospitalizations or ER visits  Patient denies NSAID use  Patient denies nausea, vomiting, diarrhea, dyspnea, orthopnea, edema, hematuria or foamy urine  Patient reports a stone episode approximately 1 month ago which passed spontaneously  No further hematuria  Patient continues to push hydration    ROS: A complete review of systems was performed and was negative unless otherwise noted in the history of present illness  Allergies:   Patient has no known allergies      Medications:     Current Outpatient Medications:   • atorvastatin (LIPITOR) 40 mg tablet, TAKE 1 TABLET BY MOUTH EVERY DAY WITH DINNER, Disp: 90 tablet, Rfl: 1  •  bisacodyl (DULCOLAX) 5 mg EC tablet, Take 2 tablets (10 mg total) by mouth once for 1 dose, Disp: 2 tablet, Rfl: 0  •  cloNIDine (CATAPRES-TTS-1) 0 1 mg/24 hr, PLACE 1 PATCH ON THE SKIN ONCE A WEEK, Disp: 12 patch, Rfl: 3  •  dofetilide (TIKOSYN) 500 mcg capsule, TAKE 1 CAPSULE BY MOUTH EVERY 12 HOURS, Disp: 180 capsule, Rfl: 3  •  eplerenone (INSPRA) 25 mg tablet, TAKE 1 TABLET (25 MG TOTAL) BY MOUTH DAILY  , Disp: 90 tablet, Rfl: 2  •  ergocalciferol (VITAMIN D2) 50,000 units, TAKE 1 CAPSULE BY MOUTH ONE TIME PER WEEK FOR 8 DOSES, Disp: 4 capsule, Rfl: 1  •  hydrALAZINE (APRESOLINE) 50 mg tablet, Take 1 tablet (50 mg total) by mouth 2 (two) times a day, Disp: 180 tablet, Rfl: 3  •  nebivolol (BYSTOLIC) 2 5 mg tablet, TAKE 1 TABLET BY MOUTH DAILY HOLD FOR HEART RATE LESS THAN 50 BEATS PER MINUTE , Disp: 90 tablet, Rfl: 3  •  NIFEdipine (PROCARDIA XL) 30 mg 24 hr tablet, Take 1 tablet (30 mg total) by mouth 2 (two) times a day (Patient taking differently: Take 30 mg by mouth daily), Disp: 180 tablet, Rfl: 3  •  olmesartan (BENICAR) 40 mg tablet, TAKE 1 TABLET BY MOUTH EVERY DAY, Disp: 90 tablet, Rfl: 3  •  torsemide (DEMADEX) 5 MG tablet, TAKE 1 TABLET (5 MG TOTAL) BY MOUTH DAILY  , Disp: 90 tablet, Rfl: 1  •  Xarelto 20 MG tablet, TAKE 1 TABLET BY MOUTH DAILY WITH BREAKFAST, Disp: 30 tablet, Rfl: 1  •  polyethylene glycol (GOLYTELY) 4000 mL solution, Take 4,000 mL by mouth once for 1 dose, Disp: 4000 mL, Rfl: 0    Past Medical History:   Diagnosis Date   • Ankle swelling    • Arthritis    • Asthma    • Atrial fibrillation (HCC)    • [facility] 04[facility]2017   • Chronic kidney disease     kidney stones   • Gout    • Hypertension    • Kidney stone    • Night sweats    • Obesity    • Seasonal allergies    • Sleep apnea    • Stroke [facility]) 03/22     Past Surgical History:   Procedure "Laterality Date   • APPENDECTOMY     • CARDIOVERSION      X4 last was 2018   • COLONOSCOPY     • CYSTOSCOPY  08/30/2016    w/removal of object, last assessed 8/30/16   • FOOT SURGERY Left    • HAND SURGERY     • KNEE SURGERY Right 2016   • TN CYSTO/URETERO W/LITHOTRIPSY &INDWELL STENT INSRT Left 08/09/2016    Procedure: CYSTOSCOPY URETEROSCOPY WITH LITHOTRIPSY HOLMIUM LASER STONE EXTRACTION, RETROGRADE PYELOGRAM AND INSERTION STENT URETERAL;  Surgeon: Parul Garcia MD;  Location:  MAIN OR;  Service: Urology last assessed 8/30/16   • TN CYSTO/URETERO W/LITHOTRIPSY &INDWELL STENT INSRT Right 08/24/2020    Procedure: CYSTOSCOPY , cystolitholapaxy of bladder stone WITH HOMIUM LASER RIGHT RETROGRADE AND RIGHT URETERAL STENT, [address];  Surgeon: Parul Garcia MD;  Location: [facility] MAIN OR;  Service: Urology   • UPPER GASTROINTESTINAL ENDOSCOPY     • WRIST SURGERY Left 2014    Fracture surgery     Family History   Problem Relation Age of Onset   • Atrial fibrillation Mother    • Other Mother         blood dyscrasia   • Hypertension Mother    • Arthritis Mother    • Other Father         hypoglycemia    • Atrial fibrillation Father    • Diabetes Family    • Other Family         Thrombocytosis   • No Known Problems Sister    • Cancer Neg Hx    • Thyroid disease Neg Hx       reports that he quit smoking about 29 years ago  His smoking use included cigarettes  He has a 7 50 pack-year smoking history  He has never used smokeless tobacco  He reports current alcohol use  He reports that he does not use drugs  Physical Exam:   Vitals:    03/30/23 0802   BP: 138/80   BP Location: Left arm   Patient Position: Sitting   Cuff Size: Large   Pulse: 65   Weight: (!) 139 kg (307 lb)   Height: 5' 10\" (1 778 m)     Body mass index is 44 05 kg/m²  General:  Awake, alert, appears comfortable and in no acute distress  Nontoxic    Skin:  No rash, warm, good skin turgor   Eyes:  PERRL, EOMI, sclerae nonicteric   no " periorbital edema   ENT:  Moist mucous membranes  Neck:  Trachea midline, symmetric  No JVD  No carotid bruits  Chest:  Clear to auscultation bilaterally without wheezes, crackles or rhonchi  CVS:  Regular rate and rhythm without murmur, gallop or rub  S1 and S2 identified and normal   No S3, S4    Abdomen:  Soft, nontender, nondistended without masses  Normal bowel sounds x 4 quadrants  No bruit  Extremities:  Warm, pink, motor and sensory intact and well perfused  No cyanosis, pallor  No BLE edema  Neuro:  Awake, alert, oriented x3  Grossly intact  Psych:  Appropriate affect  Mentating appropriately  Normal mental status exam      Procedure:  No results found for this or any previous visit  Lab Results   Component Value Date    GLUCOSE 94 08/18/2015    CALCIUM 8 8 03/23/2023     08/18/2015    K 4 5 03/23/2023    CO2 29 03/23/2023     03/23/2023    BUN 21 03/23/2023    CREATININE 1 27 03/23/2023       Results from last 7 days   Lab Units 03/23/23  0836   POTASSIUM mmol/L 4 5   CHLORIDE mmol/L 106   CO2 mmol/L 29   BUN mg/dL 21   CREATININE mg/dL 1 27   CALCIUM mg/dL 8 8   MAGNESIUM mg/dL 2 0       EMR, including Epic, Care Everywhere and outside scanned documents reviewed  I have personally reviewed the blood work as stated above and in my note  I have personally reviewed PCP, consultants and prior nephrology notes

## 2023-03-29 NOTE — PATIENT INSTRUCTIONS
Your kidney function remains stable  Most recent creatinine 1 27, at baseline  We will continue routine surveillance as outlined below  Avoid all NSAIDs to include ibuprofen, Motrin, Aleve, Advil, Naproxen, Celebrex, Indomethacin, Toradol  Stay well hydrated  Drink at least 2-3 L of water a day given your history of kidney stones  Continue all prescribed medications  Call if blood pressure consistently more than 140/90 or less than 110/50s  High and low blood pressures may affect your kidney function  Recommend low salt diet (2 gm sodium diet) particularly given your history of kidney stones  Recommend low oxalate diet     Please let us know if you are scheduled for any studies with IV contrast (ex: CT scan, arteriogram or cardiac catheterization)   Follow up in 6 months with Dr Alex Ogden with repeat labs prior to appointment  Please contact the office with new symptoms or concerns

## 2023-03-30 ENCOUNTER — OFFICE VISIT (OUTPATIENT)
Dept: NEPHROLOGY | Facility: CLINIC | Age: 58
End: 2023-03-30

## 2023-03-30 ENCOUNTER — TELEPHONE (OUTPATIENT)
Dept: NEPHROLOGY | Facility: CLINIC | Age: 58
End: 2023-03-30

## 2023-03-30 VITALS
WEIGHT: 307 LBS | HEART RATE: 65 BPM | BODY MASS INDEX: 43.95 KG/M2 | HEIGHT: 70 IN | DIASTOLIC BLOOD PRESSURE: 80 MMHG | SYSTOLIC BLOOD PRESSURE: 138 MMHG

## 2023-03-30 DIAGNOSIS — E78.5 DYSLIPIDEMIA: ICD-10-CM

## 2023-03-30 DIAGNOSIS — N18.30 BENIGN HYPERTENSION WITH CHRONIC KIDNEY DISEASE, STAGE III (HCC): ICD-10-CM

## 2023-03-30 DIAGNOSIS — G47.33 OSA (OBSTRUCTIVE SLEEP APNEA): ICD-10-CM

## 2023-03-30 DIAGNOSIS — I12.9 BENIGN HYPERTENSION WITH CHRONIC KIDNEY DISEASE, STAGE III (HCC): ICD-10-CM

## 2023-03-30 DIAGNOSIS — E66.01 MORBID OBESITY WITH BMI OF 40.0-44.9, ADULT (HCC): ICD-10-CM

## 2023-03-30 DIAGNOSIS — I48.20 CHRONIC ATRIAL FIBRILLATION (HCC): ICD-10-CM

## 2023-03-30 DIAGNOSIS — N18.31 STAGE 3A CHRONIC KIDNEY DISEASE (HCC): Primary | ICD-10-CM

## 2023-03-30 DIAGNOSIS — E11.22 TYPE 2 DIABETES MELLITUS WITH STAGE 3A CHRONIC KIDNEY DISEASE, WITHOUT LONG-TERM CURRENT USE OF INSULIN (HCC): ICD-10-CM

## 2023-03-30 DIAGNOSIS — I12.9 HYPERTENSIVE CHRONIC KIDNEY DISEASE WITH STAGE 1 THROUGH STAGE 4 CHRONIC KIDNEY DISEASE, OR UNSPECIFIED CHRONIC KIDNEY DISEASE: ICD-10-CM

## 2023-03-30 DIAGNOSIS — E66.01 CLASS 3 SEVERE OBESITY DUE TO EXCESS CALORIES WITH SERIOUS COMORBIDITY AND BODY MASS INDEX (BMI) OF 40.0 TO 44.9 IN ADULT (HCC): ICD-10-CM

## 2023-03-30 DIAGNOSIS — E55.9 VITAMIN D DEFICIENCY: ICD-10-CM

## 2023-03-30 DIAGNOSIS — N18.31 TYPE 2 DIABETES MELLITUS WITH STAGE 3A CHRONIC KIDNEY DISEASE, WITHOUT LONG-TERM CURRENT USE OF INSULIN (HCC): ICD-10-CM

## 2023-03-30 NOTE — TELEPHONE ENCOUNTER
----- Message from Rosalinda Massey MD sent at 3/30/2023  8:54 AM EDT -----  1  I would have him send in a week of blood pressure readings in 2 months after aggressive lifestyle changes we may need to make blood pressure medication changes at that time I want to be aggressive given his history    2    I would strongly encourage him to do a 24-hour urine for stone risk evaluation at this time would rather be preventative if at all possible   ----- Message -----  From: Tom Fletcher PA-C  Sent: 3/30/2023   8:34 AM EDT  To: Rosalinda Massey MD

## 2023-05-07 DIAGNOSIS — E55.9 VITAMIN D DEFICIENCY: ICD-10-CM

## 2023-05-07 DIAGNOSIS — I12.9 HYPERTENSIVE CHRONIC KIDNEY DISEASE WITH STAGE 1 THROUGH STAGE 4 CHRONIC KIDNEY DISEASE, OR UNSPECIFIED CHRONIC KIDNEY DISEASE: ICD-10-CM

## 2023-05-07 DIAGNOSIS — E66.01 CLASS 3 SEVERE OBESITY DUE TO EXCESS CALORIES WITH SERIOUS COMORBIDITY AND BODY MASS INDEX (BMI) OF 40.0 TO 44.9 IN ADULT (HCC): ICD-10-CM

## 2023-05-07 DIAGNOSIS — I10 ACCELERATED HYPERTENSION: ICD-10-CM

## 2023-05-07 DIAGNOSIS — E78.5 DYSLIPIDEMIA: ICD-10-CM

## 2023-05-07 DIAGNOSIS — N18.31 STAGE 3A CHRONIC KIDNEY DISEASE (HCC): ICD-10-CM

## 2023-05-09 RX ORDER — NIFEDIPINE 30 MG/1
TABLET, EXTENDED RELEASE ORAL
Qty: 180 TABLET | Refills: 3 | Status: SHIPPED | OUTPATIENT
Start: 2023-05-09

## 2023-05-22 DIAGNOSIS — I48.20 CHRONIC ATRIAL FIBRILLATION (HCC): ICD-10-CM

## 2023-05-23 RX ORDER — RIVAROXABAN 20 MG/1
TABLET, FILM COATED ORAL
Qty: 30 TABLET | Refills: 1 | Status: SHIPPED | OUTPATIENT
Start: 2023-05-23

## 2023-06-13 ENCOUNTER — OFFICE VISIT (OUTPATIENT)
Dept: NEUROLOGY | Facility: CLINIC | Age: 58
End: 2023-06-13
Payer: COMMERCIAL

## 2023-06-13 VITALS
TEMPERATURE: 97.1 F | WEIGHT: 304.4 LBS | HEIGHT: 70 IN | BODY MASS INDEX: 43.58 KG/M2 | HEART RATE: 61 BPM | SYSTOLIC BLOOD PRESSURE: 133 MMHG | DIASTOLIC BLOOD PRESSURE: 71 MMHG

## 2023-06-13 DIAGNOSIS — E11.22 TYPE 2 DIABETES MELLITUS WITH STAGE 3A CHRONIC KIDNEY DISEASE, WITHOUT LONG-TERM CURRENT USE OF INSULIN (HCC): Primary | ICD-10-CM

## 2023-06-13 DIAGNOSIS — I48.20 CHRONIC ATRIAL FIBRILLATION (HCC): ICD-10-CM

## 2023-06-13 DIAGNOSIS — E66.01 MORBID OBESITY WITH BMI OF 40.0-44.9, ADULT (HCC): ICD-10-CM

## 2023-06-13 DIAGNOSIS — N18.31 TYPE 2 DIABETES MELLITUS WITH STAGE 3A CHRONIC KIDNEY DISEASE, WITHOUT LONG-TERM CURRENT USE OF INSULIN (HCC): Primary | ICD-10-CM

## 2023-06-13 DIAGNOSIS — E78.5 DYSLIPIDEMIA: ICD-10-CM

## 2023-06-13 DIAGNOSIS — Z86.73 HISTORY OF STROKE: ICD-10-CM

## 2023-06-13 DIAGNOSIS — G47.33 OSA (OBSTRUCTIVE SLEEP APNEA): ICD-10-CM

## 2023-06-13 PROCEDURE — 99215 OFFICE O/P EST HI 40 MIN: CPT

## 2023-06-13 NOTE — PROGRESS NOTES
Patient ID: Fidelina Mcclure is a 62 y o  male  Assessment/Plan:    HARPER (obstructive sleep apnea)  Hx of HARPER  Reports 100% CPAP compliance      Type 2 diabetes mellitus with stage 3a chronic kidney disease, without long-term current use of insulin (HCC)    Lab Results   Component Value Date    HGBA1C 7 5 (H) 03/08/2022       Lab Results   Component Value Date    HGBA1C 7 5 (H) 03/08/2022     He is not currently on any antidiabetics at this time  He was unable to tolerate Ozempic, so self discontinued  I reminded him to complete ordered updated HgbA1c to assess for DM2 and need for oral antidiabetics  I also educated him on the risks and complications associated with diabetes mellitus type 2  I encouraged him to work on better diet and routine physical activity to assist in weight loss and improving blood sugars overall  Chronic atrial fibrillation (HCC)  Chronic Afib maintained appropriately on Xarelto 20 mg daily with breakfast, managed by Cardiology  Denies any excessive bruising or bleeding  Morbid obesity with BMI of 40 0-44 9, [facility])  He is struggling to lose weight but admits to poor diet (eating out most meals related to work) and states he has not made weight loss a priority  His weight is 304 lb now  He could not tolerate Ozempic unfortunately due to side effects  He has seen medical weight management in the past and states it does not fit into his lifestyle  He does not wish to consider bariatric surgery  We discussed the risks associated with obesity and it's effects on his hypertension, hyperglycemia etc  I encouraged him to work on making small diet modifications such as portion control and limiting his over indulging  He declines referral back to weight management at this time  Dyslipidemia  On Atorvastatin 40 mg daily; no adverse effects  LDL goal <70  LDL 11/29/22 52  Encouraged healthy diet and routine physical activity      History of stroke  Fidelina Mcclure is a 62year old male with a history of Afib on Xarelto, hypertension, sleep apnea, arthritis, who presents to the office today to follow up on his hx of left frontal corona radiata hemorrhagic stroke 3/2022  His residual deficits include patchy numbness/ diminished sensation to temperature in the RLE and face, and some very minor difficulty with word finding occasionally  He continues on Xarelto 20mg daily for Afib and Atorvastatin 40 mg for secondary stroke prevention  He continues to struggle with BP control (managed by nephrology) and weight loss due to admitted excess calorie consumption and lack of routine physical activity  He is not taking any antidiabetics, despite his HgbA1c of 7 5 during his admission 3/8/2022  He has not had a repeat but continues to be adamant that he is not diabetic  We reviewed his stroke risk factors and management of the same  He was provided stroke education and we reviewed stroke warning signs/symptoms and reasons to return by ambulance to the ER  We discussed the importance of weight loss to aide in obtaining better BP control and BS control  He continues to decline referral to medical or surgical weight management at this time  I reminded him he is due for a repeat HgbA1c and if still elevated in a diabetic range that he should follow up with his PCP to discuss treatment of Diabetes   We discussed risks associated with diabetes including neuropathy, nephropathy, retinopathy etc  He will continue to follow up closely with his nephrologist and cardiologist  Plan as outlined below        Plan:  - Continue with good blood pressure control; I would recommend monitoring at home at least 3 times per week; Goal of <125/80 (managed by nephrology)  - Continue with good cholesterol control; Goal LDL <70  - Continue with good blood sugar control; Goal HgbA1c <7 0; due for a repeat HgbA1c, when last checked you were at 7 5 in the diabetic range  - Will defer monitoring of cholesterol and blood sugar and management of hypertensive medications to the primary care provider  - Stay well hydrated by drinking enough water   - Continue with no smoking (remote hx >20 years ago)  - Eat a healthy diet, high in lean meats fish, turkey, chicken  Low in fats, cholesterol, sugars and sodium  Avoid canned foods,  get lots of fresh/frozen vegetables/fruits  - Get routine exercise/physical activity as much as able to tolerate  - Prioritize weight loss; discussed portion control, not over indulging, making better food choices, limiting etoh   - Keep follow ups with your other health care providers  - Continue Xarelto 20 mg daily (with food) and Lipitor 40 mg daily  I will plan for him to return to the office in 8 months time but would be happy to see him sooner if the need should arise  If he has any symptoms concerning for TIA or stroke including sudden painless loss of vision or double vision, difficulty speaking or swallowing, vertigo/room spinning that does not quickly resolve, or weakness/numbness affecting 1 side of the face or body he should proceed by ambulance to the nearest emergency room immediately  Diagnoses and all orders for this visit:    Type 2 diabetes mellitus with stage 3a chronic kidney disease, without long-term current use of insulin (HCC)    History of stroke    Morbid obesity with BMI of 40 0-44 9, adult (HCC)    Chronic atrial fibrillation (HCC)    Dyslipidemia    HARPER (obstructive sleep apnea)             Subjective:    LISA Ric Kwan is a 62year old male with a history of Afib on Xarelto, hypertension, sleep apnea, arthritis, who presents to the office today to follow up on his hx of left frontal corona radiata hemorrhagic stroke 3/2022  Etiology suspected to be most likely hemorrhagic stroke in the setting of uncontrolled HTN and anticoagulation use with subsequent ischemic stroke secondary to compression of vessel in the setting of vasogenic edema   His AC was discontinued for 3 weeks and he had a repeat CT head 4/6/22 before restarting Xarelto 20mg daily  He has continued on Atorvastatin 40 mg and his antihypertensives with good compliance since discharge  He was last seen 12/6/22  He returns today and states he continues to do well  Secondary Stroke Prevention  He continues on Xarelto 20 mg (with breakfast) and Atorvastatin 40 mg   Compliant with his medications, no issues affording or obtaining medications  Denies excessive bruising or bleeding     Deficits  His only residual deficit is continued (no better or worse) patchy numbness/ diminished sensation to temperature in the right lower leg from the knee down to his foot as well as a little in the face and on the right side of the head  He also notes some very minor difficulty with word finding occasionally  Denies numbness, tingling, weakness, dizziness, headaches, vision changes or any new or worsening stroke like symptoms  Denies difficulty with swallowing     Monitoring  BP-  Dr Javy Fu has set his goal blood pressure- 125/80  He monitors his BP at least once a week  BP today 133/71  He reports home readings are <145/95  He is not taking any antidiabetics, despite his HgbA1c of 7 5 during his admission 3/8/2022  - he is adamant that he is not diabetic   He has not had a repeat despite orders for repeat from our office and his PCP  Component      Latest Ref Rng & Units 11/29/2022   Cholesterol      See Comment mg/dL 121   Triglycerides      See Comment mg/dL 150 (H)   HDL      >=40 mg/dL 39 (L)   LDL Calculated      0 - 100 mg/dL 52   CTA H/N 3/7/22 without ICA stenosis      Safety  Independent of all ADLs  No falls at home  He is managing his own medications and finances  assistive devices- none  Driving- independent; no concerns  Memory- no concerns     Substance use    Smoking- does not smoke; quit 30 years ago  Etoh- weekly 4-5 beers  Drugs- none  Caffeine - 1 cup of coffee daily      Sleep  He sleeps well and uses a Bi-PAP for "HARPER   100% compliance      Diet  He is struggling to lose weight but admits to poor diet and states he has not made this a priority  His weight is 304 lb now, was 307 lb 3/30/23  He could not tolerate Ozempic unfortunately due to side effects in the past  He has seen medical weight management in the past and states it does not fit into his lifestyle  He does not wish to consider bariatric surgery  He verbalizes understanding about needing to loose weight and the health risks associated with diabetes but indicates that he needs to find his own way to loose weight  Exercise  He is exercising by walking 20-30 minutes a day at least 4 times a week   His wife has bought him a treadmill and other cycling equipment but he refuses to use it  PT/OT/ST  He completed speech therapy, occupational therapy and physical therapy s/p stroke  He recently returned to PT for shoulder tendonitis      Mood  He denies any concerns for significant anxiety or depression  Follow ups  He continues to follow closely with cardiology and nephrology  Return to work  returned to work 2 weeks after his stroke  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 and he enjoys appetizers and drinks with dinner  The following portions of the patient's history were reviewed and updated as appropriate: allergies, current medications, past family history, past medical history, past social history and past surgical history  Objective:    Blood pressure 133/71, pulse 61, temperature (!) 97 1 °F (36 2 °C), temperature source Temporal, height 5' 10\" (1 778 m), weight (!) 138 kg (304 lb 6 4 oz)  Neurological Exam     On neurological examination patient is alert, awake, oriented and in no distress  Speech is fluent without dysarthria or aphasia   Cranial nerves 2-12 were symmetrically intact bilaterally, with the exception of diminished sensation in the right V1-V2 distribution of the face compared to the left (equal " symmetric V3 bilaterally)  No evidence of any focal weakness or sensory loss in the upper or lower extremities  Motor testing reveals 5/5 strength of the bilateral upper and lower extremities  There was no pronator drift   No fasciculations present  No abnormal involuntary movements  Finger- to-nose reveals no tremor or ataxia and intact proprioceptive function, no dysmetria was noted  Sensation was intact to vibration, light touch, and temperature in bilateral upper and lower extremities, with the exception of diminished sensation to vibration in the RLE distally compared to proximally and compared to the LLE  Deep tendon reflexes were 2+ and symmetric in the bilateral upper and lower extremities  He is able to rise easily without assistance from a seated position  Casual gait is normal including stance, stride, and arm swing  He ambulates without any assistive devices  Normal tandem gait  Romberg is absent         ROS:    Review of Systems    Review of Systems   Constitutional: Negative  Negative for appetite change and fever  HENT: Negative  Negative for hearing loss, tinnitus, trouble swallowing and voice change  Eyes: Negative  Negative for photophobia, pain and visual disturbance  Respiratory: Negative  Negative for shortness of breath  Cardiovascular: Negative  Negative for palpitations  Gastrointestinal: Negative  Negative for nausea and vomiting  Endocrine: Negative  Negative for cold intolerance  Genitourinary: Negative  Negative for dysuria, frequency and urgency  Musculoskeletal: Negative  Negative for gait problem, myalgias and neck pain  Skin: Negative  Negative for rash  Allergic/Immunologic: Negative  Neurological: Negative  Negative for dizziness, tremors, seizures, syncope, facial asymmetry, speech difficulty, weakness, light-headedness, numbness and headaches  Hematological: Negative  Does not bruise/bleed easily  Psychiatric/Behavioral: Negative    Negative for confusion, hallucinations and sleep disturbance  Reviewed ROS as entered by medical assistant

## 2023-06-13 NOTE — PATIENT INSTRUCTIONS
Plan:  - Continue with good blood pressure control; I would recommend monitoring at home at least 3 times per week; Goal of <125/80 (managed by nephrology)  - Continue with good cholesterol control; Goal LDL <70  - Continue with good blood sugar control; Goal HgbA1c <7 0; due for a repeat HgbA1c, when last checked you were at 7 5 in the diabetic range  - Will defer monitoring of cholesterol and blood sugar and management of hypertensive medications to the primary care provider  - Stay well hydrated by drinking enough water   - Continue with no smoking (remote hx >20 years ago)  - Eat a healthy diet, high in lean meats fish, turkey, chicken  Low in fats, cholesterol, sugars and sodium  Avoid canned foods,  get lots of fresh/frozen vegetables/fruits  - Get routine exercise/physical activity as much as able to tolerate  - Prioritize weight loss; discussed portion control, not over indulging, making better food choices, limiting etoh   - Keep follow ups with your other health care providers  - Continue Xarelto 20 mg daily (with food) and Lipitor 40 mg daily  I will plan for him to return to the office in 8 months time but would be happy to see him sooner if the need should arise  If he has any symptoms concerning for TIA or stroke including sudden painless loss of vision or double vision, difficulty speaking or swallowing, vertigo/room spinning that does not quickly resolve, or weakness/numbness affecting 1 side of the face or body he should proceed by ambulance to the nearest emergency room immediately

## 2023-06-13 NOTE — PROGRESS NOTES
Patient ID: Nicole Gallegos is a 62 y o  male  Assessment/Plan:    No problem-specific Assessment & Plan notes found for this encounter  {Assess/PlanSmartLinks:94674}       Subjective:    HPI    Have you had any new stroke like symptoms that were not previously present (Sudden loss of vision, difficulty speaking or swallowing,  vertigo/room spinning that did not quickly resolve, weakness or numbness affecting one side of the body)? no    Are you taking all of your medications as prescribed?  Yes    Any side effects including bleeding/bruising? no      Past Medical History:   Diagnosis Date   • Ankle swelling    • Arthritis    • Asthma    • Atrial fibrillation (HCC)    • Atrial fibrillation with RVR ([facility] [address] ) 2017   • Chronic kidney disease     kidney stones   • Gout    • Hypertension    • Kidney stone    • Night sweats    • Obesity    • Seasonal allergies    • Sleep apnea    • Stroke [facility])        Social History     Socioeconomic History   • Marital status: /Civil Union     Spouse name: Not on file   • Number of children: 4   • Years of education: 12th grade    • Highest education level: Not on file   Occupational History   • Not on file   Tobacco Use   • Smoking status: Former     Packs/day: 0 50     Years: 15 00     Total pack years: 7 50     Types: Cigarettes     Quit date: 1994     Years since quittin 2   • Smokeless tobacco: Never   Vaping Use   • Vaping Use: Never used   Substance and Sexual Activity   • Alcohol use: Yes     Comment: occ   • Drug use: No   • Sexual activity: Yes     Partners: Female     Birth control/protection: None   Other Topics Concern   • Not on file   Social History Narrative    No caffeine use      Social Determinants of Health     Financial Resource Strain: Not on file   Food Insecurity: No Food Insecurity (3/11/2022)    Hunger Vital Sign    • Worried About Running Out of Food in the Last Year: Never true    • Ran Out of Food in the Last Year: Never true Transportation Needs: No Transportation Needs (3/11/2022)    PRAPARE - Transportation    • Lack of Transportation (Medical): No    • Lack of Transportation (Non-Medical): No   Physical Activity: Not on file   Stress: Not on file   Social Connections: Not on file   Intimate Partner Violence: Not on file   Housing Stability: Low Risk  (3/11/2022)    Housing Stability Vital Sign    • Unable to Pay for Housing in the Last Year: No    • Number of Places Lived in the Last Year: 1    • Unstable Housing in the Last Year: No         Current Outpatient Medications:   •  atorvastatin (LIPITOR) 40 mg tablet, TAKE 1 TABLET BY MOUTH EVERY DAY WITH DINNER, Disp: 90 tablet, Rfl: 1  •  bisacodyl (DULCOLAX) 5 mg EC tablet, Take 2 tablets (10 mg total) by mouth once for 1 dose, Disp: 2 tablet, Rfl: 0  •  cloNIDine (CATAPRES-TTS-1) 0 1 mg/24 hr, PLACE 1 PATCH ON THE SKIN ONCE A WEEK, Disp: 12 patch, Rfl: 3  •  dofetilide (TIKOSYN) 500 mcg capsule, TAKE 1 CAPSULE BY MOUTH EVERY 12 HOURS, Disp: 180 capsule, Rfl: 3  •  eplerenone (INSPRA) 25 mg tablet, TAKE 1 TABLET (25 MG TOTAL) BY MOUTH DAILY  , Disp: 90 tablet, Rfl: 2  •  ergocalciferol (VITAMIN D2) 50,000 units, TAKE 1 CAPSULE BY MOUTH ONE TIME PER WEEK FOR 8 DOSES, Disp: 4 capsule, Rfl: 1  •  hydrALAZINE (APRESOLINE) 50 mg tablet, Take 1 tablet (50 mg total) by mouth 2 (two) times a day, Disp: 180 tablet, Rfl: 3  •  nebivolol (BYSTOLIC) 2 5 mg tablet, TAKE 1 TABLET BY MOUTH DAILY HOLD FOR HEART RATE LESS THAN 50 BEATS PER MINUTE , Disp: 90 tablet, Rfl: 3  •  NIFEdipine (PROCARDIA XL) 30 mg 24 hr tablet, TAKE 1 TABLET BY MOUTH TWICE A DAY, Disp: 180 tablet, Rfl: 3  •  olmesartan (BENICAR) 40 mg tablet, TAKE 1 TABLET BY MOUTH EVERY DAY, Disp: 90 tablet, Rfl: 3  •  polyethylene glycol (GOLYTELY) 4000 mL solution, Take 4,000 mL by mouth once for 1 dose, Disp: 4000 mL, Rfl: 0  •  torsemide (DEMADEX) 5 MG tablet, TAKE 1 TABLET (5 MG TOTAL) BY MOUTH DAILY  , Disp: 90 tablet, Rfl: 1  • Xarelto 20 MG tablet, TAKE 1 TABLET BY MOUTH EVERY DAY WITH BREAKFAST, Disp: 30 tablet, Rfl: 1    No Known Allergies         {[facility] HPI texts:02718}    {Common ambulatory SmartLinks:20818}         Objective: There were no vitals taken for this visit  Physical Exam    Neurological Exam      ROS:    Review of Systems   Constitutional: Negative  Negative for appetite change and fever  HENT: Negative  Negative for hearing loss, tinnitus, trouble swallowing and voice change  Eyes: Negative  Negative for photophobia, pain and visual disturbance  Respiratory: Negative  Negative for shortness of breath  Cardiovascular: Negative  Negative for palpitations  Gastrointestinal: Negative  Negative for nausea and vomiting  Endocrine: Negative  Negative for cold intolerance  Genitourinary: Negative  Negative for dysuria, frequency and urgency  Musculoskeletal: Negative  Negative for gait problem, myalgias and neck pain  Skin: Negative  Negative for rash  Allergic/Immunologic: Negative  Neurological: Negative  Negative for dizziness, tremors, seizures, syncope, facial asymmetry, speech difficulty, weakness, light-headedness, numbness and headaches  Hematological: Negative  Does not bruise/bleed easily  Psychiatric/Behavioral: Negative  Negative for confusion, hallucinations and sleep disturbance

## 2023-06-14 NOTE — ASSESSMENT & PLAN NOTE
Lab Results   Component Value Date    HGBA1C 7 5 (H) 03/08/2022       Lab Results   Component Value Date    HGBA1C 7 5 (H) 03/08/2022     He is not currently on any antidiabetics at this time  He was unable to tolerate Ozempic, so self discontinued  I reminded him to complete ordered updated HgbA1c to assess for DM2 and need for oral antidiabetics  I also educated him on the risks and complications associated with diabetes mellitus type 2  I encouraged him to work on better diet and routine physical activity to assist in weight loss and improving blood sugars overall

## 2023-06-14 NOTE — ASSESSMENT & PLAN NOTE
Mihsel Burt is a 62year old male with a history of Afib on Xarelto, hypertension, sleep apnea, arthritis, who presents to the office today to follow up on his hx of left frontal corona radiata hemorrhagic stroke 3/2022  His residual deficits include patchy numbness/ diminished sensation to temperature in the RLE and face, and some very minor difficulty with word finding occasionally  He continues on Xarelto 20mg daily for Afib and Atorvastatin 40 mg for secondary stroke prevention  He continues to struggle with BP control (managed by nephrology) and weight loss due to admitted excess calorie consumption and lack of routine physical activity  He is not taking any antidiabetics, despite his HgbA1c of 7 5 during his admission 3/8/2022  He has not had a repeat but continues to be adamant that he is not diabetic  We reviewed his stroke risk factors and management of the same  He was provided stroke education and we reviewed stroke warning signs/symptoms and reasons to return by ambulance to the ER  We discussed the importance of weight loss to aide in obtaining better BP control and BS control  He continues to decline referral to medical or surgical weight management at this time  I reminded him he is due for a repeat HgbA1c and if still elevated in a diabetic range that he should follow up with his PCP to discuss treatment of Diabetes   We discussed risks associated with diabetes including neuropathy, nephropathy, retinopathy etc  He will continue to follow up closely with his nephrologist and cardiologist  Plan as outlined below        Plan:  - Continue with good blood pressure control; I would recommend monitoring at home at least 3 times per week; Goal of <125/80 (managed by nephrology)  - Continue with good cholesterol control; Goal LDL <70  - Continue with good blood sugar control; Goal HgbA1c <7 0; due for a repeat HgbA1c, when last checked you were at 7 5 in the diabetic range  - Will defer monitoring of cholesterol and blood sugar and management of hypertensive medications to the primary care provider  - Stay well hydrated by drinking enough water   - Continue with no smoking (remote hx >20 years ago)  - Eat a healthy diet, high in lean meats fish, turkey, chicken  Low in fats, cholesterol, sugars and sodium  Avoid canned foods,  get lots of fresh/frozen vegetables/fruits  - Get routine exercise/physical activity as much as able to tolerate  - Prioritize weight loss; discussed portion control, not over indulging, making better food choices, limiting etoh   - Keep follow ups with your other health care providers  - Continue Xarelto 20 mg daily (with food) and Lipitor 40 mg daily  I will plan for him to return to the office in 8 months time but would be happy to see him sooner if the need should arise  If he has any symptoms concerning for TIA or stroke including sudden painless loss of vision or double vision, difficulty speaking or swallowing, vertigo/room spinning that does not quickly resolve, or weakness/numbness affecting 1 side of the face or body he should proceed by ambulance to the nearest emergency room immediately

## 2023-06-14 NOTE — ASSESSMENT & PLAN NOTE
Chronic Afib maintained appropriately on Xarelto 20 mg daily with breakfast, managed by Cardiology  Denies any excessive bruising or bleeding

## 2023-06-14 NOTE — ASSESSMENT & PLAN NOTE
He is struggling to lose weight but admits to poor diet (eating out most meals related to work) and states he has not made weight loss a priority  His weight is 304 lb now  He could not tolerate Ozempic unfortunately due to side effects  He has seen medical weight management in the past and states it does not fit into his lifestyle  He does not wish to consider bariatric surgery  We discussed the risks associated with obesity and it's effects on his hypertension, hyperglycemia etc  I encouraged him to work on making small diet modifications such as portion control and limiting his over indulging  He declines referral back to weight management at this time

## 2023-06-14 NOTE — ASSESSMENT & PLAN NOTE
On Atorvastatin 40 mg daily; no adverse effects  LDL goal <70  LDL 11/29/22 52  Encouraged healthy diet and routine physical activity

## 2023-06-19 DIAGNOSIS — E78.5 DYSLIPIDEMIA: ICD-10-CM

## 2023-06-19 RX ORDER — ATORVASTATIN CALCIUM 40 MG/1
TABLET, FILM COATED ORAL
Qty: 90 TABLET | Refills: 1 | Status: SHIPPED | OUTPATIENT
Start: 2023-06-19

## 2023-07-14 DIAGNOSIS — I10 ACCELERATED HYPERTENSION: ICD-10-CM

## 2023-07-14 DIAGNOSIS — I12.9 HYPERTENSIVE CHRONIC KIDNEY DISEASE WITH STAGE 1 THROUGH STAGE 4 CHRONIC KIDNEY DISEASE, OR UNSPECIFIED CHRONIC KIDNEY DISEASE: ICD-10-CM

## 2023-07-14 DIAGNOSIS — E78.5 DYSLIPIDEMIA: ICD-10-CM

## 2023-07-14 DIAGNOSIS — E66.01 CLASS 3 SEVERE OBESITY DUE TO EXCESS CALORIES WITH SERIOUS COMORBIDITY AND BODY MASS INDEX (BMI) OF 40.0 TO 44.9 IN ADULT (HCC): ICD-10-CM

## 2023-07-14 DIAGNOSIS — N18.31 STAGE 3A CHRONIC KIDNEY DISEASE (HCC): ICD-10-CM

## 2023-07-14 DIAGNOSIS — I48.20 CHRONIC ATRIAL FIBRILLATION (HCC): ICD-10-CM

## 2023-07-14 DIAGNOSIS — I10 BENIGN ESSENTIAL HYPERTENSION: ICD-10-CM

## 2023-07-14 DIAGNOSIS — E55.9 VITAMIN D DEFICIENCY: ICD-10-CM

## 2023-07-14 DIAGNOSIS — N18.30 CHRONIC KIDNEY DISEASE, STAGE III (MODERATE) (HCC): ICD-10-CM

## 2023-07-14 RX ORDER — RIVAROXABAN 20 MG/1
TABLET, FILM COATED ORAL
Qty: 30 TABLET | Refills: 1 | Status: SHIPPED | OUTPATIENT
Start: 2023-07-14

## 2023-07-14 RX ORDER — EPLERENONE 25 MG/1
25 TABLET, FILM COATED ORAL DAILY
Qty: 90 TABLET | Refills: 2 | Status: SHIPPED | OUTPATIENT
Start: 2023-07-14

## 2023-07-14 RX ORDER — TORSEMIDE 5 MG/1
5 TABLET ORAL DAILY
Qty: 90 TABLET | Refills: 1 | Status: SHIPPED | OUTPATIENT
Start: 2023-07-14

## 2023-07-14 RX ORDER — HYDRALAZINE HYDROCHLORIDE 50 MG/1
TABLET, FILM COATED ORAL
Qty: 180 TABLET | Refills: 3 | Status: SHIPPED | OUTPATIENT
Start: 2023-07-14

## 2023-07-18 ENCOUNTER — OFFICE VISIT (OUTPATIENT)
Dept: OBGYN CLINIC | Facility: CLINIC | Age: 58
End: 2023-07-18
Payer: COMMERCIAL

## 2023-07-18 ENCOUNTER — APPOINTMENT (OUTPATIENT)
Dept: RADIOLOGY | Age: 58
End: 2023-07-18
Payer: COMMERCIAL

## 2023-07-18 VITALS
BODY MASS INDEX: 43.95 KG/M2 | DIASTOLIC BLOOD PRESSURE: 79 MMHG | HEIGHT: 70 IN | WEIGHT: 307 LBS | SYSTOLIC BLOOD PRESSURE: 145 MMHG | HEART RATE: 62 BPM

## 2023-07-18 DIAGNOSIS — M17.11 PRIMARY OSTEOARTHRITIS OF RIGHT KNEE: ICD-10-CM

## 2023-07-18 DIAGNOSIS — M25.561 RIGHT KNEE PAIN, UNSPECIFIED CHRONICITY: ICD-10-CM

## 2023-07-18 DIAGNOSIS — M25.561 RIGHT KNEE PAIN, UNSPECIFIED CHRONICITY: Primary | ICD-10-CM

## 2023-07-18 PROCEDURE — 73562 X-RAY EXAM OF KNEE 3: CPT

## 2023-07-18 PROCEDURE — 99203 OFFICE O/P NEW LOW 30 MIN: CPT | Performed by: ORTHOPAEDIC SURGERY

## 2023-07-18 NOTE — PROGRESS NOTES
CHIEF COMPLAINT/REASON FOR VISIT  Chief Complaint   Patient presents with   • Right Knee - Pain        HISTORY OF PRESENT ILLNESS  Agustin Andres is a 62 y.o. male who presents for evaluation of his right knee. Patient has had several weeks of worsening right knee pain. Pain is medial sided. Worse in the am, also has some night time pain that is disabling. Patient has had no prior injections to the knee in the last few years. At this point, patient states that the pain in the knee is disabling, but enough to seek an orthopedic consult for. He denies any numbness tingling paresthesias in the extremity. No other orthopedic complaints today. REVIEW OF SYSTEMS  Review of systems was performed and, outside that mentioned in the HPI, it was negative for symptomology related to the integumentary, hematologic, immunologic, allergic, neurologic, cardiovascular, respiratory, GI or  systems.     MEDICAL HISTORY  Patient Active Problem List   Diagnosis   • Pelvicaliectasis   • Asthma   • Benign hypertension with chronic kidney disease, stage III ([address])   • Degenerative joint disease of right knee   • Morbid obesity with BMI of 40.0-44.9, adult ([address])   • Primary osteoarthritis of left knee   • Tremor   • Chronic kidney disease, stage III (moderate) ([facility])   • Long term current use of antiarrhythmic drug   • Anticoagulation adequate   • Hypertensive chronic kidney disease with stage 1 through stage 4 chronic kidney disease, or unspecified chronic kidney disease   • Vitamin D deficiency   • Dyslipidemia   • Bladder stones   • Situational anxiety   • Class 3 severe obesity due to excess calories with serious comorbidity and body mass index (BMI) of 40.0 to 44.9 in adult ([facility])   • HARPER (obstructive sleep apnea)   • Nocturnal hypoxemia   • Chronic atrial fibrillation ([facility])   • ICH (intracerebral hemorrhage) ([facility])   • Accelerated hypertension   • Type 2 diabetes mellitus with stage 3a chronic kidney disease, without long-term current use of insulin (HCC)   • Elevated liver function tests   • Change in bowel habits   • History of stroke       SURGICAL HISTORY  Past Surgical History:   Procedure Laterality Date   • APPENDECTOMY     • CARDIOVERSION      X4 last was 2018   • COLONOSCOPY     • CYSTOSCOPY  08/30/2016    w/removal of object, last assessed 8/30/16   • FOOT SURGERY Left    • HAND SURGERY     • KNEE SURGERY Right 2016   • WI CYSTO/URETERO W/LITHOTRIPSY &INDWELL STENT INSRT Left 08/09/2016    Procedure: CYSTOSCOPY URETEROSCOPY WITH LITHOTRIPSY HOLMIUM LASER STONE EXTRACTION, RETROGRADE PYELOGRAM AND INSERTION STENT URETERAL;  Surgeon: Janet Torres MD;  Location:  MAIN OR;  Service: Urology last assessed 8/30/16   • WI CYSTO/URETERO W/LITHOTRIPSY &INDWELL STENT INSRT Right 08/24/2020    Procedure: CYSTOSCOPY , cystolitholapaxy of bladder stone WITH HOMIUM LASER RIGHT RETROGRADE AND RIGHT URETERAL Richrd Kaykay;  Surgeon: Janet Torres MD;  Location: [address] [place] [address] MAIN OR;  Service: Urology   • UPPER GASTROINTESTINAL ENDOSCOPY     • WRIST SURGERY Left 2014    Fracture surgery       CURRENT MEDICATIONS    Current Outpatient Medications:   •  atorvastatin (LIPITOR) 40 mg tablet, TAKE 1 TABLET BY MOUTH EVERY DAY WITH DINNER, Disp: 90 tablet, Rfl: 1  •  cloNIDine (CATAPRES-TTS-1) 0.1 mg/24 hr, PLACE 1 PATCH ON THE SKIN ONCE A WEEK, Disp: 12 patch, Rfl: 3  •  dofetilide (TIKOSYN) 500 mcg capsule, TAKE 1 CAPSULE BY MOUTH EVERY 12 HOURS, Disp: 180 capsule, Rfl: 3  •  eplerenone (INSPRA) 25 mg tablet, TAKE 1 TABLET (25 MG TOTAL) BY MOUTH DAILY. , Disp: 90 tablet, Rfl: 2  •  hydrALAZINE (APRESOLINE) 50 mg tablet, TAKE 1 TABLET BY MOUTH TWICE A DAY, Disp: 180 tablet, Rfl: 3  •  nebivolol (BYSTOLIC) 2.5 mg tablet, TAKE 1 TABLET BY MOUTH DAILY HOLD FOR HEART RATE LESS THAN 50 BEATS PER MINUTE., Disp: 90 tablet, Rfl: 3  •  NIFEdipine (PROCARDIA XL) 30 mg 24 hr tablet, TAKE 1 TABLET BY MOUTH TWICE A DAY, Disp: 180 tablet, Rfl: 3  • olmesartan (BENICAR) 40 mg tablet, TAKE 1 TABLET BY MOUTH EVERY DAY, Disp: 90 tablet, Rfl: 3  •  torsemide (DEMADEX) 5 MG tablet, TAKE 1 TABLET (5 MG TOTAL) BY MOUTH DAILY. , Disp: 90 tablet, Rfl: 1  •  Xarelto 20 MG tablet, TAKE 1 TABLET BY MOUTH EVERY DAY WITH BREAKFAST, Disp: 30 tablet, Rfl: 1    SOCIAL HISTORY  Social History     Socioeconomic History   • Marital status: /Civil Union     Spouse name: Not on file   • Number of children: 4   • Years of education: 12th grade    • Highest education level: Not on file   Occupational History   • Not on file   Tobacco Use   • Smoking status: Former     Packs/day: 0.50     Years: 15.00     Total pack years: 7.50     Types: Cigarettes     Quit date: 1994     Years since quittin.3   • Smokeless tobacco: Never   Vaping Use   • Vaping Use: Never used   Substance and Sexual Activity   • Alcohol use: Yes     Comment: occ   • Drug use: No   • Sexual activity: Yes     Partners: Female     Birth control/protection: None   Other Topics Concern   • Not on file   Social History Narrative    No caffeine use      Social Determinants of Health     Financial Resource Strain: Not on file   Food Insecurity: No Food Insecurity (3/11/2022)    Hunger Vital Sign    • Worried About Running Out of Food in the Last Year: Never true    • Ran Out of Food in the Last Year: Never true   Transportation Needs: No Transportation Needs (3/11/2022)    PRAPARE - Transportation    • Lack of Transportation (Medical): No    • Lack of Transportation (Non-Medical):  No   Physical Activity: Not on file   Stress: Not on file   Social Connections: Not on file   Intimate Partner Violence: Not on file   Housing Stability: Low Risk  (3/11/2022)    Housing Stability Vital Sign    • Unable to Pay for Housing in the Last Year: No    • Number of Places Lived in the Last Year: 1    • Unstable Housing in the Last Year: No       Objective     VITAL SIGNS  /79 (BP Location: Left arm, Patient Position: Sitting, Cuff Size: Large)   Pulse 62   Ht 5' 10" (1.778 m) Comment: verbal  Wt (!) 139 kg (307 lb)   BMI 44.05 kg/m²      PHYSICAL EXAM  General:   Well-appearing  No acute distress  Appears stated age    PHYSICAL EXAMINATION  General: The patient is alert, oriented, and pleasant to interact with. Neuro: Sensation intact to light touch in bilateral lower extremities. Skin: No significant abrasions or lesions over the area examined. Vessels: Palpable pedal pulse. Musculoskeletal: Right Knee Examination:  Gait: Normal and Antalgic  Alignment: mild varus  Effusion: minimal  ROM: 0-120 vs. 0 to 135 on opposite side. Crepitus no  TTP: Medial joint line  Hyperflexion: painful  Christina's Test Negative   Lachman's Test 1A  Anterior Drawer Test 1A   Posterior Drawer Test 1A  Valgus Stress Test stable at 0 and 30 degrees  Varus Stress Test stable at 0 and 30 degrees  Lateral Patellar Glide 1/4  Medial Patellar Glide 1/4  Patellar Apprehension painless  Quadriceps: No atrophy present No Strength 5/5    RADIOGRAPHIC EXAMINATION/DIAGNOSTICS:    X-rays reviewed today demonstrate tricompartmental osteoarthritis, severe osteoarthritis in the medial compartment. ASSESSMENT/PLAN:  1. Right knee Tricompartmental degenerative joint disease. Today, we discussed the non-operative treatment options that include, but are not limited to: rest, ice, activity modification, anti-inflammatory medication, physical therapy, and/or injections. Patient was not interested in steroid injections at this time. He would like to set up an appointment with a knee arthroplasty specialist in November to discuss surgical options. He voiced his understanding of this plan and was in agreement with it. All questions answered today.      If any issues, questions, or concerns arise between now and the next appointment, we have encouraged the patient contact our team.

## 2023-07-21 ENCOUNTER — APPOINTMENT (OUTPATIENT)
Dept: RADIOLOGY | Facility: MEDICAL CENTER | Age: 58
End: 2023-07-21
Payer: COMMERCIAL

## 2023-07-21 ENCOUNTER — OFFICE VISIT (OUTPATIENT)
Dept: OBGYN CLINIC | Facility: MEDICAL CENTER | Age: 58
End: 2023-07-21

## 2023-07-21 VITALS
DIASTOLIC BLOOD PRESSURE: 81 MMHG | WEIGHT: 309.6 LBS | HEART RATE: 62 BPM | HEIGHT: 70 IN | SYSTOLIC BLOOD PRESSURE: 135 MMHG | BODY MASS INDEX: 44.32 KG/M2

## 2023-07-21 DIAGNOSIS — M17.11 PRIMARY OSTEOARTHRITIS OF RIGHT KNEE: ICD-10-CM

## 2023-07-21 DIAGNOSIS — M25.561 RIGHT KNEE PAIN, UNSPECIFIED CHRONICITY: Primary | ICD-10-CM

## 2023-07-21 DIAGNOSIS — M25.561 RIGHT KNEE PAIN, UNSPECIFIED CHRONICITY: ICD-10-CM

## 2023-07-21 PROCEDURE — 73560 X-RAY EXAM OF KNEE 1 OR 2: CPT

## 2023-07-21 RX ORDER — BUPIVACAINE HYDROCHLORIDE 2.5 MG/ML
2 INJECTION, SOLUTION INFILTRATION; PERINEURAL
Status: COMPLETED | OUTPATIENT
Start: 2023-07-21 | End: 2023-07-21

## 2023-07-21 RX ORDER — TRIAMCINOLONE ACETONIDE 40 MG/ML
40 INJECTION, SUSPENSION INTRA-ARTICULAR; INTRAMUSCULAR
Status: COMPLETED | OUTPATIENT
Start: 2023-07-21 | End: 2023-07-21

## 2023-07-21 RX ADMIN — TRIAMCINOLONE ACETONIDE 40 MG: 40 INJECTION, SUSPENSION INTRA-ARTICULAR; INTRAMUSCULAR at 13:45

## 2023-07-21 RX ADMIN — BUPIVACAINE HYDROCHLORIDE 2 ML: 2.5 INJECTION, SOLUTION INFILTRATION; PERINEURAL at 13:45

## 2023-07-21 NOTE — PROGRESS NOTES
Assessment/Plan     1. Right knee pain, unspecified chronicity    2. Primary osteoarthritis of right knee    3. BMI 40.0-44.9, adult ([address])      Orders Placed This Encounter   Procedures   • Large joint arthrocentesis   • XR knee 1 or 2 vw right   • Ambulatory Referral to Weight Management   • Ambulatory Referral to Nutrition Services   • Ambulatory Referral to Physical Therapy     Reviewed today's physical exam findings and x-ray findings with patient at time of visit. Risks and benefits of conservative and operative treatments were discussed in detail with the patient. It was explained to the patient that based upon the clinical and radiographic findings, at this point in time, this is not a surgical problem. Treatment for the above diagnoses and associated symptoms of this nature is generally conservative including a physician directed physical therapy program, improved fitness/weight loss, anti-inflammatories, and potentially various injections. He was offered, accepted, performed an injection(s) of cortisone to his Right knee for symptomatic relief of pain and inflammation. Patient tolerated the treatment(s) well. Ice and post injection protocol advised. Weightbearing activities as tolerated. Amb referral to formal outpatient physical therapy to improve ROM and strengthening of Right knee. Tylenol up to 1000 mg a day as needed for pain and soreness. Instructed the patient that in order to be a total joint candidate, the patient would need to obtain a BMI of 40. Amb referral to weight management and nutritional services. Patient expresses understanding and is in agreement with this treatment plan. The patient was given the opportunity to ask questions or present concerns. Return in about 3 months (around 10/21/2023). I answered all of the patient's questions during the visit and provided education of the patient's condition during the visit.   The patient verbalized understanding of the information given and agrees with the plan. This note was dictated using [employer] software. It may contain errors including improperly dictated words. Please contact physician directly for any questions. History of Present Illness   Chief complaint:   Chief Complaint   Patient presents with   • Right Knee - Pain       HPI: Agustin Andres is a 62 y.o. male that c/o left knee pain. The patient was last seen on 07/18/2023 by Dr. Michael Rogers in which he was referred to a joint specialist for further evaluation. On today's presentation, the patient states he has been experiencing worsening left knee pain over the last few months with prolonged sedentary positions and weightbearing activities especially ambulating stairs. Denies any specific mechanism of injury or anna trauma to warrant his symptoms. He states his pain is local to the medial aspect of his left knee. The patient notes he has an antalgic gait and walks on the ball of his foot to alleviate his discomfort. The patient currently walks 1.5 miles a day while walking the dog. The patient states he had a stroke about 1.5 years ago affecting his right side. He has chronic numbness and tingling below his right knee. Reports numbness or tingling in his left lower extremity below the knee. ROS:    See HPI for musculoskeletal review.    All other systems reviewed are negative     Historical Information   Past Medical History:   Diagnosis Date   • Ankle swelling    • Arthritis    • Asthma    • Atrial fibrillation (HCC)    • Atrial fibrillation with RVR (HCC) 04/04/2017   • Chronic kidney disease     kidney stones   • Gout    • Hypertension    • Kidney stone    • Night sweats    • Obesity    • Seasonal allergies    • Sleep apnea    • Stroke ([address]) 03/22     Past Surgical History:   Procedure Laterality Date   • APPENDECTOMY     • CARDIOVERSION      X4 last was 2018   • COLONOSCOPY     • CYSTOSCOPY  08/30/2016    w/removal of object, last assessed 8/30/16   • FOOT SURGERY Left    • HAND SURGERY     • KNEE SURGERY Right 2016   • NE CYSTO/URETERO W/LITHOTRIPSY &INDWELL STENT INSRT Left 2016    Procedure: CYSTOSCOPY URETEROSCOPY WITH LITHOTRIPSY HOLMIUM LASER STONE EXTRACTION, RETROGRADE PYELOGRAM AND INSERTION STENT URETERAL;  Surgeon: Janet Torres MD;  Location:  MAIN OR;  Service: Urology last assessed 16   • NE CYSTO/URETERO W/LITHOTRIPSY &INDWELL STENT INSRT Right 2020    Procedure: CYSTOSCOPY , cystolitholapaxy of bladder stone WITH HOMIUM LASER RIGHT RETROGRADE AND RIGHT URETERAL STENT, [facility];  Surgeon: Janet Torres MD;  Location: [address] [place] [address] MAIN OR;  Service: Urology   • UPPER GASTROINTESTINAL ENDOSCOPY     • WRIST SURGERY Left 2014    Fracture surgery     Social History   Social History     Substance and Sexual Activity   Alcohol Use Yes    Comment: occ     Social History     Substance and Sexual Activity   Drug Use No     Social History     Tobacco Use   Smoking Status Former   • Packs/day: 0.50   • Years: 15.00   • Total pack years: 7.50   • Types: Cigarettes   • Quit date: 1994   • Years since quittin.3   Smokeless Tobacco Never     Family History:   Family History   Problem Relation Age of Onset   • Atrial fibrillation Mother    • Other Mother         blood dyscrasia   • Hypertension Mother    • Arthritis Mother    • Other Father         hypoglycemia    • Atrial fibrillation Father    • Diabetes Family    • Other Family         Thrombocytosis   • No Known Problems Sister    • Cancer Neg Hx    • Thyroid disease Neg Hx        Current Outpatient Medications on File Prior to Visit   Medication Sig Dispense Refill   • atorvastatin (LIPITOR) 40 mg tablet TAKE 1 TABLET BY MOUTH EVERY DAY WITH DINNER 90 tablet 1   • cloNIDine (CATAPRES-TTS-1) 0.1 mg/24 hr PLACE 1 PATCH ON THE SKIN ONCE A WEEK 12 patch 3   • dofetilide (TIKOSYN) 500 mcg capsule TAKE 1 CAPSULE BY MOUTH EVERY 12 HOURS 180 capsule 3   • eplerenone (INSPRA) 25 mg tablet TAKE 1 TABLET (25 MG TOTAL) BY MOUTH DAILY. 90 tablet 2   • hydrALAZINE (APRESOLINE) 50 mg tablet TAKE 1 TABLET BY MOUTH TWICE A  tablet 3   • nebivolol (BYSTOLIC) 2.5 mg tablet TAKE 1 TABLET BY MOUTH DAILY HOLD FOR HEART RATE LESS THAN 50 BEATS PER MINUTE. 90 tablet 3   • NIFEdipine (PROCARDIA XL) 30 mg 24 hr tablet TAKE 1 TABLET BY MOUTH TWICE A  tablet 3   • olmesartan (BENICAR) 40 mg tablet TAKE 1 TABLET BY MOUTH EVERY DAY 90 tablet 3   • torsemide (DEMADEX) 5 MG tablet TAKE 1 TABLET (5 MG TOTAL) BY MOUTH DAILY. 90 tablet 1   • Xarelto 20 MG tablet TAKE 1 TABLET BY MOUTH EVERY DAY WITH BREAKFAST 30 tablet 1     No current facility-administered medications on file prior to visit. No Known Allergies    Current Outpatient Medications on File Prior to Visit   Medication Sig Dispense Refill   • atorvastatin (LIPITOR) 40 mg tablet TAKE 1 TABLET BY MOUTH EVERY DAY WITH DINNER 90 tablet 1   • cloNIDine (CATAPRES-TTS-1) 0.1 mg/24 hr PLACE 1 PATCH ON THE SKIN ONCE A WEEK 12 patch 3   • dofetilide (TIKOSYN) 500 mcg capsule TAKE 1 CAPSULE BY MOUTH EVERY 12 HOURS 180 capsule 3   • eplerenone (INSPRA) 25 mg tablet TAKE 1 TABLET (25 MG TOTAL) BY MOUTH DAILY. 90 tablet 2   • hydrALAZINE (APRESOLINE) 50 mg tablet TAKE 1 TABLET BY MOUTH TWICE A  tablet 3   • nebivolol (BYSTOLIC) 2.5 mg tablet TAKE 1 TABLET BY MOUTH DAILY HOLD FOR HEART RATE LESS THAN 50 BEATS PER MINUTE. 90 tablet 3   • NIFEdipine (PROCARDIA XL) 30 mg 24 hr tablet TAKE 1 TABLET BY MOUTH TWICE A  tablet 3   • olmesartan (BENICAR) 40 mg tablet TAKE 1 TABLET BY MOUTH EVERY DAY 90 tablet 3   • torsemide (DEMADEX) 5 MG tablet TAKE 1 TABLET (5 MG TOTAL) BY MOUTH DAILY. 90 tablet 1   • Xarelto 20 MG tablet TAKE 1 TABLET BY MOUTH EVERY DAY WITH BREAKFAST 30 tablet 1     No current facility-administered medications on file prior to visit.        Objective   Vitals: Blood pressure 135/81, pulse 62, height 5' 10" (1.778 m), weight (!) 140 kg (309 lb 9.6 oz). ,Body mass index is 44.42 kg/m². PE:  AAOx 3  WDWN  Hearing intact, no drainage from eyes  Regular rate  no audible wheezing  no abdominal distension  LE compartments soft, skin intact    left knee:    Appearance:  no swelling   No ecchymosis  no obvious joint deformity   No effusion  Palpation/Tenderness:  + TTP over medial joint line  No TTP over lateral joint line   No TTP over patella  No TTP over patellar tendon  No TTP over pes anserine bursa  Active Range of Motion:  AROM: 0-120, PROM: 0-125  Special Tests:  Medial Christina's Test:  Negative. Lateral Christina's Test:  Negative  Apley's compression test:  Negative  Lachman's Test:  negative  Anterior Drawer Test:  Negative  Patellar grind:  Negative  Valgus Stress Test:  negative  Varus Stress Test:  negative     No ipsilateral hip pain with ROM    rightLE:    Sensation grossly intact  Palpable pulse  AT/GS/EHL intact    Imaging Studies: I have personally reviewed pertinent films in PACS  XR left knee: moderate to severe tricompartmental osteoarthritis affecting the medial tibiofemoral compartment as evidenced by joint space narrowing, osteophyte formation and subchondral sclerosis. Large joint arthrocentesis: R knee  Universal Protocol:  Consent: Verbal consent obtained. Risks and benefits: risks, benefits and alternatives were discussed  Consent given by: patient  Time out: Immediately prior to procedure a "time out" was called to verify the correct patient, procedure, equipment, support staff and site/side marked as required.   Timeout called at: 7/21/2023 2:37 PM.  Patient understanding: patient states understanding of the procedure being performed  Site marked: the operative site was marked  Patient identity confirmed: verbally with patient    Supporting Documentation  Indications: pain and diagnostic evaluation   Procedure Details  Location: knee - R knee  Needle size: 22 G  Ultrasound guidance: no  Approach: anterolateral  Medications administered: 40 mg triamcinolone acetonide 40 mg/mL; 2 mL bupivacaine 0.25 %    Patient tolerance: patient tolerated the procedure well with no immediate complications  Dressing:  Sterile dressing applied

## 2023-07-21 NOTE — LETTER
July 21, 2023     Michaela Mcdonald MD    Patient: Kinga Caldera   YOB: 1965   Date of Visit: 7/21/2023       Dear Dr. Nely Luther: Thank you for referring Jair Cleveland to me for evaluation. Below are my notes for this consultation. If you have questions, please do not hesitate to call me. I look forward to following your patient along with you. Sincerely,        Fer Santos DO        CC: MD Fer Puckett DO  7/21/2023  4:12 PM  Sign when Signing Visit   Assessment/Plan     1. Right knee pain, unspecified chronicity    2. Primary osteoarthritis of right knee    3. BMI 40.0-44.9, adult ([address])      Orders Placed This Encounter   Procedures   • Large joint arthrocentesis   • XR knee 1 or 2 vw right   • Ambulatory Referral to Weight Management   • Ambulatory Referral to Nutrition Services   • Ambulatory Referral to Physical Therapy     Reviewed today's physical exam findings and x-ray findings with patient at time of visit. Risks and benefits of conservative and operative treatments were discussed in detail with the patient. It was explained to the patient that based upon the clinical and radiographic findings, at this point in time, this is not a surgical problem. Treatment for the above diagnoses and associated symptoms of this nature is generally conservative including a physician directed physical therapy program, improved fitness/weight loss, anti-inflammatories, and potentially various injections. He was offered, accepted, performed an injection(s) of cortisone to his Right knee for symptomatic relief of pain and inflammation. Patient tolerated the treatment(s) well. Ice and post injection protocol advised. Weightbearing activities as tolerated. Amb referral to formal outpatient physical therapy to improve ROM and strengthening of Right knee. Tylenol up to 1000 mg a day as needed for pain and soreness.   Instructed the patient that in order to be a total joint candidate, the patient would need to obtain a BMI of 40. Amb referral to weight management and nutritional services. Patient expresses understanding and is in agreement with this treatment plan. The patient was given the opportunity to ask questions or present concerns. Return in about 3 months (around 10/21/2023). I answered all of the patient's questions during the visit and provided education of the patient's condition during the visit. The patient verbalized understanding of the information given and agrees with the plan. This note was dictated using [employer] software. It may contain errors including improperly dictated words. Please contact physician directly for any questions. History of Present Illness   Chief complaint:   Chief Complaint   Patient presents with   • Right Knee - Pain       HPI: Fawn Ruiz is a 62 y.o. male that c/o left knee pain. The patient was last seen on 07/18/2023 by Dr. Alethea Serrano in which he was referred to a joint specialist for further evaluation. On today's presentation, the patient states he has been experiencing worsening left knee pain over the last few months with prolonged sedentary positions and weightbearing activities especially ambulating stairs. Denies any specific mechanism of injury or anna trauma to warrant his symptoms. He states his pain is local to the medial aspect of his left knee. The patient notes he has an antalgic gait and walks on the ball of his foot to alleviate his discomfort. The patient currently walks 1.5 miles a day while walking the dog. The patient states he had a stroke about 1.5 years ago affecting his right side. He has chronic numbness and tingling below his right knee. Reports numbness or tingling in his left lower extremity below the knee. ROS:    See HPI for musculoskeletal review.    All other systems reviewed are negative     Historical Information   Past Medical History:   Diagnosis Date   • Ankle swelling    • Arthritis    • Asthma    • Atrial fibrillation (HCC)    • Atrial fibrillation with RVR ([address]) 2017   • Chronic kidney disease     kidney stones   • Gout    • Hypertension    • Kidney stone    • Night sweats    • Obesity    • Seasonal allergies    • Sleep apnea    • Stroke ([address])      Past Surgical History:   Procedure Laterality Date   • APPENDECTOMY     • CARDIOVERSION      X4 last was 2018   • COLONOSCOPY     • CYSTOSCOPY  2016    w/removal of object, last assessed 16   • FOOT SURGERY Left    • HAND SURGERY     • KNEE SURGERY Right 2016   • CO CYSTO/URETERO W/LITHOTRIPSY &INDWELL STENT INSRT Left 2016    Procedure: CYSTOSCOPY URETEROSCOPY WITH LITHOTRIPSY HOLMIUM LASER STONE EXTRACTION, RETROGRADE PYELOGRAM AND INSERTION STENT URETERAL;  Surgeon: Moe Govea MD;  Location:  MAIN OR;  Service: Urology last assessed 16   • CO CYSTO/URETERO W/LITHOTRIPSY &INDWELL STENT INSRT Right 2020    Procedure: CYSTOSCOPY , cystolitholapaxy of bladder stone WITH HOMIUM LASER RIGHT RETROGRADE AND RIGHT URETERAL [employer]ard;  Surgeon: Moe Govea MD;  Location: [facility] MAIN OR;  Service: Urology   • UPPER GASTROINTESTINAL ENDOSCOPY     • WRIST SURGERY Left 2014    Fracture surgery     Social History   Social History     Substance and Sexual Activity   Alcohol Use Yes    Comment: occ     Social History     Substance and Sexual Activity   Drug Use No     Social History     Tobacco Use   Smoking Status Former   • Packs/day: 0.50   • Years: 15.00   • Total pack years: 7.50   • Types: Cigarettes   • Quit date: 1994   • Years since quittin.3   Smokeless Tobacco Never     Family History:   Family History   Problem Relation Age of Onset   • Atrial fibrillation Mother    • Other Mother         blood dyscrasia   • Hypertension Mother    • Arthritis Mother    • Other Father         hypoglycemia    • Atrial fibrillation Father    • Diabetes Family    • Other Family         Thrombocytosis   • No Known Problems Sister    • Cancer Neg Hx    • Thyroid disease Neg Hx        Current Outpatient Medications on File Prior to Visit   Medication Sig Dispense Refill   • atorvastatin (LIPITOR) 40 mg tablet TAKE 1 TABLET BY MOUTH EVERY DAY WITH DINNER 90 tablet 1   • cloNIDine (CATAPRES-TTS-1) 0.1 mg/24 hr PLACE 1 PATCH ON THE SKIN ONCE A WEEK 12 patch 3   • dofetilide (TIKOSYN) 500 mcg capsule TAKE 1 CAPSULE BY MOUTH EVERY 12 HOURS 180 capsule 3   • eplerenone (INSPRA) 25 mg tablet TAKE 1 TABLET (25 MG TOTAL) BY MOUTH DAILY. 90 tablet 2   • hydrALAZINE (APRESOLINE) 50 mg tablet TAKE 1 TABLET BY MOUTH TWICE A  tablet 3   • nebivolol (BYSTOLIC) 2.5 mg tablet TAKE 1 TABLET BY MOUTH DAILY HOLD FOR HEART RATE LESS THAN 50 BEATS PER MINUTE. 90 tablet 3   • NIFEdipine (PROCARDIA XL) 30 mg 24 hr tablet TAKE 1 TABLET BY MOUTH TWICE A  tablet 3   • olmesartan (BENICAR) 40 mg tablet TAKE 1 TABLET BY MOUTH EVERY DAY 90 tablet 3   • torsemide (DEMADEX) 5 MG tablet TAKE 1 TABLET (5 MG TOTAL) BY MOUTH DAILY. 90 tablet 1   • Xarelto 20 MG tablet TAKE 1 TABLET BY MOUTH EVERY DAY WITH BREAKFAST 30 tablet 1     No current facility-administered medications on file prior to visit. No Known Allergies    Current Outpatient Medications on File Prior to Visit   Medication Sig Dispense Refill   • atorvastatin (LIPITOR) 40 mg tablet TAKE 1 TABLET BY MOUTH EVERY DAY WITH DINNER 90 tablet 1   • cloNIDine (CATAPRES-TTS-1) 0.1 mg/24 hr PLACE 1 PATCH ON THE SKIN ONCE A WEEK 12 patch 3   • dofetilide (TIKOSYN) 500 mcg capsule TAKE 1 CAPSULE BY MOUTH EVERY 12 HOURS 180 capsule 3   • eplerenone (INSPRA) 25 mg tablet TAKE 1 TABLET (25 MG TOTAL) BY MOUTH DAILY. 90 tablet 2   • hydrALAZINE (APRESOLINE) 50 mg tablet TAKE 1 TABLET BY MOUTH TWICE A  tablet 3   • nebivolol (BYSTOLIC) 2.5 mg tablet TAKE 1 TABLET BY MOUTH DAILY HOLD FOR HEART RATE LESS THAN 50 BEATS PER MINUTE.  90 tablet 3   • NIFEdipine (PROCARDIA XL) 30 mg 24 hr tablet TAKE 1 TABLET BY MOUTH TWICE A  tablet 3   • olmesartan (BENICAR) 40 mg tablet TAKE 1 TABLET BY MOUTH EVERY DAY 90 tablet 3   • torsemide (DEMADEX) 5 MG tablet TAKE 1 TABLET (5 MG TOTAL) BY MOUTH DAILY. 90 tablet 1   • Xarelto 20 MG tablet TAKE 1 TABLET BY MOUTH EVERY DAY WITH BREAKFAST 30 tablet 1     No current facility-administered medications on file prior to visit. Objective   Vitals: Blood pressure 135/81, pulse 62, height 5' 10" (1.778 m), weight (!) 140 kg (309 lb 9.6 oz). ,Body mass index is 44.42 kg/m². PE:  AAOx 3  WDWN  Hearing intact, no drainage from eyes  Regular rate  no audible wheezing  no abdominal distension  LE compartments soft, skin intact    left knee:    Appearance:  no swelling   No ecchymosis  no obvious joint deformity   No effusion  Palpation/Tenderness:  + TTP over medial joint line  No TTP over lateral joint line   No TTP over patella  No TTP over patellar tendon  No TTP over pes anserine bursa  Active Range of Motion:  AROM: 0-120, PROM: 0-125  Special Tests:  Medial Christina's Test:  Negative. Lateral Christina's Test:  Negative  Apley's compression test:  Negative  Lachman's Test:  negative  Anterior Drawer Test:  Negative  Patellar grind:  Negative  Valgus Stress Test:  negative  Varus Stress Test:  negative     No ipsilateral hip pain with ROM    rightLE:    Sensation grossly intact  Palpable pulse  AT/GS/EHL intact    Imaging Studies: I have personally reviewed pertinent films in PACS  XR left knee: moderate to severe tricompartmental osteoarthritis affecting the medial tibiofemoral compartment as evidenced by joint space narrowing, osteophyte formation and subchondral sclerosis. Large joint arthrocentesis: R knee  Universal Protocol:  Consent: Verbal consent obtained.   Risks and benefits: risks, benefits and alternatives were discussed  Consent given by: patient  Time out: Immediately prior to procedure a "time out" was called to verify the correct patient, procedure, equipment, support staff and site/side marked as required.   Timeout called at: 7/21/2023 2:37 PM.  Patient understanding: patient states understanding of the procedure being performed  Site marked: the operative site was marked  Patient identity confirmed: verbally with patient    Supporting Documentation  Indications: pain and diagnostic evaluation   Procedure Details  Location: knee - R knee  Needle size: 22 G  Ultrasound guidance: no  Approach: anterolateral  Medications administered: 40 mg triamcinolone acetonide 40 mg/mL; 2 mL bupivacaine 0.25 %    Patient tolerance: patient tolerated the procedure well with no immediate complications  Dressing:  Sterile dressing applied

## 2023-07-26 ENCOUNTER — EVALUATION (OUTPATIENT)
Dept: PHYSICAL THERAPY | Facility: CLINIC | Age: 58
End: 2023-07-26
Payer: COMMERCIAL

## 2023-07-26 DIAGNOSIS — M17.11 PRIMARY OSTEOARTHRITIS OF RIGHT KNEE: ICD-10-CM

## 2023-07-26 DIAGNOSIS — M25.561 RIGHT KNEE PAIN, UNSPECIFIED CHRONICITY: Primary | ICD-10-CM

## 2023-07-26 PROCEDURE — 97110 THERAPEUTIC EXERCISES: CPT

## 2023-07-26 PROCEDURE — 97161 PT EVAL LOW COMPLEX 20 MIN: CPT

## 2023-07-26 NOTE — PROGRESS NOTES
PT Evaluation     Today's date: 2023  Patient name: Heri Jaffe  : 1965  MRN: [id number]  Referring provider: Edgar Liang  Dx:   Encounter Diagnosis     ICD-10-CM    1. Right knee pain, unspecified chronicity  M25.561       2. Primary osteoarthritis of right knee  M17.11 Ambulatory Referral to Physical Therapy          Start Time: 0808  Stop Time: 0845  Total time in clinic (min): 37 minutes    Assessment  Assessment details: Heri Jaffe is a 62 y.o. male who presents with signs and symptoms consistent of right knee OA. Patient presents with pain, decreased joint mobility and ambulatory dysfunction. Due to these impairments, Patient has difficulty performing ambulating, prolonged standing and stair negotiating . Patient would benefit from skilled physical therapy to address the impairments, improve their level of function, and to improve their overall quality of life. Reviewed HEP, involved anatomy, physio as well as POC with verbalized understanding and pt is in agreement with above. Discussed with pt as well updating sneakers for greater lat. Support and to improve overall fit of sneaker by going up slightly in size. Pt verbalized understanding.   Impairments: abnormal gait, abnormal or restricted ROM, impaired balance, impaired physical strength, lacks appropriate home exercise program, pain with function and poor body mechanics    Goals  Short Term Goals: to be achieved by 4 weeks  1) Patient to be independent with basic HEP  2) Decrease pain to 1/10 at its worst  3) Increase R HS flexibility by at least 15%  4) Increase LE strength by 1/2 MMT grade in all affected planes    Long Term Goals: to be achieved by discharge  1) FOTO equal to or greater than projected goal.  2) Report no pain with reciprocal gait pattern on steps  3) Perform DL squat without reported knee pain for greater ease with functional tasks  4) Pt will report being able to walk the dog 2x/day without an increase in pain    Plan  Patient would benefit from: skilled physical therapy  Planned modality interventions: thermotherapy: hydrocollator packs and cryotherapy  Planned therapy interventions: abdominal trunk stabilization, ADL training, balance, body mechanics training, home exercise program, functional ROM exercises, flexibility, therapeutic exercise, therapeutic activities, stretching, strengthening, joint mobilization, manual therapy, neuromuscular re-education, patient education, postural training, kinesiology taping, massage, [place]udero taping, IASTM, therapeutic training and gait training  Frequency: 1x week  Duration in visits: 6  Duration in weeks: 6  Treatment plan discussed with: patient        Subjective Evaluation    History of Present Illness  Mechanism of injury: History: Emilie Alves is a 62 y.o. male who presents for evaluation of his right knee. Patient has had several weeks of worsening right knee pain. Pain is medial sided. Worse in the am, also has some night time pain that is disabling. Patient has had no prior injections to the knee in the last few years. At this point, patient states that the pain in the knee is disabling, but enough to seek an orthopedic consult for. He denies any numbness tingling paresthesias in the extremity. No other orthopedic complaints today. Pt reports the x-ray showed arthritis in the R knee and will return in 3 months. Pt reports the cortisone shot helped reduce his pain within 2 days. Prior to injection, it would wake him up at night and get to 5/10 pain whereas now he will only get up to about a 2/10. Previously was not using ice or ibuprofen. Pt still skiis and it wasn't bothering him in the winter while doing so it just recently started to bother him.  Pt presents in worn down [employer] sneakers that appear to be too small as his foot is up against the front.     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 Ankle/Foot   Ankle/Foot (Left): Supinated. Ankle/Foot (Right): Supinated. Tenderness     Right Knee   Tenderness in the medial joint line. Active Range of Motion   Left Knee   Flexion: 116 degrees   Extension: [facility]    Right Knee   Flexion: 118 degrees   Extension: [facility]    Additional Active Range of Motion Details  Pain free AROM    Strength/Myotome Testing     Left Hip   Planes of Motion   External rotation: 4  Internal rotation: 4    Right Hip   Planes of Motion   External rotation: 4  Internal rotation: 4    Left Knee   Flexion: 4-  Extension: 4    Right Knee   Flexion: 4-  Extension: 4    Tests     Right Knee   Negative anterior drawer, patellar apprehension, patellar compression, posterior drawer, valgus stress test at 0 degrees, valgus stress test at 30 degrees, varus stress test at 0 degrees and varus stress test at 30 degrees. Ambulation     Observational Gait   Left step length and right step length within functional limits. Left foot contact pattern: heel to toe  Right foot contact pattern: heel to toe    Functional Assessment:  SL squat: valgus bilaterally, poor hip control  DL squat: knee dominant- pain ant. B knee  Gait: see above    B HS: 45 deg.  At 90/90     Precautions: h/o stroke, HBP      Manuals                                                                 Neuro Re-Ed             steamboats Ther Ex             HS S             LAQ             HS curl             Bike                                                                 Ther Activity             steps                          Gait Training                                       Modalities

## 2023-07-31 ENCOUNTER — OFFICE VISIT (OUTPATIENT)
Dept: PHYSICAL THERAPY | Facility: CLINIC | Age: 58
End: 2023-07-31
Payer: COMMERCIAL

## 2023-07-31 DIAGNOSIS — M25.561 RIGHT KNEE PAIN, UNSPECIFIED CHRONICITY: Primary | ICD-10-CM

## 2023-07-31 DIAGNOSIS — M17.11 PRIMARY OSTEOARTHRITIS OF RIGHT KNEE: ICD-10-CM

## 2023-07-31 PROCEDURE — 97530 THERAPEUTIC ACTIVITIES: CPT

## 2023-07-31 PROCEDURE — 97110 THERAPEUTIC EXERCISES: CPT

## 2023-07-31 PROCEDURE — 97112 NEUROMUSCULAR REEDUCATION: CPT

## 2023-07-31 NOTE — PROGRESS NOTES
Daily Note     Today's date: 2023  Patient name: Vida Parker  : 1965  MRN: [id number]  Referring provider: Saadia Hercules  Dx:   Encounter Diagnosis     ICD-10-CM    1. Right knee pain, unspecified chronicity  M25.561       2. Primary osteoarthritis of right knee  M17.11         Start Time: 1532  Stop Time: 1620  Total time in clinic (min): 48 minutes  Subjective: Pt presents to PT with new [employer] and reports he went a 1/2 size larger. He also notes he has been walking more causing a slight increase in discomfort in his knees however he feels it is more so related to the increased time on his feet. Pt notes he goes a mile and a half each time he is out; consistently 2x/day. Objective: See treatment diary below       Assessment: Tolerated treatment well. Patient demonstrated fatigue post treatment, exhibited good technique with therapeutic exercises and would benefit from continued PT. Progressed strengthening in a WB position as well as NWB position with good tolerance to both. Pt noted some discomfort with SL LAQ and did increase the weight by 22# (to 44#) from what the therapist had it set at despite recommendation to not increase weight that quickly. Pt struggle with squat mechanics and technique review. Plan: Continue per plan of care. Progress treatment as tolerated.        Precautions: h/o stroke, HBP      Manuals                                                                Neuro Re-Ed             steamboats  NV           Lat. walking  Y 15'x4           SLS  10"x3                                                               Ther Ex             HS S  15"X4 B           LAQ SL  44# 20x B           HS curl SL  44# 20x B           Bike  L6 7'           PBall bridges  3"X20           HRs (neut., IR/ER)  20x ea                                     Ther Activity             steps             squats  10x &  review           Gait Training Modalities

## 2023-08-09 ENCOUNTER — OFFICE VISIT (OUTPATIENT)
Dept: PHYSICAL THERAPY | Facility: CLINIC | Age: 58
End: 2023-08-09
Payer: COMMERCIAL

## 2023-08-09 DIAGNOSIS — M17.11 PRIMARY OSTEOARTHRITIS OF RIGHT KNEE: ICD-10-CM

## 2023-08-09 DIAGNOSIS — M25.561 RIGHT KNEE PAIN, UNSPECIFIED CHRONICITY: Primary | ICD-10-CM

## 2023-08-09 PROCEDURE — 97112 NEUROMUSCULAR REEDUCATION: CPT

## 2023-08-09 PROCEDURE — 97530 THERAPEUTIC ACTIVITIES: CPT

## 2023-08-09 PROCEDURE — 97110 THERAPEUTIC EXERCISES: CPT

## 2023-08-09 NOTE — PROGRESS NOTES
Daily Note     Today's date: 2023  Patient name: Vladimir Balderrama  : 1965  MRN: [id number]  Referring provider: URIEL Sol*  Dx:   Encounter Diagnosis     ICD-10-CM    1. Right knee pain, unspecified chronicity  M25.561       2. Primary osteoarthritis of right knee  M17.11         Start Time: 0802  Stop Time: 0845  Total time in clinic (min): 43 minutes  Subjective: Pt reports he was on vacation and did have some discomfort when walking around. Objective: See treatment diary below      Assessment: Tolerated treatment well however continues to require cuing for proper squat mechanics and demos weakness in quad. Patient demonstrated fatigue post treatment, exhibited good technique with therapeutic exercises and would benefit from continued PT      Plan: Continue per plan of care. Progress treatment as tolerated.        Precautions: h/o stroke, HBP      Manuals                                                               Neuro Re-Ed             steamboats  NV 12.5# 20x all          Lat. walking  Y 15'x4 10'x4 Y          SLS  10"x3           Anti-rot   NV                                                 Ther Ex             HS S  15"X4 B 15"X4 B          LAQ SL  44# 20x B 22# ea 20x          HS curl SL  44# 20x B 44# 20x ea          Bike  L6 7' L7 6'          PBall bridges  3"X20           HRs (neut., IR/ER)  20x ea 20x ea (lexi SL)                                    Ther Activity             steps             SB c KB   GN 20x ea          squats  10x &  review GN KB 2x10          Gait Training                                       Modalities

## 2023-08-16 ENCOUNTER — APPOINTMENT (OUTPATIENT)
Dept: PHYSICAL THERAPY | Facility: CLINIC | Age: 58
End: 2023-08-16
Payer: COMMERCIAL

## 2023-08-17 ENCOUNTER — OFFICE VISIT (OUTPATIENT)
Dept: PHYSICAL THERAPY | Facility: CLINIC | Age: 58
End: 2023-08-17
Payer: COMMERCIAL

## 2023-08-17 DIAGNOSIS — M25.561 RIGHT KNEE PAIN, UNSPECIFIED CHRONICITY: ICD-10-CM

## 2023-08-17 DIAGNOSIS — M17.11 PRIMARY OSTEOARTHRITIS OF RIGHT KNEE: Primary | ICD-10-CM

## 2023-08-17 PROCEDURE — 97110 THERAPEUTIC EXERCISES: CPT

## 2023-08-17 PROCEDURE — 97112 NEUROMUSCULAR REEDUCATION: CPT

## 2023-09-13 ENCOUNTER — CLINICAL SUPPORT (OUTPATIENT)
Dept: NUTRITION | Facility: HOSPITAL | Age: 58
End: 2023-09-13
Attending: ORTHOPAEDIC SURGERY
Payer: COMMERCIAL

## 2023-09-13 VITALS — WEIGHT: 306.6 LBS | BODY MASS INDEX: 43.99 KG/M2

## 2023-09-13 PROCEDURE — 97802 MEDICAL NUTRITION INDIV IN: CPT

## 2023-09-13 NOTE — PROGRESS NOTES
Nutrition Assessment Form    Patient Name: Estelita Clements    YOB: 1965    Sex: Male     Assessment Date: 9/13/2023  Start Time: 10 am Stop Time:  11 am Total Minutes: 60     Data:  Present at session: self   Parent/Patient Concerns/reason for visit: "I'm here for weight management"   Medical Dx/Reason for Referral: Z68.41    Past Medical History:   Diagnosis Date   • Ankle swelling    • Arthritis    • Asthma    • Atrial fibrillation ([address])    • Atrial fibrillation with RVR ([address]) 04/04/2017   • Chronic kidney disease     kidney stones   • Gout    • Hypertension    • Kidney stone    • Night sweats    • Obesity    • Seasonal allergies    • Sleep apnea    • Stroke ([address]) 03/22       Current Outpatient Medications   Medication Sig Dispense Refill   • atorvastatin (LIPITOR) 40 mg tablet TAKE 1 TABLET BY MOUTH EVERY DAY WITH DINNER 90 tablet 1   • cloNIDine (CATAPRES-TTS-1) 0.1 mg/24 hr PLACE 1 PATCH ON THE SKIN ONCE A WEEK 12 patch 3   • dofetilide (TIKOSYN) 500 mcg capsule TAKE 1 CAPSULE BY MOUTH EVERY 12 HOURS 180 capsule 3   • eplerenone (INSPRA) 25 mg tablet TAKE 1 TABLET (25 MG TOTAL) BY MOUTH DAILY. 90 tablet 2   • hydrALAZINE (APRESOLINE) 50 mg tablet TAKE 1 TABLET BY MOUTH TWICE A  tablet 3   • nebivolol (BYSTOLIC) 2.5 mg tablet TAKE 1 TABLET BY MOUTH DAILY HOLD FOR HEART RATE LESS THAN 50 BEATS PER MINUTE. 90 tablet 3   • NIFEdipine (PROCARDIA XL) 30 mg 24 hr tablet TAKE 1 TABLET BY MOUTH TWICE A  tablet 3   • olmesartan (BENICAR) 40 mg tablet TAKE 1 TABLET BY MOUTH EVERY DAY 90 tablet 3   • torsemide (DEMADEX) 5 MG tablet TAKE 1 TABLET (5 MG TOTAL) BY MOUTH DAILY. 90 tablet 1   • Xarelto 20 MG tablet TAKE 1 TABLET BY MOUTH EVERY DAY WITH BREAKFAST 30 tablet 1     No current facility-administered medications for this visit.         Additional Meds/Supplements:    Special Learning Needs/barriers to learning/any new barriers    Height: HC Readings from Last 5 Encounters:   No data found for HC      Weight: Wt Readings from Last 10 Encounters:   23 (!) 140 kg (309 lb 9.6 oz)   23 (!) 139 kg (307 lb)   23 (!) 138 kg (304 lb 6.4 oz)   23 (!) 139 kg (307 lb)   23 (!) 138 kg (304 lb)   23 (!) 137 kg (303 lb)   23 (!) 137 kg (303 lb)   22 (!) 137 kg (303 lb)   22 (!) 137 kg (302 lb)   22 133 kg (293 lb)     Estimated body mass index is 44.42 kg/m² as calculated from the following:    Height as of 23: 5' 10" (1.778 m). Weight as of 23: 140 kg (309 lb 9.6 oz). Recent Weight Change: [x]Yes     []No  Amount:  5# weight gain  X 3 months      Energy Needs: No calculations performed for this visit   No Known Allergies or intolerances    Social History     Substance and Sexual Activity   Alcohol Use Yes    Comment: occ    Has reduced ETOH intake  Double burbon (3 oz) but will go weeks without drinks    [employer] occasionally  Drinks 2 weekends a month and will have 2-3 drinks       Social History     Tobacco Use   Smoking Status Former   • Packs/day: 0.50   • Years: 15.00   • Total pack years: 7.50   • Types: Cigarettes   • Quit date: 1994   • Years since quittin.4   Smokeless Tobacco Never       Who shops? spouse   Who cooks/cooking methods/Eating out/take out habits   spouse  Cooking methods: bake/escamilla/air escamilla/grill/boil    Dining out [employer] a/wk; 2-3 lunches a week, fast food 1-2 times a week   Exercise: Was walking dog every day until he hurt his knee, possible knee replacement but did PT instead though it hurt his knee for days after. Last PT was 2 weeks ago, is finished now.  Wants to get back into walking   Has a gym membership   Wants to use elliptical for 30 minutes and machines for upper and lower body   Other: ie: Sleep habits/ stress level/ work habits household-lives with ?/ food security Goes to sleep in various times, latest 11 pm, if tired 9:30   Listens to books as he goes sleep sometimes  Sleeps through the night  Wears Bi-PAP   Prior Nutritional Counseling? [x]Yes     []No  When:  years ago     Why:  weight management        Diet Hx:  Breakfast:  small bowl granola (1cup) w/ 2% milk 1 cup blueberries  2 fried eggs, 2 pieces toast w/ butter Diet:   Lunch:  [facility] thigh and biscuit (rare)  2 slices Hawaiian pizza   Beverage: sip of tart cherry juice  3 64 Water per day     Dinner: [facility] thigh and biscuit (rare)  7 oz meatloaf, 3/4 plate green beans  Meatloaf sandwich on low mohsen bread, 1/2 garlic noodles (prepackaged), chopped salad w/ red peppers, onion, cucumbers, celery, oil and vinegar           Snacks: AM -   PM - single serving pouch tuna francis sesame,   HS - banana   Other Notes/ Initial Assessment:  Also has appt with [facility] weight management in Dec  Had a stroke 1.5 years ago and was 320 lb. It was eye opening and made some changes (less ice cream at night). Wife made some comments that spurred him coming in for this appointment. Lost some weight with small changes- eating slower, not having seconds, eating less cheese/ meat and more vegetables  Deep, driving factor that his wife told him she resents him if he does not care for his body and does not want to be left alone  Routines will keep him on track   Keeping food journal was helpful in making mindful choices. Stated he did not have sweet snacks in preparation for this session. Patient feels the medications he takes are a factor in slow metabolism      RD discussed:  Role of K in balancing Na and BP.  Encouraged choosing mor high K foods  Weight management strategies, increasing non-starchy vegetables to decrease portions of starches, choosing fruit for snacks over sweets  Dining out- look for heart healthy options/ AHA icon, ask for steamed vegetables no salt, ask for dressing on the side, choose veggie lovers pizza     RD provided K Content of Foods from Nutrition Care Manual    Updated assessment (Follow up note only):     Nutrition Diagnosis: Overweight/obesity  related to Excess energy intake as evidenced by  BMI more than normative standard for age and sex (obesity-grade III 40+)       Any change or new dx since previous visit:     Nutrition Diagnosis:   n/a      Medical Nutrition Therapy Intervention:  []Individualized Meal Plan []Understanding Lab Values   []Basic Pathophysiology of Disease []Food/Medication Interactions   [x]Food Diary mindfulness of food choices [x]Exercise 150 mins of moderate activity weekly, discussed importance of variety of activity, including wt bearing activities 2-3x/wk x 10-15mins. Discussed importance of activity throughout the lifecycle and its impact on overall health/stress management, etc.  GOAL- 3 DAYS A WEEK     []Lifestyle/Behavior Modification Techniques []Medication, Mechanism of Action   []Label Reading: CHO/ Na/ Fat/ other_________ []Self Blood Glucose Monitoring   []Weight/BMI Goals: gain/lose/maintain [x]Other - Discussed importance of adequate fruits and vegetables, and appropriate serving sizes, including cooking methods, variety of colors daily, and how they help balance diet and food intake. Portions of other foods may increase if adequate fruits and vegetables are not included on a daily basis. Comprehension: []Excellent  []Very Good  [x]Good  []Fair   []Poor    Receptivity: []Excellent  []Very Good  [x]Good  []Fair   []Poor    Expected Compliance: []Excellent  []Very Good  [x]Good  []Fair   []Poor        Goals (initial)/ Progress made on previous goals/new goals:  1. Patient will keep food journal daily until next follow up    2. Patient will choose low Na options and/ or increase vegetables when dining out by next follow up   3. No follow-ups on file.   Labs:  CMP  Lab Results   Component Value Date     08/18/2015    K 4.5 03/23/2023     03/23/2023    CO2 29 03/23/2023    ANIONGAP 7 08/18/2015    BUN 21 03/23/2023    CREATININE 1.27 03/23/2023    GLUCOSE 94 08/18/2015 GLUF 120 (H) 03/23/2023    CALCIUM 8.8 03/23/2023    CORRECTEDCA 9.1 03/10/2022    AST 15 11/29/2022    ALT 17 11/29/2022    ALKPHOS 61 11/29/2022    PROT 7.3 12/02/2014    BILITOT 0.4 12/02/2014    EGFR 62 03/23/2023       BMP  Lab Results   Component Value Date    GLUCOSE 94 08/18/2015    CALCIUM 8.8 03/23/2023     08/18/2015    K 4.5 03/23/2023    CO2 29 03/23/2023     03/23/2023    BUN 21 03/23/2023    CREATININE 1.27 03/23/2023       Lipids  Lab Results   Component Value Date    CHOL 177 10/08/2012     Lab Results   Component Value Date    HDL 39 (L) 11/29/2022    HDL 29 (L) 06/09/2022    HDL 36 (L) 03/08/2022     Lab Results   Component Value Date    LDLCALC 52 11/29/2022    LDLCALC 42 06/09/2022    LDLCALC 100 03/08/2022     Lab Results   Component Value Date    TRIG 150 (H) 11/29/2022    TRIG 118 06/09/2022    TRIG 143 03/08/2022     No results found for: "CHOLHDL"    Hemoglobin A1C  Lab Results   Component Value Date    HGBA1C 7.5 (H) 03/08/2022       Fasting Glucose  Lab Results   Component Value Date    GLUF 120 (H) 03/23/2023       Insulin     Thyroid  No results found for: "TSH", "P1HEDUR", "U5XXEOE", "THYROIDAB"    Hepatic Function Panel  Lab Results   Component Value Date    ALT 17 11/29/2022    AST 15 11/29/2022    ALKPHOS 61 11/29/2022    BILITOT 0.4 12/02/2014       Celiac Disease Antibody Panel  Endomysial IgA   Date Value Ref Range Status   06/09/2022 Negative Negative Final     Gliadin IgA   Date Value Ref Range Status   06/09/2022 3 0 - 19 units Final     Comment:                        Negative                   0 - 19                     Weak Positive             20 - 30                     Moderate to Strong Positive   >30     Gliadin IgG   Date Value Ref Range Status   06/09/2022 2 0 - 19 units Final     Comment:                        Negative                   0 - 19                     Weak Positive             20 - 30                     Moderate to Strong Positive   >30 IGA   Date Value Ref Range Status   06/09/2022 339.0 70.0 - 400.0 mg/dL Final     IgA   Date Value Ref Range Status   06/09/2022 368 90 - 386 mg/dL Final     TISSUE TRANSGLUTAMINASE IGA   Date Value Ref Range Status   06/09/2022 <2 0 - 3 U/mL Final     Comment:                                   Negative        0 -  3                                Weak Positive   4 - 10                                Positive           >10   Tissue Transglutaminase (tTG) has been identified   as the endomysial antigen. Studies have demonstr-   ated that endomysial IgA antibodies have over 99%   specificity for gluten sensitive enteropathy.       Iron  No results found for: "IRON", "TIBC", "FERRITIN"         Phuong Pump, [address] [place] [address]. [facility] [id number]

## 2023-09-15 DIAGNOSIS — I48.20 CHRONIC ATRIAL FIBRILLATION (HCC): ICD-10-CM

## 2023-09-15 RX ORDER — RIVAROXABAN 20 MG/1
TABLET, FILM COATED ORAL
Qty: 30 TABLET | Refills: 1 | Status: SHIPPED | OUTPATIENT
Start: 2023-09-15

## 2023-10-23 ENCOUNTER — TELEPHONE (OUTPATIENT)
Dept: NEPHROLOGY | Facility: CLINIC | Age: 58
End: 2023-10-23

## 2023-11-03 ENCOUNTER — TELEPHONE (OUTPATIENT)
Dept: NEPHROLOGY | Facility: CLINIC | Age: 58
End: 2023-11-03

## 2023-11-07 ENCOUNTER — TELEPHONE (OUTPATIENT)
Age: 58
End: 2023-11-07

## 2023-11-07 NOTE — TELEPHONE ENCOUNTER
Rep from [address] [place] [address] will be faxing over paperwork to be signed by Dr Lee Cosby regarding Oxygen for PT. Please be on the look out for fax.     Thank You

## 2023-11-15 DIAGNOSIS — I48.20 CHRONIC ATRIAL FIBRILLATION (HCC): ICD-10-CM

## 2023-11-15 DIAGNOSIS — I48.0 PAROXYSMAL ATRIAL FIBRILLATION (HCC): ICD-10-CM

## 2023-11-15 RX ORDER — RIVAROXABAN 20 MG/1
TABLET, FILM COATED ORAL
Qty: 30 TABLET | Refills: 1 | Status: SHIPPED | OUTPATIENT
Start: 2023-11-15

## 2023-11-15 RX ORDER — DOFETILIDE 0.5 MG/1
CAPSULE ORAL
Qty: 60 CAPSULE | Refills: 11 | Status: SHIPPED | OUTPATIENT
Start: 2023-11-15

## 2023-11-27 PROBLEM — N20.0 NEPHROLITHIASIS: Status: ACTIVE | Noted: 2023-11-27

## 2023-11-27 NOTE — PROGRESS NOTES
RENAL FOLLOW UP NOTE:.td    ASSESSMENT AND PLAN:  1. Hypertension:  Most compatible with essential hypertension/resistant hypertension:  Secondary workup:  -normal thyroid function study  -severe HARPER on BiPAP  -aldosterone/renin ratio:  Essentially negative at 20 with an aldosterone level of 8.7  -plasma free metanephrines:  Negative  -24 hour urine for free cortisol:  Negative  -renal artery duplex:  No evidence of significant renal artery disease; but somewhat limited by body habitus and overlying bowel gas  Again obesity may be contributing to his hypertension  Current home blood pressure readings:  A.m.: 143/83  P.m.:   146/79  heart rate: 60-70 range     GOAL BLOOD PRESSURE:  LESS THAN 125/80 GIVEN CKD/recent hemorrhagic stroke     Recommendations:  continue to push nonmedical regimen including isotonic exercise, avoidance of salt and weight loss; patient counseled as such  GIVEN TIKOSYN:  AMILORIDE/SPIRONOLACTONE/HCTZ ARE CONTRAINDICATED. LOOP DIURETIC SUCH AS FUROSEMIDE OR TORSEMIDE ARE ACCEPTABLE. Medication changes today:    Continue olmesartan 40 mg daily  Continue nebivolol 2.5 mg daily  Continue hydralazine 50 mg twice a day  Continue clonidine patch# 1. Weekly  Torsemide 5 mg daily  Eplerenone: Increase eplerenone 25 mg twice daily typically twice a day drug    Recheck blood pressures 4 to 6 weeks     Future options would be to increase torsemide, or Eplerenone if and acceptable medication. And of course clonidine patch as well. DISCUSSED WITH DR. Zeyad Morales EPLERENONE IS TOTALLY ACCEPTABLE! Patient also elisabeth the patient is going to the weight loss center  Also encourage patient to exercise 3 days a week        2. CKD stage 3:    Baseline creatinine is ranged anywhere from 1.1-1.6:  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  Current creatinine:   At baseline at 1.14  Electrolytes are normal including potassium of 3.8  UA:  No proteinuria and no hematuria, 2-4 WBCs: Repeat  Urine protein creatinine ratio:  0.06 g at goal  MBD:  Of CKD:  Calcium/magnesium are normal/PTH 46.9; vitamin D 26.0 replete  CBC:  Normal with a hemoglobin of 15.5  Renal ultrasound:  12/03/2020:  Echogenic focus in the left lower pole possible fat versus calculus without shadowing. Thickened bladder with UV junction still present although perhaps slightly pronounced. Followed by Urology:  Visit with Dr. Chad Linda for possible right-sided ureterocele, history of cystoscopy and cystolitholapaxy status post right stent removal.  Bladder wall thickening resolution of hydronephrosis. Possibility of congenital ureterocele was discussed 1 year follow-up with retroperitoneal ultrasound which would be November 2022. 3. Nephrolithiasis:  As mentioned previously he has had numerous stones. No kidney stones at this time  Continue with General stone diet; in particular water intake 2 and half to 3 L a day all  Only further investigation with a 24 hour urine stone risk evaluation if recurrent stone     4. Dyslipidemia:  Current lipid profile:  LDL 45/HDL 37/triglycerides 128  Goal:  LDL less than 70 given CKD/hemorrhagic stroke  Recommend:  Diet, exercise and weight loss, patient counseled as such  No change in medication as patient is at goal        5. Recent admission 03/07/2022 secondary to right-sided weakness with word-finding difficulty, found to have acute left intraparenchymal hemorrhage with vasogenic edema. Patient admitted [address] [place] [address] with neurosurgery evaluation. Started on Cardene due to hypertension. No neuro surgical intervention at that time was deemed necessary. MRI in 3/9 demonstrated unchanged intraparenchymal hematoma as well as small acute infarct in the left corona radiata. Patient was maintained on blood pressure control. TTE demonstrated EF 65% otherwise grade 1 diastolic dysfunction.   Seen by Neurology as well recommended restarting anticoagulation 3 weeks after repeat CT scan along with PT/OT. In regards to blood pressure: Amlodipine was stopped nifedipine 10 mg every 8 hours titrate as needed, continuing hydralazine. 6. Increased liver function studies:  Right upper quadrant ultrasound showed cholelithiasis hepatomegaly and hepatic steatosis. Now followed by Dr. Kirby Fishman who believes it is related to statin therapy but is agreeable to continue the statin. Further evaluation will be done by Dr. Yamilka Silverman     GI health maintenance:  Seen by Dr. Yamilka Silverman we will plan for colonoscopy 3-4 months after CVA:  Patient knows he is due for the colonoscopy will schedule with Dr. Yamilka Silverman. Patient aware and states will set up an appointment for colonoscopy       PATIENT INSTRUCTIONS:    Patient Instructions   Visit summary:  - Overall labs look good except low vitamin D which I will treat please see below    - Blood pressure still above goal we are going to adjust her medication    Very important that you push diet and exercise  Please see below        1. Medication changes today:  Please increase eplerenone/Inspra to 25 mg twice a day    2. General instructions:  Please try to lose 20 pounds in 6 months please put this on your refrigerator to remind you every day  Please also exercise 3 days a week at least 30 minutes of some form of aerobic exercise to begin with as you can expand it more that would be great but please start with a realistic goal of 3 days a week    3. Please go for non fasting  lab work 1-2 weeks after making the above medication change.     4.  Please take 1 week a blood pressure readings 4 to 6 weeks    AS FOLLOWS  MORNING AND EVENING, SITTING AND STANDING as follows:  TAKE THE MORNING READINGS BEFORE ANY MEDICATIONS AND WHEN YOU ARE RELAXED FOR SEVERAL MINUTES  TAKE THE EVENING READINGS:  BETWEEN 7-10 P.M.; PRIOR TO ANY MEDICATIONS; AT LEAST IN OUR  FROM DINNER; AND CERTAINLY AFTER RELAXING FOR A FEW MINUTES  PLEASE INCLUDE HEART RATE WITH YOUR BLOOD PRESSURE READINGS  When taking standing readings, keep your arm supported at heart level and not dangling  Make sure you are sitting with your back supported and feet on the ground and do not cross your legs or feet  Make sure you have not taken any coffee or caffeine products or exercised or smoke cigarettes at least 30 minutes before taking your blood pressure  Then please mail these readings into the office    5. In 3 months:  Please go for nonfasting lab work but in the morning  Please take 1 week a blood pressure readings as outlined above and mail those into the office    6. Follow-up in 6 months  Please bring in 1 week a blood pressure readings morning evening, sitting and standing is outlined above  PLEASE BRING AN YOUR BLOOD PRESSURE MACHINE TO CORRELATE WITH THE OFFICE MACHINE AT THIS NEXT SCHEDULED VISIT  Please go for fasting lab work 1-2 weeks prior to your appointment      7. General non medical recommendations:  AVOID SALT BUT NOT ADDING AN READING LABELS TO MAKE SURE THERE IS LOW-SALT IN THE FOOD THAT YOU ARE EATING  Goal is less than 2 g of sodium intake or less than 5 g of sodium chloride intake per day    Avoid nonsteroidal anti-inflammatory drugs such as Naprosyn, ibuprofen, Aleve, Advil, Celebrex, Meloxicam (Mobic) etc.  You can use Tylenol as needed if you do not have any liver condition to be concerned about    Avoid medications such as Sudafed or decongestants and antihistamines that contained the D component which is the decongestant. You can take antihistamines without the decongestant or D component. Try to avoid medications such as pantoprazole or  Protonix/Nexium or Esomeprazole)/Prilosec or omeprazole/Prevacid or lansoprazole/AcipHex or Rabeprazole. If you are able to, use Pepcid as this is safer for your kidneys.     Try to exercise at least 30 minutes 3 days a week to begin with with an ultimate goal of 5 days a week for at least 30 minutes      Please do not drink more than 2 glasses of alcohol/wine on a daily basis as this may contribute to your high blood pressure. Subjective: There has been no hospitalizations or acute illnesses since last visit. The patient overall is feeling well. No fevers, chills, or cough or colds. Good appetite and good energy  No hematuria, dysuria, voiding symptoms or foamy urine  No gastrointestinal symptoms  No chest pain, mild dyspnea on exertion going up steps unchanged he believes in part related to weight, no significant lower extremity swelling  No headaches, dizziness or lightheadedness  Blood pressure medications:   Torsemide 5 mg daily in the morning  Olmesartan 40 mg daily in the morning  Procardia XL 30 mg twice a day  Clonidine patch #1 weekly  Eplerenone 25 mg daily  Nebivolol 2.5 mg daily  Hydralazine 50 mg twice a day    Renal pertinent medications:  Atorvastatin 40 mg daily    ROS:  See HPI, otherwise review of systems as completely reviewed with the patient are negative    Past Medical History:   Diagnosis Date    Ankle swelling     Arthritis     Asthma     Atrial fibrillation ([address])     Atrial fibrillation with RVR ([address]) 04/04/2017    Chronic kidney disease     kidney stones    Gout     Hypertension     Kidney stone     Night sweats     Obesity     Seasonal allergies     Sleep apnea     Stroke ([address]) 03/22     Past Surgical History:   Procedure Laterality Date    APPENDECTOMY      CARDIOVERSION      X4 last was 2018    COLONOSCOPY      CYSTOSCOPY  08/30/2016    w/removal of object, last assessed 8/30/16    FOOT SURGERY Left     HAND SURGERY      KNEE SURGERY Right 2016    [place] CYSTO/URETERO W/LITHOTRIPSY &INDWELL STENT INSRT Left 08/09/2016    Procedure: CYSTOSCOPY URETEROSCOPY WITH LITHOTRIPSY HOLMIUM LASER STONE EXTRACTION, RETROGRADE PYELOGRAM AND INSERTION STENT URETERAL;  Surgeon: Silvia Baez MD;  Location: [facility];  Service: Urology last assessed 8/30/16    [place] CYSTO/URETERO W/LITHOTRIPSY &INDWELL STENT INSRT Right 08/24/2020    Procedure: CYSTOSCOPY , cystolitholapaxy of bladder stone WITH HOMIUM LASER RIGHT RETROGRADE AND RIGHT URETERAL STENT, FULGERATION OF BLADDER;  Surgeon: Alessandro Mercado MD;  Location: [address] [place] [address] OR;  Service: Urology    UPPER GASTROINTESTINAL ENDOSCOPY      WRIST SURGERY Left 2014    Fracture surgery     Family History   Problem Relation Age of Onset    Atrial fibrillation Mother     Other Mother         blood dyscrasia    Hypertension Mother     Arthritis Mother     Other Father         hypoglycemia     Atrial fibrillation Father     Diabetes Family     Other Family         Thrombocytosis    No Known Problems Sister     Cancer Neg Hx     Thyroid disease Neg Hx       reports that he quit smoking about 29 years ago. His smoking use included cigarettes. He has a 7.50 pack-year smoking history. He has never used smokeless tobacco. He reports current alcohol use. He reports that he does not use drugs. I COMPLETELY REVIEWED THE PAST MEDICAL HISTORY/PAST SURGICAL HISTORY/SOCIAL HISTORY/FAMILY HISTORY/AND MEDICATIONS  AND UPDATED ALL    Objective:     Vitals:   BP sitting on left: 142/80 with a heart rate of 56 and regular   BP standing on left: 146/80 with a heart rate of 64 and regular  Vitals:    12/07/23 0722   BP: 133/74   Pulse: (!) 53       Weight (last 2 days)       Date/Time Weight    12/07/23 0722 140 (308)          Wt Readings from Last 3 Encounters:   12/07/23 (!) 140 kg (308 lb)   12/01/23 (!) 142 kg (312 lb)   09/13/23 (!) 139 kg (306 lb 9.6 oz)       Body mass index is 44.19 kg/m².     Physical Exam: General:  No acute distress/obese  Skin:  No acute rash  Eyes:  No scleral icterus, noninjected, no discharge from eyes  ENT:  Moist mucous membranes  Neck:  Supple, no jugular venous distention, trachea is midline, no lymphadenopathy and no thyromegaly  Back   No CVAT  Chest:  Clear to auscultation and percussion, good respiratory effort  CVS:  Regular rate and rhythm without a rub, or gallops or murmurs  Abdomen:  obese,Soft and nontender with normal bowel sounds  Extremities:  No cyanosis and no edema, no arthritic changes, normal range of motion  Neuro:  Grossly intact  Psych:  Alert, oriented x3 and appropriate      Medications:    Current Outpatient Medications:     atorvastatin (LIPITOR) 40 mg tablet, TAKE 1 TABLET BY MOUTH EVERY DAY WITH DINNER, Disp: 90 tablet, Rfl: 1    cloNIDine (CATAPRES-TTS-1) 0.1 mg/24 hr, APPLY 1 PATCH ONE TIME PER WEEK, Disp: 12 patch, Rfl: 0    dofetilide (TIKOSYN) 500 mcg capsule, TAKE 1 CAPSULE BY MOUTH EVERY 12 HOURS, Disp: 60 capsule, Rfl: 11    eplerenone (INSPRA) 25 mg tablet, Take 1 tablet (25 mg total) by mouth 2 (two) times a day, Disp: 180 tablet, Rfl: 3    ergocalciferol (VITAMIN D2) 50,000 units, Take 1 capsule (50,000 Units total) by mouth once a week for 12 doses, Disp: 12 capsule, Rfl: 0    hydrALAZINE (APRESOLINE) 50 mg tablet, TAKE 1 TABLET BY MOUTH TWICE A DAY, Disp: 180 tablet, Rfl: 3    nebivolol (BYSTOLIC) 2.5 mg tablet, TAKE 1 TABLET BY MOUTH DAILY HOLD FOR HEART RATE LESS THAN 50 BEATS PER MINUTE., Disp: 90 tablet, Rfl: 3    NIFEdipine (PROCARDIA XL) 30 mg 24 hr tablet, TAKE 1 TABLET BY MOUTH TWICE A DAY, Disp: 180 tablet, Rfl: 3    olmesartan (BENICAR) 40 mg tablet, TAKE 1 TABLET BY MOUTH EVERY DAY, Disp: 90 tablet, Rfl: 3    torsemide (DEMADEX) 5 MG tablet, TAKE 1 TABLET (5 MG TOTAL) BY MOUTH DAILY. , Disp: 90 tablet, Rfl: 1    Xarelto 20 MG tablet, TAKE 1 TABLET BY MOUTH EVERY DAY WITH BREAKFAST, Disp: 30 tablet, Rfl: 1    Lab, Imaging and other studies: I have personally reviewed pertinent labs.   Laboratory Results:  Results for orders placed or performed in visit on 12/01/23   Microalbumin / creatinine urine ratio   Result Value Ref Range    Creatinine, Ur 93.2 Reference range not established. mg/dL    Albumin,U,Random 7.3 <20.0 mg/L    Albumin Creat Ratio 8 0 - 30 mg/g creatinine   Comprehensive metabolic panel   Result Value Ref Range    Sodium 140 135 - 147 mmol/L    Potassium 3.8 3.5 - 5.3 mmol/L    Chloride 103 96 - 108 mmol/L    CO2 30 21 - 32 mmol/L    ANION GAP 7 mmol/L    BUN 21 5 - 25 mg/dL    Creatinine 1.14 0.60 - 1.30 mg/dL    Glucose, Fasting 145 (H) 65 - 99 mg/dL    Calcium 9.3 8.4 - 10.2 mg/dL    AST 14 13 - 39 U/L    ALT 22 7 - 52 U/L    Alkaline Phosphatase 63 34 - 104 U/L    Total Protein 8.0 6.4 - 8.4 g/dL    Albumin 4.4 3.5 - 5.0 g/dL    Total Bilirubin 0.56 0.20 - 1.00 mg/dL    eGFR 70 ml/min/1.73sq m   Hemoglobin A1C   Result Value Ref Range    Hemoglobin A1C 7.6 (H) Normal 4.0-5.6%; PreDiabetic 5.7-6.4%;  Diabetic >=6.5%; Glycemic control for adults with diabetes <7.0% %     mg/dl   Lipid Panel with Direct LDL reflex   Result Value Ref Range    Cholesterol 108 See Comment mg/dL    Triglycerides 128 See Comment mg/dL    HDL, Direct 37 (L) >=40 mg/dL    LDL Calculated 45 0 - 100 mg/dL   CBC   Result Value Ref Range    WBC 9.29 4.31 - 10.16 Thousand/uL    RBC 5.13 3.88 - 5.62 Million/uL    Hemoglobin 15.5 12.0 - 17.0 g/dL    Hematocrit 47.3 36.5 - 49.3 %    MCV 92 82 - 98 fL    MCH 30.2 26.8 - 34.3 pg    MCHC 32.8 31.4 - 37.4 g/dL    RDW 13.2 11.6 - 15.1 %    Platelets 290 324 - 355 Thousands/uL    MPV 12.3 8.9 - 12.7 fL   Magnesium   Result Value Ref Range    Magnesium 2.0 1.9 - 2.7 mg/dL   PTH, intact   Result Value Ref Range    PTH 46.9 12.0 - 88.0 pg/mL   Phosphorus   Result Value Ref Range    Phosphorus 3.0 2.7 - 4.5 mg/dL   Protein / creatinine ratio, urine   Result Value Ref Range    Creatinine, Ur 96.8 Reference range not established. mg/dL    Protein Urine Random 6 Reference range not established. mg/dL    Prot/Creat Ratio, Ur 0.06 0.00 - 0.10   Vitamin D 25 hydroxy   Result Value Ref Range    Vit D, 25-Hydroxy 26.0 (L) 30.0 - 100.0 ng/mL       Results from last 7 days   Lab Units 12/01/23  1126   WBC Thousand/uL 9.29   HEMOGLOBIN g/dL 15.5   HEMATOCRIT % 47.3   PLATELETS Thousands/uL 207   POTASSIUM mmol/L 3.8   CHLORIDE mmol/L 103   CO2 mmol/L 30   BUN mg/dL 21   CREATININE mg/dL 1.14   CALCIUM mg/dL 9.3   MAGNESIUM mg/dL 2.0   PHOSPHORUS mg/dL 3.0         Radiology review:   chest X-ray    Ultrasound      Portions of the record may have been created with voice recognition software. Occasional wrong word or "sound a like" substitutions may have occurred due to the inherent limitations of voice recognition software. Read the chart carefully and recognize, using context, where substitutions have occurred.

## 2023-11-30 DIAGNOSIS — I10 BENIGN ESSENTIAL HYPERTENSION: ICD-10-CM

## 2023-11-30 DIAGNOSIS — I12.9 HYPERTENSIVE CHRONIC KIDNEY DISEASE WITH STAGE 1 THROUGH STAGE 4 CHRONIC KIDNEY DISEASE, OR UNSPECIFIED CHRONIC KIDNEY DISEASE: ICD-10-CM

## 2023-11-30 RX ORDER — CLONIDINE 0.1 MG/24H
PATCH, EXTENDED RELEASE TRANSDERMAL
Qty: 12 PATCH | Refills: 0 | Status: SHIPPED | OUTPATIENT
Start: 2023-11-30

## 2023-12-01 ENCOUNTER — OFFICE VISIT (OUTPATIENT)
Dept: OBGYN CLINIC | Facility: MEDICAL CENTER | Age: 58
End: 2023-12-01
Payer: COMMERCIAL

## 2023-12-01 ENCOUNTER — LAB (OUTPATIENT)
Dept: LAB | Facility: CLINIC | Age: 58
End: 2023-12-01
Payer: COMMERCIAL

## 2023-12-01 VITALS
DIASTOLIC BLOOD PRESSURE: 72 MMHG | BODY MASS INDEX: 44.67 KG/M2 | WEIGHT: 312 LBS | HEART RATE: 74 BPM | SYSTOLIC BLOOD PRESSURE: 132 MMHG | HEIGHT: 70 IN

## 2023-12-01 DIAGNOSIS — E55.9 VITAMIN D DEFICIENCY: ICD-10-CM

## 2023-12-01 DIAGNOSIS — M17.11 PRIMARY OSTEOARTHRITIS OF RIGHT KNEE: Primary | ICD-10-CM

## 2023-12-01 DIAGNOSIS — E11.69 TYPE 2 DIABETES MELLITUS WITH OTHER SPECIFIED COMPLICATION, WITHOUT LONG-TERM CURRENT USE OF INSULIN (HCC): ICD-10-CM

## 2023-12-01 DIAGNOSIS — N18.31 STAGE 3A CHRONIC KIDNEY DISEASE (HCC): ICD-10-CM

## 2023-12-01 DIAGNOSIS — G89.29 CHRONIC PAIN OF RIGHT KNEE: ICD-10-CM

## 2023-12-01 DIAGNOSIS — M25.561 CHRONIC PAIN OF RIGHT KNEE: ICD-10-CM

## 2023-12-01 LAB
25(OH)D3 SERPL-MCNC: 26 NG/ML (ref 30–100)
ALBUMIN SERPL BCP-MCNC: 4.4 G/DL (ref 3.5–5)
ALP SERPL-CCNC: 63 U/L (ref 34–104)
ALT SERPL W P-5'-P-CCNC: 22 U/L (ref 7–52)
ANION GAP SERPL CALCULATED.3IONS-SCNC: 7 MMOL/L
AST SERPL W P-5'-P-CCNC: 14 U/L (ref 13–39)
BILIRUB SERPL-MCNC: 0.56 MG/DL (ref 0.2–1)
BUN SERPL-MCNC: 21 MG/DL (ref 5–25)
CALCIUM SERPL-MCNC: 9.3 MG/DL (ref 8.4–10.2)
CHLORIDE SERPL-SCNC: 103 MMOL/L (ref 96–108)
CHOLEST SERPL-MCNC: 108 MG/DL
CO2 SERPL-SCNC: 30 MMOL/L (ref 21–32)
CREAT SERPL-MCNC: 1.14 MG/DL (ref 0.6–1.3)
CREAT UR-MCNC: 93.2 MG/DL
CREAT UR-MCNC: 96.8 MG/DL
ERYTHROCYTE [DISTWIDTH] IN BLOOD BY AUTOMATED COUNT: 13.2 % (ref 11.6–15.1)
EST. AVERAGE GLUCOSE BLD GHB EST-MCNC: 171 MG/DL
GFR SERPL CREATININE-BSD FRML MDRD: 70 ML/MIN/1.73SQ M
GLUCOSE P FAST SERPL-MCNC: 145 MG/DL (ref 65–99)
HBA1C MFR BLD: 7.6 %
HCT VFR BLD AUTO: 47.3 % (ref 36.5–49.3)
HDLC SERPL-MCNC: 37 MG/DL
HGB BLD-MCNC: 15.5 G/DL (ref 12–17)
LDLC SERPL CALC-MCNC: 45 MG/DL (ref 0–100)
MAGNESIUM SERPL-MCNC: 2 MG/DL (ref 1.9–2.7)
MCH RBC QN AUTO: 30.2 PG (ref 26.8–34.3)
MCHC RBC AUTO-ENTMCNC: 32.8 G/DL (ref 31.4–37.4)
MCV RBC AUTO: 92 FL (ref 82–98)
MICROALBUMIN UR-MCNC: 7.3 MG/L
MICROALBUMIN/CREAT 24H UR: 8 MG/G CREATININE (ref 0–30)
PHOSPHATE SERPL-MCNC: 3 MG/DL (ref 2.7–4.5)
PLATELET # BLD AUTO: 207 THOUSANDS/UL (ref 149–390)
PMV BLD AUTO: 12.3 FL (ref 8.9–12.7)
POTASSIUM SERPL-SCNC: 3.8 MMOL/L (ref 3.5–5.3)
PROT SERPL-MCNC: 8 G/DL (ref 6.4–8.4)
PROT UR-MCNC: 6 MG/DL
PROT/CREAT UR: 0.06 MG/G{CREAT} (ref 0–0.1)
PTH-INTACT SERPL-MCNC: 46.9 PG/ML (ref 12–88)
RBC # BLD AUTO: 5.13 MILLION/UL (ref 3.88–5.62)
SODIUM SERPL-SCNC: 140 MMOL/L (ref 135–147)
TRIGL SERPL-MCNC: 128 MG/DL
WBC # BLD AUTO: 9.29 THOUSAND/UL (ref 4.31–10.16)

## 2023-12-01 PROCEDURE — 80061 LIPID PANEL: CPT

## 2023-12-01 PROCEDURE — 83735 ASSAY OF MAGNESIUM: CPT

## 2023-12-01 PROCEDURE — 80053 COMPREHEN METABOLIC PANEL: CPT

## 2023-12-01 PROCEDURE — 84100 ASSAY OF PHOSPHORUS: CPT

## 2023-12-01 PROCEDURE — 83970 ASSAY OF PARATHORMONE: CPT

## 2023-12-01 PROCEDURE — 82306 VITAMIN D 25 HYDROXY: CPT

## 2023-12-01 PROCEDURE — 85027 COMPLETE CBC AUTOMATED: CPT

## 2023-12-01 PROCEDURE — 99213 OFFICE O/P EST LOW 20 MIN: CPT | Performed by: ORTHOPAEDIC SURGERY

## 2023-12-01 PROCEDURE — 82043 UR ALBUMIN QUANTITATIVE: CPT

## 2023-12-01 PROCEDURE — 82570 ASSAY OF URINE CREATININE: CPT

## 2023-12-01 PROCEDURE — 83036 HEMOGLOBIN GLYCOSYLATED A1C: CPT

## 2023-12-01 PROCEDURE — 36415 COLL VENOUS BLD VENIPUNCTURE: CPT

## 2023-12-01 PROCEDURE — 84156 ASSAY OF PROTEIN URINE: CPT

## 2023-12-07 ENCOUNTER — CONSULT (OUTPATIENT)
Dept: BARIATRICS | Facility: CLINIC | Age: 58
End: 2023-12-07
Payer: COMMERCIAL

## 2023-12-07 ENCOUNTER — OFFICE VISIT (OUTPATIENT)
Dept: NEPHROLOGY | Facility: CLINIC | Age: 58
End: 2023-12-07
Payer: COMMERCIAL

## 2023-12-07 VITALS
SYSTOLIC BLOOD PRESSURE: 133 MMHG | HEIGHT: 70 IN | DIASTOLIC BLOOD PRESSURE: 74 MMHG | HEART RATE: 53 BPM | BODY MASS INDEX: 44.09 KG/M2 | WEIGHT: 308 LBS

## 2023-12-07 VITALS
SYSTOLIC BLOOD PRESSURE: 122 MMHG | HEIGHT: 70 IN | BODY MASS INDEX: 43.81 KG/M2 | WEIGHT: 306 LBS | HEART RATE: 55 BPM | RESPIRATION RATE: 18 BRPM | DIASTOLIC BLOOD PRESSURE: 68 MMHG | OXYGEN SATURATION: 97 %

## 2023-12-07 DIAGNOSIS — I10 ACCELERATED HYPERTENSION: ICD-10-CM

## 2023-12-07 DIAGNOSIS — E11.22 TYPE 2 DIABETES MELLITUS WITH STAGE 3A CHRONIC KIDNEY DISEASE, WITHOUT LONG-TERM CURRENT USE OF INSULIN (HCC): ICD-10-CM

## 2023-12-07 DIAGNOSIS — E66.01 CLASS 3 SEVERE OBESITY DUE TO EXCESS CALORIES WITH SERIOUS COMORBIDITY AND BODY MASS INDEX (BMI) OF 40.0 TO 44.9 IN ADULT (HCC): ICD-10-CM

## 2023-12-07 DIAGNOSIS — I12.9 HYPERTENSIVE CHRONIC KIDNEY DISEASE WITH STAGE 1 THROUGH STAGE 4 CHRONIC KIDNEY DISEASE, OR UNSPECIFIED CHRONIC KIDNEY DISEASE: ICD-10-CM

## 2023-12-07 DIAGNOSIS — N18.30 BENIGN HYPERTENSION WITH CHRONIC KIDNEY DISEASE, STAGE III (HCC): ICD-10-CM

## 2023-12-07 DIAGNOSIS — N20.0 NEPHROLITHIASIS: ICD-10-CM

## 2023-12-07 DIAGNOSIS — N18.31 TYPE 2 DIABETES MELLITUS WITH STAGE 3A CHRONIC KIDNEY DISEASE, WITHOUT LONG-TERM CURRENT USE OF INSULIN (HCC): ICD-10-CM

## 2023-12-07 DIAGNOSIS — E55.9 VITAMIN D DEFICIENCY: ICD-10-CM

## 2023-12-07 DIAGNOSIS — G47.33 OSA (OBSTRUCTIVE SLEEP APNEA): ICD-10-CM

## 2023-12-07 DIAGNOSIS — N18.31 STAGE 3A CHRONIC KIDNEY DISEASE (HCC): ICD-10-CM

## 2023-12-07 DIAGNOSIS — N18.30 BENIGN HYPERTENSION WITH CHRONIC KIDNEY DISEASE, STAGE III (HCC): Primary | ICD-10-CM

## 2023-12-07 DIAGNOSIS — E78.5 DYSLIPIDEMIA: ICD-10-CM

## 2023-12-07 DIAGNOSIS — E11.22 TYPE 2 DIABETES MELLITUS WITH STAGE 3A CHRONIC KIDNEY DISEASE, WITHOUT LONG-TERM CURRENT USE OF INSULIN (HCC): Primary | ICD-10-CM

## 2023-12-07 DIAGNOSIS — N18.31 TYPE 2 DIABETES MELLITUS WITH STAGE 3A CHRONIC KIDNEY DISEASE, WITHOUT LONG-TERM CURRENT USE OF INSULIN (HCC): Primary | ICD-10-CM

## 2023-12-07 DIAGNOSIS — I12.9 BENIGN HYPERTENSION WITH CHRONIC KIDNEY DISEASE, STAGE III (HCC): ICD-10-CM

## 2023-12-07 DIAGNOSIS — Z86.73 HISTORY OF STROKE: ICD-10-CM

## 2023-12-07 DIAGNOSIS — E66.01 MORBID OBESITY WITH BMI OF 40.0-44.9, ADULT (HCC): ICD-10-CM

## 2023-12-07 DIAGNOSIS — I12.9 BENIGN HYPERTENSION WITH CHRONIC KIDNEY DISEASE, STAGE III (HCC): Primary | ICD-10-CM

## 2023-12-07 PROCEDURE — 99204 OFFICE O/P NEW MOD 45 MIN: CPT | Performed by: FAMILY MEDICINE

## 2023-12-07 PROCEDURE — 99214 OFFICE O/P EST MOD 30 MIN: CPT | Performed by: INTERNAL MEDICINE

## 2023-12-07 RX ORDER — ERGOCALCIFEROL 1.25 MG/1
50000 CAPSULE ORAL WEEKLY
Qty: 12 CAPSULE | Refills: 0 | Status: SHIPPED | OUTPATIENT
Start: 2023-12-07 | End: 2024-02-23

## 2023-12-07 RX ORDER — EPLERENONE 25 MG/1
25 TABLET, FILM COATED ORAL 2 TIMES DAILY
Qty: 180 TABLET | Refills: 3 | Status: SHIPPED | OUTPATIENT
Start: 2023-12-07

## 2023-12-07 NOTE — PROGRESS NOTES
Assessment/Plan:  Sheyla Kumar was seen today for consult. Diagnoses and all orders for this visit:    Type 2 diabetes mellitus with stage 3a chronic kidney disease, without long-term current use of insulin (HCC)  -     Discontinue: tirzepatide (Mounjaro) 5 MG/0.5ML; Inject 0.5 mL (5 mg total) under the skin every 7 days  -     tirzepatide (Mounjaro) 2.5 MG/0.5ML; Inject 0.5 mL (2.5 mg total) under the skin every 7 days    BMI 40.0-44.9, adult ([address])  -     Ambulatory Referral to Weight Management    HARPER (obstructive sleep apnea)    Benign hypertension with chronic kidney disease, stage III ([address])    Morbid obesity with BMI of 40.0-44.9, adult ([address])    History of stroke    - Patient denies personal history of pancreatitis. Patient also denies personal and family history of medullary thyroid cancer and multiple endocrine neoplasia type 2 (MEN 2 tumor). Patient understands these major side effects and agrees to start the medication. Obesity:   Weight not at goal and patient tried more than 6 months to lose weight and was not able to achieve a meaningful weight loss above 5%  - Discussed options of [facility]-Intensive Lifestyle Intervention Program, Conservative Program, Riley-En-Y Gastric Bypass, and Vertical Sleeve Gastrectomy and the role of weight loss medications. - Patient is interested in pursuing Conservative Program  - Initial weight loss goal of 5-10% weight loss for improved health  - Weight loss can improve patient's co-morbid conditions and/or prevent weight-related complications. Motivational interview performed and patient noted changes to work on until next visit  Handouts provided:  Bariatric surgery information- declines  Calorie goal handouts provided  1500kcal  Hydration: 64oz fluid, no sugary drinks  Goal 3 meals per day  Food log encouraged , phone caridad or paper journal  Increase physical activity by 10 minutes daily.    Return in 2 mo    Subjective:   Chief Complaint   Patient presents with Consult     Initial Consultation. HARPER +. Waist:   inches       Patient ID: Huong Cruz  is a 62 y.o. male with excess weight/obesity here to pursue weight management. Previous notes and records have been reviewed. HPI  Wt Readings from Last 20 Encounters:   12/07/23 (!) 139 kg (306 lb)   12/07/23 (!) 140 kg (308 lb)   12/01/23 (!) 142 kg (312 lb)   09/13/23 (!) 139 kg (306 lb 9.6 oz)   07/21/23 (!) 140 kg (309 lb 9.6 oz)   07/18/23 (!) 139 kg (307 lb)   06/13/23 (!) 138 kg (304 lb 6.4 oz)   03/30/23 (!) 139 kg (307 lb)   02/27/23 (!) 138 kg (304 lb)   02/23/23 (!) 137 kg (303 lb)   02/12/23 (!) 137 kg (303 lb)   12/06/22 (!) 137 kg (303 lb)   11/30/22 (!) 137 kg (302 lb)   08/03/22 133 kg (293 lb)   06/15/22 134 kg (294 lb 12.8 oz)   05/23/22 135 kg (297 lb 3.2 oz)   04/20/22 (!) 139 kg (305 lb 9.6 oz)   03/15/22 (!) 141 kg (311 lb)   03/14/22 (!) 141 kg (310 lb)   03/09/22 (!) 145 kg (319 lb 10.7 oz)     Obesity/Excess Weight:Body mass index is 44.54 kg/m².     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[place] , drives 3 times a week  Sleep:has HARPER sleeps well with CPAP       Past Medical History:   Diagnosis Date    Ankle swelling     Arthritis     Asthma     Atrial fibrillation ([address])     Atrial fibrillation with RVR ([address]) 04/04/2017    Chronic kidney disease     kidney stones    Gout     Hypertension     Kidney stone     Night sweats     Obesity     Seasonal allergies     Sleep apnea     Stroke ([address]) 03/22     Past Surgical History:   Procedure Laterality Date    APPENDECTOMY CARDIOVERSION      X4 last was 2018    COLONOSCOPY      CYSTOSCOPY  08/30/2016    w/removal of object, last assessed 8/30/16    FOOT SURGERY Left     HAND SURGERY      KNEE SURGERY Right 2016    [place] CYSTO/URETERO W/LITHOTRIPSY &INDWELL STENT INSRT Left 08/09/2016    Procedure: CYSTOSCOPY URETEROSCOPY WITH LITHOTRIPSY HOLMIUM LASER STONE EXTRACTION, RETROGRADE PYELOGRAM AND INSERTION STENT URETERAL;  Surgeon: Freda Madrigal MD;  Location:  MAIN OR;  Service: Urology last assessed 8/30/16    [place] CYSTO/URETERO W/LITHOTRIPSY &INDWELL STENT INSRT Right 08/24/2020    Procedure: CYSTOSCOPY , cystolitholapaxy of bladder stone WITH HOMIUM LASER RIGHT RETROGRADE AND RIGHT URETERAL STENT, [facility];  Surgeon: Freda Madrigal MD;  Location: [address] [place] [address] MAIN OR;  Service: Urology    UPPER GASTROINTESTINAL ENDOSCOPY      WRIST SURGERY Left 2014    Fracture surgery     The following portions of the patient's history were reviewed and updated as appropriate: allergies, current medications, past family history, past medical history, past social history, past surgical history, and problem list.    ROS:  Review of Systems   Constitutional: Negative for activity change. Fatigue  HENT: Negative for trouble swallowing. Respiratory: Negative for shortness of breath. Cardiovascular: Negative for chest pain, edema  Gastrointestinal: Negative for abdominal pain, nausea and vomiting, acid reflux, constipation/diarrhea  Musculoskeletal: Negative for gait problem , has a right knee pain  Psychiatric/Behavioral: Negative for behavioral problems including anxiety /depression  Objective:  /68 (BP Location: Left arm, Patient Position: Sitting, Cuff Size: Adult)   Pulse 55   Resp 18   Ht 5' 9.5" (1.765 m)   Wt (!) 139 kg (306 lb)   SpO2 97%   BMI 44.54 kg/m²   Constitutional: Well-developed, well-nourished and Obese Body mass index is 44.54 kg/m². Prema Leigh Alert, cooperative. HEENT: No conjunctival injection.  No thyroid masses  Pulmonary: No increased work of breathing or signs of respiratory distress. Clear respiratory sounds. CV: Well-perfused, Regular rate and rhythm, no murmurs  Vascular: no peripheral edema  GI: Abdomen obese, Non-distended. Not tender  MSK: no sarcopenia noted   Neuro: Oriented to person, place and time. Normal Speech. Normal gait. Psych: Normal affect and mood. Normal thought process, no delusions     Labs and Imaging  Recent labs and imaging have been personally reviewed.   Lab Results   Component Value Date    WBC 9.29 12/01/2023    HGB 15.5 12/01/2023    HCT 47.3 12/01/2023    MCV 92 12/01/2023     12/01/2023     Lab Results   Component Value Date     08/18/2015    SODIUM 140 12/01/2023    K 3.8 12/01/2023     12/01/2023    CO2 30 12/01/2023    ANIONGAP 7 08/18/2015    AGAP 7 12/01/2023    BUN 21 12/01/2023    CREATININE 1.14 12/01/2023    GLUC 147 (H) 03/10/2022    GLUF 145 (H) 12/01/2023    CALCIUM 9.3 12/01/2023    AST 14 12/01/2023    ALT 22 12/01/2023    ALKPHOS 63 12/01/2023    PROT 7.3 12/02/2014    TP 8.0 12/01/2023    BILITOT 0.4 12/02/2014    TBILI 0.56 12/01/2023    EGFR 70 12/01/2023     Lab Results   Component Value Date    HGBA1C 7.6 (H) 12/01/2023     Lab Results   Component Value Date    NCE0BTZZLFCO 2.642 10/26/2018     Lab Results   Component Value Date    CHOLESTEROL 108 12/01/2023     Lab Results   Component Value Date    HDL 37 (L) 12/01/2023     Lab Results   Component Value Date    TRIG 128 12/01/2023     Lab Results   Component Value Date    LDLCALC 45 12/01/2023

## 2023-12-07 NOTE — PATIENT INSTRUCTIONS
Visit summary:  - Overall labs look good except low vitamin D which I will treat please see below    - Blood pressure still above goal we are going to adjust her medication    Very important that you push diet and exercise  Please see below        1. Medication changes today:  Please increase eplerenone/Inspra to 25 mg twice a day    2. General instructions:  Please try to lose 20 pounds in 6 months please put this on your refrigerator to remind you every day  Please also exercise 3 days a week at least 30 minutes of some form of aerobic exercise to begin with as you can expand it more that would be great but please start with a realistic goal of 3 days a week    3. Please go for non fasting  lab work 1-2 weeks after making the above medication change. 4.  Please take 1 week a blood pressure readings 4 to 6 weeks    AS FOLLOWS  MORNING AND EVENING, SITTING AND STANDING as follows:  TAKE THE MORNING READINGS BEFORE ANY MEDICATIONS AND WHEN YOU ARE RELAXED FOR SEVERAL MINUTES  TAKE THE EVENING READINGS:  BETWEEN 7-10 P.M.; PRIOR TO ANY MEDICATIONS; AT LEAST IN OUR  FROM DINNER; AND CERTAINLY AFTER RELAXING FOR A FEW MINUTES  PLEASE INCLUDE HEART RATE WITH YOUR BLOOD PRESSURE READINGS  When taking standing readings, keep your arm supported at heart level and not dangling  Make sure you are sitting with your back supported and feet on the ground and do not cross your legs or feet  Make sure you have not taken any coffee or caffeine products or exercised or smoke cigarettes at least 30 minutes before taking your blood pressure  Then please mail these readings into the office    5. In 3 months:  Please go for nonfasting lab work but in the morning  Please take 1 week a blood pressure readings as outlined above and mail those into the office    6. Follow-up in 6 months  Please bring in 1 week a blood pressure readings morning evening, sitting and standing is outlined above  PLEASE BRING AN YOUR BLOOD PRESSURE MACHINE TO CORRELATE WITH THE OFFICE MACHINE AT THIS NEXT SCHEDULED VISIT  Please go for fasting lab work 1-2 weeks prior to your appointment      7. General non medical recommendations:  AVOID SALT BUT NOT ADDING AN READING LABELS TO MAKE SURE THERE IS LOW-SALT IN THE FOOD THAT YOU ARE EATING  Goal is less than 2 g of sodium intake or less than 5 g of sodium chloride intake per day    Avoid nonsteroidal anti-inflammatory drugs such as Naprosyn, ibuprofen, Aleve, Advil, Celebrex, Meloxicam (Mobic) etc.  You can use Tylenol as needed if you do not have any liver condition to be concerned about    Avoid medications such as Sudafed or decongestants and antihistamines that contained the D component which is the decongestant. You can take antihistamines without the decongestant or D component. Try to avoid medications such as pantoprazole or  Protonix/Nexium or Esomeprazole)/Prilosec or omeprazole/Prevacid or lansoprazole/AcipHex or Rabeprazole. If you are able to, use Pepcid as this is safer for your kidneys. Try to exercise at least 30 minutes 3 days a week to begin with with an ultimate goal of 5 days a week for at least 30 minutes      Please do not drink more than 2 glasses of alcohol/wine on a daily basis as this may contribute to your high blood pressure.

## 2023-12-07 NOTE — PROGRESS NOTES
Assessment/Plan:  1. Primary osteoarthritis of right knee    2. Chronic pain of right knee      No orders of the defined types were placed in this encounter. Mr. Conner Naranjo has moderate to severe right knee arthritis  Doing well s/p steroid injection  Return if symptoms worsen or fail to improve. I answered all of the patient's questions during the visit and provided education of the patient's condition during the visit. The patient verbalized understanding of the information given and agrees with the plan. This note was dictated using [employer] software. It may contain errors including improperly dictated words. Please contact physician directly for any questions. Subjective   Chief Complaint:   Chief Complaint   Patient presents with    Right Knee - Follow-up       HPI  Huong Cruz is a 62 y.o. male who presents for follow up for right knee arthritis. He is currently doing very well. The steroid injection he received at his last appointment was very helpful. No new complaints. Review of Systems  ROS:    See HPI for musculoskeletal review.    All other systems reviewed are negative     History:  Past Medical History:   Diagnosis Date    Ankle swelling     Arthritis     Asthma     Atrial fibrillation (HCC)     Atrial fibrillation with RVR ([address]) 04/04/2017    Chronic kidney disease     kidney stones    Gout     Hypertension     Kidney stone     Night sweats     Obesity     Seasonal allergies     Sleep apnea     Stroke ([address]) 03/22     Past Surgical History:   Procedure Laterality Date    APPENDECTOMY      CARDIOVERSION      X4 last was 2018    COLONOSCOPY      CYSTOSCOPY  08/30/2016    w/removal of object, last assessed 8/30/16    FOOT SURGERY Left     HAND SURGERY      KNEE SURGERY Right 2016    MA CYSTO/URETERO W/LITHOTRIPSY &INDWELL STENT INSRT Left 08/09/2016    Procedure: CYSTOSCOPY URETEROSCOPY WITH LITHOTRIPSY HOLMIUM LASER STONE EXTRACTION, RETROGRADE PYELOGRAM AND INSERTION STENT URETERAL; Surgeon: Tyshawn Redd MD;  Location:  MAIN OR;  Service: Urology last assessed 16    SD CYSTO/URETERO W/LITHOTRIPSY &INDWELL STENT INSRT Right 2020    Procedure: CYSTOSCOPY , cystolitholapaxy of bladder stone WITH HOMIUM LASER RIGHT RETROGRADE AND RIGHT URETERAL Graciella Yang OF BLADDER;  Surgeon: Tyshawn Redd MD;  Location: [address] [place] [address] MAIN OR;  Service: Urology    UPPER GASTROINTESTINAL ENDOSCOPY      WRIST SURGERY Left 2014    Fracture surgery     Social History   Social History     Substance and Sexual Activity   Alcohol Use Yes    Comment: occ     Social History     Substance and Sexual Activity   Drug Use No     Social History     Tobacco Use   Smoking Status Former    Packs/day: 0.50    Years: 15.00    Total pack years: 7.50    Types: Cigarettes    Quit date: 1994    Years since quittin.6   Smokeless Tobacco Never     Family History:   Family History   Problem Relation Age of Onset    Atrial fibrillation Mother     Other Mother         blood dyscrasia    Hypertension Mother     Arthritis Mother     Other Father         hypoglycemia     Atrial fibrillation Father     Diabetes Family     Other Family         Thrombocytosis    No Known Problems Sister     Cancer Neg Hx     Thyroid disease Neg Hx        Current Outpatient Medications on File Prior to Visit   Medication Sig Dispense Refill    cloNIDine (CATAPRES-TTS-1) 0.1 mg/24 hr APPLY 1 PATCH ONE TIME PER WEEK 12 patch 0    dofetilide (TIKOSYN) 500 mcg capsule TAKE 1 CAPSULE BY MOUTH EVERY 12 HOURS 60 capsule 11    hydrALAZINE (APRESOLINE) 50 mg tablet TAKE 1 TABLET BY MOUTH TWICE A  tablet 3    nebivolol (BYSTOLIC) 2.5 mg tablet TAKE 1 TABLET BY MOUTH DAILY HOLD FOR HEART RATE LESS THAN 50 BEATS PER MINUTE.  90 tablet 3    NIFEdipine (PROCARDIA XL) 30 mg 24 hr tablet TAKE 1 TABLET BY MOUTH TWICE A  tablet 3    olmesartan (BENICAR) 40 mg tablet TAKE 1 TABLET BY MOUTH EVERY DAY 90 tablet 3    torsemide (DEMADEX) 5 MG tablet TAKE 1 TABLET (5 MG TOTAL) BY MOUTH DAILY. 90 tablet 1    Xarelto 20 MG tablet TAKE 1 TABLET BY MOUTH EVERY DAY WITH BREAKFAST 30 tablet 1    [DISCONTINUED] eplerenone (INSPRA) 25 mg tablet TAKE 1 TABLET (25 MG TOTAL) BY MOUTH DAILY. 90 tablet 2     No current facility-administered medications on file prior to visit.      No Known Allergies     Objective     /72   Pulse 74   Ht 5' 10" (1.778 m)   Wt (!) 142 kg (312 lb)   BMI 44.77 kg/m²      PE:  AAOx 3  WDWN  Hearing intact, no drainage from eyes  no audible wheezing  no abdominal distension  LE compartments soft, skin intact    Ortho Exam:  right Knee:   AROM: 0-100

## 2023-12-09 DIAGNOSIS — I48.0 PAROXYSMAL ATRIAL FIBRILLATION (HCC): ICD-10-CM

## 2023-12-11 RX ORDER — DOFETILIDE 0.5 MG/1
500 CAPSULE ORAL EVERY 12 HOURS
Qty: 180 CAPSULE | Refills: 0 | Status: SHIPPED | OUTPATIENT
Start: 2023-12-11 | End: 2024-03-10

## 2023-12-11 NOTE — TELEPHONE ENCOUNTER
Requested medication(s) are due for refill today: Yes  Patient has already received a courtesy refill: No  Other reason request has been forwarded to provider: Unable to authorize per protocol. Sent message for  to schedule follow up. He was last seen 2/10/22.

## 2024-01-02 ENCOUNTER — TELEPHONE (OUTPATIENT)
Dept: BARIATRICS | Facility: CLINIC | Age: 59
End: 2024-01-02

## 2024-01-02 DIAGNOSIS — E11.22 TYPE 2 DIABETES MELLITUS WITH STAGE 3A CHRONIC KIDNEY DISEASE, WITHOUT LONG-TERM CURRENT USE OF INSULIN (HCC): Primary | ICD-10-CM

## 2024-01-02 DIAGNOSIS — N18.31 TYPE 2 DIABETES MELLITUS WITH STAGE 3A CHRONIC KIDNEY DISEASE, WITHOUT LONG-TERM CURRENT USE OF INSULIN (HCC): Primary | ICD-10-CM

## 2024-01-02 NOTE — TELEPHONE ENCOUNTER
----- Message from Herberth Lowery sent at 12/29/2023 11:16 AM EST -----  Regarding: Follow-up  Contact: [phone]  Good Jelena Alvarado,     Please see patient's comments regarding current medication therapy and possible change in dosing.    Thank you.      ----- Message -----  From: Roland Kwan  Sent: 12/29/2023  11:09 AM EST  To: [facility]  Subject: Follow-up                                        Hello, hope you had a great holiday. Wanted to send a quick note regarding the injections as discussed. Do not experience any negative side effects and the medication seems to be helping. Administered the final ejection this morning and if you feel warranted I am comfortable increasing the dosage in the prescribed ramp you outlined. Appreciate your care and guidance.

## 2024-01-08 ENCOUNTER — TELEPHONE (OUTPATIENT)
Age: 59
End: 2024-01-08

## 2024-01-08 NOTE — TELEPHONE ENCOUNTER
Angella called asking if office staff received information on this pt. Warm transfer attempted to office clerical, was put on hold. She did have the wrong fax number, correct information given. Please keep an eye out for fax from [facility].

## 2024-01-12 DIAGNOSIS — I48.20 CHRONIC ATRIAL FIBRILLATION (HCC): ICD-10-CM

## 2024-01-12 RX ORDER — RIVAROXABAN 20 MG/1
TABLET, FILM COATED ORAL
Qty: 30 TABLET | Refills: 1 | Status: SHIPPED | OUTPATIENT
Start: 2024-01-12

## 2024-01-14 DIAGNOSIS — I12.9 HYPERTENSIVE CHRONIC KIDNEY DISEASE WITH STAGE 1 THROUGH STAGE 4 CHRONIC KIDNEY DISEASE, OR UNSPECIFIED CHRONIC KIDNEY DISEASE: ICD-10-CM

## 2024-01-14 DIAGNOSIS — I10 BENIGN ESSENTIAL HYPERTENSION: ICD-10-CM

## 2024-01-14 DIAGNOSIS — I48.91 ATRIAL FIBRILLATION WITH RVR (HCC): ICD-10-CM

## 2024-01-15 ENCOUNTER — OFFICE VISIT (OUTPATIENT)
Dept: CARDIOLOGY CLINIC | Facility: CLINIC | Age: 59
End: 2024-01-15
Payer: COMMERCIAL

## 2024-01-15 VITALS
SYSTOLIC BLOOD PRESSURE: 110 MMHG | DIASTOLIC BLOOD PRESSURE: 66 MMHG | WEIGHT: 291.8 LBS | BODY MASS INDEX: 41.78 KG/M2 | HEIGHT: 70 IN | HEART RATE: 65 BPM

## 2024-01-15 DIAGNOSIS — Z86.73 HISTORY OF STROKE: ICD-10-CM

## 2024-01-15 DIAGNOSIS — I48.20 CHRONIC ATRIAL FIBRILLATION (HCC): Primary | ICD-10-CM

## 2024-01-15 PROCEDURE — 93000 ELECTROCARDIOGRAM COMPLETE: CPT | Performed by: PHYSICIAN ASSISTANT

## 2024-01-15 PROCEDURE — 99213 OFFICE O/P EST LOW 20 MIN: CPT | Performed by: PHYSICIAN ASSISTANT

## 2024-01-15 RX ORDER — NEBIVOLOL 2.5 MG/1
TABLET ORAL
Qty: 90 TABLET | Refills: 3 | Status: SHIPPED | OUTPATIENT
Start: 2024-01-15

## 2024-01-15 RX ORDER — OLMESARTAN MEDOXOMIL 40 MG/1
TABLET ORAL
Qty: 90 TABLET | Refills: 3 | Status: SHIPPED | OUTPATIENT
Start: 2024-01-15

## 2024-01-15 NOTE — TELEPHONE ENCOUNTER
Requested medication(s) are due for refill today: Yes  Patient has already received a courtesy refill: No  Other reason request has been forwarded to provider: franky

## 2024-01-15 NOTE — PROGRESS NOTES
Electrophysiology 2 Year Follow Up  [facility]  [facility] [address], [place] [address]    Name: Roland Kwan  : 1965  MRN: [id number]    ASSESSMENT:  1. Chronic atrial fibrillation (HCC)        2. History of stroke            PLAN:  Paroxysmal atrial fibrillation  - anticoagulated with Xarelto / [id number] score of 3 (HTN, CVA)  - EF of 65% per echo 3/2022 / moderate dilation of left atrium noted  - rate control: nebivolol  - antiarrhythmic therapy: dofetilide / symptoms to flecainide  - prior cardioversion: , 10/2018,  was aborted given in sinus  - prior ablation: none  Essential hypertension  - maintained on clonidine, hydralazine, nebivolol, olmesartan, and nifedipine  - managed by Dr. Hernández of nephrology  Severe HARPER   - maintained on BiPAP since   Obesity with BMI of 44  History of CVA 3/2022  - maintained on atorvastatin (not on aspirin given AC)    IMPRESSION:  1. Atrial fibrillation - Patient is doing well from an arrhythmia standpoint. He is sinus rhythm today and has been since his last episode down at the [facility] in 2023. Although he does not truly feel his afib he does an Apple watch that he tracks his heart rate. Knows to call if he has any further episodes. Will not change current dose of dofetilide given stable Qtc and CrCl of 138mL/min.    He is keeping himself accountable with weight loss as he is seen in the bariatric office. Is diligent about taking his medications (trying to see if he can come off some if he loses weight) especially for hypertension as he is on quite a lot of meds for this.     Patient is to follow up in our EP office in 1 year. He is to call our office with any further questions or concerns in the meantime.    HPI:   Interim history: Roland Kwan is a 58 y.o. male with paroxysmal atrial fibrillation anticoagulated with Xarelto, essential hypertension, severe HARPER maintained on BiPAP, obesity with  BMI 44, and history of CVA. He presents today for overdue office visit.    Patient was diagnosed with atrial fibrillation in 2014 and in review of notes was symptomatic with this with exercise intolerance and chest discomfort - today patient reports he does not always know but has fatigue.  He had been trialed on flecainide but apparently had side effects to this and therefore he underwent dofetilide initiation and cardioversion in 2016.  He has required repeat cardioversions in 2017 and 2018.  However, interestingly in July of last year when he was on vacation he had a repeat episode of atrial fibrillation for which when he presented for cardioversion when he returned, he was back in sinus.    He reports today that he has been doing well since his last episode of afib - no further complaints. Works for [employer], sometimes travels but does work from home for the most part now. Has four kids.    Rhythm History:  1. Diagnosed with afib in 2014 - pretty symptomatic at first   A. When see by DT noted to have exercise intolerance and chest discomfort  2. Was started on flecainide but had symptoms  3. Underwent dofetilide initiation and CV 2016   A. Had repeat CV's 2017 and 2018  4. Had been in afib 7/2022 when down at the [facility] and CV was not needed      EKG: Normal sinus rhythm at 65 bpm    ROS:   Review of Systems   Constitutional: Negative for chills, diaphoresis, fever and malaise/fatigue.   Cardiovascular:  Negative for chest pain, claudication, cyanosis, dyspnea on exertion, irregular heartbeat, leg swelling, near-syncope, orthopnea, palpitations, paroxysmal nocturnal dyspnea and syncope.   Respiratory:  Negative for shortness of breath and sleep disturbances due to breathing.    Neurological:  Negative for dizziness and light-headedness.   All other systems reviewed and are negative.      OBJECTIVE:   Vitals:   /66 (BP Location: Left arm, Patient Position: Sitting, Cuff Size: Large)   Pulse 65   Ht 5'  "9.5\" (1.765 m)   Wt 132 kg (291 lb 12.8 oz)   BMI 42.47 kg/m²       Physical Exam:   Physical Exam  Vitals and nursing note reviewed.   Constitutional:       General: He is not in acute distress.     Appearance: Normal appearance. He is well-developed. He is not diaphoretic.   HENT:      Head: Normocephalic and atraumatic.   Eyes:      Pupils: Pupils are equal, round, and reactive to light.   Neck:      Vascular: No JVD.   Cardiovascular:      Rate and Rhythm: Normal rate and regular rhythm.      Heart sounds: No murmur heard.     No friction rub. No gallop.   Pulmonary:      Effort: Pulmonary effort is normal. No respiratory distress.      Breath sounds: Normal breath sounds. No wheezing.   Abdominal:      Palpations: Abdomen is soft.   Musculoskeletal:         General: Normal range of motion.      Cervical back: Normal range of motion.   Skin:     General: Skin is warm and dry.   Neurological:      Mental Status: He is alert and oriented to person, place, and time.   Psychiatric:         Mood and Affect: Mood normal.         Behavior: Behavior normal.         Medications:      Current Outpatient Medications:     atorvastatin (LIPITOR) 40 mg tablet, TAKE 1 TABLET BY MOUTH EVERY DAY WITH DINNER, Disp: 90 tablet, Rfl: 1    cloNIDine (CATAPRES-TTS-1) 0.1 mg/24 hr, APPLY 1 PATCH ONE TIME PER WEEK, Disp: 12 patch, Rfl: 0    dofetilide (TIKOSYN) 500 mcg capsule, Take 1 capsule (500 mcg total) by mouth every 12 (twelve) hours, Disp: 180 capsule, Rfl: 0    eplerenone (INSPRA) 25 mg tablet, Take 1 tablet (25 mg total) by mouth 2 (two) times a day, Disp: 180 tablet, Rfl: 3    ergocalciferol (VITAMIN D2) 50,000 units, Take 1 capsule (50,000 Units total) by mouth once a week for 12 doses (Patient not taking: Reported on 12/7/2023), Disp: 12 capsule, Rfl: 0    hydrALAZINE (APRESOLINE) 50 mg tablet, TAKE 1 TABLET BY MOUTH TWICE A DAY, Disp: 180 tablet, Rfl: 3    nebivolol (BYSTOLIC) 2.5 mg tablet, TAKE 1 TABLET BY MOUTH DAILY " HOLD FOR HEART RATE LESS THAN 50 BEATS PER MINUTE., Disp: 90 tablet, Rfl: 3    NIFEdipine (PROCARDIA XL) 30 mg 24 hr tablet, TAKE 1 TABLET BY MOUTH TWICE A DAY, Disp: 180 tablet, Rfl: 3    olmesartan (BENICAR) 40 mg tablet, TAKE 1 TABLET BY MOUTH EVERY DAY, Disp: 90 tablet, Rfl: 3    tirzepatide (Mounjaro) 5 MG/0.5ML, Inject 0.5 mL (5 mg total) under the skin every 7 days, Disp: 2 mL, Rfl: 1    torsemide (DEMADEX) 5 MG tablet, TAKE 1 TABLET (5 MG TOTAL) BY MOUTH DAILY., Disp: 90 tablet, Rfl: 1    Xarelto 20 MG tablet, TAKE 1 TABLET BY MOUTH EVERY DAY WITH BREAKFAST, Disp: 30 tablet, Rfl: 1     Family History   Problem Relation Age of Onset    Atrial fibrillation Mother     Other Mother         blood dyscrasia    Hypertension Mother     Arthritis Mother     Other Father         hypoglycemia     Atrial fibrillation Father     Diabetes Family     Other Family         Thrombocytosis    No Known Problems Sister     Cancer Neg Hx     Thyroid disease Neg Hx      Social History     Socioeconomic History    Marital status: /Civil Union     Spouse name: Not on file    Number of children: 4    Years of education: 12th grade     Highest education level: Not on file   Occupational History    Not on file   Tobacco Use    Smoking status: Former     Current packs/day: 0.00     Average packs/day: 0.5 packs/day for 15.0 years (7.5 ttl pk-yrs)     Types: Cigarettes     Start date: 1979     Quit date: 1994     Years since quittin.8    Smokeless tobacco: Never   Vaping Use    Vaping status: Never Used   Substance and Sexual Activity    Alcohol use: Yes     Comment: occ    Drug use: No    Sexual activity: Yes     Partners: Female     Birth control/protection: None   Other Topics Concern    Not on file   Social History Narrative    No caffeine use      Social Determinants of Health     Financial Resource Strain: Not on file   Food Insecurity: No Food Insecurity (3/11/2022)    Hunger Vital Sign     Worried About Running  Out of Food in the Last Year: Never true     Ran Out of Food in the Last Year: Never true   Transportation Needs: No Transportation Needs (3/11/2022)    PRAPARE - Transportation     Lack of Transportation (Medical): No     Lack of Transportation (Non-Medical): No   Physical Activity: Not on file   Stress: Not on file   Social Connections: Not on file   Intimate Partner Violence: Not on file   Housing Stability: Low Risk  (3/11/2022)    Housing Stability Vital Sign     Unable to Pay for Housing in the Last Year: No     Number of Places Lived in the Last Year: 1     Unstable Housing in the Last Year: No     Social History     Tobacco Use   Smoking Status Former    Current packs/day: 0.00    Average packs/day: 0.5 packs/day for 15.0 years (7.5 ttl pk-yrs)    Types: Cigarettes    Start date: 1979    Quit date: 1994    Years since quittin.8   Smokeless Tobacco Never     Social History     Substance and Sexual Activity   Alcohol Use Yes    Comment: occ       Labs & Results:  Below is the patient's most recent value for Albumin, ALT, AST, BUN, Calcium, Chloride, Cholesterol, CO2, Creatinine, GFR, Glucose, HDL, Hematocrit, Hemoglobin, Hemoglobin A1C, LDL, Magnesium, Phosphorus, Platelets, Potassium, PSA, Sodium, Triglycerides, and WBC.   Lab Results   Component Value Date    ALT 22 2023    AST 14 2023    BUN 21 2023    CALCIUM 9.3 2023     2023    CHOL 177 10/08/2012    CO2 30 2023    CREATININE 1.14 2023    HDL 37 (L) 2023    HCT 47.3 2023    HGB 15.5 2023    HGBA1C 7.6 (H) 2023    MG 2.0 2023    PHOS 3.0 2023     2023    K 3.8 2023    PSA 0.8 2020     2015    TRIG 128 2023    WBC 9.29 2023     Note: for a comprehensive list of the patient's lab results, access the Results Review activity.    CARDIAC TESTING:   ECHO:   Results for orders placed during the hospital encounter of  21    Echo complete with contrast if indicated    Narrative  [place] [id number] [phone]    Transthoracic Echocardiogram  2D, M-mode, Doppler, and Color Doppler    Study date:  2021    Patient: BUFFY ISAACS  MR number: [id number]  Account number: [id number]  : 1965  Age: 55 years  Gender: Male  Status: Outpatient  Location: Echo lab  Height: 70 in  Weight: 311.3 lb  BP: 150/ 92 mmHg    Indications: Shortness of Breath    Diagnoses: R06.02 - Shortness of breath    Sonographer:  JOAQUÍN Chase  Primary Physician:  Shirley Ross MD  Referring Physician:  Adam Jo PA-C  Group:  [facility]  Interpreting Physician:  Santy Daniels MD    SUMMARY    LEFT VENTRICLE:  Systolic function was normal. Ejection fraction was estimated to be 65 %.  There were no regional wall motion abnormalities.  Wall thickness was mildly increased.  The changes were consistent with concentric remodeling (increased wall thickness with normal wall mass).    LEFT ATRIUM:  The atrium was mildly dilated.    MITRAL VALVE:  There was trace regurgitation.    TRICUSPID VALVE:  There was trace regurgitation.    PULMONIC VALVE:  There was trace regurgitation.    HISTORY: PRIOR HISTORY: HARPER; ASthma; Hypertension; A-fib; CKD3; Former Smoker    PROCEDURE: The procedure was performed in the echo lab. This was a routine study. The transthoracic approach was used. The study included complete 2D imaging, M-mode, complete spectral Doppler, and color Doppler. The heart rate was 53 bpm,  at the start of the study. Images were obtained from the parasternal, apical, subcostal, and suprasternal notch acoustic windows. Intravenous contrast ( 0.6 ml/min in Definity in NSS) was administered to opacify the left ventricle. This  was a technically difficult study.    LEFT VENTRICLE: Size was normal. Systolic function was normal. Ejection fraction  was estimated to be 65 %. There were no regional wall motion abnormalities. Wall thickness was mildly increased. The changes were consistent with concentric  remodeling (increased wall thickness with normal wall mass). DOPPLER: Left ventricular diastolic function parameters were normal.    RIGHT VENTRICLE: The size was normal. Systolic function was normal. Wall thickness was normal.    LEFT ATRIUM: The atrium was mildly dilated.    RIGHT ATRIUM: Size was normal.    MITRAL VALVE: Valve structure was normal. There was normal leaflet separation. DOPPLER: The transmitral velocity was within the normal range. There was no evidence for stenosis. There was trace regurgitation.    AORTIC VALVE: The valve was trileaflet. Leaflets exhibited normal thickness and normal cuspal separation. DOPPLER: Transaortic velocity was within the normal range. There was no evidence for stenosis. There was no regurgitation.    TRICUSPID VALVE: The valve structure was normal. There was normal leaflet separation. DOPPLER: The transtricuspid velocity was within the normal range. There was no evidence for stenosis. There was trace regurgitation. Pulmonary artery  systolic pressure was within the normal range.    PULMONIC VALVE: Leaflets exhibited normal thickness, no calcification, and normal cuspal separation. DOPPLER: The transpulmonic velocity was within the normal range. There was trace regurgitation.    PERICARDIUM: There was no pericardial effusion. The pericardium was normal in appearance.    AORTA: The root exhibited normal size.    SYSTEM MEASUREMENT TABLES    2D  %FS: 37.67 %  Ao Diam: 3.62 cm  Ao asc: 3.9 cm  EDV(Teich): 114.09 ml  EF(Teich): 67.57 %  ESV(Teich): 37 ml  IVSd: 1.6 cm  LA Area: 24.4 cm2  LA Diam: 4.56 cm  LVEDV MOD A4C: 123.04 ml  LVEF MOD A4C: 56.64 %  LVESV MOD A4C: 53.35 ml  LVIDd: 4.92 cm  LVIDs: 3.07 cm  LVLd A4C: 9.06 cm  LVLs A4C: 7.68 cm  LVPWd: 1.3 cm  RA Area: 25.45 cm2  RVIDd: 4.2 cm  SV MOD A4C: 69.69  ml  SV(Teich): 77.09 ml    CW  TR Vmax: 2.71 m/s  TR maxP.3 mmHg    MM  TAPSE: 2.94 cm    PW  E' Sept: 0.08 m/s  E/E' Sept: 10.07  MV A Johnny: 0.76 m/s  MV Dec Waldo: 3.22 m/s2  MV DecT: 248.44 ms  MV E Johnny: 0.8 m/s  MV E/A Ratio: 1.05  MV PHT: 72.05 ms  MVA By PHT: 3.05 cm2    [employer] Accredited Echocardiography Laboratory    Prepared and electronically signed by    Santy Daniels MD  Signed 2021 16:30:12    Results for orders placed during the hospital encounter of 17    [place] [address]  [phone]    Transesophageal Echocardiogram  2D, M-mode, Doppler, and Color Doppler    Study date:  2017    Patient: BUFFY ISAACS  MR number: [id number]  Account number: [id number]  : 1965  Age: 51 years  Gender: Male  Status: Outpatient  Location: Cath lab  Height: 67 in  Weight: 195 lb  BP: 128/ 64 mmHg    Indications: Atrial fibrillation. Study performed to rule out left atrial thrombus prior to elective electrical cardioversion.    Diagnoses: R06.02 - Shortness of breath    Sonographer:  JOAQUÍN Stewart  Interpreting Physician:  Raymond Otero MD  Primary Physician:  Ju Douglass DO  Referring Physician:  Chris Curiel MD  Group:  [facility]  Cardiology Fellow:  Laendro Loaiza MD    SUMMARY    LEFT VENTRICLE:  Systolic function was normal. Ejection fraction was estimated to be 60 %.    LEFT ATRIUM:  The atrium was mildly dilated.  No thrombus was identified.    LEFT ATRIAL APPENDAGE:  The appendage was moderately dilated.  No thrombus was identified.    ATRIAL SEPTUM:  No defect or patent foramen ovale was identified.  No defect or patent foramen ovale was identified.  Contrast injection was performed. There was no right-to-left shunt, with provocative maneuvers to increase right atrial pressure.    MITRAL VALVE:  There was mild regurgitation.    TRICUSPID  VALVE:  There was mild regurgitation.    HISTORY: PRIOR HISTORY: Atrial fibrillation, previous cardioversions, Hypertension    PROCEDURE: The procedure was performed in the catheterization laboratory. This was a routine study. The risks and alternatives of the procedure were explained to the patient and informed consent was obtained. The transesophageal approach  was used. The study included complete 2D imaging, M-mode, complete spectral Doppler, and color Doppler. The heart rate was 121 bpm, at the start of the study. Intubated with ease. One intubation attempt(s). There was no blood detected on  the probe. Image quality was adequate. There were no complications during the procedure. MEDICATIONS: Benzocaine spray, topical to oropharynx, prior to procedure. Sedation administered by anesthesia team.    LEFT VENTRICLE: Size was normal. Systolic function was normal. Ejection fraction was estimated to be 60 %. This study was inadequate for the evaluation of regional wall motion. Wall thickness was normal. DOPPLER: The study was not  technically sufficient to allow evaluation of LV diastolic function.    RIGHT VENTRICLE: The size was normal. Systolic function was normal. Wall thickness was normal.    LEFT ATRIUM: The atrium was mildly dilated. No thrombus was identified. APPENDAGE: The appendage was moderately dilated. No thrombus was identified. DOPPLER: The function was normal (normal emptying velocity).    ATRIAL SEPTUM: No defect or patent foramen ovale was identified. No defect or patent foramen ovale was identified. Contrast injection was performed. There was no right-to-left shunt, with provocative maneuvers to increase right atrial  pressure.    RIGHT ATRIUM: Size was normal. No thrombus was identified.    MITRAL VALVE: Valve structure was normal. There was normal leaflet separation. DOPPLER: There was mild regurgitation.    AORTIC VALVE: The valve was trileaflet. Leaflets exhibited normal thickness and normal  cuspal separation. DOPPLER: There was no regurgitation.    TRICUSPID VALVE: The valve structure was normal. There was normal leaflet separation. DOPPLER: There was mild regurgitation.    PULMONIC VALVE: Leaflets exhibited normal thickness, no calcification, and normal cuspal separation.    PERICARDIUM: There was no pericardial effusion.    AORTA: The root exhibited normal size. There was no atheroma. There was no evidence for dissection. There was no evidence for aneurysm.    MEASUREMENT TABLES    DOPPLER MEASUREMENTS  Left atrium   (Reference normals)  VIVEK peak cheli   80 cm/s   (--)    [facility] Accredited Echocardiography Laboratory    Prepared and electronically signed by    Raymond Otero MD  Signed 05-Apr-2017 16:16:09      CATH:  No results found for this or any previous visit.      STRESS TEST:  No results found for this or any previous visit.

## 2024-02-06 DIAGNOSIS — I10 ACCELERATED HYPERTENSION: ICD-10-CM

## 2024-02-06 DIAGNOSIS — I12.9 HYPERTENSIVE CHRONIC KIDNEY DISEASE WITH STAGE 1 THROUGH STAGE 4 CHRONIC KIDNEY DISEASE, OR UNSPECIFIED CHRONIC KIDNEY DISEASE: ICD-10-CM

## 2024-02-06 DIAGNOSIS — E55.9 VITAMIN D DEFICIENCY: ICD-10-CM

## 2024-02-06 DIAGNOSIS — E78.5 DYSLIPIDEMIA: ICD-10-CM

## 2024-02-06 DIAGNOSIS — E66.01 CLASS 3 SEVERE OBESITY DUE TO EXCESS CALORIES WITH SERIOUS COMORBIDITY AND BODY MASS INDEX (BMI) OF 40.0 TO 44.9 IN ADULT (HCC): ICD-10-CM

## 2024-02-06 DIAGNOSIS — N18.31 STAGE 3A CHRONIC KIDNEY DISEASE (HCC): ICD-10-CM

## 2024-02-06 RX ORDER — TORSEMIDE 5 MG/1
5 TABLET ORAL DAILY
Qty: 90 TABLET | Refills: 1 | Status: SHIPPED | OUTPATIENT
Start: 2024-02-06

## 2024-02-12 ENCOUNTER — OFFICE VISIT (OUTPATIENT)
Dept: NEUROLOGY | Facility: CLINIC | Age: 59
End: 2024-02-12
Payer: COMMERCIAL

## 2024-02-12 VITALS
HEART RATE: 89 BPM | HEIGHT: 70 IN | BODY MASS INDEX: 40.47 KG/M2 | DIASTOLIC BLOOD PRESSURE: 68 MMHG | SYSTOLIC BLOOD PRESSURE: 136 MMHG | TEMPERATURE: 98 F | OXYGEN SATURATION: 96 % | WEIGHT: 282.7 LBS

## 2024-02-12 DIAGNOSIS — E66.01 MORBID OBESITY WITH BMI OF 40.0-44.9, ADULT (HCC): ICD-10-CM

## 2024-02-12 DIAGNOSIS — I48.20 CHRONIC ATRIAL FIBRILLATION (HCC): ICD-10-CM

## 2024-02-12 DIAGNOSIS — G47.33 OSA (OBSTRUCTIVE SLEEP APNEA): ICD-10-CM

## 2024-02-12 DIAGNOSIS — Z86.73 HISTORY OF STROKE: ICD-10-CM

## 2024-02-12 DIAGNOSIS — N18.30 BENIGN HYPERTENSION WITH CHRONIC KIDNEY DISEASE, STAGE III (HCC): Primary | ICD-10-CM

## 2024-02-12 DIAGNOSIS — N18.31 TYPE 2 DIABETES MELLITUS WITH STAGE 3A CHRONIC KIDNEY DISEASE, WITHOUT LONG-TERM CURRENT USE OF INSULIN (HCC): ICD-10-CM

## 2024-02-12 DIAGNOSIS — E78.5 DYSLIPIDEMIA: ICD-10-CM

## 2024-02-12 DIAGNOSIS — I12.9 BENIGN HYPERTENSION WITH CHRONIC KIDNEY DISEASE, STAGE III (HCC): Primary | ICD-10-CM

## 2024-02-12 DIAGNOSIS — E11.22 TYPE 2 DIABETES MELLITUS WITH STAGE 3A CHRONIC KIDNEY DISEASE, WITHOUT LONG-TERM CURRENT USE OF INSULIN (HCC): ICD-10-CM

## 2024-02-12 PROCEDURE — 99215 OFFICE O/P EST HI 40 MIN: CPT

## 2024-02-12 NOTE — ASSESSMENT & PLAN NOTE
Hypertension previously resistant to antihypertensives.  Currently managed by Nephrology with a complex regimen  BP today 136/68  He is also monitoring at home and reports controlled readings.

## 2024-02-12 NOTE — ASSESSMENT & PLAN NOTE
He is working with medical weight management  Is using Mounjaro with reported less appetite.   His weight was 307 lbs last year, current weight is 282 lbs.     We discussed the risks associated with obesity and it's effects on his hypertension, hyperglycemia etc. I encouraged him to continue to work on healthy diet and increasing his physical activity to include at least 150 minutes a week of vigorous physical activity.

## 2024-02-12 NOTE — ASSESSMENT & PLAN NOTE
Chronic Afib maintained appropriately on Xarelto 20 mg daily, managed by Cardiology. Denies any excessive bruising or bleeding.    Reminded him of the need to take Xarelto with food daily.

## 2024-02-12 NOTE — PROGRESS NOTES
Patient ID: Roland Kwan is a 58 y.o. male.    Assessment/Plan:    History of stroke  Roland Kwan is a 58 year old male with a history of Afib on Xarelto, hypertension, sleep apnea, arthritis, who presents to the office today to follow up on his hx of left frontal corona radiata hemorrhagic stroke 3/2022. His residual deficits include patchy numbness/ diminished sensation to temperature in the RLE and face. He continues on Xarelto 20mg daily for Afib and Atorvastatin 40 mg for secondary stroke prevention. He was reminded today to take his Xarelto with food daily. He has successfully lost weight over the last 8 months, now down to 282 lbs and his blood pressure appears to be well controlled. His other vascular risk factors also appear to be well controlled overall. He was encouraged to continue to work on weight loss, increasing physical activity, and with this tighter HgbA1c control with a goal of <7.0.  We reviewed his stroke risk factors and management of the same. He was provided stroke education and we reviewed stroke warning signs/symptoms and reasons to return by ambulance to the ER. He will continue to follow up closely with his nephrologist and cardiologist. Plan as outlined below.       Plan:  - Continue with good blood pressure control; I would recommend monitoring at home at least 3 times per week; Goal of <125/80 (managed by nephrology)  - Continue with good cholesterol control; Goal LDL <70; at goal   - Continue with good blood sugar control; Goal HgbA1c <7.0; continue to work on this   - Will defer monitoring of cholesterol and blood sugar and management of hypertensive medications to the primary care provider  - Stay well hydrated by drinking enough water   - Eat a healthy diet, high in lean meats fish, turkey, chicken. Low in fats, cholesterol, sugars and sodium. Avoid canned foods,  get lots of fresh/frozen vegetables/fruits.  - Get routine exercise/physical activity as much as able to tolerate;  increase to 150 minutes of vigorous physical activity per week    - Congratulations on weight loss so far! Continue to work hard on this.  - Keep follow ups with your other health care providers  - Continue Xarelto 20 mg daily (with food) and Lipitor 40 mg daily.     I will plan for him to return to the office in 8 months time but would be happy to see him sooner if the need should arise.  If he has any symptoms concerning for TIA or stroke including sudden painless loss of vision or double vision, difficulty speaking or swallowing, vertigo/room spinning that does not quickly resolve, or weakness/numbness affecting 1 side of the face or body he should proceed by ambulance to the nearest emergency room immediately.     Dyslipidemia  On Atorvastatin 40 mg daily; no adverse effects  LDL goal <70  LDL 12/1/2023 45  Encouraged healthy diet and routine physical activity.    Morbid obesity with BMI of 40.0-44.9, adult ([facility])  He is working with medical weight management  Is using Mounjaro with reported less appetite.   His weight was 307 lbs last year, current weight is 282 lbs.     We discussed the risks associated with obesity and it's effects on his hypertension, hyperglycemia etc. I encouraged him to continue to work on healthy diet and increasing his physical activity to include at least 150 minutes a week of vigorous physical activity.     Chronic atrial fibrillation (HCC)  Chronic Afib maintained appropriately on Xarelto 20 mg daily, managed by Cardiology. Denies any excessive bruising or bleeding.    Reminded him of the need to take Xarelto with food daily.         HARPER (obstructive sleep apnea)  Hx of HARPER  Reports 100% Bi-PAP compliance      Type 2 diabetes mellitus with stage 3a chronic kidney disease, without long-term current use of insulin ([facility])    Lab Results   Component Value Date    HGBA1C 7.6 (H) 12/01/2023     HgbA1c in the diabetic range  Goal HgbA1c <7.0  Now using Mounjaro  He has lost about 25  lbs.  Encouraged healthy diet, and routine physical activity  PCP to continue to monitor/manage diabetes     Benign hypertension with chronic kidney disease, stage III (HCC)  Hypertension previously resistant to antihypertensives.  Currently managed by Nephrology with a complex regimen  BP today 136/68  He is also monitoring at home and reports controlled readings.       Diagnoses and all orders for this visit:    Benign hypertension with chronic kidney disease, stage III (HCC)    History of stroke    Dyslipidemia    Type 2 diabetes mellitus with stage 3a chronic kidney disease, without long-term current use of insulin (HCC)    Chronic atrial fibrillation (HCC)    Morbid obesity with BMI of 40.0-44.9, adult ([facility])    HARPER (obstructive sleep apnea)         I have spent a total time of 30 minutes on 02/12/24 in caring for this patient including Diagnostic results, Prognosis, Risks and benefits of tx options, Instructions for management, Patient and family education, Importance of tx compliance, Risk factor reductions, Impressions, Documenting in the medical record, Reviewing / ordering tests, medicine, procedures  , and Obtaining or reviewing history  .      Subjective:    LISA Roland Kwan is a 58 year old male with a history of Afib on Xarelto, hypertension, sleep apnea, arthritis, who presents to the office today to follow up on his hx of left frontal corona radiata hemorrhagic stroke 3/2022. Etiology suspected to be most likely hemorrhagic stroke in the setting of uncontrolled HTN and anticoagulation use with subsequent ischemic stroke secondary to compression of vessel in the setting of vasogenic edema. His AC was discontinued for 3 weeks and he had a repeat CT head 4/6/22 before restarting Xarelto 20mg daily. He has continued on Atorvastatin 40 mg and his antihypertensives with good compliance since discharge. He was last seen 6/13/2023. He returns today and states he continues to do well.      Secondary Stroke  Prevention  He continues on Xarelto 20 mg (reports not always taken with food) and Atorvastatin 40 mg   Compliant with his medications, no issues affording or obtaining medications.   Denies excessive bruising or bleeding     Deficits  His only residual deficit is continued (no better or worse) patchy numbness/ diminished sensation to temperature in the right lower leg from the knee down to his foot as well as a little in the face and on the right side of the head.   He reports complete resolution of previously noted minor difficulty with word finding.  Denies numbness, tingling, weakness, dizziness, headaches, vision changes or any new or worsening stroke like symptoms  Denies difficulty with swallowing      Monitoring  BP-  Dr. Cash has set his goal blood pressure- 125/80.   He monitors his BP at least once a week (usually 2-3 times per week). BP today 136/68.   He is not taking any oral antidiabetics, but is using Mounjaro  12/1/2023 HgbA1c 7.6   12/1/2023 LDL 45  CTA H/N 3/7/22 without ICA stenosis      Safety  Independent of all ADLs  No falls at home  He is managing his own medications and finances  assistive devices- none. Does use a walking stick for his walks in the woods.  Driving- independent; no concerns  Memory- no concerns      Substance use    Smoking- does not smoke; quit 30 years ago before the birth of his first child  Etoh- reports no longer drinking beer, now drinking bourbon 1-4 drinks per week.   Drugs- none  Caffeine - has not recently been drinking coffee since starting Mounjaro     Sleep  He sleeps well and uses a Bi-PAP for HARPER.  100% compliance      Diet  He is working with medical weight management  Is using Mounjaro with reported less appetite.   His weight was 307 lbs last year, current weight is 282 lbs.      Exercise  3 hrs/week walking dog      PT/OT/ST  He completed speech therapy, occupational therapy and physical therapy s/p stroke.  He recently returned to PT for shoulder  "tendonitis   Recently had PT for the right knee arthritis     Mood  He denies any concerns for significant anxiety or depression.      Follow ups  He continues to follow closely with cardiology and nephrology.      Return to work  returned to work 2 weeks after his stroke.   He is working from home primarily.      The following portions of the patient's history were reviewed and updated as appropriate: allergies, current medications, past family history, past medical history, past social history, past surgical history, and problem list.     Objective:    Blood pressure 136/68, pulse 89, temperature 98 °F (36.7 °C), temperature source Temporal, height 5' 9.5\" (1.765 m), weight 128 kg (282 lb 11.2 oz), SpO2 96%.    Neurological Exam  On neurological examination patient is alert, awake, oriented and in no distress. Speech is fluent without dysarthria or aphasia. Cranial nerves 2-12 were symmetrically intact bilaterally. No evidence of any focal weakness or sensory loss in the upper or lower extremities. Motor testing reveals 5/5 strength of the bilateral upper and lower extremities.There was no pronator drift.  No fasciculations present. No abnormal involuntary movements. Finger- to-nose reveals no tremor or ataxia and intact proprioceptive function, no dysmetria was noted. Rapid alternating movement normal. Sensation was intact to vibration, light touch, and temperature in bilateral upper and lower extremities. Deep tendon reflexes were 2+ and symmetric in the bilateral upper and lower extremities. He is able to rise easily without assistance from a seated position. Casual gait is normal including stance, stride, and arm swing. Normal tandem gait. Romberg is absent.      ROS:    Review of Systems   Constitutional:  Negative for appetite change, fatigue and fever.   HENT: Negative.  Negative for hearing loss, tinnitus, trouble swallowing and voice change.    Eyes: Negative.  Negative for photophobia, pain and visual " disturbance.   Respiratory: Negative.  Negative for shortness of breath.    Cardiovascular: Negative.  Negative for palpitations.   Gastrointestinal: Negative.  Negative for nausea and vomiting.   Endocrine: Negative.  Negative for cold intolerance.   Genitourinary: Negative.  Negative for dysuria, frequency and urgency.   Musculoskeletal:  Negative for back pain, gait problem, myalgias, neck pain and neck stiffness.   Skin: Negative.  Negative for rash.   Allergic/Immunologic: Negative.    Neurological: Negative.  Negative for dizziness, tremors, seizures, syncope, facial asymmetry, speech difficulty, weakness, light-headedness, numbness and headaches.   Hematological: Negative.  Does not bruise/bleed easily.   Psychiatric/Behavioral: Negative.  Negative for confusion, hallucinations and sleep disturbance.    All other systems reviewed and are negative.    Reviewed ROS as entered by medical assistant.

## 2024-02-12 NOTE — PATIENT INSTRUCTIONS
- Continue with good blood pressure control; I would recommend monitoring at home at least 3 times per week; Goal of <125/80 (managed by nephrology)  - Continue with good cholesterol control; Goal LDL <70; at goal   - Continue with good blood sugar control; Goal HgbA1c <7.0; continue to work on this   - Will defer monitoring of cholesterol and blood sugar and management of hypertensive medications to the primary care provider  - Stay well hydrated by drinking enough water   - Eat a healthy diet, high in lean meats fish, turkey, chicken. Low in fats, cholesterol, sugars and sodium. Avoid canned foods,  get lots of fresh/frozen vegetables/fruits.  - Get routine exercise/physical activity as much as able to tolerate; increase to 150 minutes of vigorous physical activity per week    - Congratulations on weight loss so far! Continue to work hard on this.  - Keep follow ups with your other health care providers  - Continue Xarelto 20 mg daily (with food) and Lipitor 40 mg daily.     I will plan for him to return to the office in 8 months time but would be happy to see him sooner if the need should arise.  If he has any symptoms concerning for TIA or stroke including sudden painless loss of vision or double vision, difficulty speaking or swallowing, vertigo/room spinning that does not quickly resolve, or weakness/numbness affecting 1 side of the face or body he should proceed by ambulance to the nearest emergency room immediately.

## 2024-02-12 NOTE — ASSESSMENT & PLAN NOTE
Lab Results   Component Value Date    HGBA1C 7.6 (H) 12/01/2023     HgbA1c in the diabetic range  Goal HgbA1c <7.0  Now using Mounjaro  He has lost about 25 lbs.  Encouraged healthy diet, and routine physical activity  PCP to continue to monitor/manage diabetes

## 2024-02-12 NOTE — ASSESSMENT & PLAN NOTE
On Atorvastatin 40 mg daily; no adverse effects  LDL goal <70  LDL 12/1/2023 45  Encouraged healthy diet and routine physical activity.

## 2024-02-12 NOTE — ASSESSMENT & PLAN NOTE
Roland Kwan is a 58 year old male with a history of Afib on Xarelto, hypertension, sleep apnea, arthritis, who presents to the office today to follow up on his hx of left frontal corona radiata hemorrhagic stroke 3/2022. His residual deficits include patchy numbness/ diminished sensation to temperature in the RLE and face. He continues on Xarelto 20mg daily for Afib and Atorvastatin 40 mg for secondary stroke prevention. He was reminded today to take his Xarelto with food daily. He has successfully lost weight over the last 8 months, now down to 282 lbs and his blood pressure appears to be well controlled. His other vascular risk factors also appear to be well controlled overall. He was encouraged to continue to work on weight loss, increasing physical activity, and with this tighter HgbA1c control with a goal of <7.0.  We reviewed his stroke risk factors and management of the same. He was provided stroke education and we reviewed stroke warning signs/symptoms and reasons to return by ambulance to the ER. He will continue to follow up closely with his nephrologist and cardiologist. Plan as outlined below.       Plan:  - Continue with good blood pressure control; I would recommend monitoring at home at least 3 times per week; Goal of <125/80 (managed by nephrology)  - Continue with good cholesterol control; Goal LDL <70; at goal   - Continue with good blood sugar control; Goal HgbA1c <7.0; continue to work on this   - Will defer monitoring of cholesterol and blood sugar and management of hypertensive medications to the primary care provider  - Stay well hydrated by drinking enough water   - Eat a healthy diet, high in lean meats fish, turkey, chicken. Low in fats, cholesterol, sugars and sodium. Avoid canned foods,  get lots of fresh/frozen vegetables/fruits.  - Get routine exercise/physical activity as much as able to tolerate; increase to 150 minutes of vigorous physical activity per week    - Congratulations on  weight loss so far! Continue to work hard on this.  - Keep follow ups with your other health care providers  - Continue Xarelto 20 mg daily (with food) and Lipitor 40 mg daily.     I will plan for him to return to the office in 8 months time but would be happy to see him sooner if the need should arise.  If he has any symptoms concerning for TIA or stroke including sudden painless loss of vision or double vision, difficulty speaking or swallowing, vertigo/room spinning that does not quickly resolve, or weakness/numbness affecting 1 side of the face or body he should proceed by ambulance to the nearest emergency room immediately.

## 2024-02-15 LAB
LEFT EYE DIABETIC RETINOPATHY: POSITIVE
RIGHT EYE DIABETIC RETINOPATHY: POSITIVE

## 2024-02-26 DIAGNOSIS — I10 BENIGN ESSENTIAL HYPERTENSION: ICD-10-CM

## 2024-02-26 DIAGNOSIS — I12.9 HYPERTENSIVE CHRONIC KIDNEY DISEASE WITH STAGE 1 THROUGH STAGE 4 CHRONIC KIDNEY DISEASE, OR UNSPECIFIED CHRONIC KIDNEY DISEASE: ICD-10-CM

## 2024-02-26 RX ORDER — CLONIDINE 0.1 MG/24H
PATCH, EXTENDED RELEASE TRANSDERMAL
Qty: 12 PATCH | Refills: 0 | Status: SHIPPED | OUTPATIENT
Start: 2024-02-26

## 2024-02-29 ENCOUNTER — OFFICE VISIT (OUTPATIENT)
Dept: BARIATRICS | Facility: CLINIC | Age: 59
End: 2024-02-29
Payer: COMMERCIAL

## 2024-02-29 VITALS
SYSTOLIC BLOOD PRESSURE: 110 MMHG | BODY MASS INDEX: 39.94 KG/M2 | HEIGHT: 70 IN | HEART RATE: 64 BPM | DIASTOLIC BLOOD PRESSURE: 70 MMHG | RESPIRATION RATE: 14 BRPM | WEIGHT: 279 LBS

## 2024-02-29 DIAGNOSIS — E11.22 TYPE 2 DIABETES MELLITUS WITH STAGE 3A CHRONIC KIDNEY DISEASE, WITHOUT LONG-TERM CURRENT USE OF INSULIN (HCC): Primary | ICD-10-CM

## 2024-02-29 DIAGNOSIS — N18.31 TYPE 2 DIABETES MELLITUS WITH STAGE 3A CHRONIC KIDNEY DISEASE, WITHOUT LONG-TERM CURRENT USE OF INSULIN (HCC): Primary | ICD-10-CM

## 2024-02-29 PROCEDURE — 99214 OFFICE O/P EST MOD 30 MIN: CPT | Performed by: FAMILY MEDICINE

## 2024-02-29 NOTE — PROGRESS NOTES
Assessment/Plan:  Roland was seen today for follow-up.    Diagnoses and all orders for this visit:    Type 2 diabetes mellitus with stage 3a chronic kidney disease, without long-term current use of insulin (HCC)  -     tirzepatide 2.5 MG/0.5ML; Inject 0.5 mL (2.5 mg total) under the skin every 7 days  -     Hemoglobin A1C; Future    Could not tolerate higher dose, will stay at 2.5 mg , had good results and will definitely help improving his diabetes status  - Patient denies personal history of pancreatitis. Patient also denies personal and family history of medullary thyroid cancer and multiple endocrine neoplasia type 2 (MEN 2 tumor). Patient understands these major side effects and agrees to start the medication.   Had Hx of kidney stone - CI to have Qsymia  Had hx of stroke - CI Phentermine  Obesity Body mass index is 40.61 kg/m².  9% weight loss with Mounjaro at low dose will stay the same dose, cont dietary changes   Starts exercise and behavioral changes ongoing  Return in 2 mo    Subjective:   Chief Complaint   Patient presents with    Follow-up       Patient ID: Roland Kwan  is a 58 y.o. male with excess weight/obesity here to pursue weight management.  Previous notes and records have been reviewed.        HPI  Wt Readings from Last 20 Encounters:   02/29/24 127 kg (279 lb)   02/12/24 128 kg (282 lb 11.2 oz)   01/15/24 132 kg (291 lb 12.8 oz)   12/07/23 (!) 139 kg (306 lb)   12/07/23 (!) 140 kg (308 lb)   12/01/23 (!) 142 kg (312 lb)   09/13/23 (!) 139 kg (306 lb 9.6 oz)   07/21/23 (!) 140 kg (309 lb 9.6 oz)   07/18/23 (!) 139 kg (307 lb)   06/13/23 (!) 138 kg (304 lb 6.4 oz)   03/30/23 (!) 139 kg (307 lb)   02/27/23 (!) 138 kg (304 lb)   02/23/23 (!) 137 kg (303 lb)   02/12/23 (!) 137 kg (303 lb)   12/06/22 (!) 137 kg (303 lb)   11/30/22 (!) 137 kg (302 lb)   08/03/22 133 kg (293 lb)   06/15/22 134 kg (294 lb 12.8 oz)   05/23/22 135 kg (297 lb 3.2 oz)   04/20/22 (!) 139 kg (305 lb 9.6 oz)     Obesity/Excess  Weight:Body mass index is 40.61 kg/m².  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[place] , drives 3 times a week  Sleep:has HARPER sleeps well with CPAP       Past Medical History:   Diagnosis Date    Ankle swelling     Arthritis     Asthma     Atrial fibrillation (HCC)     Atrial fibrillation with RVR (HCC) 04/04/2017    Chronic kidney disease     kidney stones    Gout     Hypertension     Kidney stone     Night sweats     Obesity     Seasonal allergies     Sleep apnea     Stroke (HCC) 03/22     Past Surgical History:   Procedure Laterality Date    APPENDECTOMY      CARDIOVERSION      X4 last was 2018    COLONOSCOPY      CYSTOSCOPY  08/30/2016    w/removal of object, last assessed 8/30/16    FOOT SURGERY Left     HAND SURGERY      KNEE SURGERY Right 2016    IN CYSTO/URETERO W/LITHOTRIPSY &INDWELL STENT INSRT Left 08/09/2016    Procedure: CYSTOSCOPY URETEROSCOPY WITH LITHOTRIPSY HOLMIUM LASER STONE EXTRACTION, RETROGRADE PYELOGRAM AND INSERTION STENT URETERAL;  Surgeon: Sahil Chery MD;  Location:  MAIN OR;  Service: Urology last assessed 8/30/16    IN CYSTO/URETERO W/LITHOTRIPSY &INDWELL STENT INSRT Right 08/24/2020    Procedure: CYSTOSCOPY , cystolitholapaxy of bladder stone WITH HOMIUM LASER RIGHT RETROGRADE AND RIGHT URETERAL STENT, FULGERATION OF BLADDER;  Surgeon: Sahil Chery MD;  Location:  MAIN OR;  Service: Urology    UPPER GASTROINTESTINAL ENDOSCOPY      WRIST SURGERY Left 2014    Fracture surgery     The following portions of the patient's history were reviewed and updated as appropriate: allergies, current medications, past family history, past medical  "history, past social history, past surgical history, and problem list.    ROS:  Review of Systems   Constitutional: Negative for activity change. Fatigue  HENT: Negative for trouble swallowing.    Respiratory: Negative for shortness of breath.    Cardiovascular: Negative for chest pain, edema  Gastrointestinal: Negative for abdominal pain, nausea and vomiting, acid reflux, constipation/had diarrhea but better now  Musculoskeletal: Negative for gait problem , has a right knee pain  Psychiatric/Behavioral: Negative for behavioral problems including anxiety /depression  Objective:  /70 (BP Location: Left arm, Patient Position: Sitting, Cuff Size: Large)   Pulse 64   Resp 14   Ht 5' 9.5\" (1.765 m)   Wt 127 kg (279 lb)   BMI 40.61 kg/m²   Constitutional: Well-developed, well-nourished and Obese Body mass index is 40.61 kg/m².. Alert, cooperative.  HEENT: No conjunctival injection. No thyroid masses  Pulmonary: No increased work of breathing or signs of respiratory distress.Clear respiratory sounds.  CV: Well-perfused, Regular rate and rhythm, no murmurs  Vascular: no peripheral edema  GI: Abdomen obese, Non-distended. Not tender  MSK: no sarcopenia noted   Neuro: Oriented to person, place and time. Normal Speech. Normal gait.  Psych: Normal affect and mood. Normal thought process, no delusions     Labs and Imaging  Recent labs and imaging have been personally reviewed.  Lab Results   Component Value Date    WBC 9.29 12/01/2023    HGB 15.5 12/01/2023    HCT 47.3 12/01/2023    MCV 92 12/01/2023     12/01/2023     Lab Results   Component Value Date     08/18/2015    SODIUM 140 12/01/2023    K 3.8 12/01/2023     12/01/2023    CO2 30 12/01/2023    ANIONGAP 7 08/18/2015    AGAP 7 12/01/2023    BUN 21 12/01/2023    CREATININE 1.14 12/01/2023    GLUC 147 (H) 03/10/2022    GLUF 145 (H) 12/01/2023    CALCIUM 9.3 12/01/2023    AST 14 12/01/2023    ALT 22 12/01/2023    ALKPHOS 63 12/01/2023    PROT 7.3 " 12/02/2014    TP 8.0 12/01/2023    BILITOT 0.4 12/02/2014    TBILI 0.56 12/01/2023    EGFR 70 12/01/2023     Lab Results   Component Value Date    HGBA1C 7.6 (H) 12/01/2023     Lab Results   Component Value Date    TLZ1XOZBEKOE 2.642 10/26/2018     Lab Results   Component Value Date    CHOLESTEROL 108 12/01/2023     Lab Results   Component Value Date    HDL 37 (L) 12/01/2023     Lab Results   Component Value Date    TRIG 128 12/01/2023     Lab Results   Component Value Date    LDLCALC 45 12/01/2023

## 2024-03-01 ENCOUNTER — TELEPHONE (OUTPATIENT)
Age: 59
End: 2024-03-01

## 2024-03-01 NOTE — TELEPHONE ENCOUNTER
PA for Tirzepatide    Submitted via    []CMM-KEY   [x][employer]-Case ID # [id number]   []Faxed to plan   []Other website   []Phone call Case ID #     Office notes sent, clinical questions answered. Awaiting determination    Turnaround time for your insurance to make a decision on your Prior Authorization can take 7-21 business days.

## 2024-03-04 NOTE — TELEPHONE ENCOUNTER
PA for mounjaro Approved   Date(s) approved March 1, 2024 to March 1, 2027   Case #     Patient advised by [x] [employer] Message                      [x] Phone call       Pharmacy advised by [x]Fax                                     []Phone call    Approval letter scanned into Media Yes

## 2024-03-06 DIAGNOSIS — I48.0 PAROXYSMAL ATRIAL FIBRILLATION (HCC): ICD-10-CM

## 2024-03-06 RX ORDER — DOFETILIDE 0.5 MG/1
500 CAPSULE ORAL EVERY 12 HOURS
Qty: 60 CAPSULE | Refills: 2 | Status: SHIPPED | OUTPATIENT
Start: 2024-03-06

## 2024-03-06 NOTE — TELEPHONE ENCOUNTER
Requested medication(s) are due for refill today: Yes  Patient has already received a courtesy refill: No  Other reason request has been forwarded to provider: script failed

## 2024-03-16 DIAGNOSIS — I48.20 CHRONIC ATRIAL FIBRILLATION (HCC): ICD-10-CM

## 2024-03-20 RX ORDER — RIVAROXABAN 20 MG/1
TABLET, FILM COATED ORAL
Qty: 30 TABLET | Refills: 1 | Status: SHIPPED | OUTPATIENT
Start: 2024-03-20

## 2024-04-01 DIAGNOSIS — E66.01 CLASS 3 SEVERE OBESITY DUE TO EXCESS CALORIES WITH SERIOUS COMORBIDITY AND BODY MASS INDEX (BMI) OF 40.0 TO 44.9 IN ADULT (HCC): ICD-10-CM

## 2024-04-01 DIAGNOSIS — N20.0 NEPHROLITHIASIS: ICD-10-CM

## 2024-04-01 DIAGNOSIS — E55.9 VITAMIN D DEFICIENCY: ICD-10-CM

## 2024-04-01 DIAGNOSIS — E11.22 TYPE 2 DIABETES MELLITUS WITH STAGE 3A CHRONIC KIDNEY DISEASE, WITHOUT LONG-TERM CURRENT USE OF INSULIN (HCC): ICD-10-CM

## 2024-04-01 DIAGNOSIS — N18.30 BENIGN HYPERTENSION WITH CHRONIC KIDNEY DISEASE, STAGE III (HCC): ICD-10-CM

## 2024-04-01 DIAGNOSIS — I10 ACCELERATED HYPERTENSION: ICD-10-CM

## 2024-04-01 DIAGNOSIS — I12.9 HYPERTENSIVE CHRONIC KIDNEY DISEASE WITH STAGE 1 THROUGH STAGE 4 CHRONIC KIDNEY DISEASE, OR UNSPECIFIED CHRONIC KIDNEY DISEASE: ICD-10-CM

## 2024-04-01 DIAGNOSIS — N18.31 STAGE 3A CHRONIC KIDNEY DISEASE (HCC): ICD-10-CM

## 2024-04-01 DIAGNOSIS — N18.31 TYPE 2 DIABETES MELLITUS WITH STAGE 3A CHRONIC KIDNEY DISEASE, WITHOUT LONG-TERM CURRENT USE OF INSULIN (HCC): ICD-10-CM

## 2024-04-01 DIAGNOSIS — I12.9 BENIGN HYPERTENSION WITH CHRONIC KIDNEY DISEASE, STAGE III (HCC): ICD-10-CM

## 2024-04-01 DIAGNOSIS — E78.5 DYSLIPIDEMIA: ICD-10-CM

## 2024-04-02 DIAGNOSIS — I48.0 PAROXYSMAL ATRIAL FIBRILLATION (HCC): ICD-10-CM

## 2024-04-02 RX ORDER — ERGOCALCIFEROL 1.25 MG/1
50000 CAPSULE ORAL WEEKLY
Qty: 12 CAPSULE | Refills: 0 | OUTPATIENT
Start: 2024-04-02 | End: 2024-06-19

## 2024-04-02 RX ORDER — DOFETILIDE 0.5 MG/1
500 CAPSULE ORAL EVERY 12 HOURS
Qty: 180 CAPSULE | Refills: 1 | Status: SHIPPED | OUTPATIENT
Start: 2024-04-02

## 2024-04-09 ENCOUNTER — TELEPHONE (OUTPATIENT)
Dept: BARIATRICS | Facility: CLINIC | Age: 59
End: 2024-04-09

## 2024-04-09 ENCOUNTER — APPOINTMENT (OUTPATIENT)
Dept: LAB | Facility: CLINIC | Age: 59
End: 2024-04-09
Payer: COMMERCIAL

## 2024-04-09 DIAGNOSIS — N18.31 TYPE 2 DIABETES MELLITUS WITH STAGE 3A CHRONIC KIDNEY DISEASE, WITHOUT LONG-TERM CURRENT USE OF INSULIN (HCC): Primary | ICD-10-CM

## 2024-04-09 DIAGNOSIS — N18.30 BENIGN HYPERTENSION WITH CHRONIC KIDNEY DISEASE, STAGE III (HCC): ICD-10-CM

## 2024-04-09 DIAGNOSIS — E55.9 VITAMIN D DEFICIENCY: ICD-10-CM

## 2024-04-09 DIAGNOSIS — I10 ACCELERATED HYPERTENSION: ICD-10-CM

## 2024-04-09 DIAGNOSIS — E11.22 TYPE 2 DIABETES MELLITUS WITH STAGE 3A CHRONIC KIDNEY DISEASE, WITHOUT LONG-TERM CURRENT USE OF INSULIN (HCC): Primary | ICD-10-CM

## 2024-04-09 DIAGNOSIS — E78.5 DYSLIPIDEMIA: ICD-10-CM

## 2024-04-09 DIAGNOSIS — N18.31 TYPE 2 DIABETES MELLITUS WITH STAGE 3A CHRONIC KIDNEY DISEASE, WITHOUT LONG-TERM CURRENT USE OF INSULIN (HCC): ICD-10-CM

## 2024-04-09 DIAGNOSIS — E11.22 TYPE 2 DIABETES MELLITUS WITH STAGE 3A CHRONIC KIDNEY DISEASE, WITHOUT LONG-TERM CURRENT USE OF INSULIN (HCC): ICD-10-CM

## 2024-04-09 DIAGNOSIS — E66.01 CLASS 3 SEVERE OBESITY DUE TO EXCESS CALORIES WITH SERIOUS COMORBIDITY AND BODY MASS INDEX (BMI) OF 40.0 TO 44.9 IN ADULT (HCC): ICD-10-CM

## 2024-04-09 DIAGNOSIS — I12.9 BENIGN HYPERTENSION WITH CHRONIC KIDNEY DISEASE, STAGE III (HCC): ICD-10-CM

## 2024-04-09 DIAGNOSIS — I12.9 HYPERTENSIVE CHRONIC KIDNEY DISEASE WITH STAGE 1 THROUGH STAGE 4 CHRONIC KIDNEY DISEASE, OR UNSPECIFIED CHRONIC KIDNEY DISEASE: ICD-10-CM

## 2024-04-09 DIAGNOSIS — N20.0 NEPHROLITHIASIS: ICD-10-CM

## 2024-04-09 DIAGNOSIS — N18.31 STAGE 3A CHRONIC KIDNEY DISEASE (HCC): ICD-10-CM

## 2024-04-09 LAB
ANION GAP SERPL CALCULATED.3IONS-SCNC: 5 MMOL/L (ref 4–13)
BUN SERPL-MCNC: 17 MG/DL (ref 5–25)
CALCIUM SERPL-MCNC: 9.2 MG/DL (ref 8.4–10.2)
CHLORIDE SERPL-SCNC: 105 MMOL/L (ref 96–108)
CO2 SERPL-SCNC: 30 MMOL/L (ref 21–32)
CREAT SERPL-MCNC: 1.18 MG/DL (ref 0.6–1.3)
EST. AVERAGE GLUCOSE BLD GHB EST-MCNC: 123 MG/DL
GFR SERPL CREATININE-BSD FRML MDRD: 67 ML/MIN/1.73SQ M
GLUCOSE P FAST SERPL-MCNC: 86 MG/DL (ref 65–99)
HBA1C MFR BLD: 5.9 %
MAGNESIUM SERPL-MCNC: 2.1 MG/DL (ref 1.9–2.7)
POTASSIUM SERPL-SCNC: 4.3 MMOL/L (ref 3.5–5.3)
SODIUM SERPL-SCNC: 140 MMOL/L (ref 135–147)

## 2024-04-09 PROCEDURE — 83735 ASSAY OF MAGNESIUM: CPT

## 2024-04-09 PROCEDURE — 83036 HEMOGLOBIN GLYCOSYLATED A1C: CPT

## 2024-04-09 PROCEDURE — 36415 COLL VENOUS BLD VENIPUNCTURE: CPT

## 2024-04-09 PROCEDURE — 80048 BASIC METABOLIC PNL TOTAL CA: CPT

## 2024-04-09 NOTE — TELEPHONE ENCOUNTER
----- Message from Denisse Way sent at 4/9/2024  6:50 AM EDT -----  Regarding: FW: Ok to wait  Contact: [phone]    ----- Message -----  From: Roland Kwan  Sent: 4/8/2024   9:16 PM EDT  To: [facility]  Subject: Ok to wait                                       Please renew the prescription. I am out of the medication

## 2024-05-07 ENCOUNTER — TELEPHONE (OUTPATIENT)
Dept: NEPHROLOGY | Facility: CLINIC | Age: 59
End: 2024-05-07

## 2024-05-07 NOTE — TELEPHONE ENCOUNTER
MILA for pt to schedule follow up appointment with Dr. Cash or DM. Please try to schedule patient on 5/20, 5/21 or 5/22 with Dr. Cash

## 2024-05-10 DIAGNOSIS — E66.01 CLASS 3 SEVERE OBESITY DUE TO EXCESS CALORIES WITH SERIOUS COMORBIDITY AND BODY MASS INDEX (BMI) OF 40.0 TO 44.9 IN ADULT (HCC): ICD-10-CM

## 2024-05-10 DIAGNOSIS — E78.5 DYSLIPIDEMIA: ICD-10-CM

## 2024-05-10 DIAGNOSIS — I12.9 HYPERTENSIVE CHRONIC KIDNEY DISEASE WITH STAGE 1 THROUGH STAGE 4 CHRONIC KIDNEY DISEASE, OR UNSPECIFIED CHRONIC KIDNEY DISEASE: ICD-10-CM

## 2024-05-10 DIAGNOSIS — E55.9 VITAMIN D DEFICIENCY: ICD-10-CM

## 2024-05-10 DIAGNOSIS — I10 ACCELERATED HYPERTENSION: ICD-10-CM

## 2024-05-10 DIAGNOSIS — N18.31 STAGE 3A CHRONIC KIDNEY DISEASE (HCC): ICD-10-CM

## 2024-05-10 RX ORDER — NIFEDIPINE 30 MG/1
TABLET, EXTENDED RELEASE ORAL
Qty: 180 TABLET | Refills: 1 | Status: SHIPPED | OUTPATIENT
Start: 2024-05-10

## 2024-05-18 DIAGNOSIS — I48.20 CHRONIC ATRIAL FIBRILLATION (HCC): ICD-10-CM

## 2024-05-18 RX ORDER — RIVAROXABAN 20 MG/1
TABLET, FILM COATED ORAL
Qty: 30 TABLET | Refills: 5 | Status: SHIPPED | OUTPATIENT
Start: 2024-05-18

## 2024-05-20 NOTE — TELEPHONE ENCOUNTER
Requested medication(s) are due for refill today: Yes  Patient has already received a courtesy refill: No  Other reason request has been forwarded to provider:   
36.9

## 2024-05-22 DIAGNOSIS — I12.9 HYPERTENSIVE CHRONIC KIDNEY DISEASE WITH STAGE 1 THROUGH STAGE 4 CHRONIC KIDNEY DISEASE, OR UNSPECIFIED CHRONIC KIDNEY DISEASE: ICD-10-CM

## 2024-05-22 DIAGNOSIS — I10 BENIGN ESSENTIAL HYPERTENSION: ICD-10-CM

## 2024-05-22 RX ORDER — CLONIDINE 0.1 MG/24H
PATCH, EXTENDED RELEASE TRANSDERMAL
Qty: 12 PATCH | Refills: 1 | Status: SHIPPED | OUTPATIENT
Start: 2024-05-22

## 2024-05-30 DIAGNOSIS — E78.5 DYSLIPIDEMIA: ICD-10-CM

## 2024-05-31 RX ORDER — ATORVASTATIN CALCIUM 40 MG/1
TABLET, FILM COATED ORAL
Qty: 90 TABLET | Refills: 1 | Status: SHIPPED | OUTPATIENT
Start: 2024-05-31

## 2024-06-10 NOTE — PROGRESS NOTES
RENAL FOLLOW UP NOTE:.td    ASSESSMENT AND PLAN:  1. Hypertension:  Most compatible with essential hypertension/resistant hypertension:  Secondary workup:  -normal thyroid function study  -severe HARPER on BiPAP  -aldosterone/renin ratio:  Essentially negative at 20 with an aldosterone level of 8.7  -plasma free metanephrines:  Negative  -24 hour urine for free cortisol:  Negative  -renal artery duplex:  No evidence of significant renal artery disease; but somewhat limited by body habitus and overlying bowel gas  Again obesity may be contributing to his hypertension  Current home blood pressure readings:  A.m.: 131/74 with a heart rate 62 and regular on average  P.m.: None       GOAL BLOOD PRESSURE:  LESS THAN 125/80 GIVEN CKD/recent hemorrhagic stroke     Recommendations:  continue to push nonmedical regimen including isotonic exercise, avoidance of salt and weight loss; patient counseled as such  GIVEN TIKOSYN:  AMILORIDE/SPIRONOLACTONE/HCTZ ARE CONTRAINDICATED.  LOOP DIURETIC SUCH AS FUROSEMIDE OR TORSEMIDE ARE ACCEPTABLE.     Medication changes today:     Blood pressure overall improving significantly.  AOBP excellent.  As he is losing weight he is more active and I believe will continue to improve blood pressure control so he will resend readings and in about 2-3 months.     Future options would be to increase torsemide, or Eplerenone if and acceptable medication.  And of course clonidine patch as well.  However: If his blood pressure does improve potentially stop clonidine patch     DISCUSSED WITH DR. TAVERA EPLERENONE IS TOTALLY ACCEPTABLE!             2. CKD stage 3:    Baseline creatinine is ranged anywhere from 1.1-1.6  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  Current creatinine:  1.41 slightly higher than recent but within baseline: Just simply recheck over the next few weeks to demonstrate stability   Electrolytes are normal including potassium of 4.5  UA:  No proteinuria and no  hematuria, 2-4 WBCs:   Urine protein creatinine ratio:  0.05 g at goal  MBD:  Of CKD:  Calcium/magnesium are normal/PTH 46.9; vitamin D 26.0 replete: I will replete with 2000 units daily over-the-counter recheck next visit  CBC:  Normal with a hemoglobin of 14.7 mild leukocytosis recheck make sure no signs or symptoms of an infection   Renal ultrasound:  12/03/2020:  Echogenic focus in the left lower pole possible fat versus calculus without shadowing.  Thickened bladder with UV junction still present although perhaps slightly pronounced.  Followed by Urology:  Visit with Dr. Chery for possible right-sided ureterocele, history of cystoscopy and cystolitholapaxy status post right stent removal.  Bladder wall thickening resolution of hydronephrosis.  Possibility of congenital ureterocele was discussed 1 year follow-up with retroperitoneal ultrasound which would be November 2022.           3. Nephrolithiasis:  As mentioned previously he has had numerous stones.  No kidney stones at this time  Continue with General stone diet; in particular water intake 2 and half to 3 L a day all  Only further investigation with a 24 hour urine stone risk evaluation if recurrent stone     4. Dyslipidemia:  Current lipid profile:  LDL 45/HDL 37/triglycerides 128  Goal:  LDL less than 70 given CKD/hemorrhagic stroke  Recommend:  Diet, exercise and weight loss, patient counseled as such  No change in medication as patient is at goal        5. Recent admission 03/07/2022 secondary to right-sided weakness with word-finding difficulty, found to have acute left intraparenchymal hemorrhage with vasogenic edema.  Patient admitted [facility] with neurosurgery evaluation.  Started on Cardene due to hypertension.  No neuro surgical intervention at that time was deemed necessary.  MRI in 3/9 demonstrated unchanged intraparenchymal hematoma as well as small acute infarct in the left corona radiata.  Patient was maintained on blood  pressure control.  TTE demonstrated EF 65% otherwise grade 1 diastolic dysfunction.  Seen by Neurology as well recommended restarting anticoagulation 3 weeks after repeat CT scan along with PT/OT.  In regards to blood pressure: Amlodipine was stopped nifedipine 10 mg every 8 hours titrate as needed, continuing hydralazine.     6. Increased liver function studies:  Right upper quadrant ultrasound showed cholelithiasis hepatomegaly and hepatic steatosis.  Now followed by Dr. Jaymie Marcelo who believes it is related to statin therapy but is agreeable to continue the statin.  Further evaluation will be done by Dr. Marcelo     GI health maintenance:  Seen by Dr. Marcelo we will plan for colonoscopy 3-4 months after CVA:  Patient knows he is due for the colonoscopy will schedule with Dr. Marcelo.  Patient aware and states will set up an appointment for colonoscopy.  Rediscussed with the patient as of 6/19/2024 he knows potential risk of colon cancer and that he should set it up.    PATIENT INSTRUCTIONS:    Patient Instructions   Visit summary:  - Overall blood pressure tremendously better, this correlates well with losing weight.  Continue to lose weight and continue to exercise and of course avoid salt.  - Creatinine kidney lab just slightly higher we will recheck this I am not concerned about want to follow-up in this regard    - Your white blood cell count also slightly high this has been the case on and off in the past we will simply recheck it at this time.    - In regards to your colonoscopy I sent a message to Dr. Marcelo to reach out and schedule you.        1. Medication changes today:  Begin vitamin D over-the-counter 2000 units daily    2.  General instructions:  Continue above measures regarding low-salt diet exercise and weight loss as discussed    3.  Please go for  lab work next week or so nonfasting with good hydration    4.  Please take 1 week a blood pressure readings in 2 to 3 months    AS FOLLOWS  MORNING AND  EVENING, SITTING AND STANDING as follows:  TAKE THE MORNING READINGS BEFORE ANY MEDICATIONS AND WHEN YOU ARE RELAXED FOR SEVERAL MINUTES  TAKE THE EVENING READINGS:  BETWEEN 7-10 P.M.; PRIOR TO ANY MEDICATIONS; AT LEAST IN OUR  FROM DINNER; AND CERTAINLY AFTER RELAXING FOR A FEW MINUTES  PLEASE INCLUDE HEART RATE WITH YOUR BLOOD PRESSURE READINGS  When taking standing readings, keep your arm supported at heart level and not dangling  Make sure you are sitting with your back supported and feet on the ground and do not cross your legs or feet  Make sure you have not taken any coffee or caffeine products or exercised or smoke cigarettes at least 30 minutes before taking your blood pressure  Then please mail these readings into the office      5.  In 3 months:  Please go for nonfasting lab work but in the morning  Please take 1 week a blood pressure readings as outlined above and mail those into the office: Please see above      6.  Follow-up in 6 months  Please bring in 1 week a blood pressure readings morning evening, sitting and standing is outlined above  PLEASE BRING AN YOUR BLOOD PRESSURE MACHINE TO CORRELATE WITH THE OFFICE MACHINE AT THIS NEXT SCHEDULED VISIT  Please go for fasting lab work 1-2 weeks prior to your appointment      7.  General non medical recommendations:  AVOID SALT BUT NOT ADDING AN READING LABELS TO MAKE SURE THERE IS LOW-SALT IN THE FOOD THAT YOU ARE EATING  Goal is less than 2 g of sodium intake or less than 5 g of sodium chloride intake per day    Avoid nonsteroidal anti-inflammatory drugs such as Naprosyn, ibuprofen, Aleve, Advil, Celebrex, Meloxicam (Mobic) etc.  You can use Tylenol as needed if you do not have any liver condition to be concerned about    Avoid medications such as Sudafed or decongestants and antihistamines that contained the D component which is the decongestant.  You can take antihistamines without the decongestant or D component.    Try to avoid medications  such as pantoprazole or  Protonix/Nexium or Esomeprazole)/Prilosec or omeprazole/Prevacid or lansoprazole/AcipHex or Rabeprazole.  If you are able to, use Pepcid as this is safer for your kidneys.    Try to exercise at least 30 minutes 3 days a week to begin with with an ultimate goal of 5 days a week for at least 30 minutes    Please do not drink more than 2 glasses of alcohol/wine on a daily basis as this may contribute to your high blood pressure.            Subjective:   There has been no hospitalizations or acute illnesses since last visit.  The patient overall is feeling well.  No fevers, chills, or cough or colds.  Good appetite and good energy  No hematuria, dysuria, voiding symptoms or foamy urine  No gastrointestinal symptoms  No cardiovascular symptoms including swelling of the legs except for ankles but not all the time  No headaches, dizziness or lightheadedness  Blood pressure medications:  Torsemide 5 mg daily in the morning  Olmesartan 40 mg daily in the morning  Hydralazine 50 mg twice a day  Eplerenone 25 mg twice a day  Nifedipine ER 30 mg daily in the morning  Nebivolol 2.5 mg daily in the morning  Clonidine TTS #1 weekly    Renal pertinent medications:  Atorvastatin 40 mg daily    ROS:  See HPI, otherwise review of systems as completely reviewed with the patient are negative    Past Medical History:   Diagnosis Date    Ankle swelling     Arthritis     Asthma     Atrial fibrillation ([facility])     Atrial fibrillation with RVR ([facility]) 04/04/2017    Chronic kidney disease     kidney stones    Class 3 severe obesity due to excess calories with serious comorbidity and body mass index (BMI) of 40.0 to 44.9 in adult ([facility]) 12/07/2020    Gout     Hypertension     Kidney stone     Morbid obesity with BMI of 40.0-44.9, adult ([facility]) 08/31/2016    Night sweats     Obesity     Seasonal allergies     Sleep apnea     Stroke ([facility]) 03/22     Past Surgical History:   Procedure Laterality Date    APPENDECTOMY       CARDIOVERSION      X4 last was 2018    COLONOSCOPY      CYSTOSCOPY  08/30/2016    w/removal of object, last assessed 8/30/16    FOOT SURGERY Left     HAND SURGERY      KNEE SURGERY Right 2016    [facility] CYSTO/URETERO W/LITHOTRIPSY &INDWELL STENT INSRT Left 08/09/2016    Procedure: CYSTOSCOPY URETEROSCOPY WITH LITHOTRIPSY HOLMIUM LASER STONE EXTRACTION, RETROGRADE PYELOGRAM AND INSERTION STENT URETERAL;  Surgeon: Sahil Chery MD;  Location: [facility];  Service: Urology last assessed 8/30/16    NH CYSTO/URETERO W/LITHOTRIPSY &INDWELL STENT INSRT Right 08/24/2020    Procedure: CYSTOSCOPY , cystolitholapaxy of bladder stone WITH HOMIUM LASER RIGHT RETROGRADE AND RIGHT URETERAL STENT, FULGERATION OF BLADDER;  Surgeon: Sahil Chery MD;  Location:  MAIN OR;  Service: Urology    UPPER GASTROINTESTINAL ENDOSCOPY      WRIST SURGERY Left 2014    Fracture surgery     Family History   Problem Relation Age of Onset    Atrial fibrillation Mother     Other Mother         blood dyscrasia    Hypertension Mother     Arthritis Mother     Other Father         hypoglycemia     Atrial fibrillation Father     Diabetes Family     Other Family         Thrombocytosis    No Known Problems Sister     Cancer Neg Hx     Thyroid disease Neg Hx       reports that he quit smoking about 30 years ago. His smoking use included cigarettes. He started smoking about 45 years ago. He has a 7.5 pack-year smoking history. He has never used smokeless tobacco. He reports that he does not currently use alcohol. He reports that he does not use drugs.    I COMPLETELY REVIEWED THE PAST MEDICAL HISTORY/PAST SURGICAL HISTORY/SOCIAL HISTORY/FAMILY HISTORY/AND MEDICATIONS  AND UPDATED ALL    Objective:     Vitals:   BP sitting on left: 116/66 with a heart rate of 64 and regular  BP standing on left: 110/70 with a heart rate of 64 and regular    Vitals:    06/19/24 1421   BP: 113/67   Pulse: 63       Weight (last 2 days)       Date/Time Weight    06/19/24 1421 116  (255)          Wt Readings from Last 3 Encounters:   06/19/24 116 kg (255 lb)   06/13/24 119 kg (261 lb 9.6 oz)   02/29/24 127 kg (279 lb)       Body mass index is 37.12 kg/m².    Physical Exam: General:  No acute distress/obese  Skin:  No acute rash  Eyes:  No scleral icterus, noninjected, no discharge from eyes  ENT:  Moist mucous membranes  Neck:  Supple, no jugular venous distention, trachea is midline, no lymphadenopathy and no thyromegaly  Back   No CVAT  Chest:  Clear to auscultation and percussion, good respiratory effort  CVS:  Regular rate and rhythm without a rub, or gallops or murmurs  Abdomen:  obese, e,Soft and nontender with normal bowel sounds  Extremities:  No cyanosis and no edema, no arthritic changes, normal range of motion  Neuro:  Grossly intact  Psych:  Alert, oriented x3 and appropriate      Medications:    Current Outpatient Medications:     atorvastatin (LIPITOR) 40 mg tablet, TAKE 1 TABLET BY MOUTH EVERY DAY WITH DINNER, Disp: 90 tablet, Rfl: 1    cloNIDine (CATAPRES-TTS-1) 0.1 mg/24 hr, APPLY 1 PATCH ONE TIME PER WEEK, Disp: 12 patch, Rfl: 1    dofetilide (TIKOSYN) 500 mcg capsule, TAKE 1 CAPSULE BY MOUTH EVERY 12 HOURS, Disp: 180 capsule, Rfl: 1    eplerenone (INSPRA) 25 mg tablet, Take 1 tablet (25 mg total) by mouth 2 (two) times a day, Disp: 180 tablet, Rfl: 3    hydrALAZINE (APRESOLINE) 50 mg tablet, TAKE 1 TABLET BY MOUTH TWICE A DAY, Disp: 180 tablet, Rfl: 3    nebivolol (BYSTOLIC) 2.5 mg tablet, TAKE 1 TABLET BY MOUTH DAILY HOLD FOR HEART RATE LESS THAN 50 BEATS PER MINUTE., Disp: 90 tablet, Rfl: 3    NIFEdipine (PROCARDIA XL) 30 mg 24 hr tablet, TAKE 1 TABLET BY MOUTH TWICE A DAY (Patient taking differently: Take 30 mg by mouth daily), Disp: 180 tablet, Rfl: 1    olmesartan (BENICAR) 40 mg tablet, TAKE 1 TABLET BY MOUTH EVERY DAY, Disp: 90 tablet, Rfl: 3    rivaroxaban (Xarelto) 20 mg tablet, TAKE 1 TABLET BY MOUTH EVERY DAY WITH BREAKFAST, Disp: 30 tablet, Rfl: 5    tirzepatide  (Mounjaro) 5 MG/0.5ML, Inject 0.5 mL (5 mg total) under the skin every 7 days, Disp: 6 mL, Rfl: 1    torsemide (DEMADEX) 5 MG tablet, TAKE 1 TABLET (5 MG TOTAL) BY MOUTH DAILY., Disp: 90 tablet, Rfl: 1    Lab, Imaging and other studies: I have personally reviewed pertinent labs.  Laboratory Results:  Results for orders placed or performed in visit on 06/13/24   Comprehensive metabolic panel   Result Value Ref Range    Sodium 141 135 - 147 mmol/L    Potassium 4.5 3.5 - 5.3 mmol/L    Chloride 106 96 - 108 mmol/L    CO2 29 21 - 32 mmol/L    ANION GAP 6 4 - 13 mmol/L    BUN 21 5 - 25 mg/dL    Creatinine 1.41 (H) 0.60 - 1.30 mg/dL    Glucose, Fasting 90 65 - 99 mg/dL    Calcium 9.5 8.4 - 10.2 mg/dL    AST 14 13 - 39 U/L    ALT 14 7 - 52 U/L    Alkaline Phosphatase 51 34 - 104 U/L    Total Protein 7.8 6.4 - 8.4 g/dL    Albumin 4.4 3.5 - 5.0 g/dL    Total Bilirubin 0.48 0.20 - 1.00 mg/dL    eGFR 54 ml/min/1.73sq m   CBC   Result Value Ref Range    WBC 11.42 (H) 4.31 - 10.16 Thousand/uL    RBC 4.90 3.88 - 5.62 Million/uL    Hemoglobin 14.7 12.0 - 17.0 g/dL    Hematocrit 45.4 36.5 - 49.3 %    MCV 93 82 - 98 fL    MCH 30.0 26.8 - 34.3 pg    MCHC 32.4 31.4 - 37.4 g/dL    RDW 13.2 11.6 - 15.1 %    Platelets 239 149 - 390 Thousands/uL    MPV 11.9 8.9 - 12.7 fL   Magnesium   Result Value Ref Range    Magnesium 2.0 1.9 - 2.7 mg/dL   Protein / creatinine ratio, urine   Result Value Ref Range    Creatinine, Ur 148.8 Reference range not established. mg/dL    Protein Urine Random 7 Reference range not established. mg/dL    Prot/Creat Ratio, Ur 0.05 0.00 - 0.10       Results from last 7 days   Lab Units 06/13/24  1333   WBC Thousand/uL 11.42*   HEMOGLOBIN g/dL 14.7   HEMATOCRIT % 45.4   PLATELETS Thousands/uL 239   POTASSIUM mmol/L 4.5   CHLORIDE mmol/L 106   CO2 mmol/L 29   BUN mg/dL 21   CREATININE mg/dL 1.41*   CALCIUM mg/dL 9.5   MAGNESIUM mg/dL 2.0         Radiology review:   chest X-ray    Ultrasound      Portions of the record  "may have been created with voice recognition software.  Occasional wrong word or \"sound a like\" substitutions may have occurred due to the inherent limitations of voice recognition software.  Read the chart carefully and recognize, using context, where substitutions have occurred.                    "

## 2024-06-11 ENCOUNTER — TELEPHONE (OUTPATIENT)
Dept: NEPHROLOGY | Facility: CLINIC | Age: 59
End: 2024-06-11

## 2024-06-11 NOTE — TELEPHONE ENCOUNTER
Called patient and asked they have the blood work drawn before the follow up appointment.  Please also bring the BP to the office appointment.

## 2024-06-11 NOTE — TELEPHONE ENCOUNTER
----- Message from Luigi Cash MD sent at 6/10/2024  5:24 PM EDT -----  Labs and blood work prior to appointment

## 2024-06-12 DIAGNOSIS — E78.5 DYSLIPIDEMIA: ICD-10-CM

## 2024-06-12 DIAGNOSIS — I12.9 HYPERTENSIVE CHRONIC KIDNEY DISEASE WITH STAGE 1 THROUGH STAGE 4 CHRONIC KIDNEY DISEASE, OR UNSPECIFIED CHRONIC KIDNEY DISEASE: ICD-10-CM

## 2024-06-12 DIAGNOSIS — E66.01 CLASS 3 SEVERE OBESITY DUE TO EXCESS CALORIES WITH SERIOUS COMORBIDITY AND BODY MASS INDEX (BMI) OF 40.0 TO 44.9 IN ADULT (HCC): ICD-10-CM

## 2024-06-12 DIAGNOSIS — E55.9 VITAMIN D DEFICIENCY: ICD-10-CM

## 2024-06-12 DIAGNOSIS — I10 ACCELERATED HYPERTENSION: ICD-10-CM

## 2024-06-12 DIAGNOSIS — N18.31 STAGE 3A CHRONIC KIDNEY DISEASE (HCC): ICD-10-CM

## 2024-06-13 ENCOUNTER — OFFICE VISIT (OUTPATIENT)
Dept: BARIATRICS | Facility: CLINIC | Age: 59
End: 2024-06-13
Payer: COMMERCIAL

## 2024-06-13 ENCOUNTER — APPOINTMENT (OUTPATIENT)
Dept: LAB | Facility: CLINIC | Age: 59
End: 2024-06-13
Payer: COMMERCIAL

## 2024-06-13 VITALS
RESPIRATION RATE: 18 BRPM | HEIGHT: 70 IN | BODY MASS INDEX: 37.45 KG/M2 | OXYGEN SATURATION: 97 % | WEIGHT: 261.6 LBS | DIASTOLIC BLOOD PRESSURE: 72 MMHG | HEART RATE: 63 BPM | SYSTOLIC BLOOD PRESSURE: 122 MMHG

## 2024-06-13 DIAGNOSIS — I12.9 HYPERTENSIVE CHRONIC KIDNEY DISEASE WITH STAGE 1 THROUGH STAGE 4 CHRONIC KIDNEY DISEASE, OR UNSPECIFIED CHRONIC KIDNEY DISEASE: ICD-10-CM

## 2024-06-13 DIAGNOSIS — E11.22 TYPE 2 DIABETES MELLITUS WITH STAGE 3A CHRONIC KIDNEY DISEASE, WITHOUT LONG-TERM CURRENT USE OF INSULIN (HCC): ICD-10-CM

## 2024-06-13 DIAGNOSIS — E66.01 CLASS 3 SEVERE OBESITY DUE TO EXCESS CALORIES WITH SERIOUS COMORBIDITY AND BODY MASS INDEX (BMI) OF 40.0 TO 44.9 IN ADULT (HCC): ICD-10-CM

## 2024-06-13 DIAGNOSIS — N18.31 TYPE 2 DIABETES MELLITUS WITH STAGE 3A CHRONIC KIDNEY DISEASE, WITHOUT LONG-TERM CURRENT USE OF INSULIN (HCC): ICD-10-CM

## 2024-06-13 DIAGNOSIS — N18.30 BENIGN HYPERTENSION WITH CHRONIC KIDNEY DISEASE, STAGE III (HCC): ICD-10-CM

## 2024-06-13 DIAGNOSIS — I12.9 BENIGN HYPERTENSION WITH CHRONIC KIDNEY DISEASE, STAGE III (HCC): ICD-10-CM

## 2024-06-13 DIAGNOSIS — N18.31 STAGE 3A CHRONIC KIDNEY DISEASE (HCC): ICD-10-CM

## 2024-06-13 DIAGNOSIS — N20.0 NEPHROLITHIASIS: ICD-10-CM

## 2024-06-13 DIAGNOSIS — I10 ACCELERATED HYPERTENSION: ICD-10-CM

## 2024-06-13 DIAGNOSIS — E78.5 DYSLIPIDEMIA: ICD-10-CM

## 2024-06-13 DIAGNOSIS — E55.9 VITAMIN D DEFICIENCY: ICD-10-CM

## 2024-06-13 DIAGNOSIS — E66.09 CLASS 2 OBESITY DUE TO EXCESS CALORIES IN ADULT: Primary | ICD-10-CM

## 2024-06-13 PROBLEM — E66.812 CLASS 2 OBESITY DUE TO EXCESS CALORIES IN ADULT: Status: ACTIVE | Noted: 2024-06-13

## 2024-06-13 PROBLEM — E66.813 CLASS 3 SEVERE OBESITY DUE TO EXCESS CALORIES WITH SERIOUS COMORBIDITY AND BODY MASS INDEX (BMI) OF 40.0 TO 44.9 IN ADULT (HCC): Status: RESOLVED | Noted: 2020-12-07 | Resolved: 2024-06-13

## 2024-06-13 LAB
ALBUMIN SERPL BCP-MCNC: 4.4 G/DL (ref 3.5–5)
ALP SERPL-CCNC: 51 U/L (ref 34–104)
ALT SERPL W P-5'-P-CCNC: 14 U/L (ref 7–52)
ANION GAP SERPL CALCULATED.3IONS-SCNC: 6 MMOL/L (ref 4–13)
AST SERPL W P-5'-P-CCNC: 14 U/L (ref 13–39)
BILIRUB SERPL-MCNC: 0.48 MG/DL (ref 0.2–1)
BUN SERPL-MCNC: 21 MG/DL (ref 5–25)
CALCIUM SERPL-MCNC: 9.5 MG/DL (ref 8.4–10.2)
CHLORIDE SERPL-SCNC: 106 MMOL/L (ref 96–108)
CO2 SERPL-SCNC: 29 MMOL/L (ref 21–32)
CREAT SERPL-MCNC: 1.41 MG/DL (ref 0.6–1.3)
CREAT UR-MCNC: 148.8 MG/DL
ERYTHROCYTE [DISTWIDTH] IN BLOOD BY AUTOMATED COUNT: 13.2 % (ref 11.6–15.1)
GFR SERPL CREATININE-BSD FRML MDRD: 54 ML/MIN/1.73SQ M
GLUCOSE P FAST SERPL-MCNC: 90 MG/DL (ref 65–99)
HCT VFR BLD AUTO: 45.4 % (ref 36.5–49.3)
HGB BLD-MCNC: 14.7 G/DL (ref 12–17)
MAGNESIUM SERPL-MCNC: 2 MG/DL (ref 1.9–2.7)
MCH RBC QN AUTO: 30 PG (ref 26.8–34.3)
MCHC RBC AUTO-ENTMCNC: 32.4 G/DL (ref 31.4–37.4)
MCV RBC AUTO: 93 FL (ref 82–98)
PLATELET # BLD AUTO: 239 THOUSANDS/UL (ref 149–390)
PMV BLD AUTO: 11.9 FL (ref 8.9–12.7)
POTASSIUM SERPL-SCNC: 4.5 MMOL/L (ref 3.5–5.3)
PROT SERPL-MCNC: 7.8 G/DL (ref 6.4–8.4)
PROT UR-MCNC: 7 MG/DL
PROT/CREAT UR: 0.05 MG/G{CREAT} (ref 0–0.1)
RBC # BLD AUTO: 4.9 MILLION/UL (ref 3.88–5.62)
SODIUM SERPL-SCNC: 141 MMOL/L (ref 135–147)
WBC # BLD AUTO: 11.42 THOUSAND/UL (ref 4.31–10.16)

## 2024-06-13 PROCEDURE — 82570 ASSAY OF URINE CREATININE: CPT

## 2024-06-13 PROCEDURE — 83735 ASSAY OF MAGNESIUM: CPT

## 2024-06-13 PROCEDURE — 84156 ASSAY OF PROTEIN URINE: CPT

## 2024-06-13 PROCEDURE — 80053 COMPREHEN METABOLIC PANEL: CPT

## 2024-06-13 PROCEDURE — 36415 COLL VENOUS BLD VENIPUNCTURE: CPT

## 2024-06-13 PROCEDURE — 82306 VITAMIN D 25 HYDROXY: CPT

## 2024-06-13 PROCEDURE — 99213 OFFICE O/P EST LOW 20 MIN: CPT | Performed by: FAMILY MEDICINE

## 2024-06-13 PROCEDURE — 85027 COMPLETE CBC AUTOMATED: CPT

## 2024-06-13 RX ORDER — TORSEMIDE 5 MG/1
5 TABLET ORAL DAILY
Qty: 90 TABLET | Refills: 1 | Status: SHIPPED | OUTPATIENT
Start: 2024-06-13

## 2024-06-13 NOTE — PROGRESS NOTES
Assessment/Plan:  Roland was seen today for follow-up.    Diagnoses and all orders for this visit:    Class 2 obesity due to excess calories in adult    Type 2 diabetes mellitus with stage 3a chronic kidney disease, without long-term current use of insulin (HCC)  -     tirzepatide (Mounjaro) 5 MG/0.5ML; Inject 0.5 mL (5 mg total) under the skin every 7 days    Excellent progress 15% weight loss  Tolerates now 5mg dose  Plan to stay unless the effect of appetite suppression subsides  Needs this treatment to control Glucose no excellent   Lab Results   Component Value Date    HGBA1C 5.9 (H) 04/09/2024      Had Hx of kidney stone - CI to have Qsymia  Had hx of stroke - CI Phentermine  Obesity Body mass index is 38.08 kg/m².    weight loss with Mounjaro at low dose will stay the same dose, cont dietary changes   Starts exercise and behavioral changes ongoing  Return in 4 mo    Subjective:   Chief Complaint   Patient presents with    Follow-up     Waist: 48.5 inches       Patient ID: Roland Kwan  is a 58 y.o. male with excess weight/obesity here to pursue weight management.  Previous notes and records have been reviewed.        HPI  Wt Readings from Last 20 Encounters:   06/13/24 119 kg (261 lb 9.6 oz)   02/29/24 127 kg (279 lb)   02/12/24 128 kg (282 lb 11.2 oz)   01/15/24 132 kg (291 lb 12.8 oz)   12/07/23 (!) 139 kg (306 lb)   12/07/23 (!) 140 kg (308 lb)   12/01/23 (!) 142 kg (312 lb)   09/13/23 (!) 139 kg (306 lb 9.6 oz)   07/21/23 (!) 140 kg (309 lb 9.6 oz)   07/18/23 (!) 139 kg (307 lb)   06/13/23 (!) 138 kg (304 lb 6.4 oz)   03/30/23 (!) 139 kg (307 lb)   02/27/23 (!) 138 kg (304 lb)   02/23/23 (!) 137 kg (303 lb)   02/12/23 (!) 137 kg (303 lb)   12/06/22 (!) 137 kg (303 lb)   11/30/22 (!) 137 kg (302 lb)   08/03/22 133 kg (293 lb)   06/15/22 134 kg (294 lb 12.8 oz)   05/23/22 135 kg (297 lb 3.2 oz)     Obesity/Excess Weight:Body mass index is 38.08 kg/m².  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[place] , drives 3 times a week  Sleep:has HARPER sleeps well with CPAP       Past Medical History:   Diagnosis Date    Ankle swelling     Arthritis     Asthma     Atrial fibrillation (HCC)     Atrial fibrillation with RVR (HCC) 04/04/2017    Chronic kidney disease     kidney stones    Gout     Hypertension     Kidney stone     Night sweats     Obesity     Seasonal allergies     Sleep apnea     Stroke (HCC) 03/22     Past Surgical History:   Procedure Laterality Date    APPENDECTOMY      CARDIOVERSION      X4 last was 2018    COLONOSCOPY      CYSTOSCOPY  08/30/2016    w/removal of object, last assessed 8/30/16    FOOT SURGERY Left     HAND SURGERY      KNEE SURGERY Right 2016    [place] CYSTO/URETERO W/LITHOTRIPSY &INDWELL STENT INSRT Left 08/09/2016    Procedure: CYSTOSCOPY URETEROSCOPY WITH LITHOTRIPSY HOLMIUM LASER STONE EXTRACTION, RETROGRADE PYELOGRAM AND INSERTION STENT URETERAL;  Surgeon: Sahil Chery MD;  Location:  MAIN OR;  Service: Urology last assessed 8/30/16    MN CYSTO/URETERO W/LITHOTRIPSY &INDWELL STENT INSRT Right 08/24/2020    Procedure: CYSTOSCOPY , cystolitholapaxy of bladder stone WITH HOMIUM LASER RIGHT RETROGRADE AND RIGHT URETERAL STENT, FULGERATION OF BLADDER;  Surgeon: Sahil Chery MD;  Location:  MAIN OR;  Service: Urology    UPPER GASTROINTESTINAL ENDOSCOPY      WRIST SURGERY Left 2014    Fracture surgery     The following portions of the patient's history were reviewed and updated as appropriate: allergies, current medications, past family history, past medical history, past social  "history, past surgical history, and problem list.    ROS:  Review of Systems   Constitutional: Negative for activity change. Fatigue  HENT: Negative for trouble swallowing.    Respiratory: Negative for shortness of breath.    Cardiovascular: Negative for chest pain, edema  Gastrointestinal: Negative for abdominal pain, nausea and vomiting, acid reflux, constipation/had diarrhea but better now  Musculoskeletal: Negative for gait problem , has a right knee pain  Psychiatric/Behavioral: Negative for behavioral problems including anxiety /depression  Objective:  /72 (BP Location: Left arm, Patient Position: Sitting, Cuff Size: Adult)   Pulse 63   Resp 18   Ht 5' 9.5\" (1.765 m)   Wt 119 kg (261 lb 9.6 oz)   SpO2 97%   BMI 38.08 kg/m²   Constitutional: Well-developed, well-nourished and Obese Body mass index is 38.08 kg/m².. Alert, cooperative.  HEENT: No conjunctival injection. No thyroid masses  Pulmonary: No increased work of breathing or signs of respiratory distress.Clear respiratory sounds.  CV: Well-perfused, Regular rate and rhythm, no murmurs  Vascular: no peripheral edema  GI: Abdomen obese, Non-distended. Not tender  MSK: no sarcopenia noted   Neuro: Oriented to person, place and time. Normal Speech. Normal gait.  Psych: Normal affect and mood. Normal thought process, no delusions     Labs and Imaging  Recent labs and imaging have been personally reviewed.  Lab Results   Component Value Date    WBC 9.29 12/01/2023    HGB 15.5 12/01/2023    HCT 47.3 12/01/2023    MCV 92 12/01/2023     12/01/2023     Lab Results   Component Value Date     08/18/2015    SODIUM 140 04/09/2024    K 4.3 04/09/2024     04/09/2024    CO2 30 04/09/2024    ANIONGAP 7 08/18/2015    AGAP 5 04/09/2024    BUN 17 04/09/2024    CREATININE 1.18 04/09/2024    GLUC 147 (H) 03/10/2022    GLUF 86 04/09/2024    CALCIUM 9.2 04/09/2024    AST 14 12/01/2023    ALT 22 12/01/2023    ALKPHOS 63 12/01/2023    PROT 7.3 " 12/02/2014    TP 8.0 12/01/2023    BILITOT 0.4 12/02/2014    TBILI 0.56 12/01/2023    EGFR 67 04/09/2024     Lab Results   Component Value Date    HGBA1C 5.9 (H) 04/09/2024     Lab Results   Component Value Date    NSP4KQAEVUAA 2.642 10/26/2018     Lab Results   Component Value Date    CHOLESTEROL 108 12/01/2023     Lab Results   Component Value Date    HDL 37 (L) 12/01/2023     Lab Results   Component Value Date    TRIG 128 12/01/2023     Lab Results   Component Value Date    LDLCALC 45 12/01/2023

## 2024-06-19 ENCOUNTER — OFFICE VISIT (OUTPATIENT)
Dept: NEPHROLOGY | Facility: CLINIC | Age: 59
End: 2024-06-19
Payer: COMMERCIAL

## 2024-06-19 VITALS
HEART RATE: 63 BPM | WEIGHT: 255 LBS | DIASTOLIC BLOOD PRESSURE: 67 MMHG | BODY MASS INDEX: 36.51 KG/M2 | HEIGHT: 70 IN | SYSTOLIC BLOOD PRESSURE: 113 MMHG

## 2024-06-19 DIAGNOSIS — N20.0 NEPHROLITHIASIS: ICD-10-CM

## 2024-06-19 DIAGNOSIS — I12.9 HYPERTENSIVE CHRONIC KIDNEY DISEASE WITH STAGE 1 THROUGH STAGE 4 CHRONIC KIDNEY DISEASE, OR UNSPECIFIED CHRONIC KIDNEY DISEASE: ICD-10-CM

## 2024-06-19 DIAGNOSIS — I10 ACCELERATED HYPERTENSION: ICD-10-CM

## 2024-06-19 DIAGNOSIS — I12.9 BENIGN HYPERTENSION WITH CHRONIC KIDNEY DISEASE, STAGE III (HCC): Primary | ICD-10-CM

## 2024-06-19 DIAGNOSIS — N18.30 BENIGN HYPERTENSION WITH CHRONIC KIDNEY DISEASE, STAGE III (HCC): Primary | ICD-10-CM

## 2024-06-19 DIAGNOSIS — E66.01 MORBID OBESITY WITH BMI OF 40.0-44.9, ADULT (HCC): ICD-10-CM

## 2024-06-19 DIAGNOSIS — N18.31 STAGE 3A CHRONIC KIDNEY DISEASE (HCC): ICD-10-CM

## 2024-06-19 DIAGNOSIS — E55.9 VITAMIN D DEFICIENCY: ICD-10-CM

## 2024-06-19 DIAGNOSIS — E78.5 DYSLIPIDEMIA: ICD-10-CM

## 2024-06-19 PROCEDURE — 99214 OFFICE O/P EST MOD 30 MIN: CPT | Performed by: INTERNAL MEDICINE

## 2024-06-19 NOTE — LETTER
June 19, 2024     Shirley Ross MD    Patient: Roland Kwan   YOB: 1965   Date of Visit: 6/19/2024       Dear Dr. Ross:    Thank you for referring Roland Kwan to me for evaluation. Below are my notes for this consultation.    If you have questions, please do not hesitate to call me. I look forward to following your patient along with you.         Sincerely,        Luigi Cash MD        CC: No Recipients    Luigi Cash MD  6/19/2024  2:53 PM  Sign when Signing Visit  RENAL FOLLOW UP NOTE:.td    ASSESSMENT AND PLAN:  1. Hypertension:  Most compatible with essential hypertension/resistant hypertension:  Secondary workup:  -normal thyroid function study  -severe HARPER on BiPAP  -aldosterone/renin ratio:  Essentially negative at 20 with an aldosterone level of 8.7  -plasma free metanephrines:  Negative  -24 hour urine for free cortisol:  Negative  -renal artery duplex:  No evidence of significant renal artery disease; but somewhat limited by body habitus and overlying bowel gas  Again obesity may be contributing to his hypertension  Current home blood pressure readings:  A.m.: 131/74 with a heart rate 62 and regular on average  P.m.: None       GOAL BLOOD PRESSURE:  LESS THAN 125/80 GIVEN CKD/recent hemorrhagic stroke     Recommendations:  continue to push nonmedical regimen including isotonic exercise, avoidance of salt and weight loss; patient counseled as such  GIVEN TIKOSYN:  AMILORIDE/SPIRONOLACTONE/HCTZ ARE CONTRAINDICATED.  LOOP DIURETIC SUCH AS FUROSEMIDE OR TORSEMIDE ARE ACCEPTABLE.     Medication changes today:     Blood pressure overall improving significantly.  AOBP excellent.  As he is losing weight he is more active and I believe will continue to improve blood pressure control so he will resend readings and in about 2-3 months.     Future options would be to increase torsemide, or Eplerenone if and acceptable medication.  And of course clonidine patch as well.  However: If his  blood pressure does improve potentially stop clonidine patch     DISCUSSED WITH DR. TAVERA EPLERENONE IS TOTALLY ACCEPTABLE!             2. CKD stage 3:    Baseline creatinine is ranged anywhere from 1.1-1.6  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  Current creatinine:  1.41 slightly higher than recent but within baseline: Just simply recheck over the next few weeks to demonstrate stability   Electrolytes are normal including potassium of 4.5  UA:  No proteinuria and no hematuria, 2-4 WBCs:   Urine protein creatinine ratio:  0.05 g at goal  MBD:  Of CKD:  Calcium/magnesium are normal/PTH 46.9; vitamin D 26.0 replete: I will replete with 2000 units daily over-the-counter recheck next visit  CBC:  Normal with a hemoglobin of 14.7 mild leukocytosis recheck make sure no signs or symptoms of an infection   Renal ultrasound:  12/03/2020:  Echogenic focus in the left lower pole possible fat versus calculus without shadowing.  Thickened bladder with UV junction still present although perhaps slightly pronounced.  Followed by Urology:  Visit with Dr. Chery for possible right-sided ureterocele, history of cystoscopy and cystolitholapaxy status post right stent removal.  Bladder wall thickening resolution of hydronephrosis.  Possibility of congenital ureterocele was discussed 1 year follow-up with retroperitoneal ultrasound which would be November 2022.           3. Nephrolithiasis:  As mentioned previously he has had numerous stones.  No kidney stones at this time  Continue with General stone diet; in particular water intake 2 and half to 3 L a day all  Only further investigation with a 24 hour urine stone risk evaluation if recurrent stone     4. Dyslipidemia:  Current lipid profile:  LDL 45/HDL 37/triglycerides 128  Goal:  LDL less than 70 given CKD/hemorrhagic stroke  Recommend:  Diet, exercise and weight loss, patient counseled as such  No change in medication as patient is at goal        5.  Recent admission 03/07/2022 secondary to right-sided weakness with word-finding difficulty, found to have acute left intraparenchymal hemorrhage with vasogenic edema.  Patient admitted [facility] with neurosurgery evaluation.  Started on Cardene due to hypertension.  No neuro surgical intervention at that time was deemed necessary.  MRI in 3/9 demonstrated unchanged intraparenchymal hematoma as well as small acute infarct in the left corona radiata.  Patient was maintained on blood pressure control.  TTE demonstrated EF 65% otherwise grade 1 diastolic dysfunction.  Seen by Neurology as well recommended restarting anticoagulation 3 weeks after repeat CT scan along with PT/OT.  In regards to blood pressure: Amlodipine was stopped nifedipine 10 mg every 8 hours titrate as needed, continuing hydralazine.     6. Increased liver function studies:  Right upper quadrant ultrasound showed cholelithiasis hepatomegaly and hepatic steatosis.  Now followed by Dr. Jaymie Marcelo who believes it is related to statin therapy but is agreeable to continue the statin.  Further evaluation will be done by Dr. Marcelo     GI health maintenance:  Seen by Dr. Marcelo we will plan for colonoscopy 3-4 months after CVA:  Patient knows he is due for the colonoscopy will schedule with Dr. Marcelo.  Patient aware and states will set up an appointment for colonoscopy.  Rediscussed with the patient as of 6/19/2024 he knows potential risk of colon cancer and that he should set it up.    PATIENT INSTRUCTIONS:    Patient Instructions   Visit summary:  - Overall blood pressure tremendously better, this correlates well with losing weight.  Continue to lose weight and continue to exercise and of course avoid salt.  - Creatinine kidney lab just slightly higher we will recheck this I am not concerned about want to follow-up in this regard    - Your white blood cell count also slightly high this has been the case on and off in the past we will simply  recheck it at this time.    - In regards to your colonoscopy I sent a message to Dr. Marcelo to reach out and schedule you.        1. Medication changes today:  Begin vitamin D over-the-counter 2000 units daily    2.  General instructions:  Continue above measures regarding low-salt diet exercise and weight loss as discussed    3.  Please go for  lab work next week or so nonfasting with good hydration    4.  Please take 1 week a blood pressure readings in 2 to 3 months    AS FOLLOWS  MORNING AND EVENING, SITTING AND STANDING as follows:  TAKE THE MORNING READINGS BEFORE ANY MEDICATIONS AND WHEN YOU ARE RELAXED FOR SEVERAL MINUTES  TAKE THE EVENING READINGS:  BETWEEN 7-10 P.M.; PRIOR TO ANY MEDICATIONS; AT LEAST IN OUR  FROM DINNER; AND CERTAINLY AFTER RELAXING FOR A FEW MINUTES  PLEASE INCLUDE HEART RATE WITH YOUR BLOOD PRESSURE READINGS  When taking standing readings, keep your arm supported at heart level and not dangling  Make sure you are sitting with your back supported and feet on the ground and do not cross your legs or feet  Make sure you have not taken any coffee or caffeine products or exercised or smoke cigarettes at least 30 minutes before taking your blood pressure  Then please mail these readings into the office      5.  In 3 months:  Please go for nonfasting lab work but in the morning  Please take 1 week a blood pressure readings as outlined above and mail those into the office: Please see above      6.  Follow-up in 6 months  Please bring in 1 week a blood pressure readings morning evening, sitting and standing is outlined above  PLEASE BRING AN YOUR BLOOD PRESSURE MACHINE TO CORRELATE WITH THE OFFICE MACHINE AT THIS NEXT SCHEDULED VISIT  Please go for fasting lab work 1-2 weeks prior to your appointment      7.  General non medical recommendations:  AVOID SALT BUT NOT ADDING AN READING LABELS TO MAKE SURE THERE IS LOW-SALT IN THE FOOD THAT YOU ARE EATING  Goal is less than 2 g of sodium  intake or less than 5 g of sodium chloride intake per day    Avoid nonsteroidal anti-inflammatory drugs such as Naprosyn, ibuprofen, Aleve, Advil, Celebrex, Meloxicam (Mobic) etc.  You can use Tylenol as needed if you do not have any liver condition to be concerned about    Avoid medications such as Sudafed or decongestants and antihistamines that contained the D component which is the decongestant.  You can take antihistamines without the decongestant or D component.    Try to avoid medications such as pantoprazole or  Protonix/Nexium or Esomeprazole)/Prilosec or omeprazole/Prevacid or lansoprazole/AcipHex or Rabeprazole.  If you are able to, use Pepcid as this is safer for your kidneys.    Try to exercise at least 30 minutes 3 days a week to begin with with an ultimate goal of 5 days a week for at least 30 minutes    Please do not drink more than 2 glasses of alcohol/wine on a daily basis as this may contribute to your high blood pressure.            Subjective:   There has been no hospitalizations or acute illnesses since last visit.  The patient overall is feeling well.  No fevers, chills, or cough or colds.  Good appetite and good energy  No hematuria, dysuria, voiding symptoms or foamy urine  No gastrointestinal symptoms  No cardiovascular symptoms including swelling of the legs except for ankles but not all the time  No headaches, dizziness or lightheadedness  Blood pressure medications:  Torsemide 5 mg daily in the morning  Olmesartan 40 mg daily in the morning  Hydralazine 50 mg twice a day  Eplerenone 25 mg twice a day  Nifedipine ER 30 mg daily in the morning  Nebivolol 2.5 mg daily in the morning  Clonidine TTS #1 weekly    Renal pertinent medications:  Atorvastatin 40 mg daily    ROS:  See HPI, otherwise review of systems as completely reviewed with the patient are negative    Past Medical History:   Diagnosis Date   • Ankle swelling    • Arthritis    • Asthma    • Atrial fibrillation (HCC)    • Atrial  fibrillation with RVR ([facility]) 04/04/2017   • Chronic kidney disease     kidney stones   • Class 3 severe obesity due to excess calories with serious comorbidity and body mass index (BMI) of 40.0 to 44.9 in adult ([facility]) 12/07/2020   • Gout    • Hypertension    • Kidney stone    • Morbid obesity with BMI of 40.0-44.9, adult ([facility]) 08/31/2016   • Night sweats    • Obesity    • Seasonal allergies    • Sleep apnea    • Stroke ([facility]) 03/22     Past Surgical History:   Procedure Laterality Date   • APPENDECTOMY     • CARDIOVERSION      X4 last was 2018   • COLONOSCOPY     • CYSTOSCOPY  08/30/2016    w/removal of object, last assessed 8/30/16   • FOOT SURGERY Left    • HAND SURGERY     • KNEE SURGERY Right 2016   • OR CYSTO/URETERO W/LITHOTRIPSY &INDWELL STENT INSRT Left 08/09/2016    Procedure: CYSTOSCOPY URETEROSCOPY WITH LITHOTRIPSY HOLMIUM LASER STONE EXTRACTION, RETROGRADE PYELOGRAM AND INSERTION STENT URETERAL;  Surgeon: Sahil Chery MD;  Location:  MAIN OR;  Service: Urology last assessed 8/30/16   • OR CYSTO/URETERO W/LITHOTRIPSY &INDWELL STENT INSRT Right 08/24/2020    Procedure: CYSTOSCOPY , cystolitholapaxy of bladder stone WITH HOMIUM LASER RIGHT RETROGRADE AND RIGHT URETERAL STENT, FULGERATION OF BLADDER;  Surgeon: Sahil Chery MD;  Location:  MAIN OR;  Service: Urology   • UPPER GASTROINTESTINAL ENDOSCOPY     • WRIST SURGERY Left 2014    Fracture surgery     Family History   Problem Relation Age of Onset   • Atrial fibrillation Mother    • Other Mother         blood dyscrasia   • Hypertension Mother    • Arthritis Mother    • Other Father         hypoglycemia    • Atrial fibrillation Father    • Diabetes Family    • Other Family         Thrombocytosis   • No Known Problems Sister    • Cancer Neg Hx    • Thyroid disease Neg Hx       reports that he quit smoking about 30 years ago. His smoking use included cigarettes. He started smoking about 45 years ago. He has a 7.5 pack-year smoking history. He has  never used smokeless tobacco. He reports that he does not currently use alcohol. He reports that he does not use drugs.    I COMPLETELY REVIEWED THE PAST MEDICAL HISTORY/PAST SURGICAL HISTORY/SOCIAL HISTORY/FAMILY HISTORY/AND MEDICATIONS  AND UPDATED ALL    Objective:     Vitals:   BP sitting on left: 116/66 with a heart rate of 64 and regular  BP standing on left: 110/70 with a heart rate of 64 and regular    Vitals:    06/19/24 1421   BP: 113/67   Pulse: 63       Weight (last 2 days)       Date/Time Weight    06/19/24 1421 116 (255)          Wt Readings from Last 3 Encounters:   06/19/24 116 kg (255 lb)   06/13/24 119 kg (261 lb 9.6 oz)   02/29/24 127 kg (279 lb)       Body mass index is 37.12 kg/m².    Physical Exam: General:  No acute distress/obese  Skin:  No acute rash  Eyes:  No scleral icterus, noninjected, no discharge from eyes  ENT:  Moist mucous membranes  Neck:  Supple, no jugular venous distention, trachea is midline, no lymphadenopathy and no thyromegaly  Back   No CVAT  Chest:  Clear to auscultation and percussion, good respiratory effort  CVS:  Regular rate and rhythm without a rub, or gallops or murmurs  Abdomen:  obese, e,Soft and nontender with normal bowel sounds  Extremities:  No cyanosis and no edema, no arthritic changes, normal range of motion  Neuro:  Grossly intact  Psych:  Alert, oriented x3 and appropriate      Medications:    Current Outpatient Medications:   •  atorvastatin (LIPITOR) 40 mg tablet, TAKE 1 TABLET BY MOUTH EVERY DAY WITH DINNER, Disp: 90 tablet, Rfl: 1  •  cloNIDine (CATAPRES-TTS-1) 0.1 mg/24 hr, APPLY 1 PATCH ONE TIME PER WEEK, Disp: 12 patch, Rfl: 1  •  dofetilide (TIKOSYN) 500 mcg capsule, TAKE 1 CAPSULE BY MOUTH EVERY 12 HOURS, Disp: 180 capsule, Rfl: 1  •  eplerenone (INSPRA) 25 mg tablet, Take 1 tablet (25 mg total) by mouth 2 (two) times a day, Disp: 180 tablet, Rfl: 3  •  hydrALAZINE (APRESOLINE) 50 mg tablet, TAKE 1 TABLET BY MOUTH TWICE A DAY, Disp: 180 tablet,  Rfl: 3  •  nebivolol (BYSTOLIC) 2.5 mg tablet, TAKE 1 TABLET BY MOUTH DAILY HOLD FOR HEART RATE LESS THAN 50 BEATS PER MINUTE., Disp: 90 tablet, Rfl: 3  •  NIFEdipine (PROCARDIA XL) 30 mg 24 hr tablet, TAKE 1 TABLET BY MOUTH TWICE A DAY (Patient taking differently: Take 30 mg by mouth daily), Disp: 180 tablet, Rfl: 1  •  olmesartan (BENICAR) 40 mg tablet, TAKE 1 TABLET BY MOUTH EVERY DAY, Disp: 90 tablet, Rfl: 3  •  rivaroxaban (Xarelto) 20 mg tablet, TAKE 1 TABLET BY MOUTH EVERY DAY WITH BREAKFAST, Disp: 30 tablet, Rfl: 5  •  tirzepatide (Mounjaro) 5 MG/0.5ML, Inject 0.5 mL (5 mg total) under the skin every 7 days, Disp: 6 mL, Rfl: 1  •  torsemide (DEMADEX) 5 MG tablet, TAKE 1 TABLET (5 MG TOTAL) BY MOUTH DAILY., Disp: 90 tablet, Rfl: 1    Lab, Imaging and other studies: I have personally reviewed pertinent labs.  Laboratory Results:  Results for orders placed or performed in visit on 06/13/24   Comprehensive metabolic panel   Result Value Ref Range    Sodium 141 135 - 147 mmol/L    Potassium 4.5 3.5 - 5.3 mmol/L    Chloride 106 96 - 108 mmol/L    CO2 29 21 - 32 mmol/L    ANION GAP 6 4 - 13 mmol/L    BUN 21 5 - 25 mg/dL    Creatinine 1.41 (H) 0.60 - 1.30 mg/dL    Glucose, Fasting 90 65 - 99 mg/dL    Calcium 9.5 8.4 - 10.2 mg/dL    AST 14 13 - 39 U/L    ALT 14 7 - 52 U/L    Alkaline Phosphatase 51 34 - 104 U/L    Total Protein 7.8 6.4 - 8.4 g/dL    Albumin 4.4 3.5 - 5.0 g/dL    Total Bilirubin 0.48 0.20 - 1.00 mg/dL    eGFR 54 ml/min/1.73sq m   CBC   Result Value Ref Range    WBC 11.42 (H) 4.31 - 10.16 Thousand/uL    RBC 4.90 3.88 - 5.62 Million/uL    Hemoglobin 14.7 12.0 - 17.0 g/dL    Hematocrit 45.4 36.5 - 49.3 %    MCV 93 82 - 98 fL    MCH 30.0 26.8 - 34.3 pg    MCHC 32.4 31.4 - 37.4 g/dL    RDW 13.2 11.6 - 15.1 %    Platelets 239 149 - 390 Thousands/uL    MPV 11.9 8.9 - 12.7 fL   Magnesium   Result Value Ref Range    Magnesium 2.0 1.9 - 2.7 mg/dL   Protein / creatinine ratio, urine   Result Value Ref Range     "Creatinine, Ur 148.8 Reference range not established. mg/dL    Protein Urine Random 7 Reference range not established. mg/dL    Prot/Creat Ratio, Ur 0.05 0.00 - 0.10       Results from last 7 days   Lab Units 06/13/24  1333   WBC Thousand/uL 11.42*   HEMOGLOBIN g/dL 14.7   HEMATOCRIT % 45.4   PLATELETS Thousands/uL 239   POTASSIUM mmol/L 4.5   CHLORIDE mmol/L 106   CO2 mmol/L 29   BUN mg/dL 21   CREATININE mg/dL 1.41*   CALCIUM mg/dL 9.5   MAGNESIUM mg/dL 2.0         Radiology review:   chest X-ray    Ultrasound      Portions of the record may have been created with voice recognition software.  Occasional wrong word or \"sound a like\" substitutions may have occurred due to the inherent limitations of voice recognition software.  Read the chart carefully and recognize, using context, where substitutions have occurred.                    "

## 2024-06-19 NOTE — PATIENT INSTRUCTIONS
Visit summary:  - Overall blood pressure tremendously better, this correlates well with losing weight.  Continue to lose weight and continue to exercise and of course avoid salt.  - Creatinine kidney lab just slightly higher we will recheck this I am not concerned about want to follow-up in this regard    - Your white blood cell count also slightly high this has been the case on and off in the past we will simply recheck it at this time.    - In regards to your colonoscopy I sent a message to Dr. Marcelo to reach out and schedule you.        1. Medication changes today:  Begin vitamin D over-the-counter 2000 units daily    2.  General instructions:  Continue above measures regarding low-salt diet exercise and weight loss as discussed    3.  Please go for  lab work next week or so nonfasting with good hydration    4.  Please take 1 week a blood pressure readings in 2 to 3 months    AS FOLLOWS  MORNING AND EVENING, SITTING AND STANDING as follows:  TAKE THE MORNING READINGS BEFORE ANY MEDICATIONS AND WHEN YOU ARE RELAXED FOR SEVERAL MINUTES  TAKE THE EVENING READINGS:  BETWEEN 7-10 P.M.; PRIOR TO ANY MEDICATIONS; AT LEAST IN OUR  FROM DINNER; AND CERTAINLY AFTER RELAXING FOR A FEW MINUTES  PLEASE INCLUDE HEART RATE WITH YOUR BLOOD PRESSURE READINGS  When taking standing readings, keep your arm supported at heart level and not dangling  Make sure you are sitting with your back supported and feet on the ground and do not cross your legs or feet  Make sure you have not taken any coffee or caffeine products or exercised or smoke cigarettes at least 30 minutes before taking your blood pressure  Then please mail these readings into the office      5.  In 3 months:  Please go for nonfasting lab work but in the morning  Please take 1 week a blood pressure readings as outlined above and mail those into the office: Please see above      6.  Follow-up in 6 months  Please bring in 1 week a blood pressure readings morning  evening, sitting and standing is outlined above  PLEASE BRING AN YOUR BLOOD PRESSURE MACHINE TO CORRELATE WITH THE OFFICE MACHINE AT THIS NEXT SCHEDULED VISIT  Please go for fasting lab work 1-2 weeks prior to your appointment      7.  General non medical recommendations:  AVOID SALT BUT NOT ADDING AN READING LABELS TO MAKE SURE THERE IS LOW-SALT IN THE FOOD THAT YOU ARE EATING  Goal is less than 2 g of sodium intake or less than 5 g of sodium chloride intake per day    Avoid nonsteroidal anti-inflammatory drugs such as Naprosyn, ibuprofen, Aleve, Advil, Celebrex, Meloxicam (Mobic) etc.  You can use Tylenol as needed if you do not have any liver condition to be concerned about    Avoid medications such as Sudafed or decongestants and antihistamines that contained the D component which is the decongestant.  You can take antihistamines without the decongestant or D component.    Try to avoid medications such as pantoprazole or  Protonix/Nexium or Esomeprazole)/Prilosec or omeprazole/Prevacid or lansoprazole/AcipHex or Rabeprazole.  If you are able to, use Pepcid as this is safer for your kidneys.    Try to exercise at least 30 minutes 3 days a week to begin with with an ultimate goal of 5 days a week for at least 30 minutes    Please do not drink more than 2 glasses of alcohol/wine on a daily basis as this may contribute to your high blood pressure.

## 2024-06-21 ENCOUNTER — TELEPHONE (OUTPATIENT)
Dept: GASTROENTEROLOGY | Facility: AMBULARY SURGERY CENTER | Age: 59
End: 2024-06-21

## 2024-06-21 LAB
25(OH)D2 SERPL-MCNC: 19 NG/ML
25(OH)D3 SERPL-MCNC: 13 NG/ML
25(OH)D3+25(OH)D2 SERPL-MCNC: 32 NG/ML

## 2024-06-21 NOTE — TELEPHONE ENCOUNTER
Our mutual patient is scheduled for procedure: Colonoscopy    On: July 26 , 2024     With: Dr. Jaymie Marcelo MD    He/She is taking the following blood thinner: Xarelto (Rivaroxaban)    Can this be stopped  2 days prior to the procedure    Physician Approving clearance: Warren Mccarty DO

## 2024-07-11 DIAGNOSIS — I12.9 HYPERTENSIVE CHRONIC KIDNEY DISEASE WITH STAGE 1 THROUGH STAGE 4 CHRONIC KIDNEY DISEASE, OR UNSPECIFIED CHRONIC KIDNEY DISEASE: ICD-10-CM

## 2024-07-11 DIAGNOSIS — I10 BENIGN ESSENTIAL HYPERTENSION: ICD-10-CM

## 2024-07-11 DIAGNOSIS — N18.30 CHRONIC KIDNEY DISEASE, STAGE III (MODERATE) (HCC): ICD-10-CM

## 2024-07-11 RX ORDER — HYDRALAZINE HYDROCHLORIDE 50 MG/1
TABLET, FILM COATED ORAL
Qty: 180 TABLET | Refills: 1 | Status: SHIPPED | OUTPATIENT
Start: 2024-07-11

## 2024-07-12 ENCOUNTER — ANESTHESIA EVENT (OUTPATIENT)
Dept: ANESTHESIOLOGY | Facility: HOSPITAL | Age: 59
End: 2024-07-12

## 2024-07-12 ENCOUNTER — ANESTHESIA (OUTPATIENT)
Dept: ANESTHESIOLOGY | Facility: HOSPITAL | Age: 59
End: 2024-07-12

## 2024-07-17 ENCOUNTER — TELEPHONE (OUTPATIENT)
Age: 59
End: 2024-07-17

## 2024-08-06 DIAGNOSIS — E78.5 DYSLIPIDEMIA: ICD-10-CM

## 2024-08-06 DIAGNOSIS — N18.31 STAGE 3A CHRONIC KIDNEY DISEASE (HCC): ICD-10-CM

## 2024-08-06 DIAGNOSIS — E66.01 CLASS 3 SEVERE OBESITY DUE TO EXCESS CALORIES WITH SERIOUS COMORBIDITY AND BODY MASS INDEX (BMI) OF 40.0 TO 44.9 IN ADULT (HCC): ICD-10-CM

## 2024-08-06 DIAGNOSIS — I12.9 HYPERTENSIVE CHRONIC KIDNEY DISEASE WITH STAGE 1 THROUGH STAGE 4 CHRONIC KIDNEY DISEASE, OR UNSPECIFIED CHRONIC KIDNEY DISEASE: ICD-10-CM

## 2024-08-06 DIAGNOSIS — E55.9 VITAMIN D DEFICIENCY: ICD-10-CM

## 2024-08-06 DIAGNOSIS — I10 ACCELERATED HYPERTENSION: ICD-10-CM

## 2024-08-06 RX ORDER — NIFEDIPINE 30 MG/1
TABLET, EXTENDED RELEASE ORAL
Qty: 180 TABLET | Refills: 1 | Status: SHIPPED | OUTPATIENT
Start: 2024-08-06

## 2024-09-02 DIAGNOSIS — I48.0 PAROXYSMAL ATRIAL FIBRILLATION (HCC): ICD-10-CM

## 2024-09-03 RX ORDER — DOFETILIDE 0.5 MG/1
500 CAPSULE ORAL EVERY 12 HOURS
Qty: 60 CAPSULE | Refills: 5 | Status: SHIPPED | OUTPATIENT
Start: 2024-09-03

## 2024-09-12 ENCOUNTER — OFFICE VISIT (OUTPATIENT)
Dept: BARIATRICS | Facility: CLINIC | Age: 59
End: 2024-09-12
Payer: COMMERCIAL

## 2024-09-12 VITALS
BODY MASS INDEX: 37.19 KG/M2 | SYSTOLIC BLOOD PRESSURE: 128 MMHG | DIASTOLIC BLOOD PRESSURE: 78 MMHG | HEIGHT: 70 IN | RESPIRATION RATE: 16 BRPM | WEIGHT: 259.8 LBS | HEART RATE: 66 BPM

## 2024-09-12 DIAGNOSIS — Z86.73 HISTORY OF STROKE: ICD-10-CM

## 2024-09-12 DIAGNOSIS — I48.20 CHRONIC ATRIAL FIBRILLATION (HCC): ICD-10-CM

## 2024-09-12 DIAGNOSIS — E66.09 CLASS 2 OBESITY DUE TO EXCESS CALORIES WITH BODY MASS INDEX (BMI) OF 37.0 TO 37.9 IN ADULT: Primary | ICD-10-CM

## 2024-09-12 DIAGNOSIS — G47.33 OSA (OBSTRUCTIVE SLEEP APNEA): ICD-10-CM

## 2024-09-12 DIAGNOSIS — N20.0 NEPHROLITHIASIS: ICD-10-CM

## 2024-09-12 DIAGNOSIS — E11.22 TYPE 2 DIABETES MELLITUS WITH STAGE 3A CHRONIC KIDNEY DISEASE, WITHOUT LONG-TERM CURRENT USE OF INSULIN (HCC): ICD-10-CM

## 2024-09-12 DIAGNOSIS — N18.31 TYPE 2 DIABETES MELLITUS WITH STAGE 3A CHRONIC KIDNEY DISEASE, WITHOUT LONG-TERM CURRENT USE OF INSULIN (HCC): ICD-10-CM

## 2024-09-12 DIAGNOSIS — N18.31 STAGE 3A CHRONIC KIDNEY DISEASE (HCC): ICD-10-CM

## 2024-09-12 DIAGNOSIS — I61.9 LEFT-SIDED NONTRAUMATIC INTRACEREBRAL HEMORRHAGE, UNSPECIFIED CEREBRAL LOCATION (HCC): ICD-10-CM

## 2024-09-12 PROCEDURE — 99215 OFFICE O/P EST HI 40 MIN: CPT | Performed by: INTERNAL MEDICINE

## 2024-09-12 NOTE — PROGRESS NOTES
Program: [facility]    Assessment/Plan     Roland Kwan  is a 59 y.o. male with  returns to follow up  for treatment of excess body weight and associated risk factors/co-morbidities.     Class 2 obesity due to excess calories with body mass index (BMI) of 37.0 to 37.9 in adult  59-year-old male with multiple comorbidities such as hypertension requiring multiple meds for control, history of hemorrhagic CVA, type 2 diabetes prior hemoglobin A1c was 7.5 and now down to 5.9, hyperlipidemia, obstructive sleep apnea on CPAP, CKD stage IIIb, chronic atrial fibrillation on anticoagulation and nephrolithiasis.  Patient has been following up with Dr. Alvarado since December 2023.  Mounjaro was initiated and the patient successfully has lost 45 pounds.  He reports that his dose of Mounjaro had to go back down to 5 mg as he started experiencing some side effects of extreme nausea with the higher dose.  Since last office visit in June he has lost only 3 pounds.  We will titrate the dose to 7.5 mg of Mounjaro.  Discussed with patient that he may due to prior history of nausea side effects may have to stay on the 7.5 mg dose for 2 to 3 months.  It is possible that there is a degree of underlying gastroparesis secondary to diabetes that is contributing to his mild intolerance to the GLP-1 GIP receptor agonist.  Discussed other oral medication options that could be considered in combination with Mounjaro if ever needed for weight plateau or slow dose titration.  Patient has a history of type 2 diabetes and would benefit from combination of Mounjaro and metformin due to synergistic mechanism of action.  We will defer this decision to future follow-up visits.  Patient has a history of kidney stones so Topamax is contraindicated.  He is requiring multiple medications for control of his blood pressure so phentermine is contraindicated.  Other medications that could be considered cautiously include Wellbutrin naltrexone as no  "contraindications.    I reviewed diet recall with the patient and feel like he could benefit from working with a dietitian through the nutrition bundles.  Encourage patient to avoid skipping meals as it would slow down his metabolic rate and therefore his weight loss.  Patient has CKD stage IIIb with GFR of so would need the appropriate protein prescription.  Discussed with patient that frequent dining out could also contribute to calorie excess as that is part of his work.  He has been doing good with portion control with assistance from the injectable.  He would benefit from incorporating protein and fiber rich foods to buffer blood glucose spikes and to evenly distribute his calories throughout the day and minimize processed foods.  Encouraged him to account for liquid calories such as alcohol while trying to stay in a calorie deficit    Patient is walking currently, I emphasized the importance of introducing 2 to 3 days of strength training along with adequate protein intake in the interest of lean body mass preservation while he is titrating up the injectable medication.  Patient verbalized understanding.    HARPER on BiPAP 6 to 7 hours of sleep per night       Most recent notes and records were reviewed.         Return visit:  2-3 months     Nutrition   Do not skip meals. Avoid sugary beverages. At least 64oz of water daily.    Behavioral/Stress   Food log via caridad or provided paper log (caridad options include www.Jeeves.com, sparkpeople.com, loseit.com, calorieking.com, [employer]). Encouraged mindful eating    Physical Activity  Increase physical activity by 10 minutes daily. Gradually increase physical activity to a goal of 5 days per week for 30 minutes of MODERATE intensity ( ( should be able to pass the \"talk test\" but should not be able to sing.  target 150-300 minutes  PLUS 2-3 days per week of FULL BODY resistance training. Progression will be addressed at follow up visits. Encouraged planning " regarding establishing physical activity routine    Sleep  Provided sleep hygiene counseling and importance of having adequate sleep duration          Roland was seen today for follow-up.    Diagnoses and all orders for this visit:    Class 2 obesity due to excess calories with body mass index (BMI) of 37.0 to 37.9 in adult    Nephrolithiasis    History of stroke    Left-sided nontraumatic intracerebral hemorrhage, unspecified cerebral location (HCC)    Type 2 diabetes mellitus with stage 3a chronic kidney disease, without long-term current use of insulin (HCC)  -     tirzepatide (Mounjaro) 7.5 MG/0.5ML; Inject 0.5 mL (7.5 mg total) under the skin every 7 days    HARPER (obstructive sleep apnea)    Stage 3a chronic kidney disease (HCC)    Chronic atrial fibrillation (HCC)                Total time spent reviewing chart, interviewing patient, examining patient, discussing plan, answering all questions, and documentin minutes with >50% face-to-face time with the patient.            Weight trajectory     Wt Readings from Last 20 Encounters:   24 118 kg (259 lb 12.8 oz)   24 116 kg (255 lb)   24 119 kg (261 lb 9.6 oz)   24 127 kg (279 lb)   24 128 kg (282 lb 11.2 oz)   01/15/24 132 kg (291 lb 12.8 oz)   23 (!) 139 kg (306 lb)   23 (!) 140 kg (308 lb)   23 (!) 142 kg (312 lb)   23 (!) 139 kg (306 lb 9.6 oz)   23 (!) 140 kg (309 lb 9.6 oz)   23 (!) 139 kg (307 lb)   23 (!) 138 kg (304 lb 6.4 oz)   23 (!) 139 kg (307 lb)   23 (!) 138 kg (304 lb)   23 (!) 137 kg (303 lb)   23 (!) 137 kg (303 lb)   22 (!) 137 kg (303 lb)   22 (!) 137 kg (302 lb)   22 133 kg (293 lb)               Weight/ Lifestyle history/HPI     Patient reports   Patient is under the care of Dr. Cash nephrology for management of hypertension and under the care of of electrophysiology for A-fib  Diet recall:  B: skips was making PS ( couple of  "BB banana oatmilk one scoop of protein powder)  S:   L: skips or sometimes out   S:  D: chinese food trying to portion control   S: sweet yogurt Oui  Frequency Eating out x/ week: 5     Beverages  Water-- 64 oz   Caffeine/tea-- 16 oz   SSB --no    Alcohol: 5  drinks/ week  Friday   Smoking: no  Drug use: no    Physical Activity --walks every AM 1.6 miles     Sleep -- HARPER on Bi PAP ;6-7  hours of sleep per night    Occupation-[employer]     Psycho social- lives with adult son and wife          Start date: 12/7/23  Intial weight : 306 lbs  Intial BMI: 44 kg/m2  Obesity Class: Above 40- Obesity Class III  Goal weight: 220 lbs      Colonoscopy: UTD      Current Weight on : 259 lbs (-3 lbs)  Current BMI : 37.82 kg/m2 35.0-39.9- Obesity Class II  Weight last visit on 6/13/24 : 261 lbs   Difference: -48 lbs      Anti obesity Medications/ Medical---    Weight loss medication and dose: Mounjaro  Date initiated:   Nov 2023  SE as we increased dose been on 5 mg for a while   Severe nausea diarrhea             Objective         The following portions of the patient's history were reviewed and updated as appropriate: allergies, current medications, past family history, past medical history, past social history, past surgical history, and problem list.      /78 (BP Location: Left arm, Patient Position: Sitting, Cuff Size: Large)   Pulse 66   Resp 16   Ht 5' 9.5\" (1.765 m)   Wt 118 kg (259 lb 12.8 oz)   BMI 37.82 kg/m²             Review of Systems   Constitutional:  Negative for fatigue.   HENT:  Negative for sore throat.    Respiratory:  Negative for cough and shortness of breath.    Cardiovascular:  Negative for chest pain, palpitations and leg swelling.   Gastrointestinal:  Negative for abdominal pain, constipation, diarrhea and nausea.   Genitourinary:  Negative for dysuria.   Musculoskeletal:  Negative for arthralgias and back pain.   Skin:  Negative for rash.   Neurological:  Negative for headaches. "   Psychiatric/Behavioral:  Negative for dysphoric mood. The patient is not nervous/anxious.          Physical Exam  Vitals and nursing note reviewed.   Constitutional:       Appearance: Normal appearance.   HENT:      Head: Normocephalic.   Pulmonary:      Effort: Pulmonary effort is normal.   Neurological:      General: No focal deficit present.      Mental Status: She is alert and oriented to person, place, and time.   Psychiatric:         Mood and Affect: Mood normal.         Behavior: Behavior normal.         Thought Content: Thought content normal.         Judgment: Judgment normal.          Current medications       Current Outpatient Medications:     atorvastatin (LIPITOR) 40 mg tablet, TAKE 1 TABLET BY MOUTH EVERY DAY WITH DINNER, Disp: 90 tablet, Rfl: 1    cloNIDine (CATAPRES-TTS-1) 0.1 mg/24 hr, APPLY 1 PATCH ONE TIME PER WEEK, Disp: 12 patch, Rfl: 1    dofetilide (TIKOSYN) 500 mcg capsule, TAKE 1 CAPSULE BY MOUTH EVERY 12 HOURS, Disp: 60 capsule, Rfl: 5    eplerenone (INSPRA) 25 mg tablet, Take 1 tablet (25 mg total) by mouth 2 (two) times a day, Disp: 180 tablet, Rfl: 3    hydrALAZINE (APRESOLINE) 50 mg tablet, TAKE 1 TABLET BY MOUTH TWICE A DAY, Disp: 180 tablet, Rfl: 1    nebivolol (BYSTOLIC) 2.5 mg tablet, TAKE 1 TABLET BY MOUTH DAILY HOLD FOR HEART RATE LESS THAN 50 BEATS PER MINUTE., Disp: 90 tablet, Rfl: 3    NIFEdipine (PROCARDIA XL) 30 mg 24 hr tablet, TAKE 1 TABLET BY MOUTH TWICE A DAY, Disp: 180 tablet, Rfl: 1    olmesartan (BENICAR) 40 mg tablet, TAKE 1 TABLET BY MOUTH EVERY DAY, Disp: 90 tablet, Rfl: 3    rivaroxaban (Xarelto) 20 mg tablet, TAKE 1 TABLET BY MOUTH EVERY DAY WITH BREAKFAST, Disp: 30 tablet, Rfl: 5    tirzepatide (Mounjaro) 7.5 MG/0.5ML, Inject 0.5 mL (7.5 mg total) under the skin every 7 days, Disp: 2 mL, Rfl: 2    torsemide (DEMADEX) 5 MG tablet, TAKE 1 TABLET (5 MG TOTAL) BY MOUTH DAILY., Disp: 90 tablet, Rfl: 1         Medication considerations/contraindications     -Patient  "denies personal history of pancreatitis. Patient also denies personal and family history of medullary thyroid cancer, and multiple endocrine neoplasia type 2 (MEN 2 tumor). -Patient denies any history of kidney stones, seizures, or glaucoma, diabetic retinopathy, gall bladder disease, gastroparesis, hyperthyroidism.  -Denies Hx of CAD, PAD, palpitations, arrhythmia, uncontrolled hypertension  -Denies uncontrolled anxiety or depression, suicidal behavior or thinking , insomnia or sleep disturbance.         Labs     Most recent labs reviewed   Lab Results   Component Value Date     08/18/2015    SODIUM 141 06/13/2024    K 4.5 06/13/2024     06/13/2024    CO2 29 06/13/2024    ANIONGAP 7 08/18/2015    AGAP 6 06/13/2024    BUN 21 06/13/2024    CREATININE 1.41 (H) 06/13/2024    GLUC 147 (H) 03/10/2022    GLUF 90 06/13/2024    CALCIUM 9.5 06/13/2024    AST 14 06/13/2024    ALT 14 06/13/2024    ALKPHOS 51 06/13/2024    PROT 7.3 12/02/2014    TP 7.8 06/13/2024    BILITOT 0.4 12/02/2014    TBILI 0.48 06/13/2024    EGFR 54 06/13/2024     Lab Results   Component Value Date    HGBA1C 5.9 (H) 04/09/2024     Lab Results   Component Value Date    HLY3IWFFBQHO 2.642 10/26/2018     Lab Results   Component Value Date    CHOLESTEROL 108 12/01/2023     Lab Results   Component Value Date    HDL 37 (L) 12/01/2023     Lab Results   Component Value Date    TRIG 128 12/01/2023     Lab Results   Component Value Date    LDLCALC 45 12/01/2023     No results found for: \"VITD\"  No components found for: \"FASTINS\"   "

## 2024-09-12 NOTE — ASSESSMENT & PLAN NOTE
59-year-old male with multiple comorbidities such as hypertension requiring multiple meds for control, history of hemorrhagic CVA, type 2 diabetes prior hemoglobin A1c was 7.5 and now down to 5.9, hyperlipidemia, obstructive sleep apnea on CPAP, CKD stage IIIb, chronic atrial fibrillation on anticoagulation and nephrolithiasis.  Patient has been following up with Dr. Alvarado since December 2023.  Mounjaro was initiated and the patient successfully has lost 45 pounds.  He reports that his dose of Mounjaro had to go back down to 5 mg as he started experiencing some side effects of extreme nausea with the higher dose.  Since last office visit in June he has lost only 3 pounds.  We will titrate the dose to 7.5 mg of Mounjaro.  Discussed with patient that he may due to prior history of nausea side effects may have to stay on the 7.5 mg dose for 2 to 3 months.  It is possible that there is a degree of underlying gastroparesis secondary to diabetes that is contributing to his mild intolerance to the GLP-1 GIP receptor agonist.  Discussed other oral medication options that could be considered in combination with Mounjaro if ever needed for weight plateau or slow dose titration.  Patient has a history of type 2 diabetes and would benefit from combination of Mounjaro and metformin due to synergistic mechanism of action.  We will defer this decision to future follow-up visits.  Patient has a history of kidney stones so Topamax is contraindicated.  He is requiring multiple medications for control of his blood pressure so phentermine is contraindicated.  Other medications that could be considered cautiously include Wellbutrin naltrexone as no contraindications.    I reviewed diet recall with the patient and feel like he could benefit from working with a dietitian through the nutrition bundles.  Encourage patient to avoid skipping meals as it would slow down his metabolic rate and therefore his weight loss.  Patient has CKD stage  IIIb with GFR of so would need the appropriate protein prescription.  Discussed with patient that frequent dining out could also contribute to calorie excess as that is part of his work.  He has been doing good with portion control with assistance from the injectable.  He would benefit from incorporating protein and fiber rich foods to buffer blood glucose spikes and to evenly distribute his calories throughout the day and minimize processed foods.  Encouraged him to account for liquid calories such as alcohol while trying to stay in a calorie deficit    Patient is walking currently, I emphasized the importance of introducing 2 to 3 days of strength training along with adequate protein intake in the interest of lean body mass preservation while he is titrating up the injectable medication.  Patient verbalized understanding.    HARPER on BiPAP 6 to 7 hours of sleep per night

## 2024-09-26 ENCOUNTER — TELEPHONE (OUTPATIENT)
Dept: GASTROENTEROLOGY | Facility: CLINIC | Age: 59
End: 2024-09-26

## 2024-09-26 NOTE — TELEPHONE ENCOUNTER
Spoke with pt to reschedule colonoscopy, pt states he will call back when his schedule is available.

## 2024-09-30 ENCOUNTER — APPOINTMENT (OUTPATIENT)
Dept: LAB | Facility: CLINIC | Age: 59
End: 2024-09-30
Payer: COMMERCIAL

## 2024-09-30 DIAGNOSIS — E78.5 DYSLIPIDEMIA: ICD-10-CM

## 2024-09-30 DIAGNOSIS — E66.01 MORBID OBESITY WITH BMI OF 40.0-44.9, ADULT (HCC): ICD-10-CM

## 2024-09-30 DIAGNOSIS — N18.31 STAGE 3A CHRONIC KIDNEY DISEASE (HCC): ICD-10-CM

## 2024-09-30 DIAGNOSIS — I12.9 BENIGN HYPERTENSION WITH CHRONIC KIDNEY DISEASE, STAGE III (HCC): ICD-10-CM

## 2024-09-30 DIAGNOSIS — I10 ACCELERATED HYPERTENSION: ICD-10-CM

## 2024-09-30 DIAGNOSIS — E55.9 VITAMIN D DEFICIENCY: ICD-10-CM

## 2024-09-30 DIAGNOSIS — N18.30 BENIGN HYPERTENSION WITH CHRONIC KIDNEY DISEASE, STAGE III (HCC): ICD-10-CM

## 2024-09-30 DIAGNOSIS — N20.0 NEPHROLITHIASIS: ICD-10-CM

## 2024-09-30 DIAGNOSIS — I12.9 HYPERTENSIVE CHRONIC KIDNEY DISEASE WITH STAGE 1 THROUGH STAGE 4 CHRONIC KIDNEY DISEASE, OR UNSPECIFIED CHRONIC KIDNEY DISEASE: ICD-10-CM

## 2024-09-30 LAB
ANION GAP SERPL CALCULATED.3IONS-SCNC: 7 MMOL/L (ref 4–13)
BUN SERPL-MCNC: 24 MG/DL (ref 5–25)
CALCIUM SERPL-MCNC: 9.4 MG/DL (ref 8.4–10.2)
CHLORIDE SERPL-SCNC: 104 MMOL/L (ref 96–108)
CO2 SERPL-SCNC: 28 MMOL/L (ref 21–32)
CREAT SERPL-MCNC: 1.34 MG/DL (ref 0.6–1.3)
ERYTHROCYTE [DISTWIDTH] IN BLOOD BY AUTOMATED COUNT: 13.2 % (ref 11.6–15.1)
GFR SERPL CREATININE-BSD FRML MDRD: 57 ML/MIN/1.73SQ M
GLUCOSE P FAST SERPL-MCNC: 96 MG/DL (ref 65–99)
HCT VFR BLD AUTO: 45.7 % (ref 36.5–49.3)
HGB BLD-MCNC: 15 G/DL (ref 12–17)
MAGNESIUM SERPL-MCNC: 2.1 MG/DL (ref 1.9–2.7)
MCH RBC QN AUTO: 30.4 PG (ref 26.8–34.3)
MCHC RBC AUTO-ENTMCNC: 32.8 G/DL (ref 31.4–37.4)
MCV RBC AUTO: 93 FL (ref 82–98)
PLATELET # BLD AUTO: 203 THOUSANDS/UL (ref 149–390)
PMV BLD AUTO: 12.4 FL (ref 8.9–12.7)
POTASSIUM SERPL-SCNC: 4.8 MMOL/L (ref 3.5–5.3)
RBC # BLD AUTO: 4.94 MILLION/UL (ref 3.88–5.62)
SODIUM SERPL-SCNC: 139 MMOL/L (ref 135–147)
WBC # BLD AUTO: 9.49 THOUSAND/UL (ref 4.31–10.16)

## 2024-09-30 PROCEDURE — 80048 BASIC METABOLIC PNL TOTAL CA: CPT

## 2024-09-30 PROCEDURE — 85027 COMPLETE CBC AUTOMATED: CPT

## 2024-09-30 PROCEDURE — 83735 ASSAY OF MAGNESIUM: CPT

## 2024-09-30 PROCEDURE — 36415 COLL VENOUS BLD VENIPUNCTURE: CPT

## 2024-10-02 NOTE — PROGRESS NOTES
Weight Management Medical Nutrition Assessment  Roland is here today for 1/3 bundle visit. Current weight 256.2# (shoes on). Patient has followed with [facility] on and off since 2021. Started on Mounjaro with [facility] provider at [facility] and has had great success with weight loss as he is down 55.8#. Patient does have CKDIIIb so protein restriction was reviewed and patient was advised to not exceed 96 g/day. Per dietary recall, patient has daily meals away from home and often skips breakfast. Some days he will have 2 meals/day out. 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 Patient was advised to not skip lunch and consider something small, such as toast w/ PB on days he feels nausea following injection. Nephrology has recommended low sodium diet and patient no longer cooks with salt but is likely consuming excess sodium from restaurant meals. Patient and his wife have started ensuring that they are only having 1/2 of their portions when dining out which will assist with cutting back on large sodium and protein content of restaurant meals. Suggested patient also get sauce or dressing on the side to further limit excess sodium. Advised low-sodium products when preparing meals at home, which he does ~2x/week for lunch and 2x/week for dinner. Previously, patient had excess calories from alcohol intake but following recent lab results, he has eliminated alcohol entirely. Educated patient on building a balanced plate by including lean proteins, complex carbs, non-starchy vegetables, and healthy fats at each meal and emphasized portion control and honoring hunger/fullness cues. Patient will begin logging food choices for at least 1 week via his notes caridad to identify what and how much he is eating due to large variations in his meals away from home. Discussed benefits of preparing more meals at home and easy-to-prepare meals were discussed.  "General recommendations for portions provided. Also recommend patient add 1 snack in the morning to avoid large gaps. Patient reports he has significantly increased his activity with his weight loss and is motivated to continue. Emphasized the importance of strength and resistance training to support lean mass while utilizing Mounjaro medication. Patient would like to have his A1c level checked. Advised patient that RD will discuss with [facility] provider and message via [employer] if it is ordered. Will f/u x 1 month for bundle visit.     Patient seen by Medical Provider in past 6 months:  yes  Requested to schedule appointment with Medical Provider: No    Anthropometric Measurements  Provider Start Weight (#): 315.6# (6/1/21)  Re-established Care Weight (#): 306# (12/7/23)  Current Weight (#): 256.2# (shoes on)  TBW % Change from start weight: 21.8%  IBW: 163# (69.5\")  Goal Weight (#): 220#  Highest Weight (#): 320#  Lowest Weight (#): 230# (atkins)    Weight Loss History  Previous weight loss attempts: Counseling with MD  Exercise, Nutrition Counseling with RD, Self Created Diets (Portion Control, Healthy Food Choices, etc.)    Food and Nutrition Related History  Wake up: 5:30 am, out of bed at 6 am    Bed Time: 11 pm   Sleep quality: well, HARPER on BiPAP, 6-7 hours per night     Dietary Recall  Breakfast (7 am): -- (nausea following injection) Or protein shake (fruit (berries, banana), oatmilk, protein powder, 24 g)  Snack: --  Lunch (12-2 pm): dining out for work (bar foods or restaurant) [1/2 portions] Or if working from home will have dining out leftovers  Snack: --  Dinner (6-7 pm): dining out [1/2 portions] (chinese, sushi) Or roast beef w/ potatoes + corn Or sloppy joes   Snack (8 pm): Oui yogurt Or something sweet (cookies)     Beverages: water, 16 oz caffeine, alcohol (5 drinks/week - patient reports he has cut this out entirely)  Volume of beverage intake: 64-90 oz water     Weekends: Same  Cravings: sweets but " reduced since starting medication   Trouble area of day: none     Frequency of Eating out: 5x/week for lunch and dinner   Food restrictions: none   Cooking: wife  Food Shopping: wife     Occupation: [employer])    Physical Activity  Activity: Walking (1.6 miles - often uses it w/ a backpack that has 40# weights in it) and archery (daily) [has barbells at home to strength for archery]   Frequency: 5-6x/week (2x/day)  Physical limitations/barriers to exercise: none    Estimated Needs  [employer] Needs (needs at 259#)  BMR: 1,988 kcal  Maintenance calories (sedentary): 2,386 kcal  1-2#/week loss (sedentary): 1,386-1,886 kcal  1-2#/week loss (light activity): 1,734-2,234 kcal    Protein: 59-89 grams (0.8-1.2g/kg IBW) [Do not exceed 96 gram protein (1.3g/kg IBW)]  Fluid: 86 ounces (35mL/kg IBW)    Nutrition Diagnosis  Yes;    Overweight/obesity related to Excess energy intake as evidenced by BMI more than normative standard for age and sex (obesity-grade II 35-39.9)     Nutrition Intervention    Nutrition Prescription  Calories: 1,800-2,000 kcal (can flex up to 2,200 kcal)  Protein: 60-90 g  Fluid: 86 ounces    Meal Plan (Geo/Pro)  Breakfast: 350-450/20-25  Snack: 150-200/0-5  Lunch: 600/30  Snack: --  Dinner: 550/30-35  Snack: 150/0-5    Nutrition Education  Calorie controlled menu  Lean protein food choices  Healthy snack options  Food journaling tips    Nutrition Counseling  Strategies: meal planning, portion sizes, healthy snack choices, hydration, fiber intake, protein intake, exercise, food logging    Monitoring and Evaluation:    Evaluation criteria  Energy Intake  Meet protein needs  Maintain adequate hydration  Monitor weekly weight  Meal planning/preparation  Food journal   Decreased portions at mealtimes and snacks  Physical activity     Barriers to change:none  Readiness to change: Action:  (Changing behavior)  Comprehension: very good  Expected Compliance: very good

## 2024-10-03 ENCOUNTER — CLINICAL SUPPORT (OUTPATIENT)
Dept: BARIATRICS | Facility: CLINIC | Age: 59
End: 2024-10-03

## 2024-10-03 ENCOUNTER — TELEPHONE (OUTPATIENT)
Dept: BARIATRICS | Facility: CLINIC | Age: 59
End: 2024-10-03

## 2024-10-03 VITALS — WEIGHT: 256.2 LBS | HEIGHT: 70 IN | BODY MASS INDEX: 36.68 KG/M2

## 2024-10-03 DIAGNOSIS — R63.5 ABNORMAL WEIGHT GAIN: Primary | ICD-10-CM

## 2024-10-03 DIAGNOSIS — R73.03 PRE-DIABETES: Primary | ICD-10-CM

## 2024-10-03 PROCEDURE — RECHECK

## 2024-10-03 PROCEDURE — DB3PK

## 2024-10-03 NOTE — TELEPHONE ENCOUNTER
Called pt on 10/3/2024 at apprx 345pm, LVM requesting return call from patient to discuss satisfaction with weight management [facility].

## 2024-10-03 NOTE — TELEPHONE ENCOUNTER
Patient returning call, he stated if it is just for customer satisfaction he does not need a callback.

## 2024-10-04 ENCOUNTER — TELEPHONE (OUTPATIENT)
Dept: BARIATRICS | Facility: CLINIC | Age: 59
End: 2024-10-04

## 2024-10-04 NOTE — TELEPHONE ENCOUNTER
Called and LVM for patient to follow up on patient satisfaction with a previous WM appt. Provided on  my direct line [phone] to return call to Manager (Luis Estrada).

## 2024-10-07 ENCOUNTER — OFFICE VISIT (OUTPATIENT)
Dept: FAMILY MEDICINE CLINIC | Facility: CLINIC | Age: 59
End: 2024-10-07
Payer: COMMERCIAL

## 2024-10-07 VITALS
OXYGEN SATURATION: 98 % | DIASTOLIC BLOOD PRESSURE: 74 MMHG | HEIGHT: 70 IN | RESPIRATION RATE: 17 BRPM | BODY MASS INDEX: 36.65 KG/M2 | TEMPERATURE: 98.5 F | SYSTOLIC BLOOD PRESSURE: 118 MMHG | WEIGHT: 256 LBS | HEART RATE: 76 BPM

## 2024-10-07 DIAGNOSIS — Z12.5 PROSTATE CANCER SCREENING: ICD-10-CM

## 2024-10-07 DIAGNOSIS — E66.01 CLASS 2 SEVERE OBESITY DUE TO EXCESS CALORIES WITH SERIOUS COMORBIDITY AND BODY MASS INDEX (BMI) OF 37.0 TO 37.9 IN ADULT (HCC): Primary | ICD-10-CM

## 2024-10-07 DIAGNOSIS — E66.812 CLASS 2 SEVERE OBESITY DUE TO EXCESS CALORIES WITH SERIOUS COMORBIDITY AND BODY MASS INDEX (BMI) OF 37.0 TO 37.9 IN ADULT (HCC): Primary | ICD-10-CM

## 2024-10-07 DIAGNOSIS — E11.22 TYPE 2 DIABETES MELLITUS WITH STAGE 3A CHRONIC KIDNEY DISEASE, WITHOUT LONG-TERM CURRENT USE OF INSULIN (HCC): ICD-10-CM

## 2024-10-07 DIAGNOSIS — Z12.11 SCREENING FOR COLON CANCER: ICD-10-CM

## 2024-10-07 DIAGNOSIS — Z00.00 ANNUAL PHYSICAL EXAM: Primary | ICD-10-CM

## 2024-10-07 DIAGNOSIS — N18.31 TYPE 2 DIABETES MELLITUS WITH STAGE 3A CHRONIC KIDNEY DISEASE, WITHOUT LONG-TERM CURRENT USE OF INSULIN (HCC): ICD-10-CM

## 2024-10-07 LAB — SL AMB POCT HGB: 5.3

## 2024-10-07 PROCEDURE — 99213 OFFICE O/P EST LOW 20 MIN: CPT | Performed by: FAMILY MEDICINE

## 2024-10-07 PROCEDURE — 99396 PREV VISIT EST AGE 40-64: CPT | Performed by: FAMILY MEDICINE

## 2024-10-07 PROCEDURE — 85018 HEMOGLOBIN: CPT | Performed by: FAMILY MEDICINE

## 2024-10-07 RX ORDER — TIRZEPATIDE 10 MG/.5ML
10 INJECTION, SOLUTION SUBCUTANEOUS WEEKLY
Qty: 2 ML | Refills: 2 | Status: SHIPPED | OUTPATIENT
Start: 2024-10-07 | End: 2024-10-07

## 2024-10-07 NOTE — ASSESSMENT & PLAN NOTE
Lab Results   Component Value Date    HGBA1C 5.9 (H) 04/09/2024     Prescription sent for Mounjaro 7.5 mg today.  A1c very well-controlled.  Has experienced weight loss since starting Mounjaro.  Plan to continue  Previously following with weight management.  Due for

## 2024-10-07 NOTE — ASSESSMENT & PLAN NOTE
Lab Results   Component Value Date    HGBA1C 5.9 (H) 04/09/2024   Diabetes violetta well-controlled.  Obtain blood work listed above.  Continue Mounjaro 7.5 mg daily.    Orders:    tirzepatide (Mounjaro) 7.5 MG/0.5ML; Inject 0.5 mL (7.5 mg total) under the skin every 7 days    Albumin / creatinine urine ratio; Future    Basic metabolic panel; Future    Lipid panel; Future

## 2024-10-07 NOTE — PROGRESS NOTES
Adult Annual Physical  Name: Roland Kwan      : 1965      MRN: [id number]  Encounter Provider: Amanda Ingram MD  Encounter Date: 10/7/2024   Encounter department: [facility]    Assessment & Plan  Annual physical exam         Type 2 diabetes mellitus with stage 3a chronic kidney disease, without long-term current use of insulin (HCC)    Lab Results   Component Value Date    HGBA1C 5.9 (H) 2024   Diabetes violetta well-controlled.  Obtain blood work listed above.  Continue Mounjaro 7.5 mg daily.    Orders:    tirzepatide (Mounjaro) 7.5 MG/0.5ML; Inject 0.5 mL (7.5 mg total) under the skin every 7 days    Albumin / creatinine urine ratio; Future    Basic metabolic panel; Future    Lipid panel; Future    Screening for colon cancer    Orders:    Cologuard    Prostate cancer screening    Orders:    PSA, Total Screen; Future      Annual physical and Problem visit completed today   Orders and plan as above   Obtain eye exam from Dr. Garcia's office  Follow-up in 6 months.  Obtain labs listed above in the next 3 months.  Continue to follow with cardiology and nephrology.  Follows with Dr. Sharpe- last done 3 months ago. Need updated results       Immunizations and preventive care screenings were discussed with patient today. Appropriate education was printed on patient's after visit summary.    Discussed risks and benefits of prostate cancer screening. We discussed the controversial history of PSA screening for prostate cancer in the [place] as well as the risk of over detection and over treatment of prostate cancer by way of PSA screening.  The patient understands that PSA blood testing is an imperfect way to screen for prostate cancer and that elevated PSA levels in the blood may also be caused by infection, inflammation, prostatic trauma or manipulation, urological procedures, or by benign prostatic enlargement.    The role of the digital rectal examination in prostate cancer  screening was also discussed and I discussed with him that there is large interobserver variability in the findings of digital rectal examination.    Counseling:  Alcohol/drug use: discussed moderation in alcohol intake, the recommendations for healthy alcohol use, and avoidance of illicit drug use.  Dental Health: discussed importance of regular tooth brushing, flossing, and dental visits.  Injury prevention: discussed safety/seat belts, safety helmets, smoke detectors, carbon monoxide detectors, and smoking near bedding or upholstery.  Sexual health: discussed sexually transmitted diseases, partner selection, use of condoms, avoidance of unintended pregnancy, and contraceptive alternatives.  Exercise: the importance of regular exercise/physical activity was discussed. Recommend exercise 3-5 times per week for at least 30 minutes.       Depression Screening and Follow-up Plan: Patient was screened for depression during today's encounter. They screened negative with a PHQ-2 score of 0.        History of Present Illness     Adult Annual Physical:  Patient presents for annual physical.     Diet and Physical Activity:  - Diet/Nutrition: well balanced diet and portion control.  - Exercise: moderate cardiovascular exercise and 1-2 times a week on average.    Depression Screening:  - PHQ-2 Score: 0    General Health:  - Sleep: sleeps well and > 8 hours of sleep on average. BiPAP  - Hearing: normal hearing bilateral ears.  - Vision: vision problems and wears glasses. Follows with Dr. Sharpe  - Dental: regular dental visits.    Review of Systems   Constitutional:  Negative for activity change, fatigue and fever.   Eyes:  Negative for visual disturbance.   Respiratory:  Negative for shortness of breath.    Cardiovascular:  Negative for chest pain.   Gastrointestinal:  Negative for abdominal pain, constipation, diarrhea and nausea.   Endocrine: Negative for cold intolerance and heat intolerance.   Musculoskeletal:  Negative  for back pain.   Skin:  Negative for rash.   Neurological:  Negative for headaches.   Psychiatric/Behavioral:  Negative for confusion.      Medical History Reviewed by provider this encounter:  Tobacco  Allergies  Meds  Problems  Med Hx  Surg Hx  Fam Hx       Current Outpatient Medications on File Prior to Visit   Medication Sig Dispense Refill    atorvastatin (LIPITOR) 40 mg tablet TAKE 1 TABLET BY MOUTH EVERY DAY WITH DINNER 90 tablet 1    cloNIDine (CATAPRES-TTS-1) 0.1 mg/24 hr APPLY 1 PATCH ONE TIME PER WEEK 12 patch 1    dofetilide (TIKOSYN) 500 mcg capsule TAKE 1 CAPSULE BY MOUTH EVERY 12 HOURS 60 capsule 5    eplerenone (INSPRA) 25 mg tablet Take 1 tablet (25 mg total) by mouth 2 (two) times a day 180 tablet 3    hydrALAZINE (APRESOLINE) 50 mg tablet TAKE 1 TABLET BY MOUTH TWICE A  tablet 1    nebivolol (BYSTOLIC) 2.5 mg tablet TAKE 1 TABLET BY MOUTH DAILY HOLD FOR HEART RATE LESS THAN 50 BEATS PER MINUTE. 90 tablet 3    NIFEdipine (PROCARDIA XL) 30 mg 24 hr tablet TAKE 1 TABLET BY MOUTH TWICE A  tablet 1    olmesartan (BENICAR) 40 mg tablet TAKE 1 TABLET BY MOUTH EVERY DAY 90 tablet 3    rivaroxaban (Xarelto) 20 mg tablet TAKE 1 TABLET BY MOUTH EVERY DAY WITH BREAKFAST 30 tablet 5    torsemide (DEMADEX) 5 MG tablet TAKE 1 TABLET (5 MG TOTAL) BY MOUTH DAILY. 90 tablet 1    [DISCONTINUED] tirzepatide (Mounjaro) 7.5 MG/0.5ML Inject 0.5 mL (7.5 mg total) under the skin every 7 days 2 mL 2     No current facility-administered medications on file prior to visit.      Social History     Tobacco Use    Smoking status: Former     Current packs/day: 0.00     Average packs/day: 0.5 packs/day for 15.0 years (7.5 ttl pk-yrs)     Types: Cigarettes     Start date: 1979     Quit date: 1994     Years since quittin.5    Smokeless tobacco: Never   Vaping Use    Vaping status: Never Used   Substance and Sexual Activity    Alcohol use: Yes     Comment: occ    Drug use: No    Sexual activity: Yes  "    Partners: Female     Birth control/protection: None       Objective     /74 (BP Location: Left arm, Patient Position: Sitting, Cuff Size: Large)   Pulse 76   Temp 98.5 °F (36.9 °C) (Temporal)   Resp 17   Ht 5' 9.5\" (1.765 m)   Wt 116 kg (256 lb)   SpO2 98%   BMI 37.26 kg/m²     Physical Exam  Vitals and nursing note reviewed.   Constitutional:       Appearance: Normal appearance. He is well-developed.   HENT:      Head: Normocephalic and atraumatic.   Cardiovascular:      Rate and Rhythm: Normal rate and regular rhythm.      Pulses: no weak pulses.           Dorsalis pedis pulses are 2+ on the right side and 2+ on the left side.        Posterior tibial pulses are 2+ on the right side and 2+ on the left side.   Pulmonary:      Effort: Pulmonary effort is normal.      Breath sounds: Normal breath sounds.   Abdominal:      General: Bowel sounds are normal.      Palpations: Abdomen is soft.   Musculoskeletal:      Cervical back: Normal range of motion.   Feet:      Right foot:      Skin integrity: No ulcer, skin breakdown, erythema, warmth, callus or dry skin.      Left foot:      Skin integrity: No ulcer, skin breakdown, erythema, warmth, callus or dry skin.   Skin:     General: Skin is warm.   Neurological:      General: No focal deficit present.      Mental Status: He is alert.   Psychiatric:         Mood and Affect: Mood normal.         Speech: Speech normal.         Diabetic Foot Exam    Patient's shoes and socks removed.    Right Foot/Ankle   Right Foot Inspection  Skin Exam: skin normal and skin intact. No dry skin, no warmth, no callus, no erythema, no maceration, no abnormal color, no pre-ulcer, no ulcer and no callus.     Toe Exam: ROM and strength within normal limits.     Sensory   Monofilament testing: intact    Vascular  The right DP pulse is 2+. The right PT pulse is 2+.     Left Foot/Ankle  Left Foot Inspection  Skin Exam: skin normal and skin intact. No dry skin, no warmth, no erythema, " no maceration, normal color, no pre-ulcer, no ulcer and no callus.     Toe Exam: ROM and strength within normal limits.     Sensory   Monofilament testing: intact    Vascular  The left DP pulse is 2+. The left PT pulse is 2+.     Assign Risk Category  No deformity present  No loss of protective sensation  No weak pulses  Risk: 0       no ROM deficits were identified

## 2024-10-13 DIAGNOSIS — I12.9 HYPERTENSIVE CHRONIC KIDNEY DISEASE WITH STAGE 1 THROUGH STAGE 4 CHRONIC KIDNEY DISEASE, OR UNSPECIFIED CHRONIC KIDNEY DISEASE: ICD-10-CM

## 2024-10-13 DIAGNOSIS — I10 ACCELERATED HYPERTENSION: ICD-10-CM

## 2024-10-13 DIAGNOSIS — E66.813 CLASS 3 SEVERE OBESITY DUE TO EXCESS CALORIES WITH SERIOUS COMORBIDITY AND BODY MASS INDEX (BMI) OF 40.0 TO 44.9 IN ADULT (HCC): ICD-10-CM

## 2024-10-13 DIAGNOSIS — N18.31 STAGE 3A CHRONIC KIDNEY DISEASE (HCC): ICD-10-CM

## 2024-10-13 DIAGNOSIS — E66.01 CLASS 3 SEVERE OBESITY DUE TO EXCESS CALORIES WITH SERIOUS COMORBIDITY AND BODY MASS INDEX (BMI) OF 40.0 TO 44.9 IN ADULT (HCC): ICD-10-CM

## 2024-10-13 DIAGNOSIS — I10 BENIGN ESSENTIAL HYPERTENSION: ICD-10-CM

## 2024-10-13 DIAGNOSIS — I48.91 ATRIAL FIBRILLATION WITH RVR (HCC): ICD-10-CM

## 2024-10-13 DIAGNOSIS — E55.9 VITAMIN D DEFICIENCY: ICD-10-CM

## 2024-10-14 ENCOUNTER — OFFICE VISIT (OUTPATIENT)
Dept: NEUROLOGY | Facility: CLINIC | Age: 59
End: 2024-10-14
Payer: COMMERCIAL

## 2024-10-14 VITALS
TEMPERATURE: 98.1 F | SYSTOLIC BLOOD PRESSURE: 122 MMHG | HEART RATE: 60 BPM | BODY MASS INDEX: 37.95 KG/M2 | WEIGHT: 256.2 LBS | HEIGHT: 69 IN | OXYGEN SATURATION: 98 % | DIASTOLIC BLOOD PRESSURE: 84 MMHG

## 2024-10-14 DIAGNOSIS — N18.31 TYPE 2 DIABETES MELLITUS WITH STAGE 3A CHRONIC KIDNEY DISEASE, WITHOUT LONG-TERM CURRENT USE OF INSULIN (HCC): ICD-10-CM

## 2024-10-14 DIAGNOSIS — E11.22 TYPE 2 DIABETES MELLITUS WITH STAGE 3A CHRONIC KIDNEY DISEASE, WITHOUT LONG-TERM CURRENT USE OF INSULIN (HCC): ICD-10-CM

## 2024-10-14 DIAGNOSIS — I48.20 CHRONIC ATRIAL FIBRILLATION (HCC): ICD-10-CM

## 2024-10-14 DIAGNOSIS — Z86.73 HISTORY OF STROKE: Primary | ICD-10-CM

## 2024-10-14 DIAGNOSIS — G47.33 OSA (OBSTRUCTIVE SLEEP APNEA): ICD-10-CM

## 2024-10-14 PROBLEM — I61.9 ICH (INTRACEREBRAL HEMORRHAGE) (HCC): Status: RESOLVED | Noted: 2022-03-08 | Resolved: 2024-10-14

## 2024-10-14 PROCEDURE — 99214 OFFICE O/P EST MOD 30 MIN: CPT

## 2024-10-14 RX ORDER — EPLERENONE 25 MG/1
25 TABLET, FILM COATED ORAL 2 TIMES DAILY
Qty: 180 TABLET | Refills: 1 | Status: SHIPPED | OUTPATIENT
Start: 2024-10-14

## 2024-10-14 NOTE — ASSESSMENT & PLAN NOTE
Lab Results   Component Value Date    HGBA1C 5.9 (H) 04/09/2024     HgbA1c goal <7.0  Diabetes remains well controlled.  Encouraged continued diet, exercise and weight loss.         Emergency Department Encounter    Patient: Severa Living  MRN: [id number]  : 1947  Date of Evaluation: 2022  ED Provider:  Porfirio Vásquez MD    Triage Chief Complaint:   Shortness of Breath (states he got out of the hospital on 2022. He was diagnoised with hepatic enciephaly)    [place]:  Severa Living is a 76 y.o. male with history seen below presenting with shortness of breath. Patient is has a history of cirrhosis with paracentesis and thoracentesis in the past.  Wife states over the past 2 days patient has had increasing shortness of breath. States that similar to prior hospitalization which required paracentesis and thoracentesis. Wife states over the past 2 days he has had increased shortness of breath, increased abdominal distention, increasing lower extremity edema. Has been taking his Lasix as prescribed. Patient denies any chest pain. States he does have shortness of breath with orthopnea. Denies cough, sputum production, fevers or chills. Denies any abdominal pain but does describe increased abdominal distention from baseline. Denies nausea vomiting, change in bowel habits, urinary symptoms. Wife states that patient has been mildly more confused than baseline but not clearly where patient was at last admission patient denies headache, blurred vision, focal neurodeficits, motor or sensory transitions, recent falls or trauma, neck or back pain, rashes or lesions.     ROS - see HPI, below listed is current ROS at time of my eval:  At least 14 systems reviewed, negative other HPI    Past Medical History:   Diagnosis Date    Anxiety     Cirrhosis, alcoholic ([address]     Diabetes mellitus ([address]     GERD (gastroesophageal reflux disease)     GERD (gastroesophageal reflux disease)     Hyperlipidemia     Hypertension     Portal hypertension ([address]     Pulmonary nodules     Sleep apnea     SOB (shortness of breath)     Thyroid disease      Past Surgical History: Procedure Laterality Date    APPENDECTOMY      CHOLECYSTECTOMY      COLONOSCOPY      ENDOSCOPY, COLON, DIAGNOSTIC      FOOT SURGERY      needle removed,not sure which foot, per wife.  TONSILLECTOMY      UPPER GASTROINTESTINAL ENDOSCOPY N/A 2022    EGD BIOPSY performed by Kirk Alcaraz MD at [address] [place] [address]       Family History   Problem Relation Age of Onset    Hypertension Brother     Diabetes Other      Social History     Socioeconomic History    Marital status:      Spouse name: Not on file    Number of children: Not on file    Years of education: Not on file    Highest education level: Not on file   Occupational History     Comment: Retired    Tobacco Use    Smoking status: Former Smoker     Packs/day: 1.00     Years: 14.00     Pack years: 14.00     Quit date:      Years since quittin.5    Smokeless tobacco: Never Used   Vaping Use    Vaping Use: Never used   Substance and Sexual Activity    Alcohol use: Not Currently    Drug use: Never    Sexual activity: Yes     Partners: Female   Other Topics Concern    Not on file   Social History Narrative    Not on file     Social Determinants of Health     Financial Resource Strain: Low Risk     Difficulty of Paying Living Expenses: Not hard at all   Food Insecurity: No Food Insecurity    Worried About [facility] in the Last Year: Never true    [address] [facility] [address] in the Last Year: Never true   Transportation Needs:     Lack of Transportation (Medical): Not on file    Lack of Transportation (Non-Medical):  Not on file   Physical Activity:     Days of Exercise per Week: Not on file    Minutes of Exercise per Session: Not on file   Stress:     Feeling of Stress : Not on file   Social Connections:     Frequency of Communication with Friends and Family: Not on file    Frequency of Social Gatherings with Friends and Family: Not on file    Attends Islam Services: Not on file   Kc Active capsule by mouth daily       Allergies   Allergen Reactions    Lisinopril Swelling     Tongue swelling      Zetia [Ezetimibe] Swelling     Facial swelling    Atorvastatin     Codeine Other (See Comments)     Blood pressure drops    Hydrochlorothiazide     Hydroxyzine      Fatigue and depressed    Lexapro [Escitalopram Oxalate]      Increased depression    Pravastatin        Nursing Notes Reviewed    Physical Exam:  Triage VS:    ED Triage Vitals   Enc Vitals Group      BP       Pulse       Resp       Temp       Temp src       SpO2       Weight       Height       Head Circumference       Peak Flow       Pain Score       Pain Loc       Pain Edu? Excl. in [address]? My pulse ox interpretation is - normal    General appearance:  No acute distress. Skin:  Warm. Dry. Eye:  Extraocular movements intact. Ears, nose, mouth and throat:  Oral mucosa moist   Neck:  Trachea midline. Extremity: Pitting edema the bilateral lower extremity. Normal ROM     Heart:  Regular rate and rhythm, normal S1 & S2, no extra heart sounds. Perfusion:  intact  Respiratory: Mild respiratory distress, decreased air movement on the right  Abdominal:  Normal bowel sounds. Soft. Abdominal distention, positive fluid wave, no tenderness palpation of the abdomen, no flank pain, no CVA tenderness  Back:  No CVA tenderness to palpation     Neurological:  Alert and oriented to person and place, does not know the year. No focal neuro deficits.   No facial asymmetry, normal sensation light touch of the face, extraocular movements intact, pupils are 3 mm reactive bilaterally, no pronator drift of the bilateral upper and lower extremities, normal sensation light touch of the bilateral upper and lower extremities, positive*, normal finger-nose-finger and heel shin          Psychiatric:  Appropriate    I have reviewed and interpreted all of the currently available lab results from this visit (if applicable):  No results found for this visit on 06/21/22. Radiographs (if obtained):  Radiologist's Report Reviewed:  No results found. EKG (if obtained): (All EKG's are interpreted by myself in the absence of a cardiologist)  Sinus rhythm, ventricular rate 62, QRS duration 94, QTc 462, no significant ST elevation or depression    MDM:    66-year-old male presenting with shortness of breath. Patient has a history of cirrhosis. Wife describes increased abdominal distention, lower extremity edema and shortness of breath. Patient is usually on 2 L nasal cannula on presentation the ED patient satting 85% on 2 L nasal cannula and increased to 6 L with improvement of oxygen saturations and wean back down to 4 L. On exam patient has decreased lung sounds on the right knee, patient has abdominal distention with positive fluid wave without tenderness palpation the abdomen, patient has edema in the bilateral lower extremities. Chest x-ray reveals white out of the right lung concerning for pleural effusion. IR paracentesis and thoracentesis ordered. Discussed with IR they were only able to do 1 today and will do thoracentesis. CBC reveals no leukocytosis. Hemoglobin is stable. CMP is overall reassuring and stable for patient. Minta Led is elevated at 0.020 which is likely secondary to chronic kidney disease type II in nature. Minta Led is normally elevated in this patient similar to today's elevation. BNP is elevated at 1400. Rapid COVID is negative. Ammonia is elevated but will wait until after thoracentesis for lactulose. I did discuss this with hospitalist service that lactulose had not been given in the ED is did not want to give up for thoracentesis performed. Hospitalist was called and patient mated their service for further evaluation and treatment. Clinical Impression:  1. Ascites due to alcoholic cirrhosis ([address]    2. Pleural effusion    3. Hepatic encephalopathy ([address]         Total critical care time provided today was 32 minutes. This excludes seperately billable procedures and family discussion time. Critical care time provided for obtaining history, conducting a physical exam, performing and monitoring interventions, ordering, collecting and interpreting tests, and establishing medical decision-making. There was a potential for life/limb threatening pathology requiring close evaluation and intervention with concern for patient decompensation. Comment: Please note this report has been produced using speech recognition software and may contain errors related to that system including errors in grammar, punctuation, and spelling, as well as words and phrases that may be inappropriate. Efforts were made to edit the dictations.         Rosanne Gunderson MD  06/23/22 [id number]

## 2024-10-14 NOTE — ASSESSMENT & PLAN NOTE
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 His residual deficits include patchy numbness/ diminished sensation to temperature in the RLE and head. He continues on Xarelto 20mg daily for Afib and Atorvastatin 40 mg for secondary stroke prevention. He continues with his weight loss efforts- now 256 lbs. His blood pressure appears to be well controlled now. His other vascular risk factors also appear to be well controlled overall. He was encouraged to continue to work on weight loss and increasing physical activity. We reviewed his stroke risk factors and management of the same. He was provided stroke education and we reviewed stroke warning signs/symptoms and reasons to return by ambulance to the ER. He will continue to follow up closely with his nephrologist and cardiologist. His neurologic exam is intact and he denies any new or worsening neurologic symptoms concerning for TIA or Stroke. He will follow up in 1 year; sooner if needed.

## 2024-10-14 NOTE — ASSESSMENT & PLAN NOTE
Chronic Afib maintained appropriately on Xarelto 20 mg daily, managed by Cardiology. Denies any excessive bruising or bleeding. He would like to consider a transition to Eliquis in place of Xarelto. He will discuss further with Cardiology. No Neurologic contraindication.

## 2024-10-14 NOTE — PROGRESS NOTES
Ambulatory Visit  Name: Roland Kwan      : 1965      MRN: [id number]  Encounter Provider: GARRY Jimenez  Encounter Date: 10/14/2024   Encounter department: [facility] [place]    Assessment & Plan  Chronic atrial fibrillation (HCC)  Chronic Afib maintained appropriately on Xarelto 20 mg daily, managed by Cardiology. Denies any excessive bruising or bleeding. He would like to consider a transition to Eliquis in place of Xarelto. He will discuss further with Cardiology. No Neurologic contraindication.        History of stroke  Roland Kwan is a 59 year old male with a history of Afib on Xarelto, hypertension, sleep apnea, arthritis, who presents to the office today to follow up on his hx of left frontal corona radiata hemorrhagic stroke 3/2022. His residual deficits include patchy numbness/ diminished sensation to temperature in the RLE and head. He continues on Xarelto 20mg daily for Afib and Atorvastatin 40 mg for secondary stroke prevention. He continues with his weight loss efforts- now 256 lbs. His blood pressure appears to be well controlled now. His other vascular risk factors also appear to be well controlled overall. He was encouraged to continue to work on weight loss and increasing physical activity. We reviewed his stroke risk factors and management of the same. He was provided stroke education and we reviewed stroke warning signs/symptoms and reasons to return by ambulance to the ER. He will continue to follow up closely with his nephrologist and cardiologist. His neurologic exam is intact and he denies any new or worsening neurologic symptoms concerning for TIA or Stroke. He will follow up in 1 year; sooner if needed.        HARPER (obstructive sleep apnea)  Hx of HARPER  Reports 100% Bi-PAP compliance       Type 2 diabetes mellitus with stage 3a chronic kidney disease, without long-term current use of insulin (HCC)    Lab Results   Component Value Date    HGBA1C  5.9 (H) 04/09/2024     HgbA1c goal <7.0  Diabetes remains well controlled.  Encouraged continued diet, exercise and weight loss.          Patient Instructions   - Continue with good blood pressure control; I would recommend monitoring at home at least 3 times per week; Goal of <125/80 (managed by nephrology)  - Continue with good cholesterol control; Goal LDL <70; at goal   - Continue with good blood sugar control; Goal HgbA1c <7.0; at goal    - Will defer monitoring of cholesterol and blood sugar and management of hypertensive medications to the primary care provider  - Stay well hydrated by drinking enough water   - Eat a healthy diet, high in lean meats fish, turkey, chicken. Low in fats, cholesterol, sugars and sodium. Avoid canned foods,  get lots of fresh/frozen vegetables/fruits.  - Get routine exercise/physical activity as much as able to tolerate; increase to 150 minutes of vigorous physical activity per week    - Congratulations on weight loss so far! Continue to work hard on this.  - Keep follow ups with your other health care providers  - Continue Xarelto 20 mg daily (with food) and Lipitor 40 mg daily.     I will plan for him to return to the office in 12 months time but would be happy to see him sooner if the need should arise.  If he has any symptoms concerning for TIA or stroke including sudden painless loss of vision or double vision, difficulty speaking or swallowing, vertigo/room spinning that does not quickly resolve, or weakness/numbness affecting 1 side of the face or body he should proceed by ambulance to the nearest emergency room immediately.         History of Present Illness     HPI Roland Kwan is a 59 year old male with a history of Afib on Xarelto, hypertension, sleep apnea, arthritis, who presents to the office today to follow up on his hx of left frontal corona radiata hemorrhagic stroke 3/2022. Etiology suspected to be most likely hemorrhagic stroke in the setting of uncontrolled  HTN and anticoagulation use with subsequent ischemic stroke secondary to compression of vessel in the setting of vasogenic edema. His AC was discontinued for 3 weeks and he had a repeat CT head 4/6/22 before restarting Xarelto 20mg daily. He has continued on Atorvastatin 40 mg and his antihypertensives with good compliance since discharge. He was last seen 2/12/2024. He returns today and states he continues to do well.      Secondary Stroke Prevention  He continues on Xarelto 20 mg (taken with food) and Atorvastatin 40 mg   Compliant with his medications, no issues affording or obtaining medications.   Denies excessive bruising or bleeding     Deficits  His only residual deficit is continued (no better or worse) patchy numbness/ diminished sensation to temperature in the right lower leg from the knee down to his foot as well as the right side of the head.   He reports complete resolution of previously noted minor difficulty with word finding and numbness in the face  Denies numbness, tingling, weakness, dizziness, headaches, vision changes or any new or worsening stroke like symptoms  Denies difficulty with swallowing      Monitoring  BP-  Dr. Cash has set his goal blood pressure- 125/80.   He monitors his BP at least once a week (usually 2-3 times per week). BP today 122/84  He is using Mounjaro  4/9/2024 5.9  12/1/2023 LDL 45  CTA H/N 3/7/22 without ICA stenosis      Safety  Independent of all ADLs  No falls at home  He is managing his own medications and finances  assistive devices- none  Driving- independent; no concerns  Memory- no concerns      Substance use    Smoking- does not smoke; quit 30 years ago (before the birth of his first child)  Etoh- reports no longer drinking beer, was drinking bourbon 1-4 drinks per week; more recently he has not been drinking as he is focusing on weight loss  Drugs- none  Caffeine - has not recently been drinking coffee since starting Mounjaro     Sleep  He sleeps well and  uses a Bi-PAP for HARPER.  100% compliance      Diet  He is working with medical weight management  Is using Mounjaro with reported less appetite.   His weight was 307 lbs last year, current weight is 256 lbs.      Exercise  3 hrs/week walking dog  Walking with added weights (46 lbs)     PT/OT/ST  He completed speech therapy, occupational therapy and physical therapy s/p stroke.  Had PT for shoulder tendonitis   Had PT for the right knee arthritis     Mood  He denies any concerns for significant anxiety or depression.      Follow ups  He continues to follow closely with cardiology and nephrology.      Return to work  returned to work 2 weeks after his stroke.   He is working from home primarily.    Review of Systems   Constitutional:  Negative for appetite change, fatigue and fever.   HENT: Negative.  Negative for hearing loss, tinnitus, trouble swallowing and voice change.    Eyes: Negative.  Negative for photophobia, pain and visual disturbance.   Respiratory: Negative.  Negative for shortness of breath.    Cardiovascular: Negative.  Negative for palpitations.   Gastrointestinal: Negative.  Negative for nausea and vomiting.   Endocrine: Negative.  Negative for cold intolerance.   Genitourinary: Negative.  Negative for dysuria, frequency and urgency.   Musculoskeletal:  Negative for back pain, gait problem, myalgias, neck pain and neck stiffness.   Skin: Negative.  Negative for rash.   Allergic/Immunologic: Negative.    Neurological: Negative.  Negative for dizziness, tremors, seizures, syncope, facial asymmetry, speech difficulty, weakness, light-headedness, numbness and headaches.   Hematological: Negative.  Does not bruise/bleed easily.   Psychiatric/Behavioral: Negative.  Negative for confusion, hallucinations and sleep disturbance.    All other systems reviewed and are negative.    I have personally reviewed the MA's review of systems and made changes as necessary.    Current Outpatient Medications on File Prior  "to Visit   Medication Sig Dispense Refill    atorvastatin (LIPITOR) 40 mg tablet TAKE 1 TABLET BY MOUTH EVERY DAY WITH DINNER 90 tablet 1    cloNIDine (CATAPRES-TTS-1) 0.1 mg/24 hr APPLY 1 PATCH ONE TIME PER WEEK 12 patch 1    dofetilide (TIKOSYN) 500 mcg capsule TAKE 1 CAPSULE BY MOUTH EVERY 12 HOURS 60 capsule 5    eplerenone (INSPRA) 25 mg tablet Take 1 tablet (25 mg total) by mouth 2 (two) times a day 180 tablet 3    hydrALAZINE (APRESOLINE) 50 mg tablet TAKE 1 TABLET BY MOUTH TWICE A  tablet 1    nebivolol (BYSTOLIC) 2.5 mg tablet TAKE 1 TABLET BY MOUTH DAILY HOLD FOR HEART RATE LESS THAN 50 BEATS PER MINUTE. 90 tablet 3    NIFEdipine (PROCARDIA XL) 30 mg 24 hr tablet TAKE 1 TABLET BY MOUTH TWICE A  tablet 1    olmesartan (BENICAR) 40 mg tablet TAKE 1 TABLET BY MOUTH EVERY DAY 90 tablet 3    rivaroxaban (Xarelto) 20 mg tablet TAKE 1 TABLET BY MOUTH EVERY DAY WITH BREAKFAST 30 tablet 5    tirzepatide (Mounjaro) 7.5 MG/0.5ML Inject 0.5 mL (7.5 mg total) under the skin every 7 days 2 mL 0    torsemide (DEMADEX) 5 MG tablet TAKE 1 TABLET (5 MG TOTAL) BY MOUTH DAILY. 90 tablet 1     No current facility-administered medications on file prior to visit.      Objective     /84 (BP Location: Right arm, Patient Position: Sitting, Cuff Size: Standard)   Pulse 60   Temp 98.1 °F (36.7 °C) (Temporal)   Ht 5' 9\" (1.753 m)   Wt 116 kg (256 lb 3.2 oz)   SpO2 98%   BMI 37.83 kg/m²     Neurologic Exam  On neurological examination patient is alert, awake, oriented and in no distress. Speech is fluent without dysarthria or aphasia. Cranial nerves 2-12 were symmetrically intact bilaterally.  No evidence of any focal weakness or sensory loss in the upper or lower extremities. Motor testing reveals 5/5 strength of the bilateral upper and lower extremities.There was no pronator drift.  No fasciculations present. No abnormal involuntary movements. Finger- to-nose reveals no tremor or ataxia and intact " proprioceptive function, no dysmetria was noted. Rapid alternating movement normal. Sensation was intact to vibration, light touch, and temperature in bilateral upper and lower extremities. Deep tendon reflexes were 2+ and symmetric in the bilateral upper and lower extremities. He is able to rise easily without assistance from a seated position. Casual gait is normal including stance, stride, and arm swing. Romberg is absent.      Administrative Statements     I have spent a total time of 30 minutes in caring for this patient on the day of the visit/encounter including Diagnostic results, Prognosis, Risks and benefits of tx options, Instructions for management, Patient and family education, Importance of tx compliance, Risk factor reductions, Impressions, Documenting in the medical record, Reviewing / ordering tests, medicine, procedures  , and Obtaining or reviewing history  .

## 2024-10-15 RX ORDER — NEBIVOLOL 2.5 MG/1
TABLET ORAL
Qty: 90 TABLET | Refills: 1 | Status: SHIPPED | OUTPATIENT
Start: 2024-10-15

## 2024-10-15 RX ORDER — OLMESARTAN MEDOXOMIL 40 MG/1
TABLET ORAL
Qty: 90 TABLET | Refills: 1 | Status: SHIPPED | OUTPATIENT
Start: 2024-10-15

## 2024-10-22 ENCOUNTER — TELEPHONE (OUTPATIENT)
Dept: NEPHROLOGY | Facility: CLINIC | Age: 59
End: 2024-10-22

## 2024-10-22 NOTE — TELEPHONE ENCOUNTER
LM for pt to schedule jan f/u with Dr. Cash or AP. Please schedule with first available and place on waitlist

## 2024-11-04 ENCOUNTER — PATIENT MESSAGE (OUTPATIENT)
Dept: FAMILY MEDICINE CLINIC | Facility: CLINIC | Age: 59
End: 2024-11-04

## 2024-11-04 DIAGNOSIS — E11.22 TYPE 2 DIABETES MELLITUS WITH STAGE 3A CHRONIC KIDNEY DISEASE, WITHOUT LONG-TERM CURRENT USE OF INSULIN (HCC): ICD-10-CM

## 2024-11-04 DIAGNOSIS — N18.31 TYPE 2 DIABETES MELLITUS WITH STAGE 3A CHRONIC KIDNEY DISEASE, WITHOUT LONG-TERM CURRENT USE OF INSULIN (HCC): ICD-10-CM

## 2024-11-05 ENCOUNTER — TELEPHONE (OUTPATIENT)
Age: 59
End: 2024-11-05

## 2024-11-05 DIAGNOSIS — E11.22 TYPE 2 DIABETES MELLITUS WITH STAGE 3A CHRONIC KIDNEY DISEASE, WITHOUT LONG-TERM CURRENT USE OF INSULIN (HCC): ICD-10-CM

## 2024-11-05 DIAGNOSIS — N18.31 TYPE 2 DIABETES MELLITUS WITH STAGE 3A CHRONIC KIDNEY DISEASE, WITHOUT LONG-TERM CURRENT USE OF INSULIN (HCC): ICD-10-CM

## 2024-11-05 RX ORDER — TIRZEPATIDE 7.5 MG/.5ML
7.5 INJECTION, SOLUTION SUBCUTANEOUS WEEKLY
Qty: 2 ML | Refills: 0 | Status: SHIPPED | OUTPATIENT
Start: 2024-11-05

## 2024-11-05 RX ORDER — TIRZEPATIDE 7.5 MG/.5ML
INJECTION, SOLUTION SUBCUTANEOUS
Qty: 2 ML | Refills: 0 | Status: SHIPPED | OUTPATIENT
Start: 2024-11-05 | End: 2024-11-05

## 2024-11-05 NOTE — PATIENT COMMUNICATION
Called patient on 11/5/24 and let him know that his medication was sent over to the [facility] on [address].

## 2024-11-06 ENCOUNTER — TELEPHONE (OUTPATIENT)
Dept: ADMINISTRATIVE | Facility: OTHER | Age: 59
End: 2024-11-06

## 2024-11-06 NOTE — TELEPHONE ENCOUNTER
Upon review of the In Basket request we were able to locate, review, and update the patient chart as requested for Hemoglobin A1c. 4-9-24     Any additional questions or concerns should be emailed to the Practice Liaisons via the appropriate education email address, please do not reply via In Basket.    Thank you  Taina Barnes MA   [facility]

## 2024-11-06 NOTE — TELEPHONE ENCOUNTER
----- Message from Amanda Ingram MD sent at 11/5/2024 10:26 AM EST -----  Regarding: Diabetic care gaps  Please close diabetic care gaps that are in chart.  Protein creatinine ratio obtained through nephrology.  Hemoglobin A1c obtained at his last office visit.

## 2024-11-06 NOTE — TELEPHONE ENCOUNTER
Upon review of the In Basket request we have noted this is a NON VALUE BASED item. We are unable to complete this request. Our team has the capability to assist in retrieval, updating, and linking of the following items: Chlamydia, CRC Cologuard, CRC Colonoscopy, CRC CT Colonography, CRC FIT/FOBT(3), CRC Sigmoidoscopy, CT Lung Screening, DEXA Scan, Diabetic Eye Exam, Diabetic Foot Exam, Hemoglobin A1c, Hemoglobin (pediatrics), Hepatitis C, HIV (cannot retrieve), Immunizations, Lead, Mammogram, Medicare AWV, Pap Smear (HPV),  and Urine Microalbumin.    Lab 743 Urine/Protein/Creatinine Ratio no longer updated HM.    Any additional questions or concerns should be emailed to the Practice Liaisons via the appropriate education email address, please do not reply via In Basket.    Thank you  Taina Barnes MA   [facility]

## 2024-11-07 DIAGNOSIS — I12.9 HYPERTENSIVE CHRONIC KIDNEY DISEASE WITH STAGE 1 THROUGH STAGE 4 CHRONIC KIDNEY DISEASE, OR UNSPECIFIED CHRONIC KIDNEY DISEASE: ICD-10-CM

## 2024-11-07 DIAGNOSIS — I10 BENIGN ESSENTIAL HYPERTENSION: ICD-10-CM

## 2024-11-07 RX ORDER — CLONIDINE 0.1 MG/24H
PATCH, EXTENDED RELEASE TRANSDERMAL
Qty: 12 PATCH | Refills: 0 | Status: SHIPPED | OUTPATIENT
Start: 2024-11-07

## 2024-11-13 ENCOUNTER — TELEPHONE (OUTPATIENT)
Dept: NEPHROLOGY | Facility: CLINIC | Age: 59
End: 2024-11-13

## 2024-11-21 ENCOUNTER — TELEPHONE (OUTPATIENT)
Dept: ADMINISTRATIVE | Facility: OTHER | Age: 59
End: 2024-11-21

## 2024-11-21 NOTE — LETTER
Diabetic Eye Exam Form    Date Requested: 24  Patient: Roland Kwan  Patient : 1965   Referring Provider: Amanda Ingram MD      DIABETIC Eye Exam Date _______________________________      Type of Exam MUST be documented for Diabetic Eye Exams. Please CHECK ONE.     Retinal Exam       Dilated Retinal Exam       OCT       Optomap-Iris Exam      Fundus Photography       Left Eye - Please check Retinopathy or No Retinopathy        Exam did show retinopathy    Exam did not show retinopathy       Right Eye - Please check Retinopathy or No Retinopathy       Exam did show retinopathy    Exam did not show retinopathy       Comments __________________________________________________________    Practice Providing Exam ______________________________________________    Exam Performed By (print name) _______________________________________      Provider Signature ___________________________________________________      These reports are needed for  compliance.  Please fax this completed form and a copy of the Diabetic Eye Exam report to our office located at [address] [place] [address] as soon as possible via Fax [phone] attention Taina: Phone [phone]  We thank you for your assistance in treating our mutual patient.

## 2024-11-21 NOTE — TELEPHONE ENCOUNTER
----- Message from Marylin DIETZ sent at 11/20/2024  3:58 PM EST -----  Regarding: care gap request DM eye exam  11/20/24 3:58 PM    Hello, our patient attached above has had Diabetic Eye Exam completed/performed. Please assist in updating the patient chart by making an External outreach to Dr. Randell Sharpe facility located in [address] [place] [address] Esdras TREVINO PH: [phone]. The date of service is within the past year.    Thank you,  Marylin SCHILLING

## 2024-11-21 NOTE — TELEPHONE ENCOUNTER
Upon review of the In Basket request we were able to locate, review, and update the patient chart as requested for Diabetic Eye Exam.    Any additional questions or concerns should be emailed to the Practice Liaisons via the appropriate education email address, please do not reply via In Basket.    Thank you  Taina Barnes MA   [facility]

## 2024-11-21 NOTE — TELEPHONE ENCOUNTER
Upon review of the In Basket request and the patient's chart, initial outreach has been made via fax to facility. Please see Contacts section for details.     Thank you  Taina Barnes MA

## 2024-11-22 DIAGNOSIS — I48.20 CHRONIC ATRIAL FIBRILLATION (HCC): ICD-10-CM

## 2024-11-22 RX ORDER — RIVAROXABAN 20 MG/1
20 TABLET, FILM COATED ORAL
Qty: 30 TABLET | Refills: 1 | Status: SHIPPED | OUTPATIENT
Start: 2024-11-22

## 2024-12-10 ENCOUNTER — TELEPHONE (OUTPATIENT)
Dept: ADMINISTRATIVE | Facility: OTHER | Age: 59
End: 2024-12-10

## 2024-12-10 NOTE — TELEPHONE ENCOUNTER
----- Message from Duncan Rincon MD sent at 2/9/2017  4:36 PM CST -----  Call and mail    Labs showed baseline kidney disease and controlled diabetes   ----- Message from Lisette HIGGINS sent at 12/10/2024  9:43 AM EST -----  Regarding: diabetic eye report  12/10/24 9:45 AM    Hello, our patient Roland Kwan has had Diabetic Eye Exam completed/performed. Please assist in updating the patient chart by pulling the document from the Media Tab. The date of service is 2/15/24.     Thank you,  AC Pinzon [facility]

## 2024-12-11 NOTE — TELEPHONE ENCOUNTER
Upon review of the In Basket request we  found  is up to date.     Any additional questions or concerns should be emailed to the Practice Liaisons via the appropriate education email address, please do not reply via In Basket.    Thank you  Max Dotson MA   [facility]

## 2024-12-20 ENCOUNTER — TELEPHONE (OUTPATIENT)
Dept: ADMINISTRATIVE | Facility: OTHER | Age: 59
End: 2024-12-20

## 2024-12-20 DIAGNOSIS — E11.22 TYPE 2 DIABETES MELLITUS WITH STAGE 3A CHRONIC KIDNEY DISEASE, WITHOUT LONG-TERM CURRENT USE OF INSULIN (HCC): ICD-10-CM

## 2024-12-20 DIAGNOSIS — N18.31 TYPE 2 DIABETES MELLITUS WITH STAGE 3A CHRONIC KIDNEY DISEASE, WITHOUT LONG-TERM CURRENT USE OF INSULIN (HCC): ICD-10-CM

## 2024-12-20 RX ORDER — TIRZEPATIDE 10 MG/.5ML
10 INJECTION, SOLUTION SUBCUTANEOUS WEEKLY
Qty: 6 ML | Refills: 0 | Status: SHIPPED | OUTPATIENT
Start: 2024-12-20

## 2024-12-20 NOTE — TELEPHONE ENCOUNTER
Upon review of the In Basket request we  were unable to locate the requested item    Any additional questions or concerns should be emailed to the Practice Liaisons via the appropriate education email address, please do not reply via In Basket.    Thank you  Mauri Luna MA   [facility]

## 2024-12-20 NOTE — TELEPHONE ENCOUNTER
----- Message from Amanda Ingram MD sent at 12/19/2024  4:01 PM EST -----  Regarding: Hemoglobin A1c  Hemoglobin A1c is in chart.  Hemoglobin 5.3

## 2024-12-23 ENCOUNTER — OFFICE VISIT (OUTPATIENT)
Dept: URGENT CARE | Facility: MEDICAL CENTER | Age: 59
End: 2024-12-23
Payer: COMMERCIAL

## 2024-12-23 VITALS
TEMPERATURE: 98.6 F | OXYGEN SATURATION: 99 % | HEART RATE: 80 BPM | RESPIRATION RATE: 20 BRPM | HEIGHT: 69 IN | WEIGHT: 256 LBS | DIASTOLIC BLOOD PRESSURE: 63 MMHG | SYSTOLIC BLOOD PRESSURE: 117 MMHG | BODY MASS INDEX: 37.92 KG/M2

## 2024-12-23 DIAGNOSIS — J02.0 STREP PHARYNGITIS: Primary | ICD-10-CM

## 2024-12-23 LAB — S PYO AG THROAT QL: POSITIVE

## 2024-12-23 PROCEDURE — 87880 STREP A ASSAY W/OPTIC: CPT | Performed by: PHYSICIAN ASSISTANT

## 2024-12-23 PROCEDURE — 99214 OFFICE O/P EST MOD 30 MIN: CPT | Performed by: PHYSICIAN ASSISTANT

## 2024-12-23 RX ORDER — AMOXICILLIN 500 MG/1
500 CAPSULE ORAL EVERY 12 HOURS SCHEDULED
Qty: 20 CAPSULE | Refills: 0 | Status: SHIPPED | OUTPATIENT
Start: 2024-12-23 | End: 2025-01-02

## 2024-12-23 NOTE — PROGRESS NOTES
[facility]        NAME: Roland Kwan is a 59 y.o. male  : 1965    MRN: [id number]  DATE: 2024  TIME: 2:12 PM    Assessment and Plan   Strep pharyngitis [J02.0]  1. Strep pharyngitis  POCT rapid ANTIGEN strepA    amoxicillin (AMOXIL) 500 mg capsule      Patient presents with fever and sore throat recommend strep testing.  Rapid strep in clinic is positive.  Patient will be started on amoxicillin to treat.      Patient Instructions     Patient Instructions   Take antibiotics as prescribed. Make sure to take all the antibiotic even after you start feeling better. If you stop them too soon, you risk developing a resistant infection that is more difficult and expensive to treat. Never save antibiotics or take antibiotics without the recommendation of a healthcare provider or dental professional. Note that if you are taking birth control medications, antibiotics can decrease their effectiveness. Recommend abstinence or back up method while on antibiotics and for 3-5 days after completing the antibiotic course.   You are considered contagious until you have been on the antibiotic for 24 hours. You should not share food or drinks with others and should not kiss on the mouth in that time. You should also stay home from work or school to avoid spreading the infection to others.   You need to switch to a brand new, never used toothbrush and toothpaste tube after 48 hours (or 2 days on the antibiotic) to prevent giving strep back to yourself again.   If symptoms are not improved after 2-3 days on the antibiotics, follow-up with your primary care provider.   If symptoms worsen or new symptoms such as drooling, difficulty swallowing, voice changes, anterior neck pain, or jaw pain develop report to the emergency room as these are symptoms of an abscess having formed in your throat or neck.      Follow up with PCP in 3-5 days.  Proceed to  ER if symptoms worsen.    If tests have been performed at [facility], our office will contact you with results if changes need to be made to the care plan discussed with you at the visit. You can review your full results on [facility].     Chief Complaint     Chief Complaint   Patient presents with    Fever     X1 day     Sore Throat     Sore throat x4 days          History of Present Illness       59-year-old male presents with complaint of sore throat and fever.  Patient reports that fever developed overnight.  He woke up covered in sweat.  Sore throat has been present for approximately 4 days.  Patient denies chills.  He denies any runny nose and sinus congestion.  Patient notes a cough but states this is due to throat irritation.  Denies any chest pain or shortness of breath.  Patient denies any known sick contacts.    Fever  Associated symptoms include coughing and a sore throat. Pertinent negatives include no chest pain, chills, congestion or fever.   Sore Throat   Associated symptoms include coughing. Pertinent negatives include no congestion, shortness of breath or trouble swallowing.       Review of Systems   Review of Systems   Constitutional:  Negative for chills and fever.   HENT:  Positive for sore throat and voice change (raspy). Negative for congestion, postnasal drip, rhinorrhea and trouble swallowing.    Respiratory:  Positive for cough. Negative for shortness of breath.    Cardiovascular:  Negative for chest pain.         Current Medications       Current Outpatient Medications:     amoxicillin (AMOXIL) 500 mg capsule, Take 1 capsule (500 mg total) by mouth every 12 (twelve) hours for 10 days, Disp: 20 capsule, Rfl: 0    atorvastatin (LIPITOR) 40 mg tablet, TAKE 1 TABLET BY MOUTH EVERY DAY WITH DINNER, Disp: 90 tablet, Rfl: 1    cloNIDine (CATAPRES-TTS-1) 0.1 mg/24 hr, APPLY 1 PATCH ONE TIME PER WEEK, Disp: 12 patch, Rfl: 0    dofetilide (TIKOSYN) 500 mcg capsule, TAKE 1 CAPSULE BY MOUTH EVERY 12 HOURS, Disp: 60 capsule, Rfl: 5    eplerenone (INSPRA) 25  mg tablet, TAKE 1 TABLET BY MOUTH TWICE A DAY, Disp: 180 tablet, Rfl: 1    hydrALAZINE (APRESOLINE) 50 mg tablet, TAKE 1 TABLET BY MOUTH TWICE A DAY, Disp: 180 tablet, Rfl: 1    nebivolol (BYSTOLIC) 2.5 mg tablet, TAKE 1 TABLET BY MOUTH DAILY HOLD FOR HEART RATE LESS THAN 50 BEATS PER MINUTE., Disp: 90 tablet, Rfl: 1    NIFEdipine (PROCARDIA XL) 30 mg 24 hr tablet, TAKE 1 TABLET BY MOUTH TWICE A DAY, Disp: 180 tablet, Rfl: 1    olmesartan (BENICAR) 40 mg tablet, TAKE 1 TABLET BY MOUTH EVERY DAY, Disp: 90 tablet, Rfl: 1    rivaroxaban (Xarelto) 20 mg tablet, TAKE 1 TABLET BY MOUTH EVERY DAY WITH BREAKFAST, Disp: 30 tablet, Rfl: 1    Tirzepatide (Mounjaro) 10 MG/0.5ML SOAJ, Inject 10 mg under the skin once a week, Disp: 6 mL, Rfl: 0    torsemide (DEMADEX) 5 MG tablet, TAKE 1 TABLET (5 MG TOTAL) BY MOUTH DAILY., Disp: 90 tablet, Rfl: 1    Current Allergies     Allergies as of 12/23/2024    (No Known Allergies)            The following portions of the patient's history were reviewed and updated as appropriate: allergies, current medications, past family history, past medical history, past social history, past surgical history and problem list.     Past Medical History:   Diagnosis Date    Ankle swelling     Arthritis     Asthma     Atrial fibrillation ([facility])     Atrial fibrillation with RVR ([facility]) 04/04/2017    Chronic kidney disease     kidney stones    Class 3 severe obesity due to excess calories with serious comorbidity and body mass index (BMI) of 40.0 to 44.9 in adult ([facility]) 12/07/2020    Gout     Hypertension     ICH (intracerebral hemorrhage) ([facility]) 03/08/2022    Kidney stone     Morbid obesity with BMI of 40.0-44.9, adult ([facility]) 08/31/2016    Night sweats     Obesity     Seasonal allergies     Sleep apnea     Stroke ([facility]) 03/22       Past Surgical History:   Procedure Laterality Date    APPENDECTOMY      CARDIOVERSION      X4 last was 2018    COLONOSCOPY      CYSTOSCOPY  08/30/2016    w/removal of object, last  "assessed 8/30/16    FOOT SURGERY Left     HAND SURGERY      KNEE SURGERY Right 2016    PA CYSTO/URETERO W/LITHOTRIPSY &INDWELL STENT INSRT Left 08/09/2016    Procedure: CYSTOSCOPY URETEROSCOPY WITH LITHOTRIPSY HOLMIUM LASER STONE EXTRACTION, RETROGRADE PYELOGRAM AND INSERTION STENT URETERAL;  Surgeon: Sahil Chery MD;  Location:  MAIN OR;  Service: Urology last assessed 8/30/16    PA CYSTO/URETERO W/LITHOTRIPSY &INDWELL STENT INSRT Right 08/24/2020    Procedure: CYSTOSCOPY , cystolitholapaxy of bladder stone WITH HOMIUM LASER RIGHT RETROGRADE AND RIGHT URETERAL STENT, FULGERATION OF BLADDER;  Surgeon: Sahil Chery MD;  Location:  MAIN OR;  Service: Urology    UPPER GASTROINTESTINAL ENDOSCOPY      WRIST SURGERY Left 2014    Fracture surgery       Family History   Problem Relation Age of Onset    Atrial fibrillation Mother     Other Mother         blood dyscrasia    Hypertension Mother     Arthritis Mother     Other Father         hypoglycemia     Atrial fibrillation Father     Diabetes Family     Other Family         Thrombocytosis    No Known Problems Sister     Cancer Neg Hx     Thyroid disease Neg Hx          Medications have been verified.        Objective   /63   Pulse 80   Temp 98.6 °F (37 °C) (Oral)   Resp 20   Ht 5' 9\" (1.753 m)   Wt 116 kg (256 lb)   SpO2 99%   BMI 37.80 kg/m²   No LMP for male patient.       Physical Exam     Physical Exam  Vitals and nursing note reviewed.   Constitutional:       General: He is not in acute distress.     Appearance: Normal appearance. He is not ill-appearing or toxic-appearing.   HENT:      Head: Normocephalic and atraumatic.      Jaw: No trismus.      Right Ear: Hearing, tympanic membrane, ear canal and external ear normal. There is no impacted cerumen. No foreign body.      Left Ear: Hearing, tympanic membrane, ear canal and external ear normal. There is no impacted cerumen. No foreign body.      Nose: No nasal deformity, mucosal edema, congestion " "or rhinorrhea.      Right Nostril: No foreign body, epistaxis or occlusion.      Left Nostril: No foreign body, epistaxis or occlusion.      Right Turbinates: Not enlarged, swollen or pale.      Left Turbinates: Not enlarged, swollen or pale.      Mouth/Throat:      Lips: Pink. No lesions.      Mouth: Mucous membranes are moist. No injury, oral lesions or angioedema.      Dentition: Normal dentition.      Tongue: No lesions. Tongue does not deviate from midline.      Palate: No mass and lesions.      Pharynx: Uvula midline. Pharyngeal swelling and posterior oropharyngeal erythema present. No oropharyngeal exudate or uvula swelling.      Tonsils: No tonsillar exudate or tonsillar abscesses. 1+ on the right. 1+ on the left.   Eyes:      General: Lids are normal. Gaze aligned appropriately. No allergic shiner.     Extraocular Movements: Extraocular movements intact.   Cardiovascular:      Rate and Rhythm: Normal rate.   Pulmonary:      Effort: Pulmonary effort is normal.      Comments: Patient speaking in full sentences with no increased respiratory effort.   No audible wheezing or stridor.   Lymphadenopathy:      Cervical: Cervical adenopathy present.      Right cervical: Superficial cervical adenopathy present. No deep or posterior cervical adenopathy.     Left cervical: Superficial cervical adenopathy present. No deep or posterior cervical adenopathy.   Skin:     General: Skin is warm and dry.   Neurological:      Mental Status: He is alert and oriented to person, place, and time.      Coordination: Coordination is intact.      Gait: Gait is intact.   Psychiatric:         Attention and Perception: Attention and perception normal.         Mood and Affect: Mood and affect normal.         Speech: Speech normal.         Behavior: Behavior is cooperative.               Note: Portions of this record may have been created with voice recognition software. Occasional wrong word or \"sound a like\" substitutions may have " occurred due to the inherent limitations of voice recognition software. Please read the chart carefully and recognize, using context, where substitutions have occurred.*

## 2024-12-24 DIAGNOSIS — E66.01 CLASS 3 SEVERE OBESITY DUE TO EXCESS CALORIES WITH SERIOUS COMORBIDITY AND BODY MASS INDEX (BMI) OF 40.0 TO 44.9 IN ADULT (HCC): ICD-10-CM

## 2024-12-24 DIAGNOSIS — E66.813 CLASS 3 SEVERE OBESITY DUE TO EXCESS CALORIES WITH SERIOUS COMORBIDITY AND BODY MASS INDEX (BMI) OF 40.0 TO 44.9 IN ADULT (HCC): ICD-10-CM

## 2024-12-24 DIAGNOSIS — E55.9 VITAMIN D DEFICIENCY: ICD-10-CM

## 2024-12-24 DIAGNOSIS — N18.31 STAGE 3A CHRONIC KIDNEY DISEASE (HCC): ICD-10-CM

## 2024-12-24 DIAGNOSIS — E78.5 DYSLIPIDEMIA: ICD-10-CM

## 2024-12-24 DIAGNOSIS — I10 ACCELERATED HYPERTENSION: ICD-10-CM

## 2024-12-24 DIAGNOSIS — I12.9 HYPERTENSIVE CHRONIC KIDNEY DISEASE WITH STAGE 1 THROUGH STAGE 4 CHRONIC KIDNEY DISEASE, OR UNSPECIFIED CHRONIC KIDNEY DISEASE: ICD-10-CM

## 2024-12-24 RX ORDER — TORSEMIDE 5 MG/1
5 TABLET ORAL DAILY
Qty: 90 TABLET | Refills: 1 | Status: SHIPPED | OUTPATIENT
Start: 2024-12-24

## 2024-12-28 ENCOUNTER — APPOINTMENT (OUTPATIENT)
Dept: LAB | Facility: CLINIC | Age: 59
End: 2024-12-28
Payer: COMMERCIAL

## 2024-12-28 DIAGNOSIS — I10 ACCELERATED HYPERTENSION: ICD-10-CM

## 2024-12-28 DIAGNOSIS — N20.0 NEPHROLITHIASIS: ICD-10-CM

## 2024-12-28 DIAGNOSIS — Z12.5 PROSTATE CANCER SCREENING: ICD-10-CM

## 2024-12-28 DIAGNOSIS — N18.31 TYPE 2 DIABETES MELLITUS WITH STAGE 3A CHRONIC KIDNEY DISEASE, WITHOUT LONG-TERM CURRENT USE OF INSULIN (HCC): ICD-10-CM

## 2024-12-28 DIAGNOSIS — E11.22 TYPE 2 DIABETES MELLITUS WITH STAGE 3A CHRONIC KIDNEY DISEASE, WITHOUT LONG-TERM CURRENT USE OF INSULIN (HCC): ICD-10-CM

## 2024-12-28 DIAGNOSIS — R73.03 PRE-DIABETES: ICD-10-CM

## 2024-12-28 DIAGNOSIS — I12.9 BENIGN HYPERTENSION WITH CHRONIC KIDNEY DISEASE, STAGE III (HCC): ICD-10-CM

## 2024-12-28 DIAGNOSIS — E78.5 DYSLIPIDEMIA: ICD-10-CM

## 2024-12-28 DIAGNOSIS — I12.9 HYPERTENSIVE CHRONIC KIDNEY DISEASE WITH STAGE 1 THROUGH STAGE 4 CHRONIC KIDNEY DISEASE, OR UNSPECIFIED CHRONIC KIDNEY DISEASE: ICD-10-CM

## 2024-12-28 DIAGNOSIS — N18.30 BENIGN HYPERTENSION WITH CHRONIC KIDNEY DISEASE, STAGE III (HCC): ICD-10-CM

## 2024-12-28 DIAGNOSIS — E66.01 MORBID OBESITY WITH BMI OF 40.0-44.9, ADULT (HCC): ICD-10-CM

## 2024-12-28 DIAGNOSIS — E55.9 VITAMIN D DEFICIENCY: ICD-10-CM

## 2024-12-28 DIAGNOSIS — N18.31 STAGE 3A CHRONIC KIDNEY DISEASE (HCC): ICD-10-CM

## 2024-12-28 LAB
25(OH)D3 SERPL-MCNC: 32.7 NG/ML (ref 30–100)
ALBUMIN SERPL BCG-MCNC: 4.3 G/DL (ref 3.5–5)
ALP SERPL-CCNC: 56 U/L (ref 34–104)
ALT SERPL W P-5'-P-CCNC: 12 U/L (ref 7–52)
ANION GAP SERPL CALCULATED.3IONS-SCNC: 7 MMOL/L (ref 4–13)
AST SERPL W P-5'-P-CCNC: 13 U/L (ref 13–39)
BILIRUB SERPL-MCNC: 0.45 MG/DL (ref 0.2–1)
BUN SERPL-MCNC: 19 MG/DL (ref 5–25)
CALCIUM SERPL-MCNC: 9.1 MG/DL (ref 8.4–10.2)
CHLORIDE SERPL-SCNC: 104 MMOL/L (ref 96–108)
CHOLEST SERPL-MCNC: 93 MG/DL (ref ?–200)
CO2 SERPL-SCNC: 31 MMOL/L (ref 21–32)
CREAT SERPL-MCNC: 1.17 MG/DL (ref 0.6–1.3)
CREAT UR-MCNC: 72.2 MG/DL
CREAT UR-MCNC: 75.4 MG/DL
ERYTHROCYTE [DISTWIDTH] IN BLOOD BY AUTOMATED COUNT: 13 % (ref 11.6–15.1)
EST. AVERAGE GLUCOSE BLD GHB EST-MCNC: 117 MG/DL
GFR SERPL CREATININE-BSD FRML MDRD: 67 ML/MIN/1.73SQ M
GLUCOSE P FAST SERPL-MCNC: 92 MG/DL (ref 65–99)
HBA1C MFR BLD: 5.7 %
HCT VFR BLD AUTO: 45.9 % (ref 36.5–49.3)
HDLC SERPL-MCNC: 37 MG/DL
HGB BLD-MCNC: 15.4 G/DL (ref 12–17)
LDLC SERPL CALC-MCNC: 40 MG/DL (ref 0–100)
MAGNESIUM SERPL-MCNC: 2 MG/DL (ref 1.9–2.7)
MCH RBC QN AUTO: 31.2 PG (ref 26.8–34.3)
MCHC RBC AUTO-ENTMCNC: 33.6 G/DL (ref 31.4–37.4)
MCV RBC AUTO: 93 FL (ref 82–98)
MICROALBUMIN UR-MCNC: 33.1 MG/L
MICROALBUMIN/CREAT 24H UR: 44 MG/G CREATININE (ref 0–30)
NONHDLC SERPL-MCNC: 56 MG/DL
PLATELET # BLD AUTO: 235 THOUSANDS/UL (ref 149–390)
PMV BLD AUTO: 11.6 FL (ref 8.9–12.7)
POTASSIUM SERPL-SCNC: 4.3 MMOL/L (ref 3.5–5.3)
PROT SERPL-MCNC: 7.9 G/DL (ref 6.4–8.4)
PROT UR-MCNC: 22.7 MG/DL
PROT/CREAT UR: 0.3 MG/G{CREAT} (ref 0–0.1)
PSA SERPL-MCNC: 1.08 NG/ML (ref 0–4)
RBC # BLD AUTO: 4.94 MILLION/UL (ref 3.88–5.62)
SODIUM SERPL-SCNC: 142 MMOL/L (ref 135–147)
TRIGL SERPL-MCNC: 80 MG/DL (ref ?–150)
WBC # BLD AUTO: 8.73 THOUSAND/UL (ref 4.31–10.16)

## 2024-12-28 PROCEDURE — 83036 HEMOGLOBIN GLYCOSYLATED A1C: CPT

## 2024-12-28 PROCEDURE — G0103 PSA SCREENING: HCPCS

## 2024-12-28 PROCEDURE — 80053 COMPREHEN METABOLIC PANEL: CPT

## 2024-12-28 PROCEDURE — 85027 COMPLETE CBC AUTOMATED: CPT

## 2024-12-28 PROCEDURE — 82570 ASSAY OF URINE CREATININE: CPT

## 2024-12-28 PROCEDURE — 83735 ASSAY OF MAGNESIUM: CPT

## 2024-12-28 PROCEDURE — 82306 VITAMIN D 25 HYDROXY: CPT

## 2024-12-28 PROCEDURE — 80061 LIPID PANEL: CPT

## 2024-12-28 PROCEDURE — 82043 UR ALBUMIN QUANTITATIVE: CPT

## 2024-12-28 PROCEDURE — 84156 ASSAY OF PROTEIN URINE: CPT

## 2024-12-28 PROCEDURE — 36415 COLL VENOUS BLD VENIPUNCTURE: CPT

## 2024-12-29 ENCOUNTER — RESULTS FOLLOW-UP (OUTPATIENT)
Dept: BARIATRICS | Facility: CLINIC | Age: 59
End: 2024-12-29

## 2025-01-01 ENCOUNTER — RESULTS FOLLOW-UP (OUTPATIENT)
Dept: OTHER | Facility: HOSPITAL | Age: 60
End: 2025-01-01

## 2025-01-03 ENCOUNTER — TELEPHONE (OUTPATIENT)
Dept: OBGYN CLINIC | Facility: MEDICAL CENTER | Age: 60
End: 2025-01-03

## 2025-01-03 NOTE — ASSESSMENT & PLAN NOTE
-At baseline continue current treatment      Orders:    Basic metabolic panel; Future    Magnesium; Future    Protein / creatinine ratio, urine; Future

## 2025-01-03 NOTE — ASSESSMENT & PLAN NOTE
-At goal no changes  Orders:    Basic metabolic panel; Future    Magnesium; Future    Protein / creatinine ratio, urine; Future

## 2025-01-03 NOTE — ASSESSMENT & PLAN NOTE
- Doing extremely well with weight loss, check evening readings if low as well decrease hydralazine as listed below  Orders:    Basic metabolic panel; Future    Magnesium; Future    Protein / creatinine ratio, urine; Future

## 2025-01-03 NOTE — ASSESSMENT & PLAN NOTE
Resolved followed by Dr. Coon  Orders:    Basic metabolic panel; Future    Magnesium; Future    Protein / creatinine ratio, urine; Future

## 2025-01-03 NOTE — TELEPHONE ENCOUNTER
Spoke with patient, aware appt for 1/9/25 will need to be cancelled and rescheduled. Doctor will be out of the office.  Patient was driving and advise will need to call back to reschedule appt.   Please reschedule patient on Dr Layne's next available.     Thank you.

## 2025-01-03 NOTE — ASSESSMENT & PLAN NOTE
-Asymptomatic no further workup unless recurrent stone  Orders:    Basic metabolic panel; Future    Magnesium; Future    Protein / creatinine ratio, urine; Future

## 2025-01-03 NOTE — ASSESSMENT & PLAN NOTE
-At baseline/goal of 32.7 as of 12/28/2024  Orders:    Basic metabolic panel; Future    Magnesium; Future    Protein / creatinine ratio, urine; Future

## 2025-01-03 NOTE — ASSESSMENT & PLAN NOTE
-Per primary service currently glucose 92  Orders:    Basic metabolic panel; Future    Magnesium; Future    Protein / creatinine ratio, urine; Future

## 2025-01-03 NOTE — PROGRESS NOTES
Name: Roland Kwan      : 1965      MRN: [id number]  Encounter Provider: Luigi Cash MD  Encounter Date: 2025   Encounter department: [facility] [place]  :  Assessment & Plan  Accelerated hypertension  - Doing extremely well with weight loss, check evening readings if low as well decrease hydralazine as listed below  Orders:    Basic metabolic panel; Future    Magnesium; Future    Protein / creatinine ratio, urine; Future    Stage 3a chronic kidney disease (HCC)  -At baseline continue current treatment      Orders:    Basic metabolic panel; Future    Magnesium; Future    Protein / creatinine ratio, urine; Future    Benign hypertension with chronic kidney disease, stage III (HCC)  -Please see above      Orders:    Basic metabolic panel; Future    Magnesium; Future    Protein / creatinine ratio, urine; Future    Type 2 diabetes mellitus with stage 3a chronic kidney disease, without long-term current use of insulin (HCC)  -Per primary service currently glucose 92  Orders:    Basic metabolic panel; Future    Magnesium; Future    Protein / creatinine ratio, urine; Future    Nephrolithiasis  -Asymptomatic no further workup unless recurrent stone  Orders:    Basic metabolic panel; Future    Magnesium; Future    Protein / creatinine ratio, urine; Future    Vitamin D deficiency  -At baseline/goal of 32.7 as of 2024  Orders:    Basic metabolic panel; Future    Magnesium; Future    Protein / creatinine ratio, urine; Future    Dyslipidemia  -At goal no changes  Orders:    Basic metabolic panel; Future    Magnesium; Future    Protein / creatinine ratio, urine; Future    Elevated liver function tests  Resolved followed by Dr. Coon  Orders:    Basic metabolic panel; Future    Magnesium; Future    Protein / creatinine ratio, urine; Future        History of Present Illness   HPI  Roland Kwan is a 59 y.o. male who presents CKD and hypertension follow-up  History obtained from:  patient    There has been no hospitalizations or acute illnesses since last visit.  70 pound weight loss on Mounjaro over 1 year!  The patient overall is feeling well.  Good appetite and good energy  No fevers, chills, or cough or colds.  No hematuria, dysuria, voiding symptoms or foamy urine; except for rare hematuria on Xarelto has been seen by Dr. Chery with negative workup  No gastrointestinal symptoms  No cardiovascular symptoms including swelling of the legs  No headaches, dizziness or lightheadedness  Blood pressure medications:  Catapres patch #1 weekly  Inspra 25 mg twice a day  Hydralazine 50 mg twice a day  Bystolic 2.5 mg daily in the morning  Nifedipine XL 30 mg twice a day  Olmesartan 40 mg at bedtime  Torsemide 5 mg daily    Renal pertinent medications:  Recent 40 mg daily      Review of Systems: Please see above otherwise review of systems is completely reviewed with the patient are negative  Pertinent Medical History   1. Hypertension:  Most compatible with essential hypertension/resistant hypertension:  Secondary workup:  -normal thyroid function study  -severe HARPER on BiPAP  -aldosterone/renin ratio:  Essentially negative at 20 with an aldosterone level of 8.7  -plasma free metanephrines:  Negative  -24 hour urine for free cortisol:  Negative  -renal artery duplex:  No evidence of significant renal artery disease; but somewhat limited by body habitus and overlying bowel gas  Again obesity may be contributing to his hypertension  Current home blood pressure readings:  A.m. 116/70  P.m.:   Heart rate: 66        GOAL BLOOD PRESSURE:  LESS THAN 125/80 GIVEN CKD/recent hemorrhagic stroke     Recommendations:  continue to push nonmedical regimen including isotonic exercise, avoidance of salt and weight loss; patient counseled as such  GIVEN TIKOSYN:  AMILORIDE/SPIRONOLACTONE/HCTZ ARE CONTRAINDICATED.  LOOP DIURETIC SUCH AS FUROSEMIDE OR TORSEMIDE ARE ACCEPTABLE.     Medication changes today:     Given  low a.m. readings I am just going to check evening readings if they are indeed as low as the morning readings I would decrease hydralazine 25 twice daily to begin with  Next agent to taper off as the clonidine patch  Continue excellent weight loss     Future options would be to increase torsemide, or Eplerenone if and acceptable medication.  And of course clonidine patch as well.  However: If his blood pressure does improve potentially stop clonidine patch     DISCUSSED WITH DR. TAVERA EPLERENONE IS TOTALLY ACCEPTABLE!              2. 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  Thickened bladder with UV junction still present although perhaps slightly pronounced.  Followed by Urology:  Visit with Dr. Chery for possible right-sided ureterocele, history of cystoscopy and cystolitholapaxy status post right stent removal.  Bladder wall thickening resolution of hydronephrosis.  Possibility of congenital ureterocele was discussed 1 year follow-up with retroperitoneal ultrasound which would be November 2022.           3. Nephrolithiasis:  As mentioned previously he has had numerous stones.  No kidney stones at this time  Continue with General stone diet; in particular water intake 2 and half to 3 L a day all  Only further investigation with a 24 hour urine stone risk evaluation if recurrent stone     4. Dyslipidemia:  Current lipid profile:  LDL 40/HDL 37/triglycerides 80  Goal:   LDL less than 70 given CKD/hemorrhagic stroke  Recommend:  Diet, exercise and weight loss, patient counseled as such  No change in medication as patient is at goal        5. Recent admission 03/07/2022 secondary to right-sided weakness with word-finding difficulty, found to have acute left intraparenchymal hemorrhage with vasogenic edema.  Patient admitted [facility] with neurosurgery evaluation.  Started on Cardene due to hypertension.  No neuro surgical intervention at that time was deemed necessary.  MRI in 3/9 demonstrated unchanged intraparenchymal hematoma as well as small acute infarct in the left corona radiata.  Patient was maintained on blood pressure control.  TTE demonstrated EF 65% otherwise grade 1 diastolic dysfunction.  Seen by Neurology as well recommended restarting anticoagulation 3 weeks after repeat CT scan along with PT/OT.  In regards to blood pressure: Amlodipine was stopped nifedipine 10 mg every 8 hours titrate as needed, continuing hydralazine.     6. Increased liver function studies:  Right upper quadrant ultrasound showed cholelithiasis hepatomegaly and hepatic steatosis.  Now followed by Dr. Jaymie Marcelo who believes it is related to statin therapy but is agreeable to continue the statin.  - LFTs normal     GI health maintenance:  Seen by Dr. Marcelo we will plan for colonoscopy 3-4 months after CVA:  Patient knows he is due for the colonoscopy will schedule with Dr. Marcelo.  Patient aware and states will set up an appointment for colonoscopy.  Rediscussed with the patient as of 6/19/2024 he knows potential risk of colon cancer and that he should set it up.          Medical History Reviewed by provider this encounter:     .  Past Medical History   Past Medical History:   Diagnosis Date    Ankle swelling     Arthritis     Asthma     Atrial fibrillation (HCC)     Atrial fibrillation with RVR (HCC) 04/04/2017    Chronic kidney disease     kidney stones    Class 3 severe obesity  due to excess calories with serious comorbidity and body mass index (BMI) of 40.0 to 44.9 in adult ([facility]) 12/07/2020    Gout     Hypertension     ICH (intracerebral hemorrhage) ([facility]) 03/08/2022    Kidney stone     Morbid obesity with BMI of 40.0-44.9, adult ([facility]) 08/31/2016    Night sweats     Obesity     Seasonal allergies     Sleep apnea     Stroke ([facility]) 03/22     Past Surgical History:   Procedure Laterality Date    APPENDECTOMY      CARDIOVERSION      X4 last was 2018    COLONOSCOPY      CYSTOSCOPY  08/30/2016    w/removal of object, last assessed 8/30/16    FOOT SURGERY Left     HAND SURGERY      KNEE SURGERY Right 2016    SC CYSTO/URETERO W/LITHOTRIPSY &INDWELL STENT INSRT Left 08/09/2016    Procedure: CYSTOSCOPY URETEROSCOPY WITH LITHOTRIPSY HOLMIUM LASER STONE EXTRACTION, RETROGRADE PYELOGRAM AND INSERTION STENT URETERAL;  Surgeon: Sahil Chery MD;  Location:  MAIN OR;  Service: Urology last assessed 8/30/16    SC CYSTO/URETERO W/LITHOTRIPSY &INDWELL STENT INSRT Right 08/24/2020    Procedure: CYSTOSCOPY , cystolitholapaxy of bladder stone WITH HOMIUM LASER RIGHT RETROGRADE AND RIGHT URETERAL STENT, FULGERATION OF BLADDER;  Surgeon: Sahil Chery MD;  Location:  MAIN OR;  Service: Urology    UPPER GASTROINTESTINAL ENDOSCOPY      WRIST SURGERY Left 2014    Fracture surgery     Family History   Problem Relation Age of Onset    Atrial fibrillation Mother     Other Mother         blood dyscrasia    Hypertension Mother     Arthritis Mother     Other Father         hypoglycemia     Atrial fibrillation Father     Diabetes Family     Other Family         Thrombocytosis    No Known Problems Sister     Cancer Neg Hx     Thyroid disease Neg Hx       reports that he quit smoking about 30 years ago. His smoking use included cigarettes. He started smoking about 45 years ago. He has a 7.5 pack-year smoking history. He has never used smokeless tobacco. He reports current alcohol use. He reports that he does not use  drugs.  Current Outpatient Medications on File Prior to Visit   Medication Sig Dispense Refill    atorvastatin (LIPITOR) 40 mg tablet TAKE 1 TABLET BY MOUTH EVERY DAY WITH DINNER 90 tablet 1    cloNIDine (CATAPRES-TTS-1) 0.1 mg/24 hr APPLY 1 PATCH ONE TIME PER WEEK 12 patch 0    dofetilide (TIKOSYN) 500 mcg capsule TAKE 1 CAPSULE BY MOUTH EVERY 12 HOURS 60 capsule 5    eplerenone (INSPRA) 25 mg tablet TAKE 1 TABLET BY MOUTH TWICE A  tablet 1    hydrALAZINE (APRESOLINE) 50 mg tablet TAKE 1 TABLET BY MOUTH TWICE A  tablet 1    nebivolol (BYSTOLIC) 2.5 mg tablet TAKE 1 TABLET BY MOUTH DAILY HOLD FOR HEART RATE LESS THAN 50 BEATS PER MINUTE. 90 tablet 1    NIFEdipine (PROCARDIA XL) 30 mg 24 hr tablet TAKE 1 TABLET BY MOUTH TWICE A  tablet 1    olmesartan (BENICAR) 40 mg tablet TAKE 1 TABLET BY MOUTH EVERY DAY 90 tablet 1    rivaroxaban (Xarelto) 20 mg tablet TAKE 1 TABLET BY MOUTH EVERY DAY WITH BREAKFAST 30 tablet 1    Tirzepatide (Mounjaro) 10 MG/0.5ML SOAJ Inject 10 mg under the skin once a week 6 mL 0    torsemide (DEMADEX) 5 MG tablet TAKE 1 TABLET (5 MG TOTAL) BY MOUTH DAILY. 90 tablet 1     No current facility-administered medications on file prior to visit.   No Known Allergies   Current Outpatient Medications on File Prior to Visit   Medication Sig Dispense Refill    atorvastatin (LIPITOR) 40 mg tablet TAKE 1 TABLET BY MOUTH EVERY DAY WITH DINNER 90 tablet 1    cloNIDine (CATAPRES-TTS-1) 0.1 mg/24 hr APPLY 1 PATCH ONE TIME PER WEEK 12 patch 0    dofetilide (TIKOSYN) 500 mcg capsule TAKE 1 CAPSULE BY MOUTH EVERY 12 HOURS 60 capsule 5    eplerenone (INSPRA) 25 mg tablet TAKE 1 TABLET BY MOUTH TWICE A  tablet 1    hydrALAZINE (APRESOLINE) 50 mg tablet TAKE 1 TABLET BY MOUTH TWICE A  tablet 1    nebivolol (BYSTOLIC) 2.5 mg tablet TAKE 1 TABLET BY MOUTH DAILY HOLD FOR HEART RATE LESS THAN 50 BEATS PER MINUTE. 90 tablet 1    NIFEdipine (PROCARDIA XL) 30 mg 24 hr tablet TAKE 1 TABLET  "BY MOUTH TWICE A  tablet 1    olmesartan (BENICAR) 40 mg tablet TAKE 1 TABLET BY MOUTH EVERY DAY 90 tablet 1    rivaroxaban (Xarelto) 20 mg tablet TAKE 1 TABLET BY MOUTH EVERY DAY WITH BREAKFAST 30 tablet 1    Tirzepatide (Mounjaro) 10 MG/0.5ML SOAJ Inject 10 mg under the skin once a week 6 mL 0    torsemide (DEMADEX) 5 MG tablet TAKE 1 TABLET (5 MG TOTAL) BY MOUTH DAILY. 90 tablet 1     No current facility-administered medications on file prior to visit.      Social History     Tobacco Use    Smoking status: Former     Current packs/day: 0.00     Average packs/day: 0.5 packs/day for 15.0 years (7.5 ttl pk-yrs)     Types: Cigarettes     Start date: 1979     Quit date: 1994     Years since quittin.7    Smokeless tobacco: Never   Vaping Use    Vaping status: Never Used   Substance and Sexual Activity    Alcohol use: Yes     Comment: occ    Drug use: No    Sexual activity: Yes     Partners: Female     Birth control/protection: None        Objective   /63 (BP Location: Left arm, Patient Position: Sitting, Cuff Size: Large)   Pulse 69   Ht 5' 9\" (1.753 m)   Wt 114 kg (251 lb)   BMI 37.07 kg/m²      Physical Exam  BP sitting on left: 130/70 with a heart rate of 68 and regular  BP standing on left: 130/78 with a heart rate of 76 and regular  General: Well-developed well-nourished,obese, no acute distress  Skin:  No acute rash  Eyes: No scleral icterus and noninjected  ENT: Normocephalic/atraumatic moist mucous membranes  Neck:  Supple, no jugular venous distention, trachea midline, overall appearance is normal; no  carotid bruits, 1+ carotid upstroke  Back: No CVA tenderness spine midline  Chest:  Clear to auscultation and percussion, good respiratory effort no use of accessory respiratory muscles  CVS:  Regular rate and rhythm, without a rub or gallops or murmurs, normal S3 and S4  Abdomen:  Normal bowel sounds, soft and nontender and nondistended; no overt hepatosplenomegaly or bruits " appreciable  Extremities:  No clubbing, cyanosis or edema; 1+ dorsalis pedis pulse, no femoral bruits and no significant arthritic changes  Neuro:  No gross focality  Psych:  Alert and oriented and appropriate

## 2025-01-03 NOTE — ASSESSMENT & PLAN NOTE
-Please see above      Orders:    Basic metabolic panel; Future    Magnesium; Future    Protein / creatinine ratio, urine; Future

## 2025-01-05 DIAGNOSIS — I10 BENIGN ESSENTIAL HYPERTENSION: ICD-10-CM

## 2025-01-05 DIAGNOSIS — I12.9 HYPERTENSIVE CHRONIC KIDNEY DISEASE WITH STAGE 1 THROUGH STAGE 4 CHRONIC KIDNEY DISEASE, OR UNSPECIFIED CHRONIC KIDNEY DISEASE: ICD-10-CM

## 2025-01-05 DIAGNOSIS — N18.30 CHRONIC KIDNEY DISEASE, STAGE III (MODERATE) (HCC): ICD-10-CM

## 2025-01-06 ENCOUNTER — OFFICE VISIT (OUTPATIENT)
Dept: NEPHROLOGY | Facility: CLINIC | Age: 60
End: 2025-01-06
Payer: COMMERCIAL

## 2025-01-06 VITALS
HEIGHT: 69 IN | HEART RATE: 69 BPM | SYSTOLIC BLOOD PRESSURE: 116 MMHG | WEIGHT: 251 LBS | DIASTOLIC BLOOD PRESSURE: 63 MMHG | BODY MASS INDEX: 37.18 KG/M2

## 2025-01-06 DIAGNOSIS — R79.89 ELEVATED LIVER FUNCTION TESTS: ICD-10-CM

## 2025-01-06 DIAGNOSIS — I10 ACCELERATED HYPERTENSION: ICD-10-CM

## 2025-01-06 DIAGNOSIS — E78.5 DYSLIPIDEMIA: ICD-10-CM

## 2025-01-06 DIAGNOSIS — E11.22 TYPE 2 DIABETES MELLITUS WITH STAGE 3A CHRONIC KIDNEY DISEASE, WITHOUT LONG-TERM CURRENT USE OF INSULIN (HCC): Primary | ICD-10-CM

## 2025-01-06 DIAGNOSIS — N18.31 TYPE 2 DIABETES MELLITUS WITH STAGE 3A CHRONIC KIDNEY DISEASE, WITHOUT LONG-TERM CURRENT USE OF INSULIN (HCC): Primary | ICD-10-CM

## 2025-01-06 DIAGNOSIS — E55.9 VITAMIN D DEFICIENCY: ICD-10-CM

## 2025-01-06 DIAGNOSIS — N20.0 NEPHROLITHIASIS: ICD-10-CM

## 2025-01-06 DIAGNOSIS — N18.30 BENIGN HYPERTENSION WITH CHRONIC KIDNEY DISEASE, STAGE III (HCC): ICD-10-CM

## 2025-01-06 DIAGNOSIS — N18.31 STAGE 3A CHRONIC KIDNEY DISEASE (HCC): ICD-10-CM

## 2025-01-06 DIAGNOSIS — I12.9 BENIGN HYPERTENSION WITH CHRONIC KIDNEY DISEASE, STAGE III (HCC): ICD-10-CM

## 2025-01-06 PROCEDURE — 99214 OFFICE O/P EST MOD 30 MIN: CPT | Performed by: INTERNAL MEDICINE

## 2025-01-06 NOTE — PATIENT INSTRUCTIONS
Visit summary:  - Blood pressure doing extremely well I will have you send in a week of evening readings before bedtime but before your evening medications if possible.  If remains low I would decrease hydralazine.  - Continue superb job of weight loss and exercise      As discussed:  I would recommend making a formal appointment with Dr. Marcelo to discuss the pros and cons of a colon Skippy at this time      1. Medication changes today:  None today pending your evening readings as outlined above    2.  General instructions:  Continue excellent habits of weight loss exercise and low-salt diet        3.  Please take 1 week a blood pressure readings please take a week of evening readings as discussed and send those into the office    AS FOLLOWS  MORNING AND EVENING, SITTING AND STANDING as follows:  TAKE THE MORNING READINGS BEFORE ANY MEDICATIONS AND WHEN YOU ARE RELAXED FOR SEVERAL MINUTES  TAKE THE EVENING READINGS:  BETWEEN 7-10 P.M.; PRIOR TO ANY MEDICATIONS; AT LEAST IN OUR  FROM DINNER; AND CERTAINLY AFTER RELAXING FOR A FEW MINUTES  PLEASE INCLUDE HEART RATE WITH YOUR BLOOD PRESSURE READINGS  When taking standing readings, keep your arm supported at heart level and not dangling  Make sure you are sitting with your back supported and feet on the ground and do not cross your legs or feet  Make sure you have not taken any coffee or caffeine products or exercised or smoke cigarettes at least 30 minutes before taking your blood pressure  Then please mail these readings into the office      4.  In 3 months:    Please take 1 week a blood pressure readings as outlined above and mail those into the office      5.  Follow-up in 6 months  Please bring in 1 week a blood pressure readings morning evening, sitting and standing is outlined above  PLEASE BRING AN YOUR BLOOD PRESSURE MACHINE TO CORRELATE WITH THE OFFICE MACHINE AT THIS NEXT SCHEDULED VISIT  Please go for fasting lab work 1-2 weeks prior to your  appointment      6.  General non medical recommendations:  AVOID SALT BUT NOT ADDING AN READING LABELS TO MAKE SURE THERE IS LOW-SALT IN THE FOOD THAT YOU ARE EATING  Goal is less than 2 g of sodium intake or less than 5 g of sodium chloride intake per day    Avoid nonsteroidal anti-inflammatory drugs such as Naprosyn, ibuprofen, Aleve, Advil, Celebrex, Meloxicam (Mobic) etc.  You can use Tylenol as needed if you do not have any liver condition to be concerned about    Avoid medications such as Sudafed or decongestants and antihistamines that contained the D component which is the decongestant.  You can take antihistamines without the decongestant or D component.    Try to avoid medications such as pantoprazole or  Protonix/Nexium or Esomeprazole)/Prilosec or omeprazole/Prevacid or lansoprazole/AcipHex or Rabeprazole.  If you are able to, use Pepcid as this is safer for your kidneys.    Try to exercise at least 30 minutes 3 days a week to begin with with an ultimate goal of 5 days a week for at least 30 minutes    Please do not drink more than 2 glasses of alcohol/wine on a daily basis as this may contribute to your high blood pressure.

## 2025-01-06 NOTE — LETTER
2025     Amanda Ingram MD  [address] [facility] [address]    Patient: Roland Kwan   YOB: 1965   Date of Visit: 2025       Dear Dr. Ingram:    Thank you for referring Roland Kwan to me for evaluation. Below are my notes for this consultation.    If you have questions, please do not hesitate to call me. I look forward to following your patient along with you.         Sincerely,        Luigi Cash MD        CC: No Recipients    Luigi Cash MD  2025  8:27 AM  Sign when Signing Visit  Name: Roland Kwan      : 1965      MRN: [id number]  Encounter Provider: Luigi Cahs MD  Encounter Date: 2025   Encounter department: [facility]  :  Assessment & Plan  Accelerated hypertension  - Doing extremely well with weight loss, check evening readings if low as well decrease hydralazine as listed below  Orders:  •  Basic metabolic panel; Future  •  Magnesium; Future  •  Protein / creatinine ratio, urine; Future    Stage 3a chronic kidney disease (HCC)  -At baseline continue current treatment      Orders:  •  Basic metabolic panel; Future  •  Magnesium; Future  •  Protein / creatinine ratio, urine; Future    Benign hypertension with chronic kidney disease, stage III (HCC)  -Please see above      Orders:  •  Basic metabolic panel; Future  •  Magnesium; Future  •  Protein / creatinine ratio, urine; Future    Type 2 diabetes mellitus with stage 3a chronic kidney disease, without long-term current use of insulin (HCC)  -Per primary service currently glucose 92  Orders:  •  Basic metabolic panel; Future  •  Magnesium; Future  •  Protein / creatinine ratio, urine; Future    Nephrolithiasis  -Asymptomatic no further workup unless recurrent stone  Orders:  •  Basic metabolic panel; Future  •  Magnesium; Future  •  Protein / creatinine ratio, urine; Future    Vitamin D deficiency  -At baseline/goal of 32.7 as of 2024  Orders:  •  Basic  metabolic panel; Future  •  Magnesium; Future  •  Protein / creatinine ratio, urine; Future    Dyslipidemia  -At goal no changes  Orders:  •  Basic metabolic panel; Future  •  Magnesium; Future  •  Protein / creatinine ratio, urine; Future    Elevated liver function tests  Resolved followed by Dr. Coon  Orders:  •  Basic metabolic panel; Future  •  Magnesium; Future  •  Protein / creatinine ratio, urine; Future        History of Present Illness  HPI  Roland Kwan is a 59 y.o. male who presents CKD and hypertension follow-up  History obtained from: patient    There has been no hospitalizations or acute illnesses since last visit.  70 pound weight loss on Mounjaro over 1 year!  The patient overall is feeling well.  Good appetite and good energy  No fevers, chills, or cough or colds.  No hematuria, dysuria, voiding symptoms or foamy urine; except for rare hematuria on Xarelto has been seen by Dr. Chery with negative workup  No gastrointestinal symptoms  No cardiovascular symptoms including swelling of the legs  No headaches, dizziness or lightheadedness  Blood pressure medications:  Catapres patch #1 weekly  Inspra 25 mg twice a day  Hydralazine 50 mg twice a day  Bystolic 2.5 mg daily in the morning  Nifedipine XL 30 mg twice a day  Olmesartan 40 mg at bedtime  Torsemide 5 mg daily    Renal pertinent medications:  Recent 40 mg daily      Review of Systems: Please see above otherwise review of systems is completely reviewed with the patient are negative  Pertinent Medical History  1. Hypertension:  Most compatible with essential hypertension/resistant hypertension:  Secondary workup:  -normal thyroid function study  -severe HARPER on BiPAP  -aldosterone/renin ratio:  Essentially negative at 20 with an aldosterone level of 8.7  -plasma free metanephrines:  Negative  -24 hour urine for free cortisol:  Negative  -renal artery duplex:  No evidence of significant renal artery disease; but somewhat limited by body  habitus and overlying bowel gas  Again obesity may be contributing to his hypertension  Current home blood pressure readings:  A.m. 116/70  P.m.:   Heart rate: 66        GOAL BLOOD PRESSURE:  LESS THAN 125/80 GIVEN CKD/recent hemorrhagic stroke     Recommendations:  continue to push nonmedical regimen including isotonic exercise, avoidance of salt and weight loss; patient counseled as such  GIVEN TIKOSYN:  AMILORIDE/SPIRONOLACTONE/HCTZ ARE CONTRAINDICATED.  LOOP DIURETIC SUCH AS FUROSEMIDE OR TORSEMIDE ARE ACCEPTABLE.     Medication changes today:     Given low a.m. readings I am just going to check evening readings if they are indeed as low as the morning readings I would decrease hydralazine 25 twice daily to begin with  Next agent to taper off as the clonidine patch  Continue excellent weight loss     Future options would be to increase torsemide, or Eplerenone if and acceptable medication.  And of course clonidine patch as well.  However: If his blood pressure does improve potentially stop clonidine patch     DISCUSSED WITH DR. TAVERA EPLERENONE IS TOTALLY ACCEPTABLE!              2. 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  Thickened bladder with UV junction still present although perhaps slightly pronounced.  Followed by Urology:  Visit with Dr. Chery for possible right-sided ureterocele, history of cystoscopy and cystolitholapaxy status post right stent removal.   Bladder wall thickening resolution of hydronephrosis.  Possibility of congenital ureterocele was discussed 1 year follow-up with retroperitoneal ultrasound which would be November 2022.           3. Nephrolithiasis:  As mentioned previously he has had numerous stones.  No kidney stones at this time  Continue with General stone diet; in particular water intake 2 and half to 3 L a day all  Only further investigation with a 24 hour urine stone risk evaluation if recurrent stone     4. Dyslipidemia:  Current lipid profile:  LDL 40/HDL 37/triglycerides 80  Goal:  LDL less than 70 given CKD/hemorrhagic stroke  Recommend:  Diet, exercise and weight loss, patient counseled as such  No change in medication as patient is at goal        5. Recent admission 03/07/2022 secondary to right-sided weakness with word-finding difficulty, found to have acute left intraparenchymal hemorrhage with vasogenic edema.  Patient admitted [facility] with neurosurgery evaluation.  Started on Cardene due to hypertension.  No neuro surgical intervention at that time was deemed necessary.  MRI in 3/9 demonstrated unchanged intraparenchymal hematoma as well as small acute infarct in the left corona radiata.  Patient was maintained on blood pressure control.  TTE demonstrated EF 65% otherwise grade 1 diastolic dysfunction.  Seen by Neurology as well recommended restarting anticoagulation 3 weeks after repeat CT scan along with PT/OT.  In regards to blood pressure: Amlodipine was stopped nifedipine 10 mg every 8 hours titrate as needed, continuing hydralazine.     6. Increased liver function studies:  Right upper quadrant ultrasound showed cholelithiasis hepatomegaly and hepatic steatosis.  Now followed by Dr. Jaymie Marcelo who believes it is related to statin therapy but is agreeable to continue the statin.  - LFTs normal     GI health maintenance:  Seen by Dr. Marcelo we will plan for colonoscopy 3-4 months after CVA:  Patient knows he is  due for the colonoscopy will schedule with Dr. Marcelo.  Patient aware and states will set up an appointment for colonoscopy.  Rediscussed with the patient as of 6/19/2024 he knows potential risk of colon cancer and that he should set it up.          Medical History Reviewed by provider this encounter:     .  Past Medical History  Past Medical History:   Diagnosis Date   • Ankle swelling    • Arthritis    • Asthma    • Atrial fibrillation (HCC)    • Atrial fibrillation with RVR (Formerly [facility]) 04/04/2017   • Chronic kidney disease     kidney stones   • Class 3 severe obesity due to excess calories with serious comorbidity and body mass index (BMI) of 40.0 to 44.9 in adult ([facility]) 12/07/2020   • Gout    • Hypertension    • ICH (intracerebral hemorrhage) ([facility]) 03/08/2022   • Kidney stone    • Morbid obesity with BMI of 40.0-44.9, adult ([facility]) 08/31/2016   • Night sweats    • Obesity    • Seasonal allergies    • Sleep apnea    • Stroke ([facility]) 03/22     Past Surgical History:   Procedure Laterality Date   • APPENDECTOMY     • CARDIOVERSION      X4 last was 2018   • COLONOSCOPY     • CYSTOSCOPY  08/30/2016    w/removal of object, last assessed 8/30/16   • FOOT SURGERY Left    • HAND SURGERY     • KNEE SURGERY Right 2016   • RI CYSTO/URETERO W/LITHOTRIPSY &INDWELL STENT INSRT Left 08/09/2016    Procedure: CYSTOSCOPY URETEROSCOPY WITH LITHOTRIPSY HOLMIUM LASER STONE EXTRACTION, RETROGRADE PYELOGRAM AND INSERTION STENT URETERAL;  Surgeon: Sahil Chery MD;  Location:  MAIN OR;  Service: Urology last assessed 8/30/16   • RI CYSTO/URETERO W/LITHOTRIPSY &INDWELL STENT INSRT Right 08/24/2020    Procedure: CYSTOSCOPY , cystolitholapaxy of bladder stone WITH HOMIUM LASER RIGHT RETROGRADE AND RIGHT URETERAL STENT, FULGERATION OF BLADDER;  Surgeon: Sahil Chery MD;  Location:  MAIN OR;  Service: Urology   • UPPER GASTROINTESTINAL ENDOSCOPY     • WRIST SURGERY Left 2014    Fracture surgery     Family History   Problem Relation Age of Onset    • Atrial fibrillation Mother    • Other Mother         blood dyscrasia   • Hypertension Mother    • Arthritis Mother    • Other Father         hypoglycemia    • Atrial fibrillation Father    • Diabetes Family    • Other Family         Thrombocytosis   • No Known Problems Sister    • Cancer Neg Hx    • Thyroid disease Neg Hx       reports that he quit smoking about 30 years ago. His smoking use included cigarettes. He started smoking about 45 years ago. He has a 7.5 pack-year smoking history. He has never used smokeless tobacco. He reports current alcohol use. He reports that he does not use drugs.  Current Outpatient Medications on File Prior to Visit   Medication Sig Dispense Refill   • atorvastatin (LIPITOR) 40 mg tablet TAKE 1 TABLET BY MOUTH EVERY DAY WITH DINNER 90 tablet 1   • cloNIDine (CATAPRES-TTS-1) 0.1 mg/24 hr APPLY 1 PATCH ONE TIME PER WEEK 12 patch 0   • dofetilide (TIKOSYN) 500 mcg capsule TAKE 1 CAPSULE BY MOUTH EVERY 12 HOURS 60 capsule 5   • eplerenone (INSPRA) 25 mg tablet TAKE 1 TABLET BY MOUTH TWICE A  tablet 1   • hydrALAZINE (APRESOLINE) 50 mg tablet TAKE 1 TABLET BY MOUTH TWICE A  tablet 1   • nebivolol (BYSTOLIC) 2.5 mg tablet TAKE 1 TABLET BY MOUTH DAILY HOLD FOR HEART RATE LESS THAN 50 BEATS PER MINUTE. 90 tablet 1   • NIFEdipine (PROCARDIA XL) 30 mg 24 hr tablet TAKE 1 TABLET BY MOUTH TWICE A  tablet 1   • olmesartan (BENICAR) 40 mg tablet TAKE 1 TABLET BY MOUTH EVERY DAY 90 tablet 1   • rivaroxaban (Xarelto) 20 mg tablet TAKE 1 TABLET BY MOUTH EVERY DAY WITH BREAKFAST 30 tablet 1   • Tirzepatide (Mounjaro) 10 MG/0.5ML SOAJ Inject 10 mg under the skin once a week 6 mL 0   • torsemide (DEMADEX) 5 MG tablet TAKE 1 TABLET (5 MG TOTAL) BY MOUTH DAILY. 90 tablet 1     No current facility-administered medications on file prior to visit.   No Known Allergies   Current Outpatient Medications on File Prior to Visit   Medication Sig Dispense Refill   • atorvastatin (LIPITOR)  "40 mg tablet TAKE 1 TABLET BY MOUTH EVERY DAY WITH DINNER 90 tablet 1   • cloNIDine (CATAPRES-TTS-1) 0.1 mg/24 hr APPLY 1 PATCH ONE TIME PER WEEK 12 patch 0   • dofetilide (TIKOSYN) 500 mcg capsule TAKE 1 CAPSULE BY MOUTH EVERY 12 HOURS 60 capsule 5   • eplerenone (INSPRA) 25 mg tablet TAKE 1 TABLET BY MOUTH TWICE A  tablet 1   • hydrALAZINE (APRESOLINE) 50 mg tablet TAKE 1 TABLET BY MOUTH TWICE A  tablet 1   • nebivolol (BYSTOLIC) 2.5 mg tablet TAKE 1 TABLET BY MOUTH DAILY HOLD FOR HEART RATE LESS THAN 50 BEATS PER MINUTE. 90 tablet 1   • NIFEdipine (PROCARDIA XL) 30 mg 24 hr tablet TAKE 1 TABLET BY MOUTH TWICE A  tablet 1   • olmesartan (BENICAR) 40 mg tablet TAKE 1 TABLET BY MOUTH EVERY DAY 90 tablet 1   • rivaroxaban (Xarelto) 20 mg tablet TAKE 1 TABLET BY MOUTH EVERY DAY WITH BREAKFAST 30 tablet 1   • Tirzepatide (Mounjaro) 10 MG/0.5ML SOAJ Inject 10 mg under the skin once a week 6 mL 0   • torsemide (DEMADEX) 5 MG tablet TAKE 1 TABLET (5 MG TOTAL) BY MOUTH DAILY. 90 tablet 1     No current facility-administered medications on file prior to visit.      Social History     Tobacco Use   • Smoking status: Former     Current packs/day: 0.00     Average packs/day: 0.5 packs/day for 15.0 years (7.5 ttl pk-yrs)     Types: Cigarettes     Start date: 1979     Quit date: 1994     Years since quittin.7   • Smokeless tobacco: Never   Vaping Use   • Vaping status: Never Used   Substance and Sexual Activity   • Alcohol use: Yes     Comment: occ   • Drug use: No   • Sexual activity: Yes     Partners: Female     Birth control/protection: None        Objective  /63 (BP Location: Left arm, Patient Position: Sitting, Cuff Size: Large)   Pulse 69   Ht 5' 9\" (1.753 m)   Wt 114 kg (251 lb)   BMI 37.07 kg/m²      Physical Exam  BP sitting on left: 130/70 with a heart rate of 68 and regular  BP standing on left: 130/78 with a heart rate of 76 and regular  General: Well-developed " well-nourished,obese, no acute distress  Skin:  No acute rash  Eyes: No scleral icterus and noninjected  ENT: Normocephalic/atraumatic moist mucous membranes  Neck:  Supple, no jugular venous distention, trachea midline, overall appearance is normal; no  carotid bruits, 1+ carotid upstroke  Back: No CVA tenderness spine midline  Chest:  Clear to auscultation and percussion, good respiratory effort no use of accessory respiratory muscles  CVS:  Regular rate and rhythm, without a rub or gallops or murmurs, normal S3 and S4  Abdomen:  Normal bowel sounds, soft and nontender and nondistended; no overt hepatosplenomegaly or bruits appreciable  Extremities:  No clubbing, cyanosis or edema; 1+ dorsalis pedis pulse, no femoral bruits and no significant arthritic changes  Neuro:  No gross focality  Psych:  Alert and oriented and appropriate

## 2025-01-07 RX ORDER — HYDRALAZINE HYDROCHLORIDE 50 MG/1
50 TABLET, FILM COATED ORAL 2 TIMES DAILY
Qty: 180 TABLET | Refills: 1 | Status: SHIPPED | OUTPATIENT
Start: 2025-01-07

## 2025-01-24 DIAGNOSIS — I12.9 HYPERTENSIVE CHRONIC KIDNEY DISEASE WITH STAGE 1 THROUGH STAGE 4 CHRONIC KIDNEY DISEASE, OR UNSPECIFIED CHRONIC KIDNEY DISEASE: ICD-10-CM

## 2025-01-24 DIAGNOSIS — I48.20 CHRONIC ATRIAL FIBRILLATION (HCC): ICD-10-CM

## 2025-01-24 DIAGNOSIS — E66.01 CLASS 3 SEVERE OBESITY DUE TO EXCESS CALORIES WITH SERIOUS COMORBIDITY AND BODY MASS INDEX (BMI) OF 40.0 TO 44.9 IN ADULT (HCC): ICD-10-CM

## 2025-01-24 DIAGNOSIS — E66.813 CLASS 3 SEVERE OBESITY DUE TO EXCESS CALORIES WITH SERIOUS COMORBIDITY AND BODY MASS INDEX (BMI) OF 40.0 TO 44.9 IN ADULT (HCC): ICD-10-CM

## 2025-01-24 DIAGNOSIS — E78.5 DYSLIPIDEMIA: ICD-10-CM

## 2025-01-24 DIAGNOSIS — E55.9 VITAMIN D DEFICIENCY: ICD-10-CM

## 2025-01-24 DIAGNOSIS — N18.31 STAGE 3A CHRONIC KIDNEY DISEASE (HCC): ICD-10-CM

## 2025-01-24 DIAGNOSIS — I10 ACCELERATED HYPERTENSION: ICD-10-CM

## 2025-01-24 RX ORDER — RIVAROXABAN 20 MG/1
20 TABLET, FILM COATED ORAL
Qty: 30 TABLET | Refills: 1 | Status: SHIPPED | OUTPATIENT
Start: 2025-01-24

## 2025-01-24 RX ORDER — NIFEDIPINE 30 MG/1
30 TABLET, EXTENDED RELEASE ORAL 2 TIMES DAILY
Qty: 180 TABLET | Refills: 1 | Status: SHIPPED | OUTPATIENT
Start: 2025-01-24

## 2025-01-30 DIAGNOSIS — I10 BENIGN ESSENTIAL HYPERTENSION: ICD-10-CM

## 2025-01-30 DIAGNOSIS — I12.9 HYPERTENSIVE CHRONIC KIDNEY DISEASE WITH STAGE 1 THROUGH STAGE 4 CHRONIC KIDNEY DISEASE, OR UNSPECIFIED CHRONIC KIDNEY DISEASE: ICD-10-CM

## 2025-01-31 RX ORDER — CLONIDINE 0.1 MG/24H
PATCH, EXTENDED RELEASE TRANSDERMAL
Qty: 12 PATCH | Refills: 0 | Status: SHIPPED | OUTPATIENT
Start: 2025-01-31

## 2025-02-03 ENCOUNTER — TELEPHONE (OUTPATIENT)
Age: 60
End: 2025-02-03

## 2025-02-03 NOTE — TELEPHONE ENCOUNTER
Pt under care of:  Ilda  Office Location: [facility]    Last Seen (include Follow Up recommendations of last visit- see Office Visit - Instructions):     Pt calling due to: blood in urine for a few weeks and has not gotten better.  Pt stated hx of kidney stones and on blood thinners. Pt stated in the morning blood in urine is darker like wine and then as day goes on and drinking a lot of water blood turns to pink. Pt denies clots, nausea vomiting or diarrhea     Active Symptoms?  Explain: lower right back pain and blood in urine for a few weeks     Pt can be reached at: [phone]    Appointment Details  Date:  2/4/25  Time:    8am  Location:   [facility]   Provider:  Andrea    Does the appointment need further review? (Reason) see if pt should have any imaging or labs completed prior to appt

## 2025-02-03 NOTE — TELEPHONE ENCOUNTER
Nothing needed at this time.  We will discuss tomorrow at her appointment what workup should entail.  Thank you

## 2025-02-04 ENCOUNTER — OFFICE VISIT (OUTPATIENT)
Dept: UROLOGY | Facility: AMBULATORY SURGERY CENTER | Age: 60
End: 2025-02-04

## 2025-02-04 VITALS
BODY MASS INDEX: 37.18 KG/M2 | DIASTOLIC BLOOD PRESSURE: 80 MMHG | OXYGEN SATURATION: 97 % | WEIGHT: 251 LBS | HEIGHT: 69 IN | SYSTOLIC BLOOD PRESSURE: 110 MMHG | HEART RATE: 73 BPM

## 2025-02-04 DIAGNOSIS — Z12.11 ENCOUNTER FOR SCREENING COLONOSCOPY: ICD-10-CM

## 2025-02-04 DIAGNOSIS — R31.0 GROSS HEMATURIA: Primary | ICD-10-CM

## 2025-02-04 DIAGNOSIS — Z12.5 SCREENING FOR PROSTATE CANCER: ICD-10-CM

## 2025-02-04 LAB
SL AMB  POCT GLUCOSE, UA: ABNORMAL
SL AMB LEUKOCYTE ESTERASE,UA: ABNORMAL
SL AMB POCT BILIRUBIN,UA: ABNORMAL
SL AMB POCT BLOOD,UA: ABNORMAL
SL AMB POCT CLARITY,UA: CLEAR
SL AMB POCT COLOR,UA: ABNORMAL
SL AMB POCT KETONES,UA: ABNORMAL
SL AMB POCT NITRITE,UA: ABNORMAL
SL AMB POCT PH,UA: 5
SL AMB POCT SPECIFIC GRAVITY,UA: 1.02
SL AMB POCT URINE PROTEIN: ABNORMAL
SL AMB POCT UROBILINOGEN: 0.2

## 2025-02-04 PROCEDURE — 81001 URINALYSIS AUTO W/SCOPE: CPT

## 2025-02-04 PROCEDURE — 87086 URINE CULTURE/COLONY COUNT: CPT

## 2025-02-04 RX ORDER — TAMSULOSIN HYDROCHLORIDE 0.4 MG/1
0.4 CAPSULE ORAL
Qty: 30 CAPSULE | Refills: 0 | Status: SHIPPED | OUTPATIENT
Start: 2025-02-04 | End: 2025-03-06

## 2025-02-04 NOTE — ASSESSMENT & PLAN NOTE
Patient reports intermittent right sided flank pain and gross hematuria x 2 to 3 weeks  Urine dip in the office today showed large blood and small leukocytes  We discussed having his urine sent out for urinalysis urine culture, and urine cytology  We discussed obtaining a CT renal protocol to evaluate the upper tracts in the bladder exclusively for source of patient's gross hematuria  We discussed initiating the patient on Flomax 0.4 mg once daily for suspicion of a potential kidney stone with the patient's longstanding history of kidney and bladder stones.  Patient will initiate Flomax 0.4 mg and undergo CT renal protocol.  Our office will call with CT and urine study results.  Pending the results of the CT scan will determine if the patient needs an office follow-up for discussion in regards to stone removal procedure or an in office cystoscopy to complete his workup for gross hematuria.

## 2025-02-04 NOTE — PROGRESS NOTES
2/4/2025      Assessment and Plan    59 y.o. male managed by Dr. Chery    Gross hematuria  Patient reports intermittent right sided flank pain and gross hematuria x 2 to 3 weeks  Urine dip in the office today showed large blood and small leukocytes  We discussed having his urine sent out for urinalysis urine culture, and urine cytology  We discussed obtaining a CT renal protocol to evaluate the upper tracts in the bladder exclusively for source of patient's gross hematuria  We discussed initiating the patient on Flomax 0.4 mg once daily for suspicion of a potential kidney stone with the patient's longstanding history of kidney and bladder stones.  Patient will initiate Flomax 0.4 mg and undergo CT renal protocol.  Our office will call with CT and urine study results.  Pending the results of the CT scan will determine if the patient needs an office follow-up for discussion in regards to stone removal procedure or an in office cystoscopy to complete his workup for gross hematuria.    Screening for prostate cancer  Patient's last PSA was performed 12/20/2024 and found to be 1.079.  Refer to PSA trend below.  We discussed that the patient's PSA is overall stable and that he can plan to repeat his PSA in 1 year from his last follow-up in the office at that time to undergo TRENT.    Lab Results   Component Value Date    PSA 1.079 12/28/2024    PSA 0.8 11/16/2020            History of Present Illness  Roland Kwan is a 59 y.o. male here for evaluation of flank pain and gross hematuria.  Patient called the office on 2/3/2025 in regards to several weeks of blood in his urine and lower right flank pain.  Patient does have a history of kidney and bladder stones.  Patient was last seen in our office on 2/23/2021 after undergoing cystolitholopaxy for treatment of a bladder stone.  At that time, right-sided hydronephrosis secondary to a possible ureterocele versus ruptured ureterocele was noted.  Retrograde pyelogram was  performed which was otherwise normal.  Patient's last upper tract imaging was an ultrasound of the kidney and bladder performed 2/25/2022 which noted a 6 x 7 x 2 mm nonshadowing echogenic focus in the lower pole of the left kidney and a simple cyst in the right kidney.  Otherwise, ultrasound was unremarkable at that time.  Patient is not currently on any pharmacotherapy for treatment of lower urinary tract symptoms.  PSA performed 12/28/2024 was found to be 1.079.  Today, the patient reports that he has been experiencing intermittent right sided flank pain as well as gross hematuria for the last 2 to 3 weeks.  Patient notes that more recently the gross hematuria has started to resolve and notes it to be a tea color at this time.  Patient denies any visualization of blood clots.  Furthermore, the patient denies any visualization of passage of stone.  Patient notes that there has been other recurrences of gross hematuria since his last office visit, which have all resolved on their own.  Patient is anticoagulated on Xarelto.        Review of Systems   Constitutional:  Negative for chills and fever.   HENT:  Negative for ear pain and sore throat.    Eyes:  Negative for pain and visual disturbance.   Respiratory:  Negative for cough and shortness of breath.    Cardiovascular:  Negative for chest pain and palpitations.   Gastrointestinal:  Negative for abdominal pain and vomiting.   Genitourinary:  Positive for flank pain and hematuria. Negative for decreased urine volume, difficulty urinating, dysuria, frequency and urgency.   Musculoskeletal:  Negative for arthralgias and back pain.   Skin:  Negative for color change and rash.   Neurological:  Negative for seizures and syncope.   All other systems reviewed and are negative.          AUA SYMPTOM SCORE      Flowsheet Row Most Recent Value   AUA SYMPTOM SCORE    How often have you had a sensation of not emptying your bladder completely after you finished urinating? 0 (P)   "   How often have you had to urinate again less than two hours after you finished urinating? 3 (P)     How often have you found you stopped and started again several times when you urinate? 0 (P)     How often have you found it difficult to postpone urination? 4 (P)     How often have you had a weak urinary stream? 0 (P)     How often have you had to push or strain to begin urination? 0 (P)     How many times did you most typically get up to urinate from the time you went to bed at night until the time you got up in the morning? 0 (P)     Quality of Life: If you were to spend the rest of your life with your urinary condition just the way it is now, how would you feel about that? 5 (P)     AUA SYMPTOM SCORE 7 (P)               Vitals  Vitals:    02/04/25 0756   BP: 110/80   BP Location: Left arm   Patient Position: Sitting   Cuff Size: Standard   Pulse: 73   SpO2: 97%   Weight: 114 kg (251 lb)   Height: 5' 9\" (1.753 m)       Physical Exam  Vitals reviewed.   Constitutional:       General: He is not in acute distress.     Appearance: Normal appearance. He is not ill-appearing.   HENT:      Head: Normocephalic and atraumatic.      Nose: Nose normal.   Eyes:      General: No scleral icterus.  Pulmonary:      Effort: No respiratory distress.   Abdominal:      General: Abdomen is flat. There is no distension.      Palpations: Abdomen is soft.      Tenderness: There is no abdominal tenderness.   Musculoskeletal:         General: Normal range of motion.      Cervical back: Normal range of motion.   Skin:     General: Skin is warm.      Coloration: Skin is not jaundiced.   Neurological:      Mental Status: He is alert and oriented to person, place, and time.      Gait: Gait normal.   Psychiatric:         Mood and Affect: Mood normal.         Behavior: Behavior normal.           Past History  Past Medical History:   Diagnosis Date    Ankle swelling     Arthritis     Asthma     Atrial fibrillation (HCC)     Atrial fibrillation " with RVR ([facility]) 2017    Chronic kidney disease     kidney stones    Class 3 severe obesity due to excess calories with serious comorbidity and body mass index (BMI) of 40.0 to 44.9 in adult ([facility]) 2020    Gout     Hypertension     ICH (intracerebral hemorrhage) ([facility]) 2022    Kidney stone     Morbid obesity with BMI of 40.0-44.9, adult ([facility]) 2016    Night sweats     Obesity     Seasonal allergies     Sleep apnea     Stroke ([facility])      Social History     Socioeconomic History    Marital status: /Civil Union     Spouse name: None    Number of children: 4    Years of education: 12th grade     Highest education level: None   Occupational History    None   Tobacco Use    Smoking status: Former     Current packs/day: 0.00     Average packs/day: 0.5 packs/day for 15.0 years (7.5 ttl pk-yrs)     Types: Cigarettes     Start date: 1979     Quit date: 1994     Years since quittin.8    Smokeless tobacco: Never   Vaping Use    Vaping status: Never Used   Substance and Sexual Activity    Alcohol use: Yes     Comment: occ    Drug use: No    Sexual activity: Yes     Partners: Female     Birth control/protection: None   Other Topics Concern    None   Social History Narrative    No caffeine use      Social Drivers of Health     Financial Resource Strain: Not on file   Food Insecurity: No Food Insecurity (3/11/2022)    Hunger Vital Sign     Worried About Running Out of Food in the Last Year: Never true     Ran Out of Food in the Last Year: Never true   Transportation Needs: No Transportation Needs (3/11/2022)    PRAPARE - Transportation     Lack of Transportation (Medical): No     Lack of Transportation (Non-Medical): No   Physical Activity: Not on file   Stress: Not on file   Social Connections: Not on file   Intimate Partner Violence: Not on file   Housing Stability: Low Risk  (3/11/2022)    Housing Stability Vital Sign     Unable to Pay for Housing in the Last Year: No     Number of  Places Lived in the Last Year: 1     Unstable Housing in the Last Year: No     Social History     Tobacco Use   Smoking Status Former    Current packs/day: 0.00    Average packs/day: 0.5 packs/day for 15.0 years (7.5 ttl pk-yrs)    Types: Cigarettes    Start date: 1979    Quit date: 1994    Years since quittin.8   Smokeless Tobacco Never     Family History   Problem Relation Age of Onset    Atrial fibrillation Mother     Other Mother         blood dyscrasia    Hypertension Mother     Arthritis Mother     Other Father         hypoglycemia     Atrial fibrillation Father     Diabetes Family     Other Family         Thrombocytosis    No Known Problems Sister     Cancer Neg Hx     Thyroid disease Neg Hx        The following portions of the patient's history were reviewed and updated as appropriate: allergies, current medications, past medical history, past social history, past surgical history and problem list.    Results  No results found for this or any previous visit (from the past hour).  ]  Lab Results   Component Value Date    PSA 1.079 2024    PSA 0.8 2020     Lab Results   Component Value Date    GLUCOSE 94 2015    CALCIUM 9.1 2024     2015    K 4.3 2024    CO2 31 2024     2024    BUN 19 2024    CREATININE 1.17 2024     Lab Results   Component Value Date    WBC 8.73 2024    HGB 15.4 2024    HCT 45.9 2024    MCV 93 2024     2024

## 2025-02-04 NOTE — ASSESSMENT & PLAN NOTE
Patient's last PSA was performed 12/20/2024 and found to be 1.079.  Refer to PSA trend below.  We discussed that the patient's PSA is overall stable and that he can plan to repeat his PSA in 1 year from his last follow-up in the office at that time to undergo TRENT.    Lab Results   Component Value Date    PSA 1.079 12/28/2024    PSA 0.8 11/16/2020

## 2025-02-05 ENCOUNTER — RESULTS FOLLOW-UP (OUTPATIENT)
Dept: UROLOGY | Facility: AMBULATORY SURGERY CENTER | Age: 60
End: 2025-02-05

## 2025-02-05 DIAGNOSIS — N20.0 KIDNEY STONE: Primary | ICD-10-CM

## 2025-02-05 LAB
BACTERIA UR QL AUTO: ABNORMAL /HPF
BILIRUB UR QL STRIP: NEGATIVE
CLARITY UR: ABNORMAL
COLOR UR: ABNORMAL
GLUCOSE UR STRIP-MCNC: NEGATIVE MG/DL
HGB UR QL STRIP.AUTO: ABNORMAL
HYALINE CASTS #/AREA URNS LPF: ABNORMAL /LPF
KETONES UR STRIP-MCNC: NEGATIVE MG/DL
LEUKOCYTE ESTERASE UR QL STRIP: NEGATIVE
NITRITE UR QL STRIP: NEGATIVE
NON-SQ EPI CELLS URNS QL MICRO: ABNORMAL /HPF
PH UR STRIP.AUTO: 6 [PH]
PROT UR STRIP-MCNC: ABNORMAL MG/DL
RBC #/AREA URNS AUTO: ABNORMAL /HPF
SP GR UR STRIP.AUTO: 1.01 (ref 1–1.03)
UROBILINOGEN UR STRIP-ACNC: <2 MG/DL
WBC #/AREA URNS AUTO: ABNORMAL /HPF

## 2025-02-05 NOTE — ASSESSMENT & PLAN NOTE
Occurred 03/2022 - reports was due to high blood pressure, rather than clot  Maintained on statin (not on ASA due to AC as above)

## 2025-02-05 NOTE — ASSESSMENT & PLAN NOTE
No prior ablations  Multiple prior cardioversions: 2017/ 10/2018, 2022 (aborted as spontaneously converted to sinus)  Current medication regimen:  AAD: dofetilide 500mcg q12h  AC: xarelto 20mg daily ([id number] 3 (HTN, CVA))  Rate: nebivolol 2.5mg daily

## 2025-02-05 NOTE — PROGRESS NOTES
Electrophysiology Follow Up  [facility]  [facility] [address], [place] [address]    Name: Roland Kwan  : 1965  MRN: [id number]    Assessment & Plan  Chronic atrial fibrillation (HCC)  No prior ablations  Multiple prior cardioversions: 2017/ 10/2018,  (aborted as spontaneously converted to sinus)  Current medication regimen:  AAD: dofetilide 500mcg q12h  AC: xarelto 20mg daily ([id number] 3 (HTN, CVA))  Rate: nebivolol 2.5mg daily   Type 2 diabetes mellitus with stage 3a chronic kidney disease, without long-term current use of insulin (HCC)    Lab Results   Component Value Date    HGBA1C 5.7 (H) 2024   Continue PCP follow-up  HARPER (obstructive sleep apnea)  Recommend CPAP compliance  History of stroke  Occurred 2022 - reports was due to high blood pressure, rather than clot  Maintained on statin (not on ASA due to AC as above)  Class 2 obesity due to excess calories with body mass index (BMI) of 37.0 to 37.9 in adult, unspecified whether serious comorbidity present  Recommend weight loss through healthy diet and exercise       Discussion/Plan:    Patient has a history of A-fib for which he is currently maintained on dofetilide, Xarelto, and nebivolol as above    EKG in office today showing NSR w/ QTC 422ms and ventricular rate 62bpm    His latest lab evaluation shows stable renal function without significant electrolyte abnormality    Since his last outpatient EP appointment he reports he has been feelin well and currently denies acute cardiac compalint    He denies any significant adverse been events whilst on Xarelto    He does report an interest in swapping from Xarelto to Eliquis as he recently read an article stating that 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  Given this I have discontinued his Xarelto and have prescribed him  Eliquis.    Otherwise no acute medication changes were made today    Modifiable risk factors for atrial fibrillation were discussed today including weight loss, avoiding alcohol/tobacco products, and treatment of HARPER/HTN/DM    Patient has been instructed to follow up in our EP office in 1 year or as needed. He will call our office with any questions or concerns in the meantime.    Rhythm History:   Atrial fibrillation:      Atrial flutter:      SVT:      VT/VF/PVC:     Device history:   Pacemaker:     Defibrillator:     BIV PPM:     BIV ICD:     ILR:    Interim History/HPI:   Interim history: Roland Kwan is a 59 y.o. male with a PMH of A-fib, T2DM, HARPER, stroke history, and obesity.      He presents today for routine outpatient follow-up given his history of A-fib.  Since his last outpatient EP appointment he reports he has been feeling well and denies acute cardiac complaint.  He denies any significant adverse been events whilst on Xarelto    EKG: NSR w/ QTC 422ms and ventricular rate 62bpm    Review of Systems   Constitutional:  Negative for activity change, appetite change, chills, fatigue and fever.   HENT:  Negative for nosebleeds.    Respiratory:  Negative for chest tightness and shortness of breath.    Cardiovascular:  Negative for chest pain, palpitations and leg swelling.   Neurological:  Negative for dizziness, syncope, weakness and light-headedness.         OBJECTIVE:   Vitals:   There were no vitals taken for this visit.  There is no height or weight on file to calculate BMI.        Physical Exam:   Physical Exam  Constitutional:       General: He is not in acute distress.     Appearance: Normal appearance. He is not toxic-appearing.   HENT:      Head: Normocephalic and atraumatic.   Eyes:      General:         Right eye: No discharge.         Left eye: No discharge.   Cardiovascular:      Rate and Rhythm: Normal rate and regular rhythm.      Pulses: Normal pulses.   Pulmonary:      Effort: Pulmonary  effort is normal.      Breath sounds: Normal breath sounds.   Musculoskeletal:      Right lower leg: No edema.      Left lower leg: No edema.   Skin:     General: Skin is warm and dry.      Capillary Refill: Capillary refill takes less than 2 seconds.   Neurological:      Mental Status: He is alert.            Medications:      Current Outpatient Medications:     atorvastatin (LIPITOR) 40 mg tablet, TAKE 1 TABLET BY MOUTH EVERY DAY WITH DINNER, Disp: 90 tablet, Rfl: 1    cloNIDine (CATAPRES-TTS-1) 0.1 mg/24 hr, APPLY 1 PATCH ONE TIME PER WEEK, Disp: 12 patch, Rfl: 0    dofetilide (TIKOSYN) 500 mcg capsule, TAKE 1 CAPSULE BY MOUTH EVERY 12 HOURS, Disp: 60 capsule, Rfl: 5    eplerenone (INSPRA) 25 mg tablet, TAKE 1 TABLET BY MOUTH TWICE A DAY, Disp: 180 tablet, Rfl: 1    hydrALAZINE (APRESOLINE) 50 mg tablet, TAKE 1 TABLET BY MOUTH TWICE A DAY, Disp: 180 tablet, Rfl: 1    nebivolol (BYSTOLIC) 2.5 mg tablet, TAKE 1 TABLET BY MOUTH DAILY HOLD FOR HEART RATE LESS THAN 50 BEATS PER MINUTE., Disp: 90 tablet, Rfl: 1    NIFEdipine (PROCARDIA XL) 30 mg 24 hr tablet, TAKE 1 TABLET BY MOUTH TWICE A DAY, Disp: 180 tablet, Rfl: 1    olmesartan (BENICAR) 40 mg tablet, TAKE 1 TABLET BY MOUTH EVERY DAY, Disp: 90 tablet, Rfl: 1    tamsulosin (FLOMAX) 0.4 mg, Take 1 capsule (0.4 mg total) by mouth daily with dinner, Disp: 30 capsule, Rfl: 0    Tirzepatide (Mounjaro) 10 MG/0.5ML SOAJ, Inject 10 mg under the skin once a week, Disp: 6 mL, Rfl: 0    torsemide (DEMADEX) 5 MG tablet, TAKE 1 TABLET (5 MG TOTAL) BY MOUTH DAILY., Disp: 90 tablet, Rfl: 1    Xarelto 20 MG tablet, TAKE 1 TABLET BY MOUTH EVERY DAY WITH BREAKFAST, Disp: 30 tablet, Rfl: 1       Historical Information   Past Medical History:   Diagnosis Date    Ankle swelling     Arthritis     Asthma     Atrial fibrillation (HCC)     Atrial fibrillation with RVR (HCC) 04/04/2017    Chronic kidney disease     kidney stones    Class 3 severe obesity due to excess calories with serious  comorbidity and body mass index (BMI) of 40.0 to 44.9 in adult ([facility]) 2020    Gout     Hypertension     ICH (intracerebral hemorrhage) ([facility]) 2022    Kidney stone     Morbid obesity with BMI of 40.0-44.9, adult ([facility]) 2016    Night sweats     Obesity     Seasonal allergies     Sleep apnea     Stroke ([facility])        Past Surgical History:   Procedure Laterality Date    APPENDECTOMY      CARDIOVERSION      X4 last was 2018    COLONOSCOPY      CYSTOSCOPY  2016    w/removal of object, last assessed 16    FOOT SURGERY Left     HAND SURGERY      KNEE SURGERY Right 2016    FL CYSTO/URETERO W/LITHOTRIPSY &INDWELL STENT INSRT Left 2016    Procedure: CYSTOSCOPY URETEROSCOPY WITH LITHOTRIPSY HOLMIUM LASER STONE EXTRACTION, RETROGRADE PYELOGRAM AND INSERTION STENT URETERAL;  Surgeon: Sahil Chery MD;  Location:  MAIN OR;  Service: Urology last assessed 16    FL CYSTO/URETERO W/LITHOTRIPSY &INDWELL STENT INSRT Right 2020    Procedure: CYSTOSCOPY , cystolitholapaxy of bladder stone WITH HOMIUM LASER RIGHT RETROGRADE AND RIGHT URETERAL STENT, FULGERATION OF BLADDER;  Surgeon: Sahil Chery MD;  Location:  MAIN OR;  Service: Urology    UPPER GASTROINTESTINAL ENDOSCOPY      WRIST SURGERY Left     Fracture surgery       Social History     Substance and Sexual Activity   Alcohol Use Yes    Comment: occ     Social History     Substance and Sexual Activity   Drug Use No     Social History     Tobacco Use   Smoking Status Former    Current packs/day: 0.00    Average packs/day: 0.5 packs/day for 15.0 years (7.5 ttl pk-yrs)    Types: Cigarettes    Start date: 1979    Quit date: 1994    Years since quittin.8   Smokeless Tobacco Never       Family History   Problem Relation Age of Onset    Atrial fibrillation Mother     Other Mother         blood dyscrasia    Hypertension Mother     Arthritis Mother     Other Father         hypoglycemia     Atrial fibrillation Father      Diabetes Family     Other Family         Thrombocytosis    No Known Problems Sister     Cancer Neg Hx     Thyroid disease Neg Hx          Labs & Results:  Below is the patient's most recent value for Albumin, ALT, AST, BUN, Calcium, Chloride, Cholesterol, CO2, Creatinine, GFR, Glucose, HDL, Hematocrit, Hemoglobin, Hemoglobin A1C, LDL, Magnesium, Phosphorus, Platelets, Potassium, PSA, Sodium, Triglycerides, and WBC.   Lab Results   Component Value Date    ALT 12 12/28/2024    AST 13 12/28/2024    BUN 19 12/28/2024    CALCIUM 9.1 12/28/2024     12/28/2024    CHOL 177 10/08/2012    CO2 31 12/28/2024    CREATININE 1.17 12/28/2024    HDL 37 (L) 12/28/2024    HCT 45.9 12/28/2024    HGB 15.4 12/28/2024    HGBA1C 5.7 (H) 12/28/2024    MG 2.0 12/28/2024    PHOS 3.0 12/01/2023     12/28/2024    K 4.3 12/28/2024    PSA 1.079 12/28/2024     08/18/2015    TRIG 80 12/28/2024    WBC 8.73 12/28/2024     Note: for a comprehensive list of the patient's lab results, access the Results Review activity.

## 2025-02-06 ENCOUNTER — OFFICE VISIT (OUTPATIENT)
Dept: CARDIOLOGY CLINIC | Facility: CLINIC | Age: 60
End: 2025-02-06
Payer: COMMERCIAL

## 2025-02-06 VITALS
DIASTOLIC BLOOD PRESSURE: 74 MMHG | BODY MASS INDEX: 37.38 KG/M2 | HEART RATE: 62 BPM | SYSTOLIC BLOOD PRESSURE: 108 MMHG | WEIGHT: 252.4 LBS | HEIGHT: 69 IN

## 2025-02-06 DIAGNOSIS — E66.09 CLASS 2 OBESITY DUE TO EXCESS CALORIES WITH BODY MASS INDEX (BMI) OF 37.0 TO 37.9 IN ADULT, UNSPECIFIED WHETHER SERIOUS COMORBIDITY PRESENT: ICD-10-CM

## 2025-02-06 DIAGNOSIS — E66.812 CLASS 2 OBESITY DUE TO EXCESS CALORIES WITH BODY MASS INDEX (BMI) OF 37.0 TO 37.9 IN ADULT, UNSPECIFIED WHETHER SERIOUS COMORBIDITY PRESENT: ICD-10-CM

## 2025-02-06 DIAGNOSIS — N18.31 TYPE 2 DIABETES MELLITUS WITH STAGE 3A CHRONIC KIDNEY DISEASE, WITHOUT LONG-TERM CURRENT USE OF INSULIN (HCC): ICD-10-CM

## 2025-02-06 DIAGNOSIS — Z86.73 HISTORY OF STROKE: ICD-10-CM

## 2025-02-06 DIAGNOSIS — G47.33 OSA (OBSTRUCTIVE SLEEP APNEA): ICD-10-CM

## 2025-02-06 DIAGNOSIS — E11.22 TYPE 2 DIABETES MELLITUS WITH STAGE 3A CHRONIC KIDNEY DISEASE, WITHOUT LONG-TERM CURRENT USE OF INSULIN (HCC): ICD-10-CM

## 2025-02-06 DIAGNOSIS — I48.20 CHRONIC ATRIAL FIBRILLATION (HCC): Primary | ICD-10-CM

## 2025-02-06 LAB — BACTERIA UR CULT: NORMAL

## 2025-02-06 PROCEDURE — 99214 OFFICE O/P EST MOD 30 MIN: CPT

## 2025-02-06 PROCEDURE — 93000 ELECTROCARDIOGRAM COMPLETE: CPT

## 2025-02-06 RX ORDER — DOFETILIDE 0.5 MG/1
500 CAPSULE ORAL EVERY 12 HOURS
Qty: 60 CAPSULE | Refills: 5 | Status: SHIPPED | OUTPATIENT
Start: 2025-02-06

## 2025-02-10 ENCOUNTER — HOSPITAL ENCOUNTER (OUTPATIENT)
Dept: CT IMAGING | Facility: HOSPITAL | Age: 60
Discharge: HOME | End: 2025-02-10
Payer: COMMERCIAL

## 2025-02-10 DIAGNOSIS — R31.0 GROSS HEMATURIA: ICD-10-CM

## 2025-02-10 PROCEDURE — 74178 CT ABD&PLV WO CNTR FLWD CNTR: CPT

## 2025-02-10 RX ADMIN — IOHEXOL 100 ML: 350 INJECTION, SOLUTION INTRAVENOUS at 07:55

## 2025-02-11 ENCOUNTER — PATIENT MESSAGE (OUTPATIENT)
Dept: UROLOGY | Facility: AMBULATORY SURGERY CENTER | Age: 60
End: 2025-02-11

## 2025-02-12 NOTE — TELEPHONE ENCOUNTER
Please call the patient advise him that I reviewed his most recent CT renal protocol.  Patient CT noted a 13 x 9 mm nonobstructing stone on the right renal pelvis as well as mild prominence of the lower pole of the collecting system.  The 13 x 9 mm stone likely did not contribute to his episode of gross hematuria, nor flank pain.  However, the mild prominence can occur after passage of a kidney stone.  Patient may have recently passed a stone with his history of kidney stones.  Furthermore, 3 nonobstructing stone in the right kidney measuring 1 to 2 mm as well as a nonobstructing 4 mm stone in the left kidney.    Patient was noted to have small renal cyst on the left kidney, which are collections of fluid that sit on the kidney and are benign and do not warrant any further imaging.    Patient was also noted to have an enlarged prostate.    Patient likely passed a kidney stone without realizing it and is likely the source of his gross hematuria.  We should repeat an ultrasound of the kidney and bladder in 2 to 4 weeks.  I placed an order for an ultrasound to be completed.

## 2025-02-12 NOTE — TELEPHONE ENCOUNTER
Called and spoke directly to this pt and relayed this message in detail. Pt stated he understood the message as I relayed. Pt also is aware that a follow US is needed and he is aware he needs to follow through with that in the next 2-4 wks. If this patient calls back please relay these message again. Thank  you                                  Please call the patient advise him that I reviewed his most recent CT renal protocol.  Patient CT noted a 13 x 9 mm nonobstructing stone on the right renal pelvis as well as mild prominence of the lower pole of the collecting system.  The 13 x 9 mm stone likely did not contribute to his episode of gross hematuria, nor flank pain.  However, the mild prominence can occur after passage of a kidney stone.  Patient may have recently passed a stone with his history of kidney stones.  Furthermore, 3 nonobstructing stone in the right kidney measuring 1 to 2 mm as well as a nonobstructing 4 mm stone in the left kidney.    Patient was noted to have small renal cyst on the left kidney, which are collections of fluid that sit on the kidney and are benign and do not warrant any further imaging.    Patient was also noted to have an enlarged prostate.    Patient likely passed a kidney stone without realizing it and is likely the source of his gross hematuria.  We should repeat an ultrasound of the kidney and bladder in 2 to 4 weeks.  I placed an order for an ultrasound to be completed.

## 2025-02-18 DIAGNOSIS — N18.31 TYPE 2 DIABETES MELLITUS WITH STAGE 3A CHRONIC KIDNEY DISEASE, WITHOUT LONG-TERM CURRENT USE OF INSULIN (HCC): ICD-10-CM

## 2025-02-18 DIAGNOSIS — E11.22 TYPE 2 DIABETES MELLITUS WITH STAGE 3A CHRONIC KIDNEY DISEASE, WITHOUT LONG-TERM CURRENT USE OF INSULIN (HCC): ICD-10-CM

## 2025-02-19 ENCOUNTER — OFFICE VISIT (OUTPATIENT)
Dept: OBGYN CLINIC | Facility: MEDICAL CENTER | Age: 60
End: 2025-02-19
Payer: COMMERCIAL

## 2025-02-19 VITALS — WEIGHT: 252.8 LBS | HEIGHT: 69 IN | BODY MASS INDEX: 37.44 KG/M2

## 2025-02-19 DIAGNOSIS — E78.5 DYSLIPIDEMIA: ICD-10-CM

## 2025-02-19 DIAGNOSIS — G89.29 CHRONIC PAIN OF RIGHT KNEE: ICD-10-CM

## 2025-02-19 DIAGNOSIS — M17.11 PRIMARY OSTEOARTHRITIS OF RIGHT KNEE: Primary | ICD-10-CM

## 2025-02-19 DIAGNOSIS — M25.561 CHRONIC PAIN OF RIGHT KNEE: ICD-10-CM

## 2025-02-19 PROCEDURE — 99213 OFFICE O/P EST LOW 20 MIN: CPT | Performed by: ORTHOPAEDIC SURGERY

## 2025-02-19 PROCEDURE — 20610 DRAIN/INJ JOINT/BURSA W/O US: CPT | Performed by: PHYSICIAN ASSISTANT

## 2025-02-19 RX ORDER — TIRZEPATIDE 10 MG/.5ML
INJECTION, SOLUTION SUBCUTANEOUS
Qty: 6 ML | Refills: 1 | Status: SHIPPED | OUTPATIENT
Start: 2025-02-19

## 2025-02-19 RX ORDER — BUPIVACAINE HYDROCHLORIDE 2.5 MG/ML
2 INJECTION, SOLUTION INFILTRATION; PERINEURAL
Status: COMPLETED | OUTPATIENT
Start: 2025-02-19 | End: 2025-02-19

## 2025-02-19 RX ORDER — TRIAMCINOLONE ACETONIDE 40 MG/ML
40 INJECTION, SUSPENSION INTRA-ARTICULAR; INTRAMUSCULAR
Status: COMPLETED | OUTPATIENT
Start: 2025-02-19 | End: 2025-02-19

## 2025-02-19 RX ADMIN — TRIAMCINOLONE ACETONIDE 40 MG: 40 INJECTION, SUSPENSION INTRA-ARTICULAR; INTRAMUSCULAR at 08:45

## 2025-02-19 RX ADMIN — BUPIVACAINE HYDROCHLORIDE 2 ML: 2.5 INJECTION, SOLUTION INFILTRATION; PERINEURAL at 08:45

## 2025-02-19 NOTE — PROGRESS NOTES
Name: Roland Kwan      : 1965      MRN: [id number]  Encounter Provider: Taina Layne DO  Encounter Date: 2025   Encounter department[facility]  :  Assessment & Plan  Primary osteoarthritis of right knee  Patient has severe right knee osteoarthritis.   Non operative and operative treatment options were reviewed with patient today.   Patient is a surgical candidate at this time.   Injections: Patient received right knee steroid injection today. Tolerated the procedure well. Post injection instructions reviewed including information on glucose monitoring for diabetic patients. Patient aware that they may repeat steroid injection every 3 months if needed.   Medications: Tylenol up to 3000 mg per day  PT: Continue home exercises.   Bracing: Patient declined knee brace.   Activity: Continue activity as tolerated.   Plan for next appt: Patient will call as he had a year from the last steroid injection.    Orders:    Large joint arthrocentesis: R knee    Chronic pain of right knee               Return if symptoms worsen or fail to improve.    I answered all of the patient's questions during the visit and provided education of the patient's condition during the visit.  The patient verbalized understanding of the information given and agrees with the plan.  This note was dictated using [facility] software.  It may contain errors including improperly dictated words.  Please contact physician directly for any questions.    Subjective   Chief Complaint:   Chief Complaint   Patient presents with    Right Knee - Follow-up         History of Present Illness     HPI    Roland Kwan is a 59 y.o. male who presents for follow up for right knee OA.  He is requesting an injection today.  His last injection came about a year ago.  Of significance he lost 70 pounds since last year now he has become a surgical candidate BMI 37.  He has been on Mounjaro.  He is very happy with  his progress.  He would like to go as long as he can with injections.        Review of Systems  ROS:    See [facility] for musculoskeletal review.   All other systems reviewed are negative     History:  Past Medical History:   Diagnosis Date    Ankle swelling     Arthritis     Asthma     Atrial fibrillation ([facility])     Atrial fibrillation with RVR ([facility]) 04/04/2017    Chronic kidney disease     kidney stones    Class 3 severe obesity due to excess calories with serious comorbidity and body mass index (BMI) of 40.0 to 44.9 in adult ([facility]) 12/07/2020    Gout     Hypertension     ICH (intracerebral hemorrhage) ([facility]) 03/08/2022    Kidney stone     Morbid obesity with BMI of 40.0-44.9, adult ([facility]) 08/31/2016    Night sweats     Obesity     Seasonal allergies     Sleep apnea     Stroke ([facility]) 03/22     Past Surgical History:   Procedure Laterality Date    APPENDECTOMY      CARDIOVERSION      X4 last was 2018    COLONOSCOPY      CYSTOSCOPY  08/30/2016    w/removal of object, last assessed 8/30/16    FOOT SURGERY Left     HAND SURGERY      KNEE SURGERY Right 2016    MT CYSTO/URETERO W/LITHOTRIPSY &INDWELL STENT INSRT Left 08/09/2016    Procedure: CYSTOSCOPY URETEROSCOPY WITH LITHOTRIPSY HOLMIUM LASER STONE EXTRACTION, RETROGRADE PYELOGRAM AND INSERTION STENT URETERAL;  Surgeon: Sahil Chery MD;  Location:  MAIN OR;  Service: Urology last assessed 8/30/16    MT CYSTO/URETERO W/LITHOTRIPSY &INDWELL STENT INSRT Right 08/24/2020    Procedure: CYSTOSCOPY , cystolitholapaxy of bladder stone WITH HOMIUM LASER RIGHT RETROGRADE AND RIGHT URETERAL STENT, FULGERATION OF BLADDER;  Surgeon: Sahil Chery MD;  Location:  MAIN OR;  Service: Urology    UPPER GASTROINTESTINAL ENDOSCOPY      WRIST SURGERY Left 2014    Fracture surgery     Social History   Social History     Substance and Sexual Activity   Alcohol Use Yes    Comment: occ     Social History     Substance and Sexual Activity   Drug Use No     Social History     Tobacco Use    Smoking Status Former    Current packs/day: 0.00    Average packs/day: 0.5 packs/day for 15.0 years (7.5 ttl pk-yrs)    Types: Cigarettes    Start date: 1979    Quit date: 1994    Years since quittin.9   Smokeless Tobacco Never     Family History:   Family History   Problem Relation Age of Onset    Atrial fibrillation Mother     Other Mother         blood dyscrasia    Hypertension Mother     Arthritis Mother     Other Father         hypoglycemia     Atrial fibrillation Father     Diabetes Family     Other Family         Thrombocytosis    No Known Problems Sister     Cancer Neg Hx     Thyroid disease Neg Hx        Current Outpatient Medications on File Prior to Visit   Medication Sig Dispense Refill    apixaban (Eliquis) 5 mg Take 1 tablet (5 mg total) by mouth 2 (two) times a day 90 tablet 3    atorvastatin (LIPITOR) 40 mg tablet TAKE 1 TABLET BY MOUTH EVERY DAY WITH DINNER 90 tablet 1    cloNIDine (CATAPRES-TTS-1) 0.1 mg/24 hr APPLY 1 PATCH ONE TIME PER WEEK 12 patch 0    dofetilide (TIKOSYN) 500 mcg capsule Take 1 capsule (500 mcg total) by mouth every 12 (twelve) hours 60 capsule 5    eplerenone (INSPRA) 25 mg tablet TAKE 1 TABLET BY MOUTH TWICE A  tablet 1    hydrALAZINE (APRESOLINE) 50 mg tablet TAKE 1 TABLET BY MOUTH TWICE A  tablet 1    nebivolol (BYSTOLIC) 2.5 mg tablet TAKE 1 TABLET BY MOUTH DAILY HOLD FOR HEART RATE LESS THAN 50 BEATS PER MINUTE. 90 tablet 1    NIFEdipine (PROCARDIA XL) 30 mg 24 hr tablet TAKE 1 TABLET BY MOUTH TWICE A DAY (Patient taking differently: Take 30 mg by mouth daily) 180 tablet 1    olmesartan (BENICAR) 40 mg tablet TAKE 1 TABLET BY MOUTH EVERY DAY 90 tablet 1    tamsulosin (FLOMAX) 0.4 mg Take 1 capsule (0.4 mg total) by mouth daily with dinner 30 capsule 0    Tirzepatide (Mounjaro) 10 MG/0.5ML SOAJ Inject 10 mg under the skin once a week 6 mL 0    torsemide (DEMADEX) 5 MG tablet TAKE 1 TABLET (5 MG TOTAL) BY MOUTH DAILY. 90 tablet 1     No current  "facility-administered medications on file prior to visit.     No Known Allergies       Objective   Ht 5' 9\" (1.753 m)   Wt 115 kg (252 lb 12.8 oz)   BMI 37.33 kg/m²          PE:  AAOx 3  WDWN  Hearing intact, no drainage from eyes  no audible wheezing  no abdominal distension  LE compartments soft, skin intact    Ortho Exam:  rightknee:    Appearance:  No  swelling   No  ecchymosis  No  obvious joint deformity   No  effusion   Palpation/Tenderness:  Yes  TTP over medial joint line  Yes  TTP over lateral joint line   No  TTP over patella  No  TTP over patellar tendon  No  TTP over pes anserine bursa  Active Range of Motion:  AROM: 0-100  Special Tests:  Valgus Stress Test: positive  Varus Stress Test: negative  Medial Cuong: negative  Lateral Cuong: negative  Lachman: negative  Anterior/ Posterior Drawer: negative    Imaging Studies: Results Review Statement: No pertinent imaging studies reviewed.      Large joint arthrocentesis: R knee  [place] Protocol:  procedure performed by consultantConsent: Verbal consent obtained. Written consent not obtained.  Risks and benefits: risks, benefits and alternatives were discussed  Consent given by: patient  Patient understanding: patient states understanding of the procedure being performed  Patient consent: the patient's understanding of the procedure matches consent given  Patient identity confirmed: verbally with patient  Supporting Documentation  Indications: pain and joint swelling   Procedure Details  Location: knee - R knee  Needle size: 22 G  Ultrasound guidance: no  Approach: lateral  Medications administered: 2 mL bupivacaine 0.25 %; 40 mg triamcinolone acetonide 40 mg/mL    Patient tolerance: patient tolerated the procedure well with no immediate complications  Dressing:  Sterile dressing applied          Scribe Attestation      I,:  Vin Chopra PA-C am acting as a scribe while in the presence of the attending physician.:       I,:  Taina Dodd " DO Xi personally performed the services described in this documentation    as scribed in my presence.:

## 2025-02-19 NOTE — TELEPHONE ENCOUNTER
Reason for call:   [x] Refill   [] Prior Auth  [] Other:     Office:   [] PCP/Provider -   [x] Specialty/Provider - Neph    Medication: Atorvastatin    Dose/Frequency: 40 mg    Quantity: 90 tablets    Pharmacy: [facility]/pharmacy #[id number] - [place] - [id number]     Does the patient have enough for 3 days?   [] Yes   [x] No - Send as HP to POD

## 2025-02-19 NOTE — ASSESSMENT & PLAN NOTE
Patient has severe right knee osteoarthritis.   Non operative and operative treatment options were reviewed with patient today.   Patient is a surgical candidate at this time.   Injections: Patient received right knee steroid injection today. Tolerated the procedure well. Post injection instructions reviewed including information on glucose monitoring for diabetic patients. Patient aware that they may repeat steroid injection every 3 months if needed.   Medications: Tylenol up to 3000 mg per day  PT: Continue home exercises.   Bracing: Patient declined knee brace.   Activity: Continue activity as tolerated.   Plan for next appt: Patient will call as he had a year from the last steroid injection.    Orders:    Large joint arthrocentesis: R knee

## 2025-02-20 RX ORDER — ATORVASTATIN CALCIUM 40 MG/1
40 TABLET, FILM COATED ORAL
Qty: 90 TABLET | Refills: 1 | Status: SHIPPED | OUTPATIENT
Start: 2025-02-20

## 2025-02-22 ENCOUNTER — OFFICE VISIT (OUTPATIENT)
Dept: URGENT CARE | Facility: CLINIC | Age: 60
End: 2025-02-22
Payer: COMMERCIAL

## 2025-02-22 VITALS
RESPIRATION RATE: 18 BRPM | SYSTOLIC BLOOD PRESSURE: 110 MMHG | DIASTOLIC BLOOD PRESSURE: 60 MMHG | TEMPERATURE: 98.8 F | OXYGEN SATURATION: 98 % | HEART RATE: 68 BPM

## 2025-02-22 DIAGNOSIS — M10.9 ACUTE GOUT OF LEFT FOOT, UNSPECIFIED CAUSE: Primary | ICD-10-CM

## 2025-02-22 PROCEDURE — 99213 OFFICE O/P EST LOW 20 MIN: CPT | Performed by: FAMILY MEDICINE

## 2025-02-22 RX ORDER — PREDNISONE 20 MG/1
40 TABLET ORAL DAILY
Qty: 10 TABLET | Refills: 0 | Status: SHIPPED | OUTPATIENT
Start: 2025-02-22 | End: 2025-02-27

## 2025-02-22 NOTE — PATIENT INSTRUCTIONS
Prednisone x 5 days prescribed, complete as directed, side effects discussed, take w/ food and water.   Take Tylenol as needed for pain.   Rest the foot and keep it elevated.   Apply ice to the site.   If symptoms persist despite treatment or worsen, patient is to be seen in the ER.

## 2025-02-22 NOTE — PROGRESS NOTES
[facility]        NAME: Roland Kwan is a 59 y.o. male  : 1965    MRN: [id number]  DATE: 2025  TIME: 11:10 AM    Assessment and Plan   Acute gout of left foot, unspecified cause [M10.9]  1. Acute gout of left foot, unspecified cause  predniSONE 20 mg tablet        Patient Instructions     Patient Instructions   Prednisone x 5 days prescribed, complete as directed, side effects discussed, take w/ food and water.   Take Tylenol as needed for pain.   Rest the foot and keep it elevated.   Apply ice to the site.   If symptoms persist despite treatment or worsen, patient is to be seen in the ER.    Follow up with PCP in 3-5 days.  Proceed to  ER if symptoms worsen.    If tests have been performed at [facility], our office will contact you with results if changes need to be made to the care plan discussed with you at the visit.  You can review your full results on [facility].    Chief Complaint     Chief Complaint   Patient presents with    Gout Pain     Left foot pain since last Thursday.     History of Present Illness     Acute onset left foot pain and swelling x 2 days.   No injury to the foot.   The foot is tender and feels warm to touch.   No bruising or erythema noted.   No wounds/cuts.   No calf pain or swelling.   No fever/chills.   No treatment has yet been attempted.   Patient states he has a history of gout and has been treated with Prednisone which has worked well for him.      Review of Systems   Review of Systems   Constitutional: Negative.    Respiratory: Negative.     Cardiovascular: Negative.    Musculoskeletal:         As noted in HPI   Skin: Negative.    Allergic/Immunologic: Negative.    Neurological: Negative.    Hematological: Negative.      Current Medications       Current Outpatient Medications:     atorvastatin (LIPITOR) 40 mg tablet, Take 1 tablet (40 mg total) by mouth daily with dinner, Disp: 90 tablet, Rfl: 1    cloNIDine (CATAPRES-TTS-1) 0.1 mg/24 hr,  APPLY 1 PATCH ONE TIME PER WEEK, Disp: 12 patch, Rfl: 0    dofetilide (TIKOSYN) 500 mcg capsule, Take 1 capsule (500 mcg total) by mouth every 12 (twelve) hours, Disp: 60 capsule, Rfl: 5    eplerenone (INSPRA) 25 mg tablet, TAKE 1 TABLET BY MOUTH TWICE A DAY, Disp: 180 tablet, Rfl: 1    hydrALAZINE (APRESOLINE) 50 mg tablet, TAKE 1 TABLET BY MOUTH TWICE A DAY, Disp: 180 tablet, Rfl: 1    nebivolol (BYSTOLIC) 2.5 mg tablet, TAKE 1 TABLET BY MOUTH DAILY HOLD FOR HEART RATE LESS THAN 50 BEATS PER MINUTE., Disp: 90 tablet, Rfl: 1    NIFEdipine (PROCARDIA XL) 30 mg 24 hr tablet, TAKE 1 TABLET BY MOUTH TWICE A DAY, Disp: 180 tablet, Rfl: 1    olmesartan (BENICAR) 40 mg tablet, TAKE 1 TABLET BY MOUTH EVERY DAY, Disp: 90 tablet, Rfl: 1    predniSONE 20 mg tablet, Take 2 tablets (40 mg total) by mouth daily for 5 days, Disp: 10 tablet, Rfl: 0    Rivaroxaban (XARELTO PO), Take by mouth, Disp: , Rfl:     tamsulosin (FLOMAX) 0.4 mg, Take 1 capsule (0.4 mg total) by mouth daily with dinner, Disp: 30 capsule, Rfl: 0    Tirzepatide (Mounjaro) 10 MG/0.5ML SOAJ, INJECT 10 MG UNDER THE SKIN ONE TIME PER WEEK, Disp: 6 mL, Rfl: 1    torsemide (DEMADEX) 5 MG tablet, TAKE 1 TABLET (5 MG TOTAL) BY MOUTH DAILY., Disp: 90 tablet, Rfl: 1    apixaban (Eliquis) 5 mg, Take 1 tablet (5 mg total) by mouth 2 (two) times a day (Patient not taking: Reported on 2/22/2025), Disp: 90 tablet, Rfl: 3    Current Allergies     Allergies as of 02/22/2025    (No Known Allergies)            The following portions of the patient's history were reviewed and updated as appropriate: allergies, current medications, past family history, past medical history, past social history, past surgical history and problem list.     Past Medical History:   Diagnosis Date    Ankle swelling     Arthritis     Asthma     Atrial fibrillation (HCC)     Atrial fibrillation with RVR (HCC) 04/04/2017    Chronic kidney disease     kidney stones    Class 3 severe obesity due to excess  calories with serious comorbidity and body mass index (BMI) of 40.0 to 44.9 in adult ([facility]) 12/07/2020    Gout     Hypertension     ICH (intracerebral hemorrhage) ([facility]) 03/08/2022    Kidney stone     Morbid obesity with BMI of 40.0-44.9, adult ([facility]) 08/31/2016    Night sweats     Obesity     Seasonal allergies     Sleep apnea     Stroke ([facility]) 03/22       Past Surgical History:   Procedure Laterality Date    APPENDECTOMY      CARDIOVERSION      X4 last was 2018    COLONOSCOPY      CYSTOSCOPY  08/30/2016    w/removal of object, last assessed 8/30/16    FOOT SURGERY Left     HAND SURGERY      KNEE SURGERY Right 2016    IN CYSTO/URETERO W/LITHOTRIPSY &INDWELL STENT INSRT Left 08/09/2016    Procedure: CYSTOSCOPY URETEROSCOPY WITH LITHOTRIPSY HOLMIUM LASER STONE EXTRACTION, RETROGRADE PYELOGRAM AND INSERTION STENT URETERAL;  Surgeon: Sahil Chery MD;  Location:  MAIN OR;  Service: Urology last assessed 8/30/16    IN CYSTO/URETERO W/LITHOTRIPSY &INDWELL STENT INSRT Right 08/24/2020    Procedure: CYSTOSCOPY , cystolitholapaxy of bladder stone WITH HOMIUM LASER RIGHT RETROGRADE AND RIGHT URETERAL STENT, FULGERATION OF BLADDER;  Surgeon: Sahil Chery MD;  Location:  MAIN OR;  Service: Urology    UPPER GASTROINTESTINAL ENDOSCOPY      WRIST SURGERY Left 2014    Fracture surgery       Family History   Problem Relation Age of Onset    Atrial fibrillation Mother     Other Mother         blood dyscrasia    Hypertension Mother     Arthritis Mother     Other Father         hypoglycemia     Atrial fibrillation Father     Diabetes Family     Other Family         Thrombocytosis    No Known Problems Sister     Cancer Neg Hx     Thyroid disease Neg Hx          Medications have been verified.        Objective   /60 (BP Location: Right arm, Patient Position: Sitting, Cuff Size: Large)   Pulse 68   Temp 98.8 °F (37.1 °C) (Tympanic Core)   Resp 18   SpO2 98%   No LMP for male patient.       Physical Exam     Physical  Exam  Vitals and nursing note reviewed.   Constitutional:       General: He is awake. He is not in acute distress.     Appearance: Normal appearance. He is well-developed and well-groomed. He is not ill-appearing, toxic-appearing or diaphoretic.   Cardiovascular:      Rate and Rhythm: Normal rate.      Pulses: Normal pulses.   Pulmonary:      Effort: Pulmonary effort is normal. No tachypnea, accessory muscle usage or respiratory distress.   Musculoskeletal:      Comments: Left foot and ankle: there is tenderness to palpation and mild swelling over the dorsal lateral aspect of the foot. No bruising or erythema present. Skin is appropriately warm and intact. No wounds. 2+ pedal pulses. No ankle joint tenderness, ankle joint has full ROM, no pain w/ movement. Strength and sensations are intact. Good capillary refill.   Skin:     General: Skin is warm and dry.      Capillary Refill: Capillary refill takes less than 2 seconds.      Coloration: Skin is not pale.      Findings: No abrasion, abscess, bruising, ecchymosis, erythema, signs of injury, rash or wound.   Neurological:      General: No focal deficit present.      Mental Status: He is alert and oriented to person, place, and time. Mental status is at baseline.      Sensory: Sensation is intact.      Motor: Motor function is intact.   Psychiatric:         Mood and Affect: Mood normal.         Behavior: Behavior normal. Behavior is cooperative.         Thought Content: Thought content normal.         Judgment: Judgment normal.

## 2025-02-28 DIAGNOSIS — R31.0 GROSS HEMATURIA: ICD-10-CM

## 2025-02-28 RX ORDER — TAMSULOSIN HYDROCHLORIDE 0.4 MG/1
0.4 CAPSULE ORAL
Qty: 90 CAPSULE | Refills: 1 | Status: SHIPPED | OUTPATIENT
Start: 2025-02-28

## 2025-03-06 PROBLEM — Z12.5 SCREENING FOR PROSTATE CANCER: Status: RESOLVED | Noted: 2025-02-04 | Resolved: 2025-03-06

## 2025-03-13 ENCOUNTER — HOSPITAL ENCOUNTER (OUTPATIENT)
Dept: ULTRASOUND IMAGING | Facility: HOSPITAL | Age: 60
Discharge: HOME | End: 2025-03-13
Payer: COMMERCIAL

## 2025-03-13 DIAGNOSIS — N20.0 KIDNEY STONE: ICD-10-CM

## 2025-03-13 PROCEDURE — 76775 US EXAM ABDO BACK WALL LIM: CPT

## 2025-03-19 ENCOUNTER — RESULTS FOLLOW-UP (OUTPATIENT)
Dept: UROLOGY | Facility: AMBULATORY SURGERY CENTER | Age: 60
End: 2025-03-19

## 2025-03-19 NOTE — TELEPHONE ENCOUNTER
[employer] message with the following info from [place]:        ----- Message from Andrea Lowe PA-C sent at 3/19/2025  1:04 PM EDT -----  Please call the patient advise him I reviewed his most recent ultrasound of the kidney and bladder and it revealed bilateral nonobstructing kidney stones, which have been known since last CT, and resolution of the previously noted fullness of the renal pelvis.  No swelling of the kidney seen on ultrasound.  No further imaging/workup required at this time.

## 2025-04-04 DIAGNOSIS — E66.01 CLASS 3 SEVERE OBESITY DUE TO EXCESS CALORIES WITH SERIOUS COMORBIDITY AND BODY MASS INDEX (BMI) OF 40.0 TO 44.9 IN ADULT (HCC): ICD-10-CM

## 2025-04-04 DIAGNOSIS — E55.9 VITAMIN D DEFICIENCY: ICD-10-CM

## 2025-04-04 DIAGNOSIS — E66.813 CLASS 3 SEVERE OBESITY DUE TO EXCESS CALORIES WITH SERIOUS COMORBIDITY AND BODY MASS INDEX (BMI) OF 40.0 TO 44.9 IN ADULT (HCC): ICD-10-CM

## 2025-04-04 DIAGNOSIS — I10 ACCELERATED HYPERTENSION: ICD-10-CM

## 2025-04-04 DIAGNOSIS — N18.31 STAGE 3A CHRONIC KIDNEY DISEASE (HCC): ICD-10-CM

## 2025-04-04 DIAGNOSIS — I12.9 HYPERTENSIVE CHRONIC KIDNEY DISEASE WITH STAGE 1 THROUGH STAGE 4 CHRONIC KIDNEY DISEASE, OR UNSPECIFIED CHRONIC KIDNEY DISEASE: ICD-10-CM

## 2025-04-04 RX ORDER — EPLERENONE 25 MG/1
25 TABLET ORAL 2 TIMES DAILY
Qty: 180 TABLET | Refills: 1 | Status: SHIPPED | OUTPATIENT
Start: 2025-04-04

## 2025-04-07 ENCOUNTER — OFFICE VISIT (OUTPATIENT)
Dept: FAMILY MEDICINE CLINIC | Facility: CLINIC | Age: 60
End: 2025-04-07
Payer: COMMERCIAL

## 2025-04-07 VITALS
DIASTOLIC BLOOD PRESSURE: 66 MMHG | RESPIRATION RATE: 16 BRPM | HEART RATE: 67 BPM | HEIGHT: 69 IN | OXYGEN SATURATION: 97 % | SYSTOLIC BLOOD PRESSURE: 122 MMHG | WEIGHT: 248 LBS | BODY MASS INDEX: 36.73 KG/M2 | TEMPERATURE: 98.4 F

## 2025-04-07 DIAGNOSIS — E78.5 DYSLIPIDEMIA: ICD-10-CM

## 2025-04-07 DIAGNOSIS — N18.30 BENIGN HYPERTENSION WITH CHRONIC KIDNEY DISEASE, STAGE III (HCC): Primary | ICD-10-CM

## 2025-04-07 DIAGNOSIS — I12.9 BENIGN HYPERTENSION WITH CHRONIC KIDNEY DISEASE, STAGE III (HCC): Primary | ICD-10-CM

## 2025-04-07 DIAGNOSIS — E11.22 TYPE 2 DIABETES MELLITUS WITH STAGE 3A CHRONIC KIDNEY DISEASE, WITHOUT LONG-TERM CURRENT USE OF INSULIN (HCC): ICD-10-CM

## 2025-04-07 DIAGNOSIS — N18.31 TYPE 2 DIABETES MELLITUS WITH STAGE 3A CHRONIC KIDNEY DISEASE, WITHOUT LONG-TERM CURRENT USE OF INSULIN (HCC): ICD-10-CM

## 2025-04-07 DIAGNOSIS — E66.09 CLASS 2 OBESITY DUE TO EXCESS CALORIES WITH BODY MASS INDEX (BMI) OF 37.0 TO 37.9 IN ADULT, UNSPECIFIED WHETHER SERIOUS COMORBIDITY PRESENT: ICD-10-CM

## 2025-04-07 DIAGNOSIS — E66.812 CLASS 2 OBESITY DUE TO EXCESS CALORIES WITH BODY MASS INDEX (BMI) OF 37.0 TO 37.9 IN ADULT, UNSPECIFIED WHETHER SERIOUS COMORBIDITY PRESENT: ICD-10-CM

## 2025-04-07 LAB — SL AMB POCT HEMOGLOBIN AIC: 5.5 (ref ?–6.5)

## 2025-04-07 PROCEDURE — 99214 OFFICE O/P EST MOD 30 MIN: CPT | Performed by: FAMILY MEDICINE

## 2025-04-07 PROCEDURE — 83036 HEMOGLOBIN GLYCOSYLATED A1C: CPT | Performed by: FAMILY MEDICINE

## 2025-04-07 NOTE — ASSESSMENT & PLAN NOTE
Lab Results   Component Value Date    HGBA1C 5.5 04/07/2025   Continue Mounjaro 10 mg weekly.  A1c very well-controlled.  Weight loss ongoing.  goal is 220.  Discussed revisiting increasing Mounjaro dosage in 6-month follow-up.  Repeat labs.  Patient will provide us with updated diabetic eye exam        Orders:    POCT hemoglobin A1c

## 2025-04-07 NOTE — ASSESSMENT & PLAN NOTE
Lab Results   Component Value Date    EGFR 67 12/28/2024    EGFR 57 09/30/2024    EGFR 54 06/13/2024    CREATININE 1.17 12/28/2024    CREATININE 1.34 (H) 09/30/2024    CREATININE 1.41 (H) 06/13/2024     BP Readings from Last 3 Encounters:   04/07/25 122/66   02/22/25 110/60   02/06/25 108/74     Blood pressure remains well-controlled.  Continue current medications continue following with nephrology  Olmesartan 40 mg  Procardia 30 mg twice daily  Bystolic 2.5 mg  Hydralazine 50 mg twice daily  Eplerenone 25 twice daily

## 2025-04-07 NOTE — PROGRESS NOTES
Name: Roland Kwan      : 1965      MRN: [id number]  Encounter Provider: Amanda Ingram MD  Encounter Date: 2025   Encounter department: [facility]  :  Assessment & Plan  Type 2 diabetes mellitus with stage 3a chronic kidney disease, without long-term current use of insulin (HCC)    Lab Results   Component Value Date    HGBA1C 5.5 2025   Continue Mounjaro 10 mg weekly.  A1c very well-controlled.  Weight loss ongoing.  goal is 220.  Discussed revisiting increasing Mounjaro dosage in 6-month follow-up.  Repeat labs.  Patient will provide us with updated diabetic eye exam        Orders:    POCT hemoglobin A1c    Benign hypertension with chronic kidney disease, stage III ([facility])  Lab Results   Component Value Date    EGFR 67 2024    EGFR 57 2024    EGFR 54 2024    CREATININE 1.17 2024    CREATININE 1.34 (H) 2024    CREATININE 1.41 (H) 2024     BP Readings from Last 3 Encounters:   25 122/66   25 110/60   25 108/74     Blood pressure remains well-controlled.  Continue current medications continue following with nephrology  Olmesartan 40 mg  Procardia 30 mg twice daily  Bystolic 2.5 mg  Hydralazine 50 mg twice daily  Eplerenone 25 twice daily         Dyslipidemia  Continue atorvastatin 40 mg daily.         Class 2 obesity due to excess calories with body mass index (BMI) of 37.0 to 37.9 in adult, unspecified whether serious comorbidity present                  History of Present Illness   Patient presents with:  Follow-up: 6m    Today for 6-month follow-up.  He is tolerating Mounjaro very well.  A1c is significantly improved.  He does feel his weight loss has plateaued.  He is interested in increasing medication.  Is feeling hunger pains towards the end of the week.  Endorses adequate water intake with this history of kidney stones.      Review of Systems   Constitutional:  Negative for activity change, fatigue and fever.  "  Eyes:  Negative for visual disturbance.   Respiratory:  Negative for shortness of breath.    Cardiovascular:  Negative for chest pain.   Gastrointestinal:  Negative for abdominal pain, constipation, diarrhea and nausea.   Endocrine: Negative for cold intolerance and heat intolerance.   Musculoskeletal:  Negative for back pain.   Skin:  Negative for rash.   Neurological:  Negative for headaches.   Psychiatric/Behavioral:  Negative for confusion.        Objective   /66 (BP Location: Left arm, Patient Position: Sitting, Cuff Size: Large)   Pulse 67   Temp 98.4 °F (36.9 °C) (Temporal)   Resp 16   Ht 5' 9\" (1.753 m)   Wt 112 kg (248 lb)   SpO2 97%   BMI 36.62 kg/m²      Physical Exam  Vitals and nursing note reviewed.   Constitutional:       Appearance: Normal appearance. He is well-developed. He is obese.   HENT:      Head: Normocephalic and atraumatic.   Cardiovascular:      Rate and Rhythm: Normal rate and regular rhythm.   Pulmonary:      Effort: Pulmonary effort is normal.      Breath sounds: Normal breath sounds.   Abdominal:      General: Bowel sounds are normal.      Palpations: Abdomen is soft.   Musculoskeletal:      Cervical back: Normal range of motion.   Skin:     General: Skin is warm.   Neurological:      General: No focal deficit present.      Mental Status: He is alert.   Psychiatric:         Mood and Affect: Mood normal.         Speech: Speech normal.         "

## 2025-04-08 PROBLEM — I12.9 HYPERTENSIVE CHRONIC KIDNEY DISEASE WITH STAGE 1 THROUGH STAGE 4 CHRONIC KIDNEY DISEASE, OR UNSPECIFIED CHRONIC KIDNEY DISEASE: Status: RESOLVED | Noted: 2019-03-12 | Resolved: 2025-04-08

## 2025-05-01 LAB
LEFT EYE DIABETIC RETINOPATHY: NORMAL
RIGHT EYE DIABETIC RETINOPATHY: NORMAL

## 2025-06-29 NOTE — ASSESSMENT & PLAN NOTE
Has been at baseline continue current treatment  Orders:    hydrALAZINE (APRESOLINE) 25 mg tablet; Take 1 tablet (25 mg total) by mouth 2 (two) times a day    Basic metabolic panel; Future    Magnesium; Future    Protein / creatinine ratio, urine; Future    Lipid Panel with Direct LDL reflex; Future

## 2025-06-29 NOTE — PROGRESS NOTES
Name: Roland Kwan      : 1965      MRN: [id number]  Encounter Provider: Luigi Cash MD  Encounter Date: 2025   Encounter department: [facility]  :  Assessment & Plan  Type 2 diabetes mellitus with stage 3a chronic kidney disease, without long-term current use of insulin (HCC)  Will need to discuss with cardiology and primary physician regarding SGLT2 inhibitor   Orders:    hydrALAZINE (APRESOLINE) 25 mg tablet; Take 1 tablet (25 mg total) by mouth 2 (two) times a day    Basic metabolic panel; Future    Magnesium; Future    Protein / creatinine ratio, urine; Future    Lipid Panel with Direct LDL reflex; Future    Benign hypertension with chronic kidney disease, stage III (HCC)  Blood pressure improving with some orthostatic changes so we are going to begin to taper meds  First taper hydralazine  Then taper clonidine patch  Continue nonmedical regimen with weight loss in particular and exercise    Orders:    hydrALAZINE (APRESOLINE) 25 mg tablet; Take 1 tablet (25 mg total) by mouth 2 (two) times a day    Basic metabolic panel; Future    Magnesium; Future    Protein / creatinine ratio, urine; Future    Lipid Panel with Direct LDL reflex; Future    Stage 3a chronic kidney disease (HCC)  At baseline no changes  Orders:    hydrALAZINE (APRESOLINE) 25 mg tablet; Take 1 tablet (25 mg total) by mouth 2 (two) times a day    Basic metabolic panel; Future    Magnesium; Future    Protein / creatinine ratio, urine; Future    Lipid Panel with Direct LDL reflex; Future    Dyslipidemia  Has been at baseline continue current treatment  Orders:    hydrALAZINE (APRESOLINE) 25 mg tablet; Take 1 tablet (25 mg total) by mouth 2 (two) times a day    Basic metabolic panel; Future    Magnesium; Future    Protein / creatinine ratio, urine; Future    Lipid Panel with Direct LDL reflex; Future        History of Present Illness   HPI  Roland Kwan is a 59 y.o. male who presents with follow-up  regarding CKD and hypertension  There has been no hospitalizations or acute illnesses since last visit.  Patient  on Mounjaro and exercising with an 80 pound weight loss over 16 months  The patient overall is feeling well.  Good appetite and good energy some afternoon fatigue  No fevers, chills, or cough or colds.  No hematuria, dysuria, voiding symptoms or foamy urine; only occasional gross hematuria but this is chronic related to stone disease.  Followed by urology closely.  Has not passed the stone since in at least 24 months  No gastrointestinal symptoms  No cardiovascular symptoms including swelling of the legs  No headaches, but dizziness or lightheadedness upon standing a couple times a day  Blood pressure medications:  Clonidine patch #1 weekly  Eplerenone 25 twice a day  Hydralazine 50 mg twice a day  Bystolic 2.5 mg daily in the morning  Procardia XL 30 mg daily in the morning  Olmesartan 40 mg daily in the morning  Torsemide 5 mg daily in the morning    Renal pertinent medications:  Eliquis 5 mg twice a day  Atorvastatin 40 mg daily  Flomax 0.4 mg with dinner  Mounjaro        Review of Systems  Please see HPI, otherwise the review of systems as completely reviewed with the patient are negative    Pertinent Medical History   1. Hypertension:  Most compatible with essential hypertension/resistant hypertension:  Secondary workup:  -normal thyroid function study  -severe HARPER on BiPAP  -aldosterone/renin ratio:  Essentially negative at 20 with an aldosterone level of 8.7  -plasma free metanephrines:  Negative  -24 hour urine for free cortisol:  Negative  -renal artery duplex:  No evidence of significant renal artery disease; but somewhat limited by body habitus and overlying bowel gas  Again obesity may be contributing to his hypertension  Current home blood pressure readings:  A.m. 115/70  P.m.: 112/63  Heart rate: 60-90 range        GOAL BLOOD PRESSURE:  LESS THAN 125/80 GIVEN CKD/recent hemorrhagic stroke      Recommendations:  continue to push nonmedical regimen including isotonic exercise, avoidance of salt and weight loss; patient counseled as such  GIVEN TIKOSYN:  AMILORIDE/SPIRONOLACTONE/HCTZ ARE CONTRAINDICATED.  LOOP DIURETIC SUCH AS FUROSEMIDE OR TORSEMIDE ARE ACCEPTABLE.     Medication changes today:     I will decrease hydralazine 25 mg twice a day and potentially stop this in a few weeks checking morning evening sitting and standing for orthostasis  Next agent to taper off as the clonidine patch  Continue excellent weight loss          DISCUSSED WITH DR. TAVERA EPLERENONE IS TOTALLY ACCEPTABLE!              2. CKD stage 3:    Baseline creatinine is ranged anywhere from 1.1-1.6  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  Current creatinine: 1.30 baseline from 7/2/2025  Electrolytes are normal including potassium of 4.2  UA:  No proteinuria and no hematuria, 2-4 WBCs:   Urine protein creatinine ratio:  0.10 g at goal from 7/2/2025  MBD:  Of CKD:  Calcium/magnesium are normal/PTH 46.9; vitamin D 32.7 at goal from 12/28/2024  CBC:  Normal with a hemoglobin of 15.4  Renal ultrasound:  12/03/2020:  Echogenic focus in the left lower pole possible fat versus calculus without shadowing.  Thickened bladder with UV junction still present although perhaps slightly pronounced.  Followed by Urology:  Visit with Dr. Chery for possible right-sided ureterocele, history of cystoscopy and cystolitholapaxy status post right stent removal.  Bladder wall thickening resolution of hydronephrosis.  Possibility of congenital ureterocele was discussed 1 year follow-up with retroperitoneal ultrasound which would be November 2022.           3. Nephrolithiasis:  As mentioned previously he has had numerous stones.  No kidney stones at this time  Continue with General stone diet; in particular water intake 2 and half to 3 L a day all  Only further investigation with a 24 hour urine stone risk evaluation if  recurrent stone     4. Dyslipidemia:  Current lipid profile:  LDL 40/HDL 37/triglycerides 80 from 12/20/2024  Goal:  LDL less than 70 given CKD/hemorrhagic stroke  Recommend:  Diet, exercise and weight loss, patient counseled as such  No change in medication as patient is at goal        5. Recent admission 03/07/2022 secondary to right-sided weakness with word-finding difficulty, found to have acute left intraparenchymal hemorrhage with vasogenic edema.  Patient admitted [facility] with neurosurgery evaluation.  Started on Cardene due to hypertension.  No neuro surgical intervention at that time was deemed necessary.  MRI in 3/9 demonstrated unchanged intraparenchymal hematoma as well as small acute infarct in the left corona radiata.  Patient was maintained on blood pressure control.  TTE demonstrated EF 65% otherwise grade 1 diastolic dysfunction.  Seen by Neurology as well recommended restarting anticoagulation 3 weeks after repeat CT scan along with PT/OT.  In regards to blood pressure: Amlodipine was stopped nifedipine 10 mg every 8 hours titrate as needed, continuing hydralazine.     6. Increased liver function studies:  Right upper quadrant ultrasound showed cholelithiasis hepatomegaly and hepatic steatosis.  Now followed by Dr. Jaymie Marcelo who believes it is related to statin therapy but is agreeable to continue the statin.  - LFTs normal     GI health maintenance:  Seen by Dr. Marcelo we will plan for colonoscopy 3-4 months after CVA:  Patient knows he is due for the colonoscopy will schedule with Dr. Marcelo.  Patient aware and states will set up an appointment for colonoscopy.  Rediscussed with the patient as of 6/19/2024 he knows potential risk of colon cancer and that he should set it up.           Medical History Reviewed by provider this encounter:     .  Past Medical History   Past Medical History[1]  Past Surgical History[2]  Family History[3]   reports that he quit smoking about 31 years  "ago. His smoking use included cigarettes. He started smoking about 46 years ago. He has a 7.5 pack-year smoking history. He has never used smokeless tobacco. He reports current alcohol use. He reports that he does not use drugs.  Current Outpatient Medications   Medication Instructions    apixaban (ELIQUIS) 5 mg, Oral, 2 times daily    atorvastatin (LIPITOR) 40 mg, Oral, Daily with dinner    cloNIDine (CATAPRES-TTS-1) 0.1 mg/24 hr APPLY 1 PATCH ONE TIME PER WEEK    dofetilide (TIKOSYN) 500 mcg, Oral, Every 12 hours    eplerenone (INSPRA) 25 mg, Oral, 2 times daily    hydrALAZINE (APRESOLINE) 25 mg, Oral, 2 times daily    nebivolol (BYSTOLIC) 2.5 mg tablet TAKE 1 TABLET BY MOUTH DAILY HOLD FOR HEART RATE LESS THAN 50 BEATS PER MINUTE.    NIFEdipine (PROCARDIA XL) 30 mg, Oral, 2 times daily    olmesartan (BENICAR) 40 mg, Oral, Daily    tamsulosin (FLOMAX) 0.4 mg, Oral, Daily with dinner    Tirzepatide (Mounjaro) 10 MG/0.5ML SOAJ INJECT 10 MG UNDER THE SKIN ONE TIME PER WEEK    torsemide (DEMADEX) 5 mg, Oral, Daily   Allergies[4]   Medications Ordered Prior to Encounter[5]   Social History[6]     Objective   Ht 5' 9\" (1.753 m)   Wt 109 kg (240 lb)   BMI 35.44 kg/m²      Physical Exam  BP sitting on left: 130/72 with a heart rate of 68 and regular  BP standing on left: 100/78 with a heart rate of 68 and regular  Physical Exam: General:  No acute distress  Skin:  No acute rash  Eyes:  No scleral icterus and noninjected  ENT:  Moist mucous membranes  Neck:  Supple, no jugular venous distention, trachea midline, overall appearance is normal  Chest:  Clear to auscultation  CVS:  Regular rate and rhythm, without a rub or gallops  Abdomen:  Normal bowel sounds, soft and nontender and nondistended  Extremities: Trace pretibial edema bilaterally, and no cyanosis, no significant arthritic changes  Neuro:  No gross focality  Psych:  Alert and oriented and appropriate             [1]   Past Medical History:  Diagnosis Date    " Ankle swelling     Arthritis     Asthma     Atrial fibrillation (HCC)     Atrial fibrillation with RVR ([facility]) 04/04/2017    Chronic kidney disease     kidney stones    Class 3 severe obesity due to excess calories with serious comorbidity and body mass index (BMI) of 40.0 to 44.9 in adult 12/07/2020    Gout     Hypertension     ICH (intracerebral hemorrhage) ([facility]) 03/08/2022    Kidney stone     Morbid obesity with BMI of 40.0-44.9, adult ([facility]) 08/31/2016    Night sweats     Obesity     Seasonal allergies     Sleep apnea     Stroke ([facility]) 03/22   [2]   Past Surgical History:  Procedure Laterality Date    APPENDECTOMY      CARDIOVERSION      X4 last was 2018    COLONOSCOPY      CYSTOSCOPY  08/30/2016    w/removal of object, last assessed 8/30/16    FOOT SURGERY Left     HAND SURGERY      KNEE SURGERY Right 2016    HI CYSTO/URETERO W/LITHOTRIPSY &INDWELL STENT INSRT Left 08/09/2016    Procedure: CYSTOSCOPY URETEROSCOPY WITH LITHOTRIPSY HOLMIUM LASER STONE EXTRACTION, RETROGRADE PYELOGRAM AND INSERTION STENT URETERAL;  Surgeon: Sahil Chery MD;  Location:  MAIN OR;  Service: Urology last assessed 8/30/16    HI CYSTO/URETERO W/LITHOTRIPSY &INDWELL STENT INSRT Right 08/24/2020    Procedure: CYSTOSCOPY , cystolitholapaxy of bladder stone WITH HOMIUM LASER RIGHT RETROGRADE AND RIGHT URETERAL STENT, FULGERATION OF BLADDER;  Surgeon: Sahil Chery MD;  Location:  MAIN OR;  Service: Urology    UPPER GASTROINTESTINAL ENDOSCOPY      WRIST SURGERY Left 2014    Fracture surgery   [3]   Family History  Problem Relation Name Age of Onset    Atrial fibrillation Mother Julianna Kwan     Other Mother Julianna Kwan         blood dyscrasia    Hypertension Mother Julianna Kwan     Arthritis Mother Julianna Kwan     Other Father          hypoglycemia     Atrial fibrillation Father      Diabetes Family      Other Family          Thrombocytosis    No Known Problems Sister      Cancer Neg Hx      Thyroid disease Neg Hx      [4] No Known Allergies  [5]   Current Outpatient Medications on File Prior to Visit   Medication Sig Dispense Refill    apixaban (Eliquis) 5 mg Take 1 tablet (5 mg total) by mouth 2 (two) times a day 90 tablet 3    atorvastatin (LIPITOR) 40 mg tablet Take 1 tablet (40 mg total) by mouth daily with dinner 90 tablet 1    cloNIDine (CATAPRES-TTS-1) 0.1 mg/24 hr APPLY 1 PATCH ONE TIME PER WEEK 12 patch 0    dofetilide (TIKOSYN) 500 mcg capsule Take 1 capsule (500 mcg total) by mouth every 12 (twelve) hours 60 capsule 5    eplerenone (INSPRA) 25 mg tablet TAKE 1 TABLET BY MOUTH TWICE A  tablet 1    nebivolol (BYSTOLIC) 2.5 mg tablet TAKE 1 TABLET BY MOUTH DAILY HOLD FOR HEART RATE LESS THAN 50 BEATS PER MINUTE. 90 tablet 1    NIFEdipine (PROCARDIA XL) 30 mg 24 hr tablet TAKE 1 TABLET BY MOUTH TWICE A DAY (Patient taking differently: Take 30 mg by mouth in the morning.) 180 tablet 1    olmesartan (BENICAR) 40 mg tablet TAKE 1 TABLET BY MOUTH EVERY DAY 90 tablet 1    tamsulosin (FLOMAX) 0.4 mg TAKE 1 CAPSULE BY MOUTH EVERY DAY WITH DINNER 90 capsule 1    Tirzepatide (Mounjaro) 10 MG/0.5ML SOAJ INJECT 10 MG UNDER THE SKIN ONE TIME PER WEEK 6 mL 1    torsemide (DEMADEX) 5 MG tablet TAKE 1 TABLET (5 MG TOTAL) BY MOUTH DAILY. 90 tablet 1    [DISCONTINUED] hydrALAZINE (APRESOLINE) 50 mg tablet TAKE 1 TABLET BY MOUTH TWICE A  tablet 1     No current facility-administered medications on file prior to visit.   [6]   Social History  Tobacco Use    Smoking status: Former     Current packs/day: 0.00     Average packs/day: 0.5 packs/day for 15.0 years (7.5 ttl pk-yrs)     Types: Cigarettes     Start date: 1979     Quit date: 1994     Years since quittin.2    Smokeless tobacco: Never   Vaping Use    Vaping status: Never Used   Substance and Sexual Activity    Alcohol use: Yes     Comment: occ    Drug use: No    Sexual activity: Yes     Partners: Female     Birth control/protection: None

## 2025-06-29 NOTE — ASSESSMENT & PLAN NOTE
At baseline no changes  Orders:    hydrALAZINE (APRESOLINE) 25 mg tablet; Take 1 tablet (25 mg total) by mouth 2 (two) times a day    Basic metabolic panel; Future    Magnesium; Future    Protein / creatinine ratio, urine; Future    Lipid Panel with Direct LDL reflex; Future

## 2025-06-29 NOTE — ASSESSMENT & PLAN NOTE
Blood pressure improving with some orthostatic changes so we are going to begin to taper meds  First taper hydralazine  Then taper clonidine patch  Continue nonmedical regimen with weight loss in particular and exercise    Orders:    hydrALAZINE (APRESOLINE) 25 mg tablet; Take 1 tablet (25 mg total) by mouth 2 (two) times a day    Basic metabolic panel; Future    Magnesium; Future    Protein / creatinine ratio, urine; Future    Lipid Panel with Direct LDL reflex; Future

## 2025-06-29 NOTE — ASSESSMENT & PLAN NOTE
Will need to discuss with cardiology and primary physician regarding SGLT2 inhibitor   Orders:    hydrALAZINE (APRESOLINE) 25 mg tablet; Take 1 tablet (25 mg total) by mouth 2 (two) times a day    Basic metabolic panel; Future    Magnesium; Future    Protein / creatinine ratio, urine; Future    Lipid Panel with Direct LDL reflex; Future

## 2025-06-30 DIAGNOSIS — I12.9 HYPERTENSIVE CHRONIC KIDNEY DISEASE WITH STAGE 1 THROUGH STAGE 4 CHRONIC KIDNEY DISEASE, OR UNSPECIFIED CHRONIC KIDNEY DISEASE: ICD-10-CM

## 2025-06-30 DIAGNOSIS — E55.9 VITAMIN D DEFICIENCY: ICD-10-CM

## 2025-06-30 DIAGNOSIS — E66.813 CLASS 3 SEVERE OBESITY DUE TO EXCESS CALORIES WITH SERIOUS COMORBIDITY AND BODY MASS INDEX (BMI) OF 40.0 TO 44.9 IN ADULT: ICD-10-CM

## 2025-06-30 DIAGNOSIS — I10 BENIGN ESSENTIAL HYPERTENSION: ICD-10-CM

## 2025-06-30 DIAGNOSIS — I10 ACCELERATED HYPERTENSION: ICD-10-CM

## 2025-06-30 DIAGNOSIS — I48.91 ATRIAL FIBRILLATION WITH RVR (HCC): ICD-10-CM

## 2025-06-30 DIAGNOSIS — E78.5 DYSLIPIDEMIA: ICD-10-CM

## 2025-06-30 DIAGNOSIS — N18.30 CHRONIC KIDNEY DISEASE, STAGE III (MODERATE) (HCC): ICD-10-CM

## 2025-06-30 DIAGNOSIS — N18.31 STAGE 3A CHRONIC KIDNEY DISEASE (HCC): ICD-10-CM

## 2025-06-30 RX ORDER — HYDRALAZINE HYDROCHLORIDE 50 MG/1
50 TABLET, FILM COATED ORAL 2 TIMES DAILY
Qty: 180 TABLET | Refills: 1 | Status: SHIPPED | OUTPATIENT
Start: 2025-06-30 | End: 2025-07-09

## 2025-06-30 RX ORDER — NIFEDIPINE 30 MG/1
30 TABLET, EXTENDED RELEASE ORAL 2 TIMES DAILY
Qty: 180 TABLET | Refills: 1 | Status: SHIPPED | OUTPATIENT
Start: 2025-06-30

## 2025-07-01 RX ORDER — OLMESARTAN MEDOXOMIL 40 MG/1
40 TABLET ORAL DAILY
Qty: 90 TABLET | Refills: 1 | Status: SHIPPED | OUTPATIENT
Start: 2025-07-01

## 2025-07-01 RX ORDER — NEBIVOLOL 2.5 MG/1
TABLET ORAL
Qty: 90 TABLET | Refills: 1 | Status: SHIPPED | OUTPATIENT
Start: 2025-07-01

## 2025-07-02 ENCOUNTER — APPOINTMENT (OUTPATIENT)
Dept: LAB | Facility: CLINIC | Age: 60
End: 2025-07-02
Attending: INTERNAL MEDICINE
Payer: COMMERCIAL

## 2025-07-02 DIAGNOSIS — N20.0 NEPHROLITHIASIS: ICD-10-CM

## 2025-07-02 DIAGNOSIS — N18.31 TYPE 2 DIABETES MELLITUS WITH STAGE 3A CHRONIC KIDNEY DISEASE, WITHOUT LONG-TERM CURRENT USE OF INSULIN (HCC): ICD-10-CM

## 2025-07-02 DIAGNOSIS — E78.5 DYSLIPIDEMIA: ICD-10-CM

## 2025-07-02 DIAGNOSIS — E11.22 TYPE 2 DIABETES MELLITUS WITH STAGE 3A CHRONIC KIDNEY DISEASE, WITHOUT LONG-TERM CURRENT USE OF INSULIN (HCC): ICD-10-CM

## 2025-07-02 DIAGNOSIS — I12.9 BENIGN HYPERTENSION WITH CHRONIC KIDNEY DISEASE, STAGE III (HCC): ICD-10-CM

## 2025-07-02 DIAGNOSIS — E55.9 VITAMIN D DEFICIENCY: ICD-10-CM

## 2025-07-02 DIAGNOSIS — N18.31 STAGE 3A CHRONIC KIDNEY DISEASE (HCC): ICD-10-CM

## 2025-07-02 DIAGNOSIS — I10 ACCELERATED HYPERTENSION: ICD-10-CM

## 2025-07-02 DIAGNOSIS — R79.89 ELEVATED LIVER FUNCTION TESTS: ICD-10-CM

## 2025-07-02 DIAGNOSIS — N18.30 BENIGN HYPERTENSION WITH CHRONIC KIDNEY DISEASE, STAGE III (HCC): ICD-10-CM

## 2025-07-02 LAB
ANION GAP SERPL CALCULATED.3IONS-SCNC: 6 MMOL/L (ref 4–13)
BUN SERPL-MCNC: 26 MG/DL (ref 5–25)
CALCIUM SERPL-MCNC: 9.2 MG/DL (ref 8.4–10.2)
CHLORIDE SERPL-SCNC: 106 MMOL/L (ref 96–108)
CO2 SERPL-SCNC: 27 MMOL/L (ref 21–32)
CREAT SERPL-MCNC: 1.3 MG/DL (ref 0.6–1.3)
CREAT UR-MCNC: 86.6 MG/DL
GFR SERPL CREATININE-BSD FRML MDRD: 59 ML/MIN/1.73SQ M
GLUCOSE P FAST SERPL-MCNC: 99 MG/DL (ref 65–99)
MAGNESIUM SERPL-MCNC: 2 MG/DL (ref 1.9–2.7)
POTASSIUM SERPL-SCNC: 4.2 MMOL/L (ref 3.5–5.3)
PROT UR-MCNC: 8.6 MG/DL
PROT/CREAT UR: 0.1 MG/G{CREAT}
SODIUM SERPL-SCNC: 139 MMOL/L (ref 135–147)

## 2025-07-02 PROCEDURE — 36415 COLL VENOUS BLD VENIPUNCTURE: CPT

## 2025-07-02 PROCEDURE — 80048 BASIC METABOLIC PNL TOTAL CA: CPT

## 2025-07-02 PROCEDURE — 83735 ASSAY OF MAGNESIUM: CPT

## 2025-07-02 PROCEDURE — 82570 ASSAY OF URINE CREATININE: CPT

## 2025-07-02 PROCEDURE — 84156 ASSAY OF PROTEIN URINE: CPT

## 2025-07-09 ENCOUNTER — OFFICE VISIT (OUTPATIENT)
Dept: NEPHROLOGY | Facility: CLINIC | Age: 60
End: 2025-07-09
Payer: COMMERCIAL

## 2025-07-09 VITALS — HEIGHT: 69 IN | BODY MASS INDEX: 35.55 KG/M2 | WEIGHT: 240 LBS

## 2025-07-09 DIAGNOSIS — N18.31 STAGE 3A CHRONIC KIDNEY DISEASE (HCC): ICD-10-CM

## 2025-07-09 DIAGNOSIS — N18.31 TYPE 2 DIABETES MELLITUS WITH STAGE 3A CHRONIC KIDNEY DISEASE, WITHOUT LONG-TERM CURRENT USE OF INSULIN (HCC): Primary | ICD-10-CM

## 2025-07-09 DIAGNOSIS — N18.30 BENIGN HYPERTENSION WITH CHRONIC KIDNEY DISEASE, STAGE III (HCC): ICD-10-CM

## 2025-07-09 DIAGNOSIS — E78.5 DYSLIPIDEMIA: ICD-10-CM

## 2025-07-09 DIAGNOSIS — I12.9 BENIGN HYPERTENSION WITH CHRONIC KIDNEY DISEASE, STAGE III (HCC): ICD-10-CM

## 2025-07-09 DIAGNOSIS — E11.22 TYPE 2 DIABETES MELLITUS WITH STAGE 3A CHRONIC KIDNEY DISEASE, WITHOUT LONG-TERM CURRENT USE OF INSULIN (HCC): Primary | ICD-10-CM

## 2025-07-09 PROCEDURE — 99214 OFFICE O/P EST MOD 30 MIN: CPT | Performed by: INTERNAL MEDICINE

## 2025-07-09 RX ORDER — HYDRALAZINE HYDROCHLORIDE 25 MG/1
25 TABLET, FILM COATED ORAL 2 TIMES DAILY
Qty: 60 TABLET | Refills: 5 | Status: SHIPPED | OUTPATIENT
Start: 2025-07-09

## 2025-07-09 NOTE — LETTER
2025     Amanda Ingram MD  [address] [facility] [address]    Patient: Roland Kwan   YOB: 1965   Date of Visit: 2025       Dear MD Warren Lambert DO:    Thank you for referring Roland Kwan to me for evaluation. Below are my notes for this consultation.    If you have questions, please do not hesitate to call me. I look forward to following your patient along with you.         Sincerely,        Luigi Cash MD        CC: DO Luigi Murray MD  2025  3:34 PM  Sign when Signing Visit  Name: Roland Kwan      : 1965      MRN: [id number]  Encounter Provider: Luigi Cash MD  Encounter Date: 2025   Encounter department: [facility]  :  Assessment & Plan  Type 2 diabetes mellitus with stage 3a chronic kidney disease, without long-term current use of insulin (HCC)  Will need to discuss with cardiology and primary physician regarding SGLT2 inhibitor   Orders:  •  hydrALAZINE (APRESOLINE) 25 mg tablet; Take 1 tablet (25 mg total) by mouth 2 (two) times a day  •  Basic metabolic panel; Future  •  Magnesium; Future  •  Protein / creatinine ratio, urine; Future  •  Lipid Panel with Direct LDL reflex; Future    Benign hypertension with chronic kidney disease, stage III (HCC)  Blood pressure improving with some orthostatic changes so we are going to begin to taper meds  First taper hydralazine  Then taper clonidine patch  Continue nonmedical regimen with weight loss in particular and exercise    Orders:  •  hydrALAZINE (APRESOLINE) 25 mg tablet; Take 1 tablet (25 mg total) by mouth 2 (two) times a day  •  Basic metabolic panel; Future  •  Magnesium; Future  •  Protein / creatinine ratio, urine; Future  •  Lipid Panel with Direct LDL reflex; Future    Stage 3a chronic kidney disease (HCC)  At baseline no changes  Orders:  •  hydrALAZINE (APRESOLINE) 25 mg tablet; Take 1 tablet (25 mg total) by mouth 2  (two) times a day  •  Basic metabolic panel; Future  •  Magnesium; Future  •  Protein / creatinine ratio, urine; Future  •  Lipid Panel with Direct LDL reflex; Future    Dyslipidemia  Has been at baseline continue current treatment  Orders:  •  hydrALAZINE (APRESOLINE) 25 mg tablet; Take 1 tablet (25 mg total) by mouth 2 (two) times a day  •  Basic metabolic panel; Future  •  Magnesium; Future  •  Protein / creatinine ratio, urine; Future  •  Lipid Panel with Direct LDL reflex; Future        History of Present Illness  HPI  Roland Kwan is a 59 y.o. male who presents with follow-up regarding CKD and hypertension  There has been no hospitalizations or acute illnesses since last visit.  Patient  on Mounjaro and exercising with an 80 pound weight loss over 16 months  The patient overall is feeling well.  Good appetite and good energy some afternoon fatigue  No fevers, chills, or cough or colds.  No hematuria, dysuria, voiding symptoms or foamy urine; only occasional gross hematuria but this is chronic related to stone disease.  Followed by urology closely.  Has not passed the stone since in at least 24 months  No gastrointestinal symptoms  No cardiovascular symptoms including swelling of the legs  No headaches, but dizziness or lightheadedness upon standing a couple times a day  Blood pressure medications:  Clonidine patch #1 weekly  Eplerenone 25 twice a day  Hydralazine 50 mg twice a day  Bystolic 2.5 mg daily in the morning  Procardia XL 30 mg daily in the morning  Olmesartan 40 mg daily in the morning  Torsemide 5 mg daily in the morning    Renal pertinent medications:  Eliquis 5 mg twice a day  Atorvastatin 40 mg daily  Flomax 0.4 mg with dinner  Mounjaro        Review of Systems  Please see HPI, otherwise the review of systems as completely reviewed with the patient are negative    Pertinent Medical History  1. Hypertension:  Most compatible with essential hypertension/resistant hypertension:  Secondary  workup:  -normal thyroid function study  -severe HARPER on BiPAP  -aldosterone/renin ratio:  Essentially negative at 20 with an aldosterone level of 8.7  -plasma free metanephrines:  Negative  -24 hour urine for free cortisol:  Negative  -renal artery duplex:  No evidence of significant renal artery disease; but somewhat limited by body habitus and overlying bowel gas  Again obesity may be contributing to his hypertension  Current home blood pressure readings:  A.m. 115/70  P.m.: 112/63  Heart rate: 60-90 range        GOAL BLOOD PRESSURE:  LESS THAN 125/80 GIVEN CKD/recent hemorrhagic stroke     Recommendations:  continue to push nonmedical regimen including isotonic exercise, avoidance of salt and weight loss; patient counseled as such  GIVEN TIKOSYN:  AMILORIDE/SPIRONOLACTONE/HCTZ ARE CONTRAINDICATED.  LOOP DIURETIC SUCH AS FUROSEMIDE OR TORSEMIDE ARE ACCEPTABLE.     Medication changes today:     I will decrease hydralazine 25 mg twice a day and potentially stop this in a few weeks checking morning evening sitting and standing for orthostasis  Next agent to taper off as the clonidine patch  Continue excellent weight loss          DISCUSSED WITH DR. TAVERA EPLERENONE IS TOTALLY ACCEPTABLE!              2. CKD stage 3:    Baseline creatinine is ranged anywhere from 1.1-1.6  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  Current creatinine: 1.30 baseline from 7/2/2025  Electrolytes are normal including potassium of 4.2  UA:  No proteinuria and no hematuria, 2-4 WBCs:   Urine protein creatinine ratio:  0.10 g at goal from 7/2/2025  MBD:  Of CKD:  Calcium/magnesium are normal/PTH 46.9; vitamin D 32.7 at goal from 12/28/2024  CBC:  Normal with a hemoglobin of 15.4  Renal ultrasound:  12/03/2020:  Echogenic focus in the left lower pole possible fat versus calculus without shadowing.  Thickened bladder with UV junction still present although perhaps slightly pronounced.  Followed by Urology:  Visit with  Dr. Chery for possible right-sided ureterocele, history of cystoscopy and cystolitholapaxy status post right stent removal.  Bladder wall thickening resolution of hydronephrosis.  Possibility of congenital ureterocele was discussed 1 year follow-up with retroperitoneal ultrasound which would be November 2022.           3. Nephrolithiasis:  As mentioned previously he has had numerous stones.  No kidney stones at this time  Continue with General stone diet; in particular water intake 2 and half to 3 L a day all  Only further investigation with a 24 hour urine stone risk evaluation if recurrent stone     4. Dyslipidemia:  Current lipid profile:  LDL 40/HDL 37/triglycerides 80 from 12/20/2024  Goal:  LDL less than 70 given CKD/hemorrhagic stroke  Recommend:  Diet, exercise and weight loss, patient counseled as such  No change in medication as patient is at goal        5. Recent admission 03/07/2022 secondary to right-sided weakness with word-finding difficulty, found to have acute left intraparenchymal hemorrhage with vasogenic edema.  Patient admitted [facility] with neurosurgery evaluation.  Started on Cardene due to hypertension.  No neuro surgical intervention at that time was deemed necessary.  MRI in 3/9 demonstrated unchanged intraparenchymal hematoma as well as small acute infarct in the left corona radiata.  Patient was maintained on blood pressure control.  TTE demonstrated EF 65% otherwise grade 1 diastolic dysfunction.  Seen by Neurology as well recommended restarting anticoagulation 3 weeks after repeat CT scan along with PT/OT.  In regards to blood pressure: Amlodipine was stopped nifedipine 10 mg every 8 hours titrate as needed, continuing hydralazine.     6. Increased liver function studies:  Right upper quadrant ultrasound showed cholelithiasis hepatomegaly and hepatic steatosis.  Now followed by Dr. Jaymie Marcelo who believes it is related to statin therapy but is agreeable to continue  "the statin.  - LFTs normal     GI health maintenance:  Seen by Dr. Marcelo we will plan for colonoscopy 3-4 months after CVA:  Patient knows he is due for the colonoscopy will schedule with Dr. Marcelo.  Patient aware and states will set up an appointment for colonoscopy.  Rediscussed with the patient as of 6/19/2024 he knows potential risk of colon cancer and that he should set it up.           Medical History Reviewed by provider this encounter:     .  Past Medical History  Past Medical History[1]  Past Surgical History[2]  Family History[3]   reports that he quit smoking about 31 years ago. His smoking use included cigarettes. He started smoking about 46 years ago. He has a 7.5 pack-year smoking history. He has never used smokeless tobacco. He reports current alcohol use. He reports that he does not use drugs.  Current Outpatient Medications   Medication Instructions   • apixaban (ELIQUIS) 5 mg, Oral, 2 times daily   • atorvastatin (LIPITOR) 40 mg, Oral, Daily with dinner   • cloNIDine (CATAPRES-TTS-1) 0.1 mg/24 hr APPLY 1 PATCH ONE TIME PER WEEK   • dofetilide (TIKOSYN) 500 mcg, Oral, Every 12 hours   • eplerenone (INSPRA) 25 mg, Oral, 2 times daily   • hydrALAZINE (APRESOLINE) 25 mg, Oral, 2 times daily   • nebivolol (BYSTOLIC) 2.5 mg tablet TAKE 1 TABLET BY MOUTH DAILY HOLD FOR HEART RATE LESS THAN 50 BEATS PER MINUTE.   • NIFEdipine (PROCARDIA XL) 30 mg, Oral, 2 times daily   • olmesartan (BENICAR) 40 mg, Oral, Daily   • tamsulosin (FLOMAX) 0.4 mg, Oral, Daily with dinner   • Tirzepatide (Mounjaro) 10 MG/0.5ML SOAJ INJECT 10 MG UNDER THE SKIN ONE TIME PER WEEK   • torsemide (DEMADEX) 5 mg, Oral, Daily   Allergies[4]   Medications Ordered Prior to Encounter[5]   Social History[6]     Objective  Ht 5' 9\" (1.753 m)   Wt 109 kg (240 lb)   BMI 35.44 kg/m²      Physical Exam  BP sitting on left: 130/72 with a heart rate of 68 and regular  BP standing on left: 100/78 with a heart rate of 68 and regular  Physical Exam: " General:  No acute distress  Skin:  No acute rash  Eyes:  No scleral icterus and noninjected  ENT:  Moist mucous membranes  Neck:  Supple, no jugular venous distention, trachea midline, overall appearance is normal  Chest:  Clear to auscultation  CVS:  Regular rate and rhythm, without a rub or gallops  Abdomen:  Normal bowel sounds, soft and nontender and nondistended  Extremities: Trace pretibial edema bilaterally, and no cyanosis, no significant arthritic changes  Neuro:  No gross focality  Psych:  Alert and oriented and appropriate             [1]   Past Medical History:  Diagnosis Date   • Ankle swelling    • Arthritis    • Asthma    • Atrial fibrillation ([facility])    • Atrial fibrillation with RVR ([facility]) 04/04/2017   • Chronic kidney disease     kidney stones   • Class 3 severe obesity due to excess calories with serious comorbidity and body mass index (BMI) of 40.0 to 44.9 in adult 12/07/2020   • Gout    • Hypertension    • ICH (intracerebral hemorrhage) ([facility]) 03/08/2022   • Kidney stone    • Morbid obesity with BMI of 40.0-44.9, adult ([facility]) 08/31/2016   • Night sweats    • Obesity    • Seasonal allergies    • Sleep apnea    • Stroke ([facility]) 03/22   [2]   Past Surgical History:  Procedure Laterality Date   • APPENDECTOMY     • CARDIOVERSION      X4 last was 2018   • COLONOSCOPY     • CYSTOSCOPY  08/30/2016    w/removal of object, last assessed 8/30/16   • FOOT SURGERY Left    • HAND SURGERY     • KNEE SURGERY Right 2016   • MO CYSTO/URETERO W/LITHOTRIPSY &INDWELL STENT INSRT Left 08/09/2016    Procedure: CYSTOSCOPY URETEROSCOPY WITH LITHOTRIPSY HOLMIUM LASER STONE EXTRACTION, RETROGRADE PYELOGRAM AND INSERTION STENT URETERAL;  Surgeon: Sahil Chery MD;  Location: [facility];  Service: Urology last assessed 8/30/16   • MO CYSTO/URETERO W/LITHOTRIPSY &INDWELL STENT INSRT Right 08/24/2020    Procedure: CYSTOSCOPY , cystolitholapaxy of bladder stone WITH HOMIUM LASER RIGHT RETROGRADE AND RIGHT URETERAL STENT,  FULGERATION OF BLADDER;  Surgeon: Sahil Chery MD;  Location:  MAIN OR;  Service: Urology   • UPPER GASTROINTESTINAL ENDOSCOPY     • WRIST SURGERY Left 2014    Fracture surgery   [3]   Family History  Problem Relation Name Age of Onset   • Atrial fibrillation Mother Julianna Kwan    • Other Mother Julianna Kwan         blood dyscrasia   • Hypertension Mother Julianna Kwan    • Arthritis Mother Julianna Kwan    • Other Father          hypoglycemia    • Atrial fibrillation Father     • Diabetes Family     • Other Family          Thrombocytosis   • No Known Problems Sister     • Cancer Neg Hx     • Thyroid disease Neg Hx     [4] No Known Allergies  [5]   Current Outpatient Medications on File Prior to Visit   Medication Sig Dispense Refill   • apixaban (Eliquis) 5 mg Take 1 tablet (5 mg total) by mouth 2 (two) times a day 90 tablet 3   • atorvastatin (LIPITOR) 40 mg tablet Take 1 tablet (40 mg total) by mouth daily with dinner 90 tablet 1   • cloNIDine (CATAPRES-TTS-1) 0.1 mg/24 hr APPLY 1 PATCH ONE TIME PER WEEK 12 patch 0   • dofetilide (TIKOSYN) 500 mcg capsule Take 1 capsule (500 mcg total) by mouth every 12 (twelve) hours 60 capsule 5   • eplerenone (INSPRA) 25 mg tablet TAKE 1 TABLET BY MOUTH TWICE A  tablet 1   • nebivolol (BYSTOLIC) 2.5 mg tablet TAKE 1 TABLET BY MOUTH DAILY HOLD FOR HEART RATE LESS THAN 50 BEATS PER MINUTE. 90 tablet 1   • NIFEdipine (PROCARDIA XL) 30 mg 24 hr tablet TAKE 1 TABLET BY MOUTH TWICE A DAY (Patient taking differently: Take 30 mg by mouth in the morning.) 180 tablet 1   • olmesartan (BENICAR) 40 mg tablet TAKE 1 TABLET BY MOUTH EVERY DAY 90 tablet 1   • tamsulosin (FLOMAX) 0.4 mg TAKE 1 CAPSULE BY MOUTH EVERY DAY WITH DINNER 90 capsule 1   • Tirzepatide (Mounjaro) 10 MG/0.5ML SOAJ INJECT 10 MG UNDER THE SKIN ONE TIME PER WEEK 6 mL 1   • torsemide (DEMADEX) 5 MG tablet TAKE 1 TABLET (5 MG TOTAL) BY MOUTH DAILY. 90 tablet 1   • [DISCONTINUED] hydrALAZINE  (APRESOLINE) 50 mg tablet TAKE 1 TABLET BY MOUTH TWICE A  tablet 1     No current facility-administered medications on file prior to visit.   [6]   Social History  Tobacco Use   • Smoking status: Former     Current packs/day: 0.00     Average packs/day: 0.5 packs/day for 15.0 years (7.5 ttl pk-yrs)     Types: Cigarettes     Start date: 1979     Quit date: 1994     Years since quittin.2   • Smokeless tobacco: Never   Vaping Use   • Vaping status: Never Used   Substance and Sexual Activity   • Alcohol use: Yes     Comment: occ   • Drug use: No   • Sexual activity: Yes     Partners: Female     Birth control/protection: None

## 2025-07-09 NOTE — PATIENT INSTRUCTIONS
Visit summary:  - Your blood pressure is actually decreasing and there is some drop upon standing so we are going to begin to adjust your medications  - Otherwise labs look great    Also: I will check with Dr. Ingram and Dr. Mccarty regarding the possibility of an SGLT2 inhibitor such as Jardiance or Farxiga that have protective effects in the setting of any degree of chronic kidney disease with diabetes to protect both the heart and the kidneys.  Stay tuned I will get back to you once I have their clearance or recommendations    1. Medication changes today:  Please decrease hydralazine to 25 mg twice a day stop the 50 mg pills    2.  General instructions:  Continue excellent healthy habits  3 L of water a day and avoid salt and increase fruits and vegetables        3.  Please take 1 week a blood pressure readings 2 to 3 weeks after making the above medication change please do a week of blood pressure readings morning evening sitting and standing with heart rate before taking medications and send those into the office    AS FOLLOWS  MORNING AND EVENING, SITTING AND STANDING as follows:  TAKE THE MORNING READINGS BEFORE ANY MEDICATIONS AND WHEN YOU ARE RELAXED FOR SEVERAL MINUTES  TAKE THE EVENING READINGS:  BETWEEN 7-10 P.M.; PRIOR TO ANY MEDICATIONS; AT LEAST IN OUR  FROM DINNER; AND CERTAINLY AFTER RELAXING FOR A FEW MINUTES  PLEASE INCLUDE HEART RATE WITH YOUR BLOOD PRESSURE READINGS  When taking standing readings, keep your arm supported at heart level and not dangling  Make sure you are sitting with your back supported and feet on the ground and do not cross your legs or feet  Make sure you have not taken any coffee or caffeine products or exercised or smoke cigarettes at least 30 minutes before taking your blood pressure  Then please mail these readings into the office      4.  In 3 months:  Please go for nonfasting lab work but in the morning  Please take 1 week a blood pressure readings as outlined  above and mail those into the office      5.  Follow-up in 6 months  Please bring in 1 week a blood pressure readings morning evening, sitting and standing is outlined above  PLEASE BRING AN YOUR BLOOD PRESSURE MACHINE TO CORRELATE WITH THE OFFICE MACHINE AT THIS NEXT SCHEDULED VISIT  Please go for fasting lab work 1-2 weeks prior to your appointment      6.  General non medical recommendations:  AVOID SALT BUT NOT ADDING AN READING LABELS TO MAKE SURE THERE IS LOW-SALT IN THE FOOD THAT YOU ARE EATING  Goal is less than 2 g of sodium intake or less than 5 g of sodium chloride intake per day    Avoid nonsteroidal anti-inflammatory drugs such as Naprosyn, ibuprofen, Aleve, Advil, Celebrex, Meloxicam (Mobic) etc.  You can use Tylenol as needed if you do not have any liver condition to be concerned about    Avoid medications such as Sudafed or decongestants and antihistamines that contained the D component which is the decongestant.  You can take antihistamines without the decongestant or D component.    Try to avoid medications such as pantoprazole or  Protonix/Nexium or Esomeprazole)/Prilosec or omeprazole/Prevacid or lansoprazole/AcipHex or Rabeprazole.  If you are able to, use Pepcid as this is safer for your kidneys.    Please do not drink more than 2 glasses of alcohol/wine on a daily basis as this may contribute to your high blood pressure.

## 2025-07-14 DIAGNOSIS — E11.22 TYPE 2 DIABETES MELLITUS WITH STAGE 3A CHRONIC KIDNEY DISEASE, WITHOUT LONG-TERM CURRENT USE OF INSULIN (HCC): Primary | ICD-10-CM

## 2025-07-14 DIAGNOSIS — N18.31 TYPE 2 DIABETES MELLITUS WITH STAGE 3A CHRONIC KIDNEY DISEASE, WITHOUT LONG-TERM CURRENT USE OF INSULIN (HCC): Primary | ICD-10-CM

## 2025-08-03 DIAGNOSIS — N18.31 TYPE 2 DIABETES MELLITUS WITH STAGE 3A CHRONIC KIDNEY DISEASE, WITHOUT LONG-TERM CURRENT USE OF INSULIN (HCC): ICD-10-CM

## 2025-08-03 DIAGNOSIS — E78.5 DYSLIPIDEMIA: ICD-10-CM

## 2025-08-03 DIAGNOSIS — E55.9 VITAMIN D DEFICIENCY: ICD-10-CM

## 2025-08-03 DIAGNOSIS — I10 ACCELERATED HYPERTENSION: ICD-10-CM

## 2025-08-03 DIAGNOSIS — E66.813 CLASS 3 SEVERE OBESITY DUE TO EXCESS CALORIES WITH SERIOUS COMORBIDITY AND BODY MASS INDEX (BMI) OF 40.0 TO 44.9 IN ADULT: ICD-10-CM

## 2025-08-03 DIAGNOSIS — I12.9 BENIGN HYPERTENSION WITH CHRONIC KIDNEY DISEASE, STAGE III (HCC): ICD-10-CM

## 2025-08-03 DIAGNOSIS — N18.31 STAGE 3A CHRONIC KIDNEY DISEASE (HCC): ICD-10-CM

## 2025-08-03 DIAGNOSIS — E11.22 TYPE 2 DIABETES MELLITUS WITH STAGE 3A CHRONIC KIDNEY DISEASE, WITHOUT LONG-TERM CURRENT USE OF INSULIN (HCC): ICD-10-CM

## 2025-08-03 DIAGNOSIS — N18.30 BENIGN HYPERTENSION WITH CHRONIC KIDNEY DISEASE, STAGE III (HCC): ICD-10-CM

## 2025-08-03 DIAGNOSIS — I12.9 HYPERTENSIVE CHRONIC KIDNEY DISEASE WITH STAGE 1 THROUGH STAGE 4 CHRONIC KIDNEY DISEASE, OR UNSPECIFIED CHRONIC KIDNEY DISEASE: ICD-10-CM

## 2025-08-04 RX ORDER — HYDRALAZINE HYDROCHLORIDE 25 MG/1
25 TABLET, FILM COATED ORAL 2 TIMES DAILY
Qty: 180 TABLET | Refills: 1 | Status: SHIPPED | OUTPATIENT
Start: 2025-08-04

## 2025-08-04 RX ORDER — TORSEMIDE 5 MG/1
5 TABLET ORAL DAILY
Qty: 90 TABLET | Refills: 1 | Status: SHIPPED | OUTPATIENT
Start: 2025-08-04

## 2025-08-12 ENCOUNTER — OFFICE VISIT (OUTPATIENT)
Dept: FAMILY MEDICINE CLINIC | Facility: CLINIC | Age: 60
End: 2025-08-12
Payer: COMMERCIAL

## (undated) DEVICE — FIBER HOLMIUM MP 1000 MICRON

## (undated) DEVICE — GUIDEWIRE STRGHT TIP 0.035 IN  SOLO PLUS

## (undated) DEVICE — UROCATCH BAG

## (undated) DEVICE — STERILE POLYISOPRENE POWDER-FREE SURGICAL GLOVES: Brand: PROTEXIS

## (undated) DEVICE — SPECIMEN CONTAINER STERILE PEEL PACK

## (undated) DEVICE — CATH URETERAL 5FR X 70 CM FLEX TIP POLYUR BARD

## (undated) DEVICE — STERILE POLYISOPRENE POWDER-FREE SURGICAL GLOVES WITH EMOLLIENT COATING: Brand: PROTEXIS

## (undated) DEVICE — EVACUATOR BLADDER ELLIK DISP STRL

## (undated) DEVICE — LUBRICANT SURGILUBE TUBE 4 OZ  FLIP TOP

## (undated) DEVICE — CORD ENDO MONOPOLAR 10FT STRL DISP

## (undated) DEVICE — TUBING SUCTION 5MM X 12 FT

## (undated) DEVICE — SCD SEQUENTIAL COMPRESSION COMFORT SLEEVE MEDIUM KNEE LENGTH: Brand: KENDALL SCD

## (undated) DEVICE — PACK TUR